# Patient Record
Sex: MALE | Race: WHITE | NOT HISPANIC OR LATINO | Employment: OTHER | URBAN - METROPOLITAN AREA
[De-identification: names, ages, dates, MRNs, and addresses within clinical notes are randomized per-mention and may not be internally consistent; named-entity substitution may affect disease eponyms.]

---

## 2017-02-27 DIAGNOSIS — E11.9 TYPE 2 DIABETES MELLITUS WITHOUT COMPLICATION: ICD-10-CM

## 2017-03-06 ENCOUNTER — HOSPITAL ENCOUNTER (EMERGENCY)
Facility: HOSPITAL | Age: 52
Discharge: HOME | End: 2017-03-06
Attending: FAMILY MEDICINE
Payer: COMMERCIAL

## 2017-03-06 VITALS
WEIGHT: 315 LBS | BODY MASS INDEX: 41.75 KG/M2 | TEMPERATURE: 98 F | RESPIRATION RATE: 18 BRPM | DIASTOLIC BLOOD PRESSURE: 78 MMHG | OXYGEN SATURATION: 97 % | SYSTOLIC BLOOD PRESSURE: 162 MMHG | HEART RATE: 92 BPM | HEIGHT: 73 IN

## 2017-03-06 DIAGNOSIS — E11.621 DIABETIC FOOT ULCER: Primary | ICD-10-CM

## 2017-03-06 DIAGNOSIS — L97.509 DIABETIC FOOT ULCER: Primary | ICD-10-CM

## 2017-03-06 LAB
ALBUMIN SERPL BCP-MCNC: 3.1 G/DL
ALP SERPL-CCNC: 111 U/L
ALT SERPL W/O P-5'-P-CCNC: 17 U/L
ANION GAP SERPL CALC-SCNC: 8 MMOL/L
AST SERPL-CCNC: 11 U/L
BASOPHILS # BLD AUTO: 0.01 K/UL
BASOPHILS NFR BLD: 0.1 %
BILIRUB SERPL-MCNC: 0.9 MG/DL
BUN SERPL-MCNC: 16 MG/DL
CALCIUM SERPL-MCNC: 9 MG/DL
CHLORIDE SERPL-SCNC: 102 MMOL/L
CO2 SERPL-SCNC: 25 MMOL/L
CREAT SERPL-MCNC: 1.1 MG/DL
DIFFERENTIAL METHOD: ABNORMAL
EOSINOPHIL # BLD AUTO: 0.1 K/UL
EOSINOPHIL NFR BLD: 1.7 %
ERYTHROCYTE [DISTWIDTH] IN BLOOD BY AUTOMATED COUNT: 13 %
EST. GFR  (AFRICAN AMERICAN): >60 ML/MIN/1.73 M^2
EST. GFR  (NON AFRICAN AMERICAN): >60 ML/MIN/1.73 M^2
GLUCOSE SERPL-MCNC: 421 MG/DL
HCT VFR BLD AUTO: 40.6 %
HGB BLD-MCNC: 13.4 G/DL
LYMPHOCYTES # BLD AUTO: 2 K/UL
LYMPHOCYTES NFR BLD: 28.2 %
MCH RBC QN AUTO: 29.8 PG
MCHC RBC AUTO-ENTMCNC: 33 %
MCV RBC AUTO: 90 FL
MONOCYTES # BLD AUTO: 0.4 K/UL
MONOCYTES NFR BLD: 5.1 %
NEUTROPHILS # BLD AUTO: 4.5 K/UL
NEUTROPHILS NFR BLD: 64.6 %
PLATELET # BLD AUTO: 280 K/UL
PMV BLD AUTO: 10 FL
POTASSIUM SERPL-SCNC: 4.5 MMOL/L
PROT SERPL-MCNC: 7.4 G/DL
RBC # BLD AUTO: 4.5 M/UL
SODIUM SERPL-SCNC: 135 MMOL/L
WBC # BLD AUTO: 7.01 K/UL

## 2017-03-06 PROCEDURE — 85025 COMPLETE CBC W/AUTO DIFF WBC: CPT

## 2017-03-06 PROCEDURE — 25000003 PHARM REV CODE 250: Performed by: PHYSICIAN ASSISTANT

## 2017-03-06 PROCEDURE — 99284 EMERGENCY DEPT VISIT MOD MDM: CPT

## 2017-03-06 PROCEDURE — 80053 COMPREHEN METABOLIC PANEL: CPT

## 2017-03-06 PROCEDURE — 99285 EMERGENCY DEPT VISIT HI MDM: CPT | Mod: ,,, | Performed by: PHYSICIAN ASSISTANT

## 2017-03-06 RX ORDER — AMOXICILLIN AND CLAVULANATE POTASSIUM 875; 125 MG/1; MG/1
1 TABLET, FILM COATED ORAL 2 TIMES DAILY
Qty: 20 TABLET | Refills: 0 | Status: SHIPPED | OUTPATIENT
Start: 2017-03-06 | End: 2017-03-16

## 2017-03-06 RX ORDER — AMOXICILLIN AND CLAVULANATE POTASSIUM 875; 125 MG/1; MG/1
1 TABLET, FILM COATED ORAL
Status: COMPLETED | OUTPATIENT
Start: 2017-03-06 | End: 2017-03-06

## 2017-03-06 RX ADMIN — AMOXICILLIN AND CLAVULANATE POTASSIUM 1 TABLET: 875; 125 TABLET, FILM COATED ORAL at 03:03

## 2017-03-06 NOTE — ED AVS SNAPSHOT
[facility]  [address]  [facility] [address]               Andrew Del Cid Jr.   3/6/2017 12:58 PM   ED    Description:  Male : 1965   Department:  [facility]           Your Care was Coordinated By:     Provider Role From To    Papito Robins MD Attending Provider 17 1326 17 1411    Beth Lehman MD Attending Provider 17 1412 --    Lilo Sanabria PA-C Physician Assistant 17 1412 --      Reason for Visit     Diabetic Foot Ulcer           Diagnoses this Visit        Comments    Diabetic foot ulcer    -  Primary       ED Disposition     ED Disposition Condition Comment    Discharge             To Do List           Follow-up Information     Schedule an appointment as soon as possible for a visit with Mario Devante - Podiatry.    Specialty:  Podiatry    Contact information:    [address]  [facility] [id number] [phone]    Additional information:    [facility] [address]        Schedule an appointment as soon as possible for a visit with Jeannette Herrera MD.    Specialty:  Family Medicine    Contact information:    [address] [facility]  [address]  [facility] [address]  [phone]         These Medications        Disp Refills Start End    amoxicillin-clavulanate 875-125mg (AUGMENTIN) 875-125 mg per tablet 20 tablet 0 3/6/2017 3/16/2017    Take 1 tablet by mouth 2 (two) times daily. - Oral    Pharmacy: [facility]/pharmacy #[id number] - [address] Ph #: [phone]         [facility]     [facility] -  Assistance  Registered nurses in the [facility] provide clinical advisement, health education, appointment booking, and other advisory services.  Call for this free service at [phone].             Medications           Message regarding Medications     Verify the changes and/or additions to your medication regime  listed below are the same as discussed with your clinician today.  If any of these changes or additions are incorrect, please notify your healthcare provider.        START taking these NEW medications        Refills    amoxicillin-clavulanate 875-125mg (AUGMENTIN) 875-125 mg per tablet 0    Sig: Take 1 tablet by mouth 2 (two) times daily.    Class: Print    Route: Oral      These medications were administered today        Dose Freq    amoxicillin-clavulanate 875-125mg per tablet 1 tablet 1 tablet ED 1 Time    Sig: Take 1 tablet by mouth ED 1 Time.    Class: Normal    Route: Oral    Cosign for Ordering: Required by Beth Lehman MD           Verify that the below list of medications is an accurate representation of the medications you are currently taking.  If none reported, the list may be blank. If incorrect, please contact your healthcare provider. Carry this list with you in case of emergency.           Current Medications     amlodipine (NORVASC) 5 MG tablet Take 1 tablet (5 mg total) by mouth once daily.    atorvastatin (LIPITOR) 20 MG tablet Take 1 tablet (20 mg total) by mouth once daily.    gabapentin (NEURONTIN) 300 MG capsule Take 1 capsule (300 mg total) by mouth 3 (three) times daily.    insulin NPH-insulin regular, 70/30, (NOVOLIN 70/30) 100 unit/mL (70-30) injection 50 units sq in the am and 50 units sq in the pm    lisinopril 10 MG tablet Take 1 tablet (10 mg total) by mouth once daily.    metformin (GLUCOPHAGE) 500 MG tablet Take 2 tablets (1,000 mg total) by mouth 2 (two) times daily with meals. 2 po in the am and 1 po in the pm    methocarbamol (ROBAXIN) 500 MG Tab 1 po qhs prn    ondansetron (ZOFRAN-ODT) 8 MG TbDL Take 1 tablet (8 mg total) by mouth every 8 (eight) hours as needed.    amoxicillin-clavulanate 875-125mg (AUGMENTIN) 875-125 mg per tablet Take 1 tablet by mouth 2 (two) times daily.    aspirin (ECOTRIN) 81 MG EC tablet Take 81 mg by mouth once daily.           Clinical Reference  "Information           Your Vitals Were     BP Pulse Temp Resp Height Weight    162/78 92 98.4 °F (36.9 °C) (Oral) 18 6' 1" (1.854 m) 182.3 kg (402 lb)    SpO2 BMI             97% 53.04 kg/m2         Allergies as of 3/6/2017     No Known Allergies      Immunizations Administered on Date of Encounter - 3/6/2017     None      ED Micro, Lab, POCT     Start Ordered       Status Ordering Provider    03/06/17 1500 03/06/17 1459  Comprehensive metabolic panel  STAT      Final result     03/06/17 1332 03/06/17 1332  CBC auto differential  STAT      Final result     03/06/17 1332 03/06/17 1332    STAT,   Status:  Canceled      Canceled       ED Imaging Orders     Start Ordered       Status Ordering Provider    03/06/17 1333 03/06/17 1332  X-Ray Foot Complete Right  1 time imaging      Final result         Discharge Instructions       Follow up with your PCP and podiatrist. Take the antibiotic (augmentin) twice a day - do not skip any doses and take all the medication. Continue wound care at home. Return to the ED for any new or worsening symptoms, including those listed below.        Discharge Instructions for Diabetic Foot Ulcers  You have been diagnosed with foot ulcers related to diabetes. Diabetes is a disease that makes it very hard to control your blood sugar. One dangerous complication of diabetes is a higher risk of developing serious foot problems. Wounds, even minor ones, can easily become infected when you have diabetes. These infections can become life-threatening if they aren't treated. Infections that settle in the bones can also spread throughout your foot and leg, destroying bone as they travel.    Your healthcare provider wants you to practice good diabetic foot care. This can help make sure that hot spots, small cracks, or sores are treated before they get infected. If infection is already present, medicines may be prescribed. Follow the tips on this sheet to take better care of your feet.  After surgery  If " you have had surgery, change your dressings every 12 hours to prevent infection. Regular wound care helps keep your foot free of infection and aids healing:  · Wash your hands.  · Put on disposable gloves if your foot is infected.  · Gently remove the old dressing and discard it in a plastic bag.  · Take off the gloves.  · Wash your hands again.  · Put on new gloves.  · Clean and dress the wound as directed by your healthcare provider.  · Discard any used materials or trash in a plastic bag before placing in a trash can.  · Dispose of sharp objects in specially designated sharps containers.  Daily foot check   Here is what you can do:  · With a mirror, look at the bottom of your feet every day. This way, you can catch small skin changes before they turn into bigger problems, such as ulcers, or before they become infected.  · Call your healthcare provider right away if you notice any of the following: hot spots, red streaks, swelling, cracks, sores, injuries, or foreign objects embedded in your foot.  · Before putting on shoes, check the soles and insides for ramírez or splinters. Remove these as they could break the skin or put added pressure on your feet.  Foot care  Suggestions include the following:   · Wash your feet every day; use lukewarm (not hot) water and mild soap. Make sure to wash between your toes.  · Use a soft towel to dry your feet well, especially between the toes. Pat gently; don't rub.  · Apply a cream or lanolin lotion, especially on the heels, to keep the skin smooth. If it is cracked, talk to your healthcare provider about how to treat it. Do not apply lotion between the toes as this can lead to fungal infections.   · Dust your feet with nonmedicated powder before putting on shoes, socks, or stockings. This will help keep them dry.  · Before putting on your shoes, check your socks to make sure they are not bunched up. Also make sure they don't have folds or creases in them. Even little bumps  created by bunched socks can cause a serious foot wound.   · Talk to your healthcare provider before treating calluses, corns, or bunions.  · To prevent ingrown toenails, cut toenails straight across. Try soaking your toenails in warm water to soften them before cutting.  · Try to keep your feet from getting too hot or too cold.  · Don't go barefoot.  · Avoid rough surfaces or surfaces with sharp objects.  · Don't wear shoes that are too tight or uncomfortable.  · Don't test the temperature of the bathtub water with your feet if you have diminished sensation.  · Follow your healthcare providers instructions about walking and other activity. There may be some restrictions depending on the condition of your feet. You may need special shoes or inserts to take the pressure off the ulcers.   · Take all medicines exactly as directed.  Follow-Up  Your healthcare provider may refer you to a special wound-care center for treatment.     When to call your healthcare provider  Call your healthcare provider if you have any of the following:  · Fever of 100.4°F (38°C) or higher, or as directed by your healthcare provider   · Redness, swelling, or pain in the foot that doesn't go away  · Numbness or tingling in any part of your foot  · Chills, light-headedness, or fainting  · Odor from wounds or swollen areas   Date Last Reviewed: 6/1/2016  © 8873-3779 [employer]. [address] [place] [address]. All rights reserved. This information is not intended as a substitute for professional medical care. Always follow your healthcare professional's instructions.          Diabetic Foot Care  Diabetes can lead to a number of foot complications. Fortunately, you can prevent most of these with a little extra foot care. If diabetes is not well controlled, it can cause damage to blood vessels and result in poor circulation to the foot. When the skin does not get enough blood flow, it becomes prone to pressure sores and  ulcers, which heal slowly.  Diabetes can also damage nerves, interfering with the ability to feel pain and pressure. When you cant feel your foot normally, it is easy to injure your skin, bones, and joints without knowing it. For these reasons diabetes increases the risk of fungal infections, bunions, and ulcers. An ulcer is a sore or break in the skin. With ulcers, often the skin seems to have worn away. Deep ulcers can lead to bone infection.  Gangrene is the most serious foot complication of diabetes. It usually occurs on the tips of the toes as blackened areas of skin. The black area is dead tissue. In severe cases, gangrene spreads to involve the entire toe, other toes, and the entire foot. Foot or toe amputation may be required. Good foot care and blood sugar control can prevent this.  Home care  · Wear comfortable, well-fitting shoes.  · Wash your feet daily with warm water and mild soap.  · After drying, apply a moisturizing cream or lotion to the top and bottom of your feet. Don't put lotion between toes.  · Check your feet daily for skin breaks, blisters, swelling, or redness. Look between your toes as well. If you cannot see the bottoms of your feet, ask someone to look or use a mirror.  · Wear cotton socks and change them every day.  · Trim toenails carefully, and do not cut your cuticles.  · Strive to keep your blood sugar under control with a combination of medicines, diet, and activity.  · If you smoke and have diabetes, it is very important that you stop. Smoking reduces blood flow to your foot.  · Schedule foot exams at least every year, or more often if you have foot problems.  · Put your feet up when sitting, wiggle toes, and move ankles to help improve blood flow.  Avoid activities that increase your risk of foot injury:  · Do not walk barefoot.  · Do not use heating pads or hot water bottles on your feet.  · Do not put your foot in a hot tub without first checking the temperature with your  hand.  Follow-up care  Follow up with your healthcare provider, or as advised. Be sure to take off your shoes and socks before your appointment starts so your healthcare provider will be sure to check your feet. Report any cut, puncture, scrape, blister, or other injury to your foot. Also report if you have a bunion, hammertoes, ingrown toenail, or ulcer on your foot.  When to seek medical advice  Call your healthcare provider right away if any of these occur:  · Black skin color anywhere on the foot  · Open ulcer with pus draining from the wound  · Increasing foot or leg pain  · New areas of redness or swelling or tender areas of the foot  · Fever of 100.4°F (38°C) or greater  Date Last Reviewed: 5/25/2016  © 2528-3731 [employer]. [address] [place] [address], [place] [address]. All rights reserved. This information is not intended as a substitute for professional medical care. Always follow your healthcare professional's instructions.           [facility] complies with applicable Federal civil rights laws and does not discriminate on the basis of race, color, national origin, age, disability, or sex.        Language Assistance Services     ATTENTION: Language assistance services are available, free of charge. Please call [phone].      ATENCIÓN: Si habla aleena, tiene a antony disposición servicios gratuitos de asistencia lingüística. Llame al [phone].     CHÚ Ý: N?u b?n nói Ti?ng Vi?t, có các d?ch v? h? tr? ngôn ng? mi?n phí dành cho b?n. G?i s? [phone].

## 2017-03-06 NOTE — ED TRIAGE NOTES
Pt states hx of diabetic ulcer on right great toe.  States blister started approx 2 weeks ago.  Usually heals silver nitrate.  Wife order meds yesterday.  Followed by foot md , will not be able see md for about 3 weeks.  Going out of town next week.  And has jury duty the following week.  Pt needs medication to heal until seeing md to avoid further infection.

## 2017-03-06 NOTE — DISCHARGE INSTRUCTIONS
Follow up with your PCP and podiatrist. Take the antibiotic (augmentin) twice a day - do not skip any doses and take all the medication. Continue wound care at home. Return to the ED for any new or worsening symptoms, including those listed below.        Discharge Instructions for Diabetic Foot Ulcers  You have been diagnosed with foot ulcers related to diabetes. Diabetes is a disease that makes it very hard to control your blood sugar. One dangerous complication of diabetes is a higher risk of developing serious foot problems. Wounds, even minor ones, can easily become infected when you have diabetes. These infections can become life-threatening if they aren't treated. Infections that settle in the bones can also spread throughout your foot and leg, destroying bone as they travel.    Your healthcare provider wants you to practice good diabetic foot care. This can help make sure that hot spots, small cracks, or sores are treated before they get infected. If infection is already present, medicines may be prescribed. Follow the tips on this sheet to take better care of your feet.  After surgery  If you have had surgery, change your dressings every 12 hours to prevent infection. Regular wound care helps keep your foot free of infection and aids healing:  · Wash your hands.  · Put on disposable gloves if your foot is infected.  · Gently remove the old dressing and discard it in a plastic bag.  · Take off the gloves.  · Wash your hands again.  · Put on new gloves.  · Clean and dress the wound as directed by your healthcare provider.  · Discard any used materials or trash in a plastic bag before placing in a trash can.  · Dispose of sharp objects in specially designated sharps containers.  Daily foot check   Here is what you can do:  · With a mirror, look at the bottom of your feet every day. This way, you can catch small skin changes before they turn into bigger problems, such as ulcers, or before they become  infected.  · Call your healthcare provider right away if you notice any of the following: hot spots, red streaks, swelling, cracks, sores, injuries, or foreign objects embedded in your foot.  · Before putting on shoes, check the soles and insides for ramírez or splinters. Remove these as they could break the skin or put added pressure on your feet.  Foot care  Suggestions include the following:   · Wash your feet every day; use lukewarm (not hot) water and mild soap. Make sure to wash between your toes.  · Use a soft towel to dry your feet well, especially between the toes. Pat gently; don't rub.  · Apply a cream or lanolin lotion, especially on the heels, to keep the skin smooth. If it is cracked, talk to your healthcare provider about how to treat it. Do not apply lotion between the toes as this can lead to fungal infections.   · Dust your feet with nonmedicated powder before putting on shoes, socks, or stockings. This will help keep them dry.  · Before putting on your shoes, check your socks to make sure they are not bunched up. Also make sure they don't have folds or creases in them. Even little bumps created by bunched socks can cause a serious foot wound.   · Talk to your healthcare provider before treating calluses, corns, or bunions.  · To prevent ingrown toenails, cut toenails straight across. Try soaking your toenails in warm water to soften them before cutting.  · Try to keep your feet from getting too hot or too cold.  · Don't go barefoot.  · Avoid rough surfaces or surfaces with sharp objects.  · Don't wear shoes that are too tight or uncomfortable.  · Don't test the temperature of the bathtub water with your feet if you have diminished sensation.  · Follow your healthcare providers instructions about walking and other activity. There may be some restrictions depending on the condition of your feet. You may need special shoes or inserts to take the pressure off the ulcers.   · Take all medicines  exactly as directed.  Follow-Up  Your healthcare provider may refer you to a special wound-care center for treatment.     When to call your healthcare provider  Call your healthcare provider if you have any of the following:  · Fever of 100.4°F (38°C) or higher, or as directed by your healthcare provider   · Redness, swelling, or pain in the foot that doesn't go away  · Numbness or tingling in any part of your foot  · Chills, light-headedness, or fainting  · Odor from wounds or swollen areas   Date Last Reviewed: 6/1/2016 © 2000-2016 [employer]. [address] [place] [address]. All rights reserved. This information is not intended as a substitute for professional medical care. Always follow your healthcare professional's instructions.          Diabetic Foot Care  Diabetes can lead to a number of foot complications. Fortunately, you can prevent most of these with a little extra foot care. If diabetes is not well controlled, it can cause damage to blood vessels and result in poor circulation to the foot. When the skin does not get enough blood flow, it becomes prone to pressure sores and ulcers, which heal slowly.  Diabetes can also damage nerves, interfering with the ability to feel pain and pressure. When you cant feel your foot normally, it is easy to injure your skin, bones, and joints without knowing it. For these reasons diabetes increases the risk of fungal infections, bunions, and ulcers. An ulcer is a sore or break in the skin. With ulcers, often the skin seems to have worn away. Deep ulcers can lead to bone infection.  Gangrene is the most serious foot complication of diabetes. It usually occurs on the tips of the toes as blackened areas of skin. The black area is dead tissue. In severe cases, gangrene spreads to involve the entire toe, other toes, and the entire foot. Foot or toe amputation may be required. Good foot care and blood sugar control can prevent this.  Home  care  · Wear comfortable, well-fitting shoes.  · Wash your feet daily with warm water and mild soap.  · After drying, apply a moisturizing cream or lotion to the top and bottom of your feet. Don't put lotion between toes.  · Check your feet daily for skin breaks, blisters, swelling, or redness. Look between your toes as well. If you cannot see the bottoms of your feet, ask someone to look or use a mirror.  · Wear cotton socks and change them every day.  · Trim toenails carefully, and do not cut your cuticles.  · Strive to keep your blood sugar under control with a combination of medicines, diet, and activity.  · If you smoke and have diabetes, it is very important that you stop. Smoking reduces blood flow to your foot.  · Schedule foot exams at least every year, or more often if you have foot problems.  · Put your feet up when sitting, wiggle toes, and move ankles to help improve blood flow.  Avoid activities that increase your risk of foot injury:  · Do not walk barefoot.  · Do not use heating pads or hot water bottles on your feet.  · Do not put your foot in a hot tub without first checking the temperature with your hand.  Follow-up care  Follow up with your healthcare provider, or as advised. Be sure to take off your shoes and socks before your appointment starts so your healthcare provider will be sure to check your feet. Report any cut, puncture, scrape, blister, or other injury to your foot. Also report if you have a bunion, hammertoes, ingrown toenail, or ulcer on your foot.  When to seek medical advice  Call your healthcare provider right away if any of these occur:  · Black skin color anywhere on the foot  · Open ulcer with pus draining from the wound  · Increasing foot or leg pain  · New areas of redness or swelling or tender areas of the foot  · Fever of 100.4°F (38°C) or greater  Date Last Reviewed: 5/25/2016  © 3027-9159 [employer]. [address] [place] [address], [place] [address]. All rights  reserved. This information is not intended as a substitute for professional medical care. Always follow your healthcare professional's instructions.

## 2017-03-06 NOTE — PROVIDER PROGRESS NOTES - EMERGENCY DEPT.
Encounter Date: 3/6/2017    ED Physician Progress Notes        Physician Note:   Patient is a 51-year-old man with a history of poorly controlled diabetes and a history of prior diabetic foot ulcers past the same area has become ulcerated infected requiring hospitalization and approximately 18 months to heal over the past week.  The callus has gotten larger and more tender and over the last few days.  The to ruptured and he's been draining purulent fluid.  Since then he doesn't have any major systemic symptoms.  We will refer to the ED side for evaluation and decision on management

## 2017-03-06 NOTE — ED PROVIDER NOTES
Encounter Date: 3/6/2017       History     Chief Complaint   Patient presents with    Diabetic Foot Ulcer     getting bigger, denies chills and no fever     Review of patient's allergies indicates:  No Known Allergies    HPI Comments: 50 y/o WM with PMHx of DM2, HTN, hyperlipidemia, diabetic neuropathy presents to the ED c/o diabetic ulcer to R great toe.  He noticed a blister to the plantar aspect of the R great toe 2 weeks ago that pooped and has been draining serous fluid. He denies any purulent drainage. He had an ulceration to the same area in the past that took months to heal.  He has been applying antibiotic ointment and keeping the area clean. He denies any trauma to the toe. He is followed by podiatry but will be unable to see them for the next 2 weeks because he is going on a cruise then has jury duty. He is a poorly controlled and his glucose this morning was 271. He denies any pain to the foot. He denies f/c, chest pain, SOB, abdominal pain, n/v. He denies tobacco, alcohol, or drug use.     The history is provided by the patient.     Past Medical History:   Diagnosis Date    Diabetes mellitus, type 2     Hyperlipidemia     Hypertension     Peripheral neuropathy      Past Surgical History:   Procedure Laterality Date    APPENDECTOMY      HERNIA REPAIR       History reviewed. No pertinent family history.  Social History   Substance Use Topics    Smoking status: Never Smoker    Smokeless tobacco: None    Alcohol use Yes     Review of Systems   Constitutional: Negative for chills and fever.   HENT: Negative for congestion, rhinorrhea and sore throat.    Eyes: Negative for photophobia and visual disturbance.   Respiratory: Negative for shortness of breath.    Cardiovascular: Negative for chest pain.   Gastrointestinal: Negative for abdominal pain, constipation, diarrhea, nausea and vomiting.   Genitourinary: Negative for dysuria and hematuria.   Musculoskeletal: Negative for neck pain and neck  stiffness.   Skin: Positive for color change and wound.   Neurological: Negative for dizziness, syncope, weakness, light-headedness, numbness and headaches.   Psychiatric/Behavioral: Negative for confusion.       Physical Exam   Initial Vitals   BP Pulse Resp Temp SpO2   03/06/17 1154 03/06/17 1154 03/06/17 1154 03/06/17 1154 03/06/17 1154   162/78 92 18 98.4 °F (36.9 °C) 97 %     Physical Exam    Nursing note and vitals reviewed.  Constitutional: He appears well-developed and well-nourished. He is not diaphoretic. No distress.   HENT:   Head: Normocephalic and atraumatic.   Neck: Normal range of motion. Neck supple.   Cardiovascular: Normal rate, regular rhythm and normal heart sounds. Exam reveals no gallop and no friction rub.    No murmur heard.  Pulmonary/Chest: Breath sounds normal. He has no wheezes. He has no rhonchi. He has no rales.   Abdominal: Soft. Bowel sounds are normal. There is no tenderness. There is no rebound and no guarding.   Musculoskeletal: Normal range of motion.        Right foot: There is no tenderness and no bony tenderness.        Feet:    R pedal pulse 2+. Swelling noted to the R foot.    Neurological: He is alert and oriented to person, place, and time.   Skin: Skin is warm and dry. No rash noted. There is erythema (R great toe).   Psychiatric: He has a normal mood and affect.         ED Course   Procedures  Labs Reviewed   CBC W/ AUTO DIFFERENTIAL - Abnormal; Notable for the following:        Result Value    RBC 4.50 (*)     Hemoglobin 13.4 (*)     All other components within normal limits   COMPREHENSIVE METABOLIC PANEL - Abnormal; Notable for the following:     Sodium 135 (*)     Glucose 421 (*)     Albumin 3.1 (*)     All other components within normal limits        Imaging Results         X-Ray Foot Complete Right (Final result) Result time:  03/06/17 14:44:08    Final result by Rob Iraheta Jr., MD (03/06/17 14:44:08)    Narrative:    The right foot 3 views compared to  February 2015.  There are erosive changes involving the tarsometatarsal joints.  Hallux valgus and bunion formation noted.  Some bony fragmentation of the distal tarsals also evident.  Calcaneal spur is noted.    Impression significant detrimental change with marked erosive changes at the tarsometatarsal joints with some fragmentation and subluxation.  This likely represent Charcot joint however osteomyelitis not excluded.  Soft tissue swelling evident.      Electronically signed by: DOMINIQUE PHELPS MD  Date:     03/06/17  Time:    14:44                  Medical Decision Making:   History:   Old Medical Records: I decided to obtain old medical records.  Clinical Tests:   Lab Tests: Ordered and Reviewed  Radiological Study: Ordered and Reviewed       APC / Resident Notes:   52 y/o WM with PMHx of DM2, HTN, hyperlipidemia, diabetic neuropathy presents to the ED c/o diabetic ulcer to R great toe.  RRR without murmurs.  Lungs CTA bilaterally without wheezes. Abdomen soft and nontender. Ulceration noted to the plantar aspect of the R great toe. No purulent drainage. There is surrounding erythema noted of the toe. There is some swelling of the R foot. R pedal pulse 2+. Normal ROM of the foot. DDx includes but is not limited to diabetic foot ulceration with infection, fracture, retained foreign body. Labs and xray of the R foot ordered by intake physician. Will monitor for results.     CBC without leukocytosis with WBC 7.01. CMP shows hyperglycemia at 421 but no other acute abnormalities.    Xray of the R foot shows erosive changes at the tarsometatarsal joints. The ulceration is located at the plantar aspect of the R great toe distal to this abnormality on xray. Will treat for infected diabetic foot ulcer.     He was discharged with a prescription for augmentin (first dose given in the ED).  He will follow up with his podiatrist.  All of the patient's questions were answered.  I reviewed the patient's chart, labs, and  imaging and discussed the case with my supervising physician.            Attending Attestation:     Physician Attestation Statement for NP/PA:   I discussed this assessment and plan of this patient with the NP/PA, but I did not personally examine the patient. The face to face encounter was performed by the NP/PA.    Other NP/PA Attestation Additions:    History of Present Illness: 50 yo m, diabetic foot ulcer, increasing in size but no signs infection, no systemic sx, pt well-appearing clinically with normal VS.  No obvious signs osteo on xray.  Needs close f/u wound care and PCP for definitive management of ulcer.                   ED Course     Clinical Impression:   The encounter diagnosis was Diabetic foot ulcer.    Disposition:   Disposition: Discharged  Condition: Stable       Lilo Sanabria PA-C  03/06/17 [id number]       Beth Lehman MD  03/13/17 [id number]

## 2017-03-06 NOTE — ED NOTES
Pt identifiers Andrew Del Cid Jr. was checked and is correct  LOC: The patient is awake, alert, aware of environment with an appropriate affect. Oriented x3, speaking appropriately  APPEARANCE: Pt states hx of diabetic ulcer on right great toe.  States blister started approx 2 weeks ago.  Usually heals silver nitrate.  Wife order meds yesterday.  Followed by foot md , will not be able see md for about 3 weeks.  Going out of town next week.  And has jury duty the following week.  Pt needs medication to heal until seeing md to avoid further infection.   SKIN: Skin warm, dry and intact, normal skin turgor, moist mucus membranes. Large blister noted to right great toe.  Open and draining.    RESPIRATORY: Airway is open and patent, respirations are spontaneous, even and unlabored  CARDIAC: Normal rate and rhythm, no peripheral edema noted  ABDOMEN: Soft, nontender, nondistended. Bowel sounds present   NEUROLOGIC:patient moving all extremities spontaneously, normal sensation in all extremities when touched with a finger.  Follows all commands appropriately  MUSCULOSKELETAL: No obvious deformities. Pt has diabetic shoes

## 2017-05-07 RX ORDER — AMLODIPINE BESYLATE 5 MG/1
TABLET ORAL
Qty: 90 TABLET | Refills: 2 | Status: SHIPPED | OUTPATIENT
Start: 2017-05-07 | End: 2018-09-25 | Stop reason: SDUPTHER

## 2017-05-12 ENCOUNTER — PATIENT OUTREACH (OUTPATIENT)
Dept: ADMINISTRATIVE | Facility: HOSPITAL | Age: 52
End: 2017-05-12

## 2017-06-29 ENCOUNTER — HOSPITAL ENCOUNTER (INPATIENT)
Facility: HOSPITAL | Age: 52
LOS: 4 days | Discharge: HOME HEALTH | DRG: 478 | End: 2017-07-03
Attending: EMERGENCY MEDICINE | Admitting: HOSPITALIST
Payer: COMMERCIAL

## 2017-06-29 DIAGNOSIS — I73.9 PVD (PERIPHERAL VASCULAR DISEASE): ICD-10-CM

## 2017-06-29 DIAGNOSIS — E08.621 DIABETIC ULCER OF TOE OF RIGHT FOOT ASSOCIATED WITH DIABETES MELLITUS DUE TO UNDERLYING CONDITION, WITH FAT LAYER EXPOSED: Primary | ICD-10-CM

## 2017-06-29 DIAGNOSIS — L03.031 CELLULITIS OF TOE OF RIGHT FOOT: ICD-10-CM

## 2017-06-29 DIAGNOSIS — Z98.890 POSTOPERATIVE STATE: ICD-10-CM

## 2017-06-29 DIAGNOSIS — L97.512 DIABETIC ULCER OF TOE OF RIGHT FOOT ASSOCIATED WITH DIABETES MELLITUS DUE TO UNDERLYING CONDITION, WITH FAT LAYER EXPOSED: Primary | ICD-10-CM

## 2017-06-29 DIAGNOSIS — M86.171 ACUTE OSTEOMYELITIS OF TOE OF RIGHT FOOT: ICD-10-CM

## 2017-06-29 DIAGNOSIS — L97.519 DIABETIC ULCER OF RIGHT GREAT TOE: ICD-10-CM

## 2017-06-29 DIAGNOSIS — M86.271 SUBACUTE OSTEOMYELITIS OF RIGHT FOOT: ICD-10-CM

## 2017-06-29 DIAGNOSIS — E11.621 DIABETIC ULCER OF RIGHT GREAT TOE: ICD-10-CM

## 2017-06-29 LAB
ALBUMIN SERPL BCP-MCNC: 2.9 G/DL
ALP SERPL-CCNC: 119 U/L
ALT SERPL W/O P-5'-P-CCNC: 12 U/L
ANION GAP SERPL CALC-SCNC: 10 MMOL/L
AST SERPL-CCNC: 7 U/L
BASOPHILS # BLD AUTO: 0.01 K/UL
BASOPHILS NFR BLD: 0.1 %
BILIRUB SERPL-MCNC: 1.1 MG/DL
BUN SERPL-MCNC: 28 MG/DL
CALCIUM SERPL-MCNC: 8.8 MG/DL
CHLORIDE SERPL-SCNC: 97 MMOL/L
CO2 SERPL-SCNC: 24 MMOL/L
CREAT SERPL-MCNC: 1.2 MG/DL
DIFFERENTIAL METHOD: ABNORMAL
EOSINOPHIL # BLD AUTO: 0.1 K/UL
EOSINOPHIL NFR BLD: 1 %
ERYTHROCYTE [DISTWIDTH] IN BLOOD BY AUTOMATED COUNT: 12.4 %
EST. GFR  (AFRICAN AMERICAN): >60 ML/MIN/1.73 M^2
EST. GFR  (NON AFRICAN AMERICAN): >60 ML/MIN/1.73 M^2
GLUCOSE SERPL-MCNC: 310 MG/DL
HCT VFR BLD AUTO: 38.3 %
HGB BLD-MCNC: 12.8 G/DL
LACTATE SERPL-SCNC: 1.5 MMOL/L
LYMPHOCYTES # BLD AUTO: 2 K/UL
LYMPHOCYTES NFR BLD: 19.9 %
MCH RBC QN AUTO: 29 PG
MCHC RBC AUTO-ENTMCNC: 33.4 %
MCV RBC AUTO: 87 FL
MONOCYTES # BLD AUTO: 0.6 K/UL
MONOCYTES NFR BLD: 6.1 %
NEUTROPHILS # BLD AUTO: 7.3 K/UL
NEUTROPHILS NFR BLD: 72.5 %
PLATELET # BLD AUTO: 324 K/UL
PMV BLD AUTO: 9.3 FL
POTASSIUM SERPL-SCNC: 3.9 MMOL/L
PROT SERPL-MCNC: 7.5 G/DL
RBC # BLD AUTO: 4.41 M/UL
SODIUM SERPL-SCNC: 131 MMOL/L
WBC # BLD AUTO: 10.12 K/UL

## 2017-06-29 PROCEDURE — 96367 TX/PROPH/DG ADDL SEQ IV INF: CPT

## 2017-06-29 PROCEDURE — 12000002 HC ACUTE/MED SURGE SEMI-PRIVATE ROOM

## 2017-06-29 PROCEDURE — 96365 THER/PROPH/DIAG IV INF INIT: CPT

## 2017-06-29 PROCEDURE — 99285 EMERGENCY DEPT VISIT HI MDM: CPT | Mod: ,,, | Performed by: PHYSICIAN ASSISTANT

## 2017-06-29 PROCEDURE — 99284 EMERGENCY DEPT VISIT MOD MDM: CPT | Mod: 25

## 2017-06-29 PROCEDURE — 87070 CULTURE OTHR SPECIMN AEROBIC: CPT

## 2017-06-29 PROCEDURE — 85025 COMPLETE CBC W/AUTO DIFF WBC: CPT

## 2017-06-29 PROCEDURE — 87186 SC STD MICRODIL/AGAR DIL: CPT | Mod: 59

## 2017-06-29 PROCEDURE — 87077 CULTURE AEROBIC IDENTIFY: CPT

## 2017-06-29 PROCEDURE — 83605 ASSAY OF LACTIC ACID: CPT

## 2017-06-29 PROCEDURE — 87040 BLOOD CULTURE FOR BACTERIA: CPT | Mod: 59

## 2017-06-29 PROCEDURE — 96366 THER/PROPH/DIAG IV INF ADDON: CPT

## 2017-06-29 PROCEDURE — 80053 COMPREHEN METABOLIC PANEL: CPT

## 2017-06-29 RX ORDER — CEFEPIME HYDROCHLORIDE 2 G/50ML
2 INJECTION, SOLUTION INTRAVENOUS
Status: COMPLETED | OUTPATIENT
Start: 2017-06-30 | End: 2017-06-30

## 2017-06-30 PROBLEM — L03.031 CELLULITIS OF TOE OF RIGHT FOOT: Status: ACTIVE | Noted: 2017-06-30

## 2017-06-30 PROBLEM — I10 ESSENTIAL HYPERTENSION: Status: ACTIVE | Noted: 2017-06-30

## 2017-06-30 PROBLEM — E11.610 DIABETIC CHARCOT FOOT: Status: ACTIVE | Noted: 2017-06-30

## 2017-06-30 PROBLEM — R73.9 HYPERGLYCEMIA: Status: ACTIVE | Noted: 2017-06-30

## 2017-06-30 LAB
BACTERIA #/AREA URNS AUTO: ABNORMAL /HPF
BILIRUB UR QL STRIP: NEGATIVE
CLARITY UR REFRACT.AUTO: ABNORMAL
COLOR UR AUTO: YELLOW
GLUCOSE UR QL STRIP: ABNORMAL
HGB UR QL STRIP: NEGATIVE
HYALINE CASTS UR QL AUTO: 4 /LPF
KETONES UR QL STRIP: NEGATIVE
LEUKOCYTE ESTERASE UR QL STRIP: NEGATIVE
MICROSCOPIC COMMENT: ABNORMAL
NITRITE UR QL STRIP: NEGATIVE
PH UR STRIP: 5 [PH] (ref 5–8)
POCT GLUCOSE: 137 MG/DL (ref 70–110)
POCT GLUCOSE: 156 MG/DL (ref 70–110)
POCT GLUCOSE: 254 MG/DL (ref 70–110)
POCT GLUCOSE: 260 MG/DL (ref 70–110)
PROT UR QL STRIP: NEGATIVE
SP GR UR STRIP: 1.02 (ref 1–1.03)
SQUAMOUS #/AREA URNS AUTO: 0 /HPF
URN SPEC COLLECT METH UR: ABNORMAL
UROBILINOGEN UR STRIP-ACNC: NEGATIVE EU/DL
VANCOMYCIN TROUGH SERPL-MCNC: 18.7 UG/ML
WBC #/AREA URNS AUTO: 0 /HPF (ref 0–5)
YEAST UR QL AUTO: ABNORMAL

## 2017-06-30 PROCEDURE — 63600175 PHARM REV CODE 636 W HCPCS: Performed by: PHYSICIAN ASSISTANT

## 2017-06-30 PROCEDURE — 87070 CULTURE OTHR SPECIMN AEROBIC: CPT

## 2017-06-30 PROCEDURE — 25000003 PHARM REV CODE 250: Performed by: PHYSICIAN ASSISTANT

## 2017-06-30 PROCEDURE — 63600175 PHARM REV CODE 636 W HCPCS: Performed by: HOSPITALIST

## 2017-06-30 PROCEDURE — 25000003 PHARM REV CODE 250: Performed by: HOSPITALIST

## 2017-06-30 PROCEDURE — 80202 ASSAY OF VANCOMYCIN: CPT

## 2017-06-30 PROCEDURE — 99223 1ST HOSP IP/OBS HIGH 75: CPT | Mod: ,,, | Performed by: HOSPITALIST

## 2017-06-30 PROCEDURE — 99223 1ST HOSP IP/OBS HIGH 75: CPT | Mod: ,,, | Performed by: PODIATRIST

## 2017-06-30 PROCEDURE — 81003 URINALYSIS AUTO W/O SCOPE: CPT

## 2017-06-30 PROCEDURE — 87075 CULTR BACTERIA EXCEPT BLOOD: CPT

## 2017-06-30 PROCEDURE — 11000001 HC ACUTE MED/SURG PRIVATE ROOM

## 2017-06-30 PROCEDURE — 87077 CULTURE AEROBIC IDENTIFY: CPT

## 2017-06-30 PROCEDURE — 87186 SC STD MICRODIL/AGAR DIL: CPT

## 2017-06-30 PROCEDURE — 81001 URINALYSIS AUTO W/SCOPE: CPT

## 2017-06-30 PROCEDURE — 36415 COLL VENOUS BLD VENIPUNCTURE: CPT

## 2017-06-30 RX ORDER — IBUPROFEN 200 MG
24 TABLET ORAL
Status: DISCONTINUED | OUTPATIENT
Start: 2017-06-30 | End: 2017-07-03 | Stop reason: HOSPADM

## 2017-06-30 RX ORDER — OXYCODONE HYDROCHLORIDE 5 MG/1
15 TABLET ORAL EVERY 4 HOURS PRN
Status: DISCONTINUED | OUTPATIENT
Start: 2017-06-30 | End: 2017-07-03 | Stop reason: HOSPADM

## 2017-06-30 RX ORDER — GLUCAGON 1 MG
1 KIT INJECTION
Status: DISCONTINUED | OUTPATIENT
Start: 2017-06-30 | End: 2017-07-03 | Stop reason: HOSPADM

## 2017-06-30 RX ORDER — SODIUM CHLORIDE 9 MG/ML
INJECTION, SOLUTION INTRAVENOUS CONTINUOUS
Status: DISCONTINUED | OUTPATIENT
Start: 2017-06-30 | End: 2017-07-02

## 2017-06-30 RX ORDER — HYDROCODONE BITARTRATE AND ACETAMINOPHEN 5; 325 MG/1; MG/1
1 TABLET ORAL EVERY 4 HOURS PRN
Status: DISCONTINUED | OUTPATIENT
Start: 2017-06-30 | End: 2017-07-03 | Stop reason: HOSPADM

## 2017-06-30 RX ORDER — INSULIN ASPART 100 [IU]/ML
1-10 INJECTION, SOLUTION INTRAVENOUS; SUBCUTANEOUS
Status: DISCONTINUED | OUTPATIENT
Start: 2017-06-30 | End: 2017-07-03 | Stop reason: HOSPADM

## 2017-06-30 RX ORDER — OXYCODONE HYDROCHLORIDE 5 MG/1
10 TABLET ORAL EVERY 4 HOURS PRN
Status: DISCONTINUED | OUTPATIENT
Start: 2017-06-30 | End: 2017-07-03 | Stop reason: HOSPADM

## 2017-06-30 RX ORDER — AMLODIPINE BESYLATE 5 MG/1
5 TABLET ORAL DAILY
Status: DISCONTINUED | OUTPATIENT
Start: 2017-06-30 | End: 2017-07-03 | Stop reason: HOSPADM

## 2017-06-30 RX ORDER — INSULIN ASPART 100 [IU]/ML
8 INJECTION, SOLUTION INTRAVENOUS; SUBCUTANEOUS
Status: DISCONTINUED | OUTPATIENT
Start: 2017-06-30 | End: 2017-07-01

## 2017-06-30 RX ORDER — LISINOPRIL 10 MG/1
10 TABLET ORAL DAILY
Status: DISCONTINUED | OUTPATIENT
Start: 2017-06-30 | End: 2017-07-03 | Stop reason: HOSPADM

## 2017-06-30 RX ORDER — ONDANSETRON 2 MG/ML
4 INJECTION INTRAMUSCULAR; INTRAVENOUS EVERY 12 HOURS PRN
Status: DISCONTINUED | OUTPATIENT
Start: 2017-06-30 | End: 2017-07-03 | Stop reason: HOSPADM

## 2017-06-30 RX ORDER — CIPROFLOXACIN 2 MG/ML
400 INJECTION, SOLUTION INTRAVENOUS
Status: DISCONTINUED | OUTPATIENT
Start: 2017-06-30 | End: 2017-07-02

## 2017-06-30 RX ORDER — HEPARIN SODIUM 5000 [USP'U]/ML
7500 INJECTION, SOLUTION INTRAVENOUS; SUBCUTANEOUS EVERY 8 HOURS
Status: DISCONTINUED | OUTPATIENT
Start: 2017-06-30 | End: 2017-07-03 | Stop reason: HOSPADM

## 2017-06-30 RX ORDER — IBUPROFEN 200 MG
16 TABLET ORAL
Status: DISCONTINUED | OUTPATIENT
Start: 2017-06-30 | End: 2017-07-03 | Stop reason: HOSPADM

## 2017-06-30 RX ORDER — ACETAMINOPHEN 325 MG/1
650 TABLET ORAL EVERY 6 HOURS PRN
Status: DISCONTINUED | OUTPATIENT
Start: 2017-06-30 | End: 2017-07-03 | Stop reason: HOSPADM

## 2017-06-30 RX ORDER — METHOCARBAMOL 500 MG/1
500 TABLET, FILM COATED ORAL EVERY 8 HOURS PRN
Status: DISCONTINUED | OUTPATIENT
Start: 2017-06-30 | End: 2017-07-03 | Stop reason: HOSPADM

## 2017-06-30 RX ORDER — ASPIRIN 81 MG/1
81 TABLET ORAL DAILY
Status: DISCONTINUED | OUTPATIENT
Start: 2017-06-30 | End: 2017-07-03 | Stop reason: HOSPADM

## 2017-06-30 RX ORDER — POLYETHYLENE GLYCOL 3350 17 G/17G
17 POWDER, FOR SOLUTION ORAL DAILY
Status: DISCONTINUED | OUTPATIENT
Start: 2017-06-30 | End: 2017-07-03 | Stop reason: HOSPADM

## 2017-06-30 RX ORDER — IPRATROPIUM BROMIDE AND ALBUTEROL SULFATE 2.5; .5 MG/3ML; MG/3ML
3 SOLUTION RESPIRATORY (INHALATION) EVERY 4 HOURS PRN
Status: DISCONTINUED | OUTPATIENT
Start: 2017-06-30 | End: 2017-07-03 | Stop reason: HOSPADM

## 2017-06-30 RX ORDER — GABAPENTIN 300 MG/1
300 CAPSULE ORAL 3 TIMES DAILY
Status: DISCONTINUED | OUTPATIENT
Start: 2017-06-30 | End: 2017-07-03 | Stop reason: HOSPADM

## 2017-06-30 RX ADMIN — INSULIN ASPART 8 UNITS: 100 INJECTION, SOLUTION INTRAVENOUS; SUBCUTANEOUS at 09:06

## 2017-06-30 RX ADMIN — CEFEPIME HYDROCHLORIDE 2 G: 2 INJECTION, SOLUTION INTRAVENOUS at 12:06

## 2017-06-30 RX ADMIN — SODIUM CHLORIDE: 0.9 INJECTION, SOLUTION INTRAVENOUS at 03:06

## 2017-06-30 RX ADMIN — INSULIN DETEMIR 20 UNITS: 100 INJECTION, SOLUTION SUBCUTANEOUS at 08:06

## 2017-06-30 RX ADMIN — GABAPENTIN 300 MG: 300 CAPSULE ORAL at 01:06

## 2017-06-30 RX ADMIN — GABAPENTIN 300 MG: 300 CAPSULE ORAL at 06:06

## 2017-06-30 RX ADMIN — LISINOPRIL 10 MG: 10 TABLET ORAL at 08:06

## 2017-06-30 RX ADMIN — SODIUM CHLORIDE 1000 ML: 0.9 INJECTION, SOLUTION INTRAVENOUS at 12:06

## 2017-06-30 RX ADMIN — HEPARIN SODIUM 7500 UNITS: 5000 INJECTION, SOLUTION INTRAVENOUS; SUBCUTANEOUS at 06:06

## 2017-06-30 RX ADMIN — INSULIN ASPART 8 UNITS: 100 INJECTION, SOLUTION INTRAVENOUS; SUBCUTANEOUS at 01:06

## 2017-06-30 RX ADMIN — INSULIN ASPART 2 UNITS: 100 INJECTION, SOLUTION INTRAVENOUS; SUBCUTANEOUS at 05:06

## 2017-06-30 RX ADMIN — AMLODIPINE BESYLATE 5 MG: 5 TABLET ORAL at 08:06

## 2017-06-30 RX ADMIN — SODIUM CHLORIDE: 0.9 INJECTION, SOLUTION INTRAVENOUS at 08:06

## 2017-06-30 RX ADMIN — GABAPENTIN 300 MG: 300 CAPSULE ORAL at 09:06

## 2017-06-30 RX ADMIN — INSULIN ASPART 6 UNITS: 100 INJECTION, SOLUTION INTRAVENOUS; SUBCUTANEOUS at 09:06

## 2017-06-30 RX ADMIN — SODIUM CHLORIDE, PRESERVATIVE FREE 1000 ML: 5 INJECTION INTRAVENOUS at 08:06

## 2017-06-30 RX ADMIN — VANCOMYCIN HYDROCHLORIDE 1500 MG: 100 INJECTION, POWDER, LYOPHILIZED, FOR SOLUTION INTRAVENOUS at 08:06

## 2017-06-30 RX ADMIN — HEPARIN SODIUM 7500 UNITS: 5000 INJECTION, SOLUTION INTRAVENOUS; SUBCUTANEOUS at 09:06

## 2017-06-30 RX ADMIN — VANCOMYCIN HYDROCHLORIDE 1500 MG: 100 INJECTION, POWDER, LYOPHILIZED, FOR SOLUTION INTRAVENOUS at 01:06

## 2017-06-30 RX ADMIN — HEPARIN SODIUM 7500 UNITS: 5000 INJECTION, SOLUTION INTRAVENOUS; SUBCUTANEOUS at 01:06

## 2017-06-30 RX ADMIN — CIPROFLOXACIN 400 MG: 2 INJECTION, SOLUTION INTRAVENOUS at 11:06

## 2017-06-30 RX ADMIN — INSULIN ASPART 8 UNITS: 100 INJECTION, SOLUTION INTRAVENOUS; SUBCUTANEOUS at 05:06

## 2017-06-30 RX ADMIN — VANCOMYCIN HYDROCHLORIDE 2750 MG: 750 INJECTION, POWDER, LYOPHILIZED, FOR SOLUTION INTRAVENOUS at 12:06

## 2017-06-30 RX ADMIN — ASPIRIN 81 MG: 81 TABLET, COATED ORAL at 08:06

## 2017-06-30 RX ADMIN — INSULIN DETEMIR 20 UNITS: 100 INJECTION, SOLUTION SUBCUTANEOUS at 09:06

## 2017-06-30 RX ADMIN — CIPROFLOXACIN 400 MG: 2 INJECTION, SOLUTION INTRAVENOUS at 06:06

## 2017-06-30 NOTE — HPI
Pt is a 51 y/o male  has a past medical history of Diabetes mellitus, type 2; Hyperlipidemia; Hypertension; and Peripheral neuropathy. Admitted and consulted for diabetic foot wound of the right great toe. Pt is known to podiatry and last seen by Dr. Olguin. Pt relates he has been taking care of his wound at home for 2 months and it has recently worsened. Pt relates to new onset of pain to anterior ankle that is painful to touch. Denies recent N/V/F/C. No other complaints at this time.

## 2017-06-30 NOTE — H&P
"[facility] Medicine  History & Physical    Patient Name: Andrew Del Cid Jr.  MRN: [id number]  Admission Date: 6/29/2017  Attending Physician: Mike Luke DO   Primary Care Provider: Jeannette Herrera MD    [facility] Medicine Team: Networked reference to record PCT  Mike Luke DO     Patient information was obtained from patient and ER records.     Subjective:     Principal Problem:Diabetic ulcer of toe of right foot associated with diabetes mellitus due to underlying condition, with fat layer exposed    Chief Complaint:   Chief Complaint   Patient presents with    Diabetic Foot Ulcer     Pt reports ulcer to right big toe. Pt reports foul odor and "tip of toe falling off"        HPI:   52-year-old male with h/o uncontrolled DM2, diabetic neuropathy, HTN, HLD, morbid obesity  presents to the ER for evaluation of a diabetic wound to his right great toe.  The lesion has been present for a few months worsening for the past 2 weeks.  He has been trying to treat at home but the wound became very malodorous began to drain purulent green material. Also reports redness and swelling of his right great toe. He has no sensation to this area.  The past 2 days he reports decreased appetite with fever, chills and dizziness.     He is found to have temp of 100.3 F in ED. Received dose of vancomycin and cefepime in ER for right great toe wound infection.     NS 1 L bolus given for elevated glucose 310.     Past Medical History:   Diagnosis Date    Diabetes mellitus, type 2     Hyperlipidemia     Hypertension     Peripheral neuropathy        Past Surgical History:   Procedure Laterality Date    APPENDECTOMY      HERNIA REPAIR         Review of patient's allergies indicates:  No Known Allergies    No current facility-administered medications on file prior to encounter.      Current Outpatient Prescriptions on File Prior to Encounter   Medication Sig    amlodipine (NORVASC) 5 MG tablet TAKE 1 " TABLET BY MOUTH DAILY    aspirin (ECOTRIN) 81 MG EC tablet Take 81 mg by mouth once daily.    atorvastatin (LIPITOR) 20 MG tablet Take 1 tablet (20 mg total) by mouth once daily.    gabapentin (NEURONTIN) 300 MG capsule Take 1 capsule (300 mg total) by mouth 3 (three) times daily.    insulin NPH-insulin regular, 70/30, (NOVOLIN 70/30) 100 unit/mL (70-30) injection 50 units sq in the am and 50 units sq in the pm    lisinopril 10 MG tablet Take 1 tablet (10 mg total) by mouth once daily.    metformin (GLUCOPHAGE) 500 MG tablet Take 2 tablets (1,000 mg total) by mouth 2 (two) times daily with meals. 2 po in the am and 1 po in the pm    methocarbamol (ROBAXIN) 500 MG Tab 1 po qhs prn    ondansetron (ZOFRAN-ODT) 8 MG TbDL Take 1 tablet (8 mg total) by mouth every 8 (eight) hours as needed.     Family History     None        Social History Main Topics    Smoking status: Never Smoker    Smokeless tobacco: Never Used    Alcohol use No    Drug use: No    Sexual activity: Not on file     Review of Systems   Constitutional: Positive for chills and fever.   Skin: Positive for wound (Right great toe wound ( plantar aspect ) with purulent drainage with surrounding redness and swelling ).   Neurological: Positive for dizziness.     Objective:     Vital Signs (Most Recent):  Temp: 99.1 °F (37.3 °C) (06/30/17 0322)  Pulse: 84 (06/30/17 0322)  Resp: 17 (06/30/17 0322)  BP: (!) 119/55 (06/30/17 0322)  SpO2: 95 % (06/30/17 0322) Vital Signs (24h Range):  Temp:  [99.1 °F (37.3 °C)-100.3 °F (37.9 °C)] 99.1 °F (37.3 °C)  Pulse:  [] 84  Resp:  [17-18] 17  SpO2:  [95 %-97 %] 95 %  BP: (119-192)/(55-93) 119/55     Weight: (!) 183.8 kg (405 lb 3.3 oz)  Body mass index is 53.46 kg/m².    Physical Exam   Constitutional: He is oriented to person, place, and time. No distress.   Morbidly obese male in no distress    HENT:   Head: Normocephalic and atraumatic.   Right Ear: External ear normal.   Left Ear: External ear normal.    Nose: Nose normal.   Mouth/Throat: Oropharynx is clear and moist. No oropharyngeal exudate.   Eyes: Conjunctivae and EOM are normal. Pupils are equal, round, and reactive to light. Right eye exhibits no discharge. Left eye exhibits no discharge. No scleral icterus.   Neck: Normal range of motion. Neck supple. No JVD present. No tracheal deviation present. No thyromegaly present.   Cardiovascular: Normal rate, regular rhythm, normal heart sounds and intact distal pulses.  Exam reveals no gallop and no friction rub.    No murmur heard.  Pulmonary/Chest: Effort normal and breath sounds normal. No stridor. No respiratory distress. He has no wheezes. He has no rales. He exhibits no tenderness.   Abdominal: Soft. Bowel sounds are normal. He exhibits no distension and no mass. There is no tenderness. No hernia.   Musculoskeletal: Normal range of motion. He exhibits tenderness (Mild tenderss on palpation over right ankle, FROM intact ). He exhibits no edema or deformity.   Lymphadenopathy:     He has no cervical adenopathy.   Neurological: He is alert and oriented to person, place, and time. He has normal reflexes. He displays normal reflexes. No cranial nerve deficit. He exhibits normal muscle tone. Coordination normal.   Skin: Skin is warm and dry. No rash noted. He is not diaphoretic. No erythema. No pallor.   Large, deep wound over plantar aspect to right great toe with draining pus and surrounding erythema and edema. No fluctuance    Psychiatric: He has a normal mood and affect. His behavior is normal. Judgment and thought content normal.       Significant Labs:   Admission on 06/29/2017   Component Date Value Ref Range Status    WBC 06/29/2017 10.12  3.90 - 12.70 K/uL Final    RBC 06/29/2017 4.41* 4.60 - 6.20 M/uL Final    Hemoglobin 06/29/2017 12.8* 14.0 - 18.0 g/dL Final    Hematocrit 06/29/2017 38.3* 40.0 - 54.0 % Final    MCV 06/29/2017 87  82 - 98 fL Final    MCH 06/29/2017 29.0  27.0 - 31.0 pg Final     MCHC 06/29/2017 33.4  32.0 - 36.0 % Final    RDW 06/29/2017 12.4  11.5 - 14.5 % Final    Platelets 06/29/2017 324  150 - 350 K/uL Final    MPV 06/29/2017 9.3  9.2 - 12.9 fL Final    Gran # 06/29/2017 7.3  1.8 - 7.7 K/uL Final    Lymph # 06/29/2017 2.0  1.0 - 4.8 K/uL Final    Mono # 06/29/2017 0.6  0.3 - 1.0 K/uL Final    Eos # 06/29/2017 0.1  0.0 - 0.5 K/uL Final    Baso # 06/29/2017 0.01  0.00 - 0.20 K/uL Final    Gran% 06/29/2017 72.5  38.0 - 73.0 % Final    Lymph% 06/29/2017 19.9  18.0 - 48.0 % Final    Mono% 06/29/2017 6.1  4.0 - 15.0 % Final    Eosinophil% 06/29/2017 1.0  0.0 - 8.0 % Final    Basophil% 06/29/2017 0.1  0.0 - 1.9 % Final    Differential Method 06/29/2017 Automated   Final    Sodium 06/29/2017 131* 136 - 145 mmol/L Final    Potassium 06/29/2017 3.9  3.5 - 5.1 mmol/L Final    Chloride 06/29/2017 97  95 - 110 mmol/L Final    CO2 06/29/2017 24  23 - 29 mmol/L Final    Glucose 06/29/2017 310* 70 - 110 mg/dL Final    BUN, Bld 06/29/2017 28* 6 - 20 mg/dL Final    Creatinine 06/29/2017 1.2  0.5 - 1.4 mg/dL Final    Calcium 06/29/2017 8.8  8.7 - 10.5 mg/dL Final    Total Protein 06/29/2017 7.5  6.0 - 8.4 g/dL Final    Albumin 06/29/2017 2.9* 3.5 - 5.2 g/dL Final    Total Bilirubin 06/29/2017 1.1* 0.1 - 1.0 mg/dL Final    Alkaline Phosphatase 06/29/2017 119  55 - 135 U/L Final    AST 06/29/2017 7* 10 - 40 U/L Final    ALT 06/29/2017 12  10 - 44 U/L Final    Anion Gap 06/29/2017 10  8 - 16 mmol/L Final    eGFR if African American 06/29/2017 >60.0  >60 mL/min/1.73 m^2 Final    eGFR if non African American 06/29/2017 >60.0  >60 mL/min/1.73 m^2 Final    Specimen UA 06/30/2017 Urine, Clean Catch   Final    Color, UA 06/30/2017 Yellow  Yellow, Straw, Velvet Final    Appearance, UA 06/30/2017 Hazy* Clear Final    pH, UA 06/30/2017 5.0  5.0 - 8.0 Final    Specific Gravity, UA 06/30/2017 1.025  1.005 - 1.030 Final    Protein, UA 06/30/2017 Negative  Negative Final    Glucose,  UA 06/30/2017 3+* Negative Final    Ketones, UA 06/30/2017 Negative  Negative Final    Bilirubin (UA) 06/30/2017 Negative  Negative Final    Occult Blood UA 06/30/2017 Negative  Negative Final    Nitrite, UA 06/30/2017 Negative  Negative Final    Urobilinogen, UA 06/30/2017 Negative  <2.0 EU/dL Final    Leukocytes, UA 06/30/2017 Negative  Negative Final    Lactate (Lactic Acid) 06/29/2017 1.5  0.5 - 2.2 mmol/L Final    WBC, UA 06/30/2017 0  0 - 5 /hpf Final    Bacteria, UA 06/30/2017 Rare  None-Occ /hpf Final    Yeast, UA 06/30/2017 None  None Final    Squam Epithel, UA 06/30/2017 0  /hpf Final    Hyaline Casts, UA 06/30/2017 4* 0-1/lpf /lpf Final    Microscopic Comment 06/30/2017 SEE COMMENT   Final         Significant Imaging: I have reviewed and interpreted all pertinent imaging results/findings within the past 24 hours.     X-RAY RIGHT FOOT :  Evolving chronic changes of the foot as described above, without convincing acute displaced fracture, dislocation, or new osseous erosive/destructive process in this patient with Charcot joint.  If there is concern for osteomyelitis, consider three-phase bone scan.        Assessment/Plan:     Active Diagnoses:    Diagnosis Date Noted POA    PRINCIPAL PROBLEM:  Diabetic ulcer of toe of right foot associated with diabetes mellitus due to underlying condition, with fat layer exposed [E08.621, L97.512] 06/29/2017 Yes    Cellulitis of toe of right foot [L03.031] 06/30/2017 Yes    DM type 2, uncontrolled, with neuropathy [E11.40, E11.65] 06/30/2017 Yes    Essential hypertension [I10] 06/30/2017 Yes    Hyperglycemia [R73.9] 06/30/2017 Yes    Diabetic Charcot foot [E11.610] 06/30/2017 Yes    DM (diabetes mellitus), type 2, uncontrolled with complications [E11.8, E11.65] 06/04/2015 Yes    morbid obesity [I10] 06/04/2015 Yes      Problems Resolved During this Admission:    Diagnosis Date Noted Date Resolved POA     # Infected diabetic foot wound ( R great toe  )  - Subjective fever, chills at home   - afebrile here and no leukocytosis   - doesn't appear toxic   - Continue vancomycin and ciprofloxacin for pseudomonas coverage  - follow up with cultures, Check ESR/CRP  - Podiatry consult for further evaluation and possible I&D    # Diabetic charcot joint of right foot and diabetic neuropathy  - Continue neurontin     # Uncontrolled DM2 with hyperglycemia   - Last A1C 9 with glucose 310 today   - Not in DKA   - Patient on insulin 70/30 and metformin at home   - Doesn't check his CBG regularly   - Hold metformin while in hospital   - Will start on levemir 20 U bid AND Novolog 8 U TIDWM   - Adjust insulin dose based on CBG     # HTN   - Continue amlodipine and lisinopril       VTE Risk Mitigation         Ordered     heparin (porcine) injection 7,500 Units  Every 8 hours     Route:  Subcutaneous        06/30/17 0507     Medium Risk of VTE  Once      06/30/17 0507     Place CAT hose  Until discontinued      06/30/17 0305     Place sequential compression device  Until discontinued      06/30/17 0305        Mike Luke DO  Department of Hospital Medicine   [facility]

## 2017-06-30 NOTE — PLAN OF CARE
Problem: Patient Care Overview  Goal: Plan of Care Review  Outcome: Ongoing (interventions implemented as appropriate)  Patient resting in bed comfortably. IV intact and infusing free of infection and irration, fall precautions maintained no falls noted. Call light in reach bed locked and in lowest position. Non skid socks on while out of bed. Patient instructed to call for assistance. Skin integrity maintained as patient is independent with frequent positioning, educated patient about diabetic diet and insulin, No other complaints or concerns. Progressing towards goals. Will continue to monitor and follow careplan of care.

## 2017-06-30 NOTE — ED PROVIDER NOTES
"Encounter Date: 6/29/2017    SCRIBE #1 NOTE: I, Linette Zuleta, am scribing for, and in the presence of,  Dr. Hallman. I have scribed the following portions of the note - the APC attestation.       History     Chief Complaint   Patient presents with    Diabetic Foot Ulcer     Pt reports ulcer to right big toe. Pt reports foul odor and "tip of toe falling off"     52-year-old male presents to the ER for evaluation of a diabetic ulcer to his right great toe.  The lesion has been present for a few months worsening for the past 2 weeks.  He has been trying to treat at home but the wound became very malodorous began to drain purulent green material.  He has no sensation to this area.  The past 2 days he reports decreased appetite with fever and chills.           Review of patient's allergies indicates:  No Known Allergies  Past Medical History:   Diagnosis Date    Diabetes mellitus, type 2     Hyperlipidemia     Hypertension     Peripheral neuropathy      Past Surgical History:   Procedure Laterality Date    APPENDECTOMY      HERNIA REPAIR       History reviewed. No pertinent family history.  Social History   Substance Use Topics    Smoking status: Never Smoker    Smokeless tobacco: Never Used    Alcohol use No     Review of Systems   Constitutional: Positive for appetite change, chills and fever.   HENT: Negative for sore throat.    Respiratory: Negative for shortness of breath.    Cardiovascular: Negative for chest pain.   Gastrointestinal: Negative for nausea.   Genitourinary: Negative for dysuria.   Musculoskeletal: Negative for back pain.   Skin: Positive for color change and wound. Negative for rash.   Neurological: Negative for weakness.   Hematological: Does not bruise/bleed easily.       Physical Exam     Initial Vitals [06/29/17 2210]   BP Pulse Resp Temp SpO2   (!) 192/93 106 18 100.3 °F (37.9 °C) 96 %      MAP       126         Physical Exam    Constitutional: Vital signs are normal. He appears " well-developed and well-nourished.   HENT:   Head: Normocephalic and atraumatic.   Eyes: Conjunctivae are normal.   Cardiovascular: Regular rhythm. Tachycardia present.  Exam reveals no gallop and no friction rub.    No murmur heard.  Pulmonary/Chest: No respiratory distress. He has no wheezes. He has no rhonchi. He has no rales. He exhibits no tenderness.   Abdominal: Soft. Normal appearance, normal aorta and bowel sounds are normal.   Musculoskeletal: Normal range of motion.   Neurological: He is alert and oriented to person, place, and time.   Skin: Skin is warm and intact.   No sensation to feet.   R great toe, large diabetic ulcer, to the pad of the toe, malodor, green DC, ulcer approx 5cm diameter, extends into the base of the metatarsal   Psychiatric: He has a normal mood and affect. His speech is normal and behavior is normal. Cognition and memory are normal.         ED Course   Procedures  Labs Reviewed   CBC W/ AUTO DIFFERENTIAL - Abnormal; Notable for the following:        Result Value    RBC 4.41 (*)     Hemoglobin 12.8 (*)     Hematocrit 38.3 (*)     All other components within normal limits   COMPREHENSIVE METABOLIC PANEL - Abnormal; Notable for the following:     Sodium 131 (*)     Glucose 310 (*)     BUN, Bld 28 (*)     Albumin 2.9 (*)     Total Bilirubin 1.1 (*)     AST 7 (*)     All other components within normal limits   CULTURE, AEROBIC  (SPECIFY SOURCE)   LACTIC ACID, PLASMA         Imaging Results          MRI Lower Extremity Without Contrast Right (Final result)  Result time 07/01/17 05:20:28    Final result by Vazquez Krueger MD (07/01/17 05:20:28)                 Impression:        Osteomyelitis of the distal phalanx and distal aspect of the proximal phalanx of the first digit with probable interphalangeal septic joint and an overlying ulcer within the medial subcutaneous soft tissues.    Tenosynovitis of the flexor hallucis longus which may be sterile or infectious noting close proximity  to the aforementioned osseous changes.    Imaging findings suggesting Charcot joint centered at the midfoot.      Electronically signed by: CRISS ARAMBULA MD  Date:     07/01/17  Time:    05:20              Narrative:    Technique: Routine MRI evaluation of the distal aspect of the right forefoot performed without contrast.    Comparison: Radiograph 06/30/2017    Findings:    There is abnormal marrow edema within the distal phalanx and distal aspect of the proximal phalanx of the first digit with corresponding T1 signal abnormality in a medullary and geographic distribution.    Osseous destruction, disorganization and subluxation noted about the mid foot in keeping with sequela of Charcot joint.  Advanced degenerative changes noted about the first through third tarsometatarsal joints.    There is fluid within the flexor hallucis longus tendon sheath that extends into the area of abnormal marrow signal within the distal aspect of the first digit.  Remaining visualized flexor and extensor tendons demonstrate no significant abnormalities.    There is subcutaneous soft tissue ulceration within the medial aspect of the first digit.  Extensive signal hyperintensities noted within the dorsal subcutaneous soft tissues.  No definite focal drainable collection.                             X-Ray Foot Complete Right (Final result)  Result time 06/30/17 11:22:29    Final result by Dimas Steve MD (06/30/17 11:22:29)                 Impression:     Significant progression of neuropathic changes at the tarsometatarsal joint since 2015.      Soft tissue ulceration RIGHT great toe yet no definitive evidence for osteomyelitis.  MRI could further evaluate with increase sensitivity if indicated.      Electronically signed by: Dr. Dimas Steve MD  Date:     06/30/17  Time:    11:22              Narrative:    Comparison: Multiple, including 2015.    Findings:3 view examination.Significant degenerative changes identified level of  the tarsometatarsal joint, with marked joint space narrowing, exuberant bone production and rocker-bottom foot.  Findings are consistent with neuropathic changes.   No displaced fractures identified.  Soft tissues are unremarkable.MRI could further evaluate for underlying osteomyelitis if indicated.                             X-Ray Ankle Complete Right (Final result)  Result time 06/30/17 12:00:24    Final result by Jeison Marquez MD (06/30/17 12:00:24)                 Impression:     See above      Electronically signed by: Jeison Marquez MD  Date:     06/30/17  Time:    12:00              Narrative:    3 views    DJD.  Significant sclerotic and cystic changes identified involving the tarsal bones.  bony spurs seen arising from the calcaneus.  No acute fractures or dislocation.  Vascular calcifications.                             US Lower Extremity Veins Bilateral (Final result)  Result time 06/30/17 10:11:07    Final result by Papito Esquivel MD (06/30/17 10:11:07)                 Impression:        No evidence of deep venous thrombosis bilaterally.    Enlarged right groin lymph node with normal appearing fatty hilum. Clinical correlation is recommended to determine the need for and timing of followup.  ______________________________________     Electronically signed by resident: PAPITO THOMAS  Date:     06/30/17  Time:    10:08            As the supervising and teaching physician, I personally reviewed the images and resident's interpretation and I agree with the findings.          Electronically signed by: PAPITO ESQUIVEL MD  Date:     06/30/17  Time:    10:11              Narrative:    Time of Procedure: 06/30/17 09:25:00  Accession # [id number]    Technique: Duplex and color flow Doppler evaluation of the bilateral lower extremities.    Clinical indication:  swelling, pain.    Findings:    Right - There is no evidence of acute venous thrombosis in the common femoral, superficial femoral, greater saphenous,  popliteal, peroneal, anterior tibial and posterior tibial veins.  There is no reflux to suggest valvular incompetence.    There is a enlarged lymph node with fatty and greater saphenous vein and common femoral vein junction which measures 4.6 x 1.4 x 2.1 cm.    Left - There is no evidence of acute venous thrombosis in the common femoral, superficial femoral, greater saphenous, popliteal, peroneal, anterior tibial and posterior tibial veins.  There is no reflux to suggest valvular incompetence.                             X-Ray Foot Complete Right (Final result)  Result time 06/29/17 23:56:27    Final result by Chapin Beckman MD (06/29/17 23:56:27)                 Impression:      1.  Evolving chronic changes of the foot as described above, without convincing acute displaced fracture, dislocation, or new osseous erosive/destructive process in this patient with Charcot joint.  If there is concern for osteomyelitis, consider three-phase bone scan.      Electronically signed by: CHAPIN BECKMAN MD  Date:     06/29/17  Time:    23:56              Narrative:    Foot complete review right    Clinical history: Type 2 diabetes    Comparison: 3/6/2017    Findings:  3 views.    There is Charcot joint involving the proximal foot at the tarsometatarsal joints, noting in comparison to the previous examination, there has been some interval osseous hypertrophy and sclerotic change without significant changes in the orientation of the metatarsals in relationship to the tarsals.  No soniya dislocation.  No convincing acute displaced fracture.  No new osseous erosive or destructive process.  There is diffuse edema about the foot although improved somewhat since the previous examination.  No radiopaque foreign body.                                 Medical Decision Making:   History:   Old Medical Records: I decided to obtain old medical records.  Old Records Summarized: records from previous admission(s).  Clinical Tests:   Lab  Tests: Ordered and Reviewed  Radiological Study: Ordered and Reviewed  ED Management:  52-year-old male with a diabetic foot ulcer.   The toe appears to have good circulation.  The ulcer is very malodorous with a pseudomonas-like smell.  He also is large and Compazine the entire pad of the right great toe and going into the joint the base of the metacarpal.  There is some erythema going up the foot.     Wound cultures blood cultures ordered.  Patient started on broad-spectrum IV antibiotics.  Will admit to internal medicine with podiatry consult in the morning  Other:   I have discussed this case with another health care provider.       <> Summary of the Discussion: Hospital medicine            Scribe Attestation:   Scribe #1: I performed the above scribed service and the documentation accurately describes the services I performed. I attest to the accuracy of the note.    Attending Attestation:     Physician Attestation Statement for NP/PA:   I discussed this assessment and plan of this patient with the NP/PA, but I did not personally examine the patient. The face to face encounter was performed by the NP/PA.    Other NP/PA Attestation Additions:    History of Present Illness: 52 y.o. Male with history of DM presenting with foot ulcer with purulent drainage that is foul smelling.    Medical Decision Making: Will admit for IV antibiotics and podiatry consult.       Physician Attestation for Scribe:  Physician Attestation Statement for Scribe #1: I, Dr. Hallman, reviewed documentation, as scribed by Linetet Zuleta in my presence, and it is both accurate and complete.                 ED Course     Clinical Impression:   The primary encounter diagnosis was Diabetic ulcer of toe of right foot associated with diabetes mellitus due to underlying condition, with fat layer exposed. A diagnosis of Diabetic ulcer of right great toe was also pertinent to this visit.    Disposition:   Disposition: Admitted  Condition:  Stable                        Jordan Xiao PA-C  06/30/17 0601       Dianne Hallman MD  07/01/17 0653

## 2017-06-30 NOTE — PLAN OF CARE
Extended Emergency Contact Information  Primary Emergency Contact: Aleena Garcia  Address: [facility] [place] [address] [facility]  Home Phone: [phone]  Mobile Phone: [phone]  Relation: Spouse  Preferred language: English   needed? No    Jeannette Herrera MD  [address] / [facility] [address]    Future Appointments  Date Time Provider Department Center   7/12/2017 8:00 AM MARIA GUADALUPE [place]     Payor: [employer] / Plan: [employer] OPEN ACCESS PLUS / Product Type: Commercial /       [id number] - [place], [place] - [address]  [address] [address]  Phone: [phone] Fax: [phone]       06/30/17 1630   Discharge Assessment   Assessment Type Discharge Planning Assessment   Confirmed/corrected address and phone number on facesheet? Yes   Assessment information obtained from? Patient;Medical Record   Expected Length of Stay (days) 3   Communicated expected length of stay with patient/caregiver yes   Prior to hospitilization cognitive status: Alert/Oriented   Prior to hospitalization functional status: Independent   Current cognitive status: Alert/Oriented   Current Functional Status: Independent   Arrived From home or self-care   Lives With spouse   Able to Return to Prior Arrangements yes   Is patient able to care for self after discharge? Yes   How many people do you have in your home that can help with your care after discharge? 1   Patient's perception of discharge disposition home or selfcare   Readmission Within The Last 30 Days no previous admission in last 30 days   Patient currently being followed by outpatient case management? No   Patient currently receives home health services? No   Does the patient currently use HME? No   Patient currently receives private duty nursing? N/A   Patient currently receives any other outside agency services? No   Equipment Currently Used at Home none   Do you  have any problems affording any of your prescribed medications? No   Is the patient taking medications as prescribed? yes   Do you have any financial concerns preventing you from receiving the healthcare you need? No   Does the patient have transportation to healthcare appointments? Yes   Transportation Available car   On Dialysis? No   Does the patient receive services at the Coumadin Clinic? No   Are there any open cases? No   Discharge Plan A Home   Discharge Plan B Home;Home Health   Patient/Family In Agreement With Plan yes

## 2017-07-01 ENCOUNTER — ANESTHESIA EVENT (OUTPATIENT)
Dept: SURGERY | Facility: HOSPITAL | Age: 52
DRG: 478 | End: 2017-07-01
Payer: COMMERCIAL

## 2017-07-01 PROBLEM — E11.621 DIABETIC ULCER OF RIGHT GREAT TOE: Status: ACTIVE | Noted: 2017-07-01

## 2017-07-01 PROBLEM — M86.271 SUBACUTE OSTEOMYELITIS OF RIGHT FOOT: Status: ACTIVE | Noted: 2017-07-01

## 2017-07-01 PROBLEM — L97.519 DIABETIC ULCER OF RIGHT GREAT TOE: Status: ACTIVE | Noted: 2017-07-01

## 2017-07-01 LAB
ALBUMIN SERPL BCP-MCNC: 2.3 G/DL
ALP SERPL-CCNC: 87 U/L
ALT SERPL W/O P-5'-P-CCNC: 8 U/L
ANION GAP SERPL CALC-SCNC: 5 MMOL/L
AST SERPL-CCNC: 7 U/L
BASOPHILS # BLD AUTO: 0.01 K/UL
BASOPHILS NFR BLD: 0.2 %
BILIRUB SERPL-MCNC: 0.8 MG/DL
BUN SERPL-MCNC: 15 MG/DL
CALCIUM SERPL-MCNC: 8.5 MG/DL
CHLORIDE SERPL-SCNC: 102 MMOL/L
CHOLEST/HDLC SERPL: 4.6 {RATIO}
CO2 SERPL-SCNC: 28 MMOL/L
CREAT SERPL-MCNC: 0.8 MG/DL
CRP SERPL-MCNC: 118.1 MG/L
DIFFERENTIAL METHOD: ABNORMAL
EOSINOPHIL # BLD AUTO: 0.2 K/UL
EOSINOPHIL NFR BLD: 2.8 %
ERYTHROCYTE [DISTWIDTH] IN BLOOD BY AUTOMATED COUNT: 12.5 %
ERYTHROCYTE [SEDIMENTATION RATE] IN BLOOD BY WESTERGREN METHOD: 97 MM/HR
EST. GFR  (AFRICAN AMERICAN): >60 ML/MIN/1.73 M^2
EST. GFR  (NON AFRICAN AMERICAN): >60 ML/MIN/1.73 M^2
ESTIMATED AVG GLUCOSE: 315 MG/DL
GLUCOSE SERPL-MCNC: 227 MG/DL
HBA1C MFR BLD HPLC: 12.6 %
HCT VFR BLD AUTO: 35.1 %
HDL/CHOLESTEROL RATIO: 22 %
HDLC SERPL-MCNC: 132 MG/DL
HDLC SERPL-MCNC: 29 MG/DL
HGB BLD-MCNC: 11.6 G/DL
LDLC SERPL CALC-MCNC: 62.4 MG/DL
LYMPHOCYTES # BLD AUTO: 1.6 K/UL
LYMPHOCYTES NFR BLD: 26.6 %
MAGNESIUM SERPL-MCNC: 1.4 MG/DL
MCH RBC QN AUTO: 29 PG
MCHC RBC AUTO-ENTMCNC: 33 %
MCV RBC AUTO: 88 FL
MONOCYTES # BLD AUTO: 0.5 K/UL
MONOCYTES NFR BLD: 8.2 %
NEUTROPHILS # BLD AUTO: 3.7 K/UL
NEUTROPHILS NFR BLD: 61.7 %
NONHDLC SERPL-MCNC: 103 MG/DL
PHOSPHATE SERPL-MCNC: 2.5 MG/DL
PLATELET # BLD AUTO: 287 K/UL
PMV BLD AUTO: 9.1 FL
POCT GLUCOSE: 186 MG/DL (ref 70–110)
POCT GLUCOSE: 239 MG/DL (ref 70–110)
POCT GLUCOSE: 253 MG/DL (ref 70–110)
POCT GLUCOSE: 254 MG/DL (ref 70–110)
POTASSIUM SERPL-SCNC: 4 MMOL/L
PROT SERPL-MCNC: 6.4 G/DL
RBC # BLD AUTO: 4 M/UL
SODIUM SERPL-SCNC: 135 MMOL/L
TRIGL SERPL-MCNC: 203 MG/DL
VANCOMYCIN TROUGH SERPL-MCNC: 22.8 UG/ML
WBC # BLD AUTO: 5.98 K/UL

## 2017-07-01 PROCEDURE — 85651 RBC SED RATE NONAUTOMATED: CPT

## 2017-07-01 PROCEDURE — 84100 ASSAY OF PHOSPHORUS: CPT

## 2017-07-01 PROCEDURE — 25000003 PHARM REV CODE 250: Performed by: HOSPITALIST

## 2017-07-01 PROCEDURE — 99232 SBSQ HOSP IP/OBS MODERATE 35: CPT | Mod: ,,, | Performed by: HOSPITALIST

## 2017-07-01 PROCEDURE — 11000001 HC ACUTE MED/SURG PRIVATE ROOM

## 2017-07-01 PROCEDURE — 36415 COLL VENOUS BLD VENIPUNCTURE: CPT

## 2017-07-01 PROCEDURE — 83036 HEMOGLOBIN GLYCOSYLATED A1C: CPT

## 2017-07-01 PROCEDURE — 85025 COMPLETE CBC W/AUTO DIFF WBC: CPT

## 2017-07-01 PROCEDURE — 99232 SBSQ HOSP IP/OBS MODERATE 35: CPT | Mod: 57,,, | Performed by: PODIATRIST

## 2017-07-01 PROCEDURE — 80053 COMPREHEN METABOLIC PANEL: CPT

## 2017-07-01 PROCEDURE — 99499 UNLISTED E&M SERVICE: CPT | Mod: ,,, | Performed by: PHYSICIAN ASSISTANT

## 2017-07-01 PROCEDURE — 25000003 PHARM REV CODE 250: Performed by: PHYSICIAN ASSISTANT

## 2017-07-01 PROCEDURE — 63600175 PHARM REV CODE 636 W HCPCS: Performed by: HOSPITALIST

## 2017-07-01 PROCEDURE — 86140 C-REACTIVE PROTEIN: CPT

## 2017-07-01 PROCEDURE — 80061 LIPID PANEL: CPT

## 2017-07-01 PROCEDURE — 83735 ASSAY OF MAGNESIUM: CPT

## 2017-07-01 PROCEDURE — 80202 ASSAY OF VANCOMYCIN: CPT

## 2017-07-01 RX ORDER — INSULIN ASPART 100 [IU]/ML
10 INJECTION, SOLUTION INTRAVENOUS; SUBCUTANEOUS
Status: DISCONTINUED | OUTPATIENT
Start: 2017-07-01 | End: 2017-07-03

## 2017-07-01 RX ADMIN — INSULIN ASPART 8 UNITS: 100 INJECTION, SOLUTION INTRAVENOUS; SUBCUTANEOUS at 08:07

## 2017-07-01 RX ADMIN — SODIUM CHLORIDE: 0.9 INJECTION, SOLUTION INTRAVENOUS at 11:07

## 2017-07-01 RX ADMIN — HEPARIN SODIUM 7500 UNITS: 5000 INJECTION, SOLUTION INTRAVENOUS; SUBCUTANEOUS at 06:07

## 2017-07-01 RX ADMIN — ASPIRIN 81 MG: 81 TABLET, COATED ORAL at 08:07

## 2017-07-01 RX ADMIN — HEPARIN SODIUM 7500 UNITS: 5000 INJECTION, SOLUTION INTRAVENOUS; SUBCUTANEOUS at 09:07

## 2017-07-01 RX ADMIN — GABAPENTIN 300 MG: 300 CAPSULE ORAL at 01:07

## 2017-07-01 RX ADMIN — INSULIN ASPART 10 UNITS: 100 INJECTION, SOLUTION INTRAVENOUS; SUBCUTANEOUS at 11:07

## 2017-07-01 RX ADMIN — VANCOMYCIN HYDROCHLORIDE 1500 MG: 100 INJECTION, POWDER, LYOPHILIZED, FOR SOLUTION INTRAVENOUS at 07:07

## 2017-07-01 RX ADMIN — LISINOPRIL 10 MG: 10 TABLET ORAL at 08:07

## 2017-07-01 RX ADMIN — INSULIN ASPART 6 UNITS: 100 INJECTION, SOLUTION INTRAVENOUS; SUBCUTANEOUS at 05:07

## 2017-07-01 RX ADMIN — AMLODIPINE BESYLATE 5 MG: 5 TABLET ORAL at 08:07

## 2017-07-01 RX ADMIN — INSULIN ASPART 4 UNITS: 100 INJECTION, SOLUTION INTRAVENOUS; SUBCUTANEOUS at 11:07

## 2017-07-01 RX ADMIN — GABAPENTIN 300 MG: 300 CAPSULE ORAL at 09:07

## 2017-07-01 RX ADMIN — CIPROFLOXACIN 400 MG: 2 INJECTION, SOLUTION INTRAVENOUS at 11:07

## 2017-07-01 RX ADMIN — INSULIN ASPART 10 UNITS: 100 INJECTION, SOLUTION INTRAVENOUS; SUBCUTANEOUS at 05:07

## 2017-07-01 RX ADMIN — GABAPENTIN 300 MG: 300 CAPSULE ORAL at 06:07

## 2017-07-01 RX ADMIN — HEPARIN SODIUM 7500 UNITS: 5000 INJECTION, SOLUTION INTRAVENOUS; SUBCUTANEOUS at 01:07

## 2017-07-01 RX ADMIN — INSULIN ASPART 1 UNITS: 100 INJECTION, SOLUTION INTRAVENOUS; SUBCUTANEOUS at 09:07

## 2017-07-01 RX ADMIN — INSULIN DETEMIR 2 UNITS: 100 INJECTION, SOLUTION SUBCUTANEOUS at 11:07

## 2017-07-01 RX ADMIN — INSULIN DETEMIR 20 UNITS: 100 INJECTION, SOLUTION SUBCUTANEOUS at 08:07

## 2017-07-01 RX ADMIN — INSULIN ASPART 6 UNITS: 100 INJECTION, SOLUTION INTRAVENOUS; SUBCUTANEOUS at 08:07

## 2017-07-01 NOTE — ASSESSMENT & PLAN NOTE
-No acute sx intervention indicated at this time, wound with exposed bone and acute osteomyelitis. No drainable abscess noted clinically.   -After obtaining verbal consent, a sterile #15 blade was used to debride fibrotic tissue to level of muscle without incident noted. Pt tolerated procedure well.   -Long discussion with pt over options for treatment of osteomyelitis with 6 weeks of IV ABX vs partial 1st ray amp. Pt refuses amputation at this time  -Xrays of foot and ankle ordered  -US veins ordered  -MICKEY ordered  -Wound dressed with betadine soaked 4x4, kerlix, ACE, podiatry will follow with daily dressing changes    Weightbearing status: NWB to right forefoot  Offloading device: Darco shoe

## 2017-07-01 NOTE — SUBJECTIVE & OBJECTIVE
Interval History: Pt aware of plans for surgery tomorrow and in agreement with plan.    Review of Systems   Constitutional: Negative for appetite change, chills, fatigue and fever.   Respiratory: Negative for cough, chest tightness and shortness of breath.    Cardiovascular: Negative for chest pain and palpitations.   Gastrointestinal: Negative for abdominal pain, blood in stool, constipation and diarrhea.   Genitourinary: Negative for dysuria, frequency and urgency.   Neurological: Negative for dizziness, weakness, light-headedness and headaches.     Objective:     Vital Signs (Most Recent):  Temp: 98.5 °F (36.9 °C) (07/01/17 1200)  Pulse: 83 (07/01/17 1200)  Resp: 18 (07/01/17 1200)  BP: (!) 168/67 (07/01/17 1200)  SpO2: 96 % (07/01/17 1200) Vital Signs (24h Range):  Temp:  [98 °F (36.7 °C)-99 °F (37.2 °C)] 98.5 °F (36.9 °C)  Pulse:  [74-85] 83  Resp:  [18-20] 18  SpO2:  [92 %-97 %] 96 %  BP: (105-168)/(51-72) 168/67     Weight: (!) 183.8 kg (405 lb 3.3 oz)  Body mass index is 54.96 kg/m².    Intake/Output Summary (Last 24 hours) at 07/01/17 1225  Last data filed at 07/01/17 0400   Gross per 24 hour   Intake           391.67 ml   Output              650 ml   Net          -258.33 ml      Physical Exam   Constitutional: He is oriented to person, place, and time. He appears well-developed and well-nourished. No distress.   Cardiovascular: Normal rate and regular rhythm.    No murmur heard.  Pulmonary/Chest: Effort normal and breath sounds normal. He has no wheezes.   Abdominal: Soft. Bowel sounds are normal. He exhibits no distension. There is no tenderness.   Musculoskeletal:   Foot wrapped per podiatry   Neurological: He is alert and oriented to person, place, and time.

## 2017-07-01 NOTE — PROGRESS NOTES
Notified Dr. Schwartz vanc trough is 22.8. Orders to hold 1530 dose and she will adjust dosage. Will continue to monitor pt.

## 2017-07-01 NOTE — PROGRESS NOTES
[facility]  Podiatry  Progress Note    Patient Name: Andrew Del Cid Jr.  MRN: [id number]  Admission Date: 6/29/2017  Hospital Length of Stay: 2 days  Attending Physician: Alisha Schwartz MD  Primary Care Provider: Jeannette Herrera MD     Interval hx: Pt seen with infectious disease today. Family present at bedside. No complaints at this time.  Scheduled Meds:   amlodipine  5 mg Oral Daily    aspirin  81 mg Oral Daily    ciprofloxacin  400 mg Intravenous Q12H    gabapentin  300 mg Oral TID    heparin (porcine)  7,500 Units Subcutaneous Q8H    insulin aspart  10 Units Subcutaneous TIDWM    insulin detemir  22 Units Subcutaneous BID    lisinopril  10 mg Oral Daily    polyethylene glycol  17 g Oral Daily    [START ON 7/2/2017] vancomycin (VANCOCIN) IVPB  1,500 mg Intravenous Q12H     Continuous Infusions:   sodium chloride 0.9% 125 mL/hr at 06/30/17 2052     PRN Meds:acetaminophen, albuterol-ipratropium 2.5mg-0.5mg/3mL, dextrose 50%, dextrose 50%, dextrose 50%, dextrose 50%, glucagon (human recombinant), glucagon (human recombinant), glucose, glucose, glucose, glucose, hydrocodone-acetaminophen 5-325mg, insulin aspart, methocarbamol, ondansetron, oxycodone, oxycodone    Review of patient's allergies indicates:  No Known Allergies     Past Medical History:   Diagnosis Date    Diabetes mellitus, type 2     Hyperlipidemia     Hypertension     Peripheral neuropathy      Past Surgical History:   Procedure Laterality Date    APPENDECTOMY      HERNIA REPAIR         Family History     None        Social History Main Topics    Smoking status: Never Smoker    Smokeless tobacco: Never Used    Alcohol use No    Drug use: No    Sexual activity: Not on file     Review of Systems   Constitutional: Negative.    HENT: Negative.    Eyes: Negative.    Respiratory: Negative.    Cardiovascular: Negative.    Gastrointestinal: Negative.    Endocrine: Negative.    Genitourinary: Negative.     Musculoskeletal: Negative.    Skin: Positive for color change and wound.   Allergic/Immunologic: Negative.    Neurological: Negative.    Hematological: Negative.    Psychiatric/Behavioral: Negative.      Objective:     Vital Signs (Most Recent):  Temp: 98.1 °F (36.7 °C) (07/01/17 1558)  Pulse: 80 (07/01/17 1558)  Resp: 18 (07/01/17 1558)  BP: (!) 127/59 (07/01/17 1558)  SpO2: (!) 94 % (07/01/17 1558) Vital Signs (24h Range):  Temp:  [98 °F (36.7 °C)-99 °F (37.2 °C)] 98.1 °F (36.7 °C)  Pulse:  [74-85] 80  Resp:  [18-20] 18  SpO2:  [92 %-96 %] 94 %  BP: (118-168)/(56-72) 127/59     Weight: (!) 183.8 kg (405 lb 3.3 oz)  Body mass index is 54.96 kg/m².    Foot Exam    General  General Appearance: appears stated age and healthy   Orientation: alert and oriented to person, place, and time   Affect: appropriate       Right Foot/Ankle     Inspection and Palpation  Ecchymosis: first toe and dorsum  Tenderness: (Tenderness noted with POP to anterior ankle joint and subtalar joint)  Swelling: dorsum   Skin Exam: ulcer;     Neurovascular  Dorsalis pedis: absent  Posterior tibial: absent  Saphenous nerve sensation: diminished  Tibial nerve sensation: diminished  Superficial peroneal nerve sensation: diminished  Deep peroneal nerve sensation: diminished  Sural nerve sensation: diminished    Muscle Strength  Ankle dorsiflexion: 5  Ankle plantar flexion: 5  Ankle inversion: 5  Ankle eversion: 5  Great toe extension: 5  Great toe flexion: 5    Range of Motion    Normal right ankle ROM      Left Foot/Ankle      Inspection and Palpation  Ecchymosis: none  Tenderness: none   Swelling: none   Skin Exam: skin intact;     Neurovascular  Dorsalis pedis: absent  Posterior tibial: absent  Saphenous nerve sensation: diminished  Tibial nerve sensation: diminished  Superficial peroneal nerve sensation: diminished  Deep peroneal nerve sensation: diminished  Sural nerve sensation: diminished    Muscle Strength  Ankle dorsiflexion: 5  Ankle  plantar flexion: 5  Ankle inversion: 5  Ankle eversion: 5  Great toe extension: 5  Great toe flexion: 5    Range of Motion    Normal left ankle ROM        Derm: Wound measures 3.1x2.5x.3cm with fibrogranular base and exposed bone. Periwound erythema and edema noted extending dosomedially to level of ankle. No purulence noted. Mild serosanguinous drainage noted. No fluctuance or cerpitus noted. With hyperkeratotic wound edges.         Laboratory:  A1C:     Recent Labs  Lab 07/01/17  0351   HGBA1C 12.6*     Blood Cultures:     Recent Labs  Lab 06/29/17  2313   LABBLOO No Growth to date  No Growth to date  No Growth to date  No Growth to date     BMP:     Recent Labs  Lab 07/01/17  0351   *   *   K 4.0      CO2 28   BUN 15   CREATININE 0.8   CALCIUM 8.5*   MG 1.4*     CBC:     Recent Labs  Lab 07/01/17 0351   WBC 5.98   RBC 4.00*   HGB 11.6*   HCT 35.1*      MCV 88   MCH 29.0   MCHC 33.0     CMP:     Recent Labs  Lab 07/01/17  0351   *   CALCIUM 8.5*   ALBUMIN 2.3*   PROT 6.4   *   K 4.0   CO2 28      BUN 15   CREATININE 0.8   ALKPHOS 87   ALT 8*   AST 7*   BILITOT 0.8     Coagulation: No results for input(s): INR, APTT in the last 168 hours.    Invalid input(s): PT  CRP:     Recent Labs  Lab 07/01/17  1323   .1*     ESR:     Recent Labs  Lab 07/01/17  1323   SEDRATE 97*     LFTs:     Recent Labs  Lab 07/01/17  0351   ALT 8*   AST 7*   ALKPHOS 87   BILITOT 0.8   PROT 6.4   ALBUMIN 2.3*     Prealbumin: No results for input(s): PREALBUMIN in the last 48 hours.  Wound Cultures:     Recent Labs  Lab 06/29/17 2346 06/30/17  0957   LABAERO GRAM NEGATIVE JAKY, NON-LACTOSE FERMENTERManyIdentification and susceptibility pending PRESUMPTIVE PROTEUS SPECIESModerateIdentification and susceptibility pendingSkin cory also present     Microbiology Results (last 7 days)     Procedure Component Value Units Date/Time    Aerobic culture [[id number]] Collected:  06/29/17 2346     Order Status:  Completed Specimen:  Wound from Foot, Right Updated:  07/01/17 1037     Aerobic Bacterial Culture --     GRAM NEGATIVE JAKY, NON-LACTOSE   Many  Identification and susceptibility pending      Narrative:       Diabetic foot wound    Aerobic culture [[id number]] Collected:  06/30/17 0957    Order Status:  Completed Specimen:  Wound from Toe, Right Foot Updated:  07/01/17 0943     Aerobic Bacterial Culture --     PRESUMPTIVE PROTEUS SPECIES  Moderate  Identification and susceptibility pending  Skin cory also present      Blood Culture #1 **CANNOT BE ORDERED STAT** [[id number]] Collected:  06/29/17 2313    Order Status:  Completed Specimen:  Blood from Peripheral, Forearm, Right Updated:  07/01/17 0612     Blood Culture, Routine No Growth to date     Blood Culture, Routine No Growth to date    Blood Culture #2 **CANNOT BE ORDERED STAT** [[id number]] Collected:  06/29/17 2313    Order Status:  Completed Specimen:  Blood from Peripheral, Antecubital, Right Updated:  07/01/17 0612     Blood Culture, Routine No Growth to date     Blood Culture, Routine No Growth to date    Culture, Anaerobe [[id number]] Collected:  06/30/17 0957    Order Status:  Sent Specimen:  Wound from Toe, Right Foot Updated:  06/30/17 1003        Specimen (12h ago through future)    None          Diagnostic Results:  I have reviewed all pertinent imaging results/findings within the past 24 hours.    Clinical Findings:  Wound with exposed bone/osteomyelitis and cellulitis extending from right plantar hallux ulceration superiorly towards ankle.     Assessment/Plan:     Subacute osteomyelitis of right foot    As above        Diabetic Charcot foot    -Pt with charoct neuropathic changes since 2015  -Pt NWB to right foot  -Appears stable, will monitor        Cellulitis of toe of right foot    As above        DM (diabetes mellitus), type 2, uncontrolled with complications    Per medicine team.         * Diabetic ulcer of toe of right  foot associated with diabetes mellitus due to underlying condition, with fat layer exposed    -Surgical intervention indicated at this time. MRI reviewed. Wound with septic IPJ joint and acute osteomyelitis.   -Long discussion with pt over options for treatment of osteomyelitis with 6 weeks of IV ABX vs partial 1st ray amp. Pt agrees to partial 1st ray  -Long discussion with patient regarding the procedure in detail. Patient understands all risks, potential complications, and alternatives, including, but not limited to those listed on the consent form. All questions were answered. No guarantees given or implied as to outcome. Informed verbal and written consent was obtained. Consent forms read, signed, witnessed. Patient ready for surgery  -Pt NPO MN  -MICKEY ordered, pending  -Wound dressed with betadine soaked 4x4, kerlix, ACE, podiatry will follow with daily dressing changes    Weightbearing status: NWB to right forefoot  Offloading device: Darco shoe            Shari Patel MD  Podiatry  [facility]

## 2017-07-01 NOTE — SUBJECTIVE & OBJECTIVE
Scheduled Meds:   amlodipine  5 mg Oral Daily    aspirin  81 mg Oral Daily    ciprofloxacin  400 mg Intravenous Q12H    gabapentin  300 mg Oral TID    heparin (porcine)  7,500 Units Subcutaneous Q8H    insulin aspart  10 Units Subcutaneous TIDWM    insulin detemir  22 Units Subcutaneous BID    lisinopril  10 mg Oral Daily    polyethylene glycol  17 g Oral Daily    [START ON 7/2/2017] vancomycin (VANCOCIN) IVPB  1,500 mg Intravenous Q12H     Continuous Infusions:   sodium chloride 0.9% 125 mL/hr at 06/30/17 2052     PRN Meds:acetaminophen, albuterol-ipratropium 2.5mg-0.5mg/3mL, dextrose 50%, dextrose 50%, dextrose 50%, dextrose 50%, glucagon (human recombinant), glucagon (human recombinant), glucose, glucose, glucose, glucose, hydrocodone-acetaminophen 5-325mg, insulin aspart, methocarbamol, ondansetron, oxycodone, oxycodone    Review of patient's allergies indicates:  No Known Allergies     Past Medical History:   Diagnosis Date    Diabetes mellitus, type 2     Hyperlipidemia     Hypertension     Peripheral neuropathy      Past Surgical History:   Procedure Laterality Date    APPENDECTOMY      HERNIA REPAIR         Family History     None        Social History Main Topics    Smoking status: Never Smoker    Smokeless tobacco: Never Used    Alcohol use No    Drug use: No    Sexual activity: Not on file     Review of Systems   Constitutional: Negative.    HENT: Negative.    Eyes: Negative.    Respiratory: Negative.    Cardiovascular: Negative.    Gastrointestinal: Negative.    Endocrine: Negative.    Genitourinary: Negative.    Musculoskeletal: Negative.    Skin: Positive for color change and wound.   Allergic/Immunologic: Negative.    Neurological: Negative.    Hematological: Negative.    Psychiatric/Behavioral: Negative.      Objective:     Vital Signs (Most Recent):  Temp: 98.1 °F (36.7 °C) (07/01/17 1558)  Pulse: 80 (07/01/17 1558)  Resp: 18 (07/01/17 1558)  BP: (!) 127/59 (07/01/17  1558)  SpO2: (!) 94 % (07/01/17 1558) Vital Signs (24h Range):  Temp:  [98 °F (36.7 °C)-99 °F (37.2 °C)] 98.1 °F (36.7 °C)  Pulse:  [74-85] 80  Resp:  [18-20] 18  SpO2:  [92 %-96 %] 94 %  BP: (118-168)/(56-72) 127/59     Weight: (!) 183.8 kg (405 lb 3.3 oz)  Body mass index is 54.96 kg/m².    Foot Exam    General  General Appearance: appears stated age and healthy   Orientation: alert and oriented to person, place, and time   Affect: appropriate       Right Foot/Ankle     Inspection and Palpation  Ecchymosis: first toe and dorsum  Tenderness: (Tenderness noted with POP to anterior ankle joint and subtalar joint)  Swelling: dorsum   Skin Exam: ulcer;     Neurovascular  Dorsalis pedis: absent  Posterior tibial: absent  Saphenous nerve sensation: diminished  Tibial nerve sensation: diminished  Superficial peroneal nerve sensation: diminished  Deep peroneal nerve sensation: diminished  Sural nerve sensation: diminished    Muscle Strength  Ankle dorsiflexion: 5  Ankle plantar flexion: 5  Ankle inversion: 5  Ankle eversion: 5  Great toe extension: 5  Great toe flexion: 5    Range of Motion    Normal right ankle ROM      Left Foot/Ankle      Inspection and Palpation  Ecchymosis: none  Tenderness: none   Swelling: none   Skin Exam: skin intact;     Neurovascular  Dorsalis pedis: absent  Posterior tibial: absent  Saphenous nerve sensation: diminished  Tibial nerve sensation: diminished  Superficial peroneal nerve sensation: diminished  Deep peroneal nerve sensation: diminished  Sural nerve sensation: diminished    Muscle Strength  Ankle dorsiflexion: 5  Ankle plantar flexion: 5  Ankle inversion: 5  Ankle eversion: 5  Great toe extension: 5  Great toe flexion: 5    Range of Motion    Normal left ankle ROM        Derm: Wound measures 3.1x2.5x.3cm with fibrogranular base and exposed bone. Periwound erythema and edema noted extending dosomedially to level of ankle. No purulence noted. Mild serosanguinous drainage noted. No  fluctuance or cerpitus noted. With hyperkeratotic wound edges.         Laboratory:  A1C:     Recent Labs  Lab 07/01/17  0351   HGBA1C 12.6*     Blood Cultures:     Recent Labs  Lab 06/29/17  2313   LABBLOO No Growth to date  No Growth to date  No Growth to date  No Growth to date     BMP:     Recent Labs  Lab 07/01/17  0351   *   *   K 4.0      CO2 28   BUN 15   CREATININE 0.8   CALCIUM 8.5*   MG 1.4*     CBC:     Recent Labs  Lab 07/01/17 0351   WBC 5.98   RBC 4.00*   HGB 11.6*   HCT 35.1*      MCV 88   MCH 29.0   MCHC 33.0     CMP:     Recent Labs  Lab 07/01/17  0351   *   CALCIUM 8.5*   ALBUMIN 2.3*   PROT 6.4   *   K 4.0   CO2 28      BUN 15   CREATININE 0.8   ALKPHOS 87   ALT 8*   AST 7*   BILITOT 0.8     Coagulation: No results for input(s): INR, APTT in the last 168 hours.    Invalid input(s): PT  CRP:     Recent Labs  Lab 07/01/17  1323   .1*     ESR:     Recent Labs  Lab 07/01/17  1323   SEDRATE 97*     LFTs:     Recent Labs  Lab 07/01/17  0351   ALT 8*   AST 7*   ALKPHOS 87   BILITOT 0.8   PROT 6.4   ALBUMIN 2.3*     Prealbumin: No results for input(s): PREALBUMIN in the last 48 hours.  Wound Cultures:     Recent Labs  Lab 06/29/17 2346 06/30/17  0957   LABAERO GRAM NEGATIVE JAKY, NON-LACTOSE FERMENTERManyIdentification and susceptibility pending PRESUMPTIVE PROTEUS SPECIESModerateIdentification and susceptibility pendingSkin cory also present     Microbiology Results (last 7 days)     Procedure Component Value Units Date/Time    Aerobic culture [[id number]] Collected:  06/29/17 2346    Order Status:  Completed Specimen:  Wound from Foot, Right Updated:  07/01/17 1037     Aerobic Bacterial Culture --     GRAM NEGATIVE JAKY, NON-LACTOSE   Many  Identification and susceptibility pending      Narrative:       Diabetic foot wound    Aerobic culture [[id number]] Collected:  06/30/17 0957    Order Status:  Completed Specimen:  Wound from Toe, Right  Foot Updated:  07/01/17 0943     Aerobic Bacterial Culture --     PRESUMPTIVE PROTEUS SPECIES  Moderate  Identification and susceptibility pending  Skin cory also present      Blood Culture #1 **CANNOT BE ORDERED STAT** [[id number]] Collected:  06/29/17 2313    Order Status:  Completed Specimen:  Blood from Peripheral, Forearm, Right Updated:  07/01/17 0612     Blood Culture, Routine No Growth to date     Blood Culture, Routine No Growth to date    Blood Culture #2 **CANNOT BE ORDERED STAT** [[id number]] Collected:  06/29/17 2313    Order Status:  Completed Specimen:  Blood from Peripheral, Antecubital, Right Updated:  07/01/17 0612     Blood Culture, Routine No Growth to date     Blood Culture, Routine No Growth to date    Culture, Anaerobe [[id number]] Collected:  06/30/17 0957    Order Status:  Sent Specimen:  Wound from Toe, Right Foot Updated:  06/30/17 1003        Specimen (12h ago through future)    None          Diagnostic Results:  I have reviewed all pertinent imaging results/findings within the past 24 hours.    Clinical Findings:  Wound with exposed bone/osteomyelitis and cellulitis extending from right plantar hallux ulceration superiorly towards ankle.

## 2017-07-01 NOTE — CONSULTS
[facility]  Podiatry  Consult Note    Patient Name: Andrew Del Cid Jr.  MRN: [id number]  Admission Date: 6/29/2017  Hospital Length of Stay: 1 days  Attending Physician: Alisha Schwartz MD  Primary Care Provider: Jeannette Herrera MD     Consults  Subjective:     History of Present Illness:  Pt is a 53 y/o male  has a past medical history of Diabetes mellitus, type 2; Hyperlipidemia; Hypertension; and Peripheral neuropathy. Admitted and consulted for diabetic foot wound of the right great toe. Pt is known to podiatry and last seen by Dr. Olguin. Pt relates he has been taking care of his wound at home for 2 months and it has recently worsened. Pt relates to new onset of pain to anterior ankle that is painful to touch. Denies recent N/V/F/C. No other complaints at this time.     Scheduled Meds:   amlodipine  5 mg Oral Daily    aspirin  81 mg Oral Daily    ciprofloxacin  400 mg Intravenous Q12H    gabapentin  300 mg Oral TID    heparin (porcine)  7,500 Units Subcutaneous Q8H    insulin aspart  8 Units Subcutaneous TIDWM    insulin detemir  20 Units Subcutaneous BID    lisinopril  10 mg Oral Daily    polyethylene glycol  17 g Oral Daily    vancomycin (VANCOCIN) IVPB  1,500 mg Intravenous Q8H     Continuous Infusions:   sodium chloride 0.9% 125 mL/hr at 06/30/17 2052     PRN Meds:acetaminophen, albuterol-ipratropium 2.5mg-0.5mg/3mL, dextrose 50%, dextrose 50%, dextrose 50%, dextrose 50%, glucagon (human recombinant), glucagon (human recombinant), glucose, glucose, glucose, glucose, hydrocodone-acetaminophen 5-325mg, insulin aspart, methocarbamol, ondansetron, oxycodone, oxycodone    Review of patient's allergies indicates:  No Known Allergies     Past Medical History:   Diagnosis Date    Diabetes mellitus, type 2     Hyperlipidemia     Hypertension     Peripheral neuropathy      Past Surgical History:   Procedure Laterality Date    APPENDECTOMY      HERNIA REPAIR         Family  History     None        Social History Main Topics    Smoking status: Never Smoker    Smokeless tobacco: Never Used    Alcohol use No    Drug use: No    Sexual activity: Not on file     Review of Systems   Constitutional: Negative.    HENT: Negative.    Eyes: Negative.    Respiratory: Negative.    Cardiovascular: Negative.    Gastrointestinal: Negative.    Endocrine: Negative.    Genitourinary: Negative.    Musculoskeletal: Negative.    Skin: Positive for color change and wound.   Allergic/Immunologic: Negative.    Neurological: Negative.    Hematological: Negative.    Psychiatric/Behavioral: Negative.      Objective:     Vital Signs (Most Recent):  Temp: 98.1 °F (36.7 °C) (06/30/17 2048)  Pulse: 81 (06/30/17 2048)  Resp: 20 (06/30/17 2048)  BP: 131/72 (06/30/17 2048)  SpO2: (!) 93 % (06/30/17 2048) Vital Signs (24h Range):  Temp:  [97.5 °F (36.4 °C)-100.3 °F (37.9 °C)] 98.1 °F (36.7 °C)  Pulse:  [] 81  Resp:  [17-20] 20  SpO2:  [93 %-97 %] 93 %  BP: (100-192)/(51-93) 131/72     Weight: (!) 183.8 kg (405 lb 3.3 oz)  Body mass index is 53.46 kg/m².    Foot Exam    General  General Appearance: appears stated age and healthy   Orientation: alert and oriented to person, place, and time   Affect: appropriate       Right Foot/Ankle     Inspection and Palpation  Ecchymosis: first toe and dorsum  Tenderness: (Tenderness noted with POP to anterior ankle joint and subtalar joint)  Swelling: dorsum   Skin Exam: ulcer;     Neurovascular  Dorsalis pedis: absent  Posterior tibial: absent  Saphenous nerve sensation: diminished  Tibial nerve sensation: diminished  Superficial peroneal nerve sensation: diminished  Deep peroneal nerve sensation: diminished  Sural nerve sensation: diminished    Muscle Strength  Ankle dorsiflexion: 5  Ankle plantar flexion: 5  Ankle inversion: 5  Ankle eversion: 5  Great toe extension: 5  Great toe flexion: 5    Range of Motion    Normal right ankle ROM      Left Foot/Ankle      Inspection  and Palpation  Ecchymosis: none  Tenderness: none   Swelling: none   Skin Exam: skin intact;     Neurovascular  Dorsalis pedis: absent  Posterior tibial: absent  Saphenous nerve sensation: diminished  Tibial nerve sensation: diminished  Superficial peroneal nerve sensation: diminished  Deep peroneal nerve sensation: diminished  Sural nerve sensation: diminished    Muscle Strength  Ankle dorsiflexion: 5  Ankle plantar flexion: 5  Ankle inversion: 5  Ankle eversion: 5  Great toe extension: 5  Great toe flexion: 5    Range of Motion    Normal left ankle ROM        Derm: Wound measures 3.1x2.5x.3cm with fibrogranular base and exposed bone. Periwound erythema and edema noted extending dosomedially to level of ankle. No purulence noted. Mild serosanguinous drainage noted. No fluctuance or cerpitus noted. With hyperkeratotic wound edges.         Laboratory:  A1C: No results for input(s): HGBA1C in the last 4320 hours.  Blood Cultures:   Recent Labs  Lab 06/29/17 2313   LABBLOO No Growth to date  No Growth to date     BMP:   Recent Labs  Lab 06/29/17 2313   *   *   K 3.9   CL 97   CO2 24   BUN 28*   CREATININE 1.2   CALCIUM 8.8     CBC:   Recent Labs  Lab 06/29/17 2313   WBC 10.12   RBC 4.41*   HGB 12.8*   HCT 38.3*      MCV 87   MCH 29.0   MCHC 33.4     CMP:   Recent Labs  Lab 06/29/17 2313   *   CALCIUM 8.8   ALBUMIN 2.9*   PROT 7.5   *   K 3.9   CO2 24   CL 97   BUN 28*   CREATININE 1.2   ALKPHOS 119   ALT 12   AST 7*   BILITOT 1.1*     Coagulation: No results for input(s): INR, APTT in the last 168 hours.    Invalid input(s): PT  CRP: No results for input(s): CRP in the last 168 hours.  ESR: No results for input(s): SEDRATE in the last 168 hours.  LFTs:   Recent Labs  Lab 06/29/17 2313   ALT 12   AST 7*   ALKPHOS 119   BILITOT 1.1*   PROT 7.5   ALBUMIN 2.9*     Prealbumin: No results for input(s): PREALBUMIN in the last 48 hours.  Wound Cultures: No results for input(s): LABAERO in  the last 4320 hours.  Microbiology Results (last 7 days)     Procedure Component Value Units Date/Time    Aerobic culture [[id number]] Collected:  06/30/17 0957    Order Status:  Sent Specimen:  Wound from Toe, Right Foot Updated:  06/30/17 1004    Culture, Anaerobe [[id number]] Collected:  06/30/17 0957    Order Status:  Sent Specimen:  Wound from Toe, Right Foot Updated:  06/30/17 1003    Blood Culture #1 **CANNOT BE ORDERED STAT** [[id number]] Collected:  06/29/17 2313    Order Status:  Completed Specimen:  Blood from Peripheral, Forearm, Right Updated:  06/30/17 0745     Blood Culture, Routine No Growth to date    Blood Culture #2 **CANNOT BE ORDERED STAT** [[id number]] Collected:  06/29/17 2313    Order Status:  Completed Specimen:  Blood from Peripheral, Antecubital, Right Updated:  06/30/17 0745     Blood Culture, Routine No Growth to date    Aerobic culture [[id number]] Collected:  06/29/17 2346    Order Status:  Sent Specimen:  Wound from Foot, Right Updated:  06/29/17 2359        Specimen (12h ago through future)    None          Diagnostic Results:  I have reviewed all pertinent imaging results/findings within the past 24 hours.    Clinical Findings:  Wound with exposed bone/osteomyelitis and cellulitis extending from right plantar hallux ulceration superiorly towards ankle.     Assessment/Plan:     * Diabetic ulcer of toe of right foot associated with diabetes mellitus due to underlying condition, with fat layer exposed    --Long discussion with pt over options for treatment of osteomyelitis with 6 weeks of IV ABX vs partial 1st ray amp. Pt refuses amputation at this time  -Xrays of foot and ankle ordered  -US veins ordered  -MICKEY ordered  -Wound dressed with betadine soaked 4x4, kerlix, ACE, podiatry will follow with daily dressing changes    Weightbearing status: NWB to right forefoot  Offloading device: Darco shoe            Thank you for your consult. I will follow-up with patient. Please contact us if you  have any additional questions.    Shari Patel MD  Podiatry  [facility]        Resident Supervision:  I have personally taken the history and examined this patient and agree with the resident's note as stated above.   Yulia Fernandes DPM

## 2017-07-01 NOTE — HPI
52-year-old male with h/o uncontrolled DM2, diabetic neuropathy, HTN, HLD, morbid obesity  presents to the ER for evaluation of a diabetic wound to his right great toe.  The lesion has been present for a few months worsening for the past 2 weeks.  He has been trying to treat at home but the wound became very malodorous began to drain purulent green material. Also reports redness and swelling of his right great toe. He has no sensation to this area.  The past 2 days he reports decreased appetite with fever, chills and dizziness.

## 2017-07-01 NOTE — PROGRESS NOTES
[facility] Hospital Medicine  Progress Note    Patient Name: Andrew Del Cid Jr.  MRN: [id number]  Patient Class: IP- Inpatient   Admission Date: 6/29/2017  Length of Stay: 2 days  Attending Physician: Alisha Schwartz MD  Primary Care Provider: Jeannette Herrera MD    [facility] Medicine Team: [facility] Alisha Schwartz MD    Subjective:     Principal Problem:Diabetic ulcer of toe of right foot associated with diabetes mellitus due to underlying condition, with fat layer exposed    HPI:  52-year-old male with h/o uncontrolled DM2, diabetic neuropathy, HTN, HLD, morbid obesity  presents to the ER for evaluation of a diabetic wound to his right great toe.  The lesion has been present for a few months worsening for the past 2 weeks.  He has been trying to treat at home but the wound became very malodorous began to drain purulent green material. Also reports redness and swelling of his right great toe. He has no sensation to this area.  The past 2 days he reports decreased appetite with fever, chills and dizziness.     Hospital Course:  Pt was admitted to [facility] and podiatry was consulted.  He has been started on Vanc and Cipro for empiric antibiotics.  Wound culture currently growing proteus. MRI of the foot showed Osteomyelitis of the distal phalanx and distal aspect of the proximal phalanx of the first digit with probable interphalangeal septic joint and an overlying ulcer within the medial subcutaneous soft tissues. Podiatry now planning for partial 1st ray amputation tomorrow.  ID consulted to help tailor antibiotics.     Interval History: Pt aware of plans for surgery tomorrow and in agreement with plan.    Review of Systems   Constitutional: Negative for appetite change, chills, fatigue and fever.   Respiratory: Negative for cough, chest tightness and shortness of breath.    Cardiovascular: Negative for chest pain and palpitations.   Gastrointestinal: Negative for abdominal pain, blood in  stool, constipation and diarrhea.   Genitourinary: Negative for dysuria, frequency and urgency.   Neurological: Negative for dizziness, weakness, light-headedness and headaches.     Objective:     Vital Signs (Most Recent):  Temp: 98.5 °F (36.9 °C) (07/01/17 1200)  Pulse: 83 (07/01/17 1200)  Resp: 18 (07/01/17 1200)  BP: (!) 168/67 (07/01/17 1200)  SpO2: 96 % (07/01/17 1200) Vital Signs (24h Range):  Temp:  [98 °F (36.7 °C)-99 °F (37.2 °C)] 98.5 °F (36.9 °C)  Pulse:  [74-85] 83  Resp:  [18-20] 18  SpO2:  [92 %-97 %] 96 %  BP: (105-168)/(51-72) 168/67     Weight: (!) 183.8 kg (405 lb 3.3 oz)  Body mass index is 54.96 kg/m².    Intake/Output Summary (Last 24 hours) at 07/01/17 1225  Last data filed at 07/01/17 0400   Gross per 24 hour   Intake           391.67 ml   Output              650 ml   Net          -258.33 ml      Physical Exam   Constitutional: He is oriented to person, place, and time. He appears well-developed and well-nourished. No distress.   Cardiovascular: Normal rate and regular rhythm.    No murmur heard.  Pulmonary/Chest: Effort normal and breath sounds normal. He has no wheezes.   Abdominal: Soft. Bowel sounds are normal. He exhibits no distension. There is no tenderness.   Musculoskeletal:   Foot wrapped per podiatry   Neurological: He is alert and oriented to person, place, and time.       Assessment/Plan:      * Diabetic ulcer of toe of right foot associated with diabetes mellitus due to underlying condition, with fat layer exposed    Subacute Osteomyelitis of the right foot  - Continue vancomycin and ciprofloxacin for pseudomonas coverage  - ID consulted to tailor antibiotcs  - podiatry planning for partial ray amputation tomorrow.           DM (diabetes mellitus), type 2, uncontrolled with complications    - HA1C 12.6  - On 70/30 50 units BID at home  - Now on detimir 22 units BID and Aspart 10 TID with meals  - will continue to trend glucose and administer SSI and adjust scheduled insulin as  needed.           Essential hypertension    - Continue amlodipine and lisinopril           morbid obesity    Pt counseled on weight loss.             VTE Risk Mitigation         Ordered     heparin (porcine) injection 7,500 Units  Every 8 hours     Route:  Subcutaneous        06/30/17 0507     Medium Risk of VTE  Once      06/30/17 0507     Place CAT hose  Until discontinued      06/30/17 0305     Place sequential compression device  Until discontinued      06/30/17 0305          Alisha Schwartz MD  Department of Hospital Medicine   [facility]

## 2017-07-01 NOTE — SUBJECTIVE & OBJECTIVE
Past Medical History:   Diagnosis Date    Diabetes mellitus, type 2     Hyperlipidemia     Hypertension     Peripheral neuropathy        Past Surgical History:   Procedure Laterality Date    APPENDECTOMY      HERNIA REPAIR         Review of patient's allergies indicates:  No Known Allergies    Medications:  Prescriptions Prior to Admission   Medication Sig    amlodipine (NORVASC) 5 MG tablet TAKE 1 TABLET BY MOUTH DAILY    aspirin (ECOTRIN) 81 MG EC tablet Take 81 mg by mouth once daily.    atorvastatin (LIPITOR) 20 MG tablet Take 1 tablet (20 mg total) by mouth once daily.    gabapentin (NEURONTIN) 300 MG capsule Take 1 capsule (300 mg total) by mouth 3 (three) times daily.    insulin NPH-insulin regular, 70/30, (NOVOLIN 70/30) 100 unit/mL (70-30) injection 50 units sq in the am and 50 units sq in the pm    lisinopril 10 MG tablet Take 1 tablet (10 mg total) by mouth once daily.    metformin (GLUCOPHAGE) 500 MG tablet Take 2 tablets (1,000 mg total) by mouth 2 (two) times daily with meals. 2 po in the am and 1 po in the pm    methocarbamol (ROBAXIN) 500 MG Tab 1 po qhs prn    ondansetron (ZOFRAN-ODT) 8 MG TbDL Take 1 tablet (8 mg total) by mouth every 8 (eight) hours as needed.     Antibiotics     Start     Stop Route Frequency Ordered    06/30/17 0615  ciprofloxacin (CIPRO)400mg/200ml D5W IVPB 400 mg      -- IV Every 12 hours (non-standard times) 06/30/17 0512        Antifungals     None        Antivirals     None           Immunization History   Administered Date(s) Administered    Tdap 04/27/2013       Family History     None        Social History     Social History    Marital status:      Spouse name: N/A    Number of children: N/A    Years of education: N/A     Social History Main Topics    Smoking status: Never Smoker    Smokeless tobacco: Never Used    Alcohol use No    Drug use: No    Sexual activity: Not Asked     Other Topics Concern    None     Social History Narrative     None     Review of Systems   Constitutional: Negative for chills and fever.   Respiratory: Negative for cough, shortness of breath and wheezing.    Cardiovascular: Positive for leg swelling. Negative for chest pain.   Gastrointestinal: Negative for abdominal pain, diarrhea and vomiting.   Skin: Positive for wound (RLE). Negative for rash.   Neurological: Negative for weakness, numbness and headaches.   Psychiatric/Behavioral: Negative for confusion. The patient is not nervous/anxious.      Objective:     Vital Signs (Most Recent):  Temp: 98.1 °F (36.7 °C) (07/01/17 1558)  Pulse: 80 (07/01/17 1558)  Resp: 18 (07/01/17 1558)  BP: (!) 127/59 (07/01/17 1558)  SpO2: (!) 94 % (07/01/17 1558) Vital Signs (24h Range):  Temp:  [98 °F (36.7 °C)-99 °F (37.2 °C)] 98.1 °F (36.7 °C)  Pulse:  [74-85] 80  Resp:  [18-20] 18  SpO2:  [92 %-96 %] 94 %  BP: (118-168)/(56-72) 127/59     Weight: (!) 183.8 kg (405 lb 3.3 oz)  Body mass index is 54.96 kg/m².    Estimated Creatinine Clearance: 187.7 mL/min (based on Cr of 0.8).    Physical Exam   Constitutional: He is oriented to person, place, and time. He appears well-developed and well-nourished. No distress.       HENT:   Head: Normocephalic and atraumatic.   Cardiovascular: Normal rate, regular rhythm and normal heart sounds.  Exam reveals no gallop and no friction rub.    No murmur heard.  Pulmonary/Chest: Effort normal and breath sounds normal. No respiratory distress. He has no wheezes. He has no rales.   Abdominal: Soft. Bowel sounds are normal. He exhibits no distension and no mass. There is no tenderness. There is no rebound and no guarding.   Musculoskeletal: He exhibits no edema.   Right foot with dressing in place   Neurological: He is alert and oriented to person, place, and time.   Skin: Skin is warm and dry. No rash noted. He is not diaphoretic. No erythema. No pallor.   Psychiatric: He has a normal mood and affect. His behavior is normal.       Significant Labs:   Blood  Culture:   Recent Labs  Lab 06/29/17 2313   LABBLOO No Growth to date  No Growth to date  No Growth to date  No Growth to date     CBC:   Recent Labs  Lab 06/29/17  2313 07/01/17  0351   WBC 10.12 5.98   HGB 12.8* 11.6*   HCT 38.3* 35.1*    287     CMP:   Recent Labs  Lab 06/29/17 2313 07/01/17  0351   * 135*   K 3.9 4.0   CL 97 102   CO2 24 28   * 227*   BUN 28* 15   CREATININE 1.2 0.8   CALCIUM 8.8 8.5*   PROT 7.5 6.4   ALBUMIN 2.9* 2.3*   BILITOT 1.1* 0.8   ALKPHOS 119 87   AST 7* 7*   ALT 12 8*   ANIONGAP 10 5*   EGFRNONAA >60.0 >60.0     Wound Culture:   Recent Labs  Lab 06/29/17  2346 06/30/17  0957   LABAERO GRAM NEGATIVE JAKY, NON-LACTOSE FERMENTERManyIdentification and susceptibility pending PRESUMPTIVE PROTEUS SPECIESModerateIdentification and susceptibility pendingSkin cory also present     All pertinent labs within the past 24 hours have been reviewed.    Significant Imaging: I have reviewed all pertinent imaging results/findings within the past 24 hours.   MRI Lower Extremity Without Contrast Right [[id number]] Resulted: 07/01/17 0520   Order Status: Completed Updated: 07/01/17 0520   Narrative:     Technique: Routine MRI evaluation of the distal aspect of the right forefoot performed without contrast.    Comparison: Radiograph 06/30/2017    Findings:    There is abnormal marrow edema within the distal phalanx and distal aspect of the proximal phalanx of the first digit with corresponding T1 signal abnormality in a medullary and geographic distribution.    Osseous destruction, disorganization and subluxation noted about the mid foot in keeping with sequela of Charcot joint.  Advanced degenerative changes noted about the first through third tarsometatarsal joints.    There is fluid within the flexor hallucis longus tendon sheath that extends into the area of abnormal marrow signal within the distal aspect of the first digit.  Remaining visualized flexor and extensor tendons  demonstrate no significant abnormalities.    There is subcutaneous soft tissue ulceration within the medial aspect of the first digit.  Extensive signal hyperintensities noted within the dorsal subcutaneous soft tissues.  No definite focal drainable collection.   Impression:         Osteomyelitis of the distal phalanx and distal aspect of the proximal phalanx of the first digit with probable interphalangeal septic joint and an overlying ulcer within the medial subcutaneous soft tissues.    Tenosynovitis of the flexor hallucis longus which may be sterile or infectious noting close proximity to the aforementioned osseous changes.    Imaging findings suggesting Charcot joint centered at the midfoot.      Electronically signed by: CRISS ARAMBULA MD  Date: 07/01/17  Time: 05:20    X-Ray Ankle Complete Right [[id number]] Resulted: 06/30/17 1200   Order Status: Completed Updated: 06/30/17 1200   Narrative:     3 views    DJD.  Significant sclerotic and cystic changes identified involving the tarsal bones.  bony spurs seen arising from the calcaneus.  No acute fractures or dislocation.  Vascular calcifications.   Impression:      See above      Electronically signed by: Jeison Marquez MD  Date: 06/30/17  Time: 12:00    X-Ray Foot Complete Right [[id number]] Resulted: 06/30/17 1122   Order Status: Completed Updated: 06/30/17 1122   Narrative:     Comparison: Multiple, including 2015.    Findings:3 view examination.Significant degenerative changes identified level of the tarsometatarsal joint, with marked joint space narrowing, exuberant bone production and rocker-bottom foot.  Findings are consistent with neuropathic changes.   No displaced fractures identified.  Soft tissues are unremarkable.MRI could further evaluate for underlying osteomyelitis if indicated.   Impression:      Significant progression of neuropathic changes at the tarsometatarsal joint since 2015.      Soft tissue ulceration RIGHT great toe yet no definitive  evidence for osteomyelitis.  MRI could further evaluate with increase sensitivity if indicated.      Electronically signed by: Dr. Dimas Steve MD  Date: 06/30/17  Time: 11:22    US Lower Extremity Veins Bilateral [[id number]] Resulted: 06/30/17 1011   Order Status: Completed Updated: 06/30/17 1011   Narrative:     Time of Procedure: 06/30/17 09:25:00  Accession # [id number]    Technique: Duplex and color flow Doppler evaluation of the bilateral lower extremities.    Clinical indication:  swelling, pain.    Findings:    Right - There is no evidence of acute venous thrombosis in the common femoral, superficial femoral, greater saphenous, popliteal, peroneal, anterior tibial and posterior tibial veins.  There is no reflux to suggest valvular incompetence.    There is a enlarged lymph node with fatty and greater saphenous vein and common femoral vein junction which measures 4.6 x 1.4 x 2.1 cm.    Left - There is no evidence of acute venous thrombosis in the common femoral, superficial femoral, greater saphenous, popliteal, peroneal, anterior tibial and posterior tibial veins.  There is no reflux to suggest valvular incompetence.   Impression:         No evidence of deep venous thrombosis bilaterally.    Enlarged right groin lymph node with normal appearing fatty hilum. Clinical correlation is recommended to determine the need for and timing of followup.  ______________________________________     Electronically signed by resident: LULA THOMAS  Date: 06/30/17  Time: 10:08            As the supervising and teaching physician, I personally reviewed the images and resident's interpretation and I agree with the findings.          Electronically signed by: LULA SPANGLER MD  Date: 06/30/17  Time: 10:11    X-Ray Foot Complete Right [[id number]] Resulted: 06/29/17 2356   Order Status: Completed Updated: 06/29/17 2356   Narrative:     Foot complete review right    Clinical history: Type 2 diabetes    Comparison:  3/6/2017    Findings:  3 views.    There is Charcot joint involving the proximal foot at the tarsometatarsal joints, noting in comparison to the previous examination, there has been some interval osseous hypertrophy and sclerotic change without significant changes in the orientation of the metatarsals in relationship to the tarsals.  No soniya dislocation.  No convincing acute displaced fracture.  No new osseous erosive or destructive process.  There is diffuse edema about the foot although improved somewhat since the previous examination.  No radiopaque foreign body.   Impression:       1.  Evolving chronic changes of the foot as described above, without convincing acute displaced fracture, dislocation, or new osseous erosive/destructive process in this patient with Charcot joint.  If there is concern for osteomyelitis, consider three-phase bone scan.      Electronically signed by: CHAPIN TRUJILLO MD  Date: 06/29/17  Time: 23:56

## 2017-07-01 NOTE — ASSESSMENT & PLAN NOTE
Subacute Osteomyelitis of the right foot  - Continue vancomycin and ciprofloxacin for pseudomonas coverage  - ID consulted to tailor antibiotcs  - podiatry planning for partial ray amputation tomorrow.

## 2017-07-01 NOTE — ASSESSMENT & PLAN NOTE
-Surgical intervention indicated at this time. MRI reviewed. Wound with septic IPJ joint and acute osteomyelitis.   -Long discussion with pt over options for treatment of osteomyelitis with 6 weeks of IV ABX vs partial 1st ray amp. Pt agrees to partial 1st ray  -Long discussion with patient regarding the procedure in detail. Patient understands all risks, potential complications, and alternatives, including, but not limited to those listed on the consent form. All questions were answered. No guarantees given or implied as to outcome. Informed verbal and written consent was obtained. Consent forms read, signed, witnessed. Patient ready for surgery  -Pt NPO MN  -MICKEY ordered, pending  -Wound dressed with betadine soaked 4x4, kerlix, ACE, podiatry will follow with daily dressing changes    Weightbearing status: NWB to right forefoot  Offloading device: Darco shoe

## 2017-07-01 NOTE — ASSESSMENT & PLAN NOTE
-Pt with charoct neuropathic changes since 2015  -Pt NWB to right foot  -Appears stable, will monitor

## 2017-07-01 NOTE — ASSESSMENT & PLAN NOTE
- HA1C 12.6  - On 70/30 50 units BID at home  - Now on detimir 22 units BID and Aspart 10 TID with meals  - will continue to trend glucose and administer SSI and adjust scheduled insulin as needed.

## 2017-07-01 NOTE — SUBJECTIVE & OBJECTIVE
Scheduled Meds:   amlodipine  5 mg Oral Daily    aspirin  81 mg Oral Daily    ciprofloxacin  400 mg Intravenous Q12H    gabapentin  300 mg Oral TID    heparin (porcine)  7,500 Units Subcutaneous Q8H    insulin aspart  8 Units Subcutaneous TIDWM    insulin detemir  20 Units Subcutaneous BID    lisinopril  10 mg Oral Daily    polyethylene glycol  17 g Oral Daily    vancomycin (VANCOCIN) IVPB  1,500 mg Intravenous Q8H     Continuous Infusions:   sodium chloride 0.9% 125 mL/hr at 06/30/17 2052     PRN Meds:acetaminophen, albuterol-ipratropium 2.5mg-0.5mg/3mL, dextrose 50%, dextrose 50%, dextrose 50%, dextrose 50%, glucagon (human recombinant), glucagon (human recombinant), glucose, glucose, glucose, glucose, hydrocodone-acetaminophen 5-325mg, insulin aspart, methocarbamol, ondansetron, oxycodone, oxycodone    Review of patient's allergies indicates:  No Known Allergies     Past Medical History:   Diagnosis Date    Diabetes mellitus, type 2     Hyperlipidemia     Hypertension     Peripheral neuropathy      Past Surgical History:   Procedure Laterality Date    APPENDECTOMY      HERNIA REPAIR         Family History     None        Social History Main Topics    Smoking status: Never Smoker    Smokeless tobacco: Never Used    Alcohol use No    Drug use: No    Sexual activity: Not on file     Review of Systems   Constitutional: Negative.    HENT: Negative.    Eyes: Negative.    Respiratory: Negative.    Cardiovascular: Negative.    Gastrointestinal: Negative.    Endocrine: Negative.    Genitourinary: Negative.    Musculoskeletal: Negative.    Skin: Positive for color change and wound.   Allergic/Immunologic: Negative.    Neurological: Negative.    Hematological: Negative.    Psychiatric/Behavioral: Negative.      Objective:     Vital Signs (Most Recent):  Temp: 98.1 °F (36.7 °C) (06/30/17 2048)  Pulse: 81 (06/30/17 2048)  Resp: 20 (06/30/17 2048)  BP: 131/72 (06/30/17 2048)  SpO2: (!) 93 % (06/30/17  2048) Vital Signs (24h Range):  Temp:  [97.5 °F (36.4 °C)-100.3 °F (37.9 °C)] 98.1 °F (36.7 °C)  Pulse:  [] 81  Resp:  [17-20] 20  SpO2:  [93 %-97 %] 93 %  BP: (100-192)/(51-93) 131/72     Weight: (!) 183.8 kg (405 lb 3.3 oz)  Body mass index is 53.46 kg/m².    Foot Exam    General  General Appearance: appears stated age and healthy   Orientation: alert and oriented to person, place, and time   Affect: appropriate       Right Foot/Ankle     Inspection and Palpation  Ecchymosis: first toe and dorsum  Tenderness: (Tenderness noted with POP to anterior ankle joint and subtalar joint)  Swelling: dorsum   Skin Exam: ulcer;     Neurovascular  Dorsalis pedis: absent  Posterior tibial: absent  Saphenous nerve sensation: diminished  Tibial nerve sensation: diminished  Superficial peroneal nerve sensation: diminished  Deep peroneal nerve sensation: diminished  Sural nerve sensation: diminished    Muscle Strength  Ankle dorsiflexion: 5  Ankle plantar flexion: 5  Ankle inversion: 5  Ankle eversion: 5  Great toe extension: 5  Great toe flexion: 5    Range of Motion    Normal right ankle ROM      Left Foot/Ankle      Inspection and Palpation  Ecchymosis: none  Tenderness: none   Swelling: none   Skin Exam: skin intact;     Neurovascular  Dorsalis pedis: absent  Posterior tibial: absent  Saphenous nerve sensation: diminished  Tibial nerve sensation: diminished  Superficial peroneal nerve sensation: diminished  Deep peroneal nerve sensation: diminished  Sural nerve sensation: diminished    Muscle Strength  Ankle dorsiflexion: 5  Ankle plantar flexion: 5  Ankle inversion: 5  Ankle eversion: 5  Great toe extension: 5  Great toe flexion: 5    Range of Motion    Normal left ankle ROM        Derm: Wound measures 3.1x2.5x.3cm with fibrogranular base and exposed bone. Periwound erythema and edema noted extending dosomedially to level of ankle. No purulence noted. Mild serosanguinous drainage noted. No fluctuance or cerpitus noted.  With hyperkeratotic wound edges.         Laboratory:  A1C: No results for input(s): HGBA1C in the last 4320 hours.  Blood Cultures:   Recent Labs  Lab 06/29/17 2313   LABBLOO No Growth to date  No Growth to date     BMP:   Recent Labs  Lab 06/29/17 2313   *   *   K 3.9   CL 97   CO2 24   BUN 28*   CREATININE 1.2   CALCIUM 8.8     CBC:   Recent Labs  Lab 06/29/17 2313   WBC 10.12   RBC 4.41*   HGB 12.8*   HCT 38.3*      MCV 87   MCH 29.0   MCHC 33.4     CMP:   Recent Labs  Lab 06/29/17 2313   *   CALCIUM 8.8   ALBUMIN 2.9*   PROT 7.5   *   K 3.9   CO2 24   CL 97   BUN 28*   CREATININE 1.2   ALKPHOS 119   ALT 12   AST 7*   BILITOT 1.1*     Coagulation: No results for input(s): INR, APTT in the last 168 hours.    Invalid input(s): PT  CRP: No results for input(s): CRP in the last 168 hours.  ESR: No results for input(s): SEDRATE in the last 168 hours.  LFTs:   Recent Labs  Lab 06/29/17 2313   ALT 12   AST 7*   ALKPHOS 119   BILITOT 1.1*   PROT 7.5   ALBUMIN 2.9*     Prealbumin: No results for input(s): PREALBUMIN in the last 48 hours.  Wound Cultures: No results for input(s): LABAERO in the last 4320 hours.  Microbiology Results (last 7 days)     Procedure Component Value Units Date/Time    Aerobic culture [[id number]] Collected:  06/30/17 0957    Order Status:  Sent Specimen:  Wound from Toe, Right Foot Updated:  06/30/17 1004    Culture, Anaerobe [[id number]] Collected:  06/30/17 0957    Order Status:  Sent Specimen:  Wound from Toe, Right Foot Updated:  06/30/17 1003    Blood Culture #1 **CANNOT BE ORDERED STAT** [[id number]] Collected:  06/29/17 2313    Order Status:  Completed Specimen:  Blood from Peripheral, Forearm, Right Updated:  06/30/17 0745     Blood Culture, Routine No Growth to date    Blood Culture #2 **CANNOT BE ORDERED STAT** [[id number]] Collected:  06/29/17 2313    Order Status:  Completed Specimen:  Blood from Peripheral, Antecubital, Right Updated:  06/30/17 0745      Blood Culture, Routine No Growth to date    Aerobic culture [[id number]] Collected:  06/29/17 0986    Order Status:  Sent Specimen:  Wound from Foot, Right Updated:  06/29/17 2014        Specimen (12h ago through future)    None          Diagnostic Results:  I have reviewed all pertinent imaging results/findings within the past 24 hours.    Clinical Findings:  Wound with exposed bone/osteomyelitis and cellulitis extending from right plantar hallux ulceration superiorly towards ankle.

## 2017-07-01 NOTE — CONSULTS
[facility]  Infectious Disease  Consult Note    Patient Name: Andrew Del Cid Jr.  MRN: [id number]  Admission Date: 6/29/2017  Hospital Length of Stay: 2 days  Attending Physician: Mekhi Schwartz MD  Primary Care Provider: Jeannette Herrera MD       Inpatient consult to Infectious Diseases  Consult performed by: EVA ÁLVAREZ JR  Consult ordered by: MEKHI SCHWARTZ      Consult received.  Full consult to follow.      NIR Ritchie  Infectious Disease  [facility]

## 2017-07-01 NOTE — HPI
52-year-old male with h/o uncontrolled DM2, diabetic neuropathy, HTN, HLD, morbid obesity presented 6.29 to the ER for evaluation of a diabetic wound to his right great toe.  The lesion has been present for a 2 month with worsening for the past 2 weeks.  He has been trying to treat at home with localized care but the wound became very malodorous began to drain purulent green material. Also reports redness and swelling of his right great toe. He has no sensation to this area.  The past 2 days he reports decreased appetite with fever, chills and dizziness.    He was found to have temp of 100.3 F in ED. Received dose of vancomycin and cefepime in ER for right great toe wound infection. MRI shows osteomyelitis of the great toe with septic arthritis.  Podiatry planning amputation 7.2.     Cultures show presumptive proteus sp and a non lactose fermenting gram neg lokesh.  Patient is improved since admit.  The patient denies any recent fever, chills, or sweats.

## 2017-07-01 NOTE — ANESTHESIA PREPROCEDURE EVALUATION
07/01/2017  Andrew Del Cid Jr. is a 52 y.o., male with h/o uncontrolled DM2, diabetic neuropathy, HTN, HLD, morbid obesity, and diabetic wound to his right great toe who presents for:    Pre-operative evaluation for Procedure(s) (LRB):  AMPUTATION-TOE (Right)    Diagnostic Studies:  7/1/17 MRI LE  Osteomyelitis of the distal phalanx and distal aspect of the proximal phalanx of the first digit with probable interphalangeal septic joint and an overlying ulcer within the medial subcutaneous soft tissues.    Tenosynovitis of the flexor hallucis longus which may be sterile or infectious noting close proximity to the aforementioned osseous changes.    Imaging findings suggesting Charcot joint centered at the midfoot.    6/1/16 EKG:  Sinus tachycardia  Left anterior fascicular block    Drips:   sodium chloride 0.9% 125 mL/hr at 06/30/17 2052       Patient Active Problem List   Diagnosis    DM (diabetes mellitus), type 2, uncontrolled with complications    morbid obesity    Diabetic ulcer of toe of right foot associated with diabetes mellitus due to underlying condition, with fat layer exposed    Cellulitis of toe of right foot    DM type 2, uncontrolled, with neuropathy    Essential hypertension    Diabetic Charcot foot    Diabetic ulcer of right great toe    Subacute osteomyelitis of right foot       Review of patient's allergies indicates:  No Known Allergies     No current facility-administered medications on file prior to encounter.      Current Outpatient Prescriptions on File Prior to Encounter   Medication Sig Dispense Refill    amlodipine (NORVASC) 5 MG tablet TAKE 1 TABLET BY MOUTH DAILY 90 tablet 2    aspirin (ECOTRIN) 81 MG EC tablet Take 81 mg by mouth once daily.      atorvastatin (LIPITOR) 20 MG tablet Take 1 tablet (20 mg total) by mouth once daily. 90 tablet 3    gabapentin  (NEURONTIN) 300 MG capsule Take 1 capsule (300 mg total) by mouth 3 (three) times daily. 270 capsule 2    insulin NPH-insulin regular, 70/30, (NOVOLIN 70/30) 100 unit/mL (70-30) injection 50 units sq in the am and 50 units sq in the pm 30 mL 6    lisinopril 10 MG tablet Take 1 tablet (10 mg total) by mouth once daily. 90 tablet 3    metformin (GLUCOPHAGE) 500 MG tablet Take 2 tablets (1,000 mg total) by mouth 2 (two) times daily with meals. 2 po in the am and 1 po in the pm 270 tablet 3    methocarbamol (ROBAXIN) 500 MG Tab 1 po qhs prn 20 tablet 0    ondansetron (ZOFRAN-ODT) 8 MG TbDL Take 1 tablet (8 mg total) by mouth every 8 (eight) hours as needed. 15 tablet 0     Past Surgical History:   Procedure Laterality Date    APPENDECTOMY      HERNIA REPAIR         Social History     Social History    Marital status:      Spouse name: N/A    Number of children: N/A    Years of education: N/A     Occupational History    Not on file.     Social History Main Topics    Smoking status: Never Smoker    Smokeless tobacco: Never Used    Alcohol use No    Drug use: No    Sexual activity: Not on file     Other Topics Concern    Not on file     Social History Narrative    No narrative on file         Vital Signs Range (Last 24H):  Temp:  [36.7 °C (98 °F)-37.2 °C (99 °F)]   Pulse:  [74-85]   Resp:  [18-20]   BP: (118-168)/(56-72)   SpO2:  [92 %-96 %]       CBC:   Recent Labs      06/29/17 2313 07/01/17   0351   WBC  10.12  5.98   RBC  4.41*  4.00*   HGB  12.8*  11.6*   HCT  38.3*  35.1*   PLT  324  287   MCV  87  88   MCH  29.0  29.0   MCHC  33.4  33.0       CMP:   Recent Labs      06/29/17 2313 07/01/17   0351   NA  131*  135*   K  3.9  4.0   CL  97  102   CO2  24  28   BUN  28*  15   CREATININE  1.2  0.8   GLU  310*  227*   MG   --   1.4*   PHOS   --   2.5*   CALCIUM  8.8  8.5*   ALBUMIN  2.9*  2.3*   PROT  7.5  6.4   ALKPHOS  119  87   ALT  12  8*   AST  7*  7*   BILITOT  1.1*  0.8       Anesthesia  Evaluation    I have reviewed the Patient Summary Reports.     I have reviewed the Medications.     Review of Systems  Anesthesia Hx:  No problems with previous Anesthesia Denies Hx of Anesthetic complications  History of prior surgery of interest to airway management or planning:  Denies Personal Hx of Anesthesia complications.   Social:  Non-Smoker    Hematology/Oncology:  Hematology Normal   Oncology Normal     EENT/Dental:EENT/Dental Normal   Cardiovascular:   Exercise tolerance: good Hypertension hyperlipidemia ECG has been reviewed.    Pulmonary:  Pulmonary Normal    Renal/:  Renal/ Normal     Hepatic/GI:  Hepatic/GI Normal    Musculoskeletal:  Musculoskeletal Normal    Neurological:   Neuromuscular Disease,    Endocrine:   Diabetes    Psych:  Psychiatric Normal           Physical Exam  General:  Morbid Obesity, Large Beard    Airway/Jaw/Neck:  Airway Findings: Mouth Opening: Normal Tongue: Normal  General Airway Assessment: Adult  Oropharynx Findings: Normal Mallampati: I  TM Distance: Normal, at least 6 cm  Jaw/Neck Findings:  Neck ROM: Normal ROM  Neck Findings: Normal    Eyes/Ears/Nose:  EYES/EARS/NOSE FINDINGS: Normal   Dental:  Dental Findings: Periodontal disease, Severe    Chest/Lungs:  Chest/Lungs Findings: Normal Respiratory Rate     Heart/Vascular:  Heart Findings: Rate: Normal        Mental Status:  Mental Status Findings:  Cooperative, Alert and Oriented         Anesthesia Plan  Type of Anesthesia, risks & benefits discussed:  Anesthesia Type:  MAC, general, regional  Patient's Preference:   Intra-op Monitoring Plan:   Intra-op Monitoring Plan Comments:   Post Op Pain Control Plan:   Post Op Pain Control Plan Comments:   Induction:   IV  Beta Blocker:  Patient is not currently on a Beta-Blocker (No further documentation required).       Informed Consent: Patient understands risks and agrees with Anesthesia plan.  Questions answered. Anesthesia consent signed with patient.  ASA Score: 3     Day  of Surgery Review of History & Physical:    H&P update referred to the provider.         Ready For Surgery From Anesthesia Perspective.

## 2017-07-01 NOTE — HOSPITAL COURSE
Pt was admitted to [facility] and podiatry was consulted.  He has been started on Vanc and Cipro for empiric antibiotics.  Wound culture currently growing proteus. MRI of the foot showed Osteomyelitis of the distal phalanx and distal aspect of the proximal phalanx of the first digit with probable interphalangeal septic joint and an overlying ulcer within the medial subcutaneous soft tissues. On 7/1, pt underwent AMPUTATION right hallux and resection first metatarsal right foot by Dr Nino Olguin with Podiatry.  ID consulted to help tailor antibiotics and pt currently on Cipro monotherpy. Pt will be discharged home today on 2 weeks of oral Cipro.  He will have close follow up with podiatry and ID.  Pt will also [facility] with [facility] for [facility] / diabetes follow up and referral to endocrinology also placed for long term management. Will arrange home health for wound care.

## 2017-07-02 ENCOUNTER — ANESTHESIA (OUTPATIENT)
Dept: SURGERY | Facility: HOSPITAL | Age: 52
DRG: 478 | End: 2017-07-02
Payer: COMMERCIAL

## 2017-07-02 ENCOUNTER — SURGERY (OUTPATIENT)
Age: 52
End: 2017-07-02

## 2017-07-02 LAB
ABO + RH BLD: NORMAL
BLD GP AB SCN CELLS X3 SERPL QL: NORMAL
GRAM STN SPEC: NORMAL
POCT GLUCOSE: 173 MG/DL (ref 70–110)
POCT GLUCOSE: 216 MG/DL (ref 70–110)
POCT GLUCOSE: 217 MG/DL (ref 70–110)
POCT GLUCOSE: 249 MG/DL (ref 70–110)
POCT GLUCOSE: 252 MG/DL (ref 70–110)
VANCOMYCIN SERPL-MCNC: 4.9 UG/ML

## 2017-07-02 PROCEDURE — 0Y6P0Z0 DETACHMENT AT RIGHT 1ST TOE, COMPLETE, OPEN APPROACH: ICD-10-PCS | Performed by: PODIATRIST

## 2017-07-02 PROCEDURE — 27200750 HC INSULATED NEEDLE/ STIMUPLEX: Performed by: ANESTHESIOLOGY

## 2017-07-02 PROCEDURE — 25000003 PHARM REV CODE 250: Performed by: HOSPITALIST

## 2017-07-02 PROCEDURE — 63600175 PHARM REV CODE 636 W HCPCS: Performed by: HOSPITALIST

## 2017-07-02 PROCEDURE — 0QBN0ZX EXCISION OF RIGHT METATARSAL, OPEN APPROACH, DIAGNOSTIC: ICD-10-PCS | Performed by: PODIATRIST

## 2017-07-02 PROCEDURE — 88311 DECALCIFY TISSUE: CPT | Mod: 26,,, | Performed by: PATHOLOGY

## 2017-07-02 PROCEDURE — 25000003 PHARM REV CODE 250: Performed by: PHYSICIAN ASSISTANT

## 2017-07-02 PROCEDURE — 27000221 HC OXYGEN, UP TO 24 HOURS

## 2017-07-02 PROCEDURE — 87102 FUNGUS ISOLATION CULTURE: CPT

## 2017-07-02 PROCEDURE — 87015 SPECIMEN INFECT AGNT CONCNTJ: CPT

## 2017-07-02 PROCEDURE — 99499 UNLISTED E&M SERVICE: CPT | Mod: ,,, | Performed by: PHYSICIAN ASSISTANT

## 2017-07-02 PROCEDURE — 37000009 HC ANESTHESIA EA ADD 15 MINS: Performed by: PODIATRIST

## 2017-07-02 PROCEDURE — 88305 TISSUE EXAM BY PATHOLOGIST: CPT | Performed by: PATHOLOGY

## 2017-07-02 PROCEDURE — 99232 SBSQ HOSP IP/OBS MODERATE 35: CPT | Mod: ,,, | Performed by: HOSPITALIST

## 2017-07-02 PROCEDURE — 64450 NJX AA&/STRD OTHER PN/BRANCH: CPT | Mod: 59,RT,, | Performed by: ANESTHESIOLOGY

## 2017-07-02 PROCEDURE — D9220A PRA ANESTHESIA: Mod: CRNA,,, | Performed by: REGISTERED NURSE

## 2017-07-02 PROCEDURE — 86901 BLOOD TYPING SEROLOGIC RH(D): CPT

## 2017-07-02 PROCEDURE — 87147 CULTURE TYPE IMMUNOLOGIC: CPT

## 2017-07-02 PROCEDURE — 63600175 PHARM REV CODE 636 W HCPCS: Performed by: REGISTERED NURSE

## 2017-07-02 PROCEDURE — 80202 ASSAY OF VANCOMYCIN: CPT

## 2017-07-02 PROCEDURE — 64447 NJX AA&/STRD FEMORAL NRV IMG: CPT | Mod: 59,RT,, | Performed by: ANESTHESIOLOGY

## 2017-07-02 PROCEDURE — 25000003 PHARM REV CODE 250: Performed by: REGISTERED NURSE

## 2017-07-02 PROCEDURE — 27201423 OPTIME MED/SURG SUP & DEVICES STERILE SUPPLY: Performed by: PODIATRIST

## 2017-07-02 PROCEDURE — 36415 COLL VENOUS BLD VENIPUNCTURE: CPT

## 2017-07-02 PROCEDURE — 82962 GLUCOSE BLOOD TEST: CPT | Performed by: PODIATRIST

## 2017-07-02 PROCEDURE — 11000001 HC ACUTE MED/SURG PRIVATE ROOM

## 2017-07-02 PROCEDURE — 36000706: Performed by: PODIATRIST

## 2017-07-02 PROCEDURE — 87075 CULTR BACTERIA EXCEPT BLOOD: CPT

## 2017-07-02 PROCEDURE — 86900 BLOOD TYPING SEROLOGIC ABO: CPT

## 2017-07-02 PROCEDURE — 71000033 HC RECOVERY, INTIAL HOUR: Performed by: PODIATRIST

## 2017-07-02 PROCEDURE — 87205 SMEAR GRAM STAIN: CPT

## 2017-07-02 PROCEDURE — 76942 ECHO GUIDE FOR BIOPSY: CPT | Mod: 26,,, | Performed by: ANESTHESIOLOGY

## 2017-07-02 PROCEDURE — 28810 AMPUTATION TOE & METATARSAL: CPT | Mod: T5,RT,, | Performed by: PODIATRIST

## 2017-07-02 PROCEDURE — D9220A PRA ANESTHESIA: Mod: ANES,,, | Performed by: ANESTHESIOLOGY

## 2017-07-02 PROCEDURE — 36000707: Performed by: PODIATRIST

## 2017-07-02 PROCEDURE — 88305 TISSUE EXAM BY PATHOLOGIST: CPT | Mod: 26,,, | Performed by: PATHOLOGY

## 2017-07-02 PROCEDURE — 71000039 HC RECOVERY, EACH ADD'L HOUR: Performed by: PODIATRIST

## 2017-07-02 PROCEDURE — 37000008 HC ANESTHESIA 1ST 15 MINUTES: Performed by: PODIATRIST

## 2017-07-02 PROCEDURE — 87116 MYCOBACTERIA CULTURE: CPT | Mod: 59

## 2017-07-02 PROCEDURE — 87070 CULTURE OTHR SPECIMN AEROBIC: CPT

## 2017-07-02 RX ORDER — MIDAZOLAM HYDROCHLORIDE 1 MG/ML
INJECTION, SOLUTION INTRAMUSCULAR; INTRAVENOUS
Status: DISCONTINUED | OUTPATIENT
Start: 2017-07-02 | End: 2017-07-02

## 2017-07-02 RX ORDER — EPINEPHRINE 1 MG/ML
INJECTION, SOLUTION INTRACARDIAC; INTRAMUSCULAR; INTRAVENOUS; SUBCUTANEOUS
Status: DISPENSED
Start: 2017-07-02 | End: 2017-07-02

## 2017-07-02 RX ORDER — GLYCOPYRROLATE 0.2 MG/ML
INJECTION INTRAMUSCULAR; INTRAVENOUS
Status: DISCONTINUED | OUTPATIENT
Start: 2017-07-02 | End: 2017-07-02

## 2017-07-02 RX ORDER — LIDOCAINE HCL/PF 100 MG/5ML
SYRINGE (ML) INTRAVENOUS
Status: DISCONTINUED | OUTPATIENT
Start: 2017-07-02 | End: 2017-07-02

## 2017-07-02 RX ORDER — PROPOFOL 10 MG/ML
VIAL (ML) INTRAVENOUS
Status: DISCONTINUED | OUTPATIENT
Start: 2017-07-02 | End: 2017-07-02

## 2017-07-02 RX ORDER — FENTANYL CITRATE 50 UG/ML
INJECTION, SOLUTION INTRAMUSCULAR; INTRAVENOUS
Status: DISCONTINUED | OUTPATIENT
Start: 2017-07-02 | End: 2017-07-02

## 2017-07-02 RX ORDER — CIPROFLOXACIN 500 MG/1
500 TABLET ORAL EVERY 12 HOURS
Status: DISCONTINUED | OUTPATIENT
Start: 2017-07-02 | End: 2017-07-03 | Stop reason: HOSPADM

## 2017-07-02 RX ORDER — PROPOFOL 10 MG/ML
VIAL (ML) INTRAVENOUS CONTINUOUS PRN
Status: DISCONTINUED | OUTPATIENT
Start: 2017-07-02 | End: 2017-07-02

## 2017-07-02 RX ORDER — KETAMINE HYDROCHLORIDE 100 MG/ML
INJECTION, SOLUTION INTRAMUSCULAR; INTRAVENOUS
Status: DISCONTINUED | OUTPATIENT
Start: 2017-07-02 | End: 2017-07-02

## 2017-07-02 RX ORDER — ROPIVACAINE HYDROCHLORIDE 7.5 MG/ML
INJECTION, SOLUTION EPIDURAL; PERINEURAL
Status: DISPENSED
Start: 2017-07-02 | End: 2017-07-02

## 2017-07-02 RX ADMIN — HYDROCODONE BITARTRATE AND ACETAMINOPHEN 1 TABLET: 5; 325 TABLET ORAL at 09:07

## 2017-07-02 RX ADMIN — CIPROFLOXACIN 400 MG: 2 INJECTION, SOLUTION INTRAVENOUS at 08:07

## 2017-07-02 RX ADMIN — KETAMINE HYDROCHLORIDE 10 MG: 100 INJECTION, SOLUTION, CONCENTRATE INTRAMUSCULAR; INTRAVENOUS at 09:07

## 2017-07-02 RX ADMIN — GABAPENTIN 300 MG: 300 CAPSULE ORAL at 01:07

## 2017-07-02 RX ADMIN — CIPROFLOXACIN HYDROCHLORIDE 500 MG: 500 TABLET, FILM COATED ORAL at 01:07

## 2017-07-02 RX ADMIN — GABAPENTIN 300 MG: 300 CAPSULE ORAL at 09:07

## 2017-07-02 RX ADMIN — INSULIN ASPART 6 UNITS: 100 INJECTION, SOLUTION INTRAVENOUS; SUBCUTANEOUS at 10:07

## 2017-07-02 RX ADMIN — GLYCOPYRROLATE 0.1 MG: 0.2 INJECTION, SOLUTION INTRAMUSCULAR; INTRAVENOUS at 08:07

## 2017-07-02 RX ADMIN — KETAMINE HYDROCHLORIDE 10 MG: 100 INJECTION, SOLUTION, CONCENTRATE INTRAMUSCULAR; INTRAVENOUS at 08:07

## 2017-07-02 RX ADMIN — GLYCOPYRROLATE 0.2 MG: 0.2 INJECTION, SOLUTION INTRAMUSCULAR; INTRAVENOUS at 07:07

## 2017-07-02 RX ADMIN — FENTANYL CITRATE 25 MCG: 50 INJECTION, SOLUTION INTRAMUSCULAR; INTRAVENOUS at 07:07

## 2017-07-02 RX ADMIN — INSULIN ASPART 4 UNITS: 100 INJECTION, SOLUTION INTRAVENOUS; SUBCUTANEOUS at 01:07

## 2017-07-02 RX ADMIN — MIDAZOLAM HYDROCHLORIDE 1 MG: 1 INJECTION, SOLUTION INTRAMUSCULAR; INTRAVENOUS at 07:07

## 2017-07-02 RX ADMIN — INSULIN ASPART 10 UNITS: 100 INJECTION, SOLUTION INTRAVENOUS; SUBCUTANEOUS at 05:07

## 2017-07-02 RX ADMIN — SODIUM CHLORIDE, SODIUM GLUCONATE, SODIUM ACETATE, POTASSIUM CHLORIDE, MAGNESIUM CHLORIDE, SODIUM PHOSPHATE, DIBASIC, AND POTASSIUM PHOSPHATE: .53; .5; .37; .037; .03; .012; .00082 INJECTION, SOLUTION INTRAVENOUS at 07:07

## 2017-07-02 RX ADMIN — INSULIN ASPART 10 UNITS: 100 INJECTION, SOLUTION INTRAVENOUS; SUBCUTANEOUS at 12:07

## 2017-07-02 RX ADMIN — HEPARIN SODIUM 7500 UNITS: 5000 INJECTION, SOLUTION INTRAVENOUS; SUBCUTANEOUS at 01:07

## 2017-07-02 RX ADMIN — HEPARIN SODIUM 7500 UNITS: 5000 INJECTION, SOLUTION INTRAVENOUS; SUBCUTANEOUS at 09:07

## 2017-07-02 RX ADMIN — LIDOCAINE HYDROCHLORIDE 20 MG: 20 INJECTION, SOLUTION INTRAVENOUS at 08:07

## 2017-07-02 RX ADMIN — PROPOFOL 20 MG: 10 INJECTION, EMULSION INTRAVENOUS at 08:07

## 2017-07-02 RX ADMIN — AMLODIPINE BESYLATE 5 MG: 5 TABLET ORAL at 10:07

## 2017-07-02 RX ADMIN — POLYETHYLENE GLYCOL 3350 17 G: 17 POWDER, FOR SOLUTION ORAL at 09:07

## 2017-07-02 RX ADMIN — INSULIN ASPART 2 UNITS: 100 INJECTION, SOLUTION INTRAVENOUS; SUBCUTANEOUS at 09:07

## 2017-07-02 RX ADMIN — ASPIRIN 81 MG: 81 TABLET, COATED ORAL at 09:07

## 2017-07-02 RX ADMIN — CIPROFLOXACIN HYDROCHLORIDE 500 MG: 500 TABLET, FILM COATED ORAL at 09:07

## 2017-07-02 RX ADMIN — LISINOPRIL 10 MG: 10 TABLET ORAL at 10:07

## 2017-07-02 RX ADMIN — VANCOMYCIN HYDROCHLORIDE 1.5 G: 1 INJECTION, POWDER, LYOPHILIZED, FOR SOLUTION INTRAVENOUS at 08:07

## 2017-07-02 RX ADMIN — PROPOFOL 75 MCG/KG/MIN: 10 INJECTION, EMULSION INTRAVENOUS at 08:07

## 2017-07-02 NOTE — ASSESSMENT & PLAN NOTE
Subacute Osteomyelitis of the right foot  Pt was admitted to [facility] and podiatry was consulted.  He has been started on Vanc and Cipro for empiric antibiotics.  Wound culture currently growing proteus. MRI of the foot showed Osteomyelitis of the distal phalanx and distal aspect of the proximal phalanx of the first digit with probable interphalangeal septic joint and an overlying ulcer within the medial subcutaneous soft tissues. On 7/1, pt underwent AMPUTATION right hallux and resection first metatarsal right foot by Dr Nino Olguin with Podiatry.  ID consulted to help tailor antibiotics and pt currently on Cipro monotherpy.

## 2017-07-02 NOTE — PROGRESS NOTES
Attempted to reach wife via waiting room but no one in the waiting room.  Tried cellphone but no answer.  Pt is aware.

## 2017-07-02 NOTE — BRIEF OP NOTE
[facility]  Brief Operative Note    SUMMARY     Surgery Date: 7/2/2017     Surgeon(s) and Role:     * Nino Olguin DPM - Primary     * Shari Patel MD - Resident - Assisting        Pre-op Diagnosis:  Diabetic ulcer of right great toe [E11.621, L97.519]    Post-op Diagnosis:  Post-Op Diagnosis Codes:     * Diabetic ulcer of right great toe [E11.621, L97.519]    Procedure(s) (LRB):  AMPUTATION right hallux and resection first metatarsal right foot    Anesthesia: Local MAC    Description of Procedure: disarticulation of the hallux at the metatarsal phalangeal joint with resection first metatarsal head right foot    Description of the findings of the procedure: erosion and soft portion of the first metatarsal head and brown appearance of the bone. Toe was devitalized with soft necrotic bone that was biopsied with a lyndsey and sent for culture.    Estimated Blood Loss: 20 mL         Specimens:   Specimen (12h ago through future)    None

## 2017-07-02 NOTE — PLAN OF CARE
Pt vital sings wnl.  Rt foot dressing intact.  Pt with no complaints of any pain.  No nausea.  Pt cleared to return to room 911.

## 2017-07-02 NOTE — ANESTHESIA POSTPROCEDURE EVALUATION
Anesthesia Post Evaluation    Patient: Andrew Del Cid JrSharron    Procedure(s) Performed: Procedure(s) (LRB):  AMPUTATION-TOE (Right)    Final Anesthesia Type: general  Patient location during evaluation: PACU  Patient participation: Yes- Able to Participate  Level of consciousness: awake and alert and oriented  Post-procedure vital signs: reviewed and stable  Pain management: adequate  Airway patency: patent  PONV status at discharge: No PONV  Anesthetic complications: no      Cardiovascular status: blood pressure returned to baseline and hemodynamically stable  Respiratory status: unassisted, spontaneous ventilation and room air  Hydration status: euvolemic  Follow-up not needed.        Visit Vitals  /63   Pulse 73   Temp 36.6 °C (97.9 °F) (Skin)   Resp 14   Ht 6' (1.829 m)   Wt (!) 183.8 kg (405 lb 3.3 oz)   SpO2 98%   BMI 54.96 kg/m²       Pain/Judy Score: Pain Assessment Performed: Yes (7/2/2017  9:29 AM)  Presence of Pain: denies (7/2/2017  9:29 AM)  Pain Rating Prior to Med Admin: 0 (7/2/2017  9:54 AM)  Judy Score: 8 (7/2/2017  9:29 AM)

## 2017-07-02 NOTE — PROGRESS NOTES
[facility] Hospital Medicine  Progress Note    Patient Name: Andrew Del Cid Jr.  MRN: [id number]  Patient Class: IP- Inpatient   Admission Date: 6/29/2017  Length of Stay: 3 days  Attending Physician: Nino Olguin DPM  Primary Care Provider: Jeannette Herrera MD    [facility] Medicine Team: [facility] HOSP MED K Alisha Schwartz MD    Subjective:     Principal Problem:Diabetic ulcer of toe of right foot associated with diabetes mellitus due to underlying condition, with fat layer exposed    HPI:  52-year-old male with h/o uncontrolled DM2, diabetic neuropathy, HTN, HLD, morbid obesity  presents to the ER for evaluation of a diabetic wound to his right great toe.  The lesion has been present for a few months worsening for the past 2 weeks.  He has been trying to treat at home but the wound became very malodorous began to drain purulent green material. Also reports redness and swelling of his right great toe. He has no sensation to this area.  The past 2 days he reports decreased appetite with fever, chills and dizziness.     Hospital Course:  Pt was admitted to [facility] and podiatry was consulted.  He has been started on Vanc and Cipro for empiric antibiotics.  Wound culture currently growing proteus. MRI of the foot showed Osteomyelitis of the distal phalanx and distal aspect of the proximal phalanx of the first digit with probable interphalangeal septic joint and an overlying ulcer within the medial subcutaneous soft tissues. On 7/1, pt underwent AMPUTATION right hallux and resection first metatarsal right foot by Dr Nino Olguin with Podiatry.  ID consulted to help tailor antibiotics and pt currently on Cipro monotherpy.     Interval History: Pt s/p surgery yesterday with no complications.  Doing very well.     Review of Systems   Constitutional: Negative for appetite change, chills, fatigue and fever.   Respiratory: Negative for cough, chest tightness and shortness of breath.    Cardiovascular:  Negative for chest pain and palpitations.   Gastrointestinal: Negative for abdominal pain, blood in stool, constipation and diarrhea.   Genitourinary: Negative for dysuria, frequency and urgency.   Neurological: Negative for dizziness, weakness, light-headedness and headaches.     Objective:     Vital Signs (Most Recent):  Temp: 97.5 °F (36.4 °C) (07/02/17 1159)  Pulse: 70 (07/02/17 1159)  Resp: 18 (07/02/17 1159)  BP: 118/71 (07/02/17 1159)  SpO2: 95 % (07/02/17 1159) Vital Signs (24h Range):  Temp:  [97.5 °F (36.4 °C)-98.6 °F (37 °C)] 97.5 °F (36.4 °C)  Pulse:  [68-84] 70  Resp:  [13-22] 18  SpO2:  [92 %-98 %] 95 %  BP: ()/(58-88) 118/71     Weight: (!) 183.8 kg (405 lb 3.3 oz)  Body mass index is 54.96 kg/m².    Intake/Output Summary (Last 24 hours) at 07/02/17 1242  Last data filed at 07/02/17 1032   Gross per 24 hour   Intake             2495 ml   Output             1552 ml   Net              943 ml      Physical Exam   Constitutional: He is oriented to person, place, and time. He appears well-developed and well-nourished. No distress.   Cardiovascular: Normal rate and regular rhythm.    No murmur heard.  Pulmonary/Chest: Effort normal and breath sounds normal. He has no wheezes.   Abdominal: Soft. Bowel sounds are normal. He exhibits no distension. There is no tenderness.   Musculoskeletal:   Foot wrapped per podiatry   Neurological: He is alert and oriented to person, place, and time.       Assessment/Plan:      * Diabetic ulcer of toe of right foot associated with diabetes mellitus due to underlying condition, with fat layer exposed    Subacute Osteomyelitis of the right foot  Pt was admitted to [facility] and podiatry was consulted.  He has been started on Vanc and Cipro for empiric antibiotics.  Wound culture currently growing proteus. MRI of the foot showed Osteomyelitis of the distal phalanx and distal aspect of the proximal phalanx of the first digit with probable interphalangeal septic joint and an  overlying ulcer within the medial subcutaneous soft tissues. On 7/1, pt underwent AMPUTATION right hallux and resection first metatarsal right foot by Dr Nino Olguin with Podiatry.  ID consulted to help tailor antibiotics and pt currently on Cipro monotherpy.         DM (diabetes mellitus), type 2, uncontrolled with complications    - HA1C 12.6  - On 70/30 50 units BID at home  - Now on detimir 22 units BID and Aspart 10 TID with meals, increase levemir to 24 BID  - will continue to trend glucose and administer SSI and adjust scheduled insulin as needed.           Essential hypertension    - Continue amlodipine and lisinopril           morbid obesity    Pt counseled on weight loss.             VTE Risk Mitigation         Ordered     heparin (porcine) injection 7,500 Units  Every 8 hours     Route:  Subcutaneous        06/30/17 0507     Medium Risk of VTE  Once      06/30/17 0507     Place CAT hose  Until discontinued      06/30/17 0305     Place sequential compression device  Until discontinued      06/30/17 0305          Alisha Schwartz MD  Department of Hospital Medicine   [facility]

## 2017-07-02 NOTE — PROGRESS NOTES
Afebrile. In surgery this AM so not seen.  Continue Cipro for now as both Providencia, Morganella, and Proteus are all sensitive.  Wlll follow cultures.    Jin Pfeiffer PA-C  Pager [phone]

## 2017-07-02 NOTE — NURSING TRANSFER
Nursing Transfer Note      7/2/2017     Transfer To: 911    Transfer via stretcher    Transfer with none    Transported by transporter     Medicines sent: insulin pen    Chart send with patient: Yes    Notified: spouse going to the room    Patient reassessed at: 7/2/17

## 2017-07-02 NOTE — ANESTHESIA PROCEDURE NOTES
R Saphenous SS    Patient location during procedure: OR   Block not for primary anesthetic.  Reason for block: at surgeon's request and post-op pain management   Post-op Pain Location: right medial foot  Start time: 7/2/2017 8:02 AM  Timeout: 7/2/2017 8:01 AM   End time: 7/2/2017 8:11 AM  Staffing  Anesthesiologist: JAMES BAEZA  Performed: anesthesiologist   Preanesthetic Checklist  Completed: patient identified, site marked, surgical consent, pre-op evaluation, timeout performed, IV checked, risks and benefits discussed and monitors and equipment checked  Peripheral Block  Patient position: supine  Prep: ChloraPrep  Patient monitoring: heart rate, cardiac monitor, continuous pulse ox, continuous capnometry and frequent blood pressure checks  Block type: adductor canal  Laterality: right  Injection technique: single shot  Needle  Needle type: Stimuplex   Needle gauge: 21 G  Needle length: 4 in  Needle localization: anatomical landmarks and ultrasound guidance   -ultrasound image captured on disc.  Assessment  Injection assessment: negative aspiration, negative parasthesia and local visualized surrounding nerve  Paresthesia pain: none  Heart rate change: no  Slow fractionated injection: yes  Medications:  Bolus administered: 10 mL of 0.75 ropivacaine  Epinephrine added: 3.75 mcg/mL (1/300,000)  Additional Notes  VSS.  [facility] RN monitoring vitals throughout procedure.  Patient tolerated procedure well.

## 2017-07-02 NOTE — SUBJECTIVE & OBJECTIVE
Interval History: Pt s/p surgery yesterday with no complications.  Doing very well.     Review of Systems   Constitutional: Negative for appetite change, chills, fatigue and fever.   Respiratory: Negative for cough, chest tightness and shortness of breath.    Cardiovascular: Negative for chest pain and palpitations.   Gastrointestinal: Negative for abdominal pain, blood in stool, constipation and diarrhea.   Genitourinary: Negative for dysuria, frequency and urgency.   Neurological: Negative for dizziness, weakness, light-headedness and headaches.     Objective:     Vital Signs (Most Recent):  Temp: 97.5 °F (36.4 °C) (07/02/17 1159)  Pulse: 70 (07/02/17 1159)  Resp: 18 (07/02/17 1159)  BP: 118/71 (07/02/17 1159)  SpO2: 95 % (07/02/17 1159) Vital Signs (24h Range):  Temp:  [97.5 °F (36.4 °C)-98.6 °F (37 °C)] 97.5 °F (36.4 °C)  Pulse:  [68-84] 70  Resp:  [13-22] 18  SpO2:  [92 %-98 %] 95 %  BP: ()/(58-88) 118/71     Weight: (!) 183.8 kg (405 lb 3.3 oz)  Body mass index is 54.96 kg/m².    Intake/Output Summary (Last 24 hours) at 07/02/17 1242  Last data filed at 07/02/17 1032   Gross per 24 hour   Intake             2495 ml   Output             1552 ml   Net              943 ml      Physical Exam   Constitutional: He is oriented to person, place, and time. He appears well-developed and well-nourished. No distress.   Cardiovascular: Normal rate and regular rhythm.    No murmur heard.  Pulmonary/Chest: Effort normal and breath sounds normal. He has no wheezes.   Abdominal: Soft. Bowel sounds are normal. He exhibits no distension. There is no tenderness.   Musculoskeletal:   Foot wrapped per podiatry   Neurological: He is alert and oriented to person, place, and time.

## 2017-07-02 NOTE — ANESTHESIA PROCEDURE NOTES
Right Popliteal SS    Patient location during procedure: OR   Block not for primary anesthetic.  Reason for block: at surgeon's request and post-op pain management   Post-op Pain Location: right foot  Start time: 7/2/2017 8:02 AM  Timeout: 7/2/2017 8:01 AM   End time: 7/2/2017 8:11 AM  Staffing  Anesthesiologist: JAMES BAEZA  Performed: anesthesiologist   Preanesthetic Checklist  Completed: patient identified, site marked, surgical consent, pre-op evaluation, timeout performed, IV checked, risks and benefits discussed and monitors and equipment checked  Peripheral Block  Patient position: supine  Prep: ChloraPrep  Patient monitoring: heart rate, cardiac monitor, continuous pulse ox, continuous capnometry and frequent blood pressure checks  Block type: popliteal  Laterality: right  Injection technique: single shot  Needle  Needle type: Stimuplex   Needle gauge: 21 G  Needle length: 4 in  Needle localization: anatomical landmarks and ultrasound guidance   -ultrasound image captured on disc.  Assessment  Injection assessment: negative aspiration, negative parasthesia and local visualized surrounding nerve  Paresthesia pain: none  Heart rate change: no  Slow fractionated injection: yes  Medications:  Bolus administered: 30 mL of 0.75 ropivacaine  Epinephrine added: 3.75 mcg/mL (1/300,000)  Additional Notes  VSS.  [facility] RN monitoring vitals throughout procedure.  Patient tolerated procedure well.

## 2017-07-02 NOTE — TRANSFER OF CARE
Anesthesia Transfer of Care Note    Patient: Andrew Del Cid Jr.    Procedure(s) Performed: Procedure(s) (LRB):  AMPUTATION-TOE (Right)    Patient location: PACU    Anesthesia Type: general    Transport from OR: Transported from OR on 6-10 L/min O2 by face mask with adequate spontaneous ventilation    Post pain: adequate analgesia    Post assessment: no apparent anesthetic complications and tolerated procedure well    Post vital signs: stable    Level of consciousness: awake and alert    Nausea/Vomiting: no nausea/vomiting    Complications: none    Transfer of care protocol was followed      Last vitals:   Visit Vitals  BP 99/72 (BP Location: Left arm, Patient Position: Lying, BP Method: Automatic)   Pulse 74   Temp 36.6 °C (97.9 °F) (Skin)   Resp (!) 22   Ht 6' (1.829 m)   Wt (!) 183.8 kg (405 lb 3.3 oz)   SpO2 (!) 94%   BMI 54.96 kg/m²

## 2017-07-02 NOTE — PLAN OF CARE
Problem: Diabetes, Type 2 (Adult)  Goal: Signs and Symptoms of Listed Potential Problems Will be Absent, Minimized or Managed (Diabetes, Type 2)  Signs and symptoms of listed potential problems will be absent, minimized or managed by discharge/transition of care (reference Diabetes, Type 2 (Adult) CPG).   Outcome: Ongoing (interventions implemented as appropriate)  KV=249 overnight. 1 unit novolog administered.     Problem: Fall Risk (Adult)  Goal: Identify Related Risk Factors and Signs and Symptoms  Related risk factors and signs and symptoms are identified upon initiation of Human Response Clinical Practice Guideline (CPG)   Outcome: Ongoing (interventions implemented as appropriate)  Pt verbalized understanding of fall prevention plan and personal risk factors. Pt remained free of falls/trauma/injury during shift.     Problem: Patient Care Overview  Goal: Plan of Care Review  Outcome: Ongoing (interventions implemented as appropriate)  Pt AAOx4, VSS. Pt remained NPO after midnight, last reported solid before PM shift. Pt denied pain, nausea, and SOB throughout shift. Normal saline continuous infusion @ 125 ml/hr. Pt verbalized understanding of scheduled surgery this morning. Pre-op checklist completed in epic flowsheets. Pt with clear yellow output of 1000ml+ during shift. Overall, no acute events overnight.     Problem: Pressure Ulcer Risk (Erwin Scale) (Adult,Obstetrics,Pediatric)  Goal: Identify Related Risk Factors and Signs and Symptoms  Related risk factors and signs and symptoms are identified upon initiation of Human Response Clinical Practice Guideline (CPG)   Outcome: Ongoing (interventions implemented as appropriate)  Pt verbalized understanding of ways to reduce pressure. Wound consult placed to inquire about bariatric bed. Pt repositions independently during shift.

## 2017-07-02 NOTE — ASSESSMENT & PLAN NOTE
- HA1C 12.6  - On 70/30 50 units BID at home  - Now on detimir 22 units BID and Aspart 10 TID with meals, increase levemir to 24 BID  - will continue to trend glucose and administer SSI and adjust scheduled insulin as needed.

## 2017-07-03 VITALS
OXYGEN SATURATION: 95 % | BODY MASS INDEX: 42.66 KG/M2 | SYSTOLIC BLOOD PRESSURE: 141 MMHG | HEIGHT: 72 IN | HEART RATE: 83 BPM | DIASTOLIC BLOOD PRESSURE: 85 MMHG | RESPIRATION RATE: 18 BRPM | WEIGHT: 315 LBS | TEMPERATURE: 99 F

## 2017-07-03 PROBLEM — I73.9 PVD (PERIPHERAL VASCULAR DISEASE): Status: ACTIVE | Noted: 2017-07-03

## 2017-07-03 PROBLEM — M86.171 ACUTE OSTEOMYELITIS OF TOE OF RIGHT FOOT: Status: ACTIVE | Noted: 2017-07-03

## 2017-07-03 PROBLEM — M86.271 SUBACUTE OSTEOMYELITIS OF RIGHT FOOT: Status: RESOLVED | Noted: 2017-07-01 | Resolved: 2017-07-03

## 2017-07-03 LAB
BACTERIA SPEC AEROBE CULT: NORMAL
POCT GLUCOSE: 158 MG/DL (ref 70–110)
POCT GLUCOSE: 193 MG/DL (ref 70–110)

## 2017-07-03 PROCEDURE — 99223 1ST HOSP IP/OBS HIGH 75: CPT | Mod: ,,, | Performed by: PHYSICIAN ASSISTANT

## 2017-07-03 PROCEDURE — 93924 LWR XTR VASC STDY BILAT: CPT | Performed by: SURGERY

## 2017-07-03 PROCEDURE — 25000003 PHARM REV CODE 250: Performed by: HOSPITALIST

## 2017-07-03 PROCEDURE — 99239 HOSP IP/OBS DSCHRG MGMT >30: CPT | Mod: ,,, | Performed by: HOSPITALIST

## 2017-07-03 RX ORDER — CIPROFLOXACIN 500 MG/1
500 TABLET ORAL EVERY 12 HOURS
Qty: 24 TABLET | Refills: 0 | Status: SHIPPED | OUTPATIENT
Start: 2017-07-03 | End: 2017-07-17 | Stop reason: SDUPTHER

## 2017-07-03 RX ORDER — INSULIN GLARGINE 300 [IU]/ML
54 INJECTION, SOLUTION SUBCUTANEOUS NIGHTLY
Qty: 6 SYRINGE | Refills: 11 | Status: SHIPPED | OUTPATIENT
Start: 2017-07-03 | End: 2017-07-03 | Stop reason: HOSPADM

## 2017-07-03 RX ORDER — METFORMIN HYDROCHLORIDE 500 MG/1
1000 TABLET ORAL 2 TIMES DAILY WITH MEALS
Qty: 270 TABLET | Refills: 3
Start: 2017-07-03 | End: 2017-09-06 | Stop reason: SDUPTHER

## 2017-07-03 RX ORDER — INSULIN ASPART 100 [IU]/ML
12 INJECTION, SOLUTION INTRAVENOUS; SUBCUTANEOUS
Qty: 32.4 ML | Refills: 3 | Status: SHIPPED | OUTPATIENT
Start: 2017-07-03 | End: 2017-09-06 | Stop reason: SDUPTHER

## 2017-07-03 RX ORDER — ATORVASTATIN CALCIUM 20 MG/1
20 TABLET, FILM COATED ORAL DAILY
Qty: 90 TABLET | Refills: 3 | Status: ON HOLD | OUTPATIENT
Start: 2017-07-03 | End: 2018-12-30

## 2017-07-03 RX ORDER — LISINOPRIL 10 MG/1
10 TABLET ORAL DAILY
Qty: 90 TABLET | Refills: 3 | Status: ON HOLD | OUTPATIENT
Start: 2017-07-03 | End: 2018-12-30 | Stop reason: HOSPADM

## 2017-07-03 RX ORDER — INSULIN ASPART 100 [IU]/ML
12 INJECTION, SOLUTION INTRAVENOUS; SUBCUTANEOUS
Status: DISCONTINUED | OUTPATIENT
Start: 2017-07-03 | End: 2017-07-03 | Stop reason: HOSPADM

## 2017-07-03 RX ADMIN — INSULIN ASPART 10 UNITS: 100 INJECTION, SOLUTION INTRAVENOUS; SUBCUTANEOUS at 08:07

## 2017-07-03 RX ADMIN — GABAPENTIN 300 MG: 300 CAPSULE ORAL at 02:07

## 2017-07-03 RX ADMIN — CIPROFLOXACIN HYDROCHLORIDE 500 MG: 500 TABLET, FILM COATED ORAL at 09:07

## 2017-07-03 RX ADMIN — LISINOPRIL 10 MG: 10 TABLET ORAL at 08:07

## 2017-07-03 RX ADMIN — AMLODIPINE BESYLATE 5 MG: 5 TABLET ORAL at 09:07

## 2017-07-03 RX ADMIN — GABAPENTIN 300 MG: 300 CAPSULE ORAL at 05:07

## 2017-07-03 RX ADMIN — HEPARIN SODIUM 7500 UNITS: 5000 INJECTION, SOLUTION INTRAVENOUS; SUBCUTANEOUS at 05:07

## 2017-07-03 RX ADMIN — HEPARIN SODIUM 7500 UNITS: 5000 INJECTION, SOLUTION INTRAVENOUS; SUBCUTANEOUS at 02:07

## 2017-07-03 RX ADMIN — ASPIRIN 81 MG: 81 TABLET, COATED ORAL at 09:07

## 2017-07-03 RX ADMIN — INSULIN ASPART 12 UNITS: 100 INJECTION, SOLUTION INTRAVENOUS; SUBCUTANEOUS at 12:07

## 2017-07-03 NOTE — SUBJECTIVE & OBJECTIVE
Interval History: Pt doing very well today.  No complaints and pain well controlled. Requesting to go home if possible.     Review of Systems   Constitutional: Negative for appetite change, chills, fatigue and fever.   Respiratory: Negative for cough, chest tightness and shortness of breath.    Cardiovascular: Negative for chest pain and palpitations.   Gastrointestinal: Negative for abdominal pain, blood in stool, constipation and diarrhea.   Genitourinary: Negative for dysuria, frequency and urgency.   Neurological: Negative for dizziness, weakness, light-headedness and headaches.     Objective:     Vital Signs (Most Recent):  Temp: 99.1 °F (37.3 °C) (07/03/17 1248)  Pulse: 83 (07/03/17 1248)  Resp: 18 (07/03/17 1248)  BP: (!) 141/85 (07/03/17 1248)  SpO2: 95 % (07/03/17 1248) Vital Signs (24h Range):  Temp:  [98.9 °F (37.2 °C)-99.1 °F (37.3 °C)] 99.1 °F (37.3 °C)  Pulse:  [83-88] 83  Resp:  [17-18] 18  SpO2:  [92 %-95 %] 95 %  BP: (130-168)/(62-85) 141/85     Weight: (!) 183.8 kg (405 lb 3.3 oz)  Body mass index is 54.96 kg/m².    Intake/Output Summary (Last 24 hours) at 07/03/17 1600  Last data filed at 07/03/17 0556   Gross per 24 hour   Intake              300 ml   Output             1225 ml   Net             -925 ml      Physical Exam   Constitutional: He is oriented to person, place, and time. He appears well-developed and well-nourished. No distress.   Cardiovascular: Normal rate and regular rhythm.    No murmur heard.  Pulmonary/Chest: Effort normal and breath sounds normal. He has no wheezes.   Abdominal: Soft. Bowel sounds are normal. He exhibits no distension. There is no tenderness.   Musculoskeletal:   Foot wrapped per podiatry   Neurological: He is alert and oriented to person, place, and time.

## 2017-07-03 NOTE — CONSULTS
[facility]  Infectious Disease  Consult Note    Patient Name: Andrew Del Cid Jr.  MRN: [id number]  Admission Date: 6/29/2017  Hospital Length of Stay: 4 days  Attending Physician: Alisha Schwartz MD  Primary Care Provider: Jeannette Herrera MD     Isolation Status: No active isolations    Patient information was obtained from patient and past medical records.      Consults  Assessment/Plan:     Acute osteomyelitis of toe of right foot    - now s/p amputation of right great toe 7/2 with podiatry- per podiatry, excellent margins obtained in the OR  - ABIs show no PAD  - prior wound cultures with providencia, proteus, and morganella (all sensitive to cipro)  - OR cultures NGTD; pathology of clean margin pending  - anticipating discharge home today  - would recommend d/c home on 2 weeks of oral cipro 500 mg BID  - f/u with podiatry and ID upon discharge  - will f/u cultures and pathology as outpatient  - discussed with Dr. Schwartz  - ID signing off          Thank you for your consult. I will sign off. Please contact us if you have any additional questions.  NIR Orellana, pager: [phone]  Infectious Disease  [facility]    Subjective:     Principal Problem: Diabetic ulcer of toe of right foot associated with diabetes mellitus due to underlying condition, with fat layer exposed    HPI: 52-year-old male with h/o uncontrolled DM2, diabetic neuropathy, HTN, HLD, morbid obesity presented 6.29 to the ER for evaluation of a diabetic wound to his right great toe.  The lesion has been present for a 2 month with worsening for the past 2 weeks.  He has been trying to treat at home with localized care but the wound became very malodorous began to drain purulent green material. Also reports redness and swelling of his right great toe. He has no sensation to this area.  The past 2 days he reports decreased appetite with fever, chills and dizziness.    He was found to have temp of 100.3 F  in ED. Received dose of vancomycin and cefepime in ER for right great toe wound infection. MRI shows osteomyelitis of the great toe with septic arthritis.  Podiatry planning amputation 7.2.     Cultures show presumptive proteus sp and a non lactose fermenting gram neg lokesh.  Patient is improved since admit.  The patient denies any recent fever, chills, or sweats.      Past Medical History:   Diagnosis Date    Diabetes mellitus, type 2     Hyperlipidemia     Hypertension     Peripheral neuropathy        Past Surgical History:   Procedure Laterality Date    APPENDECTOMY      HERNIA REPAIR         Review of patient's allergies indicates:  No Known Allergies    Medications:  Prescriptions Prior to Admission   Medication Sig    amlodipine (NORVASC) 5 MG tablet TAKE 1 TABLET BY MOUTH DAILY    aspirin (ECOTRIN) 81 MG EC tablet Take 81 mg by mouth once daily.    atorvastatin (LIPITOR) 20 MG tablet Take 1 tablet (20 mg total) by mouth once daily.    gabapentin (NEURONTIN) 300 MG capsule Take 1 capsule (300 mg total) by mouth 3 (three) times daily.    insulin NPH-insulin regular, 70/30, (NOVOLIN 70/30) 100 unit/mL (70-30) injection 50 units sq in the am and 50 units sq in the pm    lisinopril 10 MG tablet Take 1 tablet (10 mg total) by mouth once daily.    metformin (GLUCOPHAGE) 500 MG tablet Take 2 tablets (1,000 mg total) by mouth 2 (two) times daily with meals. 2 po in the am and 1 po in the pm    methocarbamol (ROBAXIN) 500 MG Tab 1 po qhs prn    ondansetron (ZOFRAN-ODT) 8 MG TbDL Take 1 tablet (8 mg total) by mouth every 8 (eight) hours as needed.     Antibiotics     Start     Stop Route Frequency Ordered    06/30/17 0615  ciprofloxacin (CIPRO)400mg/200ml D5W IVPB 400 mg      -- IV Every 12 hours (non-standard times) 06/30/17 0512        Antifungals     None        Antivirals     None           Immunization History   Administered Date(s) Administered    Tdap 04/27/2013       Family History     None         Social History     Social History    Marital status:      Spouse name: N/A    Number of children: N/A    Years of education: N/A     Social History Main Topics    Smoking status: Never Smoker    Smokeless tobacco: Never Used    Alcohol use No    Drug use: No    Sexual activity: Not Asked     Other Topics Concern    None     Social History Narrative    None     Review of Systems   Constitutional: Negative for chills and fever.   Respiratory: Negative for cough, shortness of breath and wheezing.    Cardiovascular: Positive for leg swelling. Negative for chest pain.   Gastrointestinal: Negative for abdominal pain, diarrhea and vomiting.   Skin: Positive for wound (RLE). Negative for rash.   Neurological: Negative for weakness, numbness and headaches.   Psychiatric/Behavioral: Negative for confusion. The patient is not nervous/anxious.      Objective:     Vital Signs (Most Recent):  Temp: 98.1 °F (36.7 °C) (07/01/17 1558)  Pulse: 80 (07/01/17 1558)  Resp: 18 (07/01/17 1558)  BP: (!) 127/59 (07/01/17 1558)  SpO2: (!) 94 % (07/01/17 1558) Vital Signs (24h Range):  Temp:  [98 °F (36.7 °C)-99 °F (37.2 °C)] 98.1 °F (36.7 °C)  Pulse:  [74-85] 80  Resp:  [18-20] 18  SpO2:  [92 %-96 %] 94 %  BP: (118-168)/(56-72) 127/59     Weight: (!) 183.8 kg (405 lb 3.3 oz)  Body mass index is 54.96 kg/m².    Estimated Creatinine Clearance: 187.7 mL/min (based on Cr of 0.8).    Physical Exam   Constitutional: He is oriented to person, place, and time. He appears well-developed and well-nourished. No distress.       HENT:   Head: Normocephalic and atraumatic.   Cardiovascular: Normal rate, regular rhythm and normal heart sounds.  Exam reveals no gallop and no friction rub.    No murmur heard.  Pulmonary/Chest: Effort normal and breath sounds normal. No respiratory distress. He has no wheezes. He has no rales.   Abdominal: Soft. Bowel sounds are normal. He exhibits no distension and no mass. There is no tenderness. There  is no rebound and no guarding.   Musculoskeletal: He exhibits no edema.   Right foot with dressing in place   Neurological: He is alert and oriented to person, place, and time.   Skin: Skin is warm and dry. No rash noted. He is not diaphoretic. No erythema. No pallor.   Psychiatric: He has a normal mood and affect. His behavior is normal.       Significant Labs:   Blood Culture:   Recent Labs  Lab 06/29/17 2313   LABBLOO No Growth to date  No Growth to date  No Growth to date  No Growth to date     CBC:   Recent Labs  Lab 06/29/17 2313 07/01/17  0351   WBC 10.12 5.98   HGB 12.8* 11.6*   HCT 38.3* 35.1*    287     CMP:   Recent Labs  Lab 06/29/17 2313 07/01/17 0351   * 135*   K 3.9 4.0   CL 97 102   CO2 24 28   * 227*   BUN 28* 15   CREATININE 1.2 0.8   CALCIUM 8.8 8.5*   PROT 7.5 6.4   ALBUMIN 2.9* 2.3*   BILITOT 1.1* 0.8   ALKPHOS 119 87   AST 7* 7*   ALT 12 8*   ANIONGAP 10 5*   EGFRNONAA >60.0 >60.0     Wound Culture:   Recent Labs  Lab 06/29/17 2346 06/30/17  0957   LABAERO GRAM NEGATIVE JAKY, NON-LACTOSE FERMENTERManyIdentification and susceptibility pending PRESUMPTIVE PROTEUS SPECIESModerateIdentification and susceptibility pendingSkin cory also present     All pertinent labs within the past 24 hours have been reviewed.    Significant Imaging: I have reviewed all pertinent imaging results/findings within the past 24 hours.   MRI Lower Extremity Without Contrast Right [[id number]] Resulted: 07/01/17 0520   Order Status: Completed Updated: 07/01/17 0520   Narrative:     Technique: Routine MRI evaluation of the distal aspect of the right forefoot performed without contrast.    Comparison: Radiograph 06/30/2017    Findings:    There is abnormal marrow edema within the distal phalanx and distal aspect of the proximal phalanx of the first digit with corresponding T1 signal abnormality in a medullary and geographic distribution.    Osseous destruction, disorganization and subluxation noted  about the mid foot in keeping with sequela of Charcot joint.  Advanced degenerative changes noted about the first through third tarsometatarsal joints.    There is fluid within the flexor hallucis longus tendon sheath that extends into the area of abnormal marrow signal within the distal aspect of the first digit.  Remaining visualized flexor and extensor tendons demonstrate no significant abnormalities.    There is subcutaneous soft tissue ulceration within the medial aspect of the first digit.  Extensive signal hyperintensities noted within the dorsal subcutaneous soft tissues.  No definite focal drainable collection.   Impression:         Osteomyelitis of the distal phalanx and distal aspect of the proximal phalanx of the first digit with probable interphalangeal septic joint and an overlying ulcer within the medial subcutaneous soft tissues.    Tenosynovitis of the flexor hallucis longus which may be sterile or infectious noting close proximity to the aforementioned osseous changes.    Imaging findings suggesting Charcot joint centered at the midfoot.      Electronically signed by: CRISS ARAMBULA MD  Date: 07/01/17  Time: 05:20    X-Ray Ankle Complete Right [[id number]] Resulted: 06/30/17 1200   Order Status: Completed Updated: 06/30/17 1200   Narrative:     3 views    DJD.  Significant sclerotic and cystic changes identified involving the tarsal bones.  bony spurs seen arising from the calcaneus.  No acute fractures or dislocation.  Vascular calcifications.   Impression:      See above      Electronically signed by: Jeison Marquez MD  Date: 06/30/17  Time: 12:00    X-Ray Foot Complete Right [[id number]] Resulted: 06/30/17 1122   Order Status: Completed Updated: 06/30/17 1122   Narrative:     Comparison: Multiple, including 2015.    Findings:3 view examination.Significant degenerative changes identified level of the tarsometatarsal joint, with marked joint space narrowing, exuberant bone production and rocker-bottom  foot.  Findings are consistent with neuropathic changes.   No displaced fractures identified.  Soft tissues are unremarkable.MRI could further evaluate for underlying osteomyelitis if indicated.   Impression:      Significant progression of neuropathic changes at the tarsometatarsal joint since 2015.      Soft tissue ulceration RIGHT great toe yet no definitive evidence for osteomyelitis.  MRI could further evaluate with increase sensitivity if indicated.      Electronically signed by: Dr. Dimas Steve MD  Date: 06/30/17  Time: 11:22     Lower Extremity Veins Bilateral [[id number]] Resulted: 06/30/17 1011   Order Status: Completed Updated: 06/30/17 1011   Narrative:     Time of Procedure: 06/30/17 09:25:00  Accession # [id number]    Technique: Duplex and color flow Doppler evaluation of the bilateral lower extremities.    Clinical indication:  swelling, pain.    Findings:    Right - There is no evidence of acute venous thrombosis in the common femoral, superficial femoral, greater saphenous, popliteal, peroneal, anterior tibial and posterior tibial veins.  There is no reflux to suggest valvular incompetence.    There is a enlarged lymph node with fatty and greater saphenous vein and common femoral vein junction which measures 4.6 x 1.4 x 2.1 cm.    Left - There is no evidence of acute venous thrombosis in the common femoral, superficial femoral, greater saphenous, popliteal, peroneal, anterior tibial and posterior tibial veins.  There is no reflux to suggest valvular incompetence.   Impression:         No evidence of deep venous thrombosis bilaterally.    Enlarged right groin lymph node with normal appearing fatty hilum. Clinical correlation is recommended to determine the need for and timing of followup.  ______________________________________     Electronically signed by resident: LULA THOMAS  Date: 06/30/17  Time: 10:08            As the supervising and teaching physician, I personally reviewed the images and  resident's interpretation and I agree with the findings.          Electronically signed by: LULA SPANGLER MD  Date: 06/30/17  Time: 10:11    X-Ray Foot Complete Right [[id number]] Resulted: 06/29/17 2356   Order Status: Completed Updated: 06/29/17 2356   Narrative:     Foot complete review right    Clinical history: Type 2 diabetes    Comparison: 3/6/2017    Findings:  3 views.    There is Charcot joint involving the proximal foot at the tarsometatarsal joints, noting in comparison to the previous examination, there has been some interval osseous hypertrophy and sclerotic change without significant changes in the orientation of the metatarsals in relationship to the tarsals.  No soniya dislocation.  No convincing acute displaced fracture.  No new osseous erosive or destructive process.  There is diffuse edema about the foot although improved somewhat since the previous examination.  No radiopaque foreign body.   Impression:       1.  Evolving chronic changes of the foot as described above, without convincing acute displaced fracture, dislocation, or new osseous erosive/destructive process in this patient with Charcot joint.  If there is concern for osteomyelitis, consider three-phase bone scan.      Electronically signed by: CHAPIN TRUJILLO MD  Date: 06/29/17  Time: 23:56

## 2017-07-03 NOTE — DISCHARGE SUMMARY
[facility] Hospital Medicine  Discharge Summary      Patient Name: Andrew Del Cid Jr.  MRN: [id number]  Admission Date: 6/29/2017  Hospital Length of Stay: 4 days  Discharge Date and Time:  07/03/2017 4:05 PM  Attending Physician: Alisha Schwartz MD   Discharging Provider: Alisha Schwartz MD  Primary Care Provider: Jeannette Herrera MD  [facility] Medicine Team: [facility]OSP MED K Alisha Schwartz MD    HPI:   52-year-old male with h/o uncontrolled DM2, diabetic neuropathy, HTN, HLD, morbid obesity  presents to the ER for evaluation of a diabetic wound to his right great toe.  The lesion has been present for a few months worsening for the past 2 weeks.  He has been trying to treat at home but the wound became very malodorous began to drain purulent green material. Also reports redness and swelling of his right great toe. He has no sensation to this area.  The past 2 days he reports decreased appetite with fever, chills and dizziness.     Hospital Course:   Pt was admitted to [facility] and podiatry was consulted.  He has been started on Vanc and Cipro for empiric antibiotics.  Wound culture currently growing proteus. MRI of the foot showed Osteomyelitis of the distal phalanx and distal aspect of the proximal phalanx of the first digit with probable interphalangeal septic joint and an overlying ulcer within the medial subcutaneous soft tissues. On 7/1, pt underwent AMPUTATION right hallux and resection first metatarsal right foot by Dr Nino Olguin with Podiatry.  ID consulted to help tailor antibiotics and pt currently on Cipro monotherpy. Pt will be discharged home today on 2 weeks of oral Cipro.  He will have close follow up with podiatry and ID.  Pt will also follow with [facility] for insulin / diabetes follow up and referral to endocrinology also placed for long term management. Will arrange home health for wound care.       * Diabetic ulcer of toe of right foot associated with diabetes  mellitus due to underlying condition, with fat layer exposed    Subacute Osteomyelitis of the right foot  Pt was admitted to [facility] and podiatry was consulted.  He has been started on Vanc and Cipro for empiric antibiotics.  Wound culture currently growing proteus. MRI of the foot showed Osteomyelitis of the distal phalanx and distal aspect of the proximal phalanx of the first digit with probable interphalangeal septic joint and an overlying ulcer within the medial subcutaneous soft tissues. On 7/1, pt underwent AMPUTATION right hallux and resection first metatarsal right foot by Dr Nino Olguin with Podiatry.  ID consulted to help tailor antibiotics and pt currently on Cipro monotherpy. Pt will be discharged home today on 2 weeks of oral Cipro.  He will have close follow up with podiatry and ID.  Pt will also follow with [facility] for insulin / diabetes follow up and referral to endocrinology also placed for long term management. Will arrange home health for wound care.         DM (diabetes mellitus), type 2, uncontrolled with complications    - HA1C 12.6  - On 70/30 50 units BID at home  - Now on detimir 27 units BID and Aspart 12 TID with meals  - follow up with [facility] in 1-2 weeks and referral also placed for endocrinology for long term management.           Essential hypertension    - Continue amlodipine and lisinopril           morbid obesity    Pt counseled on weight loss.               Discharged Condition: good    Disposition: Home or Self Care    Follow Up:  Follow-up Information     Jeannette Herrera MD In 1 week.    Specialty:  Family Medicine  Contact information:  [address]  [address]  [facility] [address]  [phone]             Khris Camacho DPM On 7/12/2017.    Specialty:  Podiatry  Why:  Wednesday 7/12 @ 8am  Contact information:  [address]  [place] [address]  [phone]             Marcelino Gandhi MD On 7/17/2017.    Specialty:  Infectious Diseases  Why:   2:00pm  Contact information:  [address]  [facility] [address]  [phone]                 Patient Instructions:     Ambulatory Referral to Endocrinology   Referral Priority: Routine Referral Type: Consultation   Requested Specialty: Endocrinology    Number of Visits Requested: 1      Ambulatory Referral to [facility]   Referral Priority: Routine Referral Type: Consultation   Referral Reason: Specialty Services Required    Number of Visits Requested: 1      Diet Diabetic 2000 Calories       Medications:  Reconciled Home Medications:   Current Discharge Medication List      START taking these medications    Details   ciprofloxacin HCl (CIPRO) 500 MG tablet Take 1 tablet (500 mg total) by mouth every 12 (twelve) hours.  Qty: 24 tablet, Refills: 0      insulin aspart (NOVOLOG) 100 unit/mL InPn pen Inject 12 Units into the skin 3 (three) times daily with meals.  Qty: 32.4 mL, Refills: 3      insulin detemir (LEVEMIR FLEXTOUCH) 100 unit/mL (3 mL) SubQ InPn pen Inject 27 Units into the skin 2 (two) times daily.  Qty: 48.6 mL, Refills: 3         CONTINUE these medications which have CHANGED    Details   atorvastatin (LIPITOR) 20 MG tablet Take 1 tablet (20 mg total) by mouth once daily.  Qty: 90 tablet, Refills: 3      lisinopril 10 MG tablet Take 1 tablet (10 mg total) by mouth once daily.  Qty: 90 tablet, Refills: 3      metformin (GLUCOPHAGE) 500 MG tablet Take 2 tablets (1,000 mg total) by mouth 2 (two) times daily with meals. 2 po in the am and 1 po in the pm  Qty: 270 tablet, Refills: 3         CONTINUE these medications which have NOT CHANGED    Details   amlodipine (NORVASC) 5 MG tablet TAKE 1 TABLET BY MOUTH DAILY  Qty: 90 tablet, Refills: 2      aspirin (ECOTRIN) 81 MG EC tablet Take 81 mg by mouth once daily.      gabapentin (NEURONTIN) 300 MG capsule Take 1 capsule (300 mg total) by mouth 3 (three) times daily.  Qty: 270 capsule, Refills: 2      methocarbamol (ROBAXIN) 500 MG Tab 1 po qhs  prn  Qty: 20 tablet, Refills: 0      ondansetron (ZOFRAN-ODT) 8 MG TbDL Take 1 tablet (8 mg total) by mouth every 8 (eight) hours as needed.  Qty: 15 tablet, Refills: 0         STOP taking these medications       insulin NPH-insulin regular, 70/30, (NOVOLIN 70/30) 100 unit/mL (70-30) injection Comments:   Reason for Stopping:             Time spent on the discharge of patient: 40 minutes    Alisha Schwartz MD  Department of Hospital Medicine  [facility]

## 2017-07-03 NOTE — PROGRESS NOTES
[facility] Hospital Medicine  Progress Note    Patient Name: Andrew Del Cid Jr.  MRN: [id number]  Patient Class: IP- Inpatient   Admission Date: 6/29/2017  Length of Stay: 4 days  Attending Physician: Alisha Schwartz MD  Primary Care Provider: Jeannette Herrera MD    [facility] Medicine Team: [facility] HOSP MED K Alisha Schwartz MD    Subjective:     Principal Problem:Diabetic ulcer of toe of right foot associated with diabetes mellitus due to underlying condition, with fat layer exposed    HPI:  52-year-old male with h/o uncontrolled DM2, diabetic neuropathy, HTN, HLD, morbid obesity  presents to the ER for evaluation of a diabetic wound to his right great toe.  The lesion has been present for a few months worsening for the past 2 weeks.  He has been trying to treat at home but the wound became very malodorous began to drain purulent green material. Also reports redness and swelling of his right great toe. He has no sensation to this area.  The past 2 days he reports decreased appetite with fever, chills and dizziness.     Hospital Course:  Pt was admitted to [facility] and podiatry was consulted.  He has been started on Vanc and Cipro for empiric antibiotics.  Wound culture currently growing proteus. MRI of the foot showed Osteomyelitis of the distal phalanx and distal aspect of the proximal phalanx of the first digit with probable interphalangeal septic joint and an overlying ulcer within the medial subcutaneous soft tissues. On 7/1, pt underwent AMPUTATION right hallux and resection first metatarsal right foot by Dr Nino Olguin with Podiatry.  ID consulted to help tailor antibiotics and pt currently on Cipro monotherpy. Pt will be discharged home today on 2 weeks of oral Cipro.  He will have close follow up with podiatry and ID.  Pt will also follow with [facility] for insulin / diabetes follow up and referral to endocrinology also placed for long term management. Will arrange home health for  wound care.     Interval History: Pt doing very well today.  No complaints and pain well controlled. Requesting to go home if possible.     Review of Systems   Constitutional: Negative for appetite change, chills, fatigue and fever.   Respiratory: Negative for cough, chest tightness and shortness of breath.    Cardiovascular: Negative for chest pain and palpitations.   Gastrointestinal: Negative for abdominal pain, blood in stool, constipation and diarrhea.   Genitourinary: Negative for dysuria, frequency and urgency.   Neurological: Negative for dizziness, weakness, light-headedness and headaches.     Objective:     Vital Signs (Most Recent):  Temp: 99.1 °F (37.3 °C) (07/03/17 1248)  Pulse: 83 (07/03/17 1248)  Resp: 18 (07/03/17 1248)  BP: (!) 141/85 (07/03/17 1248)  SpO2: 95 % (07/03/17 1248) Vital Signs (24h Range):  Temp:  [98.9 °F (37.2 °C)-99.1 °F (37.3 °C)] 99.1 °F (37.3 °C)  Pulse:  [83-88] 83  Resp:  [17-18] 18  SpO2:  [92 %-95 %] 95 %  BP: (130-168)/(62-85) 141/85     Weight: (!) 183.8 kg (405 lb 3.3 oz)  Body mass index is 54.96 kg/m².    Intake/Output Summary (Last 24 hours) at 07/03/17 1600  Last data filed at 07/03/17 0556   Gross per 24 hour   Intake              300 ml   Output             1225 ml   Net             -925 ml      Physical Exam   Constitutional: He is oriented to person, place, and time. He appears well-developed and well-nourished. No distress.   Cardiovascular: Normal rate and regular rhythm.    No murmur heard.  Pulmonary/Chest: Effort normal and breath sounds normal. He has no wheezes.   Abdominal: Soft. Bowel sounds are normal. He exhibits no distension. There is no tenderness.   Musculoskeletal:   Foot wrapped per podiatry   Neurological: He is alert and oriented to person, place, and time.       Assessment/Plan:      * Diabetic ulcer of toe of right foot associated with diabetes mellitus due to underlying condition, with fat layer exposed    Subacute Osteomyelitis of the right  foot  Pt was admitted to [facility] and podiatry was consulted.  He has been started on Vanc and Cipro for empiric antibiotics.  Wound culture currently growing proteus. MRI of the foot showed Osteomyelitis of the distal phalanx and distal aspect of the proximal phalanx of the first digit with probable interphalangeal septic joint and an overlying ulcer within the medial subcutaneous soft tissues. On 7/1, pt underwent AMPUTATION right hallux and resection first metatarsal right foot by Dr Nino Olguin with Podiatry.  ID consulted to help tailor antibiotics and pt currently on Cipro monotherpy. Pt will be discharged home today on 2 weeks of oral Cipro.  He will have close follow up with podiatry and ID.  Pt will also [facility] for [facility] / diabetes follow up and referral to endocrinology also placed for long term management. Will arrange home health for wound care.         DM (diabetes mellitus), type 2, uncontrolled with complications    - HA1C 12.6  - On 70/30 50 units BID at home  - Now on detimir 27 units BID and Aspart 12 TID with meals  - follow up with [facility] in 1-2 weeks and referral also placed for endocrinology for long term management.           Essential hypertension    - Continue amlodipine and lisinopril           morbid obesity    Pt counseled on weight loss.             VTE Risk Mitigation         Ordered     heparin (porcine) injection 7,500 Units  Every 8 hours     Route:  Subcutaneous        06/30/17 0507     Medium Risk of VTE  Once      06/30/17 0507     Place CAT hose  Until discontinued      06/30/17 0305     Place sequential compression device  Until discontinued      06/30/17 0305          Alisha Schwartz MD  Department of Hospital Medicine   [facility]

## 2017-07-03 NOTE — PLAN OF CARE
NITHYA has faxed h/h orders along w/clinical info to isael Vanegas for [facility].  Ph# [phone].    Yamile Mcfarlane, NITHYA  [id number]

## 2017-07-03 NOTE — NURSING
Pt being discharge home with orders to follow up with primary care physician; pt also instruction on prescriptions located at [facility] as ordered to be picked up. Pt waiting for transportation home; IV site discontinued as ordered.

## 2017-07-03 NOTE — ASSESSMENT & PLAN NOTE
- HA1C 12.6  - On 70/30 50 units BID at home  - Now on detimir 27 units BID and Aspart 12 TID with meals  - follow up with [facility] in 1-2 weeks and referral also placed for endocrinology for long term management.

## 2017-07-03 NOTE — PLAN OF CARE
AUSTIN contacted [facility] p [phone] to verify pt's copays on 2 Rx's of insulin.    Rx insulin NovoLog (84 day supply/2 boxes) $0.00/copay    Rx insulin Toujeo needs a Prior Auth thru insurance - patient must use insulins Basajlar, Levemir or Tresiba or MD needs to call [phone] to state why patient needs Toujeo to receive auth. Alternate insulin can be priced or after prior auth obtained, copay will be determined.    AUSTIN will relay info to MD.    New Rx insulin Levemir $0.00/copay; AUSTIN will relay info to MD.

## 2017-07-03 NOTE — PLAN OF CARE
AUSTIN notified that h/h orders in by MD - AUSTIN informed MD that pt's insurance requires orders to go thru a 3rd party provider to assign a h/h agency therefore pt may not be seen until Wed 7/5. MD stated that is acceptable as pt's wound care is every other day. AUSTIN relayed info to covering SW.

## 2017-07-03 NOTE — PLAN OF CARE
[facility]    HOME HEALTH ORDERS  FACE TO FACE ENCOUNTER    Patient Name: Andrew Del Cid Jr.  YOB: 1965    PCP: Jeannette Herrera MD   PCP Address: [address] / [facility] [address]  PCP Phone Number: [phone]  PCP Fax: [phone]    Encounter Date: 07/03/2017    Admit to Home Health    Diagnoses:  Active Hospital Problems    Diagnosis  POA    *Diabetic ulcer of toe of right foot associated with diabetes mellitus due to underlying condition, with fat layer exposed [E08.621, L97.512]  Yes     Priority: 1 - High    DM (diabetes mellitus), type 2, uncontrolled with complications [E11.8, E11.65]  Yes     Priority: 2      Chronic    Essential hypertension [I10]  Yes     Priority: 3     morbid obesity [I10]  Yes     Priority: 4     PVD (peripheral vascular disease) [I73.9]  Yes    Acute osteomyelitis of toe of right foot [M86.171]  Yes    Diabetic ulcer of right great toe [E11.621, L97.519]  Yes    Cellulitis of toe of right foot [L03.031]  Yes    DM type 2, uncontrolled, with neuropathy [E11.40, E11.65]  Yes    Diabetic Charcot foot [E11.610]  Yes      Resolved Hospital Problems    Diagnosis Date Resolved POA    Subacute osteomyelitis of right foot [M86.271] 07/03/2017 Yes     Priority: 5        Future Appointments  Date Time Provider Department Center   7/12/2017 8:00 AM Khris Camacho DPM [facility]   7/17/2017 2:00 PM Marcelino Gandhi MD [facility] ID [address]     Follow-up Information     Jeannette Herrera MD In 1 week.    Specialty:  Family Medicine  Contact information:  [address]  [address]  [facility] [address]  [phone]             Nino Olguin DPM In 1 week.    Specialty:  Podiatry  Contact information:  [address] [facility] [id number] [phone]                     I have seen and examined this patient face to face today. My clinical findings that support the need for the home health skilled services and  home bound status are the following:  Weakness/numbness causing balance and gait disturbance due to Infection making it taxing to leave home.    Allergies:Review of patient's allergies indicates:  No Known Allergies    Diet: diabetic diet: 2000 calorie    Activities: ambulate in house with assistance    Nursing:   SN to complete comprehensive assessment including routine vital signs. Instruct on disease process and s/s of complications to report to MD. Review/verify medication list sent home with the patient at time of discharge  and instruct patient/caregiver as needed. Frequency may be adjusted depending on start of care date.    Notify MD if SBP > 160 or < 90; DBP > 90 or < 50; HR > 120 or < 50; Temp > 101      MISCELLANEOUS CARE:  Diabetic Care:   SN to perform and educate Diabetic management with blood glucose monitoring:, Fingerstick blood sugar AC and HS and Report CBG < 60 or > 350 to physician.    WOUND CARE ORDERS  yes:  Foot Ulcer:  Location: right great toe. Wound care to be done 3 x per week.     Remove packing, apply betadine soaked xeroform to incision, wrap in 2 rolls of kerlix, and secure with ACE.     Medications: Review discharge medications with patient and family and provide education.    Current Discharge Medication List      START taking these medications    Details   ciprofloxacin HCl (CIPRO) 500 MG tablet Take 1 tablet (500 mg total) by mouth every 12 (twelve) hours.  Qty: 24 tablet, Refills: 0      insulin aspart (NOVOLOG) 100 unit/mL InPn pen Inject 12 Units into the skin 3 (three) times daily with meals.  Qty: 32.4 mL, Refills: 3      insulin detemir (LEVEMIR FLEXTOUCH) 100 unit/mL (3 mL) SubQ InPn pen Inject 27 Units into the skin 2 (two) times daily.  Qty: 48.6 mL, Refills: 3         CONTINUE these medications which have CHANGED    Details   atorvastatin (LIPITOR) 20 MG tablet Take 1 tablet (20 mg total) by mouth once daily.  Qty: 90 tablet, Refills: 3      lisinopril 10 MG tablet Take  1 tablet (10 mg total) by mouth once daily.  Qty: 90 tablet, Refills: 3      metformin (GLUCOPHAGE) 500 MG tablet Take 2 tablets (1,000 mg total) by mouth 2 (two) times daily with meals. 2 po in the am and 1 po in the pm  Qty: 270 tablet, Refills: 3         CONTINUE these medications which have NOT CHANGED    Details   amlodipine (NORVASC) 5 MG tablet TAKE 1 TABLET BY MOUTH DAILY  Qty: 90 tablet, Refills: 2      aspirin (ECOTRIN) 81 MG EC tablet Take 81 mg by mouth once daily.      gabapentin (NEURONTIN) 300 MG capsule Take 1 capsule (300 mg total) by mouth 3 (three) times daily.  Qty: 270 capsule, Refills: 2      methocarbamol (ROBAXIN) 500 MG Tab 1 po qhs prn  Qty: 20 tablet, Refills: 0      ondansetron (ZOFRAN-ODT) 8 MG TbDL Take 1 tablet (8 mg total) by mouth every 8 (eight) hours as needed.  Qty: 15 tablet, Refills: 0         STOP taking these medications       insulin NPH-insulin regular, 70/30, (NOVOLIN 70/30) 100 unit/mL (70-30) injection Comments:   Reason for Stopping:               I certify that this patient is confined to his home and needs intermittent skilled nursing care.

## 2017-07-03 NOTE — ASSESSMENT & PLAN NOTE
- now s/p amputation of right great toe 7/2 with podiatry- per podiatry, excellent margins obtained in the OR  - prior wound cultures with providencia, proteus, and morganella (all sensitive to cipro)  - OR cultures NGTD; pathology of clean margin pending  - anticipating discharge home today  - would recommend d/c home on 2 weeks of oral cipro 500 mg BID  - f/u with podiatry and ID upon discharge  - will f/u cultures and pathology as outpatient  - discussed with Dr. Schwartz  - ID signing off

## 2017-07-03 NOTE — ASSESSMENT & PLAN NOTE
Subacute Osteomyelitis of the right foot  Pt was admitted to [facility] and podiatry was consulted.  He has been started on Vanc and Cipro for empiric antibiotics.  Wound culture currently growing proteus. MRI of the foot showed Osteomyelitis of the distal phalanx and distal aspect of the proximal phalanx of the first digit with probable interphalangeal septic joint and an overlying ulcer within the medial subcutaneous soft tissues. On 7/1, pt underwent AMPUTATION right hallux and resection first metatarsal right foot by Dr Nino Olguin with Podiatry.  ID consulted to help tailor antibiotics and pt currently on Cipro monotherpy. Pt will be discharged home today on 2 weeks of oral Cipro.  He will have close follow up with podiatry and ID.  Pt will also [facility] with [facility] for insulin / diabetes follow up and referral to endocrinology also placed for long term management. Will arrange home health for wound care.

## 2017-07-03 NOTE — PLAN OF CARE
07/03/17 1514   Final Note   Assessment Type Final Discharge Note   Discharge Disposition Home   Discharge planning education complete? Yes   Hospital Follow Up  Appt(s) scheduled? Yes   Discharge plans and expectations educations in teach back method with documentation complete? Yes   Offered [facility] Bedside Delivery? n/a   Discharge/Hospital Encounter Summary to (non[facility]) PCP n/a   Referral to Outpatient Case Management complete? n/a   Referral to / orders for Home Health Complete? n/a   30 day supply of medicines given at discharge, if documented non-compliance / non-adherence? n/a   Any social issues identified prior to discharge? No   Did you assess the readiness or willingness of the family or caregiver to support self management of care? Yes   Right Care Referral Info   Post Acute Recommendation No Care

## 2017-07-03 NOTE — CONSULTS
Consult received on patient. Podiatry seeing and following patient. Discussed with RN in regards to bed/support surface. RN denies that patient needs new bed/support surface. Re-consult wound care if needed.

## 2017-07-03 NOTE — ASSESSMENT & PLAN NOTE
Subacute Osteomyelitis of the right foot  Pt was admitted to [facility] and podiatry was consulted.  He has been started on Vanc and Cipro for empiric antibiotics.  Wound culture currently growing proteus. MRI of the foot showed Osteomyelitis of the distal phalanx and distal aspect of the proximal phalanx of the first digit with probable interphalangeal septic joint and an overlying ulcer within the medial subcutaneous soft tissues. On 7/1, pt underwent AMPUTATION right hallux and resection first metatarsal right foot by Dr Nino Olguin with Podiatry.  ID consulted to help tailor antibiotics and pt currently on Cipro monotherpy. Pt will be discharged home today on 2 weeks of oral Cipro.  He will have close follow up with podiatry and ID.  Pt will also [facility] with [facility] for [facility] / diabetes follow up and referral to endocrinology also placed for long term management. Will arrange home health for wound care.

## 2017-07-03 NOTE — PLAN OF CARE
[facility] has referred pt to [facility] for Skilled Nursing Care ph# [phone].    Yamile Mcfarlane, NITHYA  [id number]

## 2017-07-04 LAB — BACTERIA SPEC AEROBE CULT: NORMAL

## 2017-07-04 NOTE — PROGRESS NOTES
[facility]  Podiatry  Progress Note    Patient Name: Andrew Del Cid Jr.  MRN: [id number]  Admission Date: 6/29/2017  Hospital Length of Stay: 4 days  Attending Physician: No att. providers found  Primary Care Provider: Jeannette Herrera MD       Interval Hx: pt doing well today, relates would like to go home.     Scheduled Meds:    Continuous Infusions:    PRN Meds:    Review of patient's allergies indicates:  No Known Allergies     Past Medical History:   Diagnosis Date    Diabetes mellitus, type 2     Hyperlipidemia     Hypertension     Peripheral neuropathy      Past Surgical History:   Procedure Laterality Date    APPENDECTOMY      HERNIA REPAIR         Family History     None        Social History Main Topics    Smoking status: Never Smoker    Smokeless tobacco: Never Used    Alcohol use No    Drug use: No    Sexual activity: Not on file     Review of Systems   Constitutional: Negative.    HENT: Negative.    Eyes: Negative.    Respiratory: Negative.    Cardiovascular: Negative.    Gastrointestinal: Negative.    Endocrine: Negative.    Genitourinary: Negative.    Musculoskeletal: Negative.    Skin: Positive for color change and wound.   Allergic/Immunologic: Negative.    Neurological: Negative.    Hematological: Negative.    Psychiatric/Behavioral: Negative.      Objective:     Vital Signs (Most Recent):  Temp: 99.1 °F (37.3 °C) (07/03/17 1248)  Pulse: 83 (07/03/17 1248)  Resp: 18 (07/03/17 1248)  BP: (!) 141/85 (07/03/17 1248)  SpO2: 95 % (07/03/17 1248) Vital Signs (24h Range):  Temp:  [99.1 °F (37.3 °C)] 99.1 °F (37.3 °C)  Pulse:  [83] 83  Resp:  [18] 18  SpO2:  [95 %] 95 %  BP: (141)/(85) 141/85     Weight: (!) 183.8 kg (405 lb 3.3 oz)  Body mass index is 54.96 kg/m².    Foot Exam    General  General Appearance: appears stated age and healthy   Orientation: alert and oriented to person, place, and time   Affect: appropriate       Right Foot/Ankle     Inspection and  Palpation  Ecchymosis: first toe and dorsum  Tenderness: (Tenderness noted with POP to anterior ankle joint and subtalar joint)  Swelling: dorsum   Skin Exam: ulcer;     Neurovascular  Dorsalis pedis: absent  Posterior tibial: absent  Saphenous nerve sensation: diminished  Tibial nerve sensation: diminished  Superficial peroneal nerve sensation: diminished  Deep peroneal nerve sensation: diminished  Sural nerve sensation: diminished    Muscle Strength  Ankle dorsiflexion: 5  Ankle plantar flexion: 5  Ankle inversion: 5  Ankle eversion: 5  Great toe extension: 5  Great toe flexion: 5    Range of Motion    Normal right ankle ROM      Left Foot/Ankle      Inspection and Palpation  Ecchymosis: none  Tenderness: none   Swelling: none   Skin Exam: skin intact;     Neurovascular  Dorsalis pedis: absent  Posterior tibial: absent  Saphenous nerve sensation: diminished  Tibial nerve sensation: diminished  Superficial peroneal nerve sensation: diminished  Deep peroneal nerve sensation: diminished  Sural nerve sensation: diminished    Muscle Strength  Ankle dorsiflexion: 5  Ankle plantar flexion: 5  Ankle inversion: 5  Ankle eversion: 5  Great toe extension: 5  Great toe flexion: 5    Range of Motion    Normal left ankle ROM        Derm: Wound edges well coapted without SOI noted. Small collection of venous blood noted within the wound which was drained.               Laboratory:  A1C:     Recent Labs  Lab 07/01/17 0351   HGBA1C 12.6*     Blood Cultures:   No results for input(s): LABBLOO in the last 48 hours.  BMP:     Recent Labs  Lab 07/01/17 0351   *   *   K 4.0      CO2 28   BUN 15   CREATININE 0.8   CALCIUM 8.5*   MG 1.4*     CBC:     Recent Labs  Lab 07/01/17 0351   WBC 5.98   RBC 4.00*   HGB 11.6*   HCT 35.1*      MCV 88   MCH 29.0   MCHC 33.0     CMP:     Recent Labs  Lab 07/01/17 0351   *   CALCIUM 8.5*   ALBUMIN 2.3*   PROT 6.4   *   K 4.0   CO2 28      BUN 15    CREATININE 0.8   ALKPHOS 87   ALT 8*   AST 7*   BILITOT 0.8     Coagulation: No results for input(s): INR, APTT in the last 168 hours.    Invalid input(s): PT  CRP:     Recent Labs  Lab 07/01/17  1323   .1*     ESR:     Recent Labs  Lab 07/01/17  1323   SEDRATE 97*     LFTs:     Recent Labs  Lab 07/01/17  0351   ALT 8*   AST 7*   ALKPHOS 87   BILITOT 0.8   PROT 6.4   ALBUMIN 2.3*     Prealbumin: No results for input(s): PREALBUMIN in the last 48 hours.  Wound Cultures:     Recent Labs  Lab 06/29/17  2346 06/30/17  0957 07/02/17  0911 07/02/17  0937   LABAERO PROVIDENCIA STUARTIIMany  MORGANELLA MORGANIIMany PROTEUS MIRABILISModerateSkin cory also present STREPTOCOCCUS AGALACTIAE (GROUP B)FewSusceptibility testing not routinely performed No growth     Microbiology Results (last 7 days)     Procedure Component Value Units Date/Time    Culture, Anaerobe [[id number]] Collected:  06/30/17 0957    Order Status:  Completed Specimen:  Wound from Toe, Right Foot Updated:  07/03/17 1516     Anaerobic Culture Culture in progress    Culture, Anaerobe [[id number]] Collected:  07/02/17 0911    Order Status:  Completed Specimen:  Bone from Toe, Right Foot Updated:  07/03/17 1310     Anaerobic Culture Culture in progress    Culture, Anaerobe [[id number]] Collected:  07/02/17 0937    Order Status:  Completed Specimen:  Tissue from Toe, Right Foot Updated:  07/03/17 1310     Anaerobic Culture Culture in progress    Narrative:       1st metatarsal head    Aerobic culture [[id number]]  (Susceptibility) Collected:  06/29/17 2346    Order Status:  Completed Specimen:  Wound from Foot, Right Updated:  07/03/17 1121     Aerobic Bacterial Culture --     PROVIDENCIA STUARTII  Many       Aerobic Bacterial Culture --     MORGANELLA MORGANII  Many      Narrative:       Diabetic foot wound    Aerobic culture [[id number]]  (Susceptibility) Collected:  06/30/17 0957    Order Status:  Completed Specimen:  Wound from Toe, Right Foot Updated:   07/03/17 1056     Aerobic Bacterial Culture --     PROTEUS MIRABILIS  Moderate  Skin cory also present      Aerobic culture [[id number]] Collected:  07/02/17 0937    Order Status:  Completed Specimen:  Tissue from Toe, Right Foot Updated:  07/03/17 0804     Aerobic Bacterial Culture No growth    Narrative:       1st metatarsal head    Gram stain [[id number]] Collected:  07/02/17 0911    Order Status:  Completed Specimen:  Bone from Toe, Right Foot Updated:  07/02/17 1513     Gram Stain Result Rare WBC's      Rare Gram positive cocci    Gram stain [[id number]] Collected:  07/02/17 0937    Order Status:  Completed Specimen:  Tissue from Toe, Right Foot Updated:  07/02/17 1512     Gram Stain Result No WBC's      No organisms seen    Narrative:       1st metatarsal head    Fungus culture [[id number]] Collected:  07/02/17 0911    Order Status:  Sent Specimen:  Bone from Toe, Right Foot Updated:  07/02/17 1038    Fungus culture [[id number]] Collected:  07/02/17 0937    Order Status:  Sent Specimen:  Tissue from Toe, Right Foot Updated:  07/02/17 1035    Aerobic culture [[id number]] Collected:  07/02/17 1006    Order Status:  Sent Specimen:  Bone from Toe, Right Foot Updated:  07/02/17 1006        Specimen (12h ago through future)    None          Diagnostic Results:  I have reviewed all pertinent imaging results/findings within the past 24 hours.    Clinical Findings:  Wound with exposed bone/osteomyelitis and cellulitis extending from right plantar hallux ulceration superiorly towards ankle.     Assessment/Plan:     DM (diabetes mellitus), type 2, uncontrolled with complications    Per medicine team.         * Diabetic ulcer of toe of right foot associated with diabetes mellitus due to underlying condition, with fat layer exposed    -Pt s/p day 1 partial 1st ray amp, doing well  -Wound with venous drainage and repacked loosely with betadine soaked gauze, adaptic, 4x4, 2 rolls or kerlix, ACE  -Home Health to change dressings  every other day as follows:   1. Remove packing if there is packing in the wound  2. Do not pack wound  3. Dress wound with betadine soaked adaptic, 4x4, 2 rolls kerlix, ACE.   -Continue abx, appreciate recs    Weightbearing status: NWB to right forefoot  Offloading device: Darco shoe            Shari Patel MD  Podiatry  [facility]

## 2017-07-04 NOTE — ASSESSMENT & PLAN NOTE
-Pt s/p day 1 partial 1st ray amp, doing well  -Wound with venous drainage and repacked loosely with betadine soaked gauze, adaptic, 4x4, 2 rolls or kerlix, ACE  -Home Health to change dressings every other day as follows:   1. Remove packing if there is packing in the wound  2. Do not pack wound  3. Dress wound with betadine soaked adaptic, 4x4, 2 rolls kerlix, ACE.   -Continue abx, appreciate recs    Weightbearing status: NWB to right forefoot  Offloading device: Darco shoe

## 2017-07-04 NOTE — SUBJECTIVE & OBJECTIVE
Scheduled Meds:    Continuous Infusions:    PRN Meds:    Review of patient's allergies indicates:  No Known Allergies     Past Medical History:   Diagnosis Date    Diabetes mellitus, type 2     Hyperlipidemia     Hypertension     Peripheral neuropathy      Past Surgical History:   Procedure Laterality Date    APPENDECTOMY      HERNIA REPAIR         Family History     None        Social History Main Topics    Smoking status: Never Smoker    Smokeless tobacco: Never Used    Alcohol use No    Drug use: No    Sexual activity: Not on file     Review of Systems   Constitutional: Negative.    HENT: Negative.    Eyes: Negative.    Respiratory: Negative.    Cardiovascular: Negative.    Gastrointestinal: Negative.    Endocrine: Negative.    Genitourinary: Negative.    Musculoskeletal: Negative.    Skin: Positive for color change and wound.   Allergic/Immunologic: Negative.    Neurological: Negative.    Hematological: Negative.    Psychiatric/Behavioral: Negative.      Objective:     Vital Signs (Most Recent):  Temp: 99.1 °F (37.3 °C) (07/03/17 1248)  Pulse: 83 (07/03/17 1248)  Resp: 18 (07/03/17 1248)  BP: (!) 141/85 (07/03/17 1248)  SpO2: 95 % (07/03/17 1248) Vital Signs (24h Range):  Temp:  [99.1 °F (37.3 °C)] 99.1 °F (37.3 °C)  Pulse:  [83] 83  Resp:  [18] 18  SpO2:  [95 %] 95 %  BP: (141)/(85) 141/85     Weight: (!) 183.8 kg (405 lb 3.3 oz)  Body mass index is 54.96 kg/m².    Foot Exam    General  General Appearance: appears stated age and healthy   Orientation: alert and oriented to person, place, and time   Affect: appropriate       Right Foot/Ankle     Inspection and Palpation  Ecchymosis: first toe and dorsum  Tenderness: (Tenderness noted with POP to anterior ankle joint and subtalar joint)  Swelling: dorsum   Skin Exam: ulcer;     Neurovascular  Dorsalis pedis: absent  Posterior tibial: absent  Saphenous nerve sensation: diminished  Tibial nerve sensation: diminished  Superficial peroneal nerve sensation:  diminished  Deep peroneal nerve sensation: diminished  Sural nerve sensation: diminished    Muscle Strength  Ankle dorsiflexion: 5  Ankle plantar flexion: 5  Ankle inversion: 5  Ankle eversion: 5  Great toe extension: 5  Great toe flexion: 5    Range of Motion    Normal right ankle ROM      Left Foot/Ankle      Inspection and Palpation  Ecchymosis: none  Tenderness: none   Swelling: none   Skin Exam: skin intact;     Neurovascular  Dorsalis pedis: absent  Posterior tibial: absent  Saphenous nerve sensation: diminished  Tibial nerve sensation: diminished  Superficial peroneal nerve sensation: diminished  Deep peroneal nerve sensation: diminished  Sural nerve sensation: diminished    Muscle Strength  Ankle dorsiflexion: 5  Ankle plantar flexion: 5  Ankle inversion: 5  Ankle eversion: 5  Great toe extension: 5  Great toe flexion: 5    Range of Motion    Normal left ankle ROM        Derm: Wound edges well coapted without SOI noted. Small collection of venous blood noted within the wound which was drained.               Laboratory:  A1C:     Recent Labs  Lab 07/01/17  0351   HGBA1C 12.6*     Blood Cultures:   No results for input(s): LABBLOO in the last 48 hours.  BMP:     Recent Labs  Lab 07/01/17  0351   *   *   K 4.0      CO2 28   BUN 15   CREATININE 0.8   CALCIUM 8.5*   MG 1.4*     CBC:     Recent Labs  Lab 07/01/17  0351   WBC 5.98   RBC 4.00*   HGB 11.6*   HCT 35.1*      MCV 88   MCH 29.0   MCHC 33.0     CMP:     Recent Labs  Lab 07/01/17  0351   *   CALCIUM 8.5*   ALBUMIN 2.3*   PROT 6.4   *   K 4.0   CO2 28      BUN 15   CREATININE 0.8   ALKPHOS 87   ALT 8*   AST 7*   BILITOT 0.8     Coagulation: No results for input(s): INR, APTT in the last 168 hours.    Invalid input(s): PT  CRP:     Recent Labs  Lab 07/01/17  1323   .1*     ESR:     Recent Labs  Lab 07/01/17  1323   SEDRATE 97*     LFTs:     Recent Labs  Lab 07/01/17  0351   ALT 8*   AST 7*   ALKPHOS 87    BILITOT 0.8   PROT 6.4   ALBUMIN 2.3*     Prealbumin: No results for input(s): PREALBUMIN in the last 48 hours.  Wound Cultures:     Recent Labs  Lab 06/29/17  2346 06/30/17 0957 07/02/17 0911 07/02/17 0937   LABAERO PROVIDENCIA STUARTIIMany  MORGANELLA MORGANIIMany PROTEUS MIRABILISModerateSkin cory also present STREPTOCOCCUS AGALACTIAE (GROUP B)FewSusceptibility testing not routinely performed No growth     Microbiology Results (last 7 days)     Procedure Component Value Units Date/Time    Culture, Anaerobe [[id number]] Collected:  06/30/17 0957    Order Status:  Completed Specimen:  Wound from Toe, Right Foot Updated:  07/03/17 1516     Anaerobic Culture Culture in progress    Culture, Anaerobe [[id number]] Collected:  07/02/17 0911    Order Status:  Completed Specimen:  Bone from Toe, Right Foot Updated:  07/03/17 1310     Anaerobic Culture Culture in progress    Culture, Anaerobe [[id number]] Collected:  07/02/17 0937    Order Status:  Completed Specimen:  Tissue from Toe, Right Foot Updated:  07/03/17 1310     Anaerobic Culture Culture in progress    Narrative:       1st metatarsal head    Aerobic culture [[id number]]  (Susceptibility) Collected:  06/29/17 2346    Order Status:  Completed Specimen:  Wound from Foot, Right Updated:  07/03/17 1121     Aerobic Bacterial Culture --     PROVIDENCIA STUARTII  Many       Aerobic Bacterial Culture --     MORGANELLA MORGANII  Many      Narrative:       Diabetic foot wound    Aerobic culture [[id number]]  (Susceptibility) Collected:  06/30/17 0957    Order Status:  Completed Specimen:  Wound from Toe, Right Foot Updated:  07/03/17 1056     Aerobic Bacterial Culture --     PROTEUS MIRABILIS  Moderate  Skin cory also present      Aerobic culture [[id number]] Collected:  07/02/17 0937    Order Status:  Completed Specimen:  Tissue from Toe, Right Foot Updated:  07/03/17 0804     Aerobic Bacterial Culture No growth    Narrative:       1st metatarsal head    Gram stain  [[id number]] Collected:  07/02/17 0911    Order Status:  Completed Specimen:  Bone from Toe, Right Foot Updated:  07/02/17 1513     Gram Stain Result Rare WBC's      Rare Gram positive cocci    Gram stain [[id number]] Collected:  07/02/17 0937    Order Status:  Completed Specimen:  Tissue from Toe, Right Foot Updated:  07/02/17 1512     Gram Stain Result No WBC's      No organisms seen    Narrative:       1st metatarsal head    Fungus culture [[id number]] Collected:  07/02/17 0911    Order Status:  Sent Specimen:  Bone from Toe, Right Foot Updated:  07/02/17 1038    Fungus culture [[id number]] Collected:  07/02/17 0937    Order Status:  Sent Specimen:  Tissue from Toe, Right Foot Updated:  07/02/17 1035    Aerobic culture [[id number]] Collected:  07/02/17 1006    Order Status:  Sent Specimen:  Bone from Toe, Right Foot Updated:  07/02/17 1006        Specimen (12h ago through future)    None          Diagnostic Results:  I have reviewed all pertinent imaging results/findings within the past 24 hours.    Clinical Findings:  Wound with exposed bone/osteomyelitis and cellulitis extending from right plantar hallux ulceration superiorly towards ankle.

## 2017-07-05 ENCOUNTER — TELEPHONE (OUTPATIENT)
Dept: PODIATRY | Facility: CLINIC | Age: 52
End: 2017-07-05

## 2017-07-05 LAB
BACTERIA BLD CULT: NORMAL
BACTERIA BLD CULT: NORMAL
BACTERIA SPEC AEROBE CULT: NO GROWTH
BACTERIA SPEC ANAEROBE CULT: NORMAL

## 2017-07-05 NOTE — TELEPHONE ENCOUNTER
----- Message from Debra Martinez sent at 7/5/2017 11:41 AM CDT -----  Contact: [employer], [phone]  Requests to verify patient's information, states he is faxing a 2 page form to your office today. Please advise.

## 2017-07-05 NOTE — OP NOTE
Operative Note       Surgery Date: 7/2/2017     Surgeon(s) and Role:     * Nino Olguin DPM - Primary     * Shari Patel MD - Resident - Assisting    Pre-op Diagnosis:  Diabetic ulcer of right great toe [E11.621, L97.519]    Post-op Diagnosis: Post-Op Diagnosis Codes:     * Diabetic ulcer of right great toe [E11.621, L97.519]    Procedure(s) (LRB):  AMPUTATION HALLUX (Right)  RESECTION FIRST METATARSAL HEAD RIGHT FOOT    Anesthesia: Local MAC    Procedure in Detail/Findings:  The patient was brought to the operating room on a stretcher and placed on the operating table in a supine position. Following the successful induction with MAC, a tourniquet was applied to the patients right ankle. Then, the right foot was scrubbed, prepped and draped in the usual aseptic manner. A marking pen was utilized to create a incision guide in a racquet type fashion. A time out was performed.    Attention was drawn the the hallux of the right foot. A sterile 15 blade was used to to follow the marked incision to the level of bone. The 1st MTPJ was carefully disarticulated and the hallux was sent to path. Then the subcutaneous and deep tissues were carefully reflected off of the distal 1st metatarsal to expose the metatarsal head and distal diaphysis. A sagittal saw was then used and the distal aspect of the 1st metatarsal was removed. The bone was noted to be soft and necrotic. A clean margin was then excised, with care taken to bevel the edges. The sesamoids were then carefully removed and all exposed tendons structures, including the FHL and the EHL were excised. A rongeur was then used to debride all remaining necrotic and devitalized structures. All bleeders were cauterized as necessary. When the skin was noted to be properly approximated for closure, the wound was copiously flushed with sterile saline using a pulse lavage. The wound was then closed with 3-0 nylon with alternating simple sutures and staples. It  was packed with betadine soaked plain packing strips and dressed with 4x4, kerlix, and ACE.       The patient tolerated the procedure and anesthesia well. He was transferred to the recovery room with vital signs stable, vascular status intact, and capillary refill time < 3 seconds to the distal right foot.        Estimated Blood Loss: 20 ml           Specimens     Start     Ordered    07/02/17 0938  Specimen to Pathology - Surgery  Once      07/02/17 0939        Implants: * No implants in log *           Disposition: PACU - hemodynamically stable.           Condition: Good    Attestation:  I was present and scrubbed for the entire procedure.

## 2017-07-07 LAB — BACTERIA SPEC ANAEROBE CULT: NORMAL

## 2017-07-07 NOTE — PLAN OF CARE
Pt d/c to home w/ h/h - [facility].     07/07/17 1615   Final Note   Assessment Type Final Discharge Note   Discharge Disposition Home-Health   What phone number can be called within the next 1-3 days to see how you are doing after discharge? [phone]   Hospital Follow Up  Appt(s) scheduled? Yes   Discharge/Hospital Encounter Summary to (non-[facility]) PCP n/a   Referral to / orders for Home Health Complete? Yes   Right Care Referral Info   Post Acute Recommendation Home-care   Referral Type h/h   Facility Name [facility]

## 2017-07-10 DIAGNOSIS — Z12.11 COLON CANCER SCREENING: ICD-10-CM

## 2017-07-10 LAB — BACTERIA SPEC ANAEROBE CULT: NORMAL

## 2017-07-12 ENCOUNTER — OFFICE VISIT (OUTPATIENT)
Dept: PODIATRY | Facility: CLINIC | Age: 52
End: 2017-07-12
Payer: COMMERCIAL

## 2017-07-12 DIAGNOSIS — E11.610 DIABETIC CHARCOT FOOT: ICD-10-CM

## 2017-07-12 DIAGNOSIS — Z89.419 HISTORY OF AMPUTATION OF GREAT TOE: ICD-10-CM

## 2017-07-12 DIAGNOSIS — E11.69 ONYCHOMYCOSIS OF MULTIPLE TOENAILS WITH TYPE 2 DIABETES MELLITUS AND PERIPHERAL NEUROPATHY: ICD-10-CM

## 2017-07-12 DIAGNOSIS — I73.9 PVD (PERIPHERAL VASCULAR DISEASE): ICD-10-CM

## 2017-07-12 DIAGNOSIS — E11.42 ONYCHOMYCOSIS OF MULTIPLE TOENAILS WITH TYPE 2 DIABETES MELLITUS AND PERIPHERAL NEUROPATHY: ICD-10-CM

## 2017-07-12 DIAGNOSIS — E11.42 DIABETIC POLYNEUROPATHY ASSOCIATED WITH TYPE 2 DIABETES MELLITUS: Primary | ICD-10-CM

## 2017-07-12 DIAGNOSIS — B35.1 ONYCHOMYCOSIS OF MULTIPLE TOENAILS WITH TYPE 2 DIABETES MELLITUS AND PERIPHERAL NEUROPATHY: ICD-10-CM

## 2017-07-12 DIAGNOSIS — Z98.890 POST-OPERATIVE STATE: ICD-10-CM

## 2017-07-12 PROCEDURE — 11721 DEBRIDE NAIL 6 OR MORE: CPT | Mod: S$GLB,,, | Performed by: PODIATRIST

## 2017-07-12 PROCEDURE — 99499 UNLISTED E&M SERVICE: CPT | Mod: S$GLB,,, | Performed by: PODIATRIST

## 2017-07-12 NOTE — PROGRESS NOTES
Subjective:      Patient ID: Andrew Del Cid Jr. is a 52 y.o. male.    Chief Complaint:  Post op toe amputation     Andrew is a 52 y.o. male who presents to the clinic upon referral from Dr. Mariana cortez. provider found  for evaluation and treatment of diabetic feet. Andrew has a past medical history of Diabetes mellitus, type 2; Hyperlipidemia; Hypertension; and Peripheral neuropathy. He is 1 week s/p right partial first ray amputation for OM by Dr. Olguin. Doing well with home health dressing changes and antibiotics. Partial heel WB. Complains of thick, fungal nails cutting into skin. Denies calf pain.     PCP: Jeannette Herrera MD    Date Last Seen by internal medicine, Dr. Schwartz on 7/3/17    Current shoe gear: Football and Darco shoe on the affected foot    Hemoglobin A1C   Date Value Ref Range Status   07/01/2017 12.6 (H) 4.0 - 5.6 % Final     Comment:     According to ADA guidelines, hemoglobin A1c <7.0% represents  optimal control in non-pregnant diabetic patients. Different  metrics may apply to specific patient populations.   Standards of Medical Care in Diabetes-2016.  For the purpose of screening for the presence of diabetes:  <5.7%     Consistent with the absence of diabetes  5.7-6.4%  Consistent with increasing risk for diabetes   (prediabetes)  >or=6.5%  Consistent with diabetes  Currently, no consensus exists for use of hemoglobin A1c  for diagnosis of diabetes for children.  This Hemoglobin A1c assay has significant interference with fetal   hemoglobin   (HbF). The results are invalid for patients with abnormal amounts of   HbF,   including those with known Hereditary Persistence   of Fetal Hemoglobin. Heterozygous hemoglobin variants (HbAS, HbAC,   HbAD, HbAE, HbA2) do not significantly interfere with this assay;   however, presence of multiple variants in a sample may impact the %   interference.     06/10/2016 9.3 (H) 4.5 - 6.2 % Final   01/21/2016 8.3 (H) 4.5 - 6.2 % Final           Review of  Systems   Constitution: Negative for chills, fever and malaise/fatigue.   Cardiovascular: Negative for chest pain, leg swelling, orthopnea and palpitations.   Respiratory: Negative for cough, shortness of breath and wheezing.    Skin: Positive for color change, dry skin and nail changes. Negative for itching, poor wound healing and rash.   Musculoskeletal: Negative for arthritis, gout, joint pain, joint swelling, muscle weakness and myalgias.   Neurological: Positive for numbness, paresthesias and sensory change. Negative for disturbances in coordination, dizziness, focal weakness and tremors.           Objective:      Physical Exam   Cardiovascular:   Dorsalis pedis and posterior tibial pulses are diminished Bilaterally. Toes are cool to touch. Feet are warm proximally.There is decreased digital hair. Skin is atrophic, slightly hyperpigmented, and mildly edematous       Musculoskeletal:        Right foot: There is swelling and deformity.   Muscle strength is 5/5 in all groups bilaterally.  Metatarsophalangeal and subtalar range of motion are within normal limits without crepitus bilaterally. There is limitation of ankle dorsiflexion with knees extended and flexed Bilaterally.    Partial first ray amputation, right.         Neurological:   [place]-Luís 5.07 monofilamant testing is diminished both feet. Sharp/dull sensation diminished Bilaterally. Light touch absent Bilaterally.       Skin:   Amputation site at right first ray well opposed, sutures intact. 0.5 cm opening at dorsal incision site that was packed open. No fluctuance, drainage, erythema, malodor.  Mild forefoot edema.     Toenails 1-5 bilaterally are elongated by 2-3 mm, thickened by 2-3 mm, discolored/yellowed, dystrophic, brittle with subungual debris.              Assessment:       Encounter Diagnoses   Name Primary?    Diabetic polyneuropathy associated with type 2 diabetes mellitus Yes    PVD (peripheral vascular disease)     Diabetic Charcot  foot     History of amputation of great toe - Right Foot     Post-operative state     Onychomycosis of multiple toenails with type 2 diabetes mellitus and peripheral neuropathy          Plan:       Diagnoses and all orders for this visit:    Diabetic polyneuropathy associated with type 2 diabetes mellitus    PVD (peripheral vascular disease)    Diabetic Charcot foot    History of amputation of great toe - Right Foot    Post-operative state    Onychomycosis of multiple toenails with type 2 diabetes mellitus and peripheral neuropathy      I counseled the patient on his conditions, their implications and medical management.    - Right foot cleansed, small wound opening at dorsal incision site irrigated and packed with aquacel. Foot dressed with foam, cast padding, coban.    - Continue home alicia from dressing changes per previous orders.    - continue abx and minimal heel WBAT with surgical shoe    - Rest and elevation emphasized.    - f/u 2 weeks or sooner, prn.     - Shoe inspection. Diabetic Foot Education. Patient reminded of the importance of good nutrition and blood sugar control to help prevent podiatric complications of diabetes. Patient instructed on proper foot hygeine. We discussed wearing proper shoe gear, daily foot inspections, never walking without protective shoe gear    - With patient's permission, nails were aggressively reduced and debrided x 9 to their soft tissue attachment mechanically and with electric , removing all offending nail and debris. Patient relates relief following the procedure.

## 2017-07-17 ENCOUNTER — OFFICE VISIT (OUTPATIENT)
Dept: INFECTIOUS DISEASES | Facility: CLINIC | Age: 52
End: 2017-07-17
Payer: COMMERCIAL

## 2017-07-17 VITALS
WEIGHT: 315 LBS | BODY MASS INDEX: 41.75 KG/M2 | HEIGHT: 73 IN | HEART RATE: 91 BPM | TEMPERATURE: 98 F | DIASTOLIC BLOOD PRESSURE: 96 MMHG | SYSTOLIC BLOOD PRESSURE: 157 MMHG

## 2017-07-17 DIAGNOSIS — L08.9 TYPE 2 DIABETES MELLITUS WITH RIGHT DIABETIC FOOT INFECTION: Primary | ICD-10-CM

## 2017-07-17 DIAGNOSIS — E11.628 TYPE 2 DIABETES MELLITUS WITH RIGHT DIABETIC FOOT INFECTION: Primary | ICD-10-CM

## 2017-07-17 PROCEDURE — 3046F HEMOGLOBIN A1C LEVEL >9.0%: CPT | Mod: S$GLB,,, | Performed by: INTERNAL MEDICINE

## 2017-07-17 PROCEDURE — 99215 OFFICE O/P EST HI 40 MIN: CPT | Mod: S$GLB,,, | Performed by: INTERNAL MEDICINE

## 2017-07-17 PROCEDURE — 4010F ACE/ARB THERAPY RXD/TAKEN: CPT | Mod: S$GLB,,, | Performed by: INTERNAL MEDICINE

## 2017-07-17 PROCEDURE — 99999 PR PBB SHADOW E&M-EST. PATIENT-LVL III: CPT | Mod: PBBFAC,,, | Performed by: INTERNAL MEDICINE

## 2017-07-17 RX ORDER — CIPROFLOXACIN 500 MG/1
500 TABLET ORAL EVERY 12 HOURS
Qty: 24 TABLET | Refills: 1 | Status: SHIPPED | OUTPATIENT
Start: 2017-07-17 | End: 2017-08-16

## 2017-07-17 NOTE — PROGRESS NOTES
Subjective:      Patient ID: Andrew Del Cid Jr. is a 52 y.o. male.    Chief Complaint:   F/U diabetic foot infection with amputation right great toe      History of Present Illness    In [facility] 6/29-7/3 for infected right great toe which was amputated on 7/2.. The cultures grew Providencia, Morganella and Proteus, all cipro sensitive. He also grew Prevotella in anaerobic culture and group B strep.    His wound is healing nicely. Staples still in place and almost no drainage on bandage. No fever. No ADRs from the cipro. CRP was 118 on 7/1.    Review of Systems   Constitution: Positive for malaise/fatigue. Negative for chills, decreased appetite, fever, weakness, night sweats, weight gain and weight loss.   HENT: Negative for congestion, ear pain, headaches, hearing loss, hoarse voice, sore throat and tinnitus.    Eyes: Negative for blurred vision, redness and visual disturbance.   Cardiovascular: Negative for chest pain, leg swelling and palpitations.   Respiratory: Negative for cough, hemoptysis, shortness of breath and sputum production.    Hematologic/Lymphatic: Negative for adenopathy. Does not bruise/bleed easily.   Skin: Negative for dry skin, itching, rash and suspicious lesions.   Musculoskeletal: Negative for back pain, joint pain, myalgias and neck pain.   Gastrointestinal: Negative for abdominal pain, constipation, diarrhea, heartburn, nausea and vomiting.   Genitourinary: Negative for dysuria, flank pain, frequency, hematuria, hesitancy and urgency.   Neurological: Negative for dizziness, numbness and paresthesias.   Psychiatric/Behavioral: Negative for depression and memory loss. The patient does not have insomnia and is not nervous/anxious.      Objective:   Physical Exam   Constitutional: He appears well-developed and well-nourished.   obese   Musculoskeletal:   Right foot in football which was removed. Wound healing nicely. See photos   Vitals reviewed.            Assessment:       1. Type 2 diabetes  mellitus with right polymicrobial diabetic foot infection          Plan:        1. Renew cipro   2. CBC, CMP, ESR, CRP today   3. F/U 2 weeks with me

## 2017-07-24 ENCOUNTER — TELEPHONE (OUTPATIENT)
Dept: PODIATRY | Facility: CLINIC | Age: 52
End: 2017-07-24

## 2017-07-24 NOTE — TELEPHONE ENCOUNTER
----- Message from Debra Martinez sent at 7/24/2017  2:59 PM CDT -----  Contact[facility], [phone]  Requests to speak with you, states she got to patient's home and he has 3 lacerations on his left great toe. Please advise.

## 2017-07-24 NOTE — TELEPHONE ENCOUNTER
Per Fawn the site has been cleaned, there is no swelling on the toe but their is redness on the three lacerations.

## 2017-07-26 ENCOUNTER — OFFICE VISIT (OUTPATIENT)
Dept: PODIATRY | Facility: CLINIC | Age: 52
End: 2017-07-26
Payer: COMMERCIAL

## 2017-07-26 VITALS
WEIGHT: 315 LBS | DIASTOLIC BLOOD PRESSURE: 82 MMHG | HEIGHT: 73 IN | HEART RATE: 81 BPM | SYSTOLIC BLOOD PRESSURE: 123 MMHG | BODY MASS INDEX: 41.75 KG/M2

## 2017-07-26 DIAGNOSIS — S91.112A LACERATION OF LEFT GREAT TOE WITHOUT FOREIGN BODY PRESENT OR DAMAGE TO NAIL, INITIAL ENCOUNTER: Primary | ICD-10-CM

## 2017-07-26 DIAGNOSIS — Z89.419 HISTORY OF AMPUTATION OF GREAT TOE: ICD-10-CM

## 2017-07-26 DIAGNOSIS — Z98.890 POST-OPERATIVE STATE: ICD-10-CM

## 2017-07-26 DIAGNOSIS — I73.9 PVD (PERIPHERAL VASCULAR DISEASE): ICD-10-CM

## 2017-07-26 DIAGNOSIS — E11.42 DIABETIC POLYNEUROPATHY ASSOCIATED WITH TYPE 2 DIABETES MELLITUS: ICD-10-CM

## 2017-07-26 PROCEDURE — 99999 PR PBB SHADOW E&M-EST. PATIENT-LVL III: CPT | Mod: PBBFAC,,, | Performed by: PODIATRIST

## 2017-07-26 PROCEDURE — 4010F ACE/ARB THERAPY RXD/TAKEN: CPT | Mod: S$GLB,,, | Performed by: PODIATRIST

## 2017-07-26 PROCEDURE — 99212 OFFICE O/P EST SF 10 MIN: CPT | Mod: S$GLB,,, | Performed by: PODIATRIST

## 2017-07-26 PROCEDURE — 3046F HEMOGLOBIN A1C LEVEL >9.0%: CPT | Mod: S$GLB,,, | Performed by: PODIATRIST

## 2017-07-26 NOTE — PROGRESS NOTES
Subjective:      Patient ID: Andrew Del Cid Jr. is a 52 y.o. male.    Chief Complaint:  Post op toe amputation     Andrew is a 52 y.o. male who presents to the clinic upon referral from Dr. Mariana cortez. provider found  for evaluation and treatment of diabetic feet. Andrew has a past medical history of Diabetes mellitus, type 2; Hyperlipidemia; Hypertension; and Peripheral neuropathy. He is 3 week s/p right partial first ray amputation for OM by Dr. Olguin. Doing well with home health dressing changes and antibiotics. Partial heel WB. Complains of 3 cuts to left big toe that was noted two days ago. Denies pain, redness, bleeding, swelling.     PCP: Jeannette Herrera MD    Date Last Seen by internal medicine, Dr. Schwartz on 7/3/17    Current shoe gear: Football and Darco shoe on the affected foot    Hemoglobin A1C   Date Value Ref Range Status   07/17/2017 11.0 (H) 4.0 - 5.6 % Final     Comment:     According to ADA guidelines, hemoglobin A1c <7.0% represents  optimal control in non-pregnant diabetic patients. Different  metrics may apply to specific patient populations.   Standards of Medical Care in Diabetes-2016.  For the purpose of screening for the presence of diabetes:  <5.7%     Consistent with the absence of diabetes  5.7-6.4%  Consistent with increasing risk for diabetes   (prediabetes)  >or=6.5%  Consistent with diabetes  Currently, no consensus exists for use of hemoglobin A1c  for diagnosis of diabetes for children.  This Hemoglobin A1c assay has significant interference with fetal   hemoglobin   (HbF). The results are invalid for patients with abnormal amounts of   HbF,   including those with known Hereditary Persistence   of Fetal Hemoglobin. Heterozygous hemoglobin variants (HbAS, HbAC,   HbAD, HbAE, HbA2) do not significantly interfere with this assay;   however, presence of multiple variants in a sample may impact the %   interference.     07/01/2017 12.6 (H) 4.0 - 5.6 % Final     Comment:      According to ADA guidelines, hemoglobin A1c <7.0% represents  optimal control in non-pregnant diabetic patients. Different  metrics may apply to specific patient populations.   Standards of Medical Care in Diabetes-2016.  For the purpose of screening for the presence of diabetes:  <5.7%     Consistent with the absence of diabetes  5.7-6.4%  Consistent with increasing risk for diabetes   (prediabetes)  >or=6.5%  Consistent with diabetes  Currently, no consensus exists for use of hemoglobin A1c  for diagnosis of diabetes for children.  This Hemoglobin A1c assay has significant interference with fetal   hemoglobin   (HbF). The results are invalid for patients with abnormal amounts of   HbF,   including those with known Hereditary Persistence   of Fetal Hemoglobin. Heterozygous hemoglobin variants (HbAS, HbAC,   HbAD, HbAE, HbA2) do not significantly interfere with this assay;   however, presence of multiple variants in a sample may impact the %   interference.     06/10/2016 9.3 (H) 4.5 - 6.2 % Final           Review of Systems   Constitution: Negative for chills, fever and malaise/fatigue.   Cardiovascular: Negative for chest pain, leg swelling, orthopnea and palpitations.   Respiratory: Negative for cough, shortness of breath and wheezing.    Skin: Positive for color change, dry skin and nail changes. Negative for itching, poor wound healing and rash.   Musculoskeletal: Negative for arthritis, gout, joint pain, joint swelling, muscle weakness and myalgias.   Neurological: Positive for numbness, paresthesias and sensory change. Negative for disturbances in coordination, dizziness, focal weakness and tremors.           Objective:      Physical Exam   Cardiovascular:   Dorsalis pedis and posterior tibial pulses are diminished Bilaterally. Toes are cool to touch. Feet are warm proximally.There is decreased digital hair. Skin is atrophic, slightly hyperpigmented, and mildly edematous       Musculoskeletal:        Right  foot: There is swelling and deformity.   Muscle strength is 5/5 in all groups bilaterally.  Metatarsophalangeal and subtalar range of motion are within normal limits without crepitus bilaterally. There is limitation of ankle dorsiflexion with knees extended and flexed Bilaterally.    Partial first ray amputation, right.         Neurological:   [place]-Luís 5.07 monofilamant testing is diminished both feet. Sharp/dull sensation diminished Bilaterally. Light touch absent Bilaterally.       Skin:   Amputation site at right first ray well opposed, sutures intact. No fluctuance, drainage, erythema, malodor.  Mild forefoot edema.     3 superficial lacerations at plantar-medial left great toe each about 2 cm long. No signs of infection.              Assessment:       Encounter Diagnoses   Name Primary?    Laceration of left great toe without foreign body present or damage to nail, initial encounter Yes    Post-operative state     History of amputation of great toe - Right Foot     Diabetic polyneuropathy associated with type 2 diabetes mellitus     PVD (peripheral vascular disease)          Plan:       Andrew was seen today for post-op evaluation.    Diagnoses and all orders for this visit:    Laceration of left great toe without foreign body present or damage to nail, initial encounter    Post-operative state    History of amputation of great toe - Right Foot    Diabetic polyneuropathy associated with type 2 diabetes mellitus    PVD (peripheral vascular disease)      I counseled the patient on his conditions, their implications and medical management.    - Half of suture and staples removed. Plantar skin still healing.     - Right foot cleansed.  Foot dressed with foam, cast padding, coban.    - Triple antibiotic, Band-Aid to left great toe for 1 week or until cut healed.     - Continue home alicia from dressing changes per previous orders.    - continue abx and minimal heel WBAT with surgical shoe    - Rest and  elevation emphasized.    - f/u 2 weeks or sooner, prn.     - Shoe inspection. Diabetic Foot Education. Patient reminded of the importance of good nutrition and blood sugar control to help prevent podiatric complications of diabetes. Patient instructed on proper foot hygeine. We discussed wearing proper shoe gear, daily foot inspections, never walking without protective shoe gear

## 2017-08-01 LAB
FUNGUS SPEC CULT: NORMAL
FUNGUS SPEC CULT: NORMAL

## 2017-08-04 DIAGNOSIS — Z12.11 COLON CANCER SCREENING: ICD-10-CM

## 2017-08-09 ENCOUNTER — OFFICE VISIT (OUTPATIENT)
Dept: INFECTIOUS DISEASES | Facility: CLINIC | Age: 52
End: 2017-08-09
Payer: COMMERCIAL

## 2017-08-09 VITALS
WEIGHT: 315 LBS | TEMPERATURE: 98 F | HEIGHT: 73 IN | HEART RATE: 100 BPM | DIASTOLIC BLOOD PRESSURE: 112 MMHG | SYSTOLIC BLOOD PRESSURE: 167 MMHG | BODY MASS INDEX: 41.75 KG/M2

## 2017-08-09 DIAGNOSIS — I10 ESSENTIAL HYPERTENSION: ICD-10-CM

## 2017-08-09 DIAGNOSIS — E66.9 DIABETES MELLITUS TYPE 2 IN OBESE: Primary | ICD-10-CM

## 2017-08-09 DIAGNOSIS — L08.9 DIABETIC FOOT INFECTION: ICD-10-CM

## 2017-08-09 DIAGNOSIS — E11.69 DIABETES MELLITUS TYPE 2 IN OBESE: Primary | ICD-10-CM

## 2017-08-09 DIAGNOSIS — E11.628 DIABETIC FOOT INFECTION: ICD-10-CM

## 2017-08-09 PROCEDURE — 3046F HEMOGLOBIN A1C LEVEL >9.0%: CPT | Mod: S$GLB,,, | Performed by: INTERNAL MEDICINE

## 2017-08-09 PROCEDURE — 3077F SYST BP >= 140 MM HG: CPT | Mod: S$GLB,,, | Performed by: INTERNAL MEDICINE

## 2017-08-09 PROCEDURE — 4010F ACE/ARB THERAPY RXD/TAKEN: CPT | Mod: S$GLB,,, | Performed by: INTERNAL MEDICINE

## 2017-08-09 PROCEDURE — 99215 OFFICE O/P EST HI 40 MIN: CPT | Mod: S$GLB,,, | Performed by: INTERNAL MEDICINE

## 2017-08-09 PROCEDURE — 3080F DIAST BP >= 90 MM HG: CPT | Mod: S$GLB,,, | Performed by: INTERNAL MEDICINE

## 2017-08-09 PROCEDURE — 99999 PR PBB SHADOW E&M-EST. PATIENT-LVL III: CPT | Mod: PBBFAC,,, | Performed by: INTERNAL MEDICINE

## 2017-08-09 NOTE — PROGRESS NOTES
Subjective:      Patient ID: Andrew Del Cid Jr. is a 52 y.o. male.    Chief Complaint:Follow-up      History of Present Illness  In [facility] 6/29-7/3 for infected right great toe which was amputated on 7/2.. The cultures grew Providencia, Morganella and Proteus, all cipro sensitive. He also grew Prevotella in anaerobic culture and group B strep.     His wound is healing nicely. Staples still in place and almost no drainage on bandage. No fever. No ADRs from the cipro. CRP was 118 on 7/1 and 10 on 7/17.    Diabetes and BP are VERY POORLY controlled. I urged him to see Dr. Guerrero soon.  A1c=11.0 on 7/17. He says he is eating better now. Will recheck today.    Review of Systems   Constitution: Negative for chills, decreased appetite, fever, weakness, malaise/fatigue, night sweats, weight gain and weight loss.   HENT: Negative for congestion, ear pain, headaches, hearing loss, hoarse voice, sore throat and tinnitus.    Eyes: Negative for blurred vision, redness and visual disturbance.   Cardiovascular: Negative for chest pain, leg swelling and palpitations.   Respiratory: Negative for cough, hemoptysis, shortness of breath and sputum production.    Hematologic/Lymphatic: Negative for adenopathy. Does not bruise/bleed easily.   Skin: Negative for dry skin, itching, rash and suspicious lesions.   Musculoskeletal: Negative for back pain, joint pain, myalgias and neck pain.   Gastrointestinal: Negative for abdominal pain, constipation, diarrhea, heartburn, nausea and vomiting.   Genitourinary: Negative for dysuria, flank pain, frequency, hematuria, hesitancy and urgency.   Neurological: Negative for dizziness, numbness and paresthesias.   Psychiatric/Behavioral: Negative for depression and memory loss. The patient does not have insomnia and is not nervous/anxious.      Objective:   Physical Exam   Constitutional: He is oriented to person, place, and time. He appears well-developed and well-nourished. No distress.    Musculoskeletal:   Amputation wound clean and healing. NO redness, warmth or drainage. (see photo)   Neurological: He is alert and oriented to person, place, and time.   Vitals reviewed.        Assessment:       1. Diabetes mellitus type 2 in obese, poor control    2. Diabetic foot infection / osteomyelitis, S/P amputation of toe   3. Essential hypertension          Plan:        1. F/u with Dr. Javier ley and with podiatry   2. RTC to ID prn

## 2017-08-10 ENCOUNTER — TELEPHONE (OUTPATIENT)
Dept: FAMILY MEDICINE | Facility: CLINIC | Age: 52
End: 2017-08-10

## 2017-08-10 ENCOUNTER — OFFICE VISIT (OUTPATIENT)
Dept: PODIATRY | Facility: CLINIC | Age: 52
End: 2017-08-10
Payer: COMMERCIAL

## 2017-08-10 VITALS
HEART RATE: 88 BPM | WEIGHT: 315 LBS | BODY MASS INDEX: 41.75 KG/M2 | SYSTOLIC BLOOD PRESSURE: 134 MMHG | HEIGHT: 73 IN | DIASTOLIC BLOOD PRESSURE: 87 MMHG

## 2017-08-10 DIAGNOSIS — M20.42 HAMMER TOES OF BOTH FEET: ICD-10-CM

## 2017-08-10 DIAGNOSIS — M20.41 HAMMER TOES OF BOTH FEET: ICD-10-CM

## 2017-08-10 DIAGNOSIS — Z89.411 STATUS POST AMPUTATION OF GREAT TOE, RIGHT: ICD-10-CM

## 2017-08-10 DIAGNOSIS — I73.9 PVD (PERIPHERAL VASCULAR DISEASE): ICD-10-CM

## 2017-08-10 DIAGNOSIS — E11.42 DIABETIC POLYNEUROPATHY ASSOCIATED WITH TYPE 2 DIABETES MELLITUS: ICD-10-CM

## 2017-08-10 DIAGNOSIS — Z98.890 POSTOPERATIVE STATE: Primary | ICD-10-CM

## 2017-08-10 PROCEDURE — 99999 PR PBB SHADOW E&M-EST. PATIENT-LVL IV: CPT | Mod: PBBFAC,,, | Performed by: PODIATRIST

## 2017-08-10 PROCEDURE — 99024 POSTOP FOLLOW-UP VISIT: CPT | Mod: S$GLB,,, | Performed by: PODIATRIST

## 2017-08-10 NOTE — TELEPHONE ENCOUNTER
----- Message from Bev Ray sent at 8/10/2017  3:58 PM CDT -----  Contact: Pt  X  1st Request  _  2nd Request  _  3rd Request        Who: ABNER CAMARILLO JR. [[id number]]    Why: Pt is returning the missed call.  Pt says that he won't be able to make an appointment on tomorrow but can come in next week if possible.  Please contact pt to further discuss and advise.    What Number to Call Back: [phone]    When to Expect a call back: (With in 24 hours)

## 2017-08-10 NOTE — TELEPHONE ENCOUNTER
Patient was scheduled an appointment on 8/11/17 for elevated blood pressure.Please inform him.Thanks.

## 2017-08-11 ENCOUNTER — TELEPHONE (OUTPATIENT)
Dept: FAMILY MEDICINE | Facility: CLINIC | Age: 52
End: 2017-08-11

## 2017-08-11 NOTE — PROGRESS NOTES
Subjective:      Patient ID: Andrew Del Cid Jr. is a 52 y.o. male.    Chief Complaint: Post-op Evaluation (suture removal)    Andrew is a 52 y.o. male who presents to the clinic for evaluation and treatment of diabetic feet. Andrew has a past medical history of Diabetes mellitus, type 2; Hyperlipidemia; Hypertension; and Peripheral neuropathy. Post right hallux amputation and first metatarsal head resection 7/2/17. Dr. Camacho has been treating the patient. No new complaints.    PCP: Jeannette Herrera MD    Date Last Seen by PCP: 6/27/16    Current shoe gear: Football and Darco shoe on the affected foot    Hemoglobin A1C   Date Value Ref Range Status   08/09/2017 9.7 (H) 4.0 - 5.6 % Final     Comment:     According to ADA guidelines, hemoglobin A1c <7.0% represents  optimal control in non-pregnant diabetic patients. Different  metrics may apply to specific patient populations.   Standards of Medical Care in Diabetes-2016.  For the purpose of screening for the presence of diabetes:  <5.7%     Consistent with the absence of diabetes  5.7-6.4%  Consistent with increasing risk for diabetes   (prediabetes)  >or=6.5%  Consistent with diabetes  Currently, no consensus exists for use of hemoglobin A1c  for diagnosis of diabetes for children.  This Hemoglobin A1c assay has significant interference with fetal   hemoglobin   (HbF). The results are invalid for patients with abnormal amounts of   HbF,   including those with known Hereditary Persistence   of Fetal Hemoglobin. Heterozygous hemoglobin variants (HbAS, HbAC,   HbAD, HbAE, HbA2) do not significantly interfere with this assay;   however, presence of multiple variants in a sample may impact the %   interference.     07/17/2017 11.0 (H) 4.0 - 5.6 % Final     Comment:     According to ADA guidelines, hemoglobin A1c <7.0% represents  optimal control in non-pregnant diabetic patients. Different  metrics may apply to specific patient populations.   Standards of  Medical Care in Diabetes-2016.  For the purpose of screening for the presence of diabetes:  <5.7%     Consistent with the absence of diabetes  5.7-6.4%  Consistent with increasing risk for diabetes   (prediabetes)  >or=6.5%  Consistent with diabetes  Currently, no consensus exists for use of hemoglobin A1c  for diagnosis of diabetes for children.  This Hemoglobin A1c assay has significant interference with fetal   hemoglobin   (HbF). The results are invalid for patients with abnormal amounts of   HbF,   including those with known Hereditary Persistence   of Fetal Hemoglobin. Heterozygous hemoglobin variants (HbAS, HbAC,   HbAD, HbAE, HbA2) do not significantly interfere with this assay;   however, presence of multiple variants in a sample may impact the %   interference.     07/01/2017 12.6 (H) 4.0 - 5.6 % Final     Comment:     According to ADA guidelines, hemoglobin A1c <7.0% represents  optimal control in non-pregnant diabetic patients. Different  metrics may apply to specific patient populations.   Standards of Medical Care in Diabetes-2016.  For the purpose of screening for the presence of diabetes:  <5.7%     Consistent with the absence of diabetes  5.7-6.4%  Consistent with increasing risk for diabetes   (prediabetes)  >or=6.5%  Consistent with diabetes  Currently, no consensus exists for use of hemoglobin A1c  for diagnosis of diabetes for children.  This Hemoglobin A1c assay has significant interference with fetal   hemoglobin   (HbF). The results are invalid for patients with abnormal amounts of   HbF,   including those with known Hereditary Persistence   of Fetal Hemoglobin. Heterozygous hemoglobin variants (HbAS, HbAC,   HbAD, HbAE, HbA2) do not significantly interfere with this assay;   however, presence of multiple variants in a sample may impact the %   interference.             Review of Systems   Constitution: Negative for chills, fever, weakness and malaise/fatigue.   Cardiovascular: Positive for  leg swelling. Negative for chest pain and claudication.   Respiratory: Negative for cough and shortness of breath.    Skin: Positive for nail changes and poor wound healing. Negative for color change and rash.   Musculoskeletal: Negative for back pain, joint pain, muscle cramps and muscle weakness.   Gastrointestinal: Negative for nausea and vomiting.   Neurological: Positive for numbness. Negative for paresthesias.   Psychiatric/Behavioral: Negative for altered mental status.           Objective:      Physical Exam   Constitutional: He is oriented to person, place, and time. No distress.   Cardiovascular:   Pulses:       Dorsalis pedis pulses are 2+ on the right side, and 2+ on the left side.        Posterior tibial pulses are 1+ on the right side, and 1+ on the left side.   CFT< 3 secs all toes bilateral foot, skin temp warm bilateral foot, no hair growth bilateral foot, mild lower extremity edema bilateral.     Musculoskeletal:        Right foot: There is decreased range of motion and deformity.        Left foot: There is decreased range of motion and deformity.   Non-reducible pes planus right foot.     Amputated right hallux.    Non-reducible claw toe deformity right second toe.    Semi-reducible flexion contractures toes 3-5 right and 2-5 left.    Track bound hallux valgus left foot.    + equinus that reduces with knee bent bilateral.    No pain with ROM or MMT bilateral foot.   Feet:   Right Foot:   Skin Integrity: Positive for callus and dry skin.   Left Foot:   Skin Integrity: Positive for callus and dry skin.   Neurological: He is alert and oriented to person, place, and time. He has normal strength. A sensory deficit is present.   Skin: Skin is dry. Capillary refill takes less than 2 seconds. No ecchymosis and no rash noted. He is not diaphoretic. No cyanosis or erythema. Nails show no clubbing.   Incision right hallux amputation site healed.    Dry and hyperkeratotic skin with underlying ecchymosis  distal tip of right second toe.    Raised and hyperkeratotic skin plantar right midfoot.    Nails 2-5 right and 1-5 left are thickened 2-3 mm, trimmed, loosened, discolored yellow.               Assessment:       Encounter Diagnoses   Name Primary?    Postoperative state Yes    Status post amputation of great toe, right     Diabetic polyneuropathy associated with type 2 diabetes mellitus     PVD (peripheral vascular disease)     Hammer toes of both feet          Plan:       Andrew was seen today for post-op evaluation.    Diagnoses and all orders for this visit:    Postoperative state  -     DIABETIC SHOES FOR HOME USE    Status post amputation of great toe, right  -     DIABETIC SHOES FOR HOME USE    Diabetic polyneuropathy associated with type 2 diabetes mellitus  -     DIABETIC SHOES FOR HOME USE    PVD (peripheral vascular disease)  -     DIABETIC SHOES FOR HOME USE    Hammer toes of both feet  -     DIABETIC SHOES FOR HOME USE      I counseled the patient on his conditions, their implications and medical management.    Shoe inspection. Diabetic Foot Education. Patient reminded of the importance of good nutrition and blood sugar control to help prevent podiatric complications of diabetes. Patient instructed on proper foot hygeine. We discussed wearing proper shoe gear, daily foot inspections, never walking without protective shoe gear, never putting sharp instruments to feet, routine podiatric nail visits every 2-3 months.      Note wound healed right hallux amputation site. Discussed taking precautions to prevent it from breaking down. Aquaphor twice daily    Discussed percutaneous flexor tenotomy right second toe to reduce contracture at tip of the toe.    RTC 1 week.

## 2017-08-11 NOTE — TELEPHONE ENCOUNTER
----- Message from Jeannette Herrera MD sent at 8/11/2017 11:24 AM CDT -----  Please call pt to come in for appt fasting also have him increase his levimir to fbs in the low to mid 100's and bring a bid bs log   ----- Message -----  From: Marcelino Gandhi MD  Sent: 8/9/2017   2:34 PM  To: Jeannette Herrera MD    Jeannette   he was in today. Foot doing better but has very high BP and A1c=11. I urged him to see you soon, but he seems reluctant perhaps related to $. I could not get an appt with you in computer till Sept. Can you contact him and urge him to come in?  Thanks  Yvon

## 2017-08-13 ENCOUNTER — PATIENT MESSAGE (OUTPATIENT)
Dept: PODIATRY | Facility: CLINIC | Age: 52
End: 2017-08-13

## 2017-08-16 ENCOUNTER — OFFICE VISIT (OUTPATIENT)
Dept: FAMILY MEDICINE | Facility: CLINIC | Age: 52
End: 2017-08-16
Attending: FAMILY MEDICINE
Payer: COMMERCIAL

## 2017-08-16 ENCOUNTER — LAB VISIT (OUTPATIENT)
Dept: LAB | Facility: HOSPITAL | Age: 52
End: 2017-08-16
Attending: FAMILY MEDICINE
Payer: COMMERCIAL

## 2017-08-16 VITALS
BODY MASS INDEX: 41.75 KG/M2 | SYSTOLIC BLOOD PRESSURE: 136 MMHG | DIASTOLIC BLOOD PRESSURE: 84 MMHG | OXYGEN SATURATION: 96 % | WEIGHT: 315 LBS | HEART RATE: 102 BPM | HEIGHT: 73 IN

## 2017-08-16 DIAGNOSIS — I10 HTN (HYPERTENSION), BENIGN: ICD-10-CM

## 2017-08-16 DIAGNOSIS — E78.00 HYPERCHOLESTEROLEMIA: ICD-10-CM

## 2017-08-16 DIAGNOSIS — E11.9 TYPE 2 DIABETES MELLITUS WITHOUT COMPLICATION: ICD-10-CM

## 2017-08-16 DIAGNOSIS — E11.59 TYPE 2 DIABETES MELLITUS WITH OTHER CIRCULATORY COMPLICATION, WITHOUT LONG-TERM CURRENT USE OF INSULIN: Primary | ICD-10-CM

## 2017-08-16 LAB
CHOLEST/HDLC SERPL: 4.3 {RATIO}
HDL/CHOLESTEROL RATIO: 23.2 %
HDLC SERPL-MCNC: 185 MG/DL
HDLC SERPL-MCNC: 43 MG/DL
LDLC SERPL CALC-MCNC: 105.2 MG/DL
NONHDLC SERPL-MCNC: 142 MG/DL
TRIGL SERPL-MCNC: 184 MG/DL

## 2017-08-16 PROCEDURE — 3046F HEMOGLOBIN A1C LEVEL >9.0%: CPT | Mod: S$GLB,,, | Performed by: FAMILY MEDICINE

## 2017-08-16 PROCEDURE — 3075F SYST BP GE 130 - 139MM HG: CPT | Mod: S$GLB,,, | Performed by: FAMILY MEDICINE

## 2017-08-16 PROCEDURE — 99214 OFFICE O/P EST MOD 30 MIN: CPT | Mod: S$GLB,,, | Performed by: FAMILY MEDICINE

## 2017-08-16 PROCEDURE — 99999 PR PBB SHADOW E&M-EST. PATIENT-LVL III: CPT | Mod: PBBFAC,,, | Performed by: FAMILY MEDICINE

## 2017-08-16 PROCEDURE — 36415 COLL VENOUS BLD VENIPUNCTURE: CPT | Mod: PO

## 2017-08-16 PROCEDURE — 3079F DIAST BP 80-89 MM HG: CPT | Mod: S$GLB,,, | Performed by: FAMILY MEDICINE

## 2017-08-16 PROCEDURE — 3008F BODY MASS INDEX DOCD: CPT | Mod: S$GLB,,, | Performed by: FAMILY MEDICINE

## 2017-08-16 PROCEDURE — 80061 LIPID PANEL: CPT

## 2017-08-16 PROCEDURE — 4010F ACE/ARB THERAPY RXD/TAKEN: CPT | Mod: S$GLB,,, | Performed by: FAMILY MEDICINE

## 2017-08-16 PROCEDURE — 83036 HEMOGLOBIN GLYCOSYLATED A1C: CPT

## 2017-08-16 NOTE — PATIENT INSTRUCTIONS
Your test results will be communicated to you via : My [facility], Telephone or Letter.   If you have not received your test results in one week, please contact the clinic at [phone].

## 2017-08-17 LAB
ESTIMATED AVG GLUCOSE: 220 MG/DL
HBA1C MFR BLD HPLC: 9.3 %

## 2017-08-18 ENCOUNTER — OFFICE VISIT (OUTPATIENT)
Dept: PODIATRY | Facility: CLINIC | Age: 52
End: 2017-08-18
Payer: COMMERCIAL

## 2017-08-18 VITALS
DIASTOLIC BLOOD PRESSURE: 78 MMHG | HEART RATE: 88 BPM | BODY MASS INDEX: 41.75 KG/M2 | WEIGHT: 315 LBS | HEIGHT: 73 IN | SYSTOLIC BLOOD PRESSURE: 117 MMHG

## 2017-08-18 DIAGNOSIS — E11.42 DIABETIC POLYNEUROPATHY ASSOCIATED WITH TYPE 2 DIABETES MELLITUS: ICD-10-CM

## 2017-08-18 DIAGNOSIS — M20.41 HAMMERTOE OF RIGHT FOOT: Primary | ICD-10-CM

## 2017-08-18 PROCEDURE — 28010 INCISION OF TOE TENDON: CPT | Mod: 78,T6,S$GLB, | Performed by: PODIATRIST

## 2017-08-18 PROCEDURE — 99499 UNLISTED E&M SERVICE: CPT | Mod: S$GLB,,, | Performed by: PODIATRIST

## 2017-08-18 PROCEDURE — 99999 PR PBB SHADOW E&M-EST. PATIENT-LVL III: CPT | Mod: PBBFAC,,, | Performed by: PODIATRIST

## 2017-08-18 RX ORDER — SULFAMETHOXAZOLE AND TRIMETHOPRIM 800; 160 MG/1; MG/1
1 TABLET ORAL 2 TIMES DAILY
Qty: 14 TABLET | Refills: 0 | Status: SHIPPED | OUTPATIENT
Start: 2017-08-18 | End: 2018-02-10

## 2017-08-20 NOTE — PROGRESS NOTES
Surgeon: Nino Olguin DPM  Assistant: none  Procedure: percutaneous flexor tenotomy right second toe  Pre-op Dx: hammertoe deformity right second toe  Post-op Dx: same  Pathology: none  Anesthesia: 3 mL 1% plain lidocaine  Hemostasis: none  EBL: < 3 mL  Materials: none  Injectables: none  Complications: none    Procedure in Detail    A time out was called verifying the patient, site, foot and procedure. Alcohol was used to prep the skin followed by skin anesthesia of injection site with ethyl chloride and injected 3 mL 1% plain lidocaine into affected toe. The surgical site was prepped with betadine and sterile draping applied. A sterile 18 gauze needle was entered into the plantar aspect of the toe adjacent to the DIPJ region and used to incise the long flexor tendon while dorsiflexing the toe. The toe was noted to reduce and become more flexible. A sterile dressing consisting of xeroform, gauze and coban applied.    The patient tolerated the procedure well without complication and related no pain during the procedure.     Instructed to keep dressing clean, dry and intact x 3 days then may apply neosporin and bandaid daily.    RTC 1 week for wound check.

## 2017-08-22 NOTE — PROGRESS NOTES
"Subjective:       Patient ID: Andrew Del Cid Jr. is a 52 y.o. male.    Chief Complaint: Diabetes and Hypertension    HPI   Pt is here for follow up of dm stable on insulin no hypoglycemia and metformin no adverse gi side effects  Pt has htn stable on ace no cough and norvasc pos ankle swelling pt has venous insfcy no sob/cp acceptable bp today   Pt has hypercholesterolemia stable on a statin no muscle aches   Pt is followed in podiatry for diabetic foot complications   Review of Systems   Constitutional: Negative for chills, fatigue and fever.   Respiratory: Negative for cough, chest tightness and shortness of breath.    Cardiovascular: Negative for chest pain.   Gastrointestinal: Negative for abdominal distention and abdominal pain.   Endocrine: Negative for polydipsia, polyphagia and polyuria.       Objective:      Physical Exam   Constitutional: He appears well-developed and well-nourished. No distress.   Cardiovascular: Normal rate and regular rhythm.  Exam reveals no gallop.    Pulmonary/Chest: Effort normal and breath sounds normal. No respiratory distress. He has no rales.   Abdominal: Soft. Bowel sounds are normal. He exhibits no distension and no mass. There is no tenderness.     labs discussed with pt   Assessment:       1. Type 2 diabetes mellitus with other circulatory complication, without long-term current use of insulin    2. HTN (hypertension), benign    3. Hypercholesterolemia        Plan:     orders cmp lipid hgb a1c  Cont meds  Ada diet  Graded exercise  rtc quarterly        "This note will not be shared with the patient."   "

## 2017-08-25 ENCOUNTER — OFFICE VISIT (OUTPATIENT)
Dept: PODIATRY | Facility: CLINIC | Age: 52
End: 2017-08-25
Payer: COMMERCIAL

## 2017-08-25 VITALS
HEART RATE: 89 BPM | SYSTOLIC BLOOD PRESSURE: 138 MMHG | BODY MASS INDEX: 41.75 KG/M2 | HEIGHT: 73 IN | DIASTOLIC BLOOD PRESSURE: 89 MMHG | WEIGHT: 315 LBS

## 2017-08-25 DIAGNOSIS — Z89.411 STATUS POST AMPUTATION OF GREAT TOE, RIGHT: ICD-10-CM

## 2017-08-25 DIAGNOSIS — I73.9 PVD (PERIPHERAL VASCULAR DISEASE): ICD-10-CM

## 2017-08-25 DIAGNOSIS — E11.610 DIABETIC CHARCOT FOOT: ICD-10-CM

## 2017-08-25 DIAGNOSIS — E11.42 DIABETIC POLYNEUROPATHY ASSOCIATED WITH TYPE 2 DIABETES MELLITUS: ICD-10-CM

## 2017-08-25 DIAGNOSIS — Z98.890 POSTOPERATIVE STATE: Primary | ICD-10-CM

## 2017-08-25 PROCEDURE — 99999 PR PBB SHADOW E&M-EST. PATIENT-LVL III: CPT | Mod: PBBFAC,,, | Performed by: PODIATRIST

## 2017-08-25 PROCEDURE — 99024 POSTOP FOLLOW-UP VISIT: CPT | Mod: S$GLB,,, | Performed by: PODIATRIST

## 2017-08-28 NOTE — PROGRESS NOTES
Subjective:      Patient ID: Andrew Del Cid Jr. is a 52 y.o. male.    Chief Complaint: Toe Pain (Tenotomy R2)    Andrew is a 52 y.o. male who presents to the clinic for evaluation and treatment of diabetic feet. Andrew has a past medical history of Diabetes mellitus, type 2; Hyperlipidemia; Hypertension; and Peripheral neuropathy. Post right hallux amputation and first metatarsal head resection 7/2/17. Dr. Camacho has been treating the patient. No new complaints.    8/25/17: Post percutaneous flexor tenotomy right second toe x 1 week. Relates no pain. Requesting to be released back to work.    PCP: Jeannette Herrera MD    Date Last Seen by PCP: 6/27/16    Current shoe gear: Football and Darco shoe on the affected foot    Hemoglobin A1C   Date Value Ref Range Status   08/16/2017 9.3 (H) 4.0 - 5.6 % Final     Comment:     According to ADA guidelines, hemoglobin A1c <7.0% represents  optimal control in non-pregnant diabetic patients. Different  metrics may apply to specific patient populations.   Standards of Medical Care in Diabetes-2016.  For the purpose of screening for the presence of diabetes:  <5.7%     Consistent with the absence of diabetes  5.7-6.4%  Consistent with increasing risk for diabetes   (prediabetes)  >or=6.5%  Consistent with diabetes  Currently, no consensus exists for use of hemoglobin A1c  for diagnosis of diabetes for children.  This Hemoglobin A1c assay has significant interference with fetal   hemoglobin   (HbF). The results are invalid for patients with abnormal amounts of   HbF,   including those with known Hereditary Persistence   of Fetal Hemoglobin. Heterozygous hemoglobin variants (HbAS, HbAC,   HbAD, HbAE, HbA2) do not significantly interfere with this assay;   however, presence of multiple variants in a sample may impact the %   interference.     08/09/2017 9.7 (H) 4.0 - 5.6 % Final     Comment:     According to ADA guidelines, hemoglobin A1c <7.0% represents  optimal control in  non-pregnant diabetic patients. Different  metrics may apply to specific patient populations.   Standards of Medical Care in Diabetes-2016.  For the purpose of screening for the presence of diabetes:  <5.7%     Consistent with the absence of diabetes  5.7-6.4%  Consistent with increasing risk for diabetes   (prediabetes)  >or=6.5%  Consistent with diabetes  Currently, no consensus exists for use of hemoglobin A1c  for diagnosis of diabetes for children.  This Hemoglobin A1c assay has significant interference with fetal   hemoglobin   (HbF). The results are invalid for patients with abnormal amounts of   HbF,   including those with known Hereditary Persistence   of Fetal Hemoglobin. Heterozygous hemoglobin variants (HbAS, HbAC,   HbAD, HbAE, HbA2) do not significantly interfere with this assay;   however, presence of multiple variants in a sample may impact the %   interference.     07/17/2017 11.0 (H) 4.0 - 5.6 % Final     Comment:     According to ADA guidelines, hemoglobin A1c <7.0% represents  optimal control in non-pregnant diabetic patients. Different  metrics may apply to specific patient populations.   Standards of Medical Care in Diabetes-2016.  For the purpose of screening for the presence of diabetes:  <5.7%     Consistent with the absence of diabetes  5.7-6.4%  Consistent with increasing risk for diabetes   (prediabetes)  >or=6.5%  Consistent with diabetes  Currently, no consensus exists for use of hemoglobin A1c  for diagnosis of diabetes for children.  This Hemoglobin A1c assay has significant interference with fetal   hemoglobin   (HbF). The results are invalid for patients with abnormal amounts of   HbF,   including those with known Hereditary Persistence   of Fetal Hemoglobin. Heterozygous hemoglobin variants (HbAS, HbAC,   HbAD, HbAE, HbA2) do not significantly interfere with this assay;   however, presence of multiple variants in a sample may impact the %   interference.             Review of  Systems   Constitution: Negative for chills, fever, weakness and malaise/fatigue.   Cardiovascular: Positive for leg swelling. Negative for chest pain and claudication.   Respiratory: Negative for cough and shortness of breath.    Skin: Positive for nail changes and poor wound healing. Negative for color change and rash.   Musculoskeletal: Negative for back pain, joint pain, muscle cramps and muscle weakness.   Gastrointestinal: Negative for nausea and vomiting.   Neurological: Positive for numbness. Negative for paresthesias.   Psychiatric/Behavioral: Negative for altered mental status.           Objective:      Physical Exam   Constitutional: He is oriented to person, place, and time. No distress.   Cardiovascular:   Pulses:       Dorsalis pedis pulses are 2+ on the right side, and 2+ on the left side.        Posterior tibial pulses are 1+ on the right side, and 1+ on the left side.   CFT< 3 secs all toes bilateral foot, skin temp warm bilateral foot, no hair growth bilateral foot, mild lower extremity edema bilateral.     Musculoskeletal:        Right foot: There is decreased range of motion and deformity.        Left foot: There is decreased range of motion and deformity.   Non-reducible pes planus right foot.     Amputated right hallux.    Right second toe without pain on palpation. Toe is more flexible at the tip and sits in more superior alignment when loaded.    Semi-reducible flexion contractures toes 3-5 right and 2-5 left.    Track bound hallux valgus left foot.    + equinus that reduces with knee bent bilateral.    No pain with ROM or MMT bilateral foot.   Feet:   Right Foot:   Skin Integrity: Positive for callus and dry skin.   Left Foot:   Skin Integrity: Positive for callus and dry skin.   Neurological: He is alert and oriented to person, place, and time. He has normal strength. A sensory deficit is present.   Skin: Skin is dry and intact. Capillary refill takes less than 2 seconds. No ecchymosis and  no rash noted. He is not diaphoretic. No cyanosis or erythema. Nails show no clubbing.   Incision right hallux amputation site healed.    Dry and hyperkeratotic skin with underlying ecchymosis distal tip of right second toe.    Nails 2-5 right and 1-5 left are thickened 2-3 mm, trimmed, loosened, discolored yellow.           Assessment:       Encounter Diagnoses   Name Primary?    Postoperative state Yes    Diabetic polyneuropathy associated with type 2 diabetes mellitus     Status post amputation of great toe, right     PVD (peripheral vascular disease)     Diabetic Charcot foot          Plan:       Andrew was seen today for toe pain.    Diagnoses and all orders for this visit:    Postoperative state  -     DIABETIC SHOES FOR HOME USE  -     ORTHOTIC DEVICE (DME)    Diabetic polyneuropathy associated with type 2 diabetes mellitus  -     DIABETIC SHOES FOR HOME USE  -     ORTHOTIC DEVICE (DME)    Status post amputation of great toe, right  -     DIABETIC SHOES FOR HOME USE  -     ORTHOTIC DEVICE (DME)    PVD (peripheral vascular disease)  -     DIABETIC SHOES FOR HOME USE  -     ORTHOTIC DEVICE (DME)    Diabetic Charcot foot  -     DIABETIC SHOES FOR HOME USE  -     ORTHOTIC DEVICE (DME)      I counseled the patient on his conditions, their implications and medical management.    Shoe inspection. Diabetic Foot Education. Patient reminded of the importance of good nutrition and blood sugar control to help prevent podiatric complications of diabetes. Patient instructed on proper foot hygeine. We discussed wearing proper shoe gear, daily foot inspections, never walking without protective shoe gear, never putting sharp instruments to feet, routine podiatric nail visits every 2-3 months.      May transition back into Diabetic shoes. Referred to [facility] for new custom insoles.    RTC 2-3 months or prn.

## 2017-09-03 LAB
ACID FAST MOD KINY STN SPEC: NORMAL
ACID FAST MOD KINY STN SPEC: NORMAL
MYCOBACTERIUM SPEC QL CULT: NORMAL
MYCOBACTERIUM SPEC QL CULT: NORMAL

## 2017-09-06 ENCOUNTER — OFFICE VISIT (OUTPATIENT)
Dept: FAMILY MEDICINE | Facility: CLINIC | Age: 52
End: 2017-09-06
Attending: FAMILY MEDICINE
Payer: COMMERCIAL

## 2017-09-06 VITALS
HEART RATE: 95 BPM | WEIGHT: 315 LBS | HEIGHT: 73 IN | RESPIRATION RATE: 18 BRPM | DIASTOLIC BLOOD PRESSURE: 72 MMHG | BODY MASS INDEX: 41.75 KG/M2 | SYSTOLIC BLOOD PRESSURE: 112 MMHG | OXYGEN SATURATION: 96 %

## 2017-09-06 DIAGNOSIS — E78.00 HYPERCHOLESTEROLEMIA: ICD-10-CM

## 2017-09-06 DIAGNOSIS — I10 HTN (HYPERTENSION), BENIGN: ICD-10-CM

## 2017-09-06 PROCEDURE — 3074F SYST BP LT 130 MM HG: CPT | Mod: S$GLB,,, | Performed by: FAMILY MEDICINE

## 2017-09-06 PROCEDURE — 99999 PR PBB SHADOW E&M-EST. PATIENT-LVL III: CPT | Mod: PBBFAC,,, | Performed by: FAMILY MEDICINE

## 2017-09-06 PROCEDURE — 4010F ACE/ARB THERAPY RXD/TAKEN: CPT | Mod: S$GLB,,, | Performed by: FAMILY MEDICINE

## 2017-09-06 PROCEDURE — 3008F BODY MASS INDEX DOCD: CPT | Mod: S$GLB,,, | Performed by: FAMILY MEDICINE

## 2017-09-06 PROCEDURE — 3078F DIAST BP <80 MM HG: CPT | Mod: S$GLB,,, | Performed by: FAMILY MEDICINE

## 2017-09-06 PROCEDURE — 99214 OFFICE O/P EST MOD 30 MIN: CPT | Mod: S$GLB,,, | Performed by: FAMILY MEDICINE

## 2017-09-06 PROCEDURE — 3046F HEMOGLOBIN A1C LEVEL >9.0%: CPT | Mod: S$GLB,,, | Performed by: FAMILY MEDICINE

## 2017-09-06 RX ORDER — INSULIN ASPART 100 [IU]/ML
12 INJECTION, SOLUTION INTRAVENOUS; SUBCUTANEOUS 2 TIMES DAILY WITH MEALS
Qty: 21.6 ML | Refills: 3 | Status: SHIPPED | OUTPATIENT
Start: 2017-09-06 | End: 2017-12-27 | Stop reason: SDUPTHER

## 2017-09-06 RX ORDER — METFORMIN HYDROCHLORIDE 1000 MG/1
1000 TABLET ORAL 2 TIMES DAILY WITH MEALS
Qty: 180 TABLET | Refills: 3 | Status: SHIPPED | OUTPATIENT
Start: 2017-09-06 | End: 2018-09-25 | Stop reason: SDUPTHER

## 2017-09-11 NOTE — PROGRESS NOTES
"Subjective:       Patient ID: Andrew Del Cid Jr. is a 52 y.o. male.    Chief Complaint: Follow-up    HPI   Pt is here for follow up of dm pt is on insulin fbs in the upper 100's improved from previous pt is not consistently on an ada diet but is improving his diet he is not exercising   Pt has htn stable on norvasc with ankle swelling venous infcy and ace no cough bp is acceptable today  Pt has hypercholesterolemia stable on statin no muscle aches   Review of Systems   Constitutional: Negative for chills, fatigue and fever.   Respiratory: Negative for cough, chest tightness and shortness of breath.    Cardiovascular: Negative for palpitations.   Gastrointestinal: Negative for abdominal distention and abdominal pain.   Endocrine: Negative for polydipsia, polyphagia and polyuria.       Objective:      Physical Exam   Constitutional: He appears well-developed. No distress.   Obese    Cardiovascular: Normal rate and regular rhythm.  Exam reveals no gallop.    Pulmonary/Chest: Effort normal and breath sounds normal. No respiratory distress. He has no rales.   Abdominal: Soft. Bowel sounds are normal. He exhibits no distension. There is no tenderness.     labs discussed with pt   Assessment:       1. Uncontrolled type 2 diabetes mellitus without complication, with long-term current use of insulin    2. HTN (hypertension), benign    3. Hypercholesterolemia        Plan:     orders labs recently  Cont meds    ada diet  Increase insulin as directed  Increase water intake  rtc 3 months     "This note will not be shared with the patient."   "

## 2017-11-06 RX ORDER — GABAPENTIN 300 MG/1
CAPSULE ORAL
Qty: 270 CAPSULE | Refills: 2 | Status: SHIPPED | OUTPATIENT
Start: 2017-11-06 | End: 2018-06-28 | Stop reason: SDUPTHER

## 2017-11-17 DIAGNOSIS — Z13.5 DIABETIC RETINOPATHY SCREENING: ICD-10-CM

## 2017-12-19 ENCOUNTER — TELEPHONE (OUTPATIENT)
Dept: FAMILY MEDICINE | Facility: CLINIC | Age: 52
End: 2017-12-19

## 2017-12-19 NOTE — TELEPHONE ENCOUNTER
----- Message from Tracy Wallace sent at 12/19/2017 11:25 AM CST -----  Contact: PHARMACY AUTHORIZATION  _  1st Request  X  2nd Request  _  3rd Request                                                  PRIOR AUTHORIZATION    Please refill the medication(s) listed below. Please call the patient when the prescription(s) is ready for  at the phone number [phone] .    Medication #1  NOVOLOG  100 units insulin pen    Preferred Pharmacy:  [facility]# [id number] - [place] - [address]# [phone]  /  Fax# [phone]

## 2017-12-26 ENCOUNTER — TELEPHONE (OUTPATIENT)
Dept: FAMILY MEDICINE | Facility: CLINIC | Age: 52
End: 2017-12-26

## 2017-12-26 NOTE — TELEPHONE ENCOUNTER
Patient's phone goes straight to [facility].Would you like to send something else over for the patient in the place of his novolog?

## 2017-12-26 NOTE — TELEPHONE ENCOUNTER
----- Message from Meaghan Orlando sent at 12/26/2017  3:53 PM CST -----  Contact: [facility]  x_  1st Request  _  2nd Request  _  3rd Request      [facility]    Why: would like to know if PA for insulin aspart (NOVOLOG) 100 unit/mL InPn pen has been received     What Number to Call Back: [phone]    When to Expect a call back: (Before the end of the day)   -- if call after 3:00 call back will be tomorrow.

## 2017-12-27 ENCOUNTER — TELEPHONE (OUTPATIENT)
Dept: FAMILY MEDICINE | Facility: CLINIC | Age: 52
End: 2017-12-27

## 2017-12-27 RX ORDER — INSULIN ASPART 100 [IU]/ML
12 INJECTION, SOLUTION INTRAVENOUS; SUBCUTANEOUS 2 TIMES DAILY WITH MEALS
Qty: 21.6 ML | Refills: 3 | Status: ON HOLD | OUTPATIENT
Start: 2017-12-27 | End: 2018-12-30

## 2017-12-27 NOTE — TELEPHONE ENCOUNTER
[facility] pharmacy called and would like to know would you like the patient to take levemir along with the regular insulin.Please advise.Thanks.

## 2017-12-29 NOTE — TELEPHONE ENCOUNTER
I spoke with the pharmacist and I was informed that both Levemir and regular insulin need prior auth.  Please advise. thanks

## 2018-01-04 NOTE — TELEPHONE ENCOUNTER
Please have pt find out what insulin is covered by his plan and have him see me fasting for follow up

## 2018-01-04 NOTE — TELEPHONE ENCOUNTER
A message was left for the patient to contact his insurance company to see which insulin they are willing to cover.

## 2018-02-10 ENCOUNTER — OFFICE VISIT (OUTPATIENT)
Dept: URGENT CARE | Facility: CLINIC | Age: 53
End: 2018-02-10
Payer: MEDICAID

## 2018-02-10 VITALS
SYSTOLIC BLOOD PRESSURE: 131 MMHG | HEART RATE: 87 BPM | BODY MASS INDEX: 41.75 KG/M2 | HEIGHT: 73 IN | DIASTOLIC BLOOD PRESSURE: 81 MMHG | RESPIRATION RATE: 20 BRPM | TEMPERATURE: 98 F | WEIGHT: 315 LBS | OXYGEN SATURATION: 97 %

## 2018-02-10 DIAGNOSIS — J40 BRONCHITIS: Primary | ICD-10-CM

## 2018-02-10 PROCEDURE — 3008F BODY MASS INDEX DOCD: CPT | Mod: S$GLB,,, | Performed by: FAMILY MEDICINE

## 2018-02-10 PROCEDURE — 99214 OFFICE O/P EST MOD 30 MIN: CPT | Mod: S$GLB,,, | Performed by: FAMILY MEDICINE

## 2018-02-10 RX ORDER — IBUPROFEN 800 MG/1
800 TABLET ORAL 2 TIMES DAILY
Qty: 14 TABLET | Refills: 0 | Status: SHIPPED | OUTPATIENT
Start: 2018-02-10 | End: 2018-02-17

## 2018-02-10 RX ORDER — DEXAMETHASONE SODIUM PHOSPHATE 100 MG/10ML
10 INJECTION INTRAMUSCULAR; INTRAVENOUS
Status: COMPLETED | OUTPATIENT
Start: 2018-02-10 | End: 2018-02-10

## 2018-02-10 RX ORDER — BENZONATATE 100 MG/1
100 CAPSULE ORAL EVERY 6 HOURS PRN
Qty: 30 CAPSULE | Refills: 1 | Status: SHIPPED | OUTPATIENT
Start: 2018-02-10 | End: 2018-12-25

## 2018-02-10 RX ORDER — ALBUTEROL SULFATE 90 UG/1
2 AEROSOL, METERED RESPIRATORY (INHALATION) EVERY 6 HOURS PRN
Qty: 1 INHALER | Refills: 0 | Status: SHIPPED | OUTPATIENT
Start: 2018-02-10 | End: 2021-09-03

## 2018-02-10 RX ADMIN — DEXAMETHASONE SODIUM PHOSPHATE 10 MG: 100 INJECTION INTRAMUSCULAR; INTRAVENOUS at 03:02

## 2018-02-10 NOTE — PATIENT INSTRUCTIONS
Bronchitis with Wheezing (Viral or Bacterial: Adult)    Bronchitis is an infection of the air passages. It often occurs during a cold and is usually caused by a virus. Symptoms include cough with mucus (phlegm) and low-grade fever. This illness is contagious during the first few days and is spread through the air by coughing and sneezing, or by direct contact (touching the sick person and then touching your own eyes, nose, or mouth).  If there is a lot of inflammation, air flow is restricted. The air passages may also go into spasm, especially if you have asthma. This causes wheezing and difficulty breathing even in people who do not have asthma.  Bronchitis usually lasts 7 to 14 days. The wheezing should improve with treatment during the first week. An inhaler is often prescribed to relax the air passages and stop wheezing. Antibiotics will be prescribed if your doctor thinks there is also a secondary bacterial infection.  Home care  · If symptoms are severe, rest at home for the first 2 to 3 days. When you go back to your usual activities, don't let yourself get too tired.  · Do not smoke. Also avoid being exposed to secondhand smoke.  · You may use over-the-counter medicine to control fever or pain, unless another medicine was prescribed. Note: If you have chronic liver or kidney disease or have ever had a stomach ulcer or gastrointestinal bleeding, talk with your healthcare provider before using these medicines. Also talk to your provider if you are taking medicine to prevent blood clots.) Aspirin should never be given to anyone younger than 18 years of age who is ill with a viral infection or fever. It may cause severe liver or brain damage.  · Your appetite may be poor, so a light diet is fine. Avoid dehydration by drinking 6 to 8 glasses of fluids per day (such as water, soft drinks, sports drinks, juices, tea, or soup). Extra fluids will help loosen secretions in the nose and lungs.  · Over-the-counter  cough, cold, and sore-throat medicines will not shorten the length of the illness, but they may be helpful to reduce symptoms. (Note: Do not use decongestants if you have high blood pressure.)  · If you were given an inhaler, use it exactly as directed. If you need to use it more often than prescribed, your condition may be worsening. If this happens, contact your healthcare provider.  · If prescribed, finish all antibiotic medicine, even if you are feeling better after only a few days.  Follow-up care  Follow up with your healthcare provider, or as advised. If you had an X-ray or ECG (electrocardiogram), a specialist will review it. You will be notified of any new findings that may affect your care.  Note: If you are age 65 or older, or if you have a chronic lung disease or condition that affects your immune system, or you smoke, talk to your healthcare provider about having a pneumococcal vaccinations and a yearly influenza vaccination (flu shot).  When to seek medical advice  Call your healthcare provider right away if any of these occur:  · Fever of 100.4°F (38°C) or higher  · Coughing up increasing amounts of colored sputum  · Weakness, drowsiness, headache, facial pain, ear pain, or a stiff neck  Call 911, or get immediate medical care  Contact emergency services right away if any of these occur.  · Coughing up blood  · Worsening weakness, drowsiness, headache, or stiff neck  · Increased wheezing not helped with medication, shortness of breath, or pain with breathing  Date Last Reviewed: 9/13/2015  © 5497-5478 [employer]. [address] [place] [address], [place] [address]. All rights reserved. This information is not intended as a substitute for professional medical care. Always follow your healthcare professional's instructions.

## 2018-02-10 NOTE — LETTER
February 10, 2018      [facility] [address], [address]  [place] [address]  Phone: [phone]  Fax: [phone]       Patient: Andrew Del Cid   YOB: 1965  Date of Visit: 02/10/2018    To Whom It May Concern:    Michael Del Cid  was at [facility] on 02/10/2018. He may return to work/school on 02/10/2018 with restrictions. No cold areas for the next 7 days.  If you have any questions or concerns, or if I can be of further assistance, please do not hesitate to contact me.    Sincerely,    Olivia Lacey MA

## 2018-02-10 NOTE — PROGRESS NOTES
"Subjective:       Patient ID: Andrew Del Cid Jr. is a 52 y.o. male.    Vitals:  height is 6' 1" (1.854 m) and weight is 192.8 kg (425 lb) (abnormal). His oral temperature is 98.3 °F (36.8 °C). His blood pressure is 131/81 and his pulse is 87. His respiration is 20 and oxygen saturation is 97%.     Chief Complaint: Nasal Congestion    Other   This is a new problem. The current episode started 1 to 4 weeks ago. The problem occurs constantly. The problem has been gradually worsening. Associated symptoms include congestion and coughing. Pertinent negatives include no abdominal pain, chest pain, chills, fever, headaches, myalgias, nausea or sore throat. The symptoms are aggravated by coughing. Treatments tried: otc chest congestion med. The treatment provided no relief.     Review of Systems   Constitution: Negative for chills, fever and malaise/fatigue.   HENT: Positive for congestion. Negative for ear pain, hoarse voice and sore throat.    Eyes: Negative for discharge and redness.   Cardiovascular: Negative for chest pain, dyspnea on exertion and leg swelling.   Respiratory: Positive for cough. Negative for shortness of breath, sputum production and wheezing.    Musculoskeletal: Negative for myalgias.   Gastrointestinal: Negative for abdominal pain and nausea.   Neurological: Negative for headaches.       Objective:      Physical Exam   Constitutional: He is oriented to person, place, and time. He appears well-developed and well-nourished. He is cooperative.  Non-toxic appearance. He does not appear ill. No distress.   HENT:   Head: Normocephalic and atraumatic.   Right Ear: Hearing, tympanic membrane, external ear and ear canal normal.   Left Ear: Hearing, tympanic membrane, external ear and ear canal normal.   Nose: Nose normal. No mucosal edema, rhinorrhea or nasal deformity. No epistaxis. Right sinus exhibits no maxillary sinus tenderness and no frontal sinus tenderness. Left sinus exhibits no maxillary sinus " tenderness and no frontal sinus tenderness.   Mouth/Throat: Uvula is midline and mucous membranes are normal. No trismus in the jaw. Normal dentition. No uvula swelling. Posterior oropharyngeal erythema present.   Eyes: Conjunctivae and lids are normal. Right eye exhibits no discharge. Left eye exhibits no discharge. No scleral icterus.   Sclera clear bilat   Neck: Trachea normal, normal range of motion, full passive range of motion without pain and phonation normal. Neck supple.   Cardiovascular: Normal rate, regular rhythm, normal heart sounds, intact distal pulses and normal pulses.    Pulmonary/Chest: Effort normal and breath sounds normal. No respiratory distress.           Abdominal: Soft. Normal appearance and bowel sounds are normal. He exhibits no distension, no pulsatile midline mass and no mass. There is no tenderness.   Musculoskeletal: Normal range of motion. He exhibits no edema or deformity.   Neurological: He is alert and oriented to person, place, and time. He exhibits normal muscle tone. Coordination normal.   Skin: Skin is warm, dry and intact. He is not diaphoretic. No pallor.   Psychiatric: He has a normal mood and affect. His speech is normal and behavior is normal. Judgment and thought content normal. Cognition and memory are normal.   Nursing note and vitals reviewed.      Assessment:       1. Bronchitis        Plan:         Bronchitis    Other orders  -     ibuprofen (ADVIL,MOTRIN) 800 MG tablet; Take 1 tablet (800 mg total) by mouth 2 (two) times daily.  Dispense: 14 tablet; Refill: 0  -     dexamethasone injection 10 mg; Inject 1 mL (10 mg total) into the muscle one time.  -     benzonatate (TESSALON PERLES) 100 MG capsule; Take 1 capsule (100 mg total) by mouth every 6 (six) hours as needed for Cough.  Dispense: 30 capsule; Refill: 1  -     albuterol 90 mcg/actuation inhaler; Inhale 2 puffs into the lungs every 6 (six) hours as needed for Wheezing. Rescue  Dispense: 1 Inhaler; Refill:  0    Work note restricting work in freezer room

## 2018-04-06 DIAGNOSIS — E11.9 TYPE 2 DIABETES MELLITUS WITHOUT COMPLICATION: ICD-10-CM

## 2018-06-13 ENCOUNTER — PATIENT MESSAGE (OUTPATIENT)
Dept: PODIATRY | Facility: CLINIC | Age: 53
End: 2018-06-13

## 2018-06-23 ENCOUNTER — PATIENT MESSAGE (OUTPATIENT)
Dept: PODIATRY | Facility: CLINIC | Age: 53
End: 2018-06-23

## 2018-06-28 RX ORDER — GABAPENTIN 300 MG/1
300 CAPSULE ORAL 3 TIMES DAILY
Qty: 90 CAPSULE | Refills: 0 | Status: SHIPPED | OUTPATIENT
Start: 2018-06-28 | End: 2018-09-25 | Stop reason: SDUPTHER

## 2018-07-13 DIAGNOSIS — E11.9 TYPE 2 DIABETES MELLITUS WITHOUT COMPLICATION: ICD-10-CM

## 2018-08-10 DIAGNOSIS — Z12.11 COLON CANCER SCREENING: ICD-10-CM

## 2018-09-25 RX ORDER — METFORMIN HYDROCHLORIDE 1000 MG/1
1000 TABLET ORAL 2 TIMES DAILY WITH MEALS
Qty: 180 TABLET | Refills: 3 | Status: ON HOLD | OUTPATIENT
Start: 2018-09-25 | End: 2018-12-30 | Stop reason: HOSPADM

## 2018-09-25 RX ORDER — GABAPENTIN 300 MG/1
300 CAPSULE ORAL 3 TIMES DAILY
Qty: 90 CAPSULE | Refills: 0 | Status: SHIPPED | OUTPATIENT
Start: 2018-09-25 | End: 2022-10-04

## 2018-09-25 RX ORDER — AMLODIPINE BESYLATE 5 MG/1
5 TABLET ORAL DAILY
Qty: 90 TABLET | Refills: 2 | Status: ON HOLD | OUTPATIENT
Start: 2018-09-25 | End: 2018-12-30 | Stop reason: HOSPADM

## 2018-11-13 ENCOUNTER — HOSPITAL ENCOUNTER (EMERGENCY)
Facility: HOSPITAL | Age: 53
Discharge: HOME | End: 2018-11-13
Attending: EMERGENCY MEDICINE
Payer: COMMERCIAL

## 2018-11-13 VITALS
HEIGHT: 73 IN | BODY MASS INDEX: 41.75 KG/M2 | DIASTOLIC BLOOD PRESSURE: 74 MMHG | TEMPERATURE: 98 F | WEIGHT: 315 LBS | HEART RATE: 92 BPM | SYSTOLIC BLOOD PRESSURE: 158 MMHG | RESPIRATION RATE: 20 BRPM | OXYGEN SATURATION: 96 %

## 2018-11-13 DIAGNOSIS — S91.109S OPEN TOE WOUND, SEQUELA: Primary | ICD-10-CM

## 2018-11-13 DIAGNOSIS — M79.673 FOOT PAIN: ICD-10-CM

## 2018-11-13 LAB
GLUCOSE SERPL-MCNC: 337 MG/DL (ref 70–110)
POCT GLUCOSE: 337 MG/DL (ref 70–110)

## 2018-11-13 PROCEDURE — 82962 GLUCOSE BLOOD TEST: CPT

## 2018-11-13 PROCEDURE — 99283 EMERGENCY DEPT VISIT LOW MDM: CPT | Mod: 25

## 2018-11-13 NOTE — CONSULTS
NITHYA contacted [facility] @ [phone] to inquire if patient established in [facility] system, NITHYA spoke to Queta. Queta reported the last time patient been to [facility] was in 2016 therefore patient will have to re-establish care. Patient will see Dr. Servin on 12/27/18 @ 2:00pm. Queta informed NITHYA patient will need to apply for financial assistance before appointment because clinic will not see patient if application not done before then. NITHYA requested for patient to get a podiatry appointment, Queta informed NITHYA a referral will need to be sent in order to get a podiatry appointment.

## 2018-11-13 NOTE — ED NOTES
Called social work (Wilson) for wound care consult at [facility]. Advised pt must be established at [facility] for social work to set up clinic appointment. Wilson to call [facility] and update nurse on what steps to take. Pt will need close follow up with podiatry/wound care.

## 2018-11-13 NOTE — ED PROVIDER NOTES
Encounter Date: 11/13/2018  SORT:  This is a 53 year old Diabetic male who presents to the ED complaining of an injury to the right 4th toe.  Orders placed.  SCRIBE #1 NOTE: I, Gregory Galvan, am scribing for, and in the presence of,  Maureen Alcantar MD. I have scribed the following portions of the note - Other sections scribed: HPI, ROS, PE.       History   No chief complaint on file.    CC: Wound Check    52 y/o male with DM type II, HLD, HTN, peripheral neuropathy of b/l feet, and R great toe amputation presents to the ED for emergent evaluation of acute onset wound to 4th digit of R foot that he noticed yesterday night. The patient is unsure if he injured his toe at work due to neuropathy of b/l feet. The patient reports cleaning his toe and wrapping it up yesterday night. The patient is not following up with podiatry currently because he doesn't have insurance. The patient denies any associated pain, fever, or chills. No other symptoms reported.      The history is provided by the patient. No  was used.     Review of patient's allergies indicates:  No Known Allergies  Past Medical History:   Diagnosis Date    Diabetes mellitus, type 2     Hyperlipidemia     Hypertension     Peripheral neuropathy      Past Surgical History:   Procedure Laterality Date    AMPUTATION-TOE Right 7/2/2017    Performed by Nino Olguin DPM at [facility] OR [address] [place] [address]    APPENDECTOMY      HERNIA REPAIR       No family history on file.  Social History     Tobacco Use    Smoking status: Never Smoker    Smokeless tobacco: Never Used   Substance Use Topics    Alcohol use: No    Drug use: No     Review of Systems   Constitutional: Negative for chills and fever.   HENT: Negative for congestion, ear pain, rhinorrhea and sore throat.    Eyes: Negative for redness.   Respiratory: Negative for cough and shortness of breath.    Cardiovascular: Negative for chest pain.   Gastrointestinal: Negative for abdominal  pain, diarrhea, nausea and vomiting.   Genitourinary: Negative for decreased urine volume, difficulty urinating, dysuria, frequency, hematuria and urgency.   Musculoskeletal: Negative for back pain and neck pain.   Skin: Positive for wound (to 4th digit of R foot). Negative for rash.   Neurological: Negative for headaches.   Psychiatric/Behavioral: Negative for confusion.   All other systems reviewed and are negative.      Physical Exam     Initial Vitals [11/13/18 1241]   BP Pulse Resp Temp SpO2   (!) 154/101 104 18 98.7 °F (37.1 °C) 96 %      MAP       --         Physical Exam    Nursing note and vitals reviewed.  Constitutional: He appears well-developed and well-nourished. He is not diaphoretic. No distress.   HENT:   Head: Normocephalic and atraumatic.   Mouth/Throat: Oropharynx is clear and moist.   Eyes: Conjunctivae and EOM are normal. Pupils are equal, round, and reactive to light. No scleral icterus.   Neck: Normal range of motion. Neck supple. No JVD present.   Cardiovascular: Normal rate, regular rhythm, normal heart sounds and intact distal pulses.   Pulmonary/Chest: Breath sounds normal. No stridor. No respiratory distress.   Abdominal: Soft. Bowel sounds are normal. He exhibits no distension. There is no tenderness.   Musculoskeletal: Normal range of motion. He exhibits no edema or tenderness.   Neurological: He is alert and oriented to person, place, and time. He has normal strength. No cranial nerve deficit.   Skin: Skin is warm and dry. Capillary refill takes 2 to 3 seconds. No rash noted. There is erythema.   Wound to 4th digit of R foot. Chronic swelling to R dorsal foot after R great toe amputation on 7/2/17.   Psychiatric: He has a normal mood and affect.           ED Course   Procedures  Labs Reviewed - No data to display       Imaging Results          X-Ray Foot Complete Right (Final result)  Result time 11/13/18 14:40:13    Final result by Edin Quinones MD (11/13/18 14:40:13)                  Impression:      Postsurgical changes from a 1st metatarsal amputation; soft tissue swelling around the foot      Electronically signed by: Edin Quinones MD  Date:    11/13/2018  Time:    14:40             Narrative:    EXAMINATION:  XR FOOT COMPLETE 3 VIEW RIGHT    CLINICAL HISTORY:  . Pain in unspecified foot    TECHNIQUE:  AP, lateral, and oblique views of the right foot were performed.    COMPARISON:  Right foot radiograph 06/30/2017 and MRI dated 07/01/2017    FINDINGS:  Since the prior radiograph there is an amputation of the distal right 1st digit.  Significant degenerative changes are noted at the tarsometatarsal joint which were seen on the prior examination.  There is no evidence of acute fracture.  There is no radiographic evidence of osseous erosion or new bone formation.  Soft tissues demonstrate diffuse soft tissue swelling.  Plantar calcaneal spur and enthesophyte at the insertion of the Achilles tendon is again noted.                                 Medical Decision Making:   Initial Assessment:   Working diagnosis of wound evaluation.  ED Management:  53-year-old male who comes to the emergency department for wound evaluation of his right foot 4th toe.  Patient states that he is not sure if he accidentally incurred while working review develop a blister that spontaneously drained.  He currently has no medical insurance and is concerned with the wound and wanted a medical evaluation.  Upon physical examination the wound does not look infected.  X-ray did not show any evidence bone trauma. We were able to get social work to help him coordinate financial assistance and follow up with the wound clinic and/or Podiatry.  Patient states that he has some products for wound cleaning at home from a previous toe amputation that he had.  I discussed in detail with the patient that this evaluation in the ED does not suggest any emergent or life-threatening medical condition requiring admission or  immediate intervention beyond what was provided in the ED.  Regardless, an unremarkable evaluation in the ED does not preclude the development or presence of a serious of life threatening condition. As such, patient was instructed to return immediately for any worsening or change in current symptoms. Patient agrees and understands the outpatient treatment and follow-up plan.            Scribe Attestation:   Scribe #1: I performed the above scribed service and the documentation accurately describes the services I performed. I attest to the accuracy of the note.    Attending Attestation:           Physician Attestation for Scribe:  Physician Attestation Statement for Scribe #1: I, Maureen Alcantar MD, reviewed documentation, as scribed by Gregory Galvan in my presence, and it is both accurate and complete.                    Clinical Impression:   The primary encounter diagnosis was Open toe wound, sequela. A diagnosis of Foot pain was also pertinent to this visit.                             Maureen Zarco MD  11/13/18 2500

## 2018-11-13 NOTE — PROGRESS NOTES
Follow-up Information     [facility]. Go on 12/27/2018.    Specialties:  Neurosurgery, Plastic Surgery, Podiatry, Surgical Oncology, Allergy, Cardiothoracic Surgery, Otolaryngology, Gastroenterology, Breast Surgery, Oral Surgery, Oral and Maxillofacial Surgery, Cardiology, Bariatrics, Internal Medicine, Family Medicine, Colon and Rectal Surgery, Dental General Practice, Gynecology, Orthopedic Surgery, Genetics, Endocrinology, Vascular Surgery, Physical Medicine and Rehabilitation, Urology, Neurology, Dermatology, Rheumatology, Occupational Therapy, Ophthalmology, Optometry  Why:  Outpatient Services, PCP Follow-up Appointment, Please arrive to clinic for 2:00PM @ [facility], You will see Dr. Servin  Contact information:  [address] [facility] [address]  [phone]                   [facility]    WRITTEN HEALTHCARE AND DISCHARGE INFORMATION                        Help at Home           [phone]  After discharge for assistance [facility] On Call Nurse Care Line 24/7  Assistance      YOU WILL NEED TO APPLY FOR FINANCIAL ASSISTANCE BEFORE SCHEDULED APPOINTMENT, PLEASE GO TO [facility] 1ST FLOOR. THE HOURS TO APPLY IS Monday-Friday 7:00AM-4:00PM      Things You are responsible For To Manage Your Care At Home:  1.    Getting your prescriptions filled   2.    Taking your medications as directed, DO NOT MISS ANY DOSES!  3.    Going to your follow-up doctor appointment. This is important because it  allow the doctor to monitor your progress and determine if  any changes need to made to your treatment plan.     Thank you for choosing [facility] for your care.  Please answer any calls you may receive from [facility] we want to continue to support you as you manage your healthcare needs. [facility] is happy to have the opportunity to serve you.     Sincerely,  Your [facility] Team,  Kim Borja LMSW   II  [phone]

## 2018-11-13 NOTE — ED TRIAGE NOTES
Pt hit right fourth toe at work, noticed wound last night came to ED for evaluation. Pt has hx of diabetes, amputation of right great toe previously.

## 2018-11-13 NOTE — PLAN OF CARE
NITHYA met with patient to provide discharge follow-up instructions. NITHYA informed nurse Mónica  completed all discharge planning for patient.         11/13/18 1433   Final Note   Assessment Type Final Discharge Note   Anticipated Discharge Disposition Home   Hospital Follow Up  Appt(s) scheduled? Yes   Right Care Referral Info   Post Acute Recommendation No Care

## 2018-12-25 ENCOUNTER — HOSPITAL ENCOUNTER (INPATIENT)
Facility: OTHER | Age: 53
LOS: 5 days | Discharge: HOME | DRG: 617 | End: 2018-12-30
Attending: EMERGENCY MEDICINE | Admitting: INTERNAL MEDICINE
Payer: MEDICAID

## 2018-12-25 DIAGNOSIS — L97.929 ULCERS OF BOTH LOWER EXTREMITIES, UNSPECIFIED ULCER STAGE: ICD-10-CM

## 2018-12-25 DIAGNOSIS — L97.919 ULCERS OF BOTH LOWER EXTREMITIES, UNSPECIFIED ULCER STAGE: ICD-10-CM

## 2018-12-25 DIAGNOSIS — L97.929 ULCERS OF BOTH LOWER EXTREMITIES: ICD-10-CM

## 2018-12-25 DIAGNOSIS — Z79.4 TYPE 2 DIABETES MELLITUS WITH HYPERGLYCEMIA, WITH LONG-TERM CURRENT USE OF INSULIN: ICD-10-CM

## 2018-12-25 DIAGNOSIS — E11.621 TYPE 2 DIABETES MELLITUS WITH FOOT ULCER, WITH LONG-TERM CURRENT USE OF INSULIN: Chronic | ICD-10-CM

## 2018-12-25 DIAGNOSIS — L97.919 ULCERS OF BOTH LOWER EXTREMITIES: ICD-10-CM

## 2018-12-25 DIAGNOSIS — L97.509 TYPE 2 DIABETES MELLITUS WITH FOOT ULCER, UNSPECIFIED WHETHER LONG TERM INSULIN USE: ICD-10-CM

## 2018-12-25 DIAGNOSIS — R52 PAIN: ICD-10-CM

## 2018-12-25 DIAGNOSIS — L03.115 CELLULITIS OF RIGHT LOWER EXTREMITY: ICD-10-CM

## 2018-12-25 DIAGNOSIS — M86.9 OSTEOMYELITIS OF LEFT FOOT: ICD-10-CM

## 2018-12-25 DIAGNOSIS — L97.912 ULCERS OF BOTH LOWER EXTREMITIES WITH FAT LAYER EXPOSED: ICD-10-CM

## 2018-12-25 DIAGNOSIS — M86.172 OTHER ACUTE OSTEOMYELITIS OF LEFT FOOT: Primary | ICD-10-CM

## 2018-12-25 DIAGNOSIS — E11.621 TYPE 2 DIABETES MELLITUS WITH FOOT ULCER, UNSPECIFIED WHETHER LONG TERM INSULIN USE: ICD-10-CM

## 2018-12-25 DIAGNOSIS — E11.610 DIABETIC CHARCOT FOOT: Chronic | ICD-10-CM

## 2018-12-25 DIAGNOSIS — L97.509 TYPE 2 DIABETES MELLITUS WITH FOOT ULCER, WITH LONG-TERM CURRENT USE OF INSULIN: Chronic | ICD-10-CM

## 2018-12-25 DIAGNOSIS — E11.51 DIABETIC PERIPHERAL VASCULAR DISEASE: Chronic | ICD-10-CM

## 2018-12-25 DIAGNOSIS — L97.922 ULCERS OF BOTH LOWER EXTREMITIES WITH FAT LAYER EXPOSED: ICD-10-CM

## 2018-12-25 DIAGNOSIS — Z79.4 TYPE 2 DIABETES MELLITUS WITH FOOT ULCER, WITH LONG-TERM CURRENT USE OF INSULIN: Chronic | ICD-10-CM

## 2018-12-25 DIAGNOSIS — E11.65 TYPE 2 DIABETES MELLITUS WITH HYPERGLYCEMIA, WITH LONG-TERM CURRENT USE OF INSULIN: ICD-10-CM

## 2018-12-25 PROBLEM — E11.42 TYPE 2 DIABETES MELLITUS WITH DIABETIC POLYNEUROPATHY, WITH LONG-TERM CURRENT USE OF INSULIN: Chronic | Status: ACTIVE | Noted: 2017-06-30

## 2018-12-25 PROBLEM — M86.171 ACUTE OSTEOMYELITIS OF TOE OF RIGHT FOOT: Status: RESOLVED | Noted: 2017-07-03 | Resolved: 2018-12-25

## 2018-12-25 PROBLEM — E08.621 DIABETIC ULCER OF TOE OF RIGHT FOOT ASSOCIATED WITH DIABETES MELLITUS DUE TO UNDERLYING CONDITION, WITH FAT LAYER EXPOSED: Status: RESOLVED | Noted: 2017-06-29 | Resolved: 2018-12-25

## 2018-12-25 PROBLEM — L03.031 CELLULITIS OF TOE OF RIGHT FOOT: Status: RESOLVED | Noted: 2017-06-30 | Resolved: 2018-12-25

## 2018-12-25 PROBLEM — L97.512 DIABETIC ULCER OF TOE OF RIGHT FOOT ASSOCIATED WITH DIABETES MELLITUS DUE TO UNDERLYING CONDITION, WITH FAT LAYER EXPOSED: Status: RESOLVED | Noted: 2017-06-29 | Resolved: 2018-12-25

## 2018-12-25 PROBLEM — I73.9 PVD (PERIPHERAL VASCULAR DISEASE): Chronic | Status: ACTIVE | Noted: 2017-07-03

## 2018-12-25 PROBLEM — Z89.411 HISTORY OF AMPUTATION OF RIGHT GREAT TOE: Chronic | Status: ACTIVE | Noted: 2017-07-12

## 2018-12-25 PROBLEM — M79.673 FOOT PAIN: Status: RESOLVED | Noted: 2018-11-13 | Resolved: 2018-12-25

## 2018-12-25 PROBLEM — E78.5 HYPERLIPIDEMIA: Chronic | Status: ACTIVE | Noted: 2018-12-25

## 2018-12-25 PROBLEM — S91.109S: Status: RESOLVED | Noted: 2018-11-13 | Resolved: 2018-12-25

## 2018-12-25 PROBLEM — L97.519 DIABETIC ULCER OF RIGHT GREAT TOE: Status: RESOLVED | Noted: 2017-07-01 | Resolved: 2018-12-25

## 2018-12-25 PROBLEM — I10 ESSENTIAL HYPERTENSION: Chronic | Status: ACTIVE | Noted: 2017-06-30

## 2018-12-25 LAB
ALBUMIN SERPL BCP-MCNC: 2.9 G/DL
ALP SERPL-CCNC: 87 U/L
ALT SERPL W/O P-5'-P-CCNC: 12 U/L
ANION GAP SERPL CALC-SCNC: 10 MMOL/L
AST SERPL-CCNC: 7 U/L
B-OH-BUTYR BLD STRIP-SCNC: 0.2 MMOL/L
BACTERIA #/AREA URNS HPF: NORMAL /HPF
BASOPHILS # BLD AUTO: 0.01 K/UL
BASOPHILS NFR BLD: 0.1 %
BILIRUB SERPL-MCNC: 1.5 MG/DL
BILIRUB UR QL STRIP: NEGATIVE
BUN SERPL-MCNC: 15 MG/DL
CALCIUM SERPL-MCNC: 9.1 MG/DL
CHLORIDE SERPL-SCNC: 100 MMOL/L
CLARITY UR: CLEAR
CO2 SERPL-SCNC: 25 MMOL/L
COLOR UR: YELLOW
CREAT SERPL-MCNC: 0.9 MG/DL
CRP SERPL-MCNC: 179.1 MG/L
DIFFERENTIAL METHOD: ABNORMAL
EOSINOPHIL # BLD AUTO: 0.1 K/UL
EOSINOPHIL NFR BLD: 1.3 %
ERYTHROCYTE [DISTWIDTH] IN BLOOD BY AUTOMATED COUNT: 12.3 %
ERYTHROCYTE [SEDIMENTATION RATE] IN BLOOD: 68 MM/HR
EST. GFR  (AFRICAN AMERICAN): >60 ML/MIN/1.73 M^2
EST. GFR  (NON AFRICAN AMERICAN): >60 ML/MIN/1.73 M^2
ESTIMATED AVG GLUCOSE: 278 MG/DL
GLUCOSE SERPL-MCNC: 284 MG/DL
GLUCOSE UR QL STRIP: ABNORMAL
HBA1C MFR BLD HPLC: 11.3 %
HCT VFR BLD AUTO: 38.7 %
HGB BLD-MCNC: 12.8 G/DL
HGB UR QL STRIP: NEGATIVE
KETONES UR QL STRIP: ABNORMAL
LACTATE SERPL-SCNC: 1.3 MMOL/L
LEUKOCYTE ESTERASE UR QL STRIP: NEGATIVE
LYMPHOCYTES # BLD AUTO: 1.8 K/UL
LYMPHOCYTES NFR BLD: 16.7 %
MCH RBC QN AUTO: 28.9 PG
MCHC RBC AUTO-ENTMCNC: 33.1 G/DL
MCV RBC AUTO: 87 FL
MICROSCOPIC COMMENT: NORMAL
MONOCYTES # BLD AUTO: 0.5 K/UL
MONOCYTES NFR BLD: 5.1 %
NEUTROPHILS # BLD AUTO: 8.1 K/UL
NEUTROPHILS NFR BLD: 76.6 %
NITRITE UR QL STRIP: NEGATIVE
PH UR STRIP: 6 [PH] (ref 5–8)
PLATELET # BLD AUTO: 332 K/UL
PMV BLD AUTO: 9.1 FL
POCT GLUCOSE: 165 MG/DL (ref 70–110)
POCT GLUCOSE: 236 MG/DL (ref 70–110)
POCT GLUCOSE: 249 MG/DL (ref 70–110)
POCT GLUCOSE: 283 MG/DL (ref 70–110)
POTASSIUM SERPL-SCNC: 4.1 MMOL/L
PROT SERPL-MCNC: 7.5 G/DL
PROT UR QL STRIP: ABNORMAL
RBC # BLD AUTO: 4.43 M/UL
RBC #/AREA URNS HPF: 1 /HPF (ref 0–4)
SODIUM SERPL-SCNC: 135 MMOL/L
SP GR UR STRIP: 1.02 (ref 1–1.03)
SQUAMOUS #/AREA URNS HPF: 1 /HPF
URN SPEC COLLECT METH UR: ABNORMAL
UROBILINOGEN UR STRIP-ACNC: NEGATIVE EU/DL
WBC # BLD AUTO: 10.57 K/UL
WBC #/AREA URNS HPF: 3 /HPF (ref 0–5)
YEAST URNS QL MICRO: NORMAL

## 2018-12-25 PROCEDURE — 80053 COMPREHEN METABOLIC PANEL: CPT

## 2018-12-25 PROCEDURE — 25000003 PHARM REV CODE 250: Performed by: INTERNAL MEDICINE

## 2018-12-25 PROCEDURE — 86140 C-REACTIVE PROTEIN: CPT

## 2018-12-25 PROCEDURE — 96374 THER/PROPH/DIAG INJ IV PUSH: CPT

## 2018-12-25 PROCEDURE — 82962 GLUCOSE BLOOD TEST: CPT

## 2018-12-25 PROCEDURE — 99233 PR SUBSEQUENT HOSPITAL CARE,LEVL III: ICD-10-PCS | Mod: ,,, | Performed by: PODIATRIST

## 2018-12-25 PROCEDURE — 87147 CULTURE TYPE IMMUNOLOGIC: CPT | Mod: 59

## 2018-12-25 PROCEDURE — 96375 TX/PRO/DX INJ NEW DRUG ADDON: CPT

## 2018-12-25 PROCEDURE — A9585 GADOBUTROL INJECTION: HCPCS | Performed by: INTERNAL MEDICINE

## 2018-12-25 PROCEDURE — 85651 RBC SED RATE NONAUTOMATED: CPT

## 2018-12-25 PROCEDURE — 36415 COLL VENOUS BLD VENIPUNCTURE: CPT

## 2018-12-25 PROCEDURE — 99285 EMERGENCY DEPT VISIT HI MDM: CPT | Mod: 25

## 2018-12-25 PROCEDURE — 63600175 PHARM REV CODE 636 W HCPCS: Performed by: EMERGENCY MEDICINE

## 2018-12-25 PROCEDURE — 87070 CULTURE OTHR SPECIMN AEROBIC: CPT

## 2018-12-25 PROCEDURE — 87040 BLOOD CULTURE FOR BACTERIA: CPT

## 2018-12-25 PROCEDURE — 99223 1ST HOSP IP/OBS HIGH 75: CPT | Mod: ,,, | Performed by: INTERNAL MEDICINE

## 2018-12-25 PROCEDURE — 99233 SBSQ HOSP IP/OBS HIGH 50: CPT | Mod: ,,, | Performed by: PODIATRIST

## 2018-12-25 PROCEDURE — S5571 INSULIN DISPOS PEN 3 ML: HCPCS | Performed by: INTERNAL MEDICINE

## 2018-12-25 PROCEDURE — 25000003 PHARM REV CODE 250: Performed by: EMERGENCY MEDICINE

## 2018-12-25 PROCEDURE — 99223 PR INITIAL HOSPITAL CARE,LEVL III: ICD-10-PCS | Mod: ,,, | Performed by: INTERNAL MEDICINE

## 2018-12-25 PROCEDURE — 87186 SC STD MICRODIL/AGAR DIL: CPT

## 2018-12-25 PROCEDURE — 87075 CULTR BACTERIA EXCEPT BLOOD: CPT

## 2018-12-25 PROCEDURE — 83036 HEMOGLOBIN GLYCOSYLATED A1C: CPT

## 2018-12-25 PROCEDURE — 81000 URINALYSIS NONAUTO W/SCOPE: CPT

## 2018-12-25 PROCEDURE — 11000001 HC ACUTE MED/SURG PRIVATE ROOM

## 2018-12-25 PROCEDURE — 83605 ASSAY OF LACTIC ACID: CPT

## 2018-12-25 PROCEDURE — 82010 KETONE BODYS QUAN: CPT

## 2018-12-25 PROCEDURE — 96361 HYDRATE IV INFUSION ADD-ON: CPT

## 2018-12-25 PROCEDURE — 85025 COMPLETE CBC W/AUTO DIFF WBC: CPT

## 2018-12-25 PROCEDURE — 63600175 PHARM REV CODE 636 W HCPCS: Performed by: INTERNAL MEDICINE

## 2018-12-25 PROCEDURE — 25500020 PHARM REV CODE 255: Performed by: INTERNAL MEDICINE

## 2018-12-25 RX ORDER — SODIUM CHLORIDE 0.9 % (FLUSH) 0.9 %
5 SYRINGE (ML) INJECTION
Status: DISCONTINUED | OUTPATIENT
Start: 2018-12-25 | End: 2018-12-30 | Stop reason: HOSPADM

## 2018-12-25 RX ORDER — INSULIN ASPART 100 [IU]/ML
1-10 INJECTION, SOLUTION INTRAVENOUS; SUBCUTANEOUS
Status: DISCONTINUED | OUTPATIENT
Start: 2018-12-25 | End: 2018-12-30 | Stop reason: HOSPADM

## 2018-12-25 RX ORDER — ACETAMINOPHEN 325 MG/1
650 TABLET ORAL EVERY 8 HOURS PRN
Status: CANCELLED | OUTPATIENT
Start: 2018-12-25

## 2018-12-25 RX ORDER — CEFEPIME HYDROCHLORIDE 2 G/50ML
2 INJECTION, SOLUTION INTRAVENOUS
Status: DISCONTINUED | OUTPATIENT
Start: 2018-12-25 | End: 2018-12-25

## 2018-12-25 RX ORDER — IBUPROFEN 200 MG
24 TABLET ORAL
Status: DISCONTINUED | OUTPATIENT
Start: 2018-12-25 | End: 2018-12-30 | Stop reason: HOSPADM

## 2018-12-25 RX ORDER — ENOXAPARIN SODIUM 100 MG/ML
40 INJECTION SUBCUTANEOUS EVERY 12 HOURS
Status: DISCONTINUED | OUTPATIENT
Start: 2018-12-26 | End: 2018-12-30 | Stop reason: HOSPADM

## 2018-12-25 RX ORDER — CEFEPIME HYDROCHLORIDE 2 G/50ML
2 INJECTION, SOLUTION INTRAVENOUS
Status: DISCONTINUED | OUTPATIENT
Start: 2018-12-25 | End: 2018-12-27

## 2018-12-25 RX ORDER — GABAPENTIN 300 MG/1
300 CAPSULE ORAL 3 TIMES DAILY
Status: DISCONTINUED | OUTPATIENT
Start: 2018-12-25 | End: 2018-12-30 | Stop reason: HOSPADM

## 2018-12-25 RX ORDER — LOSARTAN POTASSIUM 25 MG/1
25 TABLET ORAL DAILY
Status: DISCONTINUED | OUTPATIENT
Start: 2018-12-26 | End: 2018-12-30 | Stop reason: HOSPADM

## 2018-12-25 RX ORDER — IBUPROFEN 200 MG
16 TABLET ORAL
Status: DISCONTINUED | OUTPATIENT
Start: 2018-12-25 | End: 2018-12-30 | Stop reason: HOSPADM

## 2018-12-25 RX ORDER — CEFAZOLIN SODIUM 2 G/50ML
2 SOLUTION INTRAVENOUS
Status: DISCONTINUED | OUTPATIENT
Start: 2018-12-25 | End: 2018-12-25

## 2018-12-25 RX ORDER — GADOBUTROL 604.72 MG/ML
10 INJECTION INTRAVENOUS
Status: COMPLETED | OUTPATIENT
Start: 2018-12-25 | End: 2018-12-25

## 2018-12-25 RX ORDER — INSULIN ASPART 100 [IU]/ML
10 INJECTION, SOLUTION INTRAVENOUS; SUBCUTANEOUS
Status: DISCONTINUED | OUTPATIENT
Start: 2018-12-25 | End: 2018-12-30 | Stop reason: HOSPADM

## 2018-12-25 RX ORDER — ATORVASTATIN CALCIUM 20 MG/1
20 TABLET, FILM COATED ORAL DAILY
Status: DISCONTINUED | OUTPATIENT
Start: 2018-12-26 | End: 2018-12-30 | Stop reason: HOSPADM

## 2018-12-25 RX ORDER — GLUCAGON 1 MG
1 KIT INJECTION
Status: DISCONTINUED | OUTPATIENT
Start: 2018-12-25 | End: 2018-12-30 | Stop reason: HOSPADM

## 2018-12-25 RX ADMIN — INSULIN DETEMIR 45 UNITS: 100 INJECTION, SOLUTION SUBCUTANEOUS at 11:12

## 2018-12-25 RX ADMIN — GABAPENTIN 300 MG: 300 CAPSULE ORAL at 11:12

## 2018-12-25 RX ADMIN — SODIUM CHLORIDE 2000 ML: 0.9 INJECTION, SOLUTION INTRAVENOUS at 01:12

## 2018-12-25 RX ADMIN — GADOBUTROL 10 ML: 604.72 INJECTION INTRAVENOUS at 10:12

## 2018-12-25 RX ADMIN — VANCOMYCIN HYDROCHLORIDE 2000 MG: 100 INJECTION, POWDER, LYOPHILIZED, FOR SOLUTION INTRAVENOUS at 01:12

## 2018-12-25 RX ADMIN — INSULIN ASPART 1 UNITS: 100 INJECTION, SOLUTION INTRAVENOUS; SUBCUTANEOUS at 11:12

## 2018-12-25 RX ADMIN — INSULIN HUMAN 6 UNITS: 100 INJECTION, SOLUTION PARENTERAL at 02:12

## 2018-12-25 RX ADMIN — CEFEPIME HYDROCHLORIDE 2 G: 2 INJECTION, SOLUTION INTRAVENOUS at 06:12

## 2018-12-25 NOTE — HPI
Mr. Del Cid is a 53/M with PMH HTN, HLD, DMII with associated neuropathy and peripheral vascular disease, h/o diabetic osteomyelitis with ultimate amputation of R 1st toe who presented to ED with a ~1.5 week history of worsening wounds to his L foot. He was previously followed by Dr. Camacho (podiatry) and Dr. Herrera (PCP), but lost insurance coverage and has not been seen by a physician in office since 02/2018. He has continued insulin therapy but does not closely monitor his glucose. He reports that his prior diabetic shoes have been wearing down and he switched to tennis shoes about 2 weeks prior to presentation; subsequently he began to develop blisters to his L 3rd toe as well as the plantar side of his left foot. A smaller area of mild skin breakdown began to develop on his R foot as well. He attempted to cover his toe wounds with neosporin ointment and wrapping with minimal effect. Denies significant pain but does note drainage. He has noted worsening erythema and drainage especially from the L 3rd toe. He also notes generalized fatigue which he reports feels similar to when he had his prior amputation, which prompted him to present to the ED.

## 2018-12-25 NOTE — ASSESSMENT & PLAN NOTE
- At home on amlodipine; previously had been on lisinopril but does not recall taking currently.  - Will start losartan 25mg PO daily in the setting of DMII; hold amlodipine for time being.

## 2018-12-25 NOTE — SUBJECTIVE & OBJECTIVE
Past Medical History:   Diagnosis Date    Diabetes mellitus, type 2     Hyperlipidemia     Hypertension     Peripheral neuropathy      Past Surgical History:   Procedure Laterality Date    AMPUTATION-TOE Right 7/2/2017    Performed by Nino Olguin DPM at [facility] OR [address] [place] [address]    APPENDECTOMY      HERNIA REPAIR      TOE AMPUTATION      right great toe     Review of patient's allergies indicates:  No Known Allergies    No current facility-administered medications on file prior to encounter.      Current Outpatient Medications on File Prior to Encounter   Medication Sig    amLODIPine (NORVASC) 5 MG tablet Take 1 tablet (5 mg total) by mouth once daily.    aspirin (ECOTRIN) 81 MG EC tablet Take 81 mg by mouth once daily.    gabapentin (NEURONTIN) 300 MG capsule Take 1 capsule (300 mg total) by mouth 3 (three) times daily.    insulin aspart (NOVOLOG) 100 unit/mL InPn pen Inject 12 Units into the skin 2 (two) times daily with meals.    insulin detemir (LEVEMIR FLEXTOUCH) 100 unit/mL (3 mL) SubQ InPn pen 60 units in the am and 40 units in the pm    metFORMIN (GLUCOPHAGE) 1000 MG tablet Take 1 tablet (1,000 mg total) by mouth 2 (two) times daily with meals.    methocarbamol (ROBAXIN) 500 MG Tab 1 po qhs prn    [DISCONTINUED] insulin regular 100 unit/mL Inj injection Inject 12 Units into the skin 2 (two) times daily before meals.    albuterol 90 mcg/actuation inhaler Inhale 2 puffs into the lungs every 6 (six) hours as needed for Wheezing. Rescue    atorvastatin (LIPITOR) 20 MG tablet Take 1 tablet (20 mg total) by mouth once daily.    lisinopril 10 MG tablet Take 1 tablet (10 mg total) by mouth once daily.    [DISCONTINUED] benzonatate (TESSALON PERLES) 100 MG capsule Take 1 capsule (100 mg total) by mouth every 6 (six) hours as needed for Cough.     Family History     None        Tobacco Use    Smoking status: Never Smoker    Smokeless tobacco: Never Used   Substance and Sexual Activity    Alcohol  use: No    Drug use: No    Sexual activity: Yes     Partners: Female     Comment:      Review of Systems   Constitutional: Positive for fatigue. Negative for chills and fever.   HENT: Negative for sore throat and trouble swallowing.    Eyes: Negative for pain and visual disturbance.   Respiratory: Negative for cough and shortness of breath.    Cardiovascular: Negative for chest pain and palpitations.   Gastrointestinal: Negative for abdominal pain, nausea and vomiting.   Endocrine: Positive for polydipsia and polyuria.   Genitourinary: Negative for difficulty urinating and dysuria.   Musculoskeletal: Negative for arthralgias and joint swelling.   Skin: Positive for rash and wound.   Neurological: Negative for weakness and numbness.     Objective:     Vital Signs (Most Recent):  Temp: 98.3 °F (36.8 °C) (12/25/18 1211)  Pulse: 78 (12/25/18 1442)  Resp: 18 (12/25/18 1211)  BP: 138/78 (12/25/18 1442)  SpO2: 97 % (12/25/18 1442) Vital Signs (24h Range):  Temp:  [98.3 °F (36.8 °C)] 98.3 °F (36.8 °C)  Pulse:  [78-91] 78  Resp:  [18] 18  SpO2:  [95 %-97 %] 97 %  BP: (136-155)/(76-87) 138/78     Weight: (!) 181.4 kg (400 lb)  Body mass index is 52.77 kg/m².    Physical Exam   Constitutional: He is oriented to person, place, and time. He appears well-developed. No distress.   Morbidly obese.   HENT:   Head: Normocephalic and atraumatic.   Eyes: Conjunctivae and EOM are normal.   Cardiovascular: Normal rate, regular rhythm, S1 normal and S2 normal.   ?systolic murmur. Poorly palpated dorsalis pedis / posterior tibialis pulses; 1+ at best.   Pulmonary/Chest: Effort normal and breath sounds normal.   Abdominal: Soft. He exhibits no distension. There is no tenderness.   Musculoskeletal:   Bilateral thickening of nails with ?onychomycosis. R foot s/p 1st toe amputation; erythema and mild irritation of R superior-medial 1st MTP; prior incision site intact. L foot with most notably 3rd toe erythema with ruptured bulla and  ulcer of distal end of toe draining serosanguinous fluid. ~4cm ruptured bulla / ulcer on distal plantar foot without significant drainage.   Neurological: He is alert and oriented to person, place, and time.   Skin: Skin is warm and dry.   Nursing note and vitals reviewed.    Significant Labs:   CBC:  Recent Labs   Lab 12/25/18  1239   WBC 10.57   HGB 12.8*   HCT 38.7*      GRAN 76.6*  8.1*   LYMPH 16.7*  1.8   MONO 5.1  0.5   EOS 0.1   BASO 0.01     CMP:  Recent Labs   Lab 12/25/18  1239   *   K 4.1      CO2 25   BUN 15   CREATININE 0.9   *   CALCIUM 9.1   ALKPHOS 87   AST 7*   ALT 12   BILITOT 1.5*   PROT 7.5   ALBUMIN 2.9*   ANIONGAP 10        Significant Imaging:   CXR 12/25/18:  Allowing for shallow inspiratory effort and habitus, no convincing acute cardiopulmonary process.    X-Ray Foot Complete Bilateral 12/25/18:  Sequela of neuropathic arthropathy bilaterally.  Superimposed osteomyelitis difficult to exclude.

## 2018-12-25 NOTE — H&P
[facility] Medicine  History & Physical    Patient Name: Andrew Del Cid Jr.  MRN: [id number]  Admission Date: 12/25/2018  Attending Physician: HARSHA Hendricks MD  Primary Care Provider: Jeannette Herrera MD    Patient information was obtained from patient, past medical records and ER records.     Subjective:     Principal Problem:Osteomyelitis of left foot    Chief Complaint:   Chief Complaint   Patient presents with    Wound Infection     PT with mutliple wounds to L foot and toes x 1.5 weeks with worsening today. Pt reporting PMH of DM, has not checked CBG in 1 month. Complaint with insulin.         HPI: Mr. Del Cid is a 53/M with PMH HTN, HLD, DMII with associated neuropathy and peripheral vascular disease, h/o diabetic osteomyelitis with ultimate amputation of R 1st toe who presented to ED with a ~1.5 week history of worsening wounds to his L foot. He was previously followed by Dr. Camacho (podiatry) and Dr. Herrera (PCP), but lost insurance coverage and has not been seen by a physician in office since 02/2018. He has continued insulin therapy but does not closely monitor his glucose. He reports that his prior diabetic shoes have been wearing down and he switched to tennis shoes about 2 weeks prior to presentation; subsequently he began to develop blisters to his L 3rd toe as well as the plantar side of his left foot. A smaller area of mild skin breakdown began to develop on his R foot as well. He attempted to cover his toe wounds with neosporin ointment and wrapping with minimal effect. Denies significant pain but does note drainage. He has noted worsening erythema and drainage especially from the L 3rd toe. He also notes generalized fatigue which he reports feels similar to when he had his prior amputation, which prompted him to present to the ED.    Past Medical History:   Diagnosis Date    Diabetes mellitus, type 2     Hyperlipidemia     Hypertension     Peripheral  neuropathy      Past Surgical History:   Procedure Laterality Date    AMPUTATION-TOE Right 7/2/2017    Performed by Nino Olguin DPM at [facility] OR [address] [place] [address]    APPENDECTOMY      HERNIA REPAIR      TOE AMPUTATION      right great toe     Review of patient's allergies indicates:  No Known Allergies    No current facility-administered medications on file prior to encounter.      Current Outpatient Medications on File Prior to Encounter   Medication Sig    amLODIPine (NORVASC) 5 MG tablet Take 1 tablet (5 mg total) by mouth once daily.    aspirin (ECOTRIN) 81 MG EC tablet Take 81 mg by mouth once daily.    gabapentin (NEURONTIN) 300 MG capsule Take 1 capsule (300 mg total) by mouth 3 (three) times daily.    insulin aspart (NOVOLOG) 100 unit/mL InPn pen Inject 12 Units into the skin 2 (two) times daily with meals.    insulin detemir (LEVEMIR FLEXTOUCH) 100 unit/mL (3 mL) SubQ InPn pen 60 units in the am and 40 units in the pm    metFORMIN (GLUCOPHAGE) 1000 MG tablet Take 1 tablet (1,000 mg total) by mouth 2 (two) times daily with meals.    methocarbamol (ROBAXIN) 500 MG Tab 1 po qhs prn    [DISCONTINUED] insulin regular 100 unit/mL Inj injection Inject 12 Units into the skin 2 (two) times daily before meals.    albuterol 90 mcg/actuation inhaler Inhale 2 puffs into the lungs every 6 (six) hours as needed for Wheezing. Rescue    atorvastatin (LIPITOR) 20 MG tablet Take 1 tablet (20 mg total) by mouth once daily.    lisinopril 10 MG tablet Take 1 tablet (10 mg total) by mouth once daily.    [DISCONTINUED] benzonatate (TESSALON PERLES) 100 MG capsule Take 1 capsule (100 mg total) by mouth every 6 (six) hours as needed for Cough.     Family History     None        Tobacco Use    Smoking status: Never Smoker    Smokeless tobacco: Never Used   Substance and Sexual Activity    Alcohol use: No    Drug use: No    Sexual activity: Yes     Partners: Female     Comment:      Review of Systems    Constitutional: Positive for fatigue. Negative for chills and fever.   HENT: Negative for sore throat and trouble swallowing.    Eyes: Negative for pain and visual disturbance.   Respiratory: Negative for cough and shortness of breath.    Cardiovascular: Negative for chest pain and palpitations.   Gastrointestinal: Negative for abdominal pain, nausea and vomiting.   Endocrine: Positive for polydipsia and polyuria.   Genitourinary: Negative for difficulty urinating and dysuria.   Musculoskeletal: Negative for arthralgias and joint swelling.   Skin: Positive for rash and wound.   Neurological: Negative for weakness and numbness.     Objective:     Vital Signs (Most Recent):  Temp: 98.3 °F (36.8 °C) (12/25/18 1211)  Pulse: 78 (12/25/18 1442)  Resp: 18 (12/25/18 1211)  BP: 138/78 (12/25/18 1442)  SpO2: 97 % (12/25/18 1442) Vital Signs (24h Range):  Temp:  [98.3 °F (36.8 °C)] 98.3 °F (36.8 °C)  Pulse:  [78-91] 78  Resp:  [18] 18  SpO2:  [95 %-97 %] 97 %  BP: (136-155)/(76-87) 138/78     Weight: (!) 181.4 kg (400 lb)  Body mass index is 52.77 kg/m².    Physical Exam   Constitutional: He is oriented to person, place, and time. He appears well-developed. No distress.   Morbidly obese.   HENT:   Head: Normocephalic and atraumatic.   Eyes: Conjunctivae and EOM are normal.   Cardiovascular: Normal rate, regular rhythm, S1 normal and S2 normal.   ?systolic murmur. Poorly palpated dorsalis pedis / posterior tibialis pulses; 1+ at best.   Pulmonary/Chest: Effort normal and breath sounds normal.   Abdominal: Soft. He exhibits no distension. There is no tenderness.   Musculoskeletal:   Bilateral thickening of nails with ?onychomycosis. R foot s/p 1st toe amputation; erythema and mild irritation of R superior-medial 1st MTP; prior incision site intact. L foot with most notably 3rd toe erythema with ruptured bulla and ulcer of distal end of toe draining serosanguinous fluid. ~4cm ruptured bulla / ulcer on distal plantar foot  without significant drainage.   Neurological: He is alert and oriented to person, place, and time.   Skin: Skin is warm and dry.   Nursing note and vitals reviewed.    Significant Labs:   CBC:  Recent Labs   Lab 12/25/18  1239   WBC 10.57   HGB 12.8*   HCT 38.7*      GRAN 76.6*  8.1*   LYMPH 16.7*  1.8   MONO 5.1  0.5   EOS 0.1   BASO 0.01     CMP:  Recent Labs   Lab 12/25/18  1239   *   K 4.1      CO2 25   BUN 15   CREATININE 0.9   *   CALCIUM 9.1   ALKPHOS 87   AST 7*   ALT 12   BILITOT 1.5*   PROT 7.5   ALBUMIN 2.9*   ANIONGAP 10        Significant Imaging:   CXR 12/25/18:  Allowing for shallow inspiratory effort and habitus, no convincing acute cardiopulmonary process.    X-Ray Foot Complete Bilateral 12/25/18:  Sequela of neuropathic arthropathy bilaterally.  Superimposed osteomyelitis difficult to exclude.    Assessment/Plan:     * Osteomyelitis of left foot    - ?cellulitis versus osteomyelitis of L 3rd toe in the setting of diabetic peripheral vascular disease and poorly-controlled diabetes.  - Received vancomycin 2g IV in ED; continue with vancomycin 2g IV q12hr. Will start cefepime 2g IV q12hr in addition for broad coverage.  - Podiatry consult to further evaluate; patient states that if amputation is necessary he is willing to proceed.     Hyperlipidemia    - Continuing atorvastatin 20mg PO daily.     Diabetic peripheral vascular disease    - Hold aspirin in setting of potential podiatric intervention.     Essential hypertension    - At home on amlodipine; previously had been on lisinopril but does not recall taking currently.  - Will start losartan 25mg PO daily in the setting of DMII; hold amlodipine for time being.     Type 2 diabetes mellitus with diabetic polyneuropathy, with long-term current use of insulin    - Patient reports home regimen of insulin detemir 50U subQ BID, insulin aspart 12U subQ TIDWM as well as PO metformin.  - Hold home regimen and start insulin  detemir 45U subQ BID, insulin aspart 10U subQ TIDWM with additional moderate-dose sliding scale insulin aspart 1-10U subQ TIDWM PRN.  - Continue gabapentin 300mg PO TID for neuropathy.     Morbid obesity with BMI of 50.0-59.9, adult    - Risk factor for DMII, hypertension, hyperlipidemia.       VTE Risk Mitigation (From admission, onward)        Ordered     enoxaparin injection 40 mg  Every 12 hours      12/25/18 1535     IP VTE HIGH RISK PATIENT  Once      12/25/18 1535     Place sequential compression device  Until discontinued      12/25/18 1535        SHAYY Meraz MD  Department of Hospital Medicine   [facility]  [phone]

## 2018-12-25 NOTE — ED NOTES
Pt presents with c/o foot wound to the left foot and toes. Pt states he noticed a blister on the sole of the foot and another on the third toe about ten days ago and states both have ruptured. Pt reports he has been cleaning them regularly and keeping it covered. He reports pain to the left foot that extends to the calf. The pain worsens when the patient walks on it. Skin is warm, drainage noted on foot. No foul odor. Pt reports he has not taken his medications today, does not check his glucose regularly, but is compliant with insulin. Pt is AAOx4, RR even and unlabored, NAD noted. Pt placed on cardiac, bp, and pulse ox monitors. Bed locked and in lowest position, side rails up x2, and call light within reach.

## 2018-12-25 NOTE — ASSESSMENT & PLAN NOTE
- Patient reports home regimen of insulin detemir 50U subQ BID, insulin aspart 12U subQ TIDWM as well as PO metformin.  - Hold home regimen and start insulin detemir 45U subQ BID, insulin aspart 10U subQ TIDWM with additional moderate-dose sliding scale insulin aspart 1-10U subQ TIDWM PRN.  - Continue gabapentin 300mg PO TID for neuropathy.

## 2018-12-25 NOTE — ED NOTES
Pt AAOx4 and appropriate at this time. Respirations even and unlabored. No acute distress noted. Skin warm and dry.

## 2018-12-25 NOTE — NURSING
[facility] called and notified of patient requiring MRI at [facility].

## 2018-12-25 NOTE — ASSESSMENT & PLAN NOTE
- ?cellulitis versus osteomyelitis of L 3rd toe in the setting of diabetic peripheral vascular disease and poorly-controlled diabetes.  - Received vancomycin 2g IV in ED; continue with vancomycin 2g IV q12hr. Will start cefepime 2g IV q12hr in addition for broad coverage.  - Podiatry consult to further evaluate; patient states that if amputation is necessary he is willing to proceed.

## 2018-12-25 NOTE — ED PROVIDER NOTES
Encounter Date: 12/25/2018    SCRIBE #1 NOTE: I, Enedelia Fitch, am scribing for, and in the presence of, Dr. Nuno.       History     Chief Complaint   Patient presents with    Wound Infection     PT with mutliple wounds to L foot and toes x 1.5 weeks with worsening today. Pt reporting PMH of DM, has not checked CBG in 1 month. Complaint with insulin.      Time seen by provider: 12:34 PM    This is a 53 y.o. Male, with history of DM, HTN, and HLD, who presents with multiple wounds to the left foot. He noticed one blister on the sole of the foot and another on the third toe approximately ten days ago and states both have ruptured. He reports pain to the left foot that extends to the calf. The pain worsens when the patient bears weight. He usually wears orthopedic shoes, but reports wearing tennis shoes last week, which caused symptoms to worsen. These symptoms are consistent with an episode that led his right toe being amputated two years ago. Pt reports fatigue that began yesterday and congestion, but denies fever, chills, or myalgias. He has not followed up with podiatry because he does not have insurance. Pt is compliant with insulin, but denies taking any other medication yesterday. Pt denies use of alcohol, tobacco, or illicit drugs.       The history is provided by the patient.     Review of patient's allergies indicates:  No Known Allergies  Past Medical History:   Diagnosis Date    Diabetes mellitus, type 2     Hyperlipidemia     Hypertension     Peripheral neuropathy      Past Surgical History:   Procedure Laterality Date    AMPUTATION-TOE Right 7/2/2017    Performed by Nino Olguin DPM at [facility] OR [address] [place] [address]    APPENDECTOMY      HERNIA REPAIR      TOE AMPUTATION      right great toe     History reviewed. No pertinent family history.  Social History     Tobacco Use    Smoking status: Never Smoker    Smokeless tobacco: Never Used   Substance Use Topics    Alcohol use: No    Drug use: No      Review of Systems   Constitutional: Positive for fatigue. Negative for activity change, appetite change, chills, diaphoresis and fever.   HENT: Positive for congestion. Negative for sore throat and trouble swallowing.    Eyes: Negative for photophobia and visual disturbance.   Respiratory: Negative for cough, chest tightness and shortness of breath.    Cardiovascular: Negative for chest pain.   Gastrointestinal: Negative for abdominal pain, nausea and vomiting.   Endocrine: Negative for polydipsia, polyphagia and polyuria.   Genitourinary: Negative for difficulty urinating and flank pain.   Musculoskeletal: Negative for back pain, myalgias and neck pain.   Skin: Positive for wound (left foot). Negative for pallor.   Neurological: Negative for weakness and headaches.   Psychiatric/Behavioral: Negative for confusion.       Physical Exam     Initial Vitals [12/25/18 1211]   BP Pulse Resp Temp SpO2   (!) 155/76 91 18 98.3 °F (36.8 °C) 95 %      MAP       --         Physical Exam    Nursing note and vitals reviewed.  Constitutional: He appears well-developed and well-nourished. He is not diaphoretic. No distress.   Morbidly obese. Pt appears fatigued.    HENT:   Head: Normocephalic and atraumatic.   Right Ear: External ear normal.   Left Ear: External ear normal.   Eyes: EOM are normal. Pupils are equal, round, and reactive to light.   Neck: Normal range of motion.   Cardiovascular: Normal rate, regular rhythm and normal heart sounds. Exam reveals no gallop and no friction rub.    No murmur heard.  Pulmonary/Chest: Breath sounds normal. No respiratory distress. He has no wheezes. He has no rhonchi. He has no rales.   Abdominal: Soft. There is no tenderness. There is no rebound and no guarding.   Obese.    Musculoskeletal: He exhibits tenderness. He exhibits no edema.   Absence of the right great toe.    Neurological: He is alert and oriented to person, place, and time.   Skin: Skin is warm and dry. Rash noted.   R  foot: Hypertrophic skin changes to all nails. 6 cm x 6 cm region of induration with a 1 cm discolored superficial ulcer overlying remote surgical incision. L foot Swelling and 2 cm ulcer present to the third digit with surrouding hypertrophy, fluctuance and ascending erythema.    Psychiatric: He has a normal mood and affect. His behavior is normal. Judgment and thought content normal.                     ED Course   Procedures  Labs Reviewed   CBC W/ AUTO DIFFERENTIAL - Abnormal; Notable for the following components:       Result Value    RBC 4.43 (*)     Hemoglobin 12.8 (*)     Hematocrit 38.7 (*)     MPV 9.1 (*)     Gran # (ANC) 8.1 (*)     Gran% 76.6 (*)     Lymph% 16.7 (*)     All other components within normal limits   COMPREHENSIVE METABOLIC PANEL - Abnormal; Notable for the following components:    Sodium 135 (*)     Glucose 284 (*)     Albumin 2.9 (*)     Total Bilirubin 1.5 (*)     AST 7 (*)     All other components within normal limits   URINALYSIS, REFLEX TO URINE CULTURE - Abnormal; Notable for the following components:    Protein, UA Trace (*)     Glucose, UA 3+ (*)     Ketones, UA Trace (*)     All other components within normal limits    Narrative:     Preferred Collection Type->Urine, Clean Catch   SEDIMENTATION RATE - Abnormal; Notable for the following components:    Sed Rate 68 (*)     All other components within normal limits   C-REACTIVE PROTEIN - Abnormal; Notable for the following components:    .1 (*)     All other components within normal limits   POCT GLUCOSE - Abnormal; Notable for the following components:    POCT Glucose 283 (*)     All other components within normal limits   POCT GLUCOSE - Abnormal; Notable for the following components:    POCT Glucose 249 (*)     All other components within normal limits   CULTURE, BLOOD   CULTURE, BLOOD   LACTIC ACID, PLASMA   BETA - HYDROXYBUTYRATE, SERUM   URINALYSIS MICROSCOPIC    Narrative:     Preferred Collection Type->Urine, Clean Catch    POCT GLUCOSE MONITORING CONTINUOUS     Imaging Results          X-Ray Foot Complete Left (Final result)  Result time 12/25/18 13:22:10    Final result by Cedric Hinojosa MD (12/25/18 13:22:10)                 Impression:      Sequela of neuropathic arthropathy bilaterally.  Superimposed osteomyelitis difficult to exclude.      Electronically signed by: Cedric Hinojosa MD  Date:    12/25/2018  Time:    13:22             Narrative:    EXAMINATION:  XR FOOT COMPLETE 3 VIEW LEFT; XR FOOT COMPLETE 3 VIEW RIGHT    CLINICAL HISTORY:  .  Non-pressure chronic ulcer of unspecified part of right lower leg with unspecified severity; Pain, unspecified    TECHNIQUE:  AP, lateral and oblique views of the bilateral feet were performed.    COMPARISON:  11/13/2018    FINDINGS:  Left: No acute fracture or dislocation.  Lisfranc articulation is congruent.  Advanced arthritic changes are seen in the midfoot as well as the 1st and 2nd MTP joints.  There is fusion of the great toe IP joint and pencil in cup deformity of the great toe MTP joint.  Scattered erosions and sclerosis noted.  There is soft tissue swelling.  Achilles enthesopathy and plantar calcaneal spur noted.  Vascular calcifications are present.    Right: Status post great toe amputation.  Advanced arthritic changes are seen throughout the midfoot with erosions and sclerosis.  Achilles enthesopathy and plantar calcaneal spur noted.  There is soft tissue swelling.  Vascular calcifications are present.                               X-Ray Foot Complete Right (Final result)  Result time 12/25/18 13:22:10    Final result by Cedric Hinojosa MD (12/25/18 13:22:10)                 Impression:      Sequela of neuropathic arthropathy bilaterally.  Superimposed osteomyelitis difficult to exclude.      Electronically signed by: eCdric Hinojosa MD  Date:    12/25/2018  Time:    13:22             Narrative:    EXAMINATION:  XR FOOT COMPLETE 3 VIEW LEFT; XR FOOT COMPLETE 3 VIEW  RIGHT    CLINICAL HISTORY:  .  Non-pressure chronic ulcer of unspecified part of right lower leg with unspecified severity; Pain, unspecified    TECHNIQUE:  AP, lateral and oblique views of the bilateral feet were performed.    COMPARISON:  11/13/2018    FINDINGS:  Left: No acute fracture or dislocation.  Lisfranc articulation is congruent.  Advanced arthritic changes are seen in the midfoot as well as the 1st and 2nd MTP joints.  There is fusion of the great toe IP joint and pencil in cup deformity of the great toe MTP joint.  Scattered erosions and sclerosis noted.  There is soft tissue swelling.  Achilles enthesopathy and plantar calcaneal spur noted.  Vascular calcifications are present.    Right: Status post great toe amputation.  Advanced arthritic changes are seen throughout the midfoot with erosions and sclerosis.  Achilles enthesopathy and plantar calcaneal spur noted.  There is soft tissue swelling.  Vascular calcifications are present.                               X-Ray Chest AP Portable (Final result)  Result time 12/25/18 13:16:47    Final result by Jordan Beckman MD (12/25/18 13:16:47)                 Impression:      1. Allowing for shallow inspiratory effort and habitus, no convincing acute cardiopulmonary process.      Electronically signed by: Jordan Beckman MD  Date:    12/25/2018  Time:    13:16             Narrative:    EXAMINATION:  XR CHEST AP PORTABLE    CLINICAL HISTORY:  Sepsis;    TECHNIQUE:  Single frontal view of the chest was performed.    COMPARISON:  CT 06/01/2016, radiograph 06/01/2016    FINDINGS:  The cardiomediastinal silhouette is prominent, magnified by technique..  There is no pleural effusion.  The trachea is midline.  The lungs are symmetrically expanded bilaterally with coarse interstitial attenuation, likely accentuated by habitus and shallow inspiratory effort.  No large focal consolidation seen.  There is no pneumothorax.  The osseous structures are remarkable for  degenerative change..                                     X-Rays:   Independently Interpreted Readings:   Chest X-Ray: Poor lung volume. No focal infiltrate.    Other Readings:  Left foot: No fracture.   Right foot: Post operative changes with absence of right great toe. No obvious subcutaneous air.     Medical Decision Making:   Initial Assessment:   Urgent evaluation a 53-year-old obese gentleman with insulin-dependent diabetes, hyperlipidemia, hypertension, peripheral neuropathy of bilateral lower extremities, and remote right great toe amputation here given worsening of bilateral lower extremity foot ulcers.  Patient was seen 11/18/18 for same complaint and reportedly not able to follow up with Podiatry as anticipated. Now pt has generalized fatigue and discomfort to L foot. On exam pt has induration overlying prior amputation site on R and wet gangrene to L 3rd digit w concern for possible osteomyelitis. Will obtain labs , imaging and admin abx.  Clinical Tests:   Lab Tests: Ordered and Reviewed  Radiological Study: Ordered and Reviewed  ED Management:  Labs notable for hyperglycemia, with normal anion gap, bicarb 25,  Lactate 1.3, ESR and CRP elevated, imaging unable to exclude acute osteo    Will plan for admission given concern for possible osteomyelitis v wet gangrene in setting of high risk pt w chronic dm ulcers.  1:51 PM - Discussed and consulted case with the hospitalist, who will admit the patient to Dr. Hendricks.             Scribe Attestation:   Scribe #1: I performed the above scribed service and the documentation accurately describes the services I performed. I attest to the accuracy of the note.    Attending Attestation:           Physician Attestation for Scribe:  Physician Attestation Statement for Scribe #1: I, Dr. Nuno , reviewed documentation, as scribed by Enedelia Fitch in my presence, and it is both accurate and complete.                    Clinical Impression:     1. Cellulitis of right  lower extremity    2. Pain    3. Ulcers of both lower extremities    4. Type 2 diabetes mellitus with hyperglycemia, with long-term current use of insulin            Disposition:   Disposition: Admitted  Condition: Patricia Nuno MD  12/25/18 1602

## 2018-12-26 ENCOUNTER — ANESTHESIA (OUTPATIENT)
Dept: SURGERY | Facility: OTHER | Age: 53
DRG: 617 | End: 2018-12-26
Payer: MEDICAID

## 2018-12-26 ENCOUNTER — ANESTHESIA EVENT (OUTPATIENT)
Dept: SURGERY | Facility: OTHER | Age: 53
DRG: 617 | End: 2018-12-26
Payer: MEDICAID

## 2018-12-26 PROBLEM — R78.81 GRAM-POSITIVE COCCI BACTEREMIA: Status: ACTIVE | Noted: 2018-12-26

## 2018-12-26 PROBLEM — L97.919 ULCERS OF BOTH LOWER EXTREMITIES: Status: ACTIVE | Noted: 2018-12-26

## 2018-12-26 PROBLEM — L97.929 ULCERS OF BOTH LOWER EXTREMITIES: Status: ACTIVE | Noted: 2018-12-26

## 2018-12-26 LAB
ANION GAP SERPL CALC-SCNC: 7 MMOL/L
BASOPHILS # BLD AUTO: 0.01 K/UL
BASOPHILS NFR BLD: 0.2 %
BUN SERPL-MCNC: 9 MG/DL
CALCIUM SERPL-MCNC: 8.5 MG/DL
CHLORIDE SERPL-SCNC: 102 MMOL/L
CO2 SERPL-SCNC: 26 MMOL/L
CREAT SERPL-MCNC: 0.9 MG/DL
DIFFERENTIAL METHOD: ABNORMAL
EOSINOPHIL # BLD AUTO: 0.1 K/UL
EOSINOPHIL NFR BLD: 1.9 %
ERYTHROCYTE [DISTWIDTH] IN BLOOD BY AUTOMATED COUNT: 12.3 %
EST. GFR  (AFRICAN AMERICAN): >60 ML/MIN/1.73 M^2
EST. GFR  (NON AFRICAN AMERICAN): >60 ML/MIN/1.73 M^2
GLUCOSE SERPL-MCNC: 285 MG/DL
GRAM STN SPEC: NORMAL
GRAM STN SPEC: NORMAL
HCT VFR BLD AUTO: 37.3 %
HGB BLD-MCNC: 12.2 G/DL
LYMPHOCYTES # BLD AUTO: 1.3 K/UL
LYMPHOCYTES NFR BLD: 20.1 %
MAGNESIUM SERPL-MCNC: 1.5 MG/DL
MCH RBC QN AUTO: 29 PG
MCHC RBC AUTO-ENTMCNC: 32.7 G/DL
MCV RBC AUTO: 89 FL
MONOCYTES # BLD AUTO: 0.3 K/UL
MONOCYTES NFR BLD: 4.8 %
NEUTROPHILS # BLD AUTO: 4.7 K/UL
NEUTROPHILS NFR BLD: 72.8 %
PHOSPHATE SERPL-MCNC: 2.5 MG/DL
PLATELET # BLD AUTO: 274 K/UL
PMV BLD AUTO: 9 FL
POCT GLUCOSE: 126 MG/DL (ref 70–110)
POCT GLUCOSE: 144 MG/DL (ref 70–110)
POCT GLUCOSE: 261 MG/DL (ref 70–110)
POCT GLUCOSE: 283 MG/DL (ref 70–110)
POTASSIUM SERPL-SCNC: 4.2 MMOL/L
RBC # BLD AUTO: 4.21 M/UL
SODIUM SERPL-SCNC: 135 MMOL/L
WBC # BLD AUTO: 6.41 K/UL

## 2018-12-26 PROCEDURE — S0020 INJECTION, BUPIVICAINE HYDRO: HCPCS

## 2018-12-26 PROCEDURE — 99233 SBSQ HOSP IP/OBS HIGH 50: CPT | Mod: 57,25,,

## 2018-12-26 PROCEDURE — 87040 BLOOD CULTURE FOR BACTERIA: CPT | Mod: 59

## 2018-12-26 PROCEDURE — 94761 N-INVAS EAR/PLS OXIMETRY MLT: CPT

## 2018-12-26 PROCEDURE — 11042 PR DEBRIDEMENT, SKIN, SUB-Q TISSUE,=<20 SQ CM: ICD-10-PCS | Mod: 59,,,

## 2018-12-26 PROCEDURE — 36415 COLL VENOUS BLD VENIPUNCTURE: CPT

## 2018-12-26 PROCEDURE — 99233 SBSQ HOSP IP/OBS HIGH 50: CPT | Mod: ,,, | Performed by: INTERNAL MEDICINE

## 2018-12-26 PROCEDURE — 88311 TISSUE SPECIMEN TO PATHOLOGY - SURGERY: ICD-10-PCS | Mod: 26,,, | Performed by: PATHOLOGY

## 2018-12-26 PROCEDURE — 88305 TISSUE EXAM BY PATHOLOGIST: CPT | Performed by: PATHOLOGY

## 2018-12-26 PROCEDURE — 25000003 PHARM REV CODE 250: Performed by: SPECIALIST

## 2018-12-26 PROCEDURE — 87075 CULTR BACTERIA EXCEPT BLOOD: CPT | Mod: 59

## 2018-12-26 PROCEDURE — 88305 TISSUE SPECIMEN TO PATHOLOGY - SURGERY: ICD-10-PCS | Mod: 26,,, | Performed by: PATHOLOGY

## 2018-12-26 PROCEDURE — 87206 SMEAR FLUORESCENT/ACID STAI: CPT

## 2018-12-26 PROCEDURE — 11043 DBRDMT MUSC&/FSCA 1ST 20/<: CPT | Mod: 59,,,

## 2018-12-26 PROCEDURE — 28820 AMPUTATION OF TOE: CPT | Mod: T2,,,

## 2018-12-26 PROCEDURE — 63600175 PHARM REV CODE 636 W HCPCS: Performed by: INTERNAL MEDICINE

## 2018-12-26 PROCEDURE — 25000003 PHARM REV CODE 250: Performed by: NURSE ANESTHETIST, CERTIFIED REGISTERED

## 2018-12-26 PROCEDURE — 87070 CULTURE OTHR SPECIMN AEROBIC: CPT

## 2018-12-26 PROCEDURE — 36000707

## 2018-12-26 PROCEDURE — 87070 CULTURE OTHR SPECIMN AEROBIC: CPT | Mod: 59

## 2018-12-26 PROCEDURE — 87075 CULTR BACTERIA EXCEPT BLOOD: CPT

## 2018-12-26 PROCEDURE — 71000039 HC RECOVERY, EACH ADD'L HOUR

## 2018-12-26 PROCEDURE — 88311 DECALCIFY TISSUE: CPT | Mod: 26,,, | Performed by: PATHOLOGY

## 2018-12-26 PROCEDURE — 37000009 HC ANESTHESIA EA ADD 15 MINS

## 2018-12-26 PROCEDURE — 25000003 PHARM REV CODE 250: Performed by: INTERNAL MEDICINE

## 2018-12-26 PROCEDURE — 87116 MYCOBACTERIA CULTURE: CPT

## 2018-12-26 PROCEDURE — 84100 ASSAY OF PHOSPHORUS: CPT

## 2018-12-26 PROCEDURE — 83735 ASSAY OF MAGNESIUM: CPT

## 2018-12-26 PROCEDURE — 87147 CULTURE TYPE IMMUNOLOGIC: CPT

## 2018-12-26 PROCEDURE — 71000033 HC RECOVERY, INTIAL HOUR

## 2018-12-26 PROCEDURE — S5571 INSULIN DISPOS PEN 3 ML: HCPCS | Performed by: INTERNAL MEDICINE

## 2018-12-26 PROCEDURE — 63600175 PHARM REV CODE 636 W HCPCS: Performed by: NURSE ANESTHETIST, CERTIFIED REGISTERED

## 2018-12-26 PROCEDURE — 28002 PR DEEP DISSEC FOOT INFEC,1 BURSA: ICD-10-PCS | Mod: 59,,,

## 2018-12-26 PROCEDURE — 37000008 HC ANESTHESIA 1ST 15 MINUTES

## 2018-12-26 PROCEDURE — 28002 TREATMENT OF FOOT INFECTION: CPT | Mod: 59,,,

## 2018-12-26 PROCEDURE — 11000001 HC ACUTE MED/SURG PRIVATE ROOM

## 2018-12-26 PROCEDURE — 36000706

## 2018-12-26 PROCEDURE — 85025 COMPLETE CBC W/AUTO DIFF WBC: CPT

## 2018-12-26 PROCEDURE — 99233 PR SUBSEQUENT HOSPITAL CARE,LEVL III: ICD-10-PCS | Mod: 57,25,,

## 2018-12-26 PROCEDURE — 88305 TISSUE EXAM BY PATHOLOGIST: CPT | Mod: 26,,, | Performed by: PATHOLOGY

## 2018-12-26 PROCEDURE — 11043 PR DEBRIDEMENT, SKIN, SUB-Q TISSUE,MUSCLE,=<20 SQ CM: ICD-10-PCS | Mod: 59,,,

## 2018-12-26 PROCEDURE — 99233 PR SUBSEQUENT HOSPITAL CARE,LEVL III: ICD-10-PCS | Mod: ,,, | Performed by: INTERNAL MEDICINE

## 2018-12-26 PROCEDURE — 87102 FUNGUS ISOLATION CULTURE: CPT

## 2018-12-26 PROCEDURE — 11042 DBRDMT SUBQ TIS 1ST 20SQCM/<: CPT | Mod: 59,,,

## 2018-12-26 PROCEDURE — 28820 PR AMPUTATION TOE,MT-P JT: ICD-10-PCS | Mod: T2,,,

## 2018-12-26 PROCEDURE — 80048 BASIC METABOLIC PNL TOTAL CA: CPT

## 2018-12-26 PROCEDURE — 25000003 PHARM REV CODE 250

## 2018-12-26 PROCEDURE — 87205 SMEAR GRAM STAIN: CPT

## 2018-12-26 RX ORDER — ONDANSETRON 2 MG/ML
4 INJECTION INTRAMUSCULAR; INTRAVENOUS DAILY PRN
Status: DISCONTINUED | OUTPATIENT
Start: 2018-12-26 | End: 2018-12-26 | Stop reason: HOSPADM

## 2018-12-26 RX ORDER — SODIUM CHLORIDE 0.9 % (FLUSH) 0.9 %
3 SYRINGE (ML) INJECTION
Status: DISCONTINUED | OUTPATIENT
Start: 2018-12-26 | End: 2018-12-30 | Stop reason: HOSPADM

## 2018-12-26 RX ORDER — FENTANYL CITRATE 50 UG/ML
25 INJECTION, SOLUTION INTRAMUSCULAR; INTRAVENOUS EVERY 5 MIN PRN
Status: DISCONTINUED | OUTPATIENT
Start: 2018-12-26 | End: 2018-12-26 | Stop reason: HOSPADM

## 2018-12-26 RX ORDER — MIDAZOLAM HYDROCHLORIDE 1 MG/ML
INJECTION INTRAMUSCULAR; INTRAVENOUS
Status: DISCONTINUED | OUTPATIENT
Start: 2018-12-26 | End: 2018-12-26

## 2018-12-26 RX ORDER — HYDROMORPHONE HYDROCHLORIDE 2 MG/ML
0.4 INJECTION, SOLUTION INTRAMUSCULAR; INTRAVENOUS; SUBCUTANEOUS EVERY 5 MIN PRN
Status: DISCONTINUED | OUTPATIENT
Start: 2018-12-26 | End: 2018-12-26 | Stop reason: HOSPADM

## 2018-12-26 RX ORDER — FENTANYL CITRATE 50 UG/ML
INJECTION, SOLUTION INTRAMUSCULAR; INTRAVENOUS
Status: DISCONTINUED | OUTPATIENT
Start: 2018-12-26 | End: 2018-12-26

## 2018-12-26 RX ORDER — PROPOFOL 10 MG/ML
VIAL (ML) INTRAVENOUS
Status: DISCONTINUED | OUTPATIENT
Start: 2018-12-26 | End: 2018-12-26

## 2018-12-26 RX ORDER — MAGNESIUM SULFATE HEPTAHYDRATE 40 MG/ML
2 INJECTION, SOLUTION INTRAVENOUS ONCE
Status: COMPLETED | OUTPATIENT
Start: 2018-12-26 | End: 2018-12-26

## 2018-12-26 RX ORDER — OXYCODONE HYDROCHLORIDE 5 MG/1
5 TABLET ORAL
Status: DISCONTINUED | OUTPATIENT
Start: 2018-12-26 | End: 2018-12-26 | Stop reason: HOSPADM

## 2018-12-26 RX ORDER — EPHEDRINE SULFATE 50 MG/ML
INJECTION, SOLUTION INTRAVENOUS
Status: DISCONTINUED | OUTPATIENT
Start: 2018-12-26 | End: 2018-12-26

## 2018-12-26 RX ORDER — ROCURONIUM BROMIDE 10 MG/ML
INJECTION, SOLUTION INTRAVENOUS
Status: DISCONTINUED | OUTPATIENT
Start: 2018-12-26 | End: 2018-12-26

## 2018-12-26 RX ORDER — LIDOCAINE HCL/PF 100 MG/5ML
SYRINGE (ML) INTRAVENOUS
Status: DISCONTINUED | OUTPATIENT
Start: 2018-12-26 | End: 2018-12-26

## 2018-12-26 RX ORDER — BACITRACIN 50000 [IU]/1
INJECTION, POWDER, FOR SOLUTION INTRAMUSCULAR
Status: DISCONTINUED | OUTPATIENT
Start: 2018-12-26 | End: 2018-12-26 | Stop reason: HOSPADM

## 2018-12-26 RX ORDER — BUPIVACAINE HYDROCHLORIDE 5 MG/ML
INJECTION, SOLUTION EPIDURAL; INTRACAUDAL
Status: DISCONTINUED | OUTPATIENT
Start: 2018-12-26 | End: 2018-12-26 | Stop reason: HOSPADM

## 2018-12-26 RX ORDER — MEPERIDINE HYDROCHLORIDE 50 MG/ML
12.5 INJECTION INTRAMUSCULAR; INTRAVENOUS; SUBCUTANEOUS ONCE AS NEEDED
Status: DISCONTINUED | OUTPATIENT
Start: 2018-12-26 | End: 2018-12-26 | Stop reason: HOSPADM

## 2018-12-26 RX ORDER — SODIUM,POTASSIUM PHOSPHATES 280-250MG
2 POWDER IN PACKET (EA) ORAL EVERY 4 HOURS
Status: DISPENSED | OUTPATIENT
Start: 2018-12-26 | End: 2018-12-26

## 2018-12-26 RX ORDER — SUCCINYLCHOLINE CHLORIDE 20 MG/ML
INJECTION INTRAMUSCULAR; INTRAVENOUS
Status: DISCONTINUED | OUTPATIENT
Start: 2018-12-26 | End: 2018-12-26

## 2018-12-26 RX ORDER — SODIUM CHLORIDE, SODIUM LACTATE, POTASSIUM CHLORIDE, CALCIUM CHLORIDE 600; 310; 30; 20 MG/100ML; MG/100ML; MG/100ML; MG/100ML
INJECTION, SOLUTION INTRAVENOUS CONTINUOUS PRN
Status: DISCONTINUED | OUTPATIENT
Start: 2018-12-26 | End: 2018-12-26

## 2018-12-26 RX ADMIN — INSULIN ASPART 10 UNITS: 100 INJECTION, SOLUTION INTRAVENOUS; SUBCUTANEOUS at 07:12

## 2018-12-26 RX ADMIN — SODIUM CHLORIDE, SODIUM LACTATE, POTASSIUM CHLORIDE, AND CALCIUM CHLORIDE: 600; 310; 30; 20 INJECTION, SOLUTION INTRAVENOUS at 10:12

## 2018-12-26 RX ADMIN — LIDOCAINE HYDROCHLORIDE 75 MG: 20 INJECTION, SOLUTION INTRAVENOUS at 11:12

## 2018-12-26 RX ADMIN — ROCURONIUM BROMIDE 5 MG: 10 INJECTION, SOLUTION INTRAVENOUS at 11:12

## 2018-12-26 RX ADMIN — GABAPENTIN 300 MG: 300 CAPSULE ORAL at 08:12

## 2018-12-26 RX ADMIN — LOSARTAN POTASSIUM 25 MG: 25 TABLET, FILM COATED ORAL at 08:12

## 2018-12-26 RX ADMIN — CEFEPIME HYDROCHLORIDE 2 G: 2 INJECTION, SOLUTION INTRAVENOUS at 05:12

## 2018-12-26 RX ADMIN — GABAPENTIN 300 MG: 300 CAPSULE ORAL at 04:12

## 2018-12-26 RX ADMIN — EPHEDRINE SULFATE 10 MG: 50 INJECTION INTRAMUSCULAR; INTRAVENOUS; SUBCUTANEOUS at 11:12

## 2018-12-26 RX ADMIN — INSULIN ASPART 6 UNITS: 100 INJECTION, SOLUTION INTRAVENOUS; SUBCUTANEOUS at 08:12

## 2018-12-26 RX ADMIN — SUCCINYLCHOLINE CHLORIDE 160 MG: 20 INJECTION, SOLUTION INTRAMUSCULAR; INTRAVENOUS at 11:12

## 2018-12-26 RX ADMIN — MIDAZOLAM HYDROCHLORIDE 2 MG: 1 INJECTION, SOLUTION INTRAMUSCULAR; INTRAVENOUS at 11:12

## 2018-12-26 RX ADMIN — INSULIN ASPART 6 UNITS: 100 INJECTION, SOLUTION INTRAVENOUS; SUBCUTANEOUS at 07:12

## 2018-12-26 RX ADMIN — VANCOMYCIN HYDROCHLORIDE 2000 MG: 10 INJECTION, POWDER, LYOPHILIZED, FOR SOLUTION INTRAVENOUS at 01:12

## 2018-12-26 RX ADMIN — INSULIN DETEMIR 50 UNITS: 100 INJECTION, SOLUTION SUBCUTANEOUS at 08:12

## 2018-12-26 RX ADMIN — PROPOFOL 300 MG: 10 INJECTION, EMULSION INTRAVENOUS at 11:12

## 2018-12-26 RX ADMIN — EPHEDRINE SULFATE 10 MG: 50 INJECTION INTRAMUSCULAR; INTRAVENOUS; SUBCUTANEOUS at 12:12

## 2018-12-26 RX ADMIN — ATORVASTATIN CALCIUM 20 MG: 20 TABLET, FILM COATED ORAL at 08:12

## 2018-12-26 RX ADMIN — ENOXAPARIN SODIUM 40 MG: 100 INJECTION SUBCUTANEOUS at 08:12

## 2018-12-26 RX ADMIN — VANCOMYCIN HYDROCHLORIDE 2000 MG: 10 INJECTION, POWDER, LYOPHILIZED, FOR SOLUTION INTRAVENOUS at 04:12

## 2018-12-26 RX ADMIN — MAGNESIUM SULFATE IN WATER 2 G: 40 INJECTION, SOLUTION INTRAVENOUS at 08:12

## 2018-12-26 RX ADMIN — FENTANYL CITRATE 100 MCG: 50 INJECTION, SOLUTION INTRAMUSCULAR; INTRAVENOUS at 11:12

## 2018-12-26 RX ADMIN — POTASSIUM & SODIUM PHOSPHATES POWDER PACK 280-160-250 MG 2 PACKET: 280-160-250 PACK at 04:12

## 2018-12-26 NOTE — ANESTHESIA POSTPROCEDURE EVALUATION
"Anesthesia Post Evaluation    Patient: Andrew Del Cid JrSharron    Procedure(s) Performed: Procedure(s) (LRB):  INCISION AND DRAINAGE, FOOT (Bilateral)  AMPUTATION, TOE; left 3rd toe (Left)    Final Anesthesia Type: general  Patient location during evaluation: PACU  Patient participation: Yes- Able to Participate  Level of consciousness: awake and alert  Post-procedure vital signs: reviewed and stable  Pain management: adequate  Airway patency: patent  PONV status at discharge: No PONV  Anesthetic complications: no      Cardiovascular status: blood pressure returned to baseline  Respiratory status: unassisted and room air  Hydration status: euvolemic  Follow-up not needed.        Visit Vitals  BP (!) 146/66   Pulse 83   Temp 37 °C (98.6 °F) (Oral)   Resp 18   Ht 6' 1" (1.854 m)   Wt (!) 184.2 kg (406 lb)   SpO2 95%   BMI 53.57 kg/m²       Pain/Judy Score: Pain Rating Prior to Med Admin: 0 (12/26/2018 12:33 PM)  Pain Rating Post Med Admin: 0 (12/26/2018 12:33 PM)  Judy Score: 9 (12/26/2018 12:33 PM)        "

## 2018-12-26 NOTE — NURSING TRANSFER
"/63 (BP Location: Right arm, Patient Position: Lying)   Pulse 75   Temp 98.4 °F (36.9 °C) (Oral)   Resp 18   Ht 6' 1" (1.854 m)   Wt (!) 184.2 kg (406 lb)   SpO2 (!) 94%   BMI 53.57 kg/m²     "

## 2018-12-26 NOTE — BRIEF OP NOTE
[facility]  Brief Operative Note    SUMMARY     Surgery Date: 12/26/2018     Surgeon(s) and Role:     * Rob Alas DPM - Primary    Assisting Surgeon: None    Pre-op Diagnosis:  Ulcers of both lower extremities [L97.919, L97.929]  Diabetic Charcot foot [E11.610]    Post-op Diagnosis:  Post-Op Diagnosis Codes:     * Ulcers of both lower extremities [L97.919, L97.929]     * Diabetic Charcot foot [E11.610]        Abscess b/l foot        Osteomyelitis left third toe    Procedure(s) (LRB):  INCISION AND DRAINAGE, FOOT (Bilateral) left third MPJ, right dorsal 1st MPJ  AMPUTATION, TOE; left 3rd toe (Left)   Debridement to muscle left first MPJ ulcer  Debridement to muscle right fourth toe distal phalanx     Anesthesia: General    Description of Procedure: Abscess noted left third toe to base, bone eroded distal phalanx left third toe, left 1st MPJ ulcer debrided without abscess, right dorsal 1st MPH abscess drained, debrided right 4th toe ulcer fourth    Description of the findings of the procedure: Appears no infected bone remains    Estimated Blood Loss:3 cc       Specimens:   Specimen (12h ago, onward)    Start     Ordered    12/26/18 1217  Specimen to Pathology - Surgery  Once     Comments:  1. Clean margin base of proximal phalanx, left third toe     Start Status   12/26/18 1217 Collected (12/26/18 1218)       12/26/18 1218    12/26/18 1206  Specimen to Pathology - Surgery  Once     Comments:  1. Left 3rd toe2. Clean margin, base of proximal phalanx (L) 3rd toe     Start Status   12/26/18 1206 Needs to be Collected       12/26/18 1206

## 2018-12-26 NOTE — ASSESSMENT & PLAN NOTE
- Continuing insulin detemir at 50U subQ BID, insulin aspart 10U subQ TIDWM with additional moderate-dose sliding scale insulin aspart 1-10U subQ TIDWM PRN.  - Continuing gabapentin 300mg PO TID for neuropathy.

## 2018-12-26 NOTE — TRANSFER OF CARE
"Anesthesia Transfer of Care Note    Patient: Andrew Del Cid Jr.    Procedure(s) Performed: Procedure(s) (LRB):  INCISION AND DRAINAGE, FOOT (Bilateral)  AMPUTATION, TOE; left 3rd toe (Left)    Patient location: PACU    Anesthesia Type: general    Transport from OR: Transported from OR on 2-3 L/min O2 by NC with adequate spontaneous ventilation    Post pain: adequate analgesia    Post assessment: no apparent anesthetic complications    Post vital signs: stable    Level of consciousness: awake and alert    Nausea/Vomiting: no nausea/vomiting    Complications: none    Transfer of care protocol was followed      Last vitals:   Visit Vitals  /63 (BP Location: Right arm, Patient Position: Lying)   Pulse 87   Temp 37.8 °C (100 °F) (Oral)   Resp 18   Ht 6' 1" (1.854 m)   Wt (!) 184.2 kg (406 lb)   SpO2 (!) 94%   BMI 53.57 kg/m²     "

## 2018-12-26 NOTE — ASSESSMENT & PLAN NOTE
- Osteomyelitis of L 3rd toe, cellulitis of R foot and concurrent bacteremia in the setting of diabetic peripheral vascular disease and poorly-controlled diabetes.  - Continuing vancomycin 2g IV q12hr, cefepime 2g IV q12hr.  - Appreciate podiatry assistance; anticipate procedure today.

## 2018-12-26 NOTE — HPI
Andrew Del Cid . is a 53 y.o. male who  has a past medical history of Diabetes mellitus, type 2, Hyperlipidemia, Hypertension, and Peripheral neuropathy.    Patient admitted for b/l infected diabetic foot ulcers.  Consulted to Podiatry for the same.  Patient complains of ulcerations to his left foot  that started a week and a half ago. Pt has been working more at [employer] since the holidays resulting in these ulcerations. Pt reports not having insurance thus not able to follow blood sugar controlling measures. Pt reports that the feet began looking infected yesterday and he has been feeling fatigue starting today. Pt denies F/N/V/C.

## 2018-12-26 NOTE — PROGRESS NOTES
[facility]  Podiatry  [place]    Patient Name: Andrew Del Cid Jr.  MRN: [id number]  Admission Date: 12/25/2018  Hospital Length of Stay: 1 days  Attending Physician: SHAYY Hendricks MD  Primary Care Provider: Jeannette Herrera MD     Consults  Subjective:     History of Present Illness:  Andrew Del Cid Jr. is a 53 y.o. male who  has a past medical history of Diabetes mellitus, type 2, Hyperlipidemia, Hypertension, and Peripheral neuropathy.    Patient admitted for b/l infected diabetic foot ulcers.  Consulted to Podiatry for the same.  Patient complains of ulcerations to his left foot  that started a week and a half ago. Pt has been working more at [employer] since the holidays resulting in these ulcerations. Pt reports not having insurance thus not able to follow blood sugar controlling measures. Pt reports that the feet began looking infected yesterday and he has been feeling fatigue starting today. Pt denies F/N/V/C.      Interim History: Underwent MRI b/l feet last night.  Verbal read reveals osteomyelitis left third distal toe, charcot changes, no major abscess.  Plan for I&D today with left third toe amputation.  Santos Narvaez MD   Resident   Podiatry   Consults   Attested               Consult Orders   Inpatient consult to Podiatry [[id number]] ordered by Cha Nuno MD at 12/25/18 1349          Attestation signed by Yen Baires DPM at 12/26/2018 9:51 AM   I have seen the patient, reviewed the Resident's history and physical, assessment and plan. I have personally interviewed and examined the patient at bedside and: agree with the findings. Handoff with Dr. Alas who will follow the patient and likely do surgery.                             []Hide copied text    []Paulver for details      [facility]  Podiatry  Consult Note     Patient Name: Andrew Del Cid Jr.  MRN: [id number]  Admission Date: 12/25/2018  Hospital Length of Stay: 0 days  Attending  Physician: SHAYY Hendricks MD  Primary Care Provider: Jeannette Herrera MD      Inpatient consult to Podiatry  Consult performed by: Santos Narvaez MD  Consult ordered by: Cha Nuno MD  Reason for consult: Foot wounds          Subjective:      History of Present Illness:  Andrew Del Cid Jr. is a 53 y.o. male who  has a past medical history of Diabetes mellitus, type 2, Hyperlipidemia, Hypertension, and Peripheral neuropathy.     Patient admitted for b/l infected diabetic foot ulcers.  Consulted to Podiatry for the same.  Patient complains of ulcerations to his left foot  that started a week and a half ago. Pt has been working more at [employer] since the holidays resulting in these ulcerations. Pt reports not having insurance thus not able to follow blood sugar controlling measures. Pt reports that the feet began looking infected yesterday and he has been feeling fatigue starting today. Pt denies F/N/V/C.          Scheduled Meds:   [START ON 12/26/2018] atorvastatin  20 mg Oral Daily    ceFEPime (MAXIPIME) IVPB  2 g Intravenous Q12H    [START ON 12/26/2018] enoxparin  40 mg Subcutaneous Q12H    gabapentin  300 mg Oral TID    insulin aspart U-100  10 Units Subcutaneous TIDWM    insulin detemir U-100  45 Units Subcutaneous BID    [START ON 12/26/2018] losartan  25 mg Oral Daily    [START ON 12/26/2018] vancomycin (VANCOCIN) IVPB  2,000 mg Intravenous Q12H      Continuous Infusions:  PRN Meds:dextrose 50%, dextrose 50%, glucagon (human recombinant), glucose, glucose, insulin aspart U-100, sodium chloride 0.9%     Review of patient's allergies indicates:  No Known Allergies           Past Medical History:   Diagnosis Date    Diabetes mellitus, type 2      Hyperlipidemia      Hypertension      Peripheral neuropathy              Past Surgical History:   Procedure Laterality Date    AMPUTATION-TOE Right 7/2/2017     Performed by Nino Olguin DPM at [facility] OR [address] [place] [address]    APPENDECTOMY         HERNIA REPAIR        TOE AMPUTATION         right great toe             Family History      None                Tobacco Use    Smoking status: Never Smoker    Smokeless tobacco: Never Used   Substance and Sexual Activity    Alcohol use: No    Drug use: No    Sexual activity: Yes       Partners: Female       Comment:       Review of Systems   Constitutional: Positive for fatigue. Negative for chills and fever.   Gastrointestinal: Negative for nausea and vomiting.   Skin: Positive for wound.      Objective:      Vital Signs (Most Recent):  Temp: 98.6 °F (37 °C) (12/25/18 1554)  Pulse: 79 (12/25/18 1554)  Resp: 18 (12/25/18 1211)  BP: (!) 144/70 (12/25/18 1554)  SpO2: 97 % (12/25/18 1554) Vital Signs (24h Range):  Temp:  [98.3 °F (36.8 °C)-98.8 °F (37.1 °C)] 98.6 °F (37 °C)  Pulse:  [78-91] 79  Resp:  [18] 18  SpO2:  [95 %-97 %] 97 %  BP: (136-155)/(66-87) 144/70      Weight: (!) 181.4 kg (400 lb)  Body mass index is 52.77 kg/m².     Foot Exam     General  Orientation: alert and oriented to person, place, and time   Affect: appropriate         Right Foot/Ankle      Inspection and Palpation  Swelling: (Diffuse edema to the LE)     Neurovascular  Dorsalis pedis: 2+  Posterior tibial: 2+  Saphenous nerve sensation: absent  Tibial nerve sensation: absent  Superficial peroneal nerve sensation: absent  Deep peroneal nerve sensation: absent  Sural nerve sensation: absent     Comments  Pt with R partial 1st ray amputation. Now with an ulceration to the old incision site. Wound measures roughly 0.5x0.5x0.5cm to the underlying bone. The wound undermines circumferentially about 1 cm around the wound. The periwound skin is erythematous. There is fluctuance. Nav purulence expressed from the wound.     There is also a superficial ulceration to the dorsal aspect of the 3rd toe with overlying callus tissue. Purulent drainage expressed upon debridement of the toe.     Left Foot/Ankle       Inspection and  Palpation  Tenderness: (Tenderness to fluctuant area dorsal lateral foot)  Swelling: (Diffuse swelling to LLE)     Neurovascular  Dorsalis pedis: 2+  Posterior tibial: 2+  Saphenous nerve sensation: absent  Tibial nerve sensation: absent  Superficial peroneal nerve sensation: absent  Deep peroneal nerve sensation: absent  Sural nerve sensation: absent     Comments  Wound to the distal tip of the 3rd toe. The wound is roughly 1.5x1.5x1cm to bone. No drainage expressed from this wound. Maceration to the periwound skin. The toe is generally edematous with erythema tracking proximally. The tip of the patient's 3rd toe is cold to touch.     Also a large superficial appearing wound to the plantar medial aspect.      Also an area of palpable fluctuance to the dorsal lateral aspect of the foot.        Laboratory:  CBC:       Recent Labs   Lab 12/25/18  1239   WBC 10.57   RBC 4.43*   HGB 12.8*   HCT 38.7*      MCV 87   MCH 28.9   MCHC 33.1      CMP:       Recent Labs   Lab 12/25/18  1239   *   CALCIUM 9.1   ALBUMIN 2.9*   PROT 7.5   *   K 4.1   CO2 25      BUN 15   CREATININE 0.9   ALKPHOS 87   ALT 12   AST 7*   BILITOT 1.5*      Wound Cultures: No results for input(s): LABAERO in the last 4320 hours.     Diagnostic Results:  MRI: Dr. THOMPSON was given a verbal read, official results pending  X-Ray: I have reviewed all pertinent results/findings within the past 24 hours.  No acute findings of gas or fracture. No obvious signs of erosion to the 3rd toe.     Clinical Findings:                            Assessment/Plan:          Type 2 diabetes mellitus with diabetic polyneuropathy, with long-term current use of insulin     Per primary.      Diabetic foot ulcers, osteomyelitis     Plan:  Discussed MRI with the patient and the treatment plan.  Plan to perform I&D b/lateral foot ulcers and a left third toe amputation with wound and bone cultures.  No guarantees implied or given.  The risks, benefits,  complications, treatment options, and expected outcomes were discussed with the patient who verbalized understanding. He understands he may need additional surgery and that both his feet are at risk for limb loss.                  Thank you for your consult. I will follow-up with patient. Please contact us if you have any additional questions.     Santos Narvaez MD  Podiatry  [facility]          Cosigned by: Yen Baires DPM at 12/26/2018  9:51 AM   Revision History     Date/Time User Provider Type Action   12/26/2018  9:51 AM Yen Baires DPM Physician Cosign   12/25/2018  7:12 PM Santos Narvaez MD Resident Sign   12/25/2018  7:11 PM Santos Narvaez MD Resident Sign   View Details Report            Assessment/Plan:     Type 2 diabetes mellitus with diabetic polyneuropathy, with long-term current use of insulin    Per primary.     Diabetic foot ulcers    Plan:  - Wounds evaluated at bedside. Purulent drainage expressed from wounds on the R foot. L foot with necrotic, infected appearing 3rd toe. There is an area of pain and fluctuance on the lateral dorsal foot.  - Cultures taken.   - b/l foot MRI's to evaluate for surgical planning. D/t body habitus, pt must get MRI at [facility].  - Art US ordered for surgical planning.  - Pt amenable to surgical intervention and will likely need amputation and drainage of any abscesses.   - Podiatry will follow.          Thank you for your consult. I will follow-up with patient. Please contact us if you have any additional questions.    Rob Crockett DPM  Podiatry  [facility]

## 2018-12-26 NOTE — ASSESSMENT & PLAN NOTE
Plan:  - Wounds evaluated at bedside. Purulent drainage expressed from wounds on the R foot. L foot with necrotic, infected appearing 3rd toe. There is an area of pain and fluctuance on the lateral dorsal foot.   - b/l foot MRI's to evaluate for surgical planning. D/t body habitus, pt must get MRI at [facility].  - Art US ordered for surgical planning.  - Pt amenable to surgical intervention and will likely need amputation and drainage of any abscesses.   - Podiatry will follow.

## 2018-12-26 NOTE — PLAN OF CARE
SW met with pt to complete discharge assessment, verified PCP and uses [facility] on [address].  Pt has crutches.  Pt stated noncompliant with meds, sometimes forget to take.  Pt is uninsured and may need assistance with purchasing meds.       12/26/18 1124   Discharge Assessment   Assessment Type Discharge Planning Assessment   Confirmed/corrected address and phone number on facesheet? Yes   Assessment information obtained from? Patient   Communicated expected length of stay with patient/caregiver no   Prior to hospitilization cognitive status: Alert/Oriented   Prior to hospitalization functional status: Independent   Current cognitive status: Alert/Oriented   Current Functional Status: Needs Assistance;Assistive Equipment   Lives With spouse;child(milana), dependent   Able to Return to Prior Arrangements yes   Is patient able to care for self after discharge? Unable to determine at this time (comments)   Readmission Within the Last 30 Days no previous admission in last 30 days   Patient currently being followed by outpatient case management? No   Patient currently receives any other outside agency services? No   Equipment Currently Used at Home crutches   Do you have any problems affording any of your prescribed medications? TBD   Is the patient taking medications as prescribed? no   Does the patient have transportation home? Yes   Transportation Anticipated family or friend will provide   Does the patient receive services at the [facility]? No   Discharge Plan A Home   Patient/Family in Agreement with Plan yes

## 2018-12-26 NOTE — CONSULTS
Consulted due to patient's weight.  Patient declines bariatric bed; reports he is comfortable in current bed.  Nurse will apply bed extender once patient returns to floor.

## 2018-12-26 NOTE — CONSULTS
[facility]  Podiatry  Consult Note    Patient Name: Andrew Del Cid Jr.  MRN: [id number]  Admission Date: 12/25/2018  Hospital Length of Stay: 0 days  Attending Physician: SHAYY Hendricks MD  Primary Care Provider: Jeannette Herrera MD     Inpatient consult to Podiatry  Consult performed by: Santos Narvaez MD  Consult ordered by: Cha Nuno MD  Reason for consult: Foot wounds        Subjective:     History of Present Illness:  Andrew Del Cid Jr. is a 53 y.o. male who  has a past medical history of Diabetes mellitus, type 2, Hyperlipidemia, Hypertension, and Peripheral neuropathy.    Patient admitted for b/l infected diabetic foot ulcers.  Consulted to Podiatry for the same.  Patient complains of ulcerations to his left foot  that started a week and a half ago. Pt has been working more at [employer] since the holidays resulting in these ulcerations. Pt reports not having insurance thus not able to follow blood sugar controlling measures. Pt reports that the feet began looking infected yesterday and he has been feeling fatigue starting today. Pt denies F/N/V/C.        Scheduled Meds:   [START ON 12/26/2018] atorvastatin  20 mg Oral Daily    ceFEPime (MAXIPIME) IVPB  2 g Intravenous Q12H    [START ON 12/26/2018] enoxparin  40 mg Subcutaneous Q12H    gabapentin  300 mg Oral TID    insulin aspart U-100  10 Units Subcutaneous TIDWM    insulin detemir U-100  45 Units Subcutaneous BID    [START ON 12/26/2018] losartan  25 mg Oral Daily    [START ON 12/26/2018] vancomycin (VANCOCIN) IVPB  2,000 mg Intravenous Q12H     Continuous Infusions:  PRN Meds:dextrose 50%, dextrose 50%, glucagon (human recombinant), glucose, glucose, insulin aspart U-100, sodium chloride 0.9%    Review of patient's allergies indicates:  No Known Allergies     Past Medical History:   Diagnosis Date    Diabetes mellitus, type 2     Hyperlipidemia     Hypertension     Peripheral neuropathy      Past Surgical  History:   Procedure Laterality Date    AMPUTATION-TOE Right 7/2/2017    Performed [facility]    APPENDECTOMY      HERNIA REPAIR      TOE AMPUTATION      right great toe       Family History     None        Tobacco Use    Smoking status: Never Smoker    Smokeless tobacco: Never Used   Substance and Sexual Activity    Alcohol use: No    Drug use: No    Sexual activity: Yes     Partners: Female     Comment:      Review of Systems   Constitutional: Positive for fatigue. Negative for chills and fever.   Gastrointestinal: Negative for nausea and vomiting.   Skin: Positive for wound.     Objective:     Vital Signs (Most Recent):  Temp: 98.6 °F (37 °C) (12/25/18 1554)  Pulse: 79 (12/25/18 1554)  Resp: 18 (12/25/18 1211)  BP: (!) 144/70 (12/25/18 1554)  SpO2: 97 % (12/25/18 1554) Vital Signs (24h Range):  Temp:  [98.3 °F (36.8 °C)-98.8 °F (37.1 °C)] 98.6 °F (37 °C)  Pulse:  [78-91] 79  Resp:  [18] 18  SpO2:  [95 %-97 %] 97 %  BP: (136-155)/(66-87) 144/70     Weight: (!) 181.4 kg (400 lb)  Body mass index is 52.77 kg/m².    Foot Exam    General  Orientation: alert and oriented to person, place, and time   Affect: appropriate       Right Foot/Ankle     Inspection and Palpation  Swelling: (Diffuse edema to the LE)    Neurovascular  Dorsalis pedis: 2+  Posterior tibial: 2+  Saphenous nerve sensation: absent  Tibial nerve sensation: absent  Superficial peroneal nerve sensation: absent  Deep peroneal nerve sensation: absent  Sural nerve sensation: absent    Comments  Pt with R partial 1st ray amputation. Now with an ulceration to the old incision site. Wound measures roughly 0.5x0.5x0.5cm to the underlying bone. The wound undermines circumferentially about 1 cm around the wound. The periwound skin is erythematous. There is fluctuance. Nav purulence expressed from the wound.    There is also a superficial ulceration to the dorsal aspect of the 3rd toe with overlying callus  tissue. Purulent drainage expressed upon debridement of the toe.    Left Foot/Ankle      Inspection and Palpation  Tenderness: (Tenderness to fluctuant area dorsal lateral foot)  Swelling: (Diffuse swelling to LLE)    Neurovascular  Dorsalis pedis: 2+  Posterior tibial: 2+  Saphenous nerve sensation: absent  Tibial nerve sensation: absent  Superficial peroneal nerve sensation: absent  Deep peroneal nerve sensation: absent  Sural nerve sensation: absent    Comments  Wound to the distal tip of the 3rd toe. The wound is roughly 1.5x1.5x1cm to bone. No drainage expressed from this wound. Maceration to the periwound skin. The toe is generally edematous with erythema tracking proximally. The tip of the patient's 3rd toe is cold to touch.    Also a large superficial appearing wound to the plantar medial aspect.     Also an area of palpable fluctuance to the dorsal lateral aspect of the foot.      Laboratory:  CBC:   Recent Labs   Lab 12/25/18  1239   WBC 10.57   RBC 4.43*   HGB 12.8*   HCT 38.7*      MCV 87   MCH 28.9   MCHC 33.1     CMP:   Recent Labs   Lab 12/25/18  1239   *   CALCIUM 9.1   ALBUMIN 2.9*   PROT 7.5   *   K 4.1   CO2 25      BUN 15   CREATININE 0.9   ALKPHOS 87   ALT 12   AST 7*   BILITOT 1.5*     Wound Cultures: No results for input(s): LABAERO in the last 4320 hours.    Diagnostic Results:  MRI: I have reviewed all pertinent results/findings within the past 24 hours.  Pending  X-Ray: I have reviewed all pertinent results/findings within the past 24 hours.  No acute findings of gas or fracture. No obvious signs of erosion to the 3rd toe.    Clinical Findings:                      Assessment/Plan:     Type 2 diabetes mellitus with diabetic polyneuropathy, with long-term current use of insulin    Per primary.     Diabetic foot ulcers    Plan:  - Wounds evaluated at bedside. Purulent drainage expressed from wounds on the R foot. L foot with necrotic, infected appearing 3rd toe.  There is an area of pain and fluctuance on the lateral dorsal foot.  - Cultures taken.   - b/l foot MRI's to evaluate for surgical planning. D/t body habitus, [place] must get [place] [facility]  - Art US ordered for surgical planning.  - Pt amenable to surgical intervention and will likely need amputation and drainage of any abscesses.   - Podiatry will follow.          Thank you for your consult. I will follow-up with patient. Please contact us if you have any additional questions.    Santos Narvaez MD  Podiatry  [facility]

## 2018-12-26 NOTE — OP NOTE
[facility]  Brief Operative Note    SUMMARY     Surgery Date: 12/26/2018     Surgeon(s) and Role:     * Rob Alas DPM - Primary    Assisting Surgeon: None    Pre-op Diagnosis:  Ulcers of both lower extremities [L97.919, L97.929]  Diabetic Charcot foot [E11.610]    Post-op Diagnosis:  Post-Op Diagnosis Codes:     * Ulcers of both lower extremities [L97.919, L97.929]     * Diabetic Charcot foot [E11.610]    Procedure(s) (LRB):  INCISION AND DRAINAGE, FOOT (Bilateral)  AMPUTATION, TOE; left 3rd toe (Left)    Anesthesia: General    Description of Procedure: The patient was brought to the operating room on a stretcher and placed on the operating table in a supine position. Following the successful induction of MAC anesthesia, esmarch was placed around the left ankle, no esmarch used on the right.. No local anesthesia was used as the patient is neuropathic.    Attention was directed to the left thirid toe where a fishmouth-type incision was made with a #15 blade and deepened down to bone. Soft tissue was carefully lifted from the base of the proximal phalanx until the metatarsophalangeal joint was visualized. At this point upon inspection it was noted that the distal phalanx was soft and brittle bone with a dark discoloration, consistent with osteomyelitis.     The metatarsophalangeal joint was disarticulated at this time and the third toe left was removed.  There was a small amount of pus noted in the joint but the base of the proximal phalanx and head of the third metatarsal did not appear to be infected.  The proximal base of the proximal phalanx was removed with a sagittal saw and portions of this were sent for permanent and cultures. The remaining flexor and extensor tendons were cut as proximally as possible. The area was copiously flushed with sterile saline.and the skin was partially closed proximally closed with 4-0 nylon and packed open with 1/4 inch packing soaked with  betadine.    Attention was directed at the plantar aspect of the left first MPJ where a 2 x 2 x 0.1 cm wound was noted to the sub q..  A sterile #15 blade and curette were used to remove all the nonviable wound elements from the wound periphery and base.  I redefined  the wound borders in the process.  Debridement was carried into and did include the muscle layer. The area was explored but no deep abscess was noted and the wound was partially closed.  Final wound measurements to muscle with partial closure were 2 x 2 x 0.6 cm.    Attention was directed at the dorsal aspect of the right dorsal 1st MPJ where a 1 x 1 x 0.5 cm wound was noted to the muscle.  A sterile #15 blade and curette were used to remove all the nonviable wound elements from the wound periphery and base.  I redefined  the wound borders in the process.  Debridement was carried into and did include the muscle layer. The area was explored an abscess was noted and drained.  Final wound measurements were 2x 2 x 1 cm.  It was packed open with 1/4 inch packing soaked in betadine.    Attention was directed at the plantar aspect of the right 4th toe where a red lesion was noted medial to the dipj was noted..  A sterile #15 blade and curette were used to make an incision and then remove all the nonviable wound elements from the wound periphery and base.  I redefined  the wound borders in the process.  Debridement was carried into and did include the subq layer. The area was explored but no deep abscess was noted and the wound was left open, final wound measurement were 1 x 0.2 x 0.5 cm    The patient tolerated the procedure and anesthesia well. He was transferred to the recovery room with vital signs stable, vascular status intact, and capillary refill time < 3 seconds to the distal bilateral foot. Following a period of post op monitoring, the patient will be transferred to the floor  1. Keep dressing dry and intact until podiatrist sees tomorrow.  2.Heel WB  only b/l feet with Darco shoes.  3. Continue antibiotics per HM  4. F/U cultures.  5.  Don't anticipate need for a long term IV antibiotics but will await bone cx results.  6.  He will need HHN for wound care upon D/C.          Estimated Blood Loss: 3 cc    Specimens:   Specimen (12h ago, onward)    Start     Ordered    12/26/18 1217  Specimen to Pathology - Surgery  Once     Comments:  1. Clean margin base of proximal phalanx, left third toe     Start Status   12/26/18 1217 Collected (12/26/18 1218)       12/26/18 1218    12/26/18 1206  Specimen to Pathology - Surgery  Once     Comments:  1. Left 3rd toe2. Clean margin, base of proximal phalanx (L) 3rd toe     Start Status   12/26/18 1206 Needs to be Collected       12/26/18 1206

## 2018-12-26 NOTE — SUBJECTIVE & OBJECTIVE
Interval History: No acute events overnight. Feeling well this morning. No other concerns at this time.     Review of Systems   Constitutional: Negative for chills and fever.   Respiratory: Negative for cough and shortness of breath.    Cardiovascular: Negative for chest pain and palpitations.   Gastrointestinal: Negative for abdominal pain, nausea and vomiting.     Objective:     Vital Signs (Most Recent):  Temp: 98.4 °F (36.9 °C) (12/26/18 1542)  Pulse: 77 (12/26/18 1542)  Resp: 18 (12/26/18 1542)  BP: 117/63 (12/26/18 1542)  SpO2: (!) 94 % (12/26/18 1542) Vital Signs (24h Range):  Temp:  [97.9 °F (36.6 °C)-100 °F (37.8 °C)] 98.4 °F (36.9 °C)  Pulse:  [77-90] 77  Resp:  [18] 18  SpO2:  [94 %-99 %] 94 %  BP: (117-152)/(56-76) 117/63     Weight: (!) 184.2 kg (406 lb)  Body mass index is 53.57 kg/m².    Intake/Output Summary (Last 24 hours) at 12/26/2018 1552  Last data filed at 12/26/2018 1300  Gross per 24 hour   Intake 1340 ml   Output 1050 ml   Net 290 ml      Physical Exam   Constitutional: He is oriented to person, place, and time. He appears well-developed. No distress.   Morbidly obese.   HENT:   Head: Normocephalic and atraumatic.   Eyes: Conjunctivae and EOM are normal.   Cardiovascular: Normal rate, regular rhythm, S1 normal and S2 normal.   Poorly palpated dorsalis pedis / posterior tibialis pulses; 1+ at best.   Pulmonary/Chest: Effort normal and breath sounds normal.   Abdominal: Soft. He exhibits no distension. There is no tenderness.   Musculoskeletal:   Bilateral thickening of nails with ?onychomycosis. R foot s/p 1st toe amputation; erythema and mild irritation of R superior-medial 1st MTP; prior incision site intact. L foot with most notably 3rd toe erythema with ruptured bulla and ulcer of distal end of toe draining serosanguinous fluid. ~4cm ruptured bulla / ulcer on distal plantar foot without significant drainage.   Neurological: He is alert and oriented to person, place, and time.   Skin: Skin  is warm and dry.   Nursing note and vitals reviewed.    Significant Labs:   CBC:  Recent Labs   Lab 12/25/18  1239 12/26/18  0425   WBC 10.57 6.41   HGB 12.8* 12.2*   HCT 38.7* 37.3*    274   GRAN 76.6*  8.1* 72.8  4.7   LYMPH 16.7*  1.8 20.1  1.3   MONO 5.1  0.5 4.8  0.3   EOS 0.1 0.1   BASO 0.01 0.01      BMP:  Recent Labs   Lab 12/25/18  1239 12/26/18  0425   * 135*   K 4.1 4.2    102   CO2 25 26   BUN 15 9   CREATININE 0.9 0.9   * 285*   CALCIUM 9.1 8.5*   MG  --  1.5*   PHOS  --  2.5*      Recent Labs   Lab 12/25/18  1230 12/25/18  1231   LABBLOO Gram stain lizzy bottle: Gram positive cocci in chains resembling Strep   Results called to and read back by: Willian Davidson RN 12/26/2018  04:07  Gram stain aer bottle: Gram positive cocci in chains resembling Strep  Positive results previously called on  12/26/2018 Gram stain lizzy bottle: Gram positive cocci in chains resembling Strep   Results called to and read back by: Willian Davidson RN 12/26/2018  04:07  Gram stain aer bottle: Gram positive cocci in chains resembling Strep  Positive results previously called on  12/26/2018      Significant Imaging:   MRI Foot Left W WO Contrast 12/25/18:  Osteomyelitis of 3rd distal phalanx. Ulcer with sinus tract to the 1st MTP joint. Neuropathic arthropathy of midfoot and forefoot.    MRI Foot Right W WO Contrast 12/25/18:  Status post 1st toe amputation.  Ulcer with extensive soft tissue edema about the stump.  Bone marrow edema at the stump without soniya replacement in keeping with reactive osteitis. Neuropathic arthropathy.

## 2018-12-26 NOTE — SUBJECTIVE & OBJECTIVE
Scheduled Meds:   [START ON 12/26/2018] atorvastatin  20 mg Oral Daily    ceFEPime (MAXIPIME) IVPB  2 g Intravenous Q12H    [START ON 12/26/2018] enoxparin  40 mg Subcutaneous Q12H    gabapentin  300 mg Oral TID    insulin aspart U-100  10 Units Subcutaneous TIDWM    insulin detemir U-100  45 Units Subcutaneous BID    [START ON 12/26/2018] losartan  25 mg Oral Daily    [START ON 12/26/2018] vancomycin (VANCOCIN) IVPB  2,000 mg Intravenous Q12H     Continuous Infusions:  PRN Meds:dextrose 50%, dextrose 50%, glucagon (human recombinant), glucose, glucose, insulin aspart U-100, sodium chloride 0.9%    Review of patient's allergies indicates:  No Known Allergies     Past Medical History:   Diagnosis Date    Diabetes mellitus, type 2     Hyperlipidemia     Hypertension     Peripheral neuropathy      Past Surgical History:   Procedure Laterality Date    AMPUTATION-TOE Right 7/2/2017    Performed [facility] Nino [facility]guin DPM at [facility] OR [address] [place] [address]    APPENDECTOMY      HERNIA REPAIR      TOE AMPUTATION      right great toe       Family History     None        Tobacco Use    Smoking status: Never Smoker    Smokeless tobacco: Never Used   Substance and Sexual Activity    Alcohol use: No    Drug use: No    Sexual activity: Yes     Partners: Female     Comment:      Review of Systems   Constitutional: Positive for fatigue. Negative for chills and fever.   Gastrointestinal: Negative for nausea and vomiting.   Skin: Positive for wound.     Objective:     Vital Signs (Most Recent):  Temp: 98.6 °F (37 °C) (12/25/18 1554)  Pulse: 79 (12/25/18 1554)  Resp: 18 (12/25/18 1211)  BP: (!) 144/70 (12/25/18 1554)  SpO2: 97 % (12/25/18 1554) Vital Signs (24h Range):  Temp:  [98.3 °F (36.8 °C)-98.8 °F (37.1 °C)] 98.6 °F (37 °C)  Pulse:  [78-91] 79  Resp:  [18] 18  SpO2:  [95 %-97 %] 97 %  BP: (136-155)/(66-87) 144/70     Weight: (!) 181.4 kg (400 lb)  Body mass index is 52.77 kg/m².    Foot Exam    General  Orientation:  alert and oriented to person, place, and time   Affect: appropriate       Right Foot/Ankle     Inspection and Palpation  Swelling: (Diffuse edema to the LE)    Neurovascular  Dorsalis pedis: 2+  Posterior tibial: 2+  Saphenous nerve sensation: absent  Tibial nerve sensation: absent  Superficial peroneal nerve sensation: absent  Deep peroneal nerve sensation: absent  Sural nerve sensation: absent    Comments  Pt with R partial 1st ray amputation. Now with an ulceration to the old incision site. Wound measures roughly 0.5x0.5x0.5cm to the underlying bone. The wound undermines circumferentially about 1 cm around the wound. The periwound skin is erythematous. There is fluctuance. Nav purulence expressed from the wound.    There is also a superficial ulceration to the dorsal aspect of the 3rd toe with overlying callus tissue. Purulent drainage expressed upon debridement of the toe.    Left Foot/Ankle      Inspection and Palpation  Tenderness: (Tenderness to fluctuant area dorsal lateral foot)  Swelling: (Diffuse swelling to LLE)    Neurovascular  Dorsalis pedis: 2+  Posterior tibial: 2+  Saphenous nerve sensation: absent  Tibial nerve sensation: absent  Superficial peroneal nerve sensation: absent  Deep peroneal nerve sensation: absent  Sural nerve sensation: absent    Comments  Wound to the distal tip of the 3rd toe. The wound is roughly 1.5x1.5x1cm to bone. No drainage expressed from this wound. Maceration to the periwound skin. The toe is generally edematous with erythema tracking proximally. The tip of the patient's 3rd toe is cold to touch.    Also a large superficial appearing wound to the plantar medial aspect.     Also an area of palpable fluctuance to the dorsal lateral aspect of the foot.      Laboratory:  CBC:   Recent Labs   Lab 12/25/18  1239   WBC 10.57   RBC 4.43*   HGB 12.8*   HCT 38.7*      MCV 87   MCH 28.9   MCHC 33.1     CMP:   Recent Labs   Lab 12/25/18  1239   *   CALCIUM 9.1    ALBUMIN 2.9*   PROT 7.5   *   K 4.1   CO2 25      BUN 15   CREATININE 0.9   ALKPHOS 87   ALT 12   AST 7*   BILITOT 1.5*     Wound Cultures: No results for input(s): LABAERO in the last 4320 hours.    Diagnostic Results:  MRI: I have reviewed all pertinent results/findings within the past 24 hours.  Pending  X-Ray: I have reviewed all pertinent results/findings within the past 24 hours.  No acute findings of gas or fracture. No obvious signs of erosion to the 3rd toe.    Clinical Findings:

## 2018-12-26 NOTE — ANESTHESIA PREPROCEDURE EVALUATION
12/26/2018  Andrew Del Cid Jr. is a 53 y.o., male with h/o uncontrolled DM2, diabetic neuropathy, HTN, HLD, morbid obesity, and diabetic wound to his right great toe who presents for:    Pre-operative evaluation for Procedure(s) (LRB):  AMPUTATION-TOE (Right)    Diagnostic Studies:  7/1/17 MRI LE  Osteomyelitis of the distal phalanx and distal aspect of the proximal phalanx of the first digit with probable interphalangeal septic joint and an overlying ulcer within the medial subcutaneous soft tissues.    Tenosynovitis of the flexor hallucis longus which may be sterile or infectious noting close proximity to the aforementioned osseous changes.    Imaging findings suggesting Charcot joint centered at the midfoot.    6/1/16 EKG:  Sinus tachycardia  Left anterior fascicular block    Drips:      Patient Active Problem List   Diagnosis    Morbid obesity with BMI of 50.0-59.9, adult    Diabetic foot ulcers    Type 2 diabetes mellitus with diabetic polyneuropathy, with long-term current use of insulin    Essential hypertension    Diabetic Charcot foot    Diabetic peripheral vascular disease    History of amputation of right great toe    Osteomyelitis of left foot    Hyperlipidemia    Gram-positive cocci bacteremia    Cellulitis of right lower extremity       Review of patient's allergies indicates:  No Known Allergies     Current Facility-Administered Medications on File Prior to Visit   Medication Dose Route Frequency Provider Last Rate Last Dose    atorvastatin tablet 20 mg  20 mg Oral Daily SHAYY Hendricks MD   20 mg at 12/26/18 0849    ceFEPIme in dextrose 5% 2 gram/50 mL IVPB 2 g  2 g Intravenous Q12H SHAYY Hendricks  mL/hr at 12/26/18 0534 2 g at 12/26/18 0534    dextrose 50% injection 12.5 g  12.5 g Intravenous PRN SHAYY Hendricks MD        dextrose 50% injection 25 g  25 g  Intravenous PRN DSharron Hendricks MD        enoxaparin injection 40 mg  40 mg Subcutaneous Q12H DSharron Hendricks MD        gabapentin capsule 300 mg  300 mg Oral TID SHAYY Hendricks MD   300 mg at 12/26/18 0849    glucagon (human recombinant) injection 1 mg  1 mg Intramuscular PRN SHAYY Hendricks MD        glucose chewable tablet 16 g  16 g Oral PRN SHAYY Hendricks MD        glucose chewable tablet 24 g  24 g Oral PRN SHAYY Hendricks MD        insulin aspart U-100 pen 1-10 Units  1-10 Units Subcutaneous QID (AC + HS) PRN SHAYY Hendricks MD   6 Units at 12/26/18 0850    insulin aspart U-100 pen 10 Units  10 Units Subcutaneous TIDWM SHAYY Hendricks MD        insulin detemir U-100 pen 50 Units  50 Units Subcutaneous BID SHAYY Hendricks MD   50 Units at 12/26/18 0850    losartan tablet 25 mg  25 mg Oral Daily SHAYY Hendricks MD   25 mg at 12/26/18 0849    magnesium sulfate 2g in water 50mL IVPB (premix)  2 g Intravenous Once DSharron Hendricks MD   2 g at 12/26/18 0849    potassium, sodium phosphates 280-160-250 mg packet 2 packet  2 packet Oral Q4H SHAYY Hendricks MD        sodium chloride 0.9% flush 5 mL  5 mL Intravenous PRN SHAYY Hendricks MD        vancomycin 2 g in dextrose 5 % 500 mL IVPB  2,000 mg Intravenous Q12H DSharron Hendricks MD   2,000 mg at 12/26/18 0110     Current Outpatient Medications on File Prior to Visit   Medication Sig Dispense Refill    albuterol 90 mcg/actuation inhaler Inhale 2 puffs into the lungs every 6 (six) hours as needed for Wheezing. Rescue 1 Inhaler 0    amLODIPine (NORVASC) 5 MG tablet Take 1 tablet (5 mg total) by mouth once daily. 90 tablet 2    aspirin (ECOTRIN) 81 MG EC tablet Take 81 mg by mouth once daily.      atorvastatin (LIPITOR) 20 MG tablet Take 1 tablet (20 mg total) by mouth once daily. 90 tablet 3    gabapentin (NEURONTIN) 300 MG capsule Take 1 capsule (300 mg total) by mouth 3 (three) times daily. 90 capsule 0    insulin aspart (NOVOLOG) 100  unit/mL InPn pen Inject 12 Units into the skin 2 (two) times daily with meals. 21.6 mL 3    insulin detemir (LEVEMIR FLEXTOUCH) 100 unit/mL (3 mL) SubQ InPn pen 60 units in the am and 40 units in the pm 5 Box 3    lisinopril 10 MG tablet Take 1 tablet (10 mg total) by mouth once daily. 90 tablet 3    metFORMIN (GLUCOPHAGE) 1000 MG tablet Take 1 tablet (1,000 mg total) by mouth 2 (two) times daily with meals. 180 tablet 3    methocarbamol (ROBAXIN) 500 MG Tab 1 po qhs prn 20 tablet 0     Past Surgical History:   Procedure Laterality Date    AMPUTATION-TOE Right 7/2/2017    Performed by Nino Olguin DPM at [facility] OR [address] [place] [address]    APPENDECTOMY      HERNIA REPAIR      TOE AMPUTATION      right great toe       Social History     Socioeconomic History    Marital status:      Spouse name: Not on file    Number of children: Not on file    Years of education: Not on file    Highest education level: Not on file   Social Needs    Financial resource strain: Not on file    Food insecurity - worry: Not on file    Food insecurity - inability: Not on file    Transportation needs - medical: Not on file    Transportation needs - non-medical: Not on file   Occupational History    Not on file   Tobacco Use    Smoking status: Never Smoker    Smokeless tobacco: Never Used   Substance and Sexual Activity    Alcohol use: No    Drug use: No    Sexual activity: Yes     Partners: Female     Comment:    Other Topics Concern    Not on file   Social History Narrative    Not on file         Vital Signs Range (Last 24H):  Temp:  [36.8 °C (98.3 °F)-37.8 °C (100 °F)]   Pulse:  [78-91]   Resp:  [18]   BP: (136-155)/(63-87)   SpO2:  [94 %-99 %]       CBC:   Recent Labs     12/25/18  1239 12/26/18  0425   WBC 10.57 6.41   RBC 4.43* 4.21*   HGB 12.8* 12.2*   HCT 38.7* 37.3*    274   MCV 87 89   MCH 28.9 29.0   MCHC 33.1 32.7       CMP:   Recent Labs     12/25/18  1239 12/26/18  0425   * 135*   K 4.1  4.2    102   CO2 25 26   BUN 15 9   CREATININE 0.9 0.9   * 285*   MG  --  1.5*   PHOS  --  2.5*   CALCIUM 9.1 8.5*   ALBUMIN 2.9*  --    PROT 7.5  --    ALKPHOS 87  --    ALT 12  --    AST 7*  --    BILITOT 1.5*  --        Pre-op Assessment    I have reviewed the Patient Summary Reports.     I have reviewed the Nursing Notes.   I have reviewed the Medications.     Review of Systems  Anesthesia Hx:  No problems with previous Anesthesia Denies Hx of Anesthetic complications  History of prior surgery of interest to airway management or planning:  Denies Personal Hx of Anesthesia complications.   Social:  Non-Smoker    Hematology/Oncology:  Hematology Normal   Oncology Normal     EENT/Dental:EENT/Dental Normal   Cardiovascular:   Exercise tolerance: good Hypertension, poorly controlled hyperlipidemia ECG has been reviewed.    Pulmonary:  Pulmonary Normal    Renal/:  Renal/ Normal     Hepatic/GI:  Hepatic/GI Normal    Musculoskeletal:  Musculoskeletal Normal    Neurological:   Neuromuscular Disease,    Endocrine:   Diabetes, poorly controlled    Psych:  Psychiatric Normal           Physical Exam  General:  Morbid Obesity, Large Beard    Airway/Jaw/Neck:  Airway Findings: Mouth Opening: Normal Tongue: Normal  General Airway Assessment: Adult  Oropharynx Findings: Normal Mallampati: I  TM Distance: Normal, at least 6 cm  Jaw/Neck Findings:  Neck ROM: Normal ROM  Neck Findings: Normal    Eyes/Ears/Nose:  EYES/EARS/NOSE FINDINGS: Normal   Dental:  Dental Findings: Periodontal disease, Severe    Chest/Lungs:  Chest/Lungs Findings: Normal Respiratory Rate     Heart/Vascular:  Heart Findings: Rate: Normal        Mental Status:  Mental Status Findings:  Cooperative, Alert and Oriented         Anesthesia Plan  Type of Anesthesia, risks & benefits discussed:  Anesthesia Type:  MAC, general, regional  Patient's Preference:   Intra-op Monitoring Plan: standard ASA monitors  Intra-op Monitoring Plan Comments:   Post  Op Pain Control Plan: per primary service following discharge from PACU  Post Op Pain Control Plan Comments:   Induction:   IV  Beta Blocker:  Patient is not currently on a Beta-Blocker (No further documentation required).       Informed Consent: Patient understands risks and agrees with Anesthesia plan.  Questions answered. Anesthesia consent signed with patient.  ASA Score: 3  emergent   Day of Surgery Review of History & Physical:    H&P update referred to the provider.         Ready For Surgery From Anesthesia Perspective.

## 2018-12-26 NOTE — PROGRESS NOTES
[facility] Hospital Medicine  Progress Note    Patient Name: Andrew Del Cid Jr.  MRN: [id number]  Patient Class: IP- Inpatient   Admission Date: 12/25/2018  Length of Stay: 1 days  Attending Physician: SHAYY Hendricks MD  Primary Care Provider: Jeannette Herrera MD        Subjective:     Principal Problem:Osteomyelitis of left foot    HPI:  Mr. Del Cid is a 53/M with PMH HTN, HLD, DMII with associated neuropathy and peripheral vascular disease, h/o diabetic osteomyelitis with ultimate amputation of R 1st toe who presented to ED with a ~1.5 week history of worsening wounds to his L foot. He was previously followed by Dr. Camacho (podiatry) and Dr. Herrera (PCP), but lost insurance coverage and has not been seen by a physician in office since 02/2018. He has continued insulin therapy but does not closely monitor his glucose. He reports that his prior diabetic shoes have been wearing down and he switched to tennis shoes about 2 weeks prior to presentation; subsequently he began to develop blisters to his L 3rd toe as well as the plantar side of his left foot. A smaller area of mild skin breakdown began to develop on his R foot as well. He attempted to cover his toe wounds with neosporin ointment and wrapping with minimal effect. Denies significant pain but does note drainage. He has noted worsening erythema and drainage especially from the L 3rd toe. He also notes generalized fatigue which he reports feels similar to when he had his prior amputation, which prompted him to present to the ED.    Hospital Course:  After admission, Podiatry was consulted and MRI obtained for surgical planning. Empiric antibiotics were continued. Initial blood cultures demonstrated GPCs.    Interval History: No acute events overnight. Feeling well this morning. No other concerns at this time.     Review of Systems   Constitutional: Negative for chills and fever.   Respiratory: Negative for cough and shortness of  breath.    Cardiovascular: Negative for chest pain and palpitations.   Gastrointestinal: Negative for abdominal pain, nausea and vomiting.     Objective:     Vital Signs (Most Recent):  Temp: 98.4 °F (36.9 °C) (12/26/18 1542)  Pulse: 77 (12/26/18 1542)  Resp: 18 (12/26/18 1542)  BP: 117/63 (12/26/18 1542)  SpO2: (!) 94 % (12/26/18 1542) Vital Signs (24h Range):  Temp:  [97.9 °F (36.6 °C)-100 °F (37.8 °C)] 98.4 °F (36.9 °C)  Pulse:  [77-90] 77  Resp:  [18] 18  SpO2:  [94 %-99 %] 94 %  BP: (117-152)/(56-76) 117/63     Weight: (!) 184.2 kg (406 lb)  Body mass index is 53.57 kg/m².    Intake/Output Summary (Last 24 hours) at 12/26/2018 1552  Last data filed at 12/26/2018 1300  Gross per 24 hour   Intake 1340 ml   Output 1050 ml   Net 290 ml      Physical Exam   Constitutional: He is oriented to person, place, and time. He appears well-developed. No distress.   Morbidly obese.   HENT:   Head: Normocephalic and atraumatic.   Eyes: Conjunctivae and EOM are normal.   Cardiovascular: Normal rate, regular rhythm, S1 normal and S2 normal.   Poorly palpated dorsalis pedis / posterior tibialis pulses; 1+ at best.   Pulmonary/Chest: Effort normal and breath sounds normal.   Abdominal: Soft. He exhibits no distension. There is no tenderness.   Musculoskeletal:   Bilateral thickening of nails with ?onychomycosis. R foot s/p 1st toe amputation; erythema and mild irritation of R superior-medial 1st MTP; prior incision site intact. L foot with most notably 3rd toe erythema with ruptured bulla and ulcer of distal end of toe draining serosanguinous fluid. ~4cm ruptured bulla / ulcer on distal plantar foot without significant drainage.   Neurological: He is alert and oriented to person, place, and time.   Skin: Skin is warm and dry.   Nursing note and vitals reviewed.    Significant Labs:   CBC:  Recent Labs   Lab 12/25/18  1239 12/26/18  0425   WBC 10.57 6.41   HGB 12.8* 12.2*   HCT 38.7* 37.3*    274   GRAN 76.6*  8.1* 72.8   4.7   LYMPH 16.7*  1.8 20.1  1.3   MONO 5.1  0.5 4.8  0.3   EOS 0.1 0.1   BASO 0.01 0.01      BMP:  Recent Labs   Lab 12/25/18  1239 12/26/18  0425   * 135*   K 4.1 4.2    102   CO2 25 26   BUN 15 9   CREATININE 0.9 0.9   * 285*   CALCIUM 9.1 8.5*   MG  --  1.5*   PHOS  --  2.5*      Recent Labs   Lab 12/25/18  1230 12/25/18  1231   LABBLOO Gram stain lizzy bottle: Gram positive cocci in chains resembling Strep   Results called to and read back by: Willian Davidson RN 12/26/2018  04:07  Gram stain aer bottle: Gram positive cocci in chains resembling Strep  Positive results previously called on  12/26/2018 Gram stain lizzy bottle: Gram positive cocci in chains resembling Strep   Results called to and read back by: Willian Davidson RN 12/26/2018  04:07  Gram stain aer bottle: Gram positive cocci in chains resembling Strep  Positive results previously called on  12/26/2018      Significant Imaging:   MRI Foot Left W WO Contrast 12/25/18:  Osteomyelitis of 3rd distal phalanx. Ulcer with sinus tract to the 1st MTP joint. Neuropathic arthropathy of midfoot and forefoot.    MRI Foot Right W WO Contrast 12/25/18:  Status post 1st toe amputation.  Ulcer with extensive soft tissue edema about the stump.  Bone marrow edema at the stump without soniya replacement in keeping with reactive osteitis. Neuropathic arthropathy.    Assessment/Plan:      * Osteomyelitis of left foot    - Osteomyelitis of L 3rd toe, cellulitis of R foot and concurrent bacteremia in the setting of diabetic peripheral vascular disease and poorly-controlled diabetes.  - Continuing vancomycin 2g IV q12hr, cefepime 2g IV q12hr.  - Appreciate podiatry assistance; anticipate procedure today.     Ulcers of both lower extremities    - As under osteomyelitis.     Cellulitis of right lower extremity    - As under osteomyelitis.     Gram-positive cocci bacteremia    - As under osteomyelitis.     Hyperlipidemia    - Continuing atorvastatin 20mg  PO daily.     Diabetic peripheral vascular disease    - Hold aspirin in setting of podiatric intervention.     Essential hypertension    -Continuing losartan 25mg PO daily.     Type 2 diabetes mellitus with diabetic polyneuropathy, with long-term current use of insulin    - Continuing insulin detemir at 50U subQ BID, insulin aspart 10U subQ TIDWM with additional moderate-dose sliding scale insulin aspart 1-10U subQ TIDWM PRN.  - Continuing gabapentin 300mg PO TID for neuropathy.     Diabetic foot ulcers    - As under osteomyelitis.     Morbid obesity with BMI of 50.0-59.9, adult    - Risk factor for DMII, hypertension, hyperlipidemia.       VTE Risk Mitigation (From admission, onward)        Ordered     enoxaparin injection 40 mg  Every 12 hours      12/25/18 1535     IP VTE HIGH RISK PATIENT  Once      12/25/18 1535     Place sequential compression device  Until discontinued      12/25/18 1535        SHAYY Meraz MD  Department of Hospital Medicine   [facility]  [phone]

## 2018-12-26 NOTE — NURSING
MICROBIOLOGY DEPARTMENT NOTIFIED THIS NURSE (RM) PT'S BLOOD CULTURES RESULTS GRAM STRAIN JACKY BOTTLE GRAM POSITIVE COCCI IN CHAINS RESEMBLING STREP.  ON CALL PA (CC) NOTIFIED BY THIS NURSE (RM).  PER ON CALL PA (CC) CURRENT ANTIBIOTIC THERAPY IS APPROPRIATE.  WILL CONTINUE TO MONITOR.

## 2018-12-26 NOTE — HOSPITAL COURSE
After admission, Podiatry was consulted and MRI obtained for surgical planning. Empiric antibiotics were continued. Initial blood cultures demonstrated GPCs which later speciated as group B streptococcus. He was taken to surgery with podiatry on 12/26 with I&D of R foot abscess and amputation of L 3rd toe.

## 2018-12-26 NOTE — PLAN OF CARE
Problem: Adult Inpatient Plan of Care  Goal: Plan of Care Review  Outcome: Ongoing (interventions implemented as appropriate)  PLAN OF CARE REVIEWED WITH PT.  PT AA+OX4.  ABLE TO MAKE NEEDS KNOWN.  DOES NOT APPEAR TO BE IN ANY DISTRESS.  NO C/O PAIN.  WILL CONTINUE TO REASSESS PAIN.  INDEPENDENT WITH ADLS.  CONT. OF B/B.  ABT THERAPY GIVEN AS ORDERED.  PT REMAINS FREE FROM FALLS, INJURY, AND TRAUMA.  FALL PRECAUTIONS IN PLACE.  BED IN LOWEST POSITION WITH WHEELS LOCKED.  CALL LIGHT WITHIN REACH.  WILL CONTINUE TO MONITOR.

## 2018-12-27 PROBLEM — L97.509 TYPE 2 DIABETES MELLITUS WITH FOOT ULCER: Chronic | Status: ACTIVE | Noted: 2017-06-30

## 2018-12-27 PROBLEM — B95.1 BACTEREMIA DUE TO GROUP B STREPTOCOCCUS: Status: ACTIVE | Noted: 2018-12-26

## 2018-12-27 PROBLEM — E11.621 TYPE 2 DIABETES MELLITUS WITH FOOT ULCER: Chronic | Status: ACTIVE | Noted: 2017-06-30

## 2018-12-27 LAB
ANION GAP SERPL CALC-SCNC: 8 MMOL/L
BACTERIA SPEC AEROBE CULT: NORMAL
BACTERIA SPEC AEROBE CULT: NORMAL
BASOPHILS # BLD AUTO: 0.01 K/UL
BASOPHILS NFR BLD: 0.2 %
BUN SERPL-MCNC: 10 MG/DL
CALCIUM SERPL-MCNC: 8.6 MG/DL
CHLORIDE SERPL-SCNC: 101 MMOL/L
CO2 SERPL-SCNC: 28 MMOL/L
CREAT SERPL-MCNC: 0.8 MG/DL
DIFFERENTIAL METHOD: ABNORMAL
EOSINOPHIL # BLD AUTO: 0.2 K/UL
EOSINOPHIL NFR BLD: 3.3 %
ERYTHROCYTE [DISTWIDTH] IN BLOOD BY AUTOMATED COUNT: 12.2 %
EST. GFR  (AFRICAN AMERICAN): >60 ML/MIN/1.73 M^2
EST. GFR  (NON AFRICAN AMERICAN): >60 ML/MIN/1.73 M^2
GLUCOSE SERPL-MCNC: 165 MG/DL
HCT VFR BLD AUTO: 38.9 %
HGB BLD-MCNC: 12.5 G/DL
LYMPHOCYTES # BLD AUTO: 1.4 K/UL
LYMPHOCYTES NFR BLD: 27.1 %
MAGNESIUM SERPL-MCNC: 1.9 MG/DL
MCH RBC QN AUTO: 28.8 PG
MCHC RBC AUTO-ENTMCNC: 32.1 G/DL
MCV RBC AUTO: 90 FL
MONOCYTES # BLD AUTO: 0.4 K/UL
MONOCYTES NFR BLD: 8.5 %
NEUTROPHILS # BLD AUTO: 3.1 K/UL
NEUTROPHILS NFR BLD: 60.7 %
PHOSPHATE SERPL-MCNC: 3.3 MG/DL
PLATELET # BLD AUTO: 294 K/UL
PMV BLD AUTO: 9.2 FL
POCT GLUCOSE: 180 MG/DL (ref 70–110)
POCT GLUCOSE: 204 MG/DL (ref 70–110)
POCT GLUCOSE: 206 MG/DL (ref 70–110)
POTASSIUM SERPL-SCNC: 4 MMOL/L
RBC # BLD AUTO: 4.34 M/UL
SODIUM SERPL-SCNC: 137 MMOL/L
VANCOMYCIN TROUGH SERPL-MCNC: 8.7 UG/ML
WBC # BLD AUTO: 5.16 K/UL

## 2018-12-27 PROCEDURE — 84100 ASSAY OF PHOSPHORUS: CPT

## 2018-12-27 PROCEDURE — 36415 COLL VENOUS BLD VENIPUNCTURE: CPT

## 2018-12-27 PROCEDURE — 94761 N-INVAS EAR/PLS OXIMETRY MLT: CPT

## 2018-12-27 PROCEDURE — 25000003 PHARM REV CODE 250: Performed by: INTERNAL MEDICINE

## 2018-12-27 PROCEDURE — 99024 PR POST-OP FOLLOW-UP VISIT: ICD-10-PCS | Mod: ,,, | Performed by: PODIATRIST

## 2018-12-27 PROCEDURE — 80202 ASSAY OF VANCOMYCIN: CPT

## 2018-12-27 PROCEDURE — 63600175 PHARM REV CODE 636 W HCPCS: Performed by: INTERNAL MEDICINE

## 2018-12-27 PROCEDURE — 80048 BASIC METABOLIC PNL TOTAL CA: CPT

## 2018-12-27 PROCEDURE — 11000001 HC ACUTE MED/SURG PRIVATE ROOM

## 2018-12-27 PROCEDURE — 97161 PT EVAL LOW COMPLEX 20 MIN: CPT

## 2018-12-27 PROCEDURE — 99233 SBSQ HOSP IP/OBS HIGH 50: CPT | Mod: ,,, | Performed by: INTERNAL MEDICINE

## 2018-12-27 PROCEDURE — 99233 PR SUBSEQUENT HOSPITAL CARE,LEVL III: ICD-10-PCS | Mod: ,,, | Performed by: INTERNAL MEDICINE

## 2018-12-27 PROCEDURE — 99024 POSTOP FOLLOW-UP VISIT: CPT | Mod: ,,, | Performed by: PODIATRIST

## 2018-12-27 PROCEDURE — 83735 ASSAY OF MAGNESIUM: CPT

## 2018-12-27 PROCEDURE — 85025 COMPLETE CBC W/AUTO DIFF WBC: CPT

## 2018-12-27 RX ORDER — BENZONATATE 100 MG/1
100 CAPSULE ORAL 3 TIMES DAILY PRN
Status: DISCONTINUED | OUTPATIENT
Start: 2018-12-27 | End: 2018-12-30 | Stop reason: HOSPADM

## 2018-12-27 RX ADMIN — CEFEPIME HYDROCHLORIDE 2 G: 2 INJECTION, SOLUTION INTRAVENOUS at 06:12

## 2018-12-27 RX ADMIN — INSULIN ASPART 10 UNITS: 100 INJECTION, SOLUTION INTRAVENOUS; SUBCUTANEOUS at 07:12

## 2018-12-27 RX ADMIN — ATORVASTATIN CALCIUM 20 MG: 20 TABLET, FILM COATED ORAL at 08:12

## 2018-12-27 RX ADMIN — GABAPENTIN 300 MG: 300 CAPSULE ORAL at 10:12

## 2018-12-27 RX ADMIN — GABAPENTIN 300 MG: 300 CAPSULE ORAL at 08:12

## 2018-12-27 RX ADMIN — GABAPENTIN 300 MG: 300 CAPSULE ORAL at 02:12

## 2018-12-27 RX ADMIN — INSULIN ASPART 10 UNITS: 100 INJECTION, SOLUTION INTRAVENOUS; SUBCUTANEOUS at 02:12

## 2018-12-27 RX ADMIN — INSULIN DETEMIR 50 UNITS: 100 INJECTION, SOLUTION SUBCUTANEOUS at 08:12

## 2018-12-27 RX ADMIN — ENOXAPARIN SODIUM 40 MG: 100 INJECTION SUBCUTANEOUS at 10:12

## 2018-12-27 RX ADMIN — VANCOMYCIN HYDROCHLORIDE 2000 MG: 10 INJECTION, POWDER, LYOPHILIZED, FOR SOLUTION INTRAVENOUS at 07:12

## 2018-12-27 RX ADMIN — INSULIN ASPART 10 UNITS: 100 INJECTION, SOLUTION INTRAVENOUS; SUBCUTANEOUS at 08:12

## 2018-12-27 RX ADMIN — LOSARTAN POTASSIUM 25 MG: 25 TABLET, FILM COATED ORAL at 08:12

## 2018-12-27 RX ADMIN — INSULIN DETEMIR 50 UNITS: 100 INJECTION, SOLUTION SUBCUTANEOUS at 10:12

## 2018-12-27 RX ADMIN — ENOXAPARIN SODIUM 40 MG: 100 INJECTION SUBCUTANEOUS at 08:12

## 2018-12-27 RX ADMIN — CEFTRIAXONE 2 G: 2 INJECTION, SOLUTION INTRAVENOUS at 12:12

## 2018-12-27 RX ADMIN — INSULIN ASPART 2 UNITS: 100 INJECTION, SOLUTION INTRAVENOUS; SUBCUTANEOUS at 08:12

## 2018-12-27 RX ADMIN — INSULIN ASPART 4 UNITS: 100 INJECTION, SOLUTION INTRAVENOUS; SUBCUTANEOUS at 07:12

## 2018-12-27 NOTE — PROGRESS NOTES
[facility]  Podiatry  Progress Note    Patient Name: Andrew Del Cid Jr.  MRN: [id number]  Admission Date: 12/25/2018  Hospital Length of Stay: 2 days  Attending Physician: SHAYY Hendricks MD  Primary Care Provider: Jeannette Herrera MD     Subjective:     Interval History:  Dressings intact from surgery; no fevers; no calf pain;  No foot pain.  Patient resting comfortably in bed.     Follow-up For: Procedure(s) (LRB):  INCISION AND DRAINAGE, FOOT (Bilateral)  AMPUTATION, TOE; left 3rd toe (Left)    Post-Operative Day: 1 Day Post-Op    Scheduled Meds:   atorvastatin  20 mg Oral Daily    cefTRIAXone (ROCEPHIN) IVPB  2 g Intravenous Q24H    enoxparin  40 mg Subcutaneous Q12H    gabapentin  300 mg Oral TID    insulin aspart U-100  10 Units Subcutaneous TIDWM    insulin detemir U-100  50 Units Subcutaneous BID    losartan  25 mg Oral Daily     Continuous Infusions:  PRN Meds:benzonatate, dextrose 50%, dextrose 50%, glucagon (human recombinant), glucose, glucose, insulin aspart U-100, sodium chloride 0.9%, sodium chloride 0.9%    Review of Systems   Constitutional: Negative for chills and fever.   Respiratory: Negative for shortness of breath and wheezing.    Cardiovascular: Negative for chest pain and palpitations.   Gastrointestinal: Negative for nausea and vomiting.   Skin: Positive for wound.     Objective:     Vital Signs (Most Recent):  Temp: 98.4 °F (36.9 °C) (12/27/18 1105)  Pulse: 85 (12/27/18 1400)  Resp: 17 (12/27/18 1105)  BP: 127/71 (12/27/18 1105)  SpO2: (!) 94 % (12/27/18 1105) Vital Signs (24h Range):  Temp:  [97.8 °F (36.6 °C)-98.8 °F (37.1 °C)] 98.4 °F (36.9 °C)  Pulse:  [72-92] 85  Resp:  [16-18] 17  SpO2:  [92 %-98 %] 94 %  BP: (117-152)/(60-78) 127/71     Weight: (!) 184.2 kg (406 lb)  Body mass index is 53.57 kg/m².    Foot Exam    Laboratory:  CMP:   Recent Labs   Lab 12/25/18  1239  12/27/18  0626   *   < > 165*   CALCIUM 9.1   < > 8.6*   ALBUMIN 2.9*  --   --     PROT 7.5  --   --    *   < > 137   K 4.1   < > 4.0   CO2 25   < > 28      < > 101   BUN 15   < > 10   CREATININE 0.9   < > 0.8   ALKPHOS 87  --   --    ALT 12  --   --    AST 7*  --   --    BILITOT 1.5*  --   --     < > = values in this interval not displayed.     CRP:   Recent Labs   Lab 12/25/18  1239   .1*     ESR:   Recent Labs   Lab 12/25/18  1239   SEDRATE 68*       Diagnostic Results:  Right  Foot MRI:    Impression       Status post 1st toe amputation.  Ulcer with extensive soft tissue edema about the stump.  Bone marrow edema at the stump without soniya replacement in keeping with reactive osteitis.    Neuropathic arthropathy.       Left Foot MRI:  Impression       Osteomyelitis of 3rd distal phalanx.    Ulcer with sinus tract to the 1st MTP joint.    Neuropathic arthropathy of midfoot and forefoot.       Clinical Findings:  S/p left 3rd toe amputation with sutures intact and small 1.5cm opening at the plantar aspect with packing material.   Wound edges appear healthy, no dehiscence.     S/p LEFT sub 1st metatarsal head wound debridement with sutures intact.  Slightly macerated site but no wound dehiscence.     S/p I&D abscess at the site of the 1st metatarsal RIGHT foot, packing material in place,  With no purulence drainage evident, wound edges appear viable.       Wound noted left 4th toe at the proximal interphalangeal joint with scant serous drainage, feels like it probes to bone.  No tenderness to palpation. No cellulitis evident.                    Assessment/Plan:     Active Diagnoses:    Diagnosis Date Noted POA    PRINCIPAL PROBLEM:  Osteomyelitis of left foot [M86.9] 12/25/2018 Yes    Bacteremia due to group B Streptococcus [R78.81] 12/26/2018 Yes    Ulcers of both lower extremities [L97.919, L97.929] 12/26/2018 Yes    Cellulitis of right lower extremity [L03.115]  Yes    Hyperlipidemia [E78.5] 12/25/2018 Yes     Chronic    Diabetic peripheral vascular disease [E11.51]  07/03/2017 Yes     Chronic    Essential hypertension [I10] 06/30/2017 Yes     Chronic    Type 2 diabetes mellitus with diabetic polyneuropathy, with long-term current use of insulin [E11.42, Z79.4] 06/30/2017 Not Applicable     Chronic    Diabetic Charcot foot [E11.610] 06/30/2017 Yes     Chronic    Diabetic foot ulcers [E11.621, L97.509] 06/29/2017 Yes    Morbid obesity with BMI of 50.0-59.9, adult [E66.01, Z68.43] 06/04/2015 Not Applicable     Chronic      Problems Resolved During this Admission:       One day S/p  INCISION AND DRAINAGE, FOOT (Bilateral);  AMPUTATION, TOE; left 3rd toe (Left);  Doing well.  Continue daily wound care.  Will follow up tomorrow, may  partially close incision on the right foot at bedside.         Yulia Fernandes DPM  Podiatry  [facility]

## 2018-12-27 NOTE — PLAN OF CARE
Problem: Adult Inpatient Plan of Care  Goal: Plan of Care Review  Outcome: Ongoing (interventions implemented as appropriate)  Patient remained free from falls/injury.  No acute changes in status.  No complaints of pain.  Bed locked in lowest position.  Side rails x2.  Call bell within reach.  Purposeful rounding maintain throughout shift.

## 2018-12-27 NOTE — PLAN OF CARE
Voicemail left for [employer] to review patient & assist with Medicaid application if applicable

## 2018-12-27 NOTE — PROGRESS NOTES
[facility] Hospital Medicine  Progress Note    Patient Name: Andrew Del Cid Jr.  MRN: [id number]  Patient Class: IP- Inpatient   Admission Date: 12/25/2018  Length of Stay: 2 days  Attending Physician: SHAYY Hendricks MD  Primary Care Provider: Jeannette Herrera MD    Subjective:     Principal Problem:Osteomyelitis of left foot    HPI:  Mr. Del Cid is a 53/M with PMH HTN, HLD, DMII with associated neuropathy and peripheral vascular disease, h/o diabetic osteomyelitis with ultimate amputation of R 1st toe who presented to ED with a ~1.5 week history of worsening wounds to his L foot. He was previously followed by Dr. Camacho (podiatry) and Dr. Herrera (PCP), but lost insurance coverage and has not been seen by a physician in office since 02/2018. He has continued insulin therapy but does not closely monitor his glucose. He reports that his prior diabetic shoes have been wearing down and he switched to tennis shoes about 2 weeks prior to presentation; subsequently he began to develop blisters to his L 3rd toe as well as the plantar side of his left foot. A smaller area of mild skin breakdown began to develop on his R foot as well. He attempted to cover his toe wounds with neosporin ointment and wrapping with minimal effect. Denies significant pain but does note drainage. He has noted worsening erythema and drainage especially from the L 3rd toe. He also notes generalized fatigue which he reports feels similar to when he had his prior amputation, which prompted him to present to the ED.    Hospital Course:  After admission, Podiatry was consulted and MRI obtained for surgical planning. Empiric antibiotics were continued. Initial blood cultures demonstrated GPCs which later speciated as group B streptococcus. He was taken to surgery with podiatry on 12/26 with I&D of R foot abscess and amputation of L 3rd toe.    Interval History: No acute events overnight. Feeling well this morning. No other  concerns at this time.     Review of Systems   Constitutional: Negative for chills and fever.   Respiratory: Negative for cough and shortness of breath.    Cardiovascular: Negative for chest pain and palpitations.   Gastrointestinal: Negative for abdominal pain, nausea and vomiting.     Objective:     Vital Signs (Most Recent):  Temp: 98.4 °F (36.9 °C) (12/27/18 1105)  Pulse: 77 (12/27/18 1105)  Resp: 17 (12/27/18 1105)  BP: 127/71 (12/27/18 1105)  SpO2: (!) 94 % (12/27/18 1105) Vital Signs (24h Range):  Temp:  [97.8 °F (36.6 °C)-98.8 °F (37.1 °C)] 98.4 °F (36.9 °C)  Pulse:  [72-92] 77  Resp:  [16-18] 17  SpO2:  [92 %-98 %] 94 %  BP: (117-152)/(56-78) 127/71     Weight: (!) 184.2 kg (406 lb)  Body mass index is 53.57 kg/m².    Intake/Output Summary (Last 24 hours) at 12/27/2018 1131  Last data filed at 12/27/2018 1102  Gross per 24 hour   Intake 1850 ml   Output 1450 ml   Net 400 ml      Physical Exam   Constitutional: He is oriented to person, place, and time. He appears well-developed. No distress.   Morbidly obese.   HENT:   Head: Normocephalic and atraumatic.   Eyes: Conjunctivae and EOM are normal.   Cardiovascular: Normal rate, regular rhythm, S1 normal and S2 normal.   Pulmonary/Chest: Effort normal and breath sounds normal.   Abdominal: Soft. He exhibits no distension. There is no tenderness.   Musculoskeletal:   Bilateral feet in dressings as per podiatry.   Neurological: He is alert and oriented to person, place, and time.   Skin: Skin is warm and dry.   Nursing note and vitals reviewed.    Significant Labs:   CBC:  Recent Labs   Lab 12/25/18  1239 12/26/18  0425 12/27/18  0626   WBC 10.57 6.41 5.16   HGB 12.8* 12.2* 12.5*   HCT 38.7* 37.3* 38.9*    274 294   GRAN 76.6*  8.1* 72.8  4.7 60.7  3.1   LYMPH 16.7*  1.8 20.1  1.3 27.1  1.4   MONO 5.1  0.5 4.8  0.3 8.5  0.4   EOS 0.1 0.1 0.2   BASO 0.01 0.01 0.01      BMP:  Recent Labs   Lab 12/25/18  1239 12/26/18  0425 12/27/18  0626   *  135* 137   K 4.1 4.2 4.0    102 101   CO2 25 26 28   BUN 15 9 10   CREATININE 0.9 0.9 0.8   * 285* 165*   CALCIUM 9.1 8.5* 8.6*   MG  --  1.5* 1.9   PHOS  --  2.5* 3.3      Recent Labs   Lab 12/26/18  0837 12/26/18  0845   LABBLOO No Growth to date No Growth to date      Significant Imaging:   No new imaging this morning.    Assessment/Plan:      * Osteomyelitis of left foot    - Osteomyelitis of L 3rd toe, cellulitis of R foot and concurrent bacteremia in the setting of diabetic peripheral vascular disease and poorly-controlled diabetes.  - Blood cultures 12/25 demonstrate group B streptococcus.  - De-escalate from cefepime, vancomycin to ceftriaxone 2g IV q24hr.  - Appreciate podiatry assistance.     Ulcers of both lower extremities    - As under osteomyelitis.     Cellulitis of right lower extremity    - As under osteomyelitis.     Bacteremia due to group B Streptococcus    - As under osteomyelitis.     Hyperlipidemia    - Continuing atorvastatin 20mg PO daily.     Diabetic peripheral vascular disease    - Hold aspirin in setting of podiatric intervention.     Essential hypertension    -Continuing losartan 25mg PO daily.     Type 2 diabetes mellitus with diabetic polyneuropathy, with long-term current use of insulin    - Continuing insulin detemir at 50U subQ BID, insulin aspart 10U subQ TIDWM with additional moderate-dose sliding scale insulin aspart 1-10U subQ TIDWM PRN.  - Continuing gabapentin 300mg PO TID for neuropathy.     Diabetic foot ulcers    - As under osteomyelitis.     Morbid obesity with BMI of 50.0-59.9, adult    - Risk factor for DMII, hypertension, hyperlipidemia.       VTE Risk Mitigation (From admission, onward)        Ordered     enoxaparin injection 40 mg  Every 12 hours      12/25/18 1535     IP VTE HIGH RISK PATIENT  Once      12/25/18 1535     Place sequential compression device  Until discontinued      12/25/18 1535        SHAYY Meraz MD  Department of Hospital Medicine    [facility]  [phone]

## 2018-12-27 NOTE — PLAN OF CARE
12/27/2018      Andrew Del Cid Jr.  [address] [address]          [facility] Medicine Dept.  [facility] [address] [facility] [address] [phone] after hours  [phone] fax Andrew ADAMS Maria Eugenia Zaragoza has been hospitalized at the [facility] since 12/25/2018.  Please excuse the patient from duties.    Please contact me if you have any questions.                  __________________________  D Ismael Hendricks MD  12/27/2018

## 2018-12-27 NOTE — SUBJECTIVE & OBJECTIVE
Interval History: No acute events overnight. Feeling well this morning. No other concerns at this time.     Review of Systems   Constitutional: Negative for chills and fever.   Respiratory: Negative for cough and shortness of breath.    Cardiovascular: Negative for chest pain and palpitations.   Gastrointestinal: Negative for abdominal pain, nausea and vomiting.     Objective:     Vital Signs (Most Recent):  Temp: 98.4 °F (36.9 °C) (12/27/18 1105)  Pulse: 77 (12/27/18 1105)  Resp: 17 (12/27/18 1105)  BP: 127/71 (12/27/18 1105)  SpO2: (!) 94 % (12/27/18 1105) Vital Signs (24h Range):  Temp:  [97.8 °F (36.6 °C)-98.8 °F (37.1 °C)] 98.4 °F (36.9 °C)  Pulse:  [72-92] 77  Resp:  [16-18] 17  SpO2:  [92 %-98 %] 94 %  BP: (117-152)/(56-78) 127/71     Weight: (!) 184.2 kg (406 lb)  Body mass index is 53.57 kg/m².    Intake/Output Summary (Last 24 hours) at 12/27/2018 1131  Last data filed at 12/27/2018 1102  Gross per 24 hour   Intake 1850 ml   Output 1450 ml   Net 400 ml      Physical Exam   Constitutional: He is oriented to person, place, and time. He appears well-developed. No distress.   Morbidly obese.   HENT:   Head: Normocephalic and atraumatic.   Eyes: Conjunctivae and EOM are normal.   Cardiovascular: Normal rate, regular rhythm, S1 normal and S2 normal.   Pulmonary/Chest: Effort normal and breath sounds normal.   Abdominal: Soft. He exhibits no distension. There is no tenderness.   Musculoskeletal:   Bilateral feet in dressings as per podiatry.   Neurological: He is alert and oriented to person, place, and time.   Skin: Skin is warm and dry.   Nursing note and vitals reviewed.    Significant Labs:   CBC:  Recent Labs   Lab 12/25/18  1239 12/26/18  0425 12/27/18  0626   WBC 10.57 6.41 5.16   HGB 12.8* 12.2* 12.5*   HCT 38.7* 37.3* 38.9*    274 294   GRAN 76.6*  8.1* 72.8  4.7 60.7  3.1   LYMPH 16.7*  1.8 20.1  1.3 27.1  1.4   MONO 5.1  0.5 4.8  0.3 8.5  0.4   EOS 0.1 0.1 0.2   BASO 0.01 0.01 0.01       BMP:  Recent Labs   Lab 12/25/18  1239 12/26/18  0425 12/27/18  0626   * 135* 137   K 4.1 4.2 4.0    102 101   CO2 25 26 28   BUN 15 9 10   CREATININE 0.9 0.9 0.8   * 285* 165*   CALCIUM 9.1 8.5* 8.6*   MG  --  1.5* 1.9   PHOS  --  2.5* 3.3      Recent Labs   Lab 12/26/18  0837 12/26/18  0845   LABBLOO No Growth to date No Growth to date      Significant Imaging:   No new imaging this morning.

## 2018-12-27 NOTE — PROGRESS NOTES
Patient seen with Dr. Fernandes for wound care to both feet.  Wounds cleansed with normal saline, silver contact layer placed.  Gauze, kerlex, cast padding and coban dressing applied.  Right fourth toe:    Wound right fourth toe:    Wound right foot:    Wound plantar surface left foot:    Surgical site left foot:

## 2018-12-27 NOTE — ASSESSMENT & PLAN NOTE
- Osteomyelitis of L 3rd toe, cellulitis of R foot and concurrent bacteremia in the setting of diabetic peripheral vascular disease and poorly-controlled diabetes.  - Blood cultures 12/25 demonstrate group B streptococcus.  - De-escalate from cefepime, vancomycin to ceftriaxone 2g IV q24hr.  - Appreciate podiatry assistance.

## 2018-12-28 LAB
ANION GAP SERPL CALC-SCNC: 9 MMOL/L
BACTERIA BLD CULT: NORMAL
BACTERIA SPEC AEROBE CULT: NORMAL
BACTERIA SPEC AEROBE CULT: NORMAL
BASOPHILS # BLD AUTO: 0.01 K/UL
BASOPHILS NFR BLD: 0.2 %
BUN SERPL-MCNC: 9 MG/DL
CALCIUM SERPL-MCNC: 9.1 MG/DL
CHLORIDE SERPL-SCNC: 101 MMOL/L
CO2 SERPL-SCNC: 29 MMOL/L
CREAT SERPL-MCNC: 0.8 MG/DL
DIFFERENTIAL METHOD: ABNORMAL
EOSINOPHIL # BLD AUTO: 0.2 K/UL
EOSINOPHIL NFR BLD: 4 %
ERYTHROCYTE [DISTWIDTH] IN BLOOD BY AUTOMATED COUNT: 12.1 %
EST. GFR  (AFRICAN AMERICAN): >60 ML/MIN/1.73 M^2
EST. GFR  (NON AFRICAN AMERICAN): >60 ML/MIN/1.73 M^2
GLUCOSE SERPL-MCNC: 182 MG/DL
HCT VFR BLD AUTO: 39.1 %
HGB BLD-MCNC: 12.5 G/DL
LYMPHOCYTES # BLD AUTO: 1.6 K/UL
LYMPHOCYTES NFR BLD: 35.4 %
MAGNESIUM SERPL-MCNC: 1.9 MG/DL
MCH RBC QN AUTO: 28.5 PG
MCHC RBC AUTO-ENTMCNC: 32 G/DL
MCV RBC AUTO: 89 FL
MONOCYTES # BLD AUTO: 0.4 K/UL
MONOCYTES NFR BLD: 8.1 %
NEUTROPHILS # BLD AUTO: 2.4 K/UL
NEUTROPHILS NFR BLD: 51.9 %
PHOSPHATE SERPL-MCNC: 3.3 MG/DL
PLATELET # BLD AUTO: 316 K/UL
PMV BLD AUTO: 9 FL
POCT GLUCOSE: 122 MG/DL (ref 70–110)
POCT GLUCOSE: 145 MG/DL (ref 70–110)
POCT GLUCOSE: 181 MG/DL (ref 70–110)
POCT GLUCOSE: 275 MG/DL (ref 70–110)
POTASSIUM SERPL-SCNC: 4.3 MMOL/L
RBC # BLD AUTO: 4.39 M/UL
SODIUM SERPL-SCNC: 139 MMOL/L
WBC # BLD AUTO: 4.55 K/UL

## 2018-12-28 PROCEDURE — 97530 THERAPEUTIC ACTIVITIES: CPT

## 2018-12-28 PROCEDURE — 63600175 PHARM REV CODE 636 W HCPCS: Performed by: INTERNAL MEDICINE

## 2018-12-28 PROCEDURE — 99232 SBSQ HOSP IP/OBS MODERATE 35: CPT | Mod: ,,, | Performed by: INTERNAL MEDICINE

## 2018-12-28 PROCEDURE — 80048 BASIC METABOLIC PNL TOTAL CA: CPT

## 2018-12-28 PROCEDURE — 85025 COMPLETE CBC W/AUTO DIFF WBC: CPT

## 2018-12-28 PROCEDURE — 84100 ASSAY OF PHOSPHORUS: CPT

## 2018-12-28 PROCEDURE — S5571 INSULIN DISPOS PEN 3 ML: HCPCS | Performed by: INTERNAL MEDICINE

## 2018-12-28 PROCEDURE — 83735 ASSAY OF MAGNESIUM: CPT

## 2018-12-28 PROCEDURE — 36415 COLL VENOUS BLD VENIPUNCTURE: CPT

## 2018-12-28 PROCEDURE — 25000003 PHARM REV CODE 250: Performed by: INTERNAL MEDICINE

## 2018-12-28 PROCEDURE — 99232 PR SUBSEQUENT HOSPITAL CARE,LEVL II: ICD-10-PCS | Mod: ,,, | Performed by: INTERNAL MEDICINE

## 2018-12-28 PROCEDURE — 11000001 HC ACUTE MED/SURG PRIVATE ROOM

## 2018-12-28 RX ORDER — AMOXICILLIN AND CLAVULANATE POTASSIUM 875; 125 MG/1; MG/1
1 TABLET, FILM COATED ORAL EVERY 12 HOURS
Status: DISCONTINUED | OUTPATIENT
Start: 2018-12-29 | End: 2018-12-30 | Stop reason: HOSPADM

## 2018-12-28 RX ADMIN — GABAPENTIN 300 MG: 300 CAPSULE ORAL at 09:12

## 2018-12-28 RX ADMIN — GABAPENTIN 300 MG: 300 CAPSULE ORAL at 03:12

## 2018-12-28 RX ADMIN — INSULIN DETEMIR 50 UNITS: 100 INJECTION, SOLUTION SUBCUTANEOUS at 09:12

## 2018-12-28 RX ADMIN — ENOXAPARIN SODIUM 40 MG: 100 INJECTION SUBCUTANEOUS at 10:12

## 2018-12-28 RX ADMIN — ATORVASTATIN CALCIUM 20 MG: 20 TABLET, FILM COATED ORAL at 08:12

## 2018-12-28 RX ADMIN — INSULIN ASPART 10 UNITS: 100 INJECTION, SOLUTION INTRAVENOUS; SUBCUTANEOUS at 09:12

## 2018-12-28 RX ADMIN — LOSARTAN POTASSIUM 25 MG: 25 TABLET, FILM COATED ORAL at 08:12

## 2018-12-28 RX ADMIN — INSULIN ASPART 10 UNITS: 100 INJECTION, SOLUTION INTRAVENOUS; SUBCUTANEOUS at 01:12

## 2018-12-28 RX ADMIN — INSULIN ASPART 3 UNITS: 100 INJECTION, SOLUTION INTRAVENOUS; SUBCUTANEOUS at 09:12

## 2018-12-28 RX ADMIN — CEFTRIAXONE 2 G: 2 INJECTION, SOLUTION INTRAVENOUS at 12:12

## 2018-12-28 RX ADMIN — ENOXAPARIN SODIUM 40 MG: 100 INJECTION SUBCUTANEOUS at 09:12

## 2018-12-28 NOTE — PROGRESS NOTES
[facility] Hospital Medicine  Progress Note    Patient Name: Andrew Del Cid Jr.  MRN: [id number]  Patient Class: IP- Inpatient   Admission Date: 12/25/2018  Length of Stay: 3 days  Attending Physician: SHAYY Hendricks MD  Primary Care Provider: Jeannette Herrera MD        Subjective:     Principal Problem:Osteomyelitis of left foot    HPI:  Mr. Del Cid is a 53/M with PMH HTN, HLD, DMII with associated neuropathy and peripheral vascular disease, h/o diabetic osteomyelitis with ultimate amputation of R 1st toe who presented to ED with a ~1.5 week history of worsening wounds to his L foot. He was previously followed by Dr. Camacho (podiatry) and Dr. Herrera (PCP), but lost insurance coverage and has not been seen by a physician in office since 02/2018. He has continued insulin therapy but does not closely monitor his glucose. He reports that his prior diabetic shoes have been wearing down and he switched to tennis shoes about 2 weeks prior to presentation; subsequently he began to develop blisters to his L 3rd toe as well as the plantar side of his left foot. A smaller area of mild skin breakdown began to develop on his R foot as well. He attempted to cover his toe wounds with neosporin ointment and wrapping with minimal effect. Denies significant pain but does note drainage. He has noted worsening erythema and drainage especially from the L 3rd toe. He also notes generalized fatigue which he reports feels similar to when he had his prior amputation, which prompted him to present to the ED.    Hospital Course:  After admission, Podiatry was consulted and MRI obtained for surgical planning. Empiric antibiotics were continued. Initial blood cultures demonstrated GPCs which later speciated as group B streptococcus. He was taken to surgery with podiatry on 12/26 with I&D of R foot abscess and amputation of L 3rd toe.    Interval History: No acute events overnight. Feeling well this morning. No other  concerns at this time.     Review of Systems   Constitutional: Negative for chills and fever.   Respiratory: Negative for cough and shortness of breath.    Cardiovascular: Negative for chest pain and palpitations.   Gastrointestinal: Negative for abdominal pain, nausea and vomiting.     Objective:     Vital Signs (Most Recent):  Temp: 97.8 °F (36.6 °C) (12/28/18 1239)  Pulse: 74 (12/28/18 1400)  Resp: 18 (12/28/18 1239)  BP: (!) 141/68 (12/28/18 1239)  SpO2: (!) 94 % (12/28/18 1239) Vital Signs (24h Range):  Temp:  [97.7 °F (36.5 °C)-99.1 °F (37.3 °C)] 97.8 °F (36.6 °C)  Pulse:  [72-84] 74  Resp:  [14-18] 18  SpO2:  [94 %-98 %] 94 %  BP: (119-154)/(60-79) 141/68     Weight: (!) 184.2 kg (406 lb)  Body mass index is 53.57 kg/m².    Intake/Output Summary (Last 24 hours) at 12/28/2018 1458  Last data filed at 12/28/2018 0900  Gross per 24 hour   Intake 1850.55 ml   Output 2440 ml   Net -589.45 ml      Physical Exam   Constitutional: He is oriented to person, place, and time. He appears well-developed. No distress.   Morbidly obese.   HENT:   Head: Normocephalic and atraumatic.   Eyes: Conjunctivae and EOM are normal.   Cardiovascular: Normal rate, regular rhythm, S1 normal and S2 normal.   Pulmonary/Chest: Effort normal and breath sounds normal.   Abdominal: Soft. He exhibits no distension. There is no tenderness.   Musculoskeletal:   Bilateral feet in dressings as per podiatry.   Neurological: He is alert and oriented to person, place, and time.   Skin: Skin is warm and dry.   Nursing note and vitals reviewed.    Significant Labs:   CBC:  Recent Labs   Lab 12/27/18  0626 12/28/18  0427   WBC 5.16 4.55   HGB 12.5* 12.5*   HCT 38.9* 39.1*    316   GRAN 60.7  3.1 51.9  2.4   LYMPH 27.1  1.4 35.4  1.6   MONO 8.5  0.4 8.1  0.4   EOS 0.2 0.2   BASO 0.01 0.01      BMP:  Recent Labs   Lab 12/27/18  0626 12/28/18  0427    139   K 4.0 4.3    101   CO2 28 29   BUN 10 9   CREATININE 0.8 0.8   *  182*   CALCIUM 8.6* 9.1   MG 1.9 1.9   PHOS 3.3 3.3      Recent Labs   Lab 12/26/18  0837 12/26/18  0845   LABBLOO No Growth to date  No Growth to date  No Growth to date No Growth to date  No Growth to date  No Growth to date      Significant Imaging:   No new imaging this morning.    Assessment/Plan:      * Osteomyelitis of left foot    - Osteomyelitis of L 3rd toe, cellulitis of R foot and concurrent bacteremia in the setting of diabetic peripheral vascular disease and poorly-controlled diabetes.  - Blood cultures 12/25 demonstrate group B streptococcus.  - Remaining afebrile; cultures show group B strep sensitive to ampicillin, ceftriaxone; resistant to clindamycin, erythromycin.  - Convert to amoxicillin-clavulanate 875-125mg PO q12hr to complete a 14-day total course, given source control of osteomyelitis with amputation.  - Appreciate podiatry assistance.     Ulcers of both lower extremities    - As under osteomyelitis.     Cellulitis of right lower extremity    - As under osteomyelitis.     Bacteremia due to group B Streptococcus    - As under osteomyelitis.     Hyperlipidemia    - Continuing atorvastatin 20mg PO daily.     Diabetic peripheral vascular disease    - Hold aspirin in setting of podiatric intervention.     Essential hypertension    -Continuing losartan 25mg PO daily.     Type 2 diabetes mellitus with foot ulcer    - Continuing insulin detemir at 50U subQ BID, insulin aspart 10U subQ TIDWM with additional moderate-dose sliding scale insulin aspart 1-10U subQ TIDWM PRN.  - Continuing gabapentin 300mg PO TID for neuropathy.     Diabetic foot ulcers    - As under osteomyelitis.     Morbid obesity with BMI of 50.0-59.9, adult    - Risk factor for DMII, hypertension, hyperlipidemia.       VTE Risk Mitigation (From admission, onward)        Ordered     enoxaparin injection 40 mg  Every 12 hours      12/25/18 1535     IP VTE HIGH RISK PATIENT  Once      12/25/18 1535     Place sequential compression  device  Until discontinued      12/25/18 0608        SHAYY Meraz MD  Department of Hospital Medicine   [facility]  [phone]

## 2018-12-28 NOTE — PLAN OF CARE
Problem: Physical Therapy Goal  Goal: Physical Therapy Goal  Goals to be met by: 1/3/19    Patient will increase functional independence with mobility by performin. Supine to sit with Mod I  2. Sit to supine with Mod I   3. Sit<>stand transfer with supervision using rolling walker, maintaining PWB (heel WB) through BLE.   4. Gait > 50 feet with SBA using rolling walker while maintaining PWB (heel WB) through BLE.   5. Ascend/descend 15 stairs with bilateral handrails with CGA with or without AD maintaining PWB (heel-WB) through BLE.     Outcome: Ongoing (interventions implemented as appropriate)  Patient demonstrating significant progress with therapy as evidenced by his tolerance to OOB mobility tasks. Patient requiring SBA for supine <> sit, and CGA for sit <> stand. Patient tolerated 15' of gait training with RW wearing [employer] boots while maintaining WBAT on LLE and PWB (heel WB) on RLE for short distances per Dr. Fernandes. Will communicate with MD regarding stair negotiation given that the patient is limited in distance for WB through BLE.

## 2018-12-28 NOTE — PT/OT/SLP PROGRESS
Physical Therapy      Patient Name:  Andrew Del Cid Jr.   MRN:  [id number]    Patient not seen today secondary to Wound care. PT spoke with patient for approx 5 minutes regarding today's POC and WB status. PT exited room to joshua Caruso but wound care and podiatry working with patient upon re-entry to patient's room. Plan to f/u as time permits.     Mine Acosta, PT

## 2018-12-28 NOTE — PLAN OF CARE
Met with patient at bedside to discuss discharge disposition including PT's recommendation for outpatient PT & rolling walker - patient agrees with both recommendations - Ambulatory referral placed for outpatient PT - Heather from [facility] gave approval to pull heavy duty rolling walker from our supply & they will bill patient's Medicaid once it's active - Art [facility] to deliver to bedside

## 2018-12-28 NOTE — NURSING
No significant events this shift. Remains free from fall, injury, and skin breakdown.  VSS stable on RA and afebrile. Positions self independently. No c/o pain this shift. Neuro checks WDL. Tolerating ordered diet. IV site WNL. Plan of care reviewed with patient and all questions answered. Bed low, locked w/ bed alarm on. Call light within reach. Purposeful rounding performed. No other complaints at this time.

## 2018-12-28 NOTE — PT/OT/SLP EVAL
"Physical Therapy Evaluation    Patient Name:  Andrew Del Cid Jr.   MRN:  [id number]    Recommendations:     Discharge Recommendations:  (OP PT)   Discharge Equipment Recommendations: walker, rolling   Barriers to discharge: Pending patient's ability to negotiate stairs    Assessment:     Andrew Del Cid Jr. is a 53 y.o. male admitted with a medical diagnosis of Osteomyelitis of left foot.  He presents with the following impairments/functional limitations:  impaired functional mobilty, gait instability, impaired balance, orthopedic precautions.    Eval complete; Goals established. Patient presented in pleasant demeanor, agreeable to PT evaluation and treatment. Session limited to bed mobility 2/2 need for redistributing boot rather than DARCO boot. Patient unsafe to ambulate on heels with RW while wearing DARCO boot; therefore, PT reached out to MD and is awaiting orders for new orthotic devices.     Patient requires SBA for bed mobility and demonstrates good strength in BLE. Plan to ambulate with patient once provided with orthotics. Patient would benefit from OP PT upon discharge in order to optimize functional mobility outcomes and maximize patient safety.     Rehab Prognosis: Good; patient would benefit from acute skilled PT services to address these deficits and reach maximum level of function.    Recent Surgery: Procedure(s) (LRB):  INCISION AND DRAINAGE, FOOT (Bilateral)  AMPUTATION, TOE; left 3rd toe (Left) 1 Day Post-Op    Plan:     During this hospitalization, patient to be seen 5 x/week to address the identified rehab impairments via gait training, therapeutic activities, therapeutic exercises and progress toward the following goals:    · Plan of Care Expires:  01/26/19    Subjective     Chief Complaint: Did not feel safe ambulating in DARCO shoe  Patient/Family Comments/goals: "I need something to distribute my weight into my heel. I've used one of those boots before. That's what I " "need."  Pain/Comfort:  · Pain Rating 1: 0/10(no pain 2/2 neuropathy)  · Pain Rating Post-Intervention 1: 0/10    Patients cultural, spiritual, Gnosticist conflicts given the current situation: no    Living Environment:  · 2 story home, 5 steps to enter, lives on second floor which is 15 steps; both stairs have BHR  · Lives with wife who is pregnant and toddler; Patient's relatives also live downstairs  · Tub/shower combo with shower chair  · Used crutches in past for surgery  · Was completely independent prior; works at [employer] part-time  · Was ambulating with no AD  · Equipment used at home: crutches, axillary, shower chair.     Objective:     Communicated with RN prior to session.  Patient found all lines intact and call button in reach peripheral IV, telemetry  upon PT entry to room.    General Precautions: Standard, diabetic   Orthopedic Precautions:(BLE PWB (WB through heels only))   Braces: N/A(Order for DARCO boot but patient prefers other orthotic device)     · Cognition:   · Patient is oriented to name, , location and situation   · Pt follows approximately 100% of single-step commands.    · Mood: Pleasant and cooperative.  · Musculoskeletal:  · Posture:    · -       Rounded shoulders  · -       Forward head  · LE ROM/Strength:  · 5/5 BLE hip flexion, knee flex, knee ext   · Did not assess ankle ROM or MMT  · Neuromuscular:  · Sensation: Paresthesias noted in BLE  · Coordination/Tone/Reflexes: No impairments identified with functional mobility. No formal testing performed.   · Balance: sitting balance: MOD I  · Visual-vestibular: No impairments identified with functional mobility. No formal testing performed.  · Integument: Wounds covered with no drainage noted  · Cardiopulmonary:  · Edema: minimal BLE    Functional Mobility:  · Bed Mobility:     · Supine to Sit: stand by assistance  · Sit to Supine: stand by assistance  · Transfers:    · Unable to assess until new orthotic devices received "   · Gait:  · Unable to assess until new orthotic devices received   · Balance: Sitting balance: Mod I      Therapeutic Activities and Exercises:  · Extensively attempted to communicate with MD regarding new orthotic but unable to reach him  · Provided his staff with PT's phone number but did not receive a call back    AM-PAC 6 CLICK MOBILITY  Total Score:17     Patient left supine with all lines intact and call button in reach.    GOALS:   Multidisciplinary Problems     Physical Therapy Goals        Problem: Physical Therapy Goal    Goal Priority Disciplines Outcome Goal Variances Interventions   Physical Therapy Goal     PT, PT/OT Ongoing (interventions implemented as appropriate)     Description:  Goals to be met by: 1/3/19    Patient will increase functional independence with mobility by performin. Supine to sit with Mod I  2. Sit to supine with Mod I   3. Sit<>stand transfer with supervision using rolling walker, maintaining PWB (heel WB) through BLE.   4. Gait > 50 feet with SBA using rolling walker while maintaining PWB (heel WB) through BLE.   5. Ascend/descend 15 stairs with bilateral handrails with CGA with or without AD maintaining PWB (heel-WB) through BLE.                      History:     Past Medical History:   Diagnosis Date    Diabetes mellitus, type 2     Hyperlipidemia     Hypertension     Peripheral neuropathy        Past Surgical History:   Procedure Laterality Date    AMPUTATION, TOE; left 3rd toe Left 2018    Performed [facility] at [facility]    AMPUTATION-TOE Right 2017    Performed by Nino Olguin [facility] at [facility] OR 2ND [facility]    APPENDECTOMY      HERNIA REPAIR      INCISION AND DRAINAGE, FOOT Bilateral 2018    Performed by Rob [facility]as [facility] at [facility] OR    TOE AMPUTATION      right great toe       Clinical Decision Making:     History  Co-morbidities and personal factors that may impact the plan of care Examination  Body Structures and  Functions, activity limitations and participation restrictions that may impact the plan of care Clinical Presentation   Decision Making/ Complexity Score   Co-morbidities:   [] Time since onset of injury / illness / exacerbation  [] Status of current condition  []Patient's cognitive status and safety concerns    [] Multiple Medical Problems (see med hx)  Personal Factors:   [] Patient's age  [] Prior Level of function   [] Patient's home situation (environment and family support)  [] Patient's level of motivation  [] Expected progression of patient      HISTORY:(criteria)    [] 29891 - no personal factors/history    [] 36886 - has 1-2 personal factor/comorbidity     [] 76780 - has >3 personal factor/comorbidity     Body Regions:  [] Objective examination findings  [] Head     []  Neck  [] Trunk   [] Upper Extremity  [] Lower Extremity    Body Systems:  [] For communication ability, affect, cognition, language, and learning style: the assessment of the ability to make needs known, consciousness, orientation (person, place, and time), expected emotional /behavioral responses, and learning preferences (eg, learning barriers, education  needs)  [] For the neuromuscular system: a general assessment of gross coordinated movement (eg, balance, gait, locomotion, transfers, and transitions) and motor function  (motor control and motor learning)  [] For the musculoskeletal system: the assessment of gross symmetry, gross range of motion, gross strength, height, and weight  [] For the integumentary system: the assessment of pliability(texture), presence of scar formation, skin color, and skin integrity  [] For cardiovascular/pulmonary system: the assessment of heart rate, respiratory rate, blood pressure, and edema     Activity limitations:    [] Patient's cognitive status and saf ety concerns          [] Status of current condition      [] Weight bearing restriction  [] Cardiopulmunary Restriction    Participation  Restrictions:   [] Goals and goal agreement with the patient     [] Rehab potential (prognosis) and probable outcome      Examination of Body System: (criteria)    [] 53557 - addressing 1-2 elements    [] 40139 - addressing a total of 3 or more elements     [] 57162 -  Addressing a total of 4 or more elements         Clinical Presentation: (criteria)  Choose one     On examination of body system using standardized tests and measures patient presents with (CHOOSE ONE) elements from any of the following: body structures and functions, activity limitations, and/or participation restrictions.  Leading to a clinical presentation that is considered (CHOOSE ONE)                              Clinical Decision Making  (Eval Complexity):  Choose One     Time Tracking:     PT Received On: 12/27/18  PT Start Time: 1116     PT Stop Time: 1142  PT Total Time (min): 26 min     Billable Minutes: Evaluation 26      Mine Acosta, PT  12/27/2018

## 2018-12-28 NOTE — PT/OT/SLP PROGRESS
"Physical Therapy Treatment    Patient Name:  Andrew Del Cid Jr.   MRN:  [id number]    Recommendations:     Discharge Recommendations:  (OP PT)   Discharge Equipment Recommendations: walker, rolling   Barriers to discharge: Inaccessible home. Must negotiate flight of stairs.     Assessment:     Andrew Del Cid Jr. is a 53 y.o. male admitted with a medical diagnosis of Osteomyelitis of left foot.  He presents with the following impairments/functional limitations:  impaired functional mobilty, gait instability, decreased safety awareness, orthopedic precautions, decreased lower extremity function, decreased ROM.    Patient demonstrating significant progress with therapy as evidenced by his tolerance to OOB mobility tasks. Patient requiring SBA for supine <> sit, and CGA for sit <> stand. Patient tolerated 15' of gait training with RW wearing DARCO boots while maintaining WBAT on LLE and PWB (heel WB) on RLE for short distances per Dr. Fernandes. Will communicate with MD regarding stair negotiation given that the patient is limited in distance for WB through BLE.      Rehab Prognosis: Good; patient would benefit from acute skilled PT services to address these deficits and reach maximum level of function.    Recent Surgery: Procedure(s) (LRB):  INCISION AND DRAINAGE, FOOT (Bilateral)  AMPUTATION, TOE; left 3rd toe (Left) 2 Days Post-Op    Plan:     During this hospitalization, patient to be seen 5 x/week to address the identified rehab impairments via gait training, therapeutic activities, therapeutic exercises and progress toward the following goals:    · Plan of Care Expires:  01/26/19    Subjective     Chief Complaint: No pain   Patient/Family Comments/goals: "That's the good thing with neuropathy... I can't feel any pain!" "Look, honey, I'm used to all this. I've had DARCO boots in the past. I can take care of myself!"  Pain/Comfort:  · Pain Rating 1: 0/10  · Pain Rating Post-Intervention 1: 0/10      Objective: "     Communicated with RN prior to session.  Patient found all lines intact and call button in reach telemetry, peripheral IV  upon PT entry to room.     General Precautions: Standard, diabetic   Orthopedic Precautions:(Short distances: LLE WBAT, RLE PWB (heel WB). Darco boots donned for all OOB mobility per Dr. Fernandes)   Braces: N/A(XL open toed DARCO boots)     Functional Mobility:  · Bed Mobility:     · Supine to Sit: stand by assistance  · Sit to Supine: stand by assistance  · Transfers:     · Sit to Stand:  stand by assistance and contact guard assistance with rolling walker  · CGA for safety   · SBA for one trial as patient asked if patient could try by himself; although patient was able to perform t/f, patient would benefit from continued CGA 2/2 poor safety  · Gait:   · 15' with RW, CGA for safety  · Impulsivity requiring Moderate verbal cues to slow down while ambulating  · Able to maintain WB precautions for short distances  · Balance:   · Standing balance: CGA with RW  · Able to maintain WB precautions    AM-PAC 6 CLICK MOBILITY  Turning over in bed (including adjusting bedclothes, sheets and blankets)?: 4  Sitting down on and standing up from a chair with arms (e.g., wheelchair, bedside commode, etc.): 3  Moving from lying on back to sitting on the side of the bed?: 4  Moving to and from a bed to a chair (including a wheelchair)?: 3  Need to walk in hospital room?: 3  Climbing 3-5 steps with a railing?: 2  Basic Mobility Total Score: 19       Therapeutic Activities and Exercises:  - reminded patient of WB precautions multiple times throughout session   - proper fitting of RW  - extensive discussion regarding proper fit of RW  - donned DARCO; patient preferred to DOFF DARCO    Patient left supine with all lines intact, call button in reach and RN notified..    GOALS:   Multidisciplinary Problems     Physical Therapy Goals        Problem: Physical Therapy Goal    Goal Priority Disciplines Outcome Goal  Variances Interventions   Physical Therapy Goal     PT, PT/OT Ongoing (interventions implemented as appropriate)     Description:  Goals to be met by: 1/3/19    Patient will increase functional independence with mobility by performin. Supine to sit with Mod I  2. Sit to supine with Mod I   3. Sit<>stand transfer with supervision using rolling walker, maintaining PWB (heel WB) through BLE.   4. Gait > 50 feet with SBA using rolling walker while maintaining PWB (heel WB) through BLE.   5. Ascend/descend 15 stairs with bilateral handrails with CGA with or without AD maintaining PWB (heel-WB) through BLE.                      Time Tracking:     PT Received On: 18  PT Start Time: 1553     PT Stop Time: 1608  PT Total Time (min): 15 min     Billable Minutes: Therapeutic Activity 15    Treatment Type: Treatment  PT/PTA: PT     PTA Visit Number: 0     Mine Acosta, PT  2018

## 2018-12-28 NOTE — PLAN OF CARE
Problem: Adult Inpatient Plan of Care  Goal: Plan of Care Review  Outcome: Ongoing (interventions implemented as appropriate)  Patient on RA with Sats 95%. No distress noted, will continue to monitor.

## 2018-12-28 NOTE — ASSESSMENT & PLAN NOTE
- Osteomyelitis of L 3rd toe, cellulitis of R foot and concurrent bacteremia in the setting of diabetic peripheral vascular disease and poorly-controlled diabetes.  - Blood cultures 12/25 demonstrate group B streptococcus.  - Remaining afebrile; cultures show group B strep sensitive to ampicillin, ceftriaxone; resistant to clindamycin, erythromycin.  - Convert to amoxicillin-clavulanate 875-125mg PO q12hr to complete a 14-day total course, given source control of osteomyelitis with amputation.  - Appreciate podiatry assistance.

## 2018-12-28 NOTE — PROGRESS NOTES
Patient seen with Dr. Fernandes.  Left toe amputation site sutured by physician.  Silver contact layer placed to protect prior to wrap being applied.  Left foot plantar wound sutures removed by physician.  Partial thickness wound. Silver contact layer placed to protect prior to wrap being applied.   Right foot medial wound sutured partially by MD then wound gel placed in base with gauze strip to fill.  Silver contact layer to cover.  Silver contact layer to protect right fourth toe.  kerlex wrap followed by cast padding wrap to both feet with coban to secure.    Left foot plantar wound prior to suture removal     Right fourth toe:    Right foot medial wound:

## 2018-12-28 NOTE — SUBJECTIVE & OBJECTIVE
Interval History: No acute events overnight. Feeling well this morning. No other concerns at this time.     Review of Systems   Constitutional: Negative for chills and fever.   Respiratory: Negative for cough and shortness of breath.    Cardiovascular: Negative for chest pain and palpitations.   Gastrointestinal: Negative for abdominal pain, nausea and vomiting.     Objective:     Vital Signs (Most Recent):  Temp: 97.8 °F (36.6 °C) (12/28/18 1239)  Pulse: 74 (12/28/18 1400)  Resp: 18 (12/28/18 1239)  BP: (!) 141/68 (12/28/18 1239)  SpO2: (!) 94 % (12/28/18 1239) Vital Signs (24h Range):  Temp:  [97.7 °F (36.5 °C)-99.1 °F (37.3 °C)] 97.8 °F (36.6 °C)  Pulse:  [72-84] 74  Resp:  [14-18] 18  SpO2:  [94 %-98 %] 94 %  BP: (119-154)/(60-79) 141/68     Weight: (!) 184.2 kg (406 lb)  Body mass index is 53.57 kg/m².    Intake/Output Summary (Last 24 hours) at 12/28/2018 1458  Last data filed at 12/28/2018 0900  Gross per 24 hour   Intake 1850.55 ml   Output 2440 ml   Net -589.45 ml      Physical Exam   Constitutional: He is oriented to person, place, and time. He appears well-developed. No distress.   Morbidly obese.   HENT:   Head: Normocephalic and atraumatic.   Eyes: Conjunctivae and EOM are normal.   Cardiovascular: Normal rate, regular rhythm, S1 normal and S2 normal.   Pulmonary/Chest: Effort normal and breath sounds normal.   Abdominal: Soft. He exhibits no distension. There is no tenderness.   Musculoskeletal:   Bilateral feet in dressings as per podiatry.   Neurological: He is alert and oriented to person, place, and time.   Skin: Skin is warm and dry.   Nursing note and vitals reviewed.    Significant Labs:   CBC:  Recent Labs   Lab 12/27/18  0626 12/28/18  0427   WBC 5.16 4.55   HGB 12.5* 12.5*   HCT 38.9* 39.1*    316   GRAN 60.7  3.1 51.9  2.4   LYMPH 27.1  1.4 35.4  1.6   MONO 8.5  0.4 8.1  0.4   EOS 0.2 0.2   BASO 0.01 0.01      BMP:  Recent Labs   Lab 12/27/18  0626 12/28/18  0427    139    K 4.0 4.3    101   CO2 28 29   BUN 10 9   CREATININE 0.8 0.8   * 182*   CALCIUM 8.6* 9.1   MG 1.9 1.9   PHOS 3.3 3.3      Recent Labs   Lab 12/26/18  0837 12/26/18  0845   LABBLOO No Growth to date  No Growth to date  No Growth to date No Growth to date  No Growth to date  No Growth to date      Significant Imaging:   No new imaging this morning.

## 2018-12-28 NOTE — PLAN OF CARE
Problem: Physical Therapy Goal  Goal: Physical Therapy Goal  Goals to be met by: 1/3/19    Patient will increase functional independence with mobility by performin. Supine to sit with Mod I  2. Sit to supine with Mod I   3. Sit<>stand transfer with supervision using rolling walker, maintaining PWB (heel WB) through BLE.   4. Gait > 50 feet with SBA using rolling walker while maintaining PWB (heel WB) through BLE.   5. Ascend/descend 15 stairs with bilateral handrails with CGA with or without AD maintaining PWB (heel-WB) through BLE.    Outcome: Ongoing (interventions implemented as appropriate)  Eval complete; Goals established. Patient presented in pleasant demeanor, agreeable to PT evaluation and treatment. Session limited to bed mobility 2/2 need for redistributing boot rather than DARCO boot. Patient unsafe to ambulate on heels with RW while wearing DARCO boot; therefore, PT reached out to MD and is awaiting orders for new orthotic devices.     Patient requires SBA for bed mobility and demonstrates good strength in BLE. Plan to ambulate with patient once provided with orthotics. Patient would benefit from OP PT upon discharge in order to optimize functional mobility outcomes and maximize patient safety.

## 2018-12-29 LAB
ANION GAP SERPL CALC-SCNC: 10 MMOL/L
BASOPHILS # BLD AUTO: 0.02 K/UL
BASOPHILS NFR BLD: 0.4 %
BUN SERPL-MCNC: 9 MG/DL
CALCIUM SERPL-MCNC: 9.4 MG/DL
CHLORIDE SERPL-SCNC: 101 MMOL/L
CO2 SERPL-SCNC: 29 MMOL/L
CREAT SERPL-MCNC: 0.8 MG/DL
DIFFERENTIAL METHOD: ABNORMAL
EOSINOPHIL # BLD AUTO: 0.2 K/UL
EOSINOPHIL NFR BLD: 4.3 %
ERYTHROCYTE [DISTWIDTH] IN BLOOD BY AUTOMATED COUNT: 12 %
EST. GFR  (AFRICAN AMERICAN): >60 ML/MIN/1.73 M^2
EST. GFR  (NON AFRICAN AMERICAN): >60 ML/MIN/1.73 M^2
GLUCOSE SERPL-MCNC: 130 MG/DL
HCT VFR BLD AUTO: 39.2 %
HGB BLD-MCNC: 12.8 G/DL
LYMPHOCYTES # BLD AUTO: 1.5 K/UL
LYMPHOCYTES NFR BLD: 34.6 %
MAGNESIUM SERPL-MCNC: 1.7 MG/DL
MCH RBC QN AUTO: 28.8 PG
MCHC RBC AUTO-ENTMCNC: 32.7 G/DL
MCV RBC AUTO: 88 FL
MONOCYTES # BLD AUTO: 0.4 K/UL
MONOCYTES NFR BLD: 9.2 %
NEUTROPHILS # BLD AUTO: 2.3 K/UL
NEUTROPHILS NFR BLD: 51.1 %
PHOSPHATE SERPL-MCNC: 3.7 MG/DL
PLATELET # BLD AUTO: 320 K/UL
PMV BLD AUTO: 8.9 FL
POCT GLUCOSE: 134 MG/DL (ref 70–110)
POCT GLUCOSE: 174 MG/DL (ref 70–110)
POCT GLUCOSE: 247 MG/DL (ref 70–110)
POCT GLUCOSE: 262 MG/DL (ref 70–110)
POTASSIUM SERPL-SCNC: 4.1 MMOL/L
RBC # BLD AUTO: 4.45 M/UL
SODIUM SERPL-SCNC: 140 MMOL/L
WBC # BLD AUTO: 4.45 K/UL

## 2018-12-29 PROCEDURE — 85025 COMPLETE CBC W/AUTO DIFF WBC: CPT

## 2018-12-29 PROCEDURE — 36415 COLL VENOUS BLD VENIPUNCTURE: CPT

## 2018-12-29 PROCEDURE — 99232 PR SUBSEQUENT HOSPITAL CARE,LEVL II: ICD-10-PCS | Mod: ,,, | Performed by: INTERNAL MEDICINE

## 2018-12-29 PROCEDURE — 94761 N-INVAS EAR/PLS OXIMETRY MLT: CPT

## 2018-12-29 PROCEDURE — 99232 SBSQ HOSP IP/OBS MODERATE 35: CPT | Mod: ,,, | Performed by: INTERNAL MEDICINE

## 2018-12-29 PROCEDURE — 99024 PR POST-OP FOLLOW-UP VISIT: ICD-10-PCS | Mod: ,,, | Performed by: PODIATRIST

## 2018-12-29 PROCEDURE — 99024 POSTOP FOLLOW-UP VISIT: CPT | Mod: ,,, | Performed by: PODIATRIST

## 2018-12-29 PROCEDURE — 11000001 HC ACUTE MED/SURG PRIVATE ROOM

## 2018-12-29 PROCEDURE — 80048 BASIC METABOLIC PNL TOTAL CA: CPT

## 2018-12-29 PROCEDURE — 25000003 PHARM REV CODE 250: Performed by: INTERNAL MEDICINE

## 2018-12-29 PROCEDURE — 63600175 PHARM REV CODE 636 W HCPCS: Performed by: INTERNAL MEDICINE

## 2018-12-29 PROCEDURE — 83735 ASSAY OF MAGNESIUM: CPT

## 2018-12-29 PROCEDURE — 84100 ASSAY OF PHOSPHORUS: CPT

## 2018-12-29 RX ADMIN — INSULIN DETEMIR 50 UNITS: 100 INJECTION, SOLUTION SUBCUTANEOUS at 08:12

## 2018-12-29 RX ADMIN — AMOXICILLIN AND CLAVULANATE POTASSIUM 1 TABLET: 875; 125 TABLET, FILM COATED ORAL at 08:12

## 2018-12-29 RX ADMIN — INSULIN ASPART 4 UNITS: 100 INJECTION, SOLUTION INTRAVENOUS; SUBCUTANEOUS at 04:12

## 2018-12-29 RX ADMIN — INSULIN ASPART 10 UNITS: 100 INJECTION, SOLUTION INTRAVENOUS; SUBCUTANEOUS at 06:12

## 2018-12-29 RX ADMIN — LOSARTAN POTASSIUM 25 MG: 25 TABLET, FILM COATED ORAL at 08:12

## 2018-12-29 RX ADMIN — INSULIN ASPART 10 UNITS: 100 INJECTION, SOLUTION INTRAVENOUS; SUBCUTANEOUS at 12:12

## 2018-12-29 RX ADMIN — ATORVASTATIN CALCIUM 20 MG: 20 TABLET, FILM COATED ORAL at 08:12

## 2018-12-29 RX ADMIN — ENOXAPARIN SODIUM 40 MG: 100 INJECTION SUBCUTANEOUS at 08:12

## 2018-12-29 RX ADMIN — GABAPENTIN 300 MG: 300 CAPSULE ORAL at 02:12

## 2018-12-29 RX ADMIN — GABAPENTIN 300 MG: 300 CAPSULE ORAL at 08:12

## 2018-12-29 RX ADMIN — INSULIN ASPART 10 UNITS: 100 INJECTION, SOLUTION INTRAVENOUS; SUBCUTANEOUS at 08:12

## 2018-12-29 RX ADMIN — INSULIN ASPART 3 UNITS: 100 INJECTION, SOLUTION INTRAVENOUS; SUBCUTANEOUS at 09:12

## 2018-12-29 NOTE — PROGRESS NOTES
[facility] Hospital Medicine  Progress Note    Patient Name: Andrew Del Cid Jr.  MRN: [id number]  Patient Class: IP- Inpatient   Admission Date: 12/25/2018  Length of Stay: 4 days  Attending Physician: SHAYY Hendricks MD  Primary Care Provider: Jeannette Herrera MD        Subjective:     Principal Problem:Osteomyelitis of left foot    HPI:  Mr. Del Cid is a 53/M with PMH HTN, HLD, DMII with associated neuropathy and peripheral vascular disease, h/o diabetic osteomyelitis with ultimate amputation of R 1st toe who presented to ED with a ~1.5 week history of worsening wounds to his L foot. He was previously followed by Dr. Camacho (podiatry) and Dr. Herrera (PCP), but lost insurance coverage and has not been seen by a physician in office since 02/2018. He has continued insulin therapy but does not closely monitor his glucose. He reports that his prior diabetic shoes have been wearing down and he switched to tennis shoes about 2 weeks prior to presentation; subsequently he began to develop blisters to his L 3rd toe as well as the plantar side of his left foot. A smaller area of mild skin breakdown began to develop on his R foot as well. He attempted to cover his toe wounds with neosporin ointment and wrapping with minimal effect. Denies significant pain but does note drainage. He has noted worsening erythema and drainage especially from the L 3rd toe. He also notes generalized fatigue which he reports feels similar to when he had his prior amputation, which prompted him to present to the ED.    Hospital Course:  After admission, Podiatry was consulted and MRI obtained for surgical planning. Empiric antibiotics were continued. Initial blood cultures demonstrated GPCs which later speciated as group B streptococcus. He was taken to surgery with podiatry on 12/26 with I&D of R foot abscess and amputation of L 3rd toe.    Interval History: No acute events overnight. Feeling well this morning. No other  concerns at this time.     Review of Systems   Constitutional: Negative for chills and fever.   Respiratory: Negative for cough and shortness of breath.    Cardiovascular: Negative for chest pain and palpitations.   Gastrointestinal: Negative for abdominal pain, nausea and vomiting.     Objective:     Vital Signs (Most Recent):  Temp: 98.1 °F (36.7 °C) (12/29/18 1221)  Pulse: 78 (12/29/18 1400)  Resp: 18 (12/29/18 1221)  BP: (!) 141/74 (12/29/18 1221)  SpO2: 95 % (12/29/18 1221) Vital Signs (24h Range):  Temp:  [96.6 °F (35.9 °C)-99 °F (37.2 °C)] 98.1 °F (36.7 °C)  Pulse:  [69-84] 78  Resp:  [18-20] 18  SpO2:  [94 %-99 %] 95 %  BP: (132-163)/(70-82) 141/74     Weight: (!) 184.2 kg (406 lb)  Body mass index is 53.57 kg/m².    Intake/Output Summary (Last 24 hours) at 12/29/2018 1630  Last data filed at 12/29/2018 1418  Gross per 24 hour   Intake --   Output 2500 ml   Net -2500 ml      Physical Exam   Constitutional: He is oriented to person, place, and time. He appears well-developed. No distress.   Morbidly obese.   HENT:   Head: Normocephalic and atraumatic.   Eyes: Conjunctivae and EOM are normal.   Cardiovascular: Normal rate, regular rhythm, S1 normal and S2 normal.   Pulmonary/Chest: Effort normal and breath sounds normal.   Abdominal: Soft. He exhibits no distension. There is no tenderness.   Musculoskeletal:   Bilateral feet in dressings as per podiatry.   Neurological: He is alert and oriented to person, place, and time.   Skin: Skin is warm and dry.   Nursing note and vitals reviewed.    Significant Labs:   CBC:  Recent Labs   Lab 12/28/18  0427 12/29/18  0806   WBC 4.55 4.45   HGB 12.5* 12.8*   HCT 39.1* 39.2*    320   GRAN 51.9  2.4 51.1  2.3   LYMPH 35.4  1.6 34.6  1.5   MONO 8.1  0.4 9.2  0.4   EOS 0.2 0.2   BASO 0.01 0.02      BMP:  Recent Labs   Lab 12/28/18  0427 12/29/18  0806    140   K 4.3 4.1    101   CO2 29 29   BUN 9 9   CREATININE 0.8 0.8   * 130*   CALCIUM 9.1  9.4   MG 1.9 1.7   PHOS 3.3 3.7      Recent Labs   Lab 12/26/18  0837 12/26/18  0845   LABBLOO No Growth to date  No Growth to date  No Growth to date  No Growth to date No Growth to date  No Growth to date  No Growth to date  No Growth to date      Significant Imaging:   No new imaging this morning.    Assessment/Plan:      * Osteomyelitis of left foot    - Osteomyelitis of L 3rd toe, cellulitis of R foot and concurrent bacteremia in the setting of diabetic peripheral vascular disease and poorly-controlled diabetes.  - Blood cultures 12/25 demonstrate group B streptococcus.  - Continuing amoxicillin-clavulanate 875-125mg PO q12hr to complete a 14-day total course, given source control of osteomyelitis with amputation.  - Appreciate podiatry assistance.     Ulcers of both lower extremities    - As under osteomyelitis.     Cellulitis of right lower extremity    - As under osteomyelitis.     Bacteremia due to group B Streptococcus    - As under osteomyelitis.     Hyperlipidemia    - Continuing atorvastatin 20mg PO daily.     Diabetic peripheral vascular disease    - Hold aspirin in setting of podiatric intervention.     Essential hypertension    -Continuing losartan 25mg PO daily.     Type 2 diabetes mellitus with foot ulcer    - Continuing insulin detemir at 50U subQ BID, insulin aspart 10U subQ TIDWM with additional moderate-dose sliding scale insulin aspart 1-10U subQ TIDWM PRN.  - Continuing gabapentin 300mg PO TID for neuropathy.     Diabetic foot ulcers    - As under osteomyelitis.     Morbid obesity with BMI of 50.0-59.9, adult    - Risk factor for DMII, hypertension, hyperlipidemia.       VTE Risk Mitigation (From admission, onward)        Ordered     enoxaparin injection 40 mg  Every 12 hours      12/25/18 1535     IP VTE HIGH RISK PATIENT  Once      12/25/18 1535     Place sequential compression device  Until discontinued      12/25/18 1535        SHAYY Meraz MD  Department of Hospital Medicine    [facility]  [phone]

## 2018-12-29 NOTE — PROGRESS NOTES
[facility]  Podiatry  Progress Note    Patient Name: Andrew Del Cid Jr.  MRN: [id number]  Admission Date: 12/25/2018  Hospital Length of Stay: 4 days  Attending Physician: SHAYY Hendricks MD  Primary Care Provider: Jeannette Herrera MD     Subjective:     Interval History:  Dressings intact no fevers; no calf pain;  No foot pain.  Patient resting comfortably in bed.     Follow-up For: Procedure(s) (LRB):  INCISION AND DRAINAGE, FOOT (Bilateral)  AMPUTATION, TOE; left 3rd toe (Left)    Post-Operative Day: 2 Days Post-Op    Scheduled Meds:   amoxicillin-clavulanate 875-125mg  1 tablet Oral Q12H    atorvastatin  20 mg Oral Daily    enoxparin  40 mg Subcutaneous Q12H    gabapentin  300 mg Oral TID    insulin aspart U-100  10 Units Subcutaneous TIDWM    insulin detemir U-100  50 Units Subcutaneous BID    losartan  25 mg Oral Daily     Continuous Infusions:  PRN Meds:benzonatate, dextrose 50%, dextrose 50%, glucagon (human recombinant), glucose, glucose, insulin aspart U-100, sodium chloride 0.9%, sodium chloride 0.9%    Review of Systems   Constitutional: Negative for chills and fever.   Respiratory: Negative for shortness of breath and wheezing.    Cardiovascular: Negative for chest pain and palpitations.   Gastrointestinal: Negative for nausea and vomiting.   Skin: Positive for wound.     Objective:     Vital Signs (Most Recent):  Temp: 98.6 °F (37 °C) (12/28/18 2351)  Pulse: 70 (12/29/18 0000)  Resp: 18 (12/28/18 2351)  BP: (!) 146/81 (12/28/18 2351)  SpO2: 96 % (12/28/18 2351) Vital Signs (24h Range):  Temp:  [97.7 °F (36.5 °C)-98.6 °F (37 °C)] 98.6 °F (37 °C)  Pulse:  [70-84] 70  Resp:  [14-20] 18  SpO2:  [94 %-96 %] 96 %  BP: (137-163)/(68-81) 146/81     Weight: (!) 184.2 kg (406 lb)  Body mass index is 53.57 kg/m².    Foot Exam    Laboratory:  CMP:   Recent Labs   Lab 12/25/18  1239  12/28/18  0427   *   < > 182*   CALCIUM 9.1   < > 9.1   ALBUMIN 2.9*  --   --    PROT 7.5  --    --    *   < > 139   K 4.1   < > 4.3   CO2 25   < > 29      < > 101   BUN 15   < > 9   CREATININE 0.9   < > 0.8   ALKPHOS 87  --   --    ALT 12  --   --    AST 7*  --   --    BILITOT 1.5*  --   --     < > = values in this interval not displayed.     CRP:   Recent Labs   Lab 12/25/18  1239   .1*     ESR:   Recent Labs   Lab 12/25/18  1239   SEDRATE 68*       Diagnostic Results:  Right  Foot MRI:    Impression       Status post 1st toe amputation.  Ulcer with extensive soft tissue edema about the stump.  Bone marrow edema at the stump without soniya replacement in keeping with reactive osteitis.    Neuropathic arthropathy.       Left Foot MRI:  Impression       Osteomyelitis of 3rd distal phalanx.    Ulcer with sinus tract to the 1st MTP joint.    Neuropathic arthropathy of midfoot and forefoot.       Clinical Findings:  S/p left 3rd toe amputation with sutures intact and small 1.5cm opening at the plantar aspect with packing material.   Wound edges appear healthy, no dehiscence.     S/p LEFT sub 1st metatarsal head wound debridement with sutures intact.  Slightly macerated site but no wound dehiscence.     S/p I&D abscess at the site of the 1st metatarsal RIGHT foot, packing material in place,  With no purulence drainage evident, wound edges appear viable.       Wound noted left 4th toe at the proximal interphalangeal joint with no drainage noted.  Dry scab noted over the proximal interphalangeal joint     No tenderness to palpation. No cellulitis evident.           Assessment/Plan:     Active Diagnoses:    Diagnosis Date Noted POA    PRINCIPAL PROBLEM:  Osteomyelitis of left foot [M86.9] 12/25/2018 Yes    Bacteremia due to group B Streptococcus [R78.81] 12/26/2018 Yes    Ulcers of both lower extremities [L97.919, L97.929] 12/26/2018 Yes    Cellulitis of right lower extremity [L03.115]  Yes    Hyperlipidemia [E78.5] 12/25/2018 Yes     Chronic    Diabetic peripheral vascular disease [E11.51]  07/03/2017 Yes     Chronic    Essential hypertension [I10] 06/30/2017 Yes     Chronic    Type 2 diabetes mellitus with foot ulcer [E11.621, L97.509] 06/30/2017 Yes     Chronic    Diabetic Charcot foot [E11.610] 06/30/2017 Yes     Chronic    Diabetic foot ulcers [E11.621, L97.509] 06/29/2017 Yes    Morbid obesity with BMI of 50.0-59.9, adult [E66.01, Z68.43] 06/04/2015 Not Applicable     Chronic      Problems Resolved During this Admission:       2 days S/p  INCISION AND DRAINAGE, FOOT (Bilateral);  AMPUTATION, TOE; left 3rd toe (Left);  Doing well.      Delayed secondary closure performed at the open left 3rd toe amputation site and right 1st metatarsal head head I&D site.  bilateral football dressings applied.  Continue PT with heel weight bearing for short distances.   Once stable, Okay to discharge home with follow up in outpatient Podiatry clinic within a week of discharge.        Yulia Fernandes DPM  Podiatry  [facility]

## 2018-12-29 NOTE — SUBJECTIVE & OBJECTIVE
Interval History: No acute events overnight. Feeling well this morning. No other concerns at this time.     Review of Systems   Constitutional: Negative for chills and fever.   Respiratory: Negative for cough and shortness of breath.    Cardiovascular: Negative for chest pain and palpitations.   Gastrointestinal: Negative for abdominal pain, nausea and vomiting.     Objective:     Vital Signs (Most Recent):  Temp: 98.1 °F (36.7 °C) (12/29/18 1221)  Pulse: 78 (12/29/18 1400)  Resp: 18 (12/29/18 1221)  BP: (!) 141/74 (12/29/18 1221)  SpO2: 95 % (12/29/18 1221) Vital Signs (24h Range):  Temp:  [96.6 °F (35.9 °C)-99 °F (37.2 °C)] 98.1 °F (36.7 °C)  Pulse:  [69-84] 78  Resp:  [18-20] 18  SpO2:  [94 %-99 %] 95 %  BP: (132-163)/(70-82) 141/74     Weight: (!) 184.2 kg (406 lb)  Body mass index is 53.57 kg/m².    Intake/Output Summary (Last 24 hours) at 12/29/2018 1630  Last data filed at 12/29/2018 1418  Gross per 24 hour   Intake --   Output 2500 ml   Net -2500 ml      Physical Exam   Constitutional: He is oriented to person, place, and time. He appears well-developed. No distress.   Morbidly obese.   HENT:   Head: Normocephalic and atraumatic.   Eyes: Conjunctivae and EOM are normal.   Cardiovascular: Normal rate, regular rhythm, S1 normal and S2 normal.   Pulmonary/Chest: Effort normal and breath sounds normal.   Abdominal: Soft. He exhibits no distension. There is no tenderness.   Musculoskeletal:   Bilateral feet in dressings as per podiatry.   Neurological: He is alert and oriented to person, place, and time.   Skin: Skin is warm and dry.   Nursing note and vitals reviewed.    Significant Labs:   CBC:  Recent Labs   Lab 12/28/18  0427 12/29/18  0806   WBC 4.55 4.45   HGB 12.5* 12.8*   HCT 39.1* 39.2*    320   GRAN 51.9  2.4 51.1  2.3   LYMPH 35.4  1.6 34.6  1.5   MONO 8.1  0.4 9.2  0.4   EOS 0.2 0.2   BASO 0.01 0.02      BMP:  Recent Labs   Lab 12/28/18  0427 12/29/18  0806    140   K 4.3 4.1   CL  101 101   CO2 29 29   BUN 9 9   CREATININE 0.8 0.8   * 130*   CALCIUM 9.1 9.4   MG 1.9 1.7   PHOS 3.3 3.7      Recent Labs   Lab 12/26/18  0837 12/26/18  0845   LABBLOO No Growth to date  No Growth to date  No Growth to date  No Growth to date No Growth to date  No Growth to date  No Growth to date  No Growth to date      Significant Imaging:   No new imaging this morning.

## 2018-12-29 NOTE — NURSING
No significant events this shift. Remains free from fall, injury, and skin breakdown. Urinal at bedside. VSS stable on RA and afebrile. Positions self independently.  Pain controlled with PO PRN meds. Neuro checks WDL. Tolerating ordered diet. IV site x2 WNL. Plan of care reviewed with patient and all questions answered. Bed low, locked w/ bed alarm on. Call light within reach. Purposeful rounding performed. No other complaints at this time.

## 2018-12-29 NOTE — ASSESSMENT & PLAN NOTE
- Osteomyelitis of L 3rd toe, cellulitis of R foot and concurrent bacteremia in the setting of diabetic peripheral vascular disease and poorly-controlled diabetes.  - Blood cultures 12/25 demonstrate group B streptococcus.  - Continuing amoxicillin-clavulanate 875-125mg PO q12hr to complete a 14-day total course, given source control of osteomyelitis with amputation.  - Appreciate podiatry assistance.

## 2018-12-29 NOTE — PLAN OF CARE
Problem: Adult Inpatient Plan of Care  Goal: Plan of Care Review  Outcome: Ongoing (interventions implemented as appropriate)  Plan of Care reviewed with patient. Pt free from falls or injury. Pt voiding adequately without difficulty. Dsg to bilateral feet changed per PodiatristJEAN. Family @ bedside. Blood sugars monitored and covered with sliding scale and routine insulin as ordered. Denies any c/o discomfort thus far this shift. Purposeful rounding done. Safety maintained. Bed in lowest position and locked. Call light in reach.

## 2018-12-29 NOTE — PLAN OF CARE
Problem: Adult Inpatient Plan of Care  Goal: Plan of Care Review  Outcome: Ongoing (interventions implemented as appropriate)  No significant events this shift. Remains free from fall, injury, and skin breakdown. Urinal at bedside. VSS stable on RA and afebrile. Positions self independently.  Pain controlled with PO PRN meds. Neuro checks WDL. Tolerating ordered diet. IV site x2 WNL. Plan of care reviewed with patient and all questions answered. Bed low, locked w/ bed alarm on. Call light within reach. Purposeful rounding performed. No other complaints at this time.

## 2018-12-30 VITALS
DIASTOLIC BLOOD PRESSURE: 85 MMHG | TEMPERATURE: 98 F | HEIGHT: 73 IN | BODY MASS INDEX: 41.75 KG/M2 | OXYGEN SATURATION: 93 % | RESPIRATION RATE: 18 BRPM | SYSTOLIC BLOOD PRESSURE: 142 MMHG | WEIGHT: 315 LBS | HEART RATE: 86 BPM

## 2018-12-30 LAB
ANION GAP SERPL CALC-SCNC: 9 MMOL/L
BASOPHILS # BLD AUTO: 0.01 K/UL
BASOPHILS NFR BLD: 0.2 %
BUN SERPL-MCNC: 8 MG/DL
CALCIUM SERPL-MCNC: 9.4 MG/DL
CHLORIDE SERPL-SCNC: 102 MMOL/L
CO2 SERPL-SCNC: 28 MMOL/L
CREAT SERPL-MCNC: 0.8 MG/DL
DIFFERENTIAL METHOD: ABNORMAL
EOSINOPHIL # BLD AUTO: 0.2 K/UL
EOSINOPHIL NFR BLD: 3.5 %
ERYTHROCYTE [DISTWIDTH] IN BLOOD BY AUTOMATED COUNT: 12.1 %
EST. GFR  (AFRICAN AMERICAN): >60 ML/MIN/1.73 M^2
EST. GFR  (NON AFRICAN AMERICAN): >60 ML/MIN/1.73 M^2
GLUCOSE SERPL-MCNC: 178 MG/DL
HCT VFR BLD AUTO: 40.6 %
HGB BLD-MCNC: 13 G/DL
LYMPHOCYTES # BLD AUTO: 1.9 K/UL
LYMPHOCYTES NFR BLD: 39.5 %
MAGNESIUM SERPL-MCNC: 1.7 MG/DL
MCH RBC QN AUTO: 28.2 PG
MCHC RBC AUTO-ENTMCNC: 32 G/DL
MCV RBC AUTO: 88 FL
MONOCYTES # BLD AUTO: 0.4 K/UL
MONOCYTES NFR BLD: 9 %
NEUTROPHILS # BLD AUTO: 2.3 K/UL
NEUTROPHILS NFR BLD: 47 %
PHOSPHATE SERPL-MCNC: 3.8 MG/DL
PLATELET # BLD AUTO: 324 K/UL
PMV BLD AUTO: 8.8 FL
POCT GLUCOSE: 145 MG/DL (ref 70–110)
POCT GLUCOSE: 263 MG/DL (ref 70–110)
POTASSIUM SERPL-SCNC: 4.1 MMOL/L
RBC # BLD AUTO: 4.61 M/UL
SODIUM SERPL-SCNC: 139 MMOL/L
WBC # BLD AUTO: 4.89 K/UL

## 2018-12-30 PROCEDURE — 84100 ASSAY OF PHOSPHORUS: CPT

## 2018-12-30 PROCEDURE — 83735 ASSAY OF MAGNESIUM: CPT

## 2018-12-30 PROCEDURE — 94761 N-INVAS EAR/PLS OXIMETRY MLT: CPT

## 2018-12-30 PROCEDURE — 36415 COLL VENOUS BLD VENIPUNCTURE: CPT

## 2018-12-30 PROCEDURE — 80048 BASIC METABOLIC PNL TOTAL CA: CPT

## 2018-12-30 PROCEDURE — 63600175 PHARM REV CODE 636 W HCPCS: Performed by: INTERNAL MEDICINE

## 2018-12-30 PROCEDURE — 25000003 PHARM REV CODE 250: Performed by: INTERNAL MEDICINE

## 2018-12-30 PROCEDURE — 97116 GAIT TRAINING THERAPY: CPT

## 2018-12-30 PROCEDURE — 99239 PR HOSPITAL DISCHARGE DAY,>30 MIN: ICD-10-PCS | Mod: ,,, | Performed by: INTERNAL MEDICINE

## 2018-12-30 PROCEDURE — 99239 HOSP IP/OBS DSCHRG MGMT >30: CPT | Mod: ,,, | Performed by: INTERNAL MEDICINE

## 2018-12-30 PROCEDURE — S5571 INSULIN DISPOS PEN 3 ML: HCPCS | Performed by: INTERNAL MEDICINE

## 2018-12-30 PROCEDURE — 85025 COMPLETE CBC W/AUTO DIFF WBC: CPT

## 2018-12-30 RX ORDER — LOSARTAN POTASSIUM 25 MG/1
25 TABLET ORAL DAILY
Qty: 30 TABLET | Refills: 2 | Status: SHIPPED | OUTPATIENT
Start: 2018-12-31 | End: 2022-10-04

## 2018-12-30 RX ORDER — INSULIN ASPART 100 [IU]/ML
12 INJECTION, SOLUTION INTRAVENOUS; SUBCUTANEOUS
Qty: 32.4 ML | Refills: 0 | Status: SHIPPED | OUTPATIENT
Start: 2018-12-30 | End: 2022-10-04

## 2018-12-30 RX ORDER — ATORVASTATIN CALCIUM 20 MG/1
20 TABLET, FILM COATED ORAL DAILY
Qty: 30 TABLET | Refills: 2 | Status: SHIPPED | OUTPATIENT
Start: 2018-12-30 | End: 2019-12-30

## 2018-12-30 RX ORDER — AMOXICILLIN AND CLAVULANATE POTASSIUM 875; 125 MG/1; MG/1
1 TABLET, FILM COATED ORAL EVERY 12 HOURS
Qty: 17 TABLET | Refills: 0 | Status: SHIPPED | OUTPATIENT
Start: 2018-12-30 | End: 2019-01-08

## 2018-12-30 RX ADMIN — GABAPENTIN 300 MG: 300 CAPSULE ORAL at 08:12

## 2018-12-30 RX ADMIN — LOSARTAN POTASSIUM 25 MG: 25 TABLET, FILM COATED ORAL at 08:12

## 2018-12-30 RX ADMIN — ENOXAPARIN SODIUM 40 MG: 100 INJECTION SUBCUTANEOUS at 08:12

## 2018-12-30 RX ADMIN — AMOXICILLIN AND CLAVULANATE POTASSIUM 1 TABLET: 875; 125 TABLET, FILM COATED ORAL at 08:12

## 2018-12-30 RX ADMIN — ATORVASTATIN CALCIUM 20 MG: 20 TABLET, FILM COATED ORAL at 08:12

## 2018-12-30 RX ADMIN — INSULIN DETEMIR 52 UNITS: 100 INJECTION, SOLUTION SUBCUTANEOUS at 08:12

## 2018-12-30 RX ADMIN — INSULIN ASPART 10 UNITS: 100 INJECTION, SOLUTION INTRAVENOUS; SUBCUTANEOUS at 08:12

## 2018-12-30 NOTE — DISCHARGE SUMMARY
[facility] Hospital Medicine  Discharge Summary      Patient Name: Andrew Del Cid Jr.  MRN: [id number]  Admission Date: 12/25/2018  Hospital Length of Stay: 5 days  Discharge Date and Time: 12/30/2018 12:32 PM  Attending Physician: SHAYY Hendricks MD   Discharging Provider: HARSHA Hendricks MD  Primary Care Provider: Jeannette Herrera MD      HPI:   Mr. Del Cid is a 53/M with PMH HTN, HLD, DMII with associated neuropathy and peripheral vascular disease, h/o diabetic osteomyelitis with ultimate amputation of R 1st toe who presented to ED with a ~1.5 week history of worsening wounds to his L foot. He was previously followed by Dr. Camacho (podiatry) and Dr. Herrera (PCP), but lost insurance coverage and has not been seen by a physician in office since 02/2018. He has continued insulin therapy but does not closely monitor his glucose. He reports that his prior diabetic shoes have been wearing down and he switched to tennis shoes about 2 weeks prior to presentation; subsequently he began to develop blisters to his L 3rd toe as well as the plantar side of his left foot. A smaller area of mild skin breakdown began to develop on his R foot as well. He attempted to cover his toe wounds with neosporin ointment and wrapping with minimal effect. Denies significant pain but does note drainage. He has noted worsening erythema and drainage especially from the L 3rd toe. He also notes generalized fatigue which he reports feels similar to when he had his prior amputation, which prompted him to present to the ED.    Procedure(s) (LRB):  INCISION AND DRAINAGE, FOOT (Bilateral)  AMPUTATION, TOE; left 3rd toe (Left)      Hospital Course:   After admission, Podiatry was consulted and MRI obtained for surgical planning. Empiric antibiotics were continued. Initial blood cultures demonstrated GPCs which later speciated as group B streptococcus. He was taken to surgery with podiatry on 12/26 with I&D of R foot abscess  and amputation of L 3rd toe. Gait training was performed with PT and orthotics provided with podiatry. He was converted to amoxicillin-clavulanate and will complete a 14-day total course of antibiotics given source control operatively. He will follow-up with podiatry this week and establish a PCP near his podiatrist's office for continued management of his chronic conditions.    Consults:   Consults (From admission, onward)        Status Ordering Provider     Inpatient consult to Podiatry  Once     Provider:  Santos Narvaez MD    Completed ROMEL GRIGGS     Inpatient consult to Podiatry  Once     Provider:  (Not yet assigned)    Completed SHAYY ROYAL          No new Assessment & Plan notes have been filed under this hospital service since the last note was generated.  Service: Hospital Medicine    Final Active Diagnoses:    Diagnosis Date Noted POA    PRINCIPAL PROBLEM:  Osteomyelitis of left foot [M86.9] 12/25/2018 Yes    Bacteremia due to group B Streptococcus [R78.81] 12/26/2018 Yes    Ulcers of both lower extremities [L97.919, L97.929] 12/26/2018 Yes    Cellulitis of right lower extremity [L03.115]  Yes    Hyperlipidemia [E78.5] 12/25/2018 Yes     Chronic    Diabetic peripheral vascular disease [E11.51] 07/03/2017 Yes     Chronic    Essential hypertension [I10] 06/30/2017 Yes     Chronic    Type 2 diabetes mellitus with foot ulcer [E11.621, L97.509] 06/30/2017 Yes     Chronic    Diabetic Charcot foot [E11.610] 06/30/2017 Yes     Chronic    Diabetic foot ulcers [E11.621, L97.509] 06/29/2017 Yes    Morbid obesity with BMI of 50.0-59.9, adult [E66.01, Z68.43] 06/04/2015 Not Applicable     Chronic      Problems Resolved During this Admission:       Discharged Condition: good    Disposition:     Follow Up:  Follow-up Information     [facility]    Specialty:  DME Provider  Why:  heavy duty rolling walker   Contact information:  [address]  [facility]  "[id number]  [phone]             Yulia Fernandes On 1/3/2019.    Why:  at 4pm for podiatry follow up appt   Contact information:  Michael Burch [address] [address] [phone]               Patient Instructions:      WALKER FOR HOME USE     Order Specific Question Answer Comments   Type of Walker: Heavy duty    With wheels? Yes    Height: 6' 1" (1.854 m)    Weight: 184.2 kg (406 lb)    Length of need (1-99 months): 99    Does patient have medical equipment at home? crutches, axillary    Does patient have medical equipment at home? shower chair    Please check all that apply: Patient's condition impairs ambulation.    Please check all that apply: Patient is unable to safely ambulate without equipment.      Ambulatory consult to Physical Therapy   Referral Priority: Routine Referral Type: Physical Medicine   Referral Reason: Specialty Services Required   Requested Specialty: Physical Therapy   Number of Visits Requested: 1       Significant Diagnostic Studies:   Recent Results (from the past 24 hour(s))   POCT glucose    Collection Time: 12/29/18  6:14 PM   Result Value Ref Range    POCT Glucose 263 (H) 70 - 110 mg/dL   POCT glucose    Collection Time: 12/29/18  9:15 PM   Result Value Ref Range    POCT Glucose 262 (H) 70 - 110 mg/dL   Magnesium    Collection Time: 12/30/18  5:03 AM   Result Value Ref Range    Magnesium 1.7 1.6 - 2.6 mg/dL   Phosphorus    Collection Time: 12/30/18  5:03 AM   Result Value Ref Range    Phosphorus 3.8 2.7 - 4.5 mg/dL   CBC with Automated Differential    Collection Time: 12/30/18  5:03 AM   Result Value Ref Range    WBC 4.89 3.90 - 12.70 K/uL    RBC 4.61 4.60 - 6.20 M/uL    Hemoglobin 13.0 (L) 14.0 - 18.0 g/dL    Hematocrit 40.6 40.0 - 54.0 %    MCV 88 82 - 98 fL    MCH 28.2 27.0 - 31.0 pg    MCHC 32.0 32.0 - 36.0 g/dL    RDW 12.1 11.5 - 14.5 %    Platelets 324 150 - 350 K/uL    MPV 8.8 (L) 9.2 - 12.9 fL    Gran # (ANC) 2.3 1.8 - 7.7 K/uL    Lymph # 1.9 1.0 - 4.8 K/uL    " Mono # 0.4 0.3 - 1.0 K/uL    Eos # 0.2 0.0 - 0.5 K/uL    Baso # 0.01 0.00 - 0.20 K/uL    Gran% 47.0 38.0 - 73.0 %    Lymph% 39.5 18.0 - 48.0 %    Mono% 9.0 4.0 - 15.0 %    Eosinophil% 3.5 0.0 - 8.0 %    Basophil% 0.2 0.0 - 1.9 %    Differential Method Automated    Basic Metabolic Panel (BMP)    Collection Time: 12/30/18  5:03 AM   Result Value Ref Range    Sodium 139 136 - 145 mmol/L    Potassium 4.1 3.5 - 5.1 mmol/L    Chloride 102 95 - 110 mmol/L    CO2 28 23 - 29 mmol/L    Glucose 178 (H) 70 - 110 mg/dL    BUN, Bld 8 6 - 20 mg/dL    Creatinine 0.8 0.5 - 1.4 mg/dL    Calcium 9.4 8.7 - 10.5 mg/dL    Anion Gap 9 8 - 16 mmol/L    eGFR if African American >60 >60 mL/min/1.73 m^2    eGFR if non African American >60 >60 mL/min/1.73 m^2   POCT glucose    Collection Time: 12/30/18  7:59 AM   Result Value Ref Range    POCT Glucose 145 (H) 70 - 110 mg/dL     Recent Labs   Lab 12/25/18  1230 12/25/18  1231 12/26/18  0837 12/26/18  0845   LABBLOO Gram stain lizzy bottle: Gram positive cocci in chains resembling Strep   Results called to and read back by: Willian Davidson RN 12/26/2018  04:07  Gram stain aer bottle: Gram positive cocci in chains resembling Strep  Positive results previously called on  12/26/2018  STREPTOCOCCUS AGALACTIAE (GROUP B)  Beta-hemolytic streptococci are routinely susceptible to   penicillins,cephalosporins and carbapenems.   Gram stain lizzy bottle: Gram positive cocci in chains resembling Strep   Results called to and read back by: Willian Davidson RN 12/26/2018  04:07  Gram stain aer bottle: Gram positive cocci in chains resembling Strep  Positive results previously called on  12/26/2018  STREPTOCOCCUS AGALACTIAE (GROUP B)  Beta-hemolytic streptococci are routinely susceptible to   penicillins,cephalosporins and carbapenems.  For susceptibility see order #[id number]   No Growth to date  No Growth to date  No Growth to date  No Growth to date  No Growth to date No Growth to date  No Growth to  date  No Growth to date  No Growth to date  No Growth to date      Pending Diagnostic Studies:     Procedure Component Value Units Date/Time    Basic Metabolic Panel (BMP) [[id number]] Collected:  12/30/18 0503    Order Status:  Sent Lab Status:  In process Updated:  12/30/18 0527    Specimen:  Blood     Magnesium [[id number]] Collected:  12/30/18 0503    Order Status:  Sent Lab Status:  In process Updated:  12/30/18 0527    Specimen:  Blood     Phosphorus [[id number]] Collected:  12/30/18 0503    Order Status:  Sent Lab Status:  In process Updated:  12/30/18 0527    Specimen:  Blood          Medications:  Reconciled Home Medications:      Medication List      START taking these medications    amoxicillin-clavulanate 875-125mg 875-125 mg per tablet  Commonly known as:  AUGMENTIN  Take 1 tablet by mouth every 12 (twelve) hours. for 9 days     losartan 25 MG tablet  Commonly known as:  COZAAR  Take 1 tablet (25 mg total) by mouth once daily.  Start taking on:  12/31/2018        CHANGE how you take these medications    insulin aspart U-100 100 unit/mL Inpn pen  Commonly known as:  NovoLOG  Inject 12 Units into the skin 3 (three) times daily with meals.  What changed:  when to take this     insulin detemir U-100 100 unit/mL (3 mL) Inpn pen  Commonly known as:  LEVEMIR FLEXTOUCH  Inject 52 Units into the skin 2 (two) times daily.  What changed:    · how much to take  · how to take this  · when to take this  · additional instructions        CONTINUE taking these medications    albuterol 90 mcg/actuation inhaler  Commonly known as:  PROVENTIL/VENTOLIN HFA  Inhale 2 puffs into the lungs every 6 (six) hours as needed for Wheezing. Rescue     aspirin 81 MG EC tablet  Commonly known as:  ECOTRIN  Take 81 mg by mouth once daily.     atorvastatin 20 MG tablet  Commonly known as:  LIPITOR  Take 1 tablet (20 mg total) by mouth once daily.     gabapentin 300 MG capsule  Commonly known as:  NEURONTIN  Take 1 capsule (300 mg total)  by mouth 3 (three) times daily.        STOP taking these medications    amLODIPine 5 MG tablet  Commonly known as:  NORVASC     benzonatate 100 MG capsule  Commonly known as:  TESSALON PERLES     insulin regular 100 unit/mL injection  Commonly known as:  Humulin R     lisinopril 10 MG tablet     metFORMIN 1000 MG tablet  Commonly known as:  GLUCOPHAGE     methocarbamol 500 MG Tab  Commonly known as:  ROBAXIN          Indwelling Lines/Drains at time of discharge:   Lines/Drains/Airways          None        Time spent on the discharge of patient: 35 minutes  Patient was seen and examined on the date of discharge and determined to be suitable for discharge.    HARSHA Hendricks MD  Department of Hospital Medicine  [facility]

## 2018-12-30 NOTE — PLAN OF CARE
Problem: Adult Inpatient Plan of Care  Goal: Plan of Care Review  Outcome: Ongoing (interventions implemented as appropriate)  Plan of Care reviewed with patient. Pt free from falls or injury. Pt voiding adequately without difficulty.Blood sugars covered this shift with  sliding scale and routine insulin as ordered. Hourly neuro checks performed. Pt rested well this shift. Denies pain this shift. Purposeful rounding done. Safety maintained. Bed in lowest position and locked. Call light in reach.

## 2018-12-30 NOTE — PT/OT/SLP PROGRESS
Physical Therapy Treatment    Patient Name:  Andrew Del Cid Jr.   MRN:  [id number]    Recommendations:     Discharge Recommendations:  (OPPT)   Discharge Equipment Recommendations: walker, rolling   Barriers to discharge: None    Assessment:     Andrew Del Cid Jr. is a 53 y.o. male admitted with a medical diagnosis of Osteomyelitis of left foot.  He presents with the following impairments/functional limitations:  impaired functional mobilty, gait instability, decreased lower extremity function, decreased safety awareness, impaired skin ;pt with good mobility today, only needing cueing for safety (slowing down, reaching back for chair, keeping B heel partial wt bearing only).    Rehab Prognosis: Good; patient would benefit from acute skilled PT services to address these deficits and reach maximum level of function.    Recent Surgery: Procedure(s) (LRB):  INCISION AND DRAINAGE, FOOT (Bilateral)  AMPUTATION, TOE; left 3rd toe (Left) 4 Days Post-Op    Plan:     During this hospitalization, patient to be seen 5 x/week to address the identified rehab impairments via gait training, therapeutic activities, therapeutic exercises and progress toward the following goals:    · Plan of Care Expires:  01/26/19    Subjective     Chief Complaint: none stated  Patient/Family Comments/goals: pt agreeable to session, wanting to walk to gym to practice steps, explained to pt that it's too far and that he should only be ambulating short distances at this time. Pt brought to dept via w/c.    Pain/Comfort:  · Pain Rating 1: 0/10  · Pain Rating Post-Intervention 1: 0/10      Objective:     Communicated with nurse prior to session.  Patient found sitting up on sofa in room with  peripheral IV  upon PT entry to room.     General Precautions: Standard, diabetic   Orthopedic Precautions:LLE weight bearing as tolerated, RLE partial weight bearing( heel wt bearing only in Darco boots)   Braces: N/A(open toed Darco boots)     Functional  Mobility:  · Transfers:     · Sit to Stand:  supervision and stand by assistance with rolling walker  · Gait: amb'd ~50' with RW and SBA/Supervision,  PWB on heels (Darco boots donned)  · Stairs:  Pt ascended/descended 8 stair(s) with No Assistive Device with bilateral handrails with Stand-by Assistance.       AM-PAC 6 CLICK MOBILITY  Turning over in bed (including adjusting bedclothes, sheets and blankets)?: 4  Sitting down on and standing up from a chair with arms (e.g., wheelchair, bedside commode, etc.): 3  Moving from lying on back to sitting on the side of the bed?: 4  Moving to and from a bed to a chair (including a wheelchair)?: 3  Need to walk in hospital room?: 3  Climbing 3-5 steps with a railing?: 3  Basic Mobility Total Score: 20       Therapeutic Activities and Exercises:       Patient left sitting up on sofa with all lines intact, call button in reach and nurse notified..    GOALS:   Multidisciplinary Problems     Physical Therapy Goals        Problem: Physical Therapy Goal    Goal Priority Disciplines Outcome Goal Variances Interventions   Physical Therapy Goal     PT, PT/OT Ongoing (interventions implemented as appropriate)     Description:  Goals to be met by: 1/3/19    Patient will increase functional independence with mobility by performin. Supine to sit with Mod I  2. Sit to supine with Mod I   3. Sit<>stand transfer with supervision using rolling walker, maintaining PWB (heel WB) through BLE.   4. Gait > 50 feet with SBA using rolling walker while maintaining PWB (heel WB) through BLE.   5. Ascend/descend 15 stairs with bilateral handrails with CGA with or without AD maintaining PWB (heel-WB) through BLE.                      Time Tracking:     PT Received On: 18  PT Start Time: 1035     PT Stop Time: 1048  PT Total Time (min): 13 min     Billable Minutes: Gait Training 13    Treatment Type: Treatment  PT/PTA: PTA     PTA Visit Number: 1     Shilpi Valente PTA  2018

## 2018-12-30 NOTE — SUBJECTIVE & OBJECTIVE
Interval Hx:  Patient Seen at bedside for dressing change.  Patient denies F/C/N/V.  Dressings clean, dry, and intact.    Scheduled Meds:   amoxicillin-clavulanate 875-125mg  1 tablet Oral Q12H    atorvastatin  20 mg Oral Daily    enoxparin  40 mg Subcutaneous Q12H    gabapentin  300 mg Oral TID    insulin aspart U-100  10 Units Subcutaneous TIDWM    insulin detemir U-100  50 Units Subcutaneous BID    losartan  25 mg Oral Daily     Continuous Infusions:  PRN Meds:benzonatate, dextrose 50%, dextrose 50%, glucagon (human recombinant), glucose, glucose, insulin aspart U-100, sodium chloride 0.9%, sodium chloride 0.9%    Review of patient's allergies indicates:  No Known Allergies     Past Medical History:   Diagnosis Date    Diabetes mellitus, type 2     Hyperlipidemia     Hypertension     Peripheral neuropathy      Past Surgical History:   Procedure Laterality Date    AMPUTATION, TOE; left 3rd toe Left 12/26/2018    [facility]    AMPUTATION-TOE Right 7/2/2017    Performed by Nino [facility]guin DPM at [facility] OR 2ND FLR    APPENDECTOMY      HERNIA REPAIR      INCISION AND DRAINAGE, FOOT Bilateral 12/26/2018    Performed by [facility]as D[facility] at [facility] OR    TOE AMPUTATION      right great toe       Family History     None        Tobacco Use    Smoking status: Never Smoker    Smokeless tobacco: Never Used   Substance and Sexual Activity    Alcohol use: No    Drug use: No    Sexual activity: Yes     Partners: Female     Comment:      Review of Systems   Constitutional: Positive for fatigue. Negative for chills and fever.   Gastrointestinal: Negative for nausea and vomiting.   Skin: Positive for wound.     Objective:     Vital Signs (Most Recent):  Temp: 98.3 °F (36.8 °C) (12/29/18 1903)  Pulse: 70 (12/29/18 1903)  Resp: 20 (12/29/18 1903)  BP: (!) 144/72 (12/29/18 1903)  SpO2: 95 % (12/29/18 1903) Vital Signs (24h Range):  Temp:  [96.6 °F (35.9 °C)-99 °F  (37.2 °C)] 98.3 °F (36.8 °C)  Pulse:  [69-84] 70  Resp:  [18-20] 20  SpO2:  [94 %-99 %] 95 %  BP: (132-146)/(71-82) 144/72     Weight: (!) 184.2 kg (406 lb)  Body mass index is 53.57 kg/m².    Foot Exam    General  Orientation: alert and oriented to person, place, and time   Affect: appropriate       Right Foot/Ankle     Inspection and Palpation  Swelling: (Diffuse edema to the LE)    Neurovascular  Dorsalis pedis: 2+  Posterior tibial: 2+  Saphenous nerve sensation: absent  Tibial nerve sensation: absent  Superficial peroneal nerve sensation: absent  Deep peroneal nerve sensation: absent  Sural nerve sensation: absent    Comments  Pt with R partial 1st ray amputation. Now with an ulceration to the old incision site. Wound measures roughly 0.5x0.5x0.5cm to the underlying bone. The wound undermines circumferentially about 1 cm around the wound. The periwound skin is erythematous. There is fluctuance. Nav purulence expressed from the wound.    There is also a superficial ulceration to the dorsal aspect of the 3rd toe with overlying callus tissue. Purulent drainage expressed upon debridement of the toe.    Left Foot/Ankle      Inspection and Palpation  Tenderness: (Tenderness to fluctuant area dorsal lateral foot)  Swelling: (Diffuse swelling to LLE)    Neurovascular  Dorsalis pedis: 2+  Posterior tibial: 2+  Saphenous nerve sensation: absent  Tibial nerve sensation: absent  Superficial peroneal nerve sensation: absent  Deep peroneal nerve sensation: absent  Sural nerve sensation: absent    Comments  Wound to the distal tip of the 3rd toe. The wound is roughly 1.5x1.5x1cm to bone. No drainage expressed from this wound. Maceration to the periwound skin. The toe is generally edematous with erythema tracking proximally. The tip of the patient's 3rd toe is cold to touch.    Also a large superficial appearing wound to the plantar medial aspect.     Also an area of palpable fluctuance to the dorsal lateral aspect of the  foot.      Laboratory:  CBC:   Recent Labs   Lab 12/29/18  0806   WBC 4.45   RBC 4.45*   HGB 12.8*   HCT 39.2*      MCV 88   MCH 28.8   MCHC 32.7     CMP:   Recent Labs   Lab 12/25/18  1239  12/29/18  0806   *   < > 130*   CALCIUM 9.1   < > 9.4   ALBUMIN 2.9*  --   --    PROT 7.5  --   --    *   < > 140   K 4.1   < > 4.1   CO2 25   < > 29      < > 101   BUN 15   < > 9   CREATININE 0.9   < > 0.8   ALKPHOS 87  --   --    ALT 12  --   --    AST 7*  --   --    BILITOT 1.5*  --   --     < > = values in this interval not displayed.     Wound Cultures:   Recent Labs   Lab 12/25/18  1600 12/26/18  1222 12/26/18  1223   LABAERO STREPTOCOCCUS AGALACTIAE (GROUP B)  Many  Beta-hemolytic streptococci are routinely susceptible to   penicillins,cephalosporins and carbapenems.  Susceptibility testing not routinely performed  Skin cory also present    STREPTOCOCCUS AGALACTIAE (GROUP B)  Many  Beta-hemolytic streptococci are routinely susceptible to   penicillins,cephalosporins and carbapenems.  Susceptibility testing not routinely performed  Skin cory also present   STREPTOCOCCUS AGALACTIAE (GROUP B)  Few  Beta-hemolytic streptococci are routinely susceptible to   penicillins,cephalosporins and carbapenems.  Susceptibility testing not routinely performed  Skin cory also present   STREPTOCOCCUS AGALACTIAE (GROUP B)  Few  Beta-hemolytic streptococci are routinely susceptible to   penicillins,cephalosporins and carbapenems.  Susceptibility testing not routinely performed         Diagnostic Results:  reviewed    Clinical Findings:  S/p left 3rd toe amputation with sutures intact and small 1.5cm opening at the plantar aspect with packing material.   Wound edges appear healthy, no dehiscence.      S/p LEFT sub 1st metatarsal head wound debridement with sutures intact.  Slightly macerated site but no wound dehiscence.      S/p I&D abscess at the site of the 1st metatarsal RIGHT foot,  With no purulence  drainage evident, wound edges appear viable.        Wound noted left 4th toe at the proximal interphalangeal joint with no drainage noted.  Dry scab noted over the proximal interphalangeal joint      No tenderness to palpation. No cellulitis evident.

## 2018-12-30 NOTE — PROGRESS NOTES
[facility]  Podiatry  Progress Note    Patient Name: Andrew Del Cid Jr.  MRN: [id number]  Admission Date: 12/25/2018  Hospital Length of Stay: 4 days  Attending Physician: SHAYY Hendricks MD  Primary Care Provider: Jeannette Herrera MD   Interval Hx:  Patient Seen at bedside for dressing change.  Patient denies F/C/N/V.  Dressings clean, dry, and intact.    Scheduled Meds:   amoxicillin-clavulanate 875-125mg  1 tablet Oral Q12H    atorvastatin  20 mg Oral Daily    enoxparin  40 mg Subcutaneous Q12H    gabapentin  300 mg Oral TID    insulin aspart U-100  10 Units Subcutaneous TIDWM    insulin detemir U-100  50 Units Subcutaneous BID    losartan  25 mg Oral Daily     Continuous Infusions:  PRN Meds:benzonatate, dextrose 50%, dextrose 50%, glucagon (human recombinant), glucose, glucose, insulin aspart U-100, sodium chloride 0.9%, sodium chloride 0.9%    Review of patient's allergies indicates:  No Known Allergies     Past Medical History:   Diagnosis Date    Diabetes mellitus, type 2     Hyperlipidemia     Hypertension     Peripheral neuropathy      Past Surgical History:   Procedure Laterality Date    AMPUTATION, TOE; left 3rd toe Left 12/26/2018    [facility]    AMPUTATION-TOE Right 7/2/2017    Per[facility]ino [facility]ugin [facility] at [facility] OR 2ND FLR    APPENDECTOMY      HERNIA REPAIR      INCISION AND DRAINAGE, FOOT Bilateral 12/26/2018    [facility] at [facility] OR    TOE AMPUTATION      right great toe       Family History     None        Tobacco Use    Smoking status: Never Smoker    Smokeless tobacco: Never Used   Substance and Sexual Activity    Alcohol use: No    Drug use: No    Sexual activity: Yes     Partners: Female     Comment:      Review of Systems   Constitutional: Positive for fatigue. Negative for chills and fever.   Gastrointestinal: Negative for nausea and vomiting.   Skin: Positive for  wound.     Objective:     Vital Signs (Most Recent):  Temp: 98.3 °F (36.8 °C) (12/29/18 1903)  Pulse: 70 (12/29/18 1903)  Resp: 20 (12/29/18 1903)  BP: (!) 144/72 (12/29/18 1903)  SpO2: 95 % (12/29/18 1903) Vital Signs (24h Range):  Temp:  [96.6 °F (35.9 °C)-99 °F (37.2 °C)] 98.3 °F (36.8 °C)  Pulse:  [69-84] 70  Resp:  [18-20] 20  SpO2:  [94 %-99 %] 95 %  BP: (132-146)/(71-82) 144/72     Weight: (!) 184.2 kg (406 lb)  Body mass index is 53.57 kg/m².    Foot Exam    General  Orientation: alert and oriented to person, place, and time   Affect: appropriate       Right Foot/Ankle     Inspection and Palpation  Swelling: (Diffuse edema to the LE)    Neurovascular  Dorsalis pedis: 2+  Posterior tibial: 2+  Saphenous nerve sensation: absent  Tibial nerve sensation: absent  Superficial peroneal nerve sensation: absent  Deep peroneal nerve sensation: absent  Sural nerve sensation: absent    Comments  Pt with R partial 1st ray amputation. Now with an ulceration to the old incision site. Wound measures roughly 0.5x0.5x0.5cm to the underlying bone. The wound undermines circumferentially about 1 cm around the wound. The periwound skin is erythematous. There is fluctuance. Nav purulence expressed from the wound.    There is also a superficial ulceration to the dorsal aspect of the 3rd toe with overlying callus tissue. Purulent drainage expressed upon debridement of the toe.    Left Foot/Ankle      Inspection and Palpation  Tenderness: (Tenderness to fluctuant area dorsal lateral foot)  Swelling: (Diffuse swelling to LLE)    Neurovascular  Dorsalis pedis: 2+  Posterior tibial: 2+  Saphenous nerve sensation: absent  Tibial nerve sensation: absent  Superficial peroneal nerve sensation: absent  Deep peroneal nerve sensation: absent  Sural nerve sensation: absent    Comments  Wound to the distal tip of the 3rd toe. The wound is roughly 1.5x1.5x1cm to bone. No drainage expressed from this wound. Maceration to the periwound skin. The  toe is generally edematous with erythema tracking proximally. The tip of the patient's 3rd toe is cold to touch.    Also a large superficial appearing wound to the plantar medial aspect.     Also an area of palpable fluctuance to the dorsal lateral aspect of the foot.      Laboratory:  CBC:   Recent Labs   Lab 12/29/18  0806   WBC 4.45   RBC 4.45*   HGB 12.8*   HCT 39.2*      MCV 88   MCH 28.8   MCHC 32.7     CMP:   Recent Labs   Lab 12/25/18  1239  12/29/18  0806   *   < > 130*   CALCIUM 9.1   < > 9.4   ALBUMIN 2.9*  --   --    PROT 7.5  --   --    *   < > 140   K 4.1   < > 4.1   CO2 25   < > 29      < > 101   BUN 15   < > 9   CREATININE 0.9   < > 0.8   ALKPHOS 87  --   --    ALT 12  --   --    AST 7*  --   --    BILITOT 1.5*  --   --     < > = values in this interval not displayed.     Wound Cultures:   Recent Labs   Lab 12/25/18  1600 12/26/18  1222 12/26/18  1223   LABAERO STREPTOCOCCUS AGALACTIAE (GROUP B)  Many  Beta-hemolytic streptococci are routinely susceptible to   penicillins,cephalosporins and carbapenems.  Susceptibility testing not routinely performed  Skin cory also present    STREPTOCOCCUS AGALACTIAE (GROUP B)  Many  Beta-hemolytic streptococci are routinely susceptible to   penicillins,cephalosporins and carbapenems.  Susceptibility testing not routinely performed  Skin coyr also present   STREPTOCOCCUS AGALACTIAE (GROUP B)  Few  Beta-hemolytic streptococci are routinely susceptible to   penicillins,cephalosporins and carbapenems.  Susceptibility testing not routinely performed  Skin cory also present   STREPTOCOCCUS AGALACTIAE (GROUP B)  Few  Beta-hemolytic streptococci are routinely susceptible to   penicillins,cephalosporins and carbapenems.  Susceptibility testing not routinely performed         Diagnostic Results:  reviewed    Clinical Findings:  S/p left 3rd toe amputation with sutures intact and small 1.5cm opening at the plantar aspect with packing material.    Wound edges appear healthy, no dehiscence.      S/p LEFT sub 1st metatarsal head wound debridement with sutures intact.  Slightly macerated site but no wound dehiscence.      S/p I&D abscess at the site of the 1st metatarsal RIGHT foot,  With no purulence drainage evident, wound edges appear viable.        Wound noted left 4th toe at the proximal interphalangeal joint with no drainage noted.  Dry scab noted over the proximal interphalangeal joint      No tenderness to palpation. No cellulitis evident.                                Assessment/Plan:     Diabetic peripheral vascular disease    Per primary       Type 2 diabetes mellitus with foot ulcer    Per primary.     Diabetic foot ulcers    Plan  - Wounds stable.  Dressed with wound gel, xeroform and foot ball dressings  -Ok in podiatry standpoint for discharge.  Patient to follow up with Dr. Fernandes within one week of discharge.  -abx per primary, appreciate help  -Patient ok to ambulate to tolerance in Darco shoes.  Educated patient to keep walking distances as short as possible, and focus on placing weight on heels.  -PT for gait training.         Hermes Camp MD  Podiatry  [facility]

## 2018-12-30 NOTE — PLAN OF CARE
Problem: Adult Inpatient Plan of Care  Goal: Plan of Care Review  Outcome: Ongoing (interventions implemented as appropriate)  Patient on RA. Sats 95%. Pt. in no distress, will continue to monitor.

## 2018-12-30 NOTE — ASSESSMENT & PLAN NOTE
Plan  - Wounds stable.  Dressed with wound gel, xeroform and foot ball dressings  -Ok in podiatry standpoint for discharge.  Patient to follow up with Dr. Fernandes within one week of discharge.  -abx per primary, appreciate help  -Patient ok to ambulate to tolerance in Darco shoes.  Educated patient to keep walking distances as short as possible, and focus on placing weight on heels.  -PT for gait training.

## 2018-12-30 NOTE — PLAN OF CARE
Problem: Physical Therapy Goal  Goal: Physical Therapy Goal  Goals to be met by: 1/3/19    Patient will increase functional independence with mobility by performin. Supine to sit with Mod I  2. Sit to supine with Mod I   3. Sit<>stand transfer with supervision using rolling walker, maintaining PWB (heel WB) through BLE.   4. Gait > 50 feet with SBA using rolling walker while maintaining PWB (heel WB) through BLE.   5. Ascend/descend 15 stairs with bilateral handrails with CGA with or without AD maintaining PWB (heel-WB) through BLE.     Pt progressing towards goals, amb'd ~50' with RW and PWB on B heels in Darco boots with SBA/Supervision. Asc/desc 8 steps with 2 rails and SBA and PWB on B heels. Recommend OPPT

## 2018-12-30 NOTE — PROGRESS NOTES
Physical Therapy Discharge Summary    Name: Andrew Del Cid Jr.  MRN: [id number]   Principal Problem: Osteomyelitis of left foot     Patient Discharged from acute Physical Therapy on 18.  Please refer to prior PT noted date on 18 for functional status.     Assessment:     Patient appropriate for care in another setting.    Objective:     GOALS:   Multidisciplinary Problems     Physical Therapy Goals        Problem: Physical Therapy Goal    Goal Priority Disciplines Outcome Goal Variances Interventions   Physical Therapy Goal     PT, PT/OT Ongoing (interventions implemented as appropriate)     Description:  Goals to be met by: 1/3/19    Patient will increase functional independence with mobility by performin. Supine to sit with Mod I  2. Sit to supine with Mod I   3. Sit<>stand transfer with supervision using rolling walker, maintaining PWB (heel WB) through BLE. -MET 18  4. Gait > 50 feet with SBA using rolling walker while maintaining PWB (heel WB) through BLE. -MET 18  5. Ascend/descend 15 stairs with bilateral handrails with CGA with or without AD maintaining PWB (heel-WB) through BLE.-MET 18                       Reasons for Discontinuation of Therapy Services  Transfer to alternate level of care.      Plan:     Patient Discharged to: Outpatient Therapy Services.    Jeison Wesley, PT  2018

## 2018-12-31 LAB
BACTERIA BLD CULT: NORMAL
BACTERIA BLD CULT: NORMAL
BACTERIA SPEC ANAEROBE CULT: NORMAL

## 2019-01-03 ENCOUNTER — PATIENT OUTREACH (OUTPATIENT)
Dept: ADMINISTRATIVE | Facility: HOSPITAL | Age: 54
End: 2019-01-03

## 2019-01-03 ENCOUNTER — OFFICE VISIT (OUTPATIENT)
Dept: PODIATRY | Facility: CLINIC | Age: 54
End: 2019-01-03
Payer: MEDICAID

## 2019-01-03 VITALS — HEIGHT: 73 IN | WEIGHT: 315 LBS | BODY MASS INDEX: 41.75 KG/M2

## 2019-01-03 DIAGNOSIS — E11.42 DIABETIC POLYNEUROPATHY ASSOCIATED WITH TYPE 2 DIABETES MELLITUS: ICD-10-CM

## 2019-01-03 DIAGNOSIS — Z98.890 POSTOPERATIVE STATE: Primary | ICD-10-CM

## 2019-01-03 PROBLEM — R78.81 BACTEREMIA DUE TO GROUP B STREPTOCOCCUS: Status: RESOLVED | Noted: 2018-12-26 | Resolved: 2019-01-03

## 2019-01-03 PROBLEM — M86.9 OSTEOMYELITIS OF LEFT FOOT: Status: RESOLVED | Noted: 2018-12-25 | Resolved: 2019-01-03

## 2019-01-03 PROBLEM — B95.1 BACTEREMIA DUE TO GROUP B STREPTOCOCCUS: Status: RESOLVED | Noted: 2018-12-26 | Resolved: 2019-01-03

## 2019-01-03 PROCEDURE — 99999 PR PBB SHADOW E&M-EST. PATIENT-LVL III: ICD-10-PCS | Mod: PBBFAC,,, | Performed by: PODIATRIST

## 2019-01-03 PROCEDURE — 99024 POSTOP FOLLOW-UP VISIT: CPT | Mod: S$PBB,,, | Performed by: PODIATRIST

## 2019-01-03 PROCEDURE — 99999 PR PBB SHADOW E&M-EST. PATIENT-LVL III: CPT | Mod: PBBFAC,,, | Performed by: PODIATRIST

## 2019-01-03 PROCEDURE — 99024 PR POST-OP FOLLOW-UP VISIT: ICD-10-PCS | Mod: S$PBB,,, | Performed by: PODIATRIST

## 2019-01-03 PROCEDURE — 99213 OFFICE O/P EST LOW 20 MIN: CPT | Mod: PBBFAC,PN | Performed by: PODIATRIST

## 2019-01-03 NOTE — PROGRESS NOTES
Dear Andrew Del Cid Jr.        [facility] is committed to your overall health and would like to ensure that you are up to date on all of your health maintenance testing. Our records indicate that you are overdue for your colorectal cancer screening. After reviewing your chart, it has been documented that a FIT KIT (colorectal cancer screening kit) was given at a clinic visit or mailed to you, but the lab has yet to receive the kit so that we may update your chart. This is a friendly reminder to complete this test (the instructions will tell you how) and then return your sample in the postage-paid return envelope within 24 hours of collection or return to ANY [facility] . Please remember to put the collection date on your sample!    If your test results are negative, you won't need testing again for another year. If results show you need more testing, we will call you with next steps.  Also, if you have misplaced the FITKIT let us know and we will mail you another one.  Sincerely,        Jeannette Herrera MD and your [facility]

## 2019-01-03 NOTE — PROGRESS NOTES
Podiatry  Progress Note    HPI:  Andrew Del Cid Jr. is a 53 y.o. male presents to clinic for follow up s/p :  INCISION AND DRAINAGE, FOOT (Bilateral) and AMPUTATION, TOE; left 3rd toe (Left)   Dr. Alas on 12/26/2018.     Past Medical History:   Diagnosis Date    Diabetes mellitus, type 2     Hyperlipidemia     Hypertension     Peripheral neuropathy          Current Outpatient Medications on File Prior to Visit   Medication Sig Dispense Refill    amoxicillin-clavulanate 875-125mg (AUGMENTIN) 875-125 mg per tablet Take 1 tablet by mouth every 12 (twelve) hours. for 9 days 17 tablet 0    aspirin (ECOTRIN) 81 MG EC tablet Take 81 mg by mouth once daily.      atorvastatin (LIPITOR) 20 MG tablet Take 1 tablet (20 mg total) by mouth once daily. 30 tablet 2    gabapentin (NEURONTIN) 300 MG capsule Take 1 capsule (300 mg total) by mouth 3 (three) times daily. 90 capsule 0    insulin aspart U-100 (NOVOLOG) 100 unit/mL InPn pen Inject 12 Units into the skin 3 (three) times daily with meals. 32.4 mL 0    insulin detemir U-100 (LEVEMIR FLEXTOUCH) 100 unit/mL (3 mL) SubQ InPn pen Inject 52 Units into the skin 2 (two) times daily. 93.6 mL 0    losartan (COZAAR) 25 MG tablet Take 1 tablet (25 mg total) by mouth once daily. 30 tablet 2    albuterol 90 mcg/actuation inhaler Inhale 2 puffs into the lungs every 6 (six) hours as needed for Wheezing. Rescue 1 Inhaler 0     No current facility-administered medications on file prior to visit.          Review of patient's allergies indicates:  No Known Allergies      Social History     Socioeconomic History    Marital status:      Spouse name: Not on file    Number of children: Not on file    Years of education: Not on file    Highest education level: Not on file   Social Needs    Financial resource strain: Not on file    Food insecurity - worry: Not on file    Food insecurity - inability: Not on file    Transportation needs - medical: Not on file     "Transportation needs - non-medical: Not on file   Occupational History    Not on file   Tobacco Use    Smoking status: Never Smoker    Smokeless tobacco: Never Used   Substance and Sexual Activity    Alcohol use: No    Drug use: No    Sexual activity: Yes     Partners: Female     Comment:    Other Topics Concern    Not on file   Social History Narrative    Not on file           Review of Systems   Constitutional: Negative for chills and fever.   Respiratory: Negative for shortness of breath and wheezing.    Cardiovascular: Negative for chest pain and palpitations.   Gastrointestinal: Negative for nausea and vomiting.   Skin: Positive for wound.     Objective:     Vitals:    01/03/19 1602   Weight: (!) 181 kg (399 lb)   Height: 6' 1" (1.854 m)         Foot Exam  Diagnostic Results:  Right  Foot MRI:    Impression       Status post 1st toe amputation.  Ulcer with extensive soft tissue edema about the stump.  Bone marrow edema at the stump without soniya replacement in keeping with reactive osteitis.    Neuropathic arthropathy.       Left Foot MRI:  Impression       Osteomyelitis of 3rd distal phalanx.    Ulcer with sinus tract to the 1st MTP joint.    Neuropathic arthropathy of midfoot and forefoot.       Clinical Findings:  S/p left 3rd toe amputation with sutures intact   Wound edges appear healthy, no dehiscence.     S/p LEFT sub 1st metatarsal head wound debridement, granular base, no drainage.     S/p I&D abscess at the site of the 1st metatarsal RIGHT foot, packing material in place,  With no purulence drainage evident, wound edges appear viable.       Wound noted left 4th toe at the proximal interphalangeal joint with no drainage noted.  Dry scab noted over the proximal interphalangeal joint     No tenderness to palpation. No cellulitis evident.           Assessment/Plan:     Problem List Items Addressed This Visit     None      Visit Diagnoses     Postoperative state    -  Primary    Diabetic " polyneuropathy associated with type 2 diabetes mellitus              S/p  INCISION AND DRAINAGE, FOOT (Bilateral);  AMPUTATION, TOE; left 3rd toe (Left);  Doing well.     DOS:  12/26/18  Heel weight bearing only.     Leave dressings clean, dry, and intact.    follow up in a week or sooner if concerned.      Yulia Fernandes DPM  Podiatry  [facility]

## 2019-01-04 ENCOUNTER — TELEPHONE (OUTPATIENT)
Dept: PODIATRY | Facility: CLINIC | Age: 54
End: 2019-01-04

## 2019-01-04 NOTE — TELEPHONE ENCOUNTER
Called and spoke with patient wife gave wife results.She communicated understanding.          ----- Message from Rob Alas DPM sent at 1/4/2019  9:18 AM CST -----  The clean margin of bone culture showed no bone infection.

## 2019-01-09 NOTE — PHYSICIAN QUERY
PT Name: Andrew Del Cid Jr.  MR #: [id number]     Physician Query Form - Diagnosis Clarification      CDS/: Vane ADAIR, RN, CCDS              Contact information: [email or web address]     This form is a permanent document in the medical record.     Query Date: January 9, 2019    By submitting this query, we are merely seeking further clarification of documentation.  Please utilize your independent clinical judgment when addressing the question(s) below.     The medical record contains the following:      Findings Supporting Clinical Information Location in Medical Record     Osteomyelitis of left foot     FINAL PATHOLOGIC DIAGNOSIS  BASE OF PROXIMAL PHALANX CLEAN MARGIN:  -Bone with degenerative changes.  -Negative for osteomyelitis.    The metatarsophalangeal joint was disarticulated at this time and the third toe left was removed.  There was a small amount of pus noted in the joint but the base of the proximal phalanx and head of the third metatarsal did not appear to be infected.  The proximal base of the proximal phalanx was removed with a sagittal saw and portions of this were sent for permanent and cultures.      Final Active Diagnoses:    PRINCIPAL PROBLEM: Osteomyelitis of left foot   Bacteremia due to group B Streptococcus    Ulcers of both lower extremities   Cellulitis of right lower extremity      Pathology Report resulting 1/3/19          Podiatry Op note 12/26/18                      Discharge Summary 12/30/18                   Please clarify if the ___Osteomyelitis of left foot_____ diagnosis has been:    [  ] Ruled Out   [  ] Other/Clarification of findings (please specify):   [  ] Clinically insignificant     [ x ] Clinically undetermined - defer to podiatry     Please document in your progress notes daily for the duration of treatment, until resolved, and include in your discharge summary.

## 2019-01-10 ENCOUNTER — OFFICE VISIT (OUTPATIENT)
Dept: PODIATRY | Facility: CLINIC | Age: 54
End: 2019-01-10
Payer: MEDICAID

## 2019-01-10 VITALS — BODY MASS INDEX: 41.75 KG/M2 | HEIGHT: 73 IN | WEIGHT: 315 LBS

## 2019-01-10 DIAGNOSIS — E11.42 DIABETIC POLYNEUROPATHY ASSOCIATED WITH TYPE 2 DIABETES MELLITUS: ICD-10-CM

## 2019-01-10 DIAGNOSIS — Z89.411 STATUS POST AMPUTATION OF GREAT TOE, RIGHT: ICD-10-CM

## 2019-01-10 DIAGNOSIS — Z98.890 POSTOPERATIVE STATE: Primary | ICD-10-CM

## 2019-01-10 DIAGNOSIS — Z89.422 HISTORY OF AMPUTATION OF LESSER TOE OF LEFT FOOT: ICD-10-CM

## 2019-01-10 PROCEDURE — 99999 PR PBB SHADOW E&M-EST. PATIENT-LVL III: ICD-10-PCS | Mod: PBBFAC,,, | Performed by: PODIATRIST

## 2019-01-10 PROCEDURE — 99024 PR POST-OP FOLLOW-UP VISIT: ICD-10-PCS | Mod: ,,, | Performed by: PODIATRIST

## 2019-01-10 PROCEDURE — 99213 OFFICE O/P EST LOW 20 MIN: CPT | Mod: PBBFAC,PN | Performed by: PODIATRIST

## 2019-01-10 PROCEDURE — 99024 POSTOP FOLLOW-UP VISIT: CPT | Mod: ,,, | Performed by: PODIATRIST

## 2019-01-10 PROCEDURE — 99999 PR PBB SHADOW E&M-EST. PATIENT-LVL III: CPT | Mod: PBBFAC,,, | Performed by: PODIATRIST

## 2019-01-10 NOTE — PROGRESS NOTES
Podiatry  Progress Note    HPI:  Andrew Del Cid Jr. is a 53 y.o. male presents to clinic for follow up s/p :  INCISION AND DRAINAGE, FOOT (Bilateral) and AMPUTATION, TOE; left 3rd toe (Left)   Dr. Alas on 12/26/2018.   He has been in bilateral football dressing.  No fevers, no chills.  No foot pain      Past Medical History:   Diagnosis Date    Diabetes mellitus, type 2     Hyperlipidemia     Hypertension     Peripheral neuropathy          Current Outpatient Medications on File Prior to Visit   Medication Sig Dispense Refill    aspirin (ECOTRIN) 81 MG EC tablet Take 81 mg by mouth once daily.      atorvastatin (LIPITOR) 20 MG tablet Take 1 tablet (20 mg total) by mouth once daily. 30 tablet 2    gabapentin (NEURONTIN) 300 MG capsule Take 1 capsule (300 mg total) by mouth 3 (three) times daily. 90 capsule 0    insulin aspart U-100 (NOVOLOG) 100 unit/mL InPn pen Inject 12 Units into the skin 3 (three) times daily with meals. 32.4 mL 0    insulin detemir U-100 (LEVEMIR FLEXTOUCH) 100 unit/mL (3 mL) SubQ InPn pen Inject 52 Units into the skin 2 (two) times daily. 93.6 mL 0    losartan (COZAAR) 25 MG tablet Take 1 tablet (25 mg total) by mouth once daily. 30 tablet 2    albuterol 90 mcg/actuation inhaler Inhale 2 puffs into the lungs every 6 (six) hours as needed for Wheezing. Rescue 1 Inhaler 0     No current facility-administered medications on file prior to visit.          Review of patient's allergies indicates:  No Known Allergies      Social History     Socioeconomic History    Marital status:      Spouse name: Not on file    Number of children: Not on file    Years of education: Not on file    Highest education level: Not on file   Social Needs    Financial resource strain: Not on file    Food insecurity - worry: Not on file    Food insecurity - inability: Not on file    Transportation needs - medical: Not on file    Transportation needs - non-medical: Not on file  "  Occupational History    Not on file   Tobacco Use    Smoking status: Never Smoker    Smokeless tobacco: Never Used   Substance and Sexual Activity    Alcohol use: No    Drug use: No    Sexual activity: Yes     Partners: Female     Comment:    Other Topics Concern    Not on file   Social History Narrative    Not on file           Review of Systems   Constitutional: Negative for chills and fever.   Respiratory: Negative for shortness of breath and wheezing.    Cardiovascular: Negative for chest pain and palpitations.   Gastrointestinal: Negative for nausea and vomiting.   Skin: Positive for wound.     Objective:     Vitals:    01/10/19 1444   Weight: (!) 181 kg (399 lb)   Height: 6' 1" (1.854 m)         Foot Exam  Diagnostic Results:  Right  Foot MRI:    Impression       Status post 1st toe amputation.  Ulcer with extensive soft tissue edema about the stump.  Bone marrow edema at the stump without soniya replacement in keeping with reactive osteitis.    Neuropathic arthropathy.       Left Foot MRI:  Impression       Osteomyelitis of 3rd distal phalanx.    Ulcer with sinus tract to the 1st MTP joint.    Neuropathic arthropathy of midfoot and forefoot.       Clinical Findings:  S/p left 3rd toe amputation with sutures intact   Wound edges appear healthy, no dehiscence.     S/p LEFT sub 1st metatarsal head wound debridement, granular base, no drainage.     S/p I&D abscess at the site of the 1st metatarsal RIGHT foot,  With no purulence drainage evident, wound edges appear viable.       Wound noted left 4th toe is healed.   Dry scab noted over the proximal interphalangeal joint     No tenderness to palpation. No cellulitis evident.           Assessment/Plan:     Problem List Items Addressed This Visit     None      Visit Diagnoses     Postoperative state    -  Primary    Diabetic polyneuropathy associated with type 2 diabetes mellitus        Status post amputation of great toe, right        History of " amputation of lesser toe of left foot        3rd toe (DOS 12/26/18)          S/p  INCISION AND DRAINAGE, FOOT (Bilateral);  AMPUTATION, TOE; left 3rd toe (Left);  Doing well.     DOS:  12/26/18  Heel weight bearing only.     Dilcia to wounds.  bilateral football dressings applied Leave dressings clean, dry, and intact.    follow up in a week or sooner if concerned.      Yulia Fernandes DPM  Podiatry

## 2019-01-17 ENCOUNTER — OFFICE VISIT (OUTPATIENT)
Dept: PODIATRY | Facility: CLINIC | Age: 54
End: 2019-01-17
Payer: MEDICAID

## 2019-01-17 VITALS — WEIGHT: 315 LBS | HEIGHT: 73 IN | BODY MASS INDEX: 41.75 KG/M2

## 2019-01-17 DIAGNOSIS — Z98.890 POSTOPERATIVE STATE: ICD-10-CM

## 2019-01-17 DIAGNOSIS — E11.621 DIABETIC ULCER OF LEFT FOOT ASSOCIATED WITH TYPE 2 DIABETES MELLITUS, WITH FAT LAYER EXPOSED, UNSPECIFIED PART OF FOOT: Primary | ICD-10-CM

## 2019-01-17 DIAGNOSIS — Z89.422 HISTORY OF AMPUTATION OF LESSER TOE OF LEFT FOOT: ICD-10-CM

## 2019-01-17 DIAGNOSIS — E11.42 DIABETIC POLYNEUROPATHY ASSOCIATED WITH TYPE 2 DIABETES MELLITUS: ICD-10-CM

## 2019-01-17 DIAGNOSIS — L97.522 DIABETIC ULCER OF LEFT FOOT ASSOCIATED WITH TYPE 2 DIABETES MELLITUS, WITH FAT LAYER EXPOSED, UNSPECIFIED PART OF FOOT: Primary | ICD-10-CM

## 2019-01-17 PROCEDURE — 99213 OFFICE O/P EST LOW 20 MIN: CPT | Mod: 24,S$PBB,, | Performed by: PODIATRIST

## 2019-01-17 PROCEDURE — 99999 PR PBB SHADOW E&M-EST. PATIENT-LVL II: ICD-10-PCS | Mod: PBBFAC,,, | Performed by: PODIATRIST

## 2019-01-17 PROCEDURE — 99999 PR PBB SHADOW E&M-EST. PATIENT-LVL II: CPT | Mod: PBBFAC,,, | Performed by: PODIATRIST

## 2019-01-17 PROCEDURE — 99213 PR OFFICE/OUTPT VISIT, EST, LEVL III, 20-29 MIN: ICD-10-PCS | Mod: 24,S$PBB,, | Performed by: PODIATRIST

## 2019-01-17 PROCEDURE — 99212 OFFICE O/P EST SF 10 MIN: CPT | Mod: PBBFAC,PN | Performed by: PODIATRIST

## 2019-01-17 NOTE — PROGRESS NOTES
Podiatry  Progress Note    HPI:  Andrew Del Cid Jr. is a 53 y.o. male presents to clinic for follow up s/p :  INCISION AND DRAINAGE, FOOT (Bilateral) and AMPUTATION, TOE; left 3rd toe (Left)   Dr. Alas on 12/26/2018.     He has been in bilateral football dressing.  No fevers, no chills.  No foot pain      Past Medical History:   Diagnosis Date    Diabetes mellitus, type 2     Hyperlipidemia     Hypertension     Peripheral neuropathy          Current Outpatient Medications on File Prior to Visit   Medication Sig Dispense Refill    aspirin (ECOTRIN) 81 MG EC tablet Take 81 mg by mouth once daily.      atorvastatin (LIPITOR) 20 MG tablet Take 1 tablet (20 mg total) by mouth once daily. 30 tablet 2    gabapentin (NEURONTIN) 300 MG capsule Take 1 capsule (300 mg total) by mouth 3 (three) times daily. 90 capsule 0    insulin aspart U-100 (NOVOLOG) 100 unit/mL InPn pen Inject 12 Units into the skin 3 (three) times daily with meals. 32.4 mL 0    insulin detemir U-100 (LEVEMIR FLEXTOUCH) 100 unit/mL (3 mL) SubQ InPn pen Inject 52 Units into the skin 2 (two) times daily. 93.6 mL 0    losartan (COZAAR) 25 MG tablet Take 1 tablet (25 mg total) by mouth once daily. 30 tablet 2    albuterol 90 mcg/actuation inhaler Inhale 2 puffs into the lungs every 6 (six) hours as needed for Wheezing. Rescue 1 Inhaler 0     No current facility-administered medications on file prior to visit.          Review of patient's allergies indicates:  No Known Allergies      Social History     Socioeconomic History    Marital status:      Spouse name: Not on file    Number of children: Not on file    Years of education: Not on file    Highest education level: Not on file   Social Needs    Financial resource strain: Not on file    Food insecurity - worry: Not on file    Food insecurity - inability: Not on file    Transportation needs - medical: Not on file    Transportation needs - non-medical: Not on file  "  Occupational History    Not on file   Tobacco Use    Smoking status: Never Smoker    Smokeless tobacco: Never Used   Substance and Sexual Activity    Alcohol use: No    Drug use: No    Sexual activity: Yes     Partners: Female     Comment:    Other Topics Concern    Not on file   Social History Narrative    Not on file           Review of Systems   Constitutional: Negative for chills and fever.   Respiratory: Negative for shortness of breath and wheezing.    Cardiovascular: Negative for chest pain and palpitations.   Gastrointestinal: Negative for nausea and vomiting.   Skin: Positive for wound.     Objective:     Vitals:    01/17/19 0959   Weight: (!) 181 kg (399 lb)   Height: 6' 1" (1.854 m)         Foot Exam  Diagnostic Results:  Right  Foot MRI:    Impression       Status post 1st toe amputation.  Ulcer with extensive soft tissue edema about the stump.  Bone marrow edema at the stump without soniya replacement in keeping with reactive osteitis.    Neuropathic arthropathy.       Left Foot MRI:  Impression       Osteomyelitis of 3rd distal phalanx.    Ulcer with sinus tract to the 1st MTP joint.    Neuropathic arthropathy of midfoot and forefoot.         Clinical Findings:  S/p left 3rd toe amputation.  Surgical site is healed.   No redness or localized swelling.     S/p LEFT sub 1st metatarsal head wound debridement, 100% granular base, no drainage.     S/p I&D abscess at the site of the 1st metatarsal RIGHT foot,  with no purulence drainage evident, wound edges appear viable.   100% granular base    No tenderness to palpation. No cellulitis evident.           Assessment/Plan:     Problem List Items Addressed This Visit     Diabetic foot ulcers - Primary      Other Visit Diagnoses     Postoperative state        Diabetic polyneuropathy associated with type 2 diabetes mellitus        History of amputation of lesser toe of left foot              S/p  INCISION AND DRAINAGE, FOOT (Bilateral);  " AMPUTATION, TOE; left 3rd toe (Left);  Doing well.     DOS:  12/26/18  Heel weight bearing only.     Dilcia to wounds sub 1st metatarsal head left foot and right 1st metatarsal head  bilateral football dressings applied Leave dressings clean, dry, and intact.    follow up in a week or sooner if concerned.      Yulia Fernandes DPM  Podiatry

## 2019-01-25 ENCOUNTER — PATIENT MESSAGE (OUTPATIENT)
Dept: PODIATRY | Facility: CLINIC | Age: 54
End: 2019-01-25

## 2019-01-29 LAB — FUNGUS SPEC CULT: NORMAL

## 2019-02-04 ENCOUNTER — OFFICE VISIT (OUTPATIENT)
Dept: PODIATRY | Facility: CLINIC | Age: 54
End: 2019-02-04
Payer: MEDICAID

## 2019-02-04 VITALS — BODY MASS INDEX: 41.75 KG/M2 | HEIGHT: 73 IN | WEIGHT: 315 LBS

## 2019-02-04 DIAGNOSIS — Z89.422 HISTORY OF AMPUTATION OF LESSER TOE OF LEFT FOOT: ICD-10-CM

## 2019-02-04 DIAGNOSIS — L97.512 DIABETIC ULCER OF OTHER PART OF RIGHT FOOT ASSOCIATED WITH TYPE 2 DIABETES MELLITUS, WITH FAT LAYER EXPOSED: ICD-10-CM

## 2019-02-04 DIAGNOSIS — E11.621 DIABETIC ULCER OF LEFT FOOT ASSOCIATED WITH TYPE 2 DIABETES MELLITUS, WITH FAT LAYER EXPOSED, UNSPECIFIED PART OF FOOT: ICD-10-CM

## 2019-02-04 DIAGNOSIS — E11.42 DIABETIC POLYNEUROPATHY ASSOCIATED WITH TYPE 2 DIABETES MELLITUS: Primary | ICD-10-CM

## 2019-02-04 DIAGNOSIS — E11.621 DIABETIC ULCER OF OTHER PART OF RIGHT FOOT ASSOCIATED WITH TYPE 2 DIABETES MELLITUS, WITH FAT LAYER EXPOSED: ICD-10-CM

## 2019-02-04 DIAGNOSIS — L97.522 DIABETIC ULCER OF LEFT FOOT ASSOCIATED WITH TYPE 2 DIABETES MELLITUS, WITH FAT LAYER EXPOSED, UNSPECIFIED PART OF FOOT: ICD-10-CM

## 2019-02-04 PROCEDURE — 99213 OFFICE O/P EST LOW 20 MIN: CPT | Mod: S$PBB,24,, | Performed by: PODIATRIST

## 2019-02-04 PROCEDURE — 99999 PR PBB SHADOW E&M-EST. PATIENT-LVL III: ICD-10-PCS | Mod: PBBFAC,,, | Performed by: PODIATRIST

## 2019-02-04 PROCEDURE — 99213 PR OFFICE/OUTPT VISIT, EST, LEVL III, 20-29 MIN: ICD-10-PCS | Mod: S$PBB,24,, | Performed by: PODIATRIST

## 2019-02-04 PROCEDURE — 99213 OFFICE O/P EST LOW 20 MIN: CPT | Mod: PBBFAC,PN | Performed by: PODIATRIST

## 2019-02-04 PROCEDURE — 99999 PR PBB SHADOW E&M-EST. PATIENT-LVL III: CPT | Mod: PBBFAC,,, | Performed by: PODIATRIST

## 2019-02-06 NOTE — PROGRESS NOTES
Chief Complaint   Patient presents with    Post-op Evaluation     Bilateral           HPI:  Andrew Del Cid Jr. is a 53 y.o. male presents to clinic for follow up s/p :  INCISION AND DRAINAGE, FOOT (Bilateral) and AMPUTATION, TOE; left 3rd toe (Left)   Dr. Alas on 12/26/2018.     He has been in bilateral football dressing x 2 weeks.  Missed last appointment.    No fevers, no chills.  No foot pain.      PCP:  Jeannette Herrera MD  Last PCP visit:   9/6/17      Past Medical History:   Diagnosis Date    Diabetes mellitus, type 2     Hyperlipidemia     Hypertension     Peripheral neuropathy          Current Outpatient Medications on File Prior to Visit   Medication Sig Dispense Refill    aspirin (ECOTRIN) 81 MG EC tablet Take 81 mg by mouth once daily.      atorvastatin (LIPITOR) 20 MG tablet Take 1 tablet (20 mg total) by mouth once daily. 30 tablet 2    gabapentin (NEURONTIN) 300 MG capsule Take 1 capsule (300 mg total) by mouth 3 (three) times daily. 90 capsule 0    insulin aspart U-100 (NOVOLOG) 100 unit/mL InPn pen Inject 12 Units into the skin 3 (three) times daily with meals. 32.4 mL 0    insulin detemir U-100 (LEVEMIR FLEXTOUCH) 100 unit/mL (3 mL) SubQ InPn pen Inject 52 Units into the skin 2 (two) times daily. 93.6 mL 0    losartan (COZAAR) 25 MG tablet Take 1 tablet (25 mg total) by mouth once daily. 30 tablet 2    albuterol 90 mcg/actuation inhaler Inhale 2 puffs into the lungs every 6 (six) hours as needed for Wheezing. Rescue 1 Inhaler 0     No current facility-administered medications on file prior to visit.          Review of patient's allergies indicates:  No Known Allergies      Social History     Socioeconomic History    Marital status:      Spouse name: Not on file    Number of children: Not on file    Years of education: Not on file    Highest education level: Not on file   Social Needs    Financial resource strain: Not on file    Food insecurity - worry: Not on file  "   Food insecurity - inability: Not on file    Transportation needs - medical: Not on file    Transportation needs - non-medical: Not on file   Occupational History    Not on file   Tobacco Use    Smoking status: Never Smoker    Smokeless tobacco: Never Used   Substance and Sexual Activity    Alcohol use: No    Drug use: No    Sexual activity: Yes     Partners: Female     Comment:    Other Topics Concern    Not on file   Social History Narrative    Not on file           Review of Systems   Constitutional: Negative for chills and fever.   Respiratory: Negative for shortness of breath and wheezing.    Cardiovascular: Negative for chest pain and palpitations.   Gastrointestinal: Negative for nausea and vomiting.   Skin: Positive for wound.     Objective:     Vitals:    02/04/19 1215   Weight: (!) 181 kg (399 lb)   Height: 6' 1" (1.854 m)         Foot Exam  Diagnostic Results:  Right  Foot MRI:    Impression       Status post 1st toe amputation.  Ulcer with extensive soft tissue edema about the stump.  Bone marrow edema at the stump without soniya replacement in keeping with reactive osteitis.    Neuropathic arthropathy.       Left Foot MRI:  Impression       Osteomyelitis of 3rd distal phalanx.    Ulcer with sinus tract to the 1st MTP joint.    Neuropathic arthropathy of midfoot and forefoot.         Clinical Findings:    Msk:  S/p left 3rd toe amputation.  Surgical site is healed.   No redness or localized swelling.     Derm:   S/p LEFT sub 1st metatarsal head wound debridement, 100% granular base, no drainage.   1cm x 0.1cm x 0.1cm.     S/p I&D abscess at the site of the 1st metatarsal RIGHT foot,  with no purulence drainage evident, wound edges appear viable.   100% granular base    1.5cm x 1cm x 0.1cm   No tenderness to palpation. No cellulitis evident.     Neuro:  +LOPS  Negative Tinels    Vasc:  DP, PT Pulses palpable.           Assessment/Plan:     Problem List Items Addressed This Visit     " Diabetic foot ulcers      Other Visit Diagnoses     Diabetic polyneuropathy associated with type 2 diabetes mellitus    -  Primary    History of amputation of lesser toe of left foot              S/p  INCISION AND DRAINAGE, FOOT (Bilateral);  AMPUTATION, TOE; left 3rd toe (Left);  Doing well.     DOS:  12/26/18  Heel weight bearing only.     Dilcia to wounds sub 1st metatarsal head left foot and right 1st metatarsal head  bilateral football dressings applied Leave dressings clean, dry, and intact.    follow up in a week or sooner if concerned.

## 2019-02-18 ENCOUNTER — TELEPHONE (OUTPATIENT)
Dept: PODIATRY | Facility: CLINIC | Age: 54
End: 2019-02-18

## 2019-02-18 NOTE — TELEPHONE ENCOUNTER
----- Message from Yulia Fernandes DPM sent at 2/18/2019  8:14 AM CST -----  Regarding: RE: FMLA and disability amendment    Please amend the return to work date as March 30, 2019.  If patient wants another date, please inform.         ----- Message -----  From: Adalgisa Roberts MA  Sent: 2/18/2019   8:01 AM  To: Yulia Fernandes DPM, Cristiana Ramirez MA, #  Subject: FMLA and disability amendment                    Good morning,     I received forms for extension for this patient. His original return to work date was 1/31/2019, however patient states he needs an extension of leave due to the additional treatment.  With Dr. Fernandes's permission I can amend the forms to reflect the new return to work date.  Please advise the new expected return to work date. Also please note patient needs these forms completed TODAY as they are due tomorrow.  Patient has advised if they are not submitted he will be terminated.    Thank you    Adalgisa    Left a message to patient regarding his return to work date.

## 2019-02-19 ENCOUNTER — PATIENT OUTREACH (OUTPATIENT)
Dept: ADMINISTRATIVE | Facility: HOSPITAL | Age: 54
End: 2019-02-19

## 2019-02-20 ENCOUNTER — OFFICE VISIT (OUTPATIENT)
Dept: PODIATRY | Facility: CLINIC | Age: 54
End: 2019-02-20
Payer: MEDICAID

## 2019-02-20 VITALS — HEIGHT: 73 IN | WEIGHT: 315 LBS | BODY MASS INDEX: 41.75 KG/M2

## 2019-02-20 DIAGNOSIS — E11.42 DIABETIC POLYNEUROPATHY ASSOCIATED WITH TYPE 2 DIABETES MELLITUS: Primary | ICD-10-CM

## 2019-02-20 DIAGNOSIS — L97.511 DIABETIC ULCER OF OTHER PART OF RIGHT FOOT ASSOCIATED WITH TYPE 2 DIABETES MELLITUS, LIMITED TO BREAKDOWN OF SKIN: ICD-10-CM

## 2019-02-20 DIAGNOSIS — E11.621 DIABETIC ULCER OF OTHER PART OF RIGHT FOOT ASSOCIATED WITH TYPE 2 DIABETES MELLITUS, LIMITED TO BREAKDOWN OF SKIN: ICD-10-CM

## 2019-02-20 DIAGNOSIS — Z89.422 HISTORY OF AMPUTATION OF LESSER TOE OF LEFT FOOT: ICD-10-CM

## 2019-02-20 PROCEDURE — 99213 OFFICE O/P EST LOW 20 MIN: CPT | Mod: PBBFAC,PN | Performed by: PODIATRIST

## 2019-02-20 PROCEDURE — 99999 PR PBB SHADOW E&M-EST. PATIENT-LVL III: CPT | Mod: PBBFAC,,, | Performed by: PODIATRIST

## 2019-02-20 PROCEDURE — 99214 OFFICE O/P EST MOD 30 MIN: CPT | Mod: S$PBB,,, | Performed by: PODIATRIST

## 2019-02-20 PROCEDURE — 99999 PR PBB SHADOW E&M-EST. PATIENT-LVL III: ICD-10-PCS | Mod: PBBFAC,,, | Performed by: PODIATRIST

## 2019-02-20 PROCEDURE — 99214 PR OFFICE/OUTPT VISIT, EST, LEVL IV, 30-39 MIN: ICD-10-PCS | Mod: S$PBB,,, | Performed by: PODIATRIST

## 2019-02-20 NOTE — PROGRESS NOTES
Chief Complaint   Patient presents with    Follow-up     ulcer Bilateral    Diabetes Mellitus     A1C 12/25/18 11.3 PCP last seen on 9/06/17           HPI:  Andrew Del Cid Jr. is a 53 y.o. male presents to clinic for follow up s/p :  INCISION AND DRAINAGE, FOOT (Bilateral) and AMPUTATION, TOE; left 3rd toe (Left)   Dr. Alas on 12/26/2018.     He has been in bilateral football dressing x 2 weeks.  He wife changes bandages once without complications.    No fevers, no chills.  No foot pain.        PCP:  Jeannette Herrera MD  Last PCP visit:   9/6/17        Past Medical History:   Diagnosis Date    Diabetes mellitus, type 2     Hyperlipidemia     Hypertension     Peripheral neuropathy          Current Outpatient Medications on File Prior to Visit   Medication Sig Dispense Refill    aspirin (ECOTRIN) 81 MG EC tablet Take 81 mg by mouth once daily.      atorvastatin (LIPITOR) 20 MG tablet Take 1 tablet (20 mg total) by mouth once daily. 30 tablet 2    gabapentin (NEURONTIN) 300 MG capsule Take 1 capsule (300 mg total) by mouth 3 (three) times daily. 90 capsule 0    insulin aspart U-100 (NOVOLOG) 100 unit/mL InPn pen Inject 12 Units into the skin 3 (three) times daily with meals. 32.4 mL 0    insulin detemir U-100 (LEVEMIR FLEXTOUCH) 100 unit/mL (3 mL) SubQ InPn pen Inject 52 Units into the skin 2 (two) times daily. 93.6 mL 0    losartan (COZAAR) 25 MG tablet Take 1 tablet (25 mg total) by mouth once daily. 30 tablet 2    albuterol 90 mcg/actuation inhaler Inhale 2 puffs into the lungs every 6 (six) hours as needed for Wheezing. Rescue 1 Inhaler 0     No current facility-administered medications on file prior to visit.          Review of patient's allergies indicates:  No Known Allergies        Social History     Socioeconomic History    Marital status:      Spouse name: Not on file    Number of children: Not on file    Years of education: Not on file    Highest education level: Not on file    Social Needs    Financial resource strain: Not on file    Food insecurity - worry: Not on file    Food insecurity - inability: Not on file    Transportation needs - medical: Not on file    Transportation needs - non-medical: Not on file   Occupational History    Not on file   Tobacco Use    Smoking status: Never Smoker    Smokeless tobacco: Never Used   Substance and Sexual Activity    Alcohol use: No    Drug use: No    Sexual activity: Yes     Partners: Female     Comment:    Other Topics Concern    Not on file   Social History Narrative    Not on file             Review of Systems   Constitutional: Negative for chills and fever.   Respiratory: Negative for shortness of breath and wheezing.    Cardiovascular: Negative for chest pain and palpitations.   Gastrointestinal: Negative for nausea and vomiting.   Skin: Positive for wound.       Hemoglobin A1C   Date Value Ref Range Status   12/25/2018 11.3 (H) 4.0 - 5.6 % Final     Comment:     ADA Screening Guidelines:  5.7-6.4%  Consistent with prediabetes  >or=6.5%  Consistent with diabetes  High levels of fetal hemoglobin interfere with the HbA1C  assay. Heterozygous hemoglobin variants (HbS, HgC, etc)do  not significantly interfere with this assay.   However, presence of multiple variants may affect accuracy.     08/16/2017 9.3 (H) 4.0 - 5.6 % Final     Comment:     According to ADA guidelines, hemoglobin A1c <7.0% represents  optimal control in non-pregnant diabetic patients. Different  metrics may apply to specific patient populations.   Standards of Medical Care in Diabetes-2016.  For the purpose of screening for the presence of diabetes:  <5.7%     Consistent with the absence of diabetes  5.7-6.4%  Consistent with increasing risk for diabetes   (prediabetes)  >or=6.5%  Consistent with diabetes  Currently, no consensus exists for use of hemoglobin A1c  for diagnosis of diabetes for children.  This Hemoglobin A1c assay has significant  "interference with fetal   hemoglobin   (HbF). The results are invalid for patients with abnormal amounts of   HbF,   including those with known Hereditary Persistence   of Fetal Hemoglobin. Heterozygous hemoglobin variants (HbAS, HbAC,   HbAD, HbAE, HbA2) do not significantly interfere with this assay;   however, presence of multiple variants in a sample may impact the %   interference.     08/09/2017 9.7 (H) 4.0 - 5.6 % Final     Comment:     According to ADA guidelines, hemoglobin A1c <7.0% represents  optimal control in non-pregnant diabetic patients. Different  metrics may apply to specific patient populations.   Standards of Medical Care in Diabetes-2016.  For the purpose of screening for the presence of diabetes:  <5.7%     Consistent with the absence of diabetes  5.7-6.4%  Consistent with increasing risk for diabetes   (prediabetes)  >or=6.5%  Consistent with diabetes  Currently, no consensus exists for use of hemoglobin A1c  for diagnosis of diabetes for children.  This Hemoglobin A1c assay has significant interference with fetal   hemoglobin   (HbF). The results are invalid for patients with abnormal amounts of   HbF,   including those with known Hereditary Persistence   of Fetal Hemoglobin. Heterozygous hemoglobin variants (HbAS, HbAC,   HbAD, HbAE, HbA2) do not significantly interfere with this assay;   however, presence of multiple variants in a sample may impact the %   interference.           Objective:     Vitals:    02/20/19 1128   Weight: (!) 181 kg (399 lb)   Height: 6' 1" (1.854 m)         Foot Exam  Diagnostic Results:  Right  Foot MRI:    Impression       Status post 1st toe amputation.  Ulcer with extensive soft tissue edema about the stump.  Bone marrow edema at the stump without soniya replacement in keeping with reactive osteitis.    Neuropathic arthropathy.       Left Foot MRI:  Impression       Osteomyelitis of 3rd distal phalanx.    Ulcer with sinus tract to the 1st MTP " joint.    Neuropathic arthropathy of midfoot and forefoot.         Clinical Findings:    Msk:  right hallux amputation status.   S/p left 3rd toe amputation.  Surgical site is healed.   No redness or localized swelling.   Remaining hammertoes    Derm:   S/p LEFT sub 1st metatarsal head wound debridement, wound has epithelialized with fragile skin.    S/p I&D abscess at the site of the 1st metatarsal RIGHT foot,  with no purulence drainage evident, wound edges appear viable.   100% granular base   1cm x 0.1cm.    No tenderness to palpation. No cellulitis evident.   right 4th toe dorsal aspect, skin breakdown.  No acute infection evident.     Neuro:  +LOPS  Negative Tinels  Negative Mulders    Vasc:  DP, PT Pulses palpable.   1+ chronic edema       2/20/19                       Assessment/Plan:     Problem List Items Addressed This Visit     Diabetic foot ulcers      Other Visit Diagnoses     Diabetic polyneuropathy associated with type 2 diabetes mellitus    -  Primary    History of amputation of lesser toe of left foot              S/p  INCISION AND DRAINAGE, FOOT (Bilateral);  AMPUTATION, TOE; left 3rd toe (Left);  Doing well.     DOS:  12/26/18  Dilcia to wounds right 1st metatarsal head and right 4th toe.    Bilateral football dressings applied Leave dressings clean, dry, and intact.    Minimal weight bearing      Follow up in a week or sooner if concerned.     Patient to remain out of work until March 30, 2019 due to feet condition.             Yulia Fernandes DPM  NPI: [id number]         [facility] - PODIATRY  [address] [address]  Dept: [phone]  Dept Fax: [phone]

## 2019-02-27 DIAGNOSIS — E11.59 TYPE 2 DIABETES MELLITUS WITH OTHER CIRCULATORY COMPLICATION, WITH LONG-TERM CURRENT USE OF INSULIN: Primary | ICD-10-CM

## 2019-02-27 DIAGNOSIS — Z79.4 TYPE 2 DIABETES MELLITUS WITH OTHER CIRCULATORY COMPLICATION, WITH LONG-TERM CURRENT USE OF INSULIN: Primary | ICD-10-CM

## 2019-02-27 LAB
ACID FAST MOD KINY STN SPEC: NORMAL
MYCOBACTERIUM SPEC QL CULT: NORMAL

## 2019-02-28 ENCOUNTER — OFFICE VISIT (OUTPATIENT)
Dept: PODIATRY | Facility: CLINIC | Age: 54
End: 2019-02-28
Payer: MEDICAID

## 2019-02-28 VITALS — BODY MASS INDEX: 41.75 KG/M2 | HEIGHT: 73 IN | WEIGHT: 315 LBS

## 2019-02-28 DIAGNOSIS — Z89.411 HISTORY OF AMPUTATION OF RIGHT GREAT TOE: Chronic | ICD-10-CM

## 2019-02-28 DIAGNOSIS — Z89.422 HISTORY OF AMPUTATION OF LESSER TOE, LEFT: ICD-10-CM

## 2019-02-28 DIAGNOSIS — E11.42 DIABETIC POLYNEUROPATHY ASSOCIATED WITH TYPE 2 DIABETES MELLITUS: ICD-10-CM

## 2019-02-28 DIAGNOSIS — E11.610 DIABETIC CHARCOT FOOT: Primary | Chronic | ICD-10-CM

## 2019-02-28 PROBLEM — L97.919 ULCERS OF BOTH LOWER EXTREMITIES: Status: RESOLVED | Noted: 2018-12-26 | Resolved: 2019-02-28

## 2019-02-28 PROBLEM — E11.621 DIABETIC FOOT ULCERS: Status: RESOLVED | Noted: 2017-06-29 | Resolved: 2019-02-28

## 2019-02-28 PROBLEM — L97.929 ULCERS OF BOTH LOWER EXTREMITIES: Status: RESOLVED | Noted: 2018-12-26 | Resolved: 2019-02-28

## 2019-02-28 PROBLEM — L97.509 TYPE 2 DIABETES MELLITUS WITH FOOT ULCER: Chronic | Status: RESOLVED | Noted: 2017-06-30 | Resolved: 2019-02-28

## 2019-02-28 PROBLEM — L97.509 DIABETIC FOOT ULCERS: Status: RESOLVED | Noted: 2017-06-29 | Resolved: 2019-02-28

## 2019-02-28 PROBLEM — E11.621 TYPE 2 DIABETES MELLITUS WITH FOOT ULCER: Chronic | Status: RESOLVED | Noted: 2017-06-30 | Resolved: 2019-02-28

## 2019-02-28 PROCEDURE — 99213 OFFICE O/P EST LOW 20 MIN: CPT | Mod: PBBFAC,PN | Performed by: PODIATRIST

## 2019-02-28 PROCEDURE — 99024 POSTOP FOLLOW-UP VISIT: CPT | Mod: S$PBB,,, | Performed by: PODIATRIST

## 2019-02-28 PROCEDURE — 99999 PR PBB SHADOW E&M-EST. PATIENT-LVL III: ICD-10-PCS | Mod: PBBFAC,,, | Performed by: PODIATRIST

## 2019-02-28 PROCEDURE — 99024 PR POST-OP FOLLOW-UP VISIT: ICD-10-PCS | Mod: S$PBB,,, | Performed by: PODIATRIST

## 2019-02-28 PROCEDURE — 99999 PR PBB SHADOW E&M-EST. PATIENT-LVL III: CPT | Mod: PBBFAC,,, | Performed by: PODIATRIST

## 2019-02-28 NOTE — PROGRESS NOTES
Chief Complaint   Patient presents with    Follow-up     Bilateral           HPI:  Andrew Del Cid Jr. is a 53 y.o. male presents to clinic for follow up s/p :  INCISION AND DRAINAGE, FOOT (Bilateral) and AMPUTATION, TOE; left 3rd toe (Left)   Dr. Alas on 12/26/2018.     He has been in bilateral security football dressing.  No fevers, no chills.  No foot pain.    He needs diabetes shoes prescription.       PCP:  Jeannette Herrera MD  Last PCP visit:   9/6/17        Past Medical History:   Diagnosis Date    Diabetes mellitus, type 2     Hyperlipidemia     Hypertension     Peripheral neuropathy          Current Outpatient Medications on File Prior to Visit   Medication Sig Dispense Refill    aspirin (ECOTRIN) 81 MG EC tablet Take 81 mg by mouth once daily.      atorvastatin (LIPITOR) 20 MG tablet Take 1 tablet (20 mg total) by mouth once daily. 30 tablet 2    gabapentin (NEURONTIN) 300 MG capsule Take 1 capsule (300 mg total) by mouth 3 (three) times daily. 90 capsule 0    insulin aspart U-100 (NOVOLOG) 100 unit/mL InPn pen Inject 12 Units into the skin 3 (three) times daily with meals. 32.4 mL 0    insulin detemir U-100 (LEVEMIR FLEXTOUCH) 100 unit/mL (3 mL) SubQ InPn pen Inject 52 Units into the skin 2 (two) times daily. 93.6 mL 0    losartan (COZAAR) 25 MG tablet Take 1 tablet (25 mg total) by mouth once daily. 30 tablet 2    albuterol 90 mcg/actuation inhaler Inhale 2 puffs into the lungs every 6 (six) hours as needed for Wheezing. Rescue 1 Inhaler 0     No current facility-administered medications on file prior to visit.          Review of patient's allergies indicates:  No Known Allergies        Social History     Socioeconomic History    Marital status:      Spouse name: Not on file    Number of children: Not on file    Years of education: Not on file    Highest education level: Not on file   Social Needs    Financial resource strain: Not on file    Food insecurity - worry: Not  on file    Food insecurity - inability: Not on file    Transportation needs - medical: Not on file    Transportation needs - non-medical: Not on file   Occupational History    Not on file   Tobacco Use    Smoking status: Never Smoker    Smokeless tobacco: Never Used   Substance and Sexual Activity    Alcohol use: No    Drug use: No    Sexual activity: Yes     Partners: Female     Comment:    Other Topics Concern    Not on file   Social History Narrative    Not on file             Review of Systems   Constitutional: Negative for chills and fever.   Respiratory: Negative for shortness of breath and wheezing.    Cardiovascular: Negative for chest pain and palpitations.   Gastrointestinal: Negative for nausea and vomiting.   Skin: Positive for wound.       Hemoglobin A1C   Date Value Ref Range Status   12/25/2018 11.3 (H) 4.0 - 5.6 % Final     Comment:     ADA Screening Guidelines:  5.7-6.4%  Consistent with prediabetes  >or=6.5%  Consistent with diabetes  High levels of fetal hemoglobin interfere with the HbA1C  assay. Heterozygous hemoglobin variants (HbS, HgC, etc)do  not significantly interfere with this assay.   However, presence of multiple variants may affect accuracy.     08/16/2017 9.3 (H) 4.0 - 5.6 % Final     Comment:     According to ADA guidelines, hemoglobin A1c <7.0% represents  optimal control in non-pregnant diabetic patients. Different  metrics may apply to specific patient populations.   Standards of Medical Care in Diabetes-2016.  For the purpose of screening for the presence of diabetes:  <5.7%     Consistent with the absence of diabetes  5.7-6.4%  Consistent with increasing risk for diabetes   (prediabetes)  >or=6.5%  Consistent with diabetes  Currently, no consensus exists for use of hemoglobin A1c  for diagnosis of diabetes for children.  This Hemoglobin A1c assay has significant interference with fetal   hemoglobin   (HbF). The results are invalid for patients with abnormal  "amounts of   HbF,   including those with known Hereditary Persistence   of Fetal Hemoglobin. Heterozygous hemoglobin variants (HbAS, HbAC,   HbAD, HbAE, HbA2) do not significantly interfere with this assay;   however, presence of multiple variants in a sample may impact the %   interference.     08/09/2017 9.7 (H) 4.0 - 5.6 % Final     Comment:     According to ADA guidelines, hemoglobin A1c <7.0% represents  optimal control in non-pregnant diabetic patients. Different  metrics may apply to specific patient populations.   Standards of Medical Care in Diabetes-2016.  For the purpose of screening for the presence of diabetes:  <5.7%     Consistent with the absence of diabetes  5.7-6.4%  Consistent with increasing risk for diabetes   (prediabetes)  >or=6.5%  Consistent with diabetes  Currently, no consensus exists for use of hemoglobin A1c  for diagnosis of diabetes for children.  This Hemoglobin A1c assay has significant interference with fetal   hemoglobin   (HbF). The results are invalid for patients with abnormal amounts of   HbF,   including those with known Hereditary Persistence   of Fetal Hemoglobin. Heterozygous hemoglobin variants (HbAS, HbAC,   HbAD, HbAE, HbA2) do not significantly interfere with this assay;   however, presence of multiple variants in a sample may impact the %   interference.           Objective:     Vitals:    02/28/19 0932   Weight: (!) 181 kg (399 lb)   Height: 6' 1" (1.854 m)         Foot Exam  Diagnostic Results:  Right  Foot MRI:    Impression       Status post 1st toe amputation.  Ulcer with extensive soft tissue edema about the stump.  Bone marrow edema at the stump without soniya replacement in keeping with reactive osteitis.    Neuropathic arthropathy.       Left Foot MRI:  Impression       Osteomyelitis of 3rd distal phalanx.    Ulcer with sinus tract to the 1st MTP joint.    Neuropathic arthropathy of midfoot and forefoot.         Clinical Findings:    Msk:  right hallux " amputation status.   S/p left 3rd toe amputation.  Surgical site is healed.   No redness or localized swelling.   Remaining hammertoes    Derm:   S/p LEFT sub 1st metatarsal head wound debridement, wound has epithelialized with fragile skin.    S/p I&D abscess at the site of the 1st metatarsal RIGHT foot,  with no purulence drainage evident, epithelialized  No tenderness to palpation. No cellulitis evident.   right 4th toe dorsal aspect, skin breakdown.  No acute infection evident.     Neuro:  +LOPS  Negative Tinels  Negative Mulders    Vasc:  DP, PT Pulses palpable.   1+ chronic edema       2/20/19                       Assessment/Plan:     Problem List Items Addressed This Visit     Diabetic Charcot foot - Primary (Chronic)    Relevant Orders    DIABETIC SHOES FOR HOME USE    History of amputation of right great toe (Chronic)    Relevant Orders    DIABETIC SHOES FOR HOME USE      Other Visit Diagnoses     History of amputation of lesser toe, left        Relevant Orders    DIABETIC SHOES FOR HOME USE    Diabetic polyneuropathy associated with type 2 diabetes mellitus        Relevant Orders    DIABETIC SHOES FOR HOME USE          S/p  INCISION AND DRAINAGE, FOOT (Bilateral);  AMPUTATION, TOE; left 3rd toe (Left);  Doing well.     DOS:  12/26/18  Security Bilateral football dressings applied with extra foam.   Leave dressings clean, dry, and intact.    Minimal weight bearing.   Follow up in a week or sooner if concerned.   He will schedule appointment to be fitted for custom shoes.  He will need bilateral football dressings until he is able to obtain custom diabetes shoes.               Yulia Fernandes DPM  NPI: [id number]         [facility] - PODIATRY  [address] [place] [id number]  Dept: [phone]  Dept Fax: [phone]

## 2019-03-11 ENCOUNTER — OFFICE VISIT (OUTPATIENT)
Dept: PODIATRY | Facility: CLINIC | Age: 54
End: 2019-03-11
Payer: MEDICAID

## 2019-03-11 VITALS — HEIGHT: 73 IN | WEIGHT: 315 LBS | BODY MASS INDEX: 41.75 KG/M2

## 2019-03-11 DIAGNOSIS — E11.42 DIABETIC POLYNEUROPATHY ASSOCIATED WITH TYPE 2 DIABETES MELLITUS: Primary | ICD-10-CM

## 2019-03-11 DIAGNOSIS — Z89.422 HISTORY OF AMPUTATION OF LESSER TOE, LEFT: ICD-10-CM

## 2019-03-11 DIAGNOSIS — Z89.411 HISTORY OF AMPUTATION OF RIGHT GREAT TOE: ICD-10-CM

## 2019-03-11 PROCEDURE — 99024 PR POST-OP FOLLOW-UP VISIT: ICD-10-PCS | Mod: S$PBB,,, | Performed by: PODIATRIST

## 2019-03-11 PROCEDURE — 99999 PR PBB SHADOW E&M-EST. PATIENT-LVL III: CPT | Mod: PBBFAC,,, | Performed by: PODIATRIST

## 2019-03-11 PROCEDURE — 99999 PR PBB SHADOW E&M-EST. PATIENT-LVL III: ICD-10-PCS | Mod: PBBFAC,,, | Performed by: PODIATRIST

## 2019-03-11 PROCEDURE — 99213 OFFICE O/P EST LOW 20 MIN: CPT | Mod: PBBFAC,PN | Performed by: PODIATRIST

## 2019-03-11 PROCEDURE — 99024 POSTOP FOLLOW-UP VISIT: CPT | Mod: S$PBB,,, | Performed by: PODIATRIST

## 2019-03-11 NOTE — PROGRESS NOTES
Chief Complaint   Patient presents with    Follow-up     ulcer Bilateral    Diabetes Mellitus     A1C 12/25/18 11.3 PCP last seen on 9/26/17           HPI:  Andrew Del Cid Jr. is a 53 y.o. male presents to clinic for follow up s/p :  INCISION AND DRAINAGE, FOOT (Bilateral) and AMPUTATION, TOE; left 3rd toe (Left)   Dr. Alas on 12/26/2018.     He has been in bilateral security football dressing.    No fevers, no chills.  No foot pain.    Removed dressing on  Friday to get feet measured for custom diabetes shoes.  He has been wearing [employer] shoes over the weekend and does not note new wounds to his feet.    He states he will not be insured after this month.         PCP:  Jeannette Herrera MD  Last PCP visit:   9/6/17        Past Medical History:   Diagnosis Date    Diabetes mellitus, type 2     Hyperlipidemia     Hypertension     Peripheral neuropathy          Current Outpatient Medications on File Prior to Visit   Medication Sig Dispense Refill    aspirin (ECOTRIN) 81 MG EC tablet Take 81 mg by mouth once daily.      atorvastatin (LIPITOR) 20 MG tablet Take 1 tablet (20 mg total) by mouth once daily. 30 tablet 2    gabapentin (NEURONTIN) 300 MG capsule Take 1 capsule (300 mg total) by mouth 3 (three) times daily. 90 capsule 0    insulin aspart U-100 (NOVOLOG) 100 unit/mL InPn pen Inject 12 Units into the skin 3 (three) times daily with meals. 32.4 mL 0    insulin detemir U-100 (LEVEMIR FLEXTOUCH) 100 unit/mL (3 mL) SubQ InPn pen Inject 52 Units into the skin 2 (two) times daily. 93.6 mL 0    losartan (COZAAR) 25 MG tablet Take 1 tablet (25 mg total) by mouth once daily. 30 tablet 2    albuterol 90 mcg/actuation inhaler Inhale 2 puffs into the lungs every 6 (six) hours as needed for Wheezing. Rescue 1 Inhaler 0     No current facility-administered medications on file prior to visit.          Review of patient's allergies indicates:  No Known Allergies        Social History     Socioeconomic History     Marital status:      Spouse name: Not on file    Number of children: Not on file    Years of education: Not on file    Highest education level: Not on file   Social Needs    Financial resource strain: Not on file    Food insecurity - worry: Not on file    Food insecurity - inability: Not on file    Transportation needs - medical: Not on file    Transportation needs - non-medical: Not on file   Occupational History    Not on file   Tobacco Use    Smoking status: Never Smoker    Smokeless tobacco: Never Used   Substance and Sexual Activity    Alcohol use: No    Drug use: No    Sexual activity: Yes     Partners: Female     Comment:    Other Topics Concern    Not on file   Social History Narrative    Not on file             Review of Systems   Constitutional: Negative for chills and fever.   Respiratory: Negative for shortness of breath and wheezing.    Cardiovascular: Negative for chest pain and palpitations.   Gastrointestinal: Negative for nausea and vomiting.   Skin: Positive for wound.       Hemoglobin A1C   Date Value Ref Range Status   12/25/2018 11.3 (H) 4.0 - 5.6 % Final     Comment:     ADA Screening Guidelines:  5.7-6.4%  Consistent with prediabetes  >or=6.5%  Consistent with diabetes  High levels of fetal hemoglobin interfere with the HbA1C  assay. Heterozygous hemoglobin variants (HbS, HgC, etc)do  not significantly interfere with this assay.   However, presence of multiple variants may affect accuracy.     08/16/2017 9.3 (H) 4.0 - 5.6 % Final     Comment:     According to ADA guidelines, hemoglobin A1c <7.0% represents  optimal control in non-pregnant diabetic patients. Different  metrics may apply to specific patient populations.   Standards of Medical Care in Diabetes-2016.  For the purpose of screening for the presence of diabetes:  <5.7%     Consistent with the absence of diabetes  5.7-6.4%  Consistent with increasing risk for diabetes   (prediabetes)  >or=6.5%   "Consistent with diabetes  Currently, no consensus exists for use of hemoglobin A1c  for diagnosis of diabetes for children.  This Hemoglobin A1c assay has significant interference with fetal   hemoglobin   (HbF). The results are invalid for patients with abnormal amounts of   HbF,   including those with known Hereditary Persistence   of Fetal Hemoglobin. Heterozygous hemoglobin variants (HbAS, HbAC,   HbAD, HbAE, HbA2) do not significantly interfere with this assay;   however, presence of multiple variants in a sample may impact the %   interference.     08/09/2017 9.7 (H) 4.0 - 5.6 % Final     Comment:     According to ADA guidelines, hemoglobin A1c <7.0% represents  optimal control in non-pregnant diabetic patients. Different  metrics may apply to specific patient populations.   Standards of Medical Care in Diabetes-2016.  For the purpose of screening for the presence of diabetes:  <5.7%     Consistent with the absence of diabetes  5.7-6.4%  Consistent with increasing risk for diabetes   (prediabetes)  >or=6.5%  Consistent with diabetes  Currently, no consensus exists for use of hemoglobin A1c  for diagnosis of diabetes for children.  This Hemoglobin A1c assay has significant interference with fetal   hemoglobin   (HbF). The results are invalid for patients with abnormal amounts of   HbF,   including those with known Hereditary Persistence   of Fetal Hemoglobin. Heterozygous hemoglobin variants (HbAS, HbAC,   HbAD, HbAE, HbA2) do not significantly interfere with this assay;   however, presence of multiple variants in a sample may impact the %   interference.           Objective:     Vitals:    03/11/19 1442   Weight: (!) 181 kg (399 lb)   Height: 6' 1" (1.854 m)         Foot Exam  Diagnostic Results:  Right  Foot MRI:    Impression       Status post 1st toe amputation.  Ulcer with extensive soft tissue edema about the stump.  Bone marrow edema at the stump without soniya replacement in keeping with reactive " osteitis.    Neuropathic arthropathy.       Left Foot MRI:  Impression       Osteomyelitis of 3rd distal phalanx.    Ulcer with sinus tract to the 1st MTP joint.    Neuropathic arthropathy of midfoot and forefoot.         Clinical Findings:    Msk:  right hallux amputation status.   S/p left 3rd toe amputation.  Surgical site is healed.   No redness or localized swelling.   Remaining hammertoes    Derm:   S/p LEFT sub 1st metatarsal head wound debridement, wound has epithelialized with fragile skin.    S/p I&D abscess at the site of the 1st metatarsal RIGHT foot,  with no purulence drainage evident, epithelialized  No tenderness to palpation. No cellulitis evident.   right 4th toe dorsal aspect, no open wounds.   Splinter noted to the sulcus left 4th toe, plantarly.  Superficial skin breakdown.  No pain due to neuropathy.  The splinter was removed from the superficial skin without complication.       Neuro:  +LOPS  Negative Tinels  Negative Mulders  Reflexes 2+      Vasc:  DP, PT Pulses palpable.   1+ chronic edema   Feet warm to touch.   Cap refill <3secs to toes.           Assessment/Plan:     Problem List Items Addressed This Visit     History of amputation of right great toe (Chronic)      Other Visit Diagnoses     Diabetic polyneuropathy associated with type 2 diabetes mellitus    -  Primary    History of amputation of lesser toe, left              · S/p  INCISION AND DRAINAGE, FOOT (Bilateral);  AMPUTATION, TOE; left 3rd toe (Left);  Doing well.     DOS:  12/26/18  ·   · Security Bilateral football dressings applied with extra foam.   Leave dressings clean, dry, and intact.    ·   · Minimal weight bearing.   Follow up in a week or sooner if concerned for dressing change and check to make sure no new wounds noted.                 Yulia Fernandes DPM  NPI: [id number]         [facility] - PODIATRY  [address] [place] [id number]  Dept: [phone]  Dept Fax:  [phone]

## 2019-04-03 ENCOUNTER — PATIENT MESSAGE (OUTPATIENT)
Dept: PODIATRY | Facility: CLINIC | Age: 54
End: 2019-04-03

## 2019-08-16 DIAGNOSIS — Z12.11 COLON CANCER SCREENING: ICD-10-CM

## 2021-09-03 ENCOUNTER — HOSPITAL ENCOUNTER (EMERGENCY)
Facility: HOSPITAL | Age: 56
Discharge: HOME | End: 2021-09-04
Attending: EMERGENCY MEDICINE
Payer: MEDICAID

## 2021-09-03 DIAGNOSIS — R73.9 HYPERGLYCEMIA: ICD-10-CM

## 2021-09-03 DIAGNOSIS — S99.929A FOOT INJURY: ICD-10-CM

## 2021-09-03 DIAGNOSIS — T14.8XXA PUNCTURE WOUND: Primary | ICD-10-CM

## 2021-09-03 LAB
ALBUMIN SERPL BCP-MCNC: 3 G/DL (ref 3.5–5.2)
ALP SERPL-CCNC: 107 U/L (ref 55–135)
ALT SERPL W/O P-5'-P-CCNC: 20 U/L (ref 10–44)
ANION GAP SERPL CALC-SCNC: 7 MMOL/L (ref 8–16)
AST SERPL-CCNC: 6 U/L (ref 10–40)
BASOPHILS # BLD AUTO: 0.04 K/UL (ref 0–0.2)
BASOPHILS NFR BLD: 0.4 % (ref 0–1.9)
BILIRUB SERPL-MCNC: 1.3 MG/DL (ref 0.1–1)
BUN SERPL-MCNC: 19 MG/DL (ref 6–20)
CALCIUM SERPL-MCNC: 8.4 MG/DL (ref 8.7–10.5)
CHLORIDE SERPL-SCNC: 95 MMOL/L (ref 95–110)
CO2 SERPL-SCNC: 26 MMOL/L (ref 23–29)
CREAT SERPL-MCNC: 1.3 MG/DL (ref 0.5–1.4)
DIFFERENTIAL METHOD: ABNORMAL
EOSINOPHIL # BLD AUTO: 0 K/UL (ref 0–0.5)
EOSINOPHIL NFR BLD: 0.3 % (ref 0–8)
ERYTHROCYTE [DISTWIDTH] IN BLOOD BY AUTOMATED COUNT: 11.9 % (ref 11.5–14.5)
EST. GFR  (AFRICAN AMERICAN): >60 ML/MIN/1.73 M^2
EST. GFR  (NON AFRICAN AMERICAN): >60 ML/MIN/1.73 M^2
GLUCOSE SERPL-MCNC: 543 MG/DL (ref 70–110)
HCT VFR BLD AUTO: 37.9 % (ref 40–54)
HGB BLD-MCNC: 12.9 G/DL (ref 14–18)
IMM GRANULOCYTES # BLD AUTO: 0.04 K/UL (ref 0–0.04)
IMM GRANULOCYTES NFR BLD AUTO: 0.4 % (ref 0–0.5)
LACTATE SERPL-SCNC: 1.7 MMOL/L (ref 0.5–2.2)
LYMPHOCYTES # BLD AUTO: 1.7 K/UL (ref 1–4.8)
LYMPHOCYTES NFR BLD: 16.5 % (ref 18–48)
MCH RBC QN AUTO: 29.6 PG (ref 27–31)
MCHC RBC AUTO-ENTMCNC: 34 G/DL (ref 32–36)
MCV RBC AUTO: 87 FL (ref 82–98)
MONOCYTES # BLD AUTO: 0.7 K/UL (ref 0.3–1)
MONOCYTES NFR BLD: 6.6 % (ref 4–15)
NEUTROPHILS # BLD AUTO: 7.6 K/UL (ref 1.8–7.7)
NEUTROPHILS NFR BLD: 75.8 % (ref 38–73)
NRBC BLD-RTO: 0 /100 WBC
PLATELET # BLD AUTO: 333 K/UL (ref 150–450)
PMV BLD AUTO: 9.3 FL (ref 9.2–12.9)
POCT GLUCOSE: 468 MG/DL (ref 70–110)
POTASSIUM SERPL-SCNC: 4.3 MMOL/L (ref 3.5–5.1)
PROT SERPL-MCNC: 7.9 G/DL (ref 6–8.4)
RBC # BLD AUTO: 4.36 M/UL (ref 4.6–6.2)
SODIUM SERPL-SCNC: 128 MMOL/L (ref 136–145)
WBC # BLD AUTO: 10 K/UL (ref 3.9–12.7)

## 2021-09-03 PROCEDURE — 96361 HYDRATE IV INFUSION ADD-ON: CPT

## 2021-09-03 PROCEDURE — 63600175 PHARM REV CODE 636 W HCPCS: Performed by: CLINICAL NURSE SPECIALIST

## 2021-09-03 PROCEDURE — 82962 GLUCOSE BLOOD TEST: CPT

## 2021-09-03 PROCEDURE — 63600175 PHARM REV CODE 636 W HCPCS: Performed by: EMERGENCY MEDICINE

## 2021-09-03 PROCEDURE — 96375 TX/PRO/DX INJ NEW DRUG ADDON: CPT

## 2021-09-03 PROCEDURE — 25000003 PHARM REV CODE 250: Performed by: CLINICAL NURSE SPECIALIST

## 2021-09-03 PROCEDURE — 83605 ASSAY OF LACTIC ACID: CPT | Performed by: CLINICAL NURSE SPECIALIST

## 2021-09-03 PROCEDURE — 96365 THER/PROPH/DIAG IV INF INIT: CPT

## 2021-09-03 PROCEDURE — 80053 COMPREHEN METABOLIC PANEL: CPT | Performed by: CLINICAL NURSE SPECIALIST

## 2021-09-03 PROCEDURE — 85025 COMPLETE CBC W/AUTO DIFF WBC: CPT | Performed by: CLINICAL NURSE SPECIALIST

## 2021-09-03 PROCEDURE — 25000003 PHARM REV CODE 250: Performed by: EMERGENCY MEDICINE

## 2021-09-03 PROCEDURE — 36415 COLL VENOUS BLD VENIPUNCTURE: CPT | Performed by: CLINICAL NURSE SPECIALIST

## 2021-09-03 PROCEDURE — 99284 EMERGENCY DEPT VISIT MOD MDM: CPT | Mod: 25

## 2021-09-03 PROCEDURE — 96372 THER/PROPH/DIAG INJ SC/IM: CPT | Mod: 59

## 2021-09-03 PROCEDURE — 96376 TX/PRO/DX INJ SAME DRUG ADON: CPT

## 2021-09-03 RX ORDER — GABAPENTIN 300 MG/1
1 CAPSULE ORAL 3 TIMES DAILY
COMMUNITY
Start: 2021-02-18 | End: 2022-10-04

## 2021-09-03 RX ORDER — LISINOPRIL 10 MG/1
1 TABLET ORAL DAILY
COMMUNITY
Start: 2021-04-19 | End: 2022-10-04

## 2021-09-03 RX ORDER — AMOXICILLIN AND CLAVULANATE POTASSIUM 875; 125 MG/1; MG/1
1 TABLET, FILM COATED ORAL 2 TIMES DAILY
Qty: 14 TABLET | Refills: 0 | Status: SHIPPED | OUTPATIENT
Start: 2021-09-03 | End: 2022-10-04

## 2021-09-03 RX ORDER — ATORVASTATIN CALCIUM 40 MG/1
40 TABLET, FILM COATED ORAL
COMMUNITY
Start: 2021-07-19 | End: 2023-01-17 | Stop reason: SDUPTHER

## 2021-09-03 RX ORDER — AMLODIPINE BESYLATE 5 MG/1
5 TABLET ORAL
Status: ON HOLD | COMMUNITY
Start: 2021-07-19 | End: 2022-11-09 | Stop reason: HOSPADM

## 2021-09-03 RX ORDER — CLINDAMYCIN PHOSPHATE 900 MG/50ML
900 INJECTION, SOLUTION INTRAVENOUS
Status: COMPLETED | OUTPATIENT
Start: 2021-09-03 | End: 2021-09-04

## 2021-09-03 RX ADMIN — INSULIN HUMAN 10 UNITS: 100 INJECTION, SOLUTION PARENTERAL at 08:09

## 2021-09-03 RX ADMIN — INSULIN HUMAN 10 UNITS: 100 INJECTION, SOLUTION PARENTERAL at 11:09

## 2021-09-03 RX ADMIN — SODIUM CHLORIDE 1000 ML: 0.9 INJECTION, SOLUTION INTRAVENOUS at 08:09

## 2021-09-03 RX ADMIN — CLINDAMYCIN IN 5 PERCENT DEXTROSE 900 MG: 18 INJECTION, SOLUTION INTRAVENOUS at 11:09

## 2021-09-03 RX ADMIN — SODIUM CHLORIDE 1000 ML: 0.9 INJECTION, SOLUTION INTRAVENOUS at 11:09

## 2021-09-04 VITALS
RESPIRATION RATE: 16 BRPM | BODY MASS INDEX: 41.75 KG/M2 | HEART RATE: 84 BPM | DIASTOLIC BLOOD PRESSURE: 94 MMHG | SYSTOLIC BLOOD PRESSURE: 164 MMHG | HEIGHT: 73 IN | WEIGHT: 315 LBS | OXYGEN SATURATION: 100 % | TEMPERATURE: 99 F

## 2021-09-04 LAB
POCT GLUCOSE: 268 MG/DL (ref 70–110)
POCT GLUCOSE: 418 MG/DL (ref 70–110)

## 2021-09-04 PROCEDURE — 82962 GLUCOSE BLOOD TEST: CPT

## 2022-03-07 DIAGNOSIS — E11.65 TYPE 2 DIABETES MELLITUS WITH HYPERGLYCEMIA, UNSPECIFIED WHETHER LONG TERM INSULIN USE: Primary | ICD-10-CM

## 2022-07-21 ENCOUNTER — HOSPITAL ENCOUNTER (EMERGENCY)
Facility: HOSPITAL | Age: 57
Discharge: HOME | End: 2022-07-21
Attending: EMERGENCY MEDICINE
Payer: MEDICAID

## 2022-07-21 VITALS
SYSTOLIC BLOOD PRESSURE: 168 MMHG | RESPIRATION RATE: 14 BRPM | OXYGEN SATURATION: 97 % | DIASTOLIC BLOOD PRESSURE: 89 MMHG | WEIGHT: 315 LBS | TEMPERATURE: 98 F | HEART RATE: 81 BPM | BODY MASS INDEX: 41.75 KG/M2 | HEIGHT: 73 IN

## 2022-07-21 DIAGNOSIS — R07.89 CHEST WALL PAIN: Primary | ICD-10-CM

## 2022-07-21 LAB
ALBUMIN SERPL BCP-MCNC: 2.8 G/DL (ref 3.5–5.2)
ALP SERPL-CCNC: 95 U/L (ref 55–135)
ALT SERPL W/O P-5'-P-CCNC: 21 U/L (ref 10–44)
ANION GAP SERPL CALC-SCNC: 4 MMOL/L (ref 8–16)
AST SERPL-CCNC: 10 U/L (ref 10–40)
BASOPHILS # BLD AUTO: 0.03 K/UL (ref 0–0.2)
BASOPHILS NFR BLD: 0.4 % (ref 0–1.9)
BILIRUB SERPL-MCNC: 1 MG/DL (ref 0.1–1)
BUN SERPL-MCNC: 11 MG/DL (ref 6–20)
CALCIUM SERPL-MCNC: 8.7 MG/DL (ref 8.7–10.5)
CHLORIDE SERPL-SCNC: 102 MMOL/L (ref 95–110)
CO2 SERPL-SCNC: 30 MMOL/L (ref 23–29)
CREAT SERPL-MCNC: 0.8 MG/DL (ref 0.5–1.4)
DIFFERENTIAL METHOD: ABNORMAL
EOSINOPHIL # BLD AUTO: 0.1 K/UL (ref 0–0.5)
EOSINOPHIL NFR BLD: 2.1 % (ref 0–8)
ERYTHROCYTE [DISTWIDTH] IN BLOOD BY AUTOMATED COUNT: 13 % (ref 11.5–14.5)
EST. GFR  (AFRICAN AMERICAN): >60 ML/MIN/1.73 M^2
EST. GFR  (NON AFRICAN AMERICAN): >60 ML/MIN/1.73 M^2
GLUCOSE SERPL-MCNC: 281 MG/DL (ref 70–110)
HCT VFR BLD AUTO: 39 % (ref 40–54)
HGB BLD-MCNC: 12.9 G/DL (ref 14–18)
IMM GRANULOCYTES # BLD AUTO: 0.02 K/UL (ref 0–0.04)
IMM GRANULOCYTES NFR BLD AUTO: 0.3 % (ref 0–0.5)
LYMPHOCYTES # BLD AUTO: 2.3 K/UL (ref 1–4.8)
LYMPHOCYTES NFR BLD: 33.2 % (ref 18–48)
MCH RBC QN AUTO: 28.7 PG (ref 27–31)
MCHC RBC AUTO-ENTMCNC: 33.1 G/DL (ref 32–36)
MCV RBC AUTO: 87 FL (ref 82–98)
MONOCYTES # BLD AUTO: 0.4 K/UL (ref 0.3–1)
MONOCYTES NFR BLD: 5.8 % (ref 4–15)
NEUTROPHILS # BLD AUTO: 4 K/UL (ref 1.8–7.7)
NEUTROPHILS NFR BLD: 58.2 % (ref 38–73)
NRBC BLD-RTO: 0 /100 WBC
PLATELET # BLD AUTO: 296 K/UL (ref 150–450)
PMV BLD AUTO: 8.9 FL (ref 9.2–12.9)
POTASSIUM SERPL-SCNC: 4.7 MMOL/L (ref 3.5–5.1)
PROT SERPL-MCNC: 6.9 G/DL (ref 6–8.4)
RBC # BLD AUTO: 4.5 M/UL (ref 4.6–6.2)
SODIUM SERPL-SCNC: 136 MMOL/L (ref 136–145)
TROPONIN I SERPL DL<=0.01 NG/ML-MCNC: 12.1 PG/ML (ref 0–60)
WBC # BLD AUTO: 6.78 K/UL (ref 3.9–12.7)

## 2022-07-21 PROCEDURE — 96372 THER/PROPH/DIAG INJ SC/IM: CPT | Performed by: EMERGENCY MEDICINE

## 2022-07-21 PROCEDURE — 80053 COMPREHEN METABOLIC PANEL: CPT | Performed by: EMERGENCY MEDICINE

## 2022-07-21 PROCEDURE — 36415 COLL VENOUS BLD VENIPUNCTURE: CPT | Performed by: EMERGENCY MEDICINE

## 2022-07-21 PROCEDURE — 93010 EKG 12-LEAD: ICD-10-PCS | Mod: ,,, | Performed by: INTERNAL MEDICINE

## 2022-07-21 PROCEDURE — 63600175 PHARM REV CODE 636 W HCPCS: Performed by: EMERGENCY MEDICINE

## 2022-07-21 PROCEDURE — 93010 ELECTROCARDIOGRAM REPORT: CPT | Mod: ,,, | Performed by: INTERNAL MEDICINE

## 2022-07-21 PROCEDURE — 93005 ELECTROCARDIOGRAM TRACING: CPT

## 2022-07-21 PROCEDURE — 84484 ASSAY OF TROPONIN QUANT: CPT | Performed by: EMERGENCY MEDICINE

## 2022-07-21 PROCEDURE — 85025 COMPLETE CBC W/AUTO DIFF WBC: CPT | Performed by: EMERGENCY MEDICINE

## 2022-07-21 PROCEDURE — 99285 EMERGENCY DEPT VISIT HI MDM: CPT | Mod: 25

## 2022-07-21 RX ORDER — METHOCARBAMOL 500 MG/1
1000 TABLET, FILM COATED ORAL 3 TIMES DAILY
Qty: 30 TABLET | Refills: 0 | Status: SHIPPED | OUTPATIENT
Start: 2022-07-21 | End: 2022-07-21 | Stop reason: SDUPTHER

## 2022-07-21 RX ORDER — KETOROLAC TROMETHAMINE 30 MG/ML
60 INJECTION, SOLUTION INTRAMUSCULAR; INTRAVENOUS
Status: COMPLETED | OUTPATIENT
Start: 2022-07-21 | End: 2022-07-21

## 2022-07-21 RX ORDER — METHOCARBAMOL 500 MG/1
1000 TABLET, FILM COATED ORAL 3 TIMES DAILY
Qty: 30 TABLET | Refills: 0 | Status: SHIPPED | OUTPATIENT
Start: 2022-07-21 | End: 2022-07-26

## 2022-07-21 RX ADMIN — KETOROLAC TROMETHAMINE 60 MG: 30 INJECTION, SOLUTION INTRAMUSCULAR; INTRAVENOUS at 06:07

## 2022-07-21 NOTE — ED PROVIDER NOTES
Encounter Date: 7/21/2022       History     Chief Complaint   Patient presents with    Chest Pain     Pt reports chest pain that hurts when he moves.      This is a 57-year-old male with a history of diabetes mellitus, hypertension, hyperlipidemia, peripheral neuropathy presents the emergency department with reproducible left-sided chest wall pain, worse with movement and palpation, also back of shoulder as well, denies any trauma.  History of this in the past where he was diagnosed with chest wall pain.  Denies any other radiation.  No nausea vomiting.  No diaphoresis.  He is not ill appearing, alert oriented x4, GCS is 15. He works as a  for [employer].          Review of patient's allergies indicates:  No Known Allergies  Past Medical History:   Diagnosis Date    Diabetes mellitus, type 2     Hyperlipidemia     Hypertension     Peripheral neuropathy      Past Surgical History:   Procedure Laterality Date    APPENDECTOMY      HERNIA REPAIR      INCISION AND DRAINAGE FOOT Bilateral 12/26/2018    Procedure: INCISION AND DRAINAGE, FOOT;  Surgeon: Rob Alas DPM;  Location: [facility] OR;  Service: Podiatry;  Laterality: Bilateral;    TOE AMPUTATION      right great toe    TOE AMPUTATION Left 12/26/2018    Procedure: AMPUTATION, TOE; left 3rd toe;  Surgeon: Rob Alas DPM;  Location: [facility] OR;  Service: Podiatry;  Laterality: Left;     No family history on file.  Social History     Tobacco Use    Smoking status: Never Smoker    Smokeless tobacco: Never Used   Substance Use Topics    Alcohol use: No    Drug use: No     Review of Systems   Constitutional: Negative for fever.   HENT: Negative for sore throat.    Respiratory: Negative for shortness of breath.    Cardiovascular: Positive for chest pain.   Gastrointestinal: Negative for nausea.   Genitourinary: Negative for dysuria.   Musculoskeletal: Negative for back pain.   Skin: Negative for rash.   Neurological: Negative for weakness.    Hematological: Does not bruise/bleed easily.   All other systems reviewed and are negative.      Physical Exam     Initial Vitals [07/21/22 0653]   BP Pulse Resp Temp SpO2   (!) 204/112 88 14 98.1 °F (36.7 °C) 97 %      MAP       --         Physical Exam    Nursing note and vitals reviewed.  Constitutional: He appears well-developed and well-nourished. He is not diaphoretic. No distress.   HENT:   Head: Normocephalic and atraumatic.   Eyes: Conjunctivae and EOM are normal. Pupils are equal, round, and reactive to light. Right eye exhibits no discharge. Left eye exhibits no discharge. No scleral icterus.   Neck: Neck supple. No JVD present.   Normal range of motion.  Cardiovascular: Normal rate, regular rhythm, normal heart sounds and intact distal pulses.   No murmur heard.  Pulmonary/Chest: Breath sounds normal. No stridor. No respiratory distress. He has no wheezes. He has no rhonchi. He has no rales. He exhibits tenderness.   Completely reproducible left-sided chest wall tenderness anteriorly with palpation, no crepitance, no rash.   Abdominal: Abdomen is soft. Bowel sounds are normal. He exhibits no distension and no mass. There is no abdominal tenderness. There is no rebound and no guarding.   Musculoskeletal:         General: No tenderness or edema. Normal range of motion.      Cervical back: Normal range of motion and neck supple.     Neurological: He is alert and oriented to person, place, and time. He has normal strength. GCS score is 15. GCS eye subscore is 4. GCS verbal subscore is 5. GCS motor subscore is 6.   Skin: Skin is warm and dry. Capillary refill takes less than 2 seconds.         ED Course   Procedures  Labs Reviewed   CBC W/ AUTO DIFFERENTIAL - Abnormal; Notable for the following components:       Result Value    RBC 4.50 (*)     Hemoglobin 12.9 (*)     Hematocrit 39.0 (*)     MPV 8.9 (*)     All other components within normal limits   COMPREHENSIVE METABOLIC PANEL - Abnormal; Notable for the  following components:    CO2 30 (*)     Glucose 281 (*)     Albumin 2.8 (*)     Anion Gap 4 (*)     All other components within normal limits   TROPONIN I HIGH SENSITIVITY     EKG Readings: (Independently Interpreted)   Initial Reading: No STEMI. Rhythm: Normal Sinus Rhythm. Heart Rate: 84. Ectopy: No Ectopy. Conduction: LAFB. ST Segments: Normal ST Segments. T Waves: Normal. Axis: Normal. Clinical Impression: Normal Sinus Rhythm     ECG Results          EKG 12-lead (In process)  Result time 07/21/22 07:43:25    In process by Interface, Lab In [facility] (07/21/22 07:43:25)                 Narrative:    Test Reason : R07.89,    Vent. Rate : 084 BPM     Atrial Rate : 084 BPM     P-R Int : 178 ms          QRS Dur : 100 ms      QT Int : 350 ms       P-R-T Axes : 053 -54 058 degrees     QTc Int : 413 ms    Program found technically poor ECG  Normal sinus rhythm  Left anterior fascicular block  Cannot rule out Anterior infarct ,age undetermined  Abnormal ECG  When compared with ECG of 01-JUN-2016 09:30,  No significant change was found    Referred By: [email or web address]   SELF           Confirmed By:                             Imaging Results          X-Ray Chest 1 View (In process)                  Medications   ketorolac injection 60 mg (60 mg Intramuscular Given 7/21/22 0657)     Medical Decision Making:   Differential Diagnosis:   Chest wall pain, costochondritis, musculoskeletal pain             ED Course as of 07/21/22 0759   Thu Jul 21, 2022   0706 Chest x-ray is negative for acute changes [SD]   0758 Labs are all normal, clinically this is chest wall pain/costochondritis, stable for discharge home [SD]      ED Course User Index  [SD] Ismael Amaro MD             Clinical Impression:   Final diagnoses:  [R07.89] Chest wall pain (Primary)          ED Disposition Condition    Discharge Stable        ED Prescriptions     Medication Sig Dispense Start Date End Date Auth. Provider    methocarbamoL (ROBAXIN) 500 MG Tab Take 2  tablets (1,000 mg total) by mouth 3 (three) times daily. for 5 days 30 tablet 7/21/2022 7/26/2022 Ismael Amaro MD        Follow-up Information     Follow up With Specialties Details Why Contact Info Additional Information    Primary care physician  In 2 days       [facility] Emergency Department Emergency Medicine  If symptoms worsen [address] [place] [id number]  [phone] Floor 1           Ismael Amaro MD  07/21/22 0759

## 2022-07-21 NOTE — Clinical Note
"Andrew Robledo" Maria Eugenia was seen and treated in our emergency department on 7/21/2022.  He may return to school on 07/22/2022.      If you have any questions or concerns, please don't hesitate to call.      Shelli DANIEL"

## 2022-08-10 ENCOUNTER — TELEPHONE (OUTPATIENT)
Dept: PRIMARY CARE CLINIC | Facility: CLINIC | Age: 57
End: 2022-08-10
Payer: MEDICAID

## 2022-08-10 NOTE — TELEPHONE ENCOUNTER
[facility] sent us a referral for patient to see us for higher level of care.   I tried calling there was not answer.

## 2022-08-30 ENCOUNTER — TELEPHONE (OUTPATIENT)
Dept: PRIMARY CARE CLINIC | Facility: CLINIC | Age: 57
End: 2022-08-30
Payer: MEDICAID

## 2022-08-30 NOTE — TELEPHONE ENCOUNTER
Tried calling patient to schedule him an appt.  Yina Fontenot from [facility]  referred him here for higher level of care.  No answer to schedule him.  I also called him on 8/10/2022 at 1:23pm no answer then either.

## 2022-10-04 ENCOUNTER — OFFICE VISIT (OUTPATIENT)
Dept: PRIMARY CARE CLINIC | Facility: CLINIC | Age: 57
End: 2022-10-04
Payer: MEDICAID

## 2022-10-04 VITALS
DIASTOLIC BLOOD PRESSURE: 77 MMHG | SYSTOLIC BLOOD PRESSURE: 132 MMHG | HEART RATE: 92 BPM | WEIGHT: 315 LBS | OXYGEN SATURATION: 96 % | TEMPERATURE: 99 F | BODY MASS INDEX: 41.75 KG/M2 | RESPIRATION RATE: 16 BRPM | HEIGHT: 73 IN

## 2022-10-04 DIAGNOSIS — E11.621 TYPE 2 DIABETES MELLITUS WITH FOOT ULCER, WITH LONG-TERM CURRENT USE OF INSULIN: ICD-10-CM

## 2022-10-04 DIAGNOSIS — I10 ESSENTIAL HYPERTENSION: Primary | Chronic | ICD-10-CM

## 2022-10-04 DIAGNOSIS — Z79.4 TYPE 2 DIABETES MELLITUS WITH DIABETIC POLYNEUROPATHY, WITH LONG-TERM CURRENT USE OF INSULIN: ICD-10-CM

## 2022-10-04 DIAGNOSIS — E78.5 HYPERLIPIDEMIA, UNSPECIFIED HYPERLIPIDEMIA TYPE: Chronic | ICD-10-CM

## 2022-10-04 DIAGNOSIS — Z79.4 TYPE 2 DIABETES MELLITUS WITH FOOT ULCER, WITH LONG-TERM CURRENT USE OF INSULIN: ICD-10-CM

## 2022-10-04 DIAGNOSIS — E11.42 TYPE 2 DIABETES MELLITUS WITH DIABETIC POLYNEUROPATHY, WITH LONG-TERM CURRENT USE OF INSULIN: ICD-10-CM

## 2022-10-04 DIAGNOSIS — Z23 INFLUENZA VACCINE ADMINISTERED: ICD-10-CM

## 2022-10-04 DIAGNOSIS — E66.01 MORBID OBESITY WITH BMI OF 50.0-59.9, ADULT: Chronic | ICD-10-CM

## 2022-10-04 DIAGNOSIS — L97.509 TYPE 2 DIABETES MELLITUS WITH FOOT ULCER, WITH LONG-TERM CURRENT USE OF INSULIN: ICD-10-CM

## 2022-10-04 DIAGNOSIS — Z89.411 HISTORY OF AMPUTATION OF RIGHT GREAT TOE: Chronic | ICD-10-CM

## 2022-10-04 PROCEDURE — 4010F ACE/ARB THERAPY RXD/TAKEN: CPT | Mod: CPTII,,, | Performed by: STUDENT IN AN ORGANIZED HEALTH CARE EDUCATION/TRAINING PROGRAM

## 2022-10-04 PROCEDURE — 99214 OFFICE O/P EST MOD 30 MIN: CPT | Mod: PBBFAC,PN | Performed by: STUDENT IN AN ORGANIZED HEALTH CARE EDUCATION/TRAINING PROGRAM

## 2022-10-04 PROCEDURE — 1159F PR MEDICATION LIST DOCUMENTED IN MEDICAL RECORD: ICD-10-PCS | Mod: CPTII,,, | Performed by: STUDENT IN AN ORGANIZED HEALTH CARE EDUCATION/TRAINING PROGRAM

## 2022-10-04 PROCEDURE — 1159F MED LIST DOCD IN RCRD: CPT | Mod: CPTII,,, | Performed by: STUDENT IN AN ORGANIZED HEALTH CARE EDUCATION/TRAINING PROGRAM

## 2022-10-04 PROCEDURE — 4010F PR ACE/ARB THEARPY RXD/TAKEN: ICD-10-PCS | Mod: CPTII,,, | Performed by: STUDENT IN AN ORGANIZED HEALTH CARE EDUCATION/TRAINING PROGRAM

## 2022-10-04 PROCEDURE — 3075F SYST BP GE 130 - 139MM HG: CPT | Mod: CPTII,,, | Performed by: STUDENT IN AN ORGANIZED HEALTH CARE EDUCATION/TRAINING PROGRAM

## 2022-10-04 PROCEDURE — 90686 IIV4 VACC NO PRSV 0.5 ML IM: CPT | Mod: PBBFAC,PN | Performed by: STUDENT IN AN ORGANIZED HEALTH CARE EDUCATION/TRAINING PROGRAM

## 2022-10-04 PROCEDURE — 99999 PR PBB SHADOW E&M-EST. PATIENT-LVL IV: CPT | Mod: PBBFAC,,, | Performed by: STUDENT IN AN ORGANIZED HEALTH CARE EDUCATION/TRAINING PROGRAM

## 2022-10-04 PROCEDURE — 99204 OFFICE O/P NEW MOD 45 MIN: CPT | Mod: S$PBB,,, | Performed by: STUDENT IN AN ORGANIZED HEALTH CARE EDUCATION/TRAINING PROGRAM

## 2022-10-04 PROCEDURE — 3078F PR MOST RECENT DIASTOLIC BLOOD PRESSURE < 80 MM HG: ICD-10-PCS | Mod: CPTII,,, | Performed by: STUDENT IN AN ORGANIZED HEALTH CARE EDUCATION/TRAINING PROGRAM

## 2022-10-04 PROCEDURE — 3075F PR MOST RECENT SYSTOLIC BLOOD PRESS GE 130-139MM HG: ICD-10-PCS | Mod: CPTII,,, | Performed by: STUDENT IN AN ORGANIZED HEALTH CARE EDUCATION/TRAINING PROGRAM

## 2022-10-04 PROCEDURE — 3078F DIAST BP <80 MM HG: CPT | Mod: CPTII,,, | Performed by: STUDENT IN AN ORGANIZED HEALTH CARE EDUCATION/TRAINING PROGRAM

## 2022-10-04 PROCEDURE — 99204 PR OFFICE/OUTPT VISIT, NEW, LEVL IV, 45-59 MIN: ICD-10-PCS | Mod: S$PBB,,, | Performed by: STUDENT IN AN ORGANIZED HEALTH CARE EDUCATION/TRAINING PROGRAM

## 2022-10-04 PROCEDURE — 99999 PR PBB SHADOW E&M-EST. PATIENT-LVL IV: ICD-10-PCS | Mod: PBBFAC,,, | Performed by: STUDENT IN AN ORGANIZED HEALTH CARE EDUCATION/TRAINING PROGRAM

## 2022-10-04 PROCEDURE — 3008F BODY MASS INDEX DOCD: CPT | Mod: CPTII,,, | Performed by: STUDENT IN AN ORGANIZED HEALTH CARE EDUCATION/TRAINING PROGRAM

## 2022-10-04 PROCEDURE — 3008F PR BODY MASS INDEX (BMI) DOCUMENTED: ICD-10-PCS | Mod: CPTII,,, | Performed by: STUDENT IN AN ORGANIZED HEALTH CARE EDUCATION/TRAINING PROGRAM

## 2022-10-04 RX ORDER — DULOXETIN HYDROCHLORIDE 30 MG/1
30 CAPSULE, DELAYED RELEASE ORAL DAILY
COMMUNITY
Start: 2022-09-08 | End: 2022-12-17 | Stop reason: SDUPTHER

## 2022-10-04 RX ORDER — EMPAGLIFLOZIN 25 MG/1
25 TABLET, FILM COATED ORAL DAILY
COMMUNITY
Start: 2022-08-28 | End: 2023-01-19 | Stop reason: SDUPTHER

## 2022-10-04 RX ORDER — DULAGLUTIDE 4.5 MG/.5ML
INJECTION, SOLUTION SUBCUTANEOUS
COMMUNITY
Start: 2022-08-28 | End: 2022-11-15 | Stop reason: SDUPTHER

## 2022-10-04 RX ORDER — LOSARTAN POTASSIUM 25 MG/1
TABLET ORAL
Status: ON HOLD | COMMUNITY
End: 2022-10-25 | Stop reason: SDUPTHER

## 2022-10-04 RX ORDER — METFORMIN HYDROCHLORIDE 1000 MG/1
1000 TABLET ORAL 2 TIMES DAILY
COMMUNITY
Start: 2022-09-05 | End: 2023-01-19

## 2022-10-04 RX ORDER — VIT C/E/ZN/COPPR/LUTEIN/ZEAXAN 250MG-90MG
CAPSULE ORAL
COMMUNITY
End: 2024-01-18

## 2022-10-04 RX ORDER — INSULIN ASPART 100 [IU]/ML
30 INJECTION, SOLUTION INTRAVENOUS; SUBCUTANEOUS 2 TIMES DAILY
Status: ON HOLD | COMMUNITY
End: 2022-10-25 | Stop reason: HOSPADM

## 2022-10-04 RX ORDER — INSULIN GLARGINE 100 [IU]/ML
INJECTION, SOLUTION SUBCUTANEOUS
COMMUNITY
Start: 2022-09-23 | End: 2022-11-15 | Stop reason: SDUPTHER

## 2022-10-04 RX ORDER — GABAPENTIN 100 MG/1
1 CAPSULE ORAL 3 TIMES DAILY
COMMUNITY
End: 2022-10-04

## 2022-10-04 NOTE — ASSESSMENT & PLAN NOTE
Normotensive. BP controlled with   - amlodipine 5 mg daily  - losartan 25 mg daily  - continue to follow low sodium diet <2g or 2 teaspoons  - DASH diet  - f/u 3 months

## 2022-10-04 NOTE — PROGRESS NOTES
[facility] Note    Chief Complaint      Chief Complaint   Patient presents with    Establish Care     Referred by [facility] for higher level of care       History of Present Illness      Andrew Del Cid Jr. is a 57 y.o. male who presents today for Establish Care (Referred by [facility] for higher level of care)  Patient states he is currently under the care of podiatry for poor healing diabetic wound over his right foot.   He has had his toes in the left foot amputated in the past.  His A1c in August to TAC was over 12.5%.  Along with his basal insulin is now put on Jardiance, along good Januvia.  He has been educated on diabetes management and diet modifications including increasing physical activity.  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   Patient declines to meet with any specialists at this time as it will further cause financial strain in addition to the food insecurity he mentioned having difficulty to support his family with three children, the youngest 5 months old.     Past Medical History:  Past Medical History:   Diagnosis Date    Diabetes mellitus, type 2     Hyperlipidemia     Hypertension     Obese     Peripheral neuropathy      Past Surgical History:   has a past surgical history that includes Hernia repair; Appendectomy; Toe amputation; Incision and drainage foot (Bilateral, 12/26/2018); and Toe amputation (Left, 12/26/2018).    Family History:  family history is not on file.     Social History:  Social History     Tobacco Use    Smoking status: Never    Smokeless tobacco: Never   Substance Use Topics    Alcohol use: No    Drug use: No     I personally reviewed all past medical, surgical, social and family history.    Review of Systems   Constitutional:  Negative for chills, fever, malaise/fatigue and weight loss.   HENT:  Negative for congestion, ear discharge, ear pain, sinus pain and sore throat.    Eyes:  Negative for  blurred vision, pain, discharge and redness.   Respiratory:  Negative for cough, hemoptysis, sputum production, shortness of breath, wheezing and stridor.    Cardiovascular:  Negative for chest pain, palpitations and leg swelling.   Gastrointestinal:  Negative for abdominal pain, blood in stool, constipation, diarrhea, nausea and vomiting.   Genitourinary:  Negative for dysuria, frequency and hematuria.   Musculoskeletal:  Negative for back pain, falls, myalgias and neck pain.   Skin: Negative.  Negative for rash.   Neurological:  Negative for dizziness, tingling, focal weakness, seizures, weakness and headaches.   Endo/Heme/Allergies:  Negative for environmental allergies and polydipsia. Does not bruise/bleed easily.   Psychiatric/Behavioral:  Negative for depression and suicidal ideas. The patient is not nervous/anxious.       Medications:  Outpatient Encounter Medications as of 10/4/2022   Medication Sig Note Dispense Refill    amLODIPine (NORVASC) 5 MG tablet Take 5 mg by mouth.       aspirin (ECOTRIN) 81 MG EC tablet Take 81 mg by mouth once daily.       atorvastatin (LIPITOR) 40 MG tablet Take 40 mg by mouth.       cholecalciferol, vitamin D3, (VITAMIN D3) 25 mcg (1,000 unit) capsule 1 capsule       DULoxetine (CYMBALTA) 30 MG capsule Take 30 mg by mouth once daily.       insulin aspart U-100 (NOVOLOG) 100 unit/mL (3 mL) InPn pen 30 Units 2 (two) times daily.       JARDIANCE 25 mg tablet Take 25 mg by mouth once daily.       LANTUS SOLOSTAR U-100 INSULIN glargine 100 units/mL SubQ pen SMARTSI Unit(s) SUB-Q Twice Daily       losartan (COZAAR) 25 MG tablet 1 tablet       metFORMIN (GLUCOPHAGE) 1000 MG tablet Take 1,000 mg by mouth 2 (two) times daily.       TRULICITY 4.5 mg/0.5 mL pen injector Inject into the skin.       [DISCONTINUED] amoxicillin-clavulanate 875-125mg (AUGMENTIN) 875-125 mg per tablet Take 1 tablet by mouth 2 (two) times daily. (Patient not taking: Reported on 10/4/2022)  14 tablet 0     [DISCONTINUED] gabapentin (NEURONTIN) 100 MG capsule Take 1 capsule by mouth 3 (three) times daily. 10/5/2022: No therapeutic effects      [DISCONTINUED] gabapentin (NEURONTIN) 300 MG capsule Take 1 capsule (300 mg total) by mouth 3 (three) times daily.  90 capsule 0    [DISCONTINUED] gabapentin (NEURONTIN) 300 MG capsule Take 1 capsule by mouth 3 (three) times daily.       [DISCONTINUED] insulin aspart U-100 (NOVOLOG) 100 unit/mL InPn pen Inject 12 Units into the skin 3 (three) times daily with meals. (Patient taking differently: Inject 30 Units into the skin 2 (two) times daily.)  32.4 mL 0    [DISCONTINUED] insulin detemir U-100 (LEVEMIR FLEXTOUCH) 100 unit/mL (3 mL) SubQ InPn pen Inject 52 Units into the skin 2 (two) times daily.  93.6 mL 0    [DISCONTINUED] lisinopriL 10 MG tablet Take 1 tablet by mouth once daily.       [DISCONTINUED] losartan (COZAAR) 25 MG tablet Take 1 tablet (25 mg total) by mouth once daily.  30 tablet 2    [DISCONTINUED] SITagliptin (JANUVIA) 100 MG Tab Take 100 mg by mouth.        No facility-administered encounter medications on file as of 10/4/2022.     Allergies:  Review of patient's allergies indicates:  No Known Allergies    Health Maintenance:  Immunization History   Administered Date(s) Administered    COVID-19 MRNA, LN-S PF (MODERNA HALF 0.25 ML DOSE) 03/12/2021, 03/14/2022    COVID-19, MRNA, LN-S, PF (MODERNA FULL 0.5 ML DOSE) 03/12/2021, 04/09/2021    Influenza 11/15/2018    Influenza - Quadrivalent - PF *Preferred* (6 months and older) 09/12/2019, 10/19/2021, 10/04/2022    Influenza - Trivalent - PF (ADULT) 10/19/2021    Pneumococcal Conjugate - 13 Valent 10/19/2021    Tdap 04/27/2013, 04/27/2013      Health Maintenance   Topic Date Due    Hepatitis C Screening  Never done    Hemoglobin A1c  11/02/2022    TETANUS VACCINE  04/27/2023    Lipid Panel  11/13/2025     Health Maintenance Topics with due status: Not Due       Topic Last Completion Date    TETANUS VACCINE 04/27/2013  "   Lipid Panel 11/13/2020    Hemoglobin A1c 08/02/2022     Health Maintenance Due   Topic Date Due    Hepatitis C Screening  Never done    HIV Screening  Never done    Colorectal Cancer Screening  Never done    Shingles Vaccine (1 of 2) Never done    COVID-19 Vaccine (2 - Moderna series) 04/11/2022     Physical Exam      Vital Signs  Temp: 98.5 °F (36.9 °C)  Temp src: Oral  Pulse: 92  Resp: 16  SpO2: 96 %  BP: 132/77  BP Location: Left arm  Patient Position: Sitting  Pain Score: 0-No pain  Height and Weight  Height: 6' 1" (185.4 cm)  Weight: (!) 168.7 kg (372 lb)  BSA (Calculated - sq m): 2.95 sq meters  BMI (Calculated): 49.1  Weight in (lb) to have BMI = 25: 189.1]    Physical Exam  Vitals and nursing note reviewed.   Constitutional:       General: He is not in acute distress.     Appearance: Normal appearance. He is normal weight. He is not ill-appearing or toxic-appearing.   HENT:      Head: Normocephalic and atraumatic.      Right Ear: External ear normal.      Left Ear: External ear normal.      Nose: Nose normal.      Mouth/Throat:      Mouth: Mucous membranes are moist.      Pharynx: Oropharynx is clear.   Eyes:      Extraocular Movements: Extraocular movements intact.      Conjunctiva/sclera: Conjunctivae normal.   Cardiovascular:      Rate and Rhythm: Normal rate and regular rhythm.      Pulses: Normal pulses.      Heart sounds: Normal heart sounds. No murmur heard.  Pulmonary:      Effort: Pulmonary effort is normal. No respiratory distress.      Breath sounds: Normal breath sounds. No wheezing or rales.   Abdominal:      Palpations: Abdomen is soft. There is no mass.      Tenderness: There is no abdominal tenderness. There is no right CVA tenderness, left CVA tenderness or guarding.   Musculoskeletal:         General: Normal range of motion.      Cervical back: Normal range of motion and neck supple. No rigidity.      Right lower leg: No edema.      Left lower leg: No edema.   Skin:     General: Skin is " warm and dry.   Neurological:      General: No focal deficit present.      Mental Status: He is alert and oriented to person, place, and time.      Motor: No weakness.      Gait: Gait normal.   Psychiatric:         Mood and Affect: Mood normal.         Behavior: Behavior normal.         Thought Content: Thought content normal.         Judgment: Judgment normal.   Protective Sensation (w/ 10 gram monofilament):  Right: Decreased  Left: Absent    Visual Inspection:  Right foot wrapped in dressing in boot, Left foot third toe amputation, dry skin, no skin break down in left.  Pedal Pulses:   Right: Present  Left: Present  Posterior tibialis:   Right:Present  Left: Present   Assessment/Plan     Andrew Del Cid Jr. is a 57 y.o.male with:    Problem List Items Addressed This Visit          Cardiac/Vascular    Essential hypertension - Primary (Chronic)    Current Assessment & Plan     Normotensive. BP controlled with   - amlodipine 5 mg daily  - losartan 25 mg daily  - continue to follow low sodium diet <2g or 2 teaspoons  - DASH diet  - f/u 3 months         Hyperlipidemia (Chronic)    Current Assessment & Plan     Review external lab 8/28. Patient currently taking atorvastatin 40 mg daily.             ID    Influenza vaccine administered    Relevant Orders    Influenza - Quadrivalent *Preferred* (6 months+) (PF) (Completed)       Endocrine    Morbid obesity with BMI of 50.0-59.9, adult (Chronic)    Current Assessment & Plan     Wt Readings from Last 3 Encounters:   10/04/22 1430 (!) 168.7 kg (372 lb)   07/21/22 0652 (!) 179.6 kg (396 lb)   09/03/21 1811 (!) 175.1 kg (386 lb)   BMI today is 49. Patient endorses two pounds of weight loss attributed to reduction in sugary carbonated drink intake.   Patient will not eat vegetables. His only intake for veggies include, cucumber, tomato, lettuce, peas and corn.    Recommendations:   Stay physically active. As tolerated alternate resistance training with stretching and  cardio. Goal of 150 minutes per week of moderate intensity activity or 7,500 - 10,000 steps per day. Follow the Mediterranean Diet. Include whole fresh fruits, vegetables, olive oil, seeds, nuts, whole grains, cold water fish, salmon, mackerel and lean cuts of meat.  Do not drink sugary/diet carbonated beverages. Decrease portion sizes slightly which will result in an approximately 500-calorie deficit. Avoid fast or fried and processed food, especially canned foods. Avoid refined carbohydrates, white starchy foods, flour, white potato, bread, muffins, and cakes. Consider substituting one meal a day with a meal replacement such as Slim fast, lean cuisine, or weight watcher's. Follow a healthy diet that includes enough calcium, vitamin D and proteins for bone health.           Type 2 diabetes mellitus with diabetic polyneuropathy, with long-term current use of insulin    Current Assessment & Plan     Diagnosis over 20 years. Last HgA1C >12.5% end of 8/2022. Started on Jardiance. Denies symptoms. Continue with current regimen and follow up 11/2022 with labs. Diabetes complicated with current poor healing right foot wound, under the care of podiatry.  For cardiovascular prevention, on statin, ASA and ARB.  - patient declined referral to endocrinologist  - metformin 1000 mg BID  - Jardiance 25 mg daily  - Trulicity 4.5 mg weekly  - insulin aspart 30 U BID           Relevant Medications    insulin aspart U-100 (NOVOLOG) 100 unit/mL (3 mL) InPn pen    LANTUS SOLOSTAR U-100 INSULIN glargine 100 units/mL SubQ pen    TRULICITY 4.5 mg/0.5 mL pen injector    metFORMIN (GLUCOPHAGE) 1000 MG tablet    Type 2 diabetes mellitus without complication, with long-term current use of insulin    Relevant Medications    insulin aspart U-100 (NOVOLOG) 100 unit/mL (3 mL) InPn pen    LANTUS SOLOSTAR U-100 INSULIN glargine 100 units/mL SubQ pen    TRULICITY 4.5 mg/0.5 mL pen injector    metFORMIN (GLUCOPHAGE) 1000 MG tablet       Orthopedic     History of amputation of right great toe (Chronic)       Other than changes above, cont current medications and maintain follow up with specialists.  No follow-ups on file.    Future Appointments   Date Time Provider Department Center   12/6/2022  2:00 PM [facility]       Kazumi G Yoshinaga, DO [facility]

## 2022-10-05 ENCOUNTER — PATIENT MESSAGE (OUTPATIENT)
Dept: ADMINISTRATIVE | Facility: HOSPITAL | Age: 57
End: 2022-10-05
Payer: MEDICAID

## 2022-10-05 PROBLEM — Z23 INFLUENZA VACCINE ADMINISTERED: Status: ACTIVE | Noted: 2022-10-05

## 2022-10-05 PROBLEM — Z79.4 TYPE 2 DIABETES MELLITUS WITHOUT COMPLICATION, WITH LONG-TERM CURRENT USE OF INSULIN: Status: ACTIVE | Noted: 2022-10-05

## 2022-10-05 PROBLEM — E11.628 DIABETIC INFECTION OF RIGHT FOOT: Chronic | Status: ACTIVE | Noted: 2022-10-04

## 2022-10-05 PROBLEM — E11.9 TYPE 2 DIABETES MELLITUS WITHOUT COMPLICATION, WITH LONG-TERM CURRENT USE OF INSULIN: Status: ACTIVE | Noted: 2022-10-05

## 2022-10-05 PROBLEM — L08.9 DIABETIC INFECTION OF RIGHT FOOT: Chronic | Status: ACTIVE | Noted: 2022-10-04

## 2022-10-05 NOTE — ASSESSMENT & PLAN NOTE
Currently under the care of podiatry, Dr. Esquivel.   - importance of blood glucose control discussed  - daily foot exam bilaterally  - f/u podiatry

## 2022-10-05 NOTE — ASSESSMENT & PLAN NOTE
Diagnosis over 20 years. Last HgA1C >12.5% end of 8/2022. Started on Jardiance. Denies symptoms. Continue with current regimen and follow up 11/2022 with labs. Diabetes complicated with current poor healing right foot wound, under the care of podiatry.  For cardiovascular prevention, on statin, ASA and ARB.  - patient declined referral to endocrinologist  - metformin 1000 mg BID  - Jardiance 25 mg daily  - Trulicity 4.5 mg weekly  - insulin aspart 30 U BID

## 2022-10-21 ENCOUNTER — HOSPITAL ENCOUNTER (INPATIENT)
Facility: HOSPITAL | Age: 57
LOS: 4 days | Discharge: HOME | DRG: 638 | End: 2022-10-25
Attending: EMERGENCY MEDICINE | Admitting: INTERNAL MEDICINE
Payer: MEDICAID

## 2022-10-21 DIAGNOSIS — E86.0 DEHYDRATION: ICD-10-CM

## 2022-10-21 DIAGNOSIS — I48.91 NEW ONSET A-FIB: ICD-10-CM

## 2022-10-21 DIAGNOSIS — R78.81 BACTEREMIA: ICD-10-CM

## 2022-10-21 DIAGNOSIS — R53.1 GENERALIZED WEAKNESS: ICD-10-CM

## 2022-10-21 DIAGNOSIS — E87.1 HYPONATREMIA: ICD-10-CM

## 2022-10-21 DIAGNOSIS — R09.02 HYPOXIA: Primary | ICD-10-CM

## 2022-10-21 DIAGNOSIS — E11.628 DIABETIC INFECTION OF RIGHT FOOT: Chronic | ICD-10-CM

## 2022-10-21 DIAGNOSIS — R42 DIZZINESS: ICD-10-CM

## 2022-10-21 DIAGNOSIS — R50.9 FEVER, UNSPECIFIED FEVER CAUSE: ICD-10-CM

## 2022-10-21 DIAGNOSIS — R53.83 FATIGUE, UNSPECIFIED TYPE: ICD-10-CM

## 2022-10-21 DIAGNOSIS — L08.9 DIABETIC INFECTION OF RIGHT FOOT: Chronic | ICD-10-CM

## 2022-10-21 DIAGNOSIS — N17.9 AKI (ACUTE KIDNEY INJURY): ICD-10-CM

## 2022-10-21 DIAGNOSIS — R65.10 SIRS (SYSTEMIC INFLAMMATORY RESPONSE SYNDROME): ICD-10-CM

## 2022-10-21 DIAGNOSIS — R73.9 HYPERGLYCEMIA: ICD-10-CM

## 2022-10-21 LAB
CORRECTED TEMPERATURE (PCO2): 39.9 MMHG
CORRECTED TEMPERATURE (PH): 7.4
CORRECTED TEMPERATURE (PO2): 53.3 MMHG
CTP QC/QA: YES
CTP QC/QA: YES
FIO2: 21 %
Lab: ABNORMAL
MODIFIED ALLEN'S TEST: ABNORMAL
NOTIFIED BY: ABNORMAL
O2DEVICE: ABNORMAL
PCO2 BLDA: 39.9 MMHG (ref 35–45)
PH SMN: 7.4 [PH] (ref 7.34–7.45)
PO2 BLDA: 53.3 MMHG (ref 80–100)
POC BASE DEFICIT: -0.1 MMOL/L
POC HCO3: 24.1 MMOL/L
POC MOLECULAR INFLUENZA A AGN: NEGATIVE
POC MOLECULAR INFLUENZA B AGN: NEGATIVE
POC PERFORMED BY: ABNORMAL
POC SATURATED O2: 89.5 %
POC TCO2: 50 ML/DL
POC TEMPERATURE: 37 C
POCT GLUCOSE: 349 MG/DL (ref 70–110)
PROVIDER NOTIFIED: ABNORMAL
SARS-COV-2 RDRP RESP QL NAA+PROBE: NEGATIVE
SPECIMEN SOURCE: ABNORMAL

## 2022-10-21 PROCEDURE — 96375 TX/PRO/DX INJ NEW DRUG ADDON: CPT

## 2022-10-21 PROCEDURE — 94761 N-INVAS EAR/PLS OXIMETRY MLT: CPT

## 2022-10-21 PROCEDURE — 25000242 PHARM REV CODE 250 ALT 637 W/ HCPCS: Performed by: EMERGENCY MEDICINE

## 2022-10-21 PROCEDURE — 25000003 PHARM REV CODE 250: Performed by: EMERGENCY MEDICINE

## 2022-10-21 PROCEDURE — 93010 EKG 12-LEAD: ICD-10-PCS | Mod: ,,, | Performed by: INTERNAL MEDICINE

## 2022-10-21 PROCEDURE — 84484 ASSAY OF TROPONIN QUANT: CPT | Performed by: EMERGENCY MEDICINE

## 2022-10-21 PROCEDURE — 87186 SC STD MICRODIL/AGAR DIL: CPT | Performed by: EMERGENCY MEDICINE

## 2022-10-21 PROCEDURE — 36600 WITHDRAWAL OF ARTERIAL BLOOD: CPT

## 2022-10-21 PROCEDURE — 83605 ASSAY OF LACTIC ACID: CPT | Performed by: EMERGENCY MEDICINE

## 2022-10-21 PROCEDURE — 63600175 PHARM REV CODE 636 W HCPCS: Performed by: EMERGENCY MEDICINE

## 2022-10-21 PROCEDURE — 82009 KETONE BODYS QUAL: CPT | Performed by: EMERGENCY MEDICINE

## 2022-10-21 PROCEDURE — 36415 COLL VENOUS BLD VENIPUNCTURE: CPT | Performed by: EMERGENCY MEDICINE

## 2022-10-21 PROCEDURE — 87502 INFLUENZA DNA AMP PROBE: CPT

## 2022-10-21 PROCEDURE — 96361 HYDRATE IV INFUSION ADD-ON: CPT

## 2022-10-21 PROCEDURE — 82803 BLOOD GASES ANY COMBINATION: CPT

## 2022-10-21 PROCEDURE — 87635 SARS-COV-2 COVID-19 AMP PRB: CPT | Performed by: EMERGENCY MEDICINE

## 2022-10-21 PROCEDURE — 87040 BLOOD CULTURE FOR BACTERIA: CPT | Performed by: EMERGENCY MEDICINE

## 2022-10-21 PROCEDURE — 87077 CULTURE AEROBIC IDENTIFY: CPT | Performed by: EMERGENCY MEDICINE

## 2022-10-21 PROCEDURE — 94640 AIRWAY INHALATION TREATMENT: CPT

## 2022-10-21 PROCEDURE — 99285 EMERGENCY DEPT VISIT HI MDM: CPT | Mod: 25,27

## 2022-10-21 PROCEDURE — 93010 ELECTROCARDIOGRAM REPORT: CPT | Mod: ,,, | Performed by: INTERNAL MEDICINE

## 2022-10-21 PROCEDURE — 11000001 HC ACUTE MED/SURG PRIVATE ROOM

## 2022-10-21 PROCEDURE — 93005 ELECTROCARDIOGRAM TRACING: CPT

## 2022-10-21 PROCEDURE — 83880 ASSAY OF NATRIURETIC PEPTIDE: CPT | Performed by: EMERGENCY MEDICINE

## 2022-10-21 PROCEDURE — 83735 ASSAY OF MAGNESIUM: CPT | Performed by: EMERGENCY MEDICINE

## 2022-10-21 PROCEDURE — 80053 COMPREHEN METABOLIC PANEL: CPT | Mod: 91 | Performed by: EMERGENCY MEDICINE

## 2022-10-21 PROCEDURE — 85025 COMPLETE CBC W/AUTO DIFF WBC: CPT | Mod: 91 | Performed by: EMERGENCY MEDICINE

## 2022-10-21 PROCEDURE — 82962 GLUCOSE BLOOD TEST: CPT

## 2022-10-21 PROCEDURE — 99900035 HC TECH TIME PER 15 MIN (STAT)

## 2022-10-21 PROCEDURE — 99900031 HC PATIENT EDUCATION (STAT)

## 2022-10-21 RX ORDER — IBUPROFEN 800 MG/1
800 TABLET ORAL
Status: COMPLETED | OUTPATIENT
Start: 2022-10-21 | End: 2022-10-21

## 2022-10-21 RX ORDER — PROCHLORPERAZINE EDISYLATE 5 MG/ML
10 INJECTION INTRAMUSCULAR; INTRAVENOUS
Status: COMPLETED | OUTPATIENT
Start: 2022-10-21 | End: 2022-10-21

## 2022-10-21 RX ORDER — VANCOMYCIN 2 GRAM/500 ML IN 0.9 % SODIUM CHLORIDE INTRAVENOUS
2000
Status: COMPLETED | OUTPATIENT
Start: 2022-10-22 | End: 2022-10-22

## 2022-10-21 RX ORDER — SODIUM CHLORIDE 9 MG/ML
1000 INJECTION, SOLUTION INTRAVENOUS
Status: DISCONTINUED | OUTPATIENT
Start: 2022-10-21 | End: 2022-10-21

## 2022-10-21 RX ORDER — IPRATROPIUM BROMIDE AND ALBUTEROL SULFATE 2.5; .5 MG/3ML; MG/3ML
3 SOLUTION RESPIRATORY (INHALATION)
Status: COMPLETED | OUTPATIENT
Start: 2022-10-21 | End: 2022-10-21

## 2022-10-21 RX ORDER — DIPHENHYDRAMINE HYDROCHLORIDE 50 MG/ML
25 INJECTION INTRAMUSCULAR; INTRAVENOUS
Status: COMPLETED | OUTPATIENT
Start: 2022-10-21 | End: 2022-10-21

## 2022-10-21 RX ADMIN — IBUPROFEN 800 MG: 800 TABLET, FILM COATED ORAL at 11:10

## 2022-10-21 RX ADMIN — PROCHLORPERAZINE EDISYLATE 10 MG: 5 INJECTION INTRAMUSCULAR; INTRAVENOUS at 11:10

## 2022-10-21 RX ADMIN — DIPHENHYDRAMINE HYDROCHLORIDE 25 MG: 50 INJECTION, SOLUTION INTRAMUSCULAR; INTRAVENOUS at 11:10

## 2022-10-21 RX ADMIN — SODIUM CHLORIDE 1000 ML: 0.9 INJECTION, SOLUTION INTRAVENOUS at 11:10

## 2022-10-21 RX ADMIN — IPRATROPIUM BROMIDE AND ALBUTEROL SULFATE 3 ML: 2.5; .5 SOLUTION RESPIRATORY (INHALATION) at 11:10

## 2022-10-21 NOTE — Clinical Note
Diagnosis: Hypoxia [412825]   Future Attending Provider: TR SANDOVAL JR [[id number]]   Admitting Provider:: RT SANDOVAL JR [[id number]]

## 2022-10-22 PROBLEM — R50.9 FEVER: Status: ACTIVE | Noted: 2022-10-22

## 2022-10-22 PROBLEM — E87.1 HYPONATREMIA: Status: ACTIVE | Noted: 2022-10-22

## 2022-10-22 PROBLEM — N17.0 ACUTE RENAL FAILURE WITH TUBULAR NECROSIS: Status: ACTIVE | Noted: 2022-10-22

## 2022-10-22 LAB
ACETONE BLD-MCNC: NEGATIVE MG/DL
ALBUMIN SERPL BCP-MCNC: 2.6 G/DL (ref 3.5–5.2)
ALP SERPL-CCNC: 102 U/L (ref 55–135)
ALT SERPL W/O P-5'-P-CCNC: 36 U/L (ref 10–44)
ANION GAP SERPL CALC-SCNC: 8 MMOL/L (ref 8–16)
AST SERPL-CCNC: 60 U/L (ref 10–40)
BACTERIA #/AREA URNS HPF: NEGATIVE /HPF
BASOPHILS # BLD AUTO: 0.02 K/UL (ref 0–0.2)
BASOPHILS NFR BLD: 0.3 % (ref 0–1.9)
BILIRUB SERPL-MCNC: 1.4 MG/DL (ref 0.1–1)
BILIRUB UR QL STRIP: NEGATIVE
BUN SERPL-MCNC: 32 MG/DL (ref 6–20)
CALCIUM SERPL-MCNC: 8.2 MG/DL (ref 8.7–10.5)
CHLORIDE SERPL-SCNC: 97 MMOL/L (ref 95–110)
CLARITY UR: ABNORMAL
CO2 SERPL-SCNC: 24 MMOL/L (ref 23–29)
COLOR UR: YELLOW
CREAT SERPL-MCNC: 1.5 MG/DL (ref 0.5–1.4)
DIFFERENTIAL METHOD: ABNORMAL
EOSINOPHIL # BLD AUTO: 0 K/UL (ref 0–0.5)
EOSINOPHIL NFR BLD: 0 % (ref 0–8)
ERYTHROCYTE [DISTWIDTH] IN BLOOD BY AUTOMATED COUNT: 12.2 % (ref 11.5–14.5)
EST. GFR  (NO RACE VARIABLE): 54.6 ML/MIN/1.73 M^2
ESTIMATED AVG GLUCOSE: 217 MG/DL (ref 68–131)
GLUCOSE SERPL-MCNC: 243 MG/DL (ref 70–110)
GLUCOSE UR QL STRIP: ABNORMAL
HBA1C MFR BLD: 9.2 % (ref 4–5.6)
HCT VFR BLD AUTO: 39.1 % (ref 40–54)
HGB BLD-MCNC: 13.3 G/DL (ref 14–18)
HGB UR QL STRIP: ABNORMAL
HYALINE CASTS #/AREA URNS LPF: 9 /LPF
IMM GRANULOCYTES # BLD AUTO: 0.06 K/UL (ref 0–0.04)
IMM GRANULOCYTES NFR BLD AUTO: 1 % (ref 0–0.5)
KETONES UR QL STRIP: NEGATIVE
LACTATE SERPL-SCNC: 1.2 MMOL/L (ref 0.5–2.2)
LACTATE SERPL-SCNC: 1.6 MMOL/L (ref 0.5–2.2)
LEUKOCYTE ESTERASE UR QL STRIP: NEGATIVE
LYMPHOCYTES # BLD AUTO: 0.7 K/UL (ref 1–4.8)
LYMPHOCYTES NFR BLD: 11.7 % (ref 18–48)
MAGNESIUM SERPL-MCNC: 2 MG/DL (ref 1.6–2.6)
MCH RBC QN AUTO: 28.3 PG (ref 27–31)
MCHC RBC AUTO-ENTMCNC: 34 G/DL (ref 32–36)
MCV RBC AUTO: 83 FL (ref 82–98)
MICROSCOPIC COMMENT: ABNORMAL
MONOCYTES # BLD AUTO: 0.3 K/UL (ref 0.3–1)
MONOCYTES NFR BLD: 4.5 % (ref 4–15)
MRSA ID BY PCR: NEGATIVE
NEUTROPHILS # BLD AUTO: 4.7 K/UL (ref 1.8–7.7)
NEUTROPHILS NFR BLD: 82.5 % (ref 38–73)
NITRITE UR QL STRIP: NEGATIVE
NRBC BLD-RTO: 0 /100 WBC
NT-PROBNP SERPL-MCNC: 381 PG/ML (ref 5–900)
PH UR STRIP: 5 [PH] (ref 5–8)
PLATELET # BLD AUTO: 184 K/UL (ref 150–450)
PMV BLD AUTO: 10.1 FL (ref 9.2–12.9)
POCT GLUCOSE: 210 MG/DL (ref 70–110)
POCT GLUCOSE: 299 MG/DL (ref 70–110)
POTASSIUM SERPL-SCNC: 4.4 MMOL/L (ref 3.5–5.1)
PROT SERPL-MCNC: 7.5 G/DL (ref 6–8.4)
PROT UR QL STRIP: ABNORMAL
RBC # BLD AUTO: 4.7 M/UL (ref 4.6–6.2)
RBC #/AREA URNS HPF: 1 /HPF (ref 0–4)
SODIUM SERPL-SCNC: 129 MMOL/L (ref 136–145)
SP GR UR STRIP: 1.02 (ref 1–1.03)
SQUAMOUS #/AREA URNS HPF: 2 /HPF
STAPH AUREUS ID BY PCR: POSITIVE
TROPONIN I SERPL DL<=0.01 NG/ML-MCNC: 45.7 PG/ML (ref 0–60)
URN SPEC COLLECT METH UR: ABNORMAL
UROBILINOGEN UR STRIP-ACNC: 1 EU/DL
VANCOMYCIN SERPL-MCNC: 8.6 UG/ML
WBC # BLD AUTO: 5.75 K/UL (ref 3.9–12.7)
WBC #/AREA URNS HPF: 5 /HPF (ref 0–5)
YEAST URNS QL MICRO: ABNORMAL

## 2022-10-22 PROCEDURE — 99900031 HC PATIENT EDUCATION (STAT)

## 2022-10-22 PROCEDURE — 83605 ASSAY OF LACTIC ACID: CPT | Performed by: EMERGENCY MEDICINE

## 2022-10-22 PROCEDURE — 63600175 PHARM REV CODE 636 W HCPCS: Performed by: EMERGENCY MEDICINE

## 2022-10-22 PROCEDURE — 81000 URINALYSIS NONAUTO W/SCOPE: CPT | Performed by: EMERGENCY MEDICINE

## 2022-10-22 PROCEDURE — 83036 HEMOGLOBIN GLYCOSYLATED A1C: CPT | Performed by: EMERGENCY MEDICINE

## 2022-10-22 PROCEDURE — 87150 DNA/RNA AMPLIFIED PROBE: CPT | Performed by: EMERGENCY MEDICINE

## 2022-10-22 PROCEDURE — 27000221 HC OXYGEN, UP TO 24 HOURS

## 2022-10-22 PROCEDURE — 63600175 PHARM REV CODE 636 W HCPCS: Performed by: INTERNAL MEDICINE

## 2022-10-22 PROCEDURE — 93010 EKG 12-LEAD: ICD-10-PCS | Mod: ,,, | Performed by: INTERNAL MEDICINE

## 2022-10-22 PROCEDURE — 96365 THER/PROPH/DIAG IV INF INIT: CPT

## 2022-10-22 PROCEDURE — 99900035 HC TECH TIME PER 15 MIN (STAT)

## 2022-10-22 PROCEDURE — 25500020 PHARM REV CODE 255: Performed by: EMERGENCY MEDICINE

## 2022-10-22 PROCEDURE — 25000003 PHARM REV CODE 250: Performed by: EMERGENCY MEDICINE

## 2022-10-22 PROCEDURE — 94761 N-INVAS EAR/PLS OXIMETRY MLT: CPT

## 2022-10-22 PROCEDURE — 11000001 HC ACUTE MED/SURG PRIVATE ROOM

## 2022-10-22 PROCEDURE — 36415 COLL VENOUS BLD VENIPUNCTURE: CPT | Performed by: INTERNAL MEDICINE

## 2022-10-22 PROCEDURE — 93005 ELECTROCARDIOGRAM TRACING: CPT

## 2022-10-22 PROCEDURE — 87632 RESP VIRUS 6-11 TARGETS: CPT | Performed by: EMERGENCY MEDICINE

## 2022-10-22 PROCEDURE — 25000003 PHARM REV CODE 250: Performed by: STUDENT IN AN ORGANIZED HEALTH CARE EDUCATION/TRAINING PROGRAM

## 2022-10-22 PROCEDURE — 93010 ELECTROCARDIOGRAM REPORT: CPT | Mod: ,,, | Performed by: INTERNAL MEDICINE

## 2022-10-22 PROCEDURE — 36415 COLL VENOUS BLD VENIPUNCTURE: CPT | Performed by: EMERGENCY MEDICINE

## 2022-10-22 PROCEDURE — 80202 ASSAY OF VANCOMYCIN: CPT | Performed by: INTERNAL MEDICINE

## 2022-10-22 PROCEDURE — 96375 TX/PRO/DX INJ NEW DRUG ADDON: CPT

## 2022-10-22 PROCEDURE — 25000003 PHARM REV CODE 250: Performed by: INTERNAL MEDICINE

## 2022-10-22 RX ORDER — GLUCAGON 1 MG
1 KIT INJECTION
Status: DISCONTINUED | OUTPATIENT
Start: 2022-10-22 | End: 2022-10-23

## 2022-10-22 RX ORDER — ATORVASTATIN CALCIUM 40 MG/1
40 TABLET, FILM COATED ORAL DAILY
Status: DISCONTINUED | OUTPATIENT
Start: 2022-10-22 | End: 2022-10-25 | Stop reason: HOSPADM

## 2022-10-22 RX ORDER — ASPIRIN 81 MG/1
81 TABLET ORAL DAILY
Status: DISCONTINUED | OUTPATIENT
Start: 2022-10-22 | End: 2022-10-25 | Stop reason: HOSPADM

## 2022-10-22 RX ORDER — TALC
6 POWDER (GRAM) TOPICAL NIGHTLY PRN
Status: DISCONTINUED | OUTPATIENT
Start: 2022-10-22 | End: 2022-10-25 | Stop reason: HOSPADM

## 2022-10-22 RX ORDER — MUPIROCIN 20 MG/G
OINTMENT TOPICAL 2 TIMES DAILY
Status: DISCONTINUED | OUTPATIENT
Start: 2022-10-22 | End: 2022-10-25 | Stop reason: HOSPADM

## 2022-10-22 RX ORDER — SODIUM CHLORIDE 9 MG/ML
INJECTION, SOLUTION INTRAVENOUS CONTINUOUS
Status: ACTIVE | OUTPATIENT
Start: 2022-10-22 | End: 2022-10-24

## 2022-10-22 RX ORDER — IBUPROFEN 200 MG
24 TABLET ORAL
Status: DISCONTINUED | OUTPATIENT
Start: 2022-10-22 | End: 2022-10-23

## 2022-10-22 RX ORDER — SODIUM CHLORIDE 0.9 % (FLUSH) 0.9 %
10 SYRINGE (ML) INJECTION
Status: DISCONTINUED | OUTPATIENT
Start: 2022-10-22 | End: 2022-10-25 | Stop reason: HOSPADM

## 2022-10-22 RX ORDER — ONDANSETRON 2 MG/ML
4 INJECTION INTRAMUSCULAR; INTRAVENOUS EVERY 8 HOURS PRN
Status: DISCONTINUED | OUTPATIENT
Start: 2022-10-22 | End: 2022-10-25 | Stop reason: HOSPADM

## 2022-10-22 RX ORDER — IBUPROFEN 200 MG
16 TABLET ORAL
Status: DISCONTINUED | OUTPATIENT
Start: 2022-10-22 | End: 2022-10-23

## 2022-10-22 RX ORDER — ACETAMINOPHEN 325 MG/1
650 TABLET ORAL EVERY 8 HOURS PRN
Status: DISCONTINUED | OUTPATIENT
Start: 2022-10-22 | End: 2022-10-25 | Stop reason: HOSPADM

## 2022-10-22 RX ADMIN — SODIUM CHLORIDE: 0.9 INJECTION, SOLUTION INTRAVENOUS at 02:10

## 2022-10-22 RX ADMIN — SODIUM CHLORIDE 1500 ML: 0.9 INJECTION, SOLUTION INTRAVENOUS at 12:10

## 2022-10-22 RX ADMIN — VANCOMYCIN 2 GRAM/500 ML IN 0.9 % SODIUM CHLORIDE INTRAVENOUS 2000 MG: at 12:10

## 2022-10-22 RX ADMIN — PIPERACILLIN AND TAZOBACTAM 4.5 G: 4; .5 INJECTION, POWDER, LYOPHILIZED, FOR SOLUTION INTRAVENOUS; PARENTERAL at 12:10

## 2022-10-22 RX ADMIN — PIPERACILLIN AND TAZOBACTAM 4.5 G: 4; .5 INJECTION, POWDER, LYOPHILIZED, FOR SOLUTION INTRAVENOUS; PARENTERAL at 10:10

## 2022-10-22 RX ADMIN — ATORVASTATIN CALCIUM 40 MG: 40 TABLET, FILM COATED ORAL at 01:10

## 2022-10-22 RX ADMIN — VANCOMYCIN HYDROCHLORIDE 1500 MG: 1.5 INJECTION, POWDER, LYOPHILIZED, FOR SOLUTION INTRAVENOUS at 06:10

## 2022-10-22 RX ADMIN — ASPIRIN 81 MG: 81 TABLET, COATED ORAL at 01:10

## 2022-10-22 RX ADMIN — ACETAMINOPHEN 650 MG: 325 TABLET ORAL at 11:10

## 2022-10-22 RX ADMIN — PIPERACILLIN AND TAZOBACTAM 4.5 G: 4; .5 INJECTION, POWDER, LYOPHILIZED, FOR SOLUTION INTRAVENOUS; PARENTERAL at 08:10

## 2022-10-22 RX ADMIN — MUPIROCIN: 20 OINTMENT TOPICAL at 09:10

## 2022-10-22 RX ADMIN — IOHEXOL 100 ML: 350 INJECTION, SOLUTION INTRAVENOUS at 12:10

## 2022-10-22 RX ADMIN — MUPIROCIN: 20 OINTMENT TOPICAL at 01:10

## 2022-10-22 NOTE — RESPIRATORY THERAPY
Reported ABG results to Dr. Augustin. Placed patient on NC 2L due to hypoxia on room air ABG. Will continue to monitor.

## 2022-10-22 NOTE — NURSING
Pt arrived to floor diaphoretic but awake and alert. ER RN states that he did just break his fever. Pt was able to answer all admission questions and transferred himself to bed. Fall precautions discussed. Urinal provided to patient, he did urinate 150cc. Right foot wound wrapped, dressing is dry and intact. PT states that he does see a doctor for his wound, and his wife performs the daily wound care for it, reports that dressing was last changed yesterday prior to arrival. No other wounds noted. O2 on NC. Rails up x2, bed low and locked.     10/22/2022 02:58 AM  Pt BP dropping since arriving to floor . Called  to give him an update on pt condition. Advised of diaphoresis and hypotension. MD gave orders for bladder scan, lamberto for scan result >200, and to start zosyn 4.5 q8hrs.     10/22/2022 03:12 AM  Pt placed on tele monitor, which shows a-fib. RN performed EKG which confirmed afib. RN advised  of EKG results, MD advised RN that he will check in on patient in the am as long as his rate is controlled, RN vu.   Bladder scan performed with result of 184mls.

## 2022-10-22 NOTE — ED PROVIDER NOTES
"Encounter Date: 10/21/2022       History     Chief Complaint   Patient presents with    Fatigue     Pt presents to the ER w/ complaints of dizziness "since early today." Pt was evaluated in the ER early today due to back pain and dizziness, states "he wasn't told anything and to go see his primary doctor." Pt states symptoms have worsened, fell while attempting to get out of bed due to weakness.     57-year-old male with a history of diabetes, hypertension, morbid obesity comes in complaining of dizziness and back pain and generalized weakness.  He was seen earlier today in the emergency department and diagnosed with dizziness and back pain but he says that symptoms have worsened.  He fell when he was trying to get out of bed due to generalized weakness but no head injury and no loss of consciousness and no injury sustained.    Review of patient's allergies indicates:  No Known Allergies  Past Medical History:   Diagnosis Date    Diabetes mellitus, type 2     Hyperlipidemia     Hypertension     Obese     Peripheral neuropathy      Past Surgical History:   Procedure Laterality Date    APPENDECTOMY      HERNIA REPAIR      INCISION AND DRAINAGE FOOT Bilateral 12/26/2018    Procedure: INCISION AND DRAINAGE, FOOT;  Surgeon: Rob Alas DPM;  Location: [facility] OR;  Service: Podiatry;  Laterality: Bilateral;    TOE AMPUTATION      right great toe    TOE AMPUTATION Left 12/26/2018    Procedure: AMPUTATION, TOE; left 3rd toe;  Surgeon: Rob Alas DPM;  Location: [facility] OR;  Service: Podiatry;  Laterality: Left;     No family history on file.  Social History     Tobacco Use    Smoking status: Never    Smokeless tobacco: Never   Substance Use Topics    Alcohol use: No    Drug use: No     Review of Systems   Constitutional:  Positive for fatigue. Negative for fever.   HENT:  Negative for sore throat.    Respiratory:  Negative for shortness of breath.    Cardiovascular:  Negative for chest pain.   Gastrointestinal:  " Negative for abdominal pain, blood in stool, diarrhea, nausea and vomiting.   Genitourinary:  Negative for dysuria.   Musculoskeletal:  Positive for back pain. Negative for neck pain and neck stiffness.   Skin:  Negative for rash.   Neurological:  Positive for dizziness. Negative for seizures, syncope, facial asymmetry, speech difficulty, weakness, light-headedness, numbness and headaches.   Hematological:  Does not bruise/bleed easily.   All other systems reviewed and are negative.    Physical Exam     Initial Vitals [10/21/22 2249]   BP Pulse Resp Temp SpO2   120/63 107 18 (!) 102.6 °F (39.2 °C) (!) 92 %      MAP       --         Physical Exam    Nursing note and vitals reviewed.  Constitutional: He appears well-developed and well-nourished. He is not diaphoretic.   HENT:   Head: Normocephalic and atraumatic.   Eyes: EOM are normal. Pupils are equal, round, and reactive to light.   Neck: Neck supple.   Normal range of motion.  Cardiovascular:  Normal rate and regular rhythm.           Pulmonary/Chest: Breath sounds normal. No respiratory distress. He has no wheezes.   Abdominal: Abdomen is soft. Bowel sounds are normal. There is no abdominal tenderness.   Musculoskeletal:         General: Normal range of motion.      Cervical back: Normal range of motion and neck supple.     Neurological: He is alert and oriented to person, place, and time. He has normal strength. No cranial nerve deficit or sensory deficit.   Skin: Skin is warm and dry.   Psychiatric: He has a normal mood and affect. Thought content normal.       ED Course   Procedures  Labs Reviewed   CBC W/ AUTO DIFFERENTIAL - Abnormal; Notable for the following components:       Result Value    Hemoglobin 13.3 (*)     Hematocrit 39.1 (*)     Immature Granulocytes 1.0 (*)     Immature Grans (Abs) 0.06 (*)     Lymph # 0.7 (*)     Gran % 82.5 (*)     Lymph % 11.7 (*)     All other components within normal limits   COMPREHENSIVE METABOLIC PANEL - Abnormal; Notable  for the following components:    Sodium 129 (*)     Glucose 243 (*)     BUN 32 (*)     Creatinine 1.5 (*)     Calcium 8.2 (*)     Albumin 2.6 (*)     Total Bilirubin 1.4 (*)     AST 60 (*)     eGFR 54.6 (*)     All other components within normal limits   URINALYSIS, REFLEX TO URINE CULTURE - Abnormal; Notable for the following components:    Appearance, UA Cloudy (*)     Protein, UA 2+ (*)     Glucose, UA 4+ (*)     Occult Blood UA 2+ (*)     All other components within normal limits    Narrative:     Preferred Collection Type->Urine, Clean Catch  Specimen Source->Urine   URINALYSIS MICROSCOPIC - Abnormal; Notable for the following components:    Hyaline Casts, UA 9.0 (*)     All other components within normal limits    Narrative:     Preferred Collection Type->Urine, Clean Catch  Specimen Source->Urine   POCT GLUCOSE - Abnormal; Notable for the following components:    POCT Glucose 349 (*)     All other components within normal limits   CULTURE, BLOOD   CULTURE, BLOOD   RESPIRATORY VIRAL PANEL BY PCR   NT-PRO NATRIURETIC PEPTIDE   LACTIC ACID, PLASMA   MAGNESIUM   TROPONIN I HIGH SENSITIVITY   ACETONE   LACTIC ACID, PLASMA   SARS-COV-2 RDRP GENE    Narrative:     This test utilizes isothermal nucleic acid amplification   technology to detect the SARS-CoV-2 RdRp nucleic acid segment.   The analytical sensitivity (limit of detection) is 125 genome   equivalents/mL.   A POSITIVE result implies infection with the SARS-CoV-2 virus;   the patient is presumed to be contagious.     A NEGATIVE result means that SARS-CoV-2 nucleic acids are not   present above the limit of detection. A NEGATIVE result should be   treated as presumptive. It does not rule out the possibility of   COVID-19 and should not be the sole basis for treatment decisions.   If COVID-19 is strongly suspected based on clinical and exposure   history, re-testing using an alternate molecular assay should be   considered.   This test is only for use under  the Food and Drug   Administration s Emergency Use Authorization (EUA).   Commercial kits are provided by [employer].   Performance characteristics of the EUA have been independently   verified by [facility] Department of   Pathology and Laboratory Medicine.   _________________________________________________________________   The authorized Fact Sheet for Healthcare Providers and the authorized Fact   Sheet for Patients of the ID NOW COVID-19 are available on the FDA   website:     https://www.fda.gov/media/713071/download  https://www.fda.gov/media/020540/download           POCT INFLUENZA A/B MOLECULAR   POCT GLUCOSE MONITORING CONTINUOUS          Imaging Results              CTA Chest Non-Coronary (PE Studies) (In process)                      X-Ray Chest AP Portable (In process)                      Medications   vancomycin 2 g in 0.9% sodium chloride 500 mL IVPB (2,000 mg Intravenous New Bag 10/22/22 0055)   sodium chloride 0.9% flush 10 mL (has no administration in time range)   melatonin tablet 6 mg (has no administration in time range)   0.9%  NaCl infusion (has no administration in time range)   ondansetron injection 4 mg (has no administration in time range)   acetaminophen tablet 650 mg (has no administration in time range)   glucose chewable tablet 16 g (has no administration in time range)   glucose chewable tablet 24 g (has no administration in time range)   glucagon (human recombinant) injection 1 mg (has no administration in time range)   dextrose 10% bolus 125 mL (has no administration in time range)   dextrose 10% bolus 250 mL (has no administration in time range)   ibuprofen tablet 800 mg (800 mg Oral Given 10/21/22 2308)   sodium chloride 0.9% bolus 1,000 mL (0 mLs Intravenous Stopped 10/22/22 0026)   prochlorperazine injection Soln 10 mg (10 mg Intravenous Given 10/21/22 2308)   diphenhydrAMINE injection 25 mg (25 mg Intravenous Given 10/21/22 2308)   albuterol-ipratropium  2.5 mg-0.5 mg/3 mL nebulizer solution 3 mL (3 mLs Nebulization Given 10/21/22 2336)   sodium chloride 0.9% bolus 1,500 mL (0 mLs Intravenous Stopped 10/22/22 0118)   piperacillin-tazobactam 4.5 g in dextrose 5 % 100 mL IVPB (ready to mix system) (0 g Intravenous Stopped 10/22/22 0054)   iohexoL (OMNIPAQUE 350) injection 100 mL (100 mLs Intravenous Given 10/22/22 0022)                Attending Attestation:             Attending ED Notes:   57-year-old male with a history of diabetes, hypertension, morbid obesity comes in complaining of dizziness and back pain and generalized weakness.  He was seen earlier today in the emergency department and diagnosed with dizziness and back pain but he says that symptoms have worsened.  He fell when he was trying to get out of bed due to generalized weakness but no head injury and no loss of consciousness and no injury sustained.    Patient tachycardic, febrile, tachypneic, hypoxic upon arrival.  SIRS criteria.  Blood cultures drawn, antibiotics given, IV fluids given.  Workup reveals no acute/emergent specific pathology or etiology however respiratory viral panel is pending.  No obvious pneumonia or PE or aortic dissection on CTA.  Patient with MAGDI.  IV fluids given. Pt hypoxic; 93% on 2 L. Lung sounds diminished. Given neb tx. Pt with hyperglycemia; no DKA. Patient has a normal neurologic exam. No vision changes, confusion, speech difficulty, numbness, weakness. No bowel or bladder incontinence or retention. Ambulates without difficulty. Discussed patient with admitting doctor, Ad[facility] [place] [facility] [place] and management. Pt agreed with POC. NAD, stable upon admission.                   Clinical Impression:   Final diagnoses:  [R42] Dizziness  [R53.83] Fatigue, unspecified type  [R53.1] Generalized weakness  [R09.02] Hypoxia (Primary)  [R73.9] Hyperglycemia  [R50.9] Fever, unspecified fever cause  [R65.10] SIRS (systemic inflammatory response syndrome)  [E86.0]  Dehydration  [N17.9] MAGDI (acute kidney injury)        ED Disposition Condition    Observation Stable                Rosa Maria Augustin MD  10/22/22 0218       Rosa Maria Augustin MD  10/22/22 0219

## 2022-10-22 NOTE — PLAN OF CARE
[facility]  Initial Discharge Assessment       Primary Care Provider: Olena Thornton DO    Admission Diagnosis: Dehydration [E86.0]  Dizziness [R42]  Hyperglycemia [R73.9]  Hypoxia [R09.02]  SIRS (systemic inflammatory response syndrome) [R65.10]  MAGDI (acute kidney injury) [N17.9]  Generalized weakness [R53.1]  Fever, unspecified fever cause [R50.9]  Fatigue, unspecified type [R53.83]    Admission Date: 10/21/2022  Expected Discharge Date:     Discharge Barriers Identified: None    Payor: MEDICAID / Plan: [facility] COMMUNITY PLAN [facility] ([place] MEDICAID) / Product Type: Managed Medicaid /     Extended Emergency Contact Information  Primary Emergency Contact: Aleena Garcia  Address: [address]  Home Phone: [phone]  Mobile Phone: [phone]  Relation: Spouse  Preferred language: English   needed? No    Discharge Plan A: Home  Discharge Plan B: Home with family      [id number] - [place], [place] - [address]  [address] [address]  Phone: [phone] Fax: [phone]    [id number] - [place], [place] - [address] [address]  Phone: [phone] Fax: [phone]    [facility] [address] [place], [place] - [address]  [address]  [facility] [address]  Phone: [phone] Fax: [phone]      Initial Assessment (most recent)       Adult Discharge Assessment - 10/22/22 1018          Discharge Assessment    Confirmed/corrected address, phone number and insurance Yes     Confirmed Demographics Correct on Facesheet     Source of Information patient     Communicated MICHAEL with patient/caregiver No     Reason For Admission hypoxia, fever.     Lives With child(milana), dependent;significant other     Facility Arrived From: home     Do you expect to return to your current living situation? Yes     Do you have help at home or someone to help  you manage your care at home? Yes     Who are your caregiver(s) and their phone number(s)? Lives with Lisa Garcia, [phone]     Prior to hospitilization cognitive status: Alert/Oriented     Current cognitive status: Alert/Oriented     Walking or Climbing Stairs Difficulty none     Dressing/Bathing Difficulty none     Equipment Currently Used at Home none     Readmission within 30 days? No     Patient currently being followed by outpatient case management? No     Do you currently have service(s) that help you manage your care at home? No     Do you take prescription medications? Yes     Do you have any problems affording any of your prescribed medications? No     Is the patient taking medications as prescribed? --   not known    Who is going to help you get home at discharge? wife     How do you get to doctors appointments? car, drives self     Are you on dialysis? No     Do you take coumadin? No     Discharge Plan A Home     Discharge Plan B Home with family     DME Needed Upon Discharge  none     Discharge Plan discussed with: Patient     Discharge Barriers Identified None                   Spoke with patient at bedside.  Awake alert, states he lives with Aleena Garcia, and [facility] is the [facility], confirmed her contadt number.  Patient is independent adult, drives, works at [employer].  Anticipates DC to home when medically appropriate.  Has no DME at home, does not anticipate needing any DME. [facility].  States wife manages his medications.

## 2022-10-22 NOTE — ASSESSMENT & PLAN NOTE
Body mass index is 49.08 kg/m². Morbid obesity complicates all aspects of disease management from diagnostic modalities to treatment. Weight loss encouraged and health benefits explained to patient.

## 2022-10-22 NOTE — PLAN OF CARE
Problem: Adult Inpatient Plan of Care  Goal: Plan of Care Review  Outcome: Ongoing, Progressing  Flowsheets (Taken 10/22/2022 0449)  Plan of Care Reviewed With: patient  Goal: Optimal Comfort and Wellbeing  Outcome: Ongoing, Progressing  Intervention: Monitor Pain and Promote Comfort  Flowsheets (Taken 10/22/2022 0449)  Pain Management Interventions: pain management plan reviewed with patient/caregiver  Intervention: Provide Person-Centered Care  Flowsheets (Taken 10/22/2022 0449)  Trust Relationship/Rapport:   care explained   choices provided   questions encouraged   reassurance provided     Problem: Bariatric Environmental Safety  Goal: Safety Maintained with Care  Outcome: Ongoing, Progressing  Intervention: Promote Safety and Comfort  Flowsheets (Taken 10/22/2022 0449)  Bariatric Safety: specialty bed utilized     Problem: Diabetes Comorbidity  Goal: Blood Glucose Level Within Targeted Range  Outcome: Ongoing, Progressing  Intervention: Monitor and Manage Glycemia  Flowsheets (Taken 10/22/2022 0449)  Glycemic Management: blood glucose monitored     Problem: Impaired Wound Healing  Goal: Optimal Wound Healing  Outcome: Ongoing, Progressing  Intervention: Promote Wound Healing  Flowsheets (Taken 10/22/2022 0449)  Oral Nutrition Promotion: calorie-dense foods provided  Sleep/Rest Enhancement: regular sleep/rest pattern promoted  Activity Management: Ambulated -L4  Pain Management Interventions: pain management plan reviewed with patient/caregiver

## 2022-10-22 NOTE — H&P
"[facility]  History & Physical    Patient Name: Andrew Del Cid Jr.  MRN: [id number]  Patient Class: IP- Inpatient  Admission Date: 10/21/2022  Attending Physician: Sukhwinder Martinez Jr., MD   Primary Care Provider: Olena Thornton DO         Patient information was obtained from ER records.     Subjective:     Principal Problem:Hyponatremia    Chief Complaint:   Chief Complaint   Patient presents with    Fatigue     Pt presents to the ER w/ complaints of dizziness "since early today." Pt was evaluated in the ER early today due to back pain and dizziness, states "he wasn't told anything and to go see his primary doctor." Pt states symptoms have worsened, fell while attempting to get out of bed due to weakness.        HPI:   Chief Complaint   Patient presents with    Weakness       Pt stated that he was experiencing right lower back pain yesterday - stayed in bed / sleeping most of the day and was only able to eat 1/2 sandwich. Hx DM. Presents to ED today with complaints of dizziness / lethargy, concerned blood sugar was low. CBG at triage 237mg/dl.       57-year-old male with a history of chronic back pain, diabetes mellitus, hypertension, obesity off the complaining lower back pain bilaterally that became worse 2 days ago, try tests sleep all day yesterday and rest which improved the pain.  Denies any fever.  No dysuria.  History of this multiple times in the past.  Eating of mild dizziness when he walks as well.  Denies any blood in stool.  No nausea vomiting.  Afebrile.  Not ill appearing, alert oriented x4, GCS is 15.  Dizziness is worse when he wakes up, better with holding head still.  Denies any shortness of breath or chest pain         Past Medical History:   Diagnosis Date    Diabetes mellitus, type 2     Hyperlipidemia     Hypertension     Obese     Peripheral neuropathy        Past Surgical History:   Procedure Laterality Date    APPENDECTOMY      HERNIA REPAIR   "    INCISION AND DRAINAGE FOOT Bilateral 2018    Procedure: INCISION AND DRAINAGE, FOOT;  Surgeon: Rob Alas DPM;  Location: [facility] OR;  Service: Podiatry;  Laterality: Bilateral;    TOE AMPUTATION      right great toe    TOE AMPUTATION Left 2018    Procedure: AMPUTATION, TOE; left 3rd toe;  Surgeon: Rob Alas DPM;  Location: [facility] OR;  Service: Podiatry;  Laterality: Left;       Review of patient's allergies indicates:  No Known Allergies    Current Facility-Administered Medications on File Prior to Encounter   Medication    [COMPLETED] sodium chloride 0.9% bolus 1,000 mL     Current Outpatient Medications on File Prior to Encounter   Medication Sig    amLODIPine (NORVASC) 5 MG tablet Take 5 mg by mouth.    aspirin (ECOTRIN) 81 MG EC tablet Take 81 mg by mouth once daily.    atorvastatin (LIPITOR) 40 MG tablet Take 40 mg by mouth.    cholecalciferol, vitamin D3, (VITAMIN D3) 25 mcg (1,000 unit) capsule 1 capsule    DULoxetine (CYMBALTA) 30 MG capsule Take 30 mg by mouth once daily.    insulin aspart U-100 (NOVOLOG) 100 unit/mL (3 mL) InPn pen 30 Units 2 (two) times daily.    JARDIANCE 25 mg tablet Take 25 mg by mouth once daily.    LANTUS SOLOSTAR U-100 INSULIN glargine 100 units/mL SubQ pen SMARTSI Unit(s) SUB-Q Twice Daily    losartan (COZAAR) 25 MG tablet 1 tablet    metFORMIN (GLUCOPHAGE) 1000 MG tablet Take 1,000 mg by mouth 2 (two) times daily.    TRULICITY 4.5 mg/0.5 mL pen injector Inject into the skin.     Family History    None       Tobacco Use    Smoking status: Never    Smokeless tobacco: Never   Substance and Sexual Activity    Alcohol use: No    Drug use: No    Sexual activity: Yes     Partners: Female     Comment:      Review of Systems   Constitutional:  Positive for fever. Negative for chills.   HENT:  Negative for rhinorrhea, sneezing and sore throat.    Eyes:  Negative for pain and visual disturbance.   Respiratory:  Negative for cough  and shortness of breath.    Cardiovascular:  Negative for chest pain.   Gastrointestinal:  Negative for abdominal pain, constipation and diarrhea.   Endocrine: Negative for cold intolerance and heat intolerance.   Genitourinary:  Positive for flank pain. Negative for difficulty urinating.   Musculoskeletal:  Negative for arthralgias and joint swelling.   Skin:  Negative for rash.   Allergic/Immunologic: Negative for environmental allergies.   Neurological:  Positive for dizziness. Negative for syncope and weakness.   Hematological:  Does not bruise/bleed easily.   Psychiatric/Behavioral:  Negative for dysphoric mood. The patient is not nervous/anxious.    Objective:     Vital Signs (Most Recent):  Temp: 98.6 °F (37 °C) (10/22/22 0757)  Pulse: (!) 131 (10/22/22 0757)  Resp: 20 (10/22/22 0757)  BP: 132/64 (10/22/22 0757)  SpO2: 96 % (10/22/22 0757)   Vital Signs (24h Range):  Temp:  [96.3 °F (35.7 °C)-102.6 °F (39.2 °C)] 98.6 °F (37 °C)  Pulse:  [] 131  Resp:  [18-32] 20  SpO2:  [90 %-96 %] 96 %  BP: ()/(52-70) 132/64     Weight: (!) 168.7 kg (372 lb)  Body mass index is 49.08 kg/m².    Physical Exam  Vitals and nursing note reviewed.   Constitutional:       Appearance: Normal appearance. He is obese.   HENT:      Head: Normocephalic and atraumatic.      Nose: Nose normal.   Eyes:      Extraocular Movements: Extraocular movements intact.      Pupils: Pupils are equal, round, and reactive to light.   Cardiovascular:      Rate and Rhythm: Normal rate and regular rhythm.      Heart sounds: No murmur heard.    No friction rub. No gallop.   Pulmonary:      Effort: Pulmonary effort is normal.      Breath sounds: Normal breath sounds.   Abdominal:      General: Abdomen is flat. Bowel sounds are normal.      Palpations: Abdomen is soft.   Musculoskeletal:         General: No swelling or deformity.      Cervical back: Normal range of motion and neck supple.   Skin:     General: Skin is warm and dry.      Capillary  Refill: Capillary refill takes less than 2 seconds.      Findings: Lesion present.      Comments: Left index finger cellulitis   Neurological:      General: No focal deficit present.      Mental Status: He is alert and oriented to person, place, and time.   Psychiatric:         Mood and Affect: Mood normal.         Behavior: Behavior normal.         CRANIAL NERVES     CN III, IV, VI   Pupils are equal, round, and reactive to light.     Significant Labs: All pertinent labs within the past 24 hours have been reviewed.    Significant Imaging: I have reviewed all pertinent imaging results/findings within the past 24 hours.    Assessment/Plan:     * Hyponatremia  Continue to monitor with volume resuscitation with sodium chloride.    Patient has hyponatremia.  Last electrolytes reviewed- Recent Labs   Lab 10/21/22  1130 10/21/22  2352   * 129*           Acute renal failure with tubular necrosis  Patient with acute kidney injury likely due to IVVD/dehydration and acute tubular necrosis MAGDI is currently improving. Labs reviewed- Renal function/electrolytes with Estimated Creatinine Clearance: 90.8 mL/min (A) (based on SCr of 1.5 mg/dL (H)). according to latest data. Monitor urine output and serial BMP and adjust therapy as needed. Avoid nephrotoxins and renally dose meds for GFR listed above.     Lab Results   Component Value Date    CREATININE 1.5 (H) 10/21/2022    CREATININE 1.2 10/21/2022    CREATININE 0.8 07/21/2022          Fever  Unclear etiology at this time.  Continue prophylactic antimicrobial coverage.  Blood cultures negative thus far.  Respiratory panel PCR pending.      Type 2 diabetes mellitus without complication, with long-term current use of insulin  Last A1c reviewed-   Lab Results   Component Value Date    HGBA1C 9.2 (H) 10/21/2022     Most recent fingerstick glucose reviewed-   Recent Labs   Lab 10/21/22  2300 10/22/22  0229   POCTGLUCOSE 349* 210*     Current correctional scale:   Low  Anti-hyperglycemic dose as follows-   Antihyperglycemics (From admission, onward)    None        Hold Oral hypoglycemics while patient is in the hospital.      Hyperlipidemia  Continue home medical therapy.      Essential hypertension  Will reinstitute home medical therapy as needed.    BP Readings from Last 3 Encounters:   10/22/22 132/64   10/21/22 127/73   10/04/22 132/77           Morbid obesity with BMI of 50.0-59.9, adult  Body mass index is 49.08 kg/m². Morbid obesity complicates all aspects of disease management from diagnostic modalities to treatment. Weight loss encouraged and health benefits explained to patient.           VTE Risk Mitigation (From admission, onward)         Ordered     IP VTE HIGH RISK PATIENT  Once         10/22/22 0210     Place sequential compression device  Until discontinued         10/22/22 0210                   Sukhwinder Martinez Jr, MD  Department of Hospital Medicine   [facility]

## 2022-10-22 NOTE — ASSESSMENT & PLAN NOTE
Will reinstitute home medical therapy as needed.    BP Readings from Last 3 Encounters:   10/22/22 132/64   10/21/22 127/73   10/04/22 132/77

## 2022-10-22 NOTE — HPI
Chief Complaint   Patient presents with    Weakness       Pt stated that he was experiencing right lower back pain yesterday - stayed in bed / sleeping most of the day and was only able to eat 1/2 sandwich. Hx DM. Presents to ED today with complaints of dizziness / lethargy, concerned blood sugar was low. CBG at triage 237mg/dl.       57-year-old male with a history of chronic back pain, diabetes mellitus, hypertension, obesity off the complaining lower back pain bilaterally that became worse 2 days ago, try tests sleep all day yesterday and rest which improved the pain.  Denies any fever.  No dysuria.  History of this multiple times in the past.  Eating of mild dizziness when he walks as well.  Denies any blood in stool.  No nausea vomiting.  Afebrile.  Not ill appearing, alert oriented x4, GCS is 15.  Dizziness is worse when he wakes up, better with holding head still.  Denies any shortness of breath or chest pain

## 2022-10-22 NOTE — ED NOTES
..Sepsis post fluid bolus tissue perfusion exam:    See current vital signs.    Cardiopulmonary:   Heart: Regular rate and rhythm   Lungs: Clear to auscultation bilaterally    Capillary refill: < 3 seconds  Peripheral pulses: radial 2+ bilaterally  Skin: warm/dry/pink with good turgor

## 2022-10-22 NOTE — ASSESSMENT & PLAN NOTE
Continue to monitor with volume resuscitation with sodium chloride.    Patient has hyponatremia.  Last electrolytes reviewed- Recent Labs   Lab 10/21/22  1130 10/21/22  2352   * 129*

## 2022-10-22 NOTE — ASSESSMENT & PLAN NOTE
Last A1c reviewed-   Lab Results   Component Value Date    HGBA1C 9.2 (H) 10/21/2022     Most recent fingerstick glucose reviewed-   Recent Labs   Lab 10/21/22  2300 10/22/22  0229   POCTGLUCOSE 349* 210*     Current correctional scale:  Low  Anti-hyperglycemic dose as follows-   Antihyperglycemics (From admission, onward)    None        Hold Oral hypoglycemics while patient is in the hospital.

## 2022-10-22 NOTE — ASSESSMENT & PLAN NOTE
Unclear etiology at this time.  Continue prophylactic antimicrobial coverage.  Blood cultures negative thus far.  Respiratory panel PCR pending.

## 2022-10-22 NOTE — ASSESSMENT & PLAN NOTE
Patient with acute kidney injury likely due to IVVD/dehydration and acute tubular necrosis MAGDI is currently improving. Labs reviewed- Renal function/electrolytes with Estimated Creatinine Clearance: 90.8 mL/min (A) (based on SCr of 1.5 mg/dL (H)). according to latest data. Monitor urine output and serial BMP and adjust therapy as needed. Avoid nephrotoxins and renally dose meds for GFR listed above.     Lab Results   Component Value Date    CREATININE 1.5 (H) 10/21/2022    CREATININE 1.2 10/21/2022    CREATININE 0.8 07/21/2022

## 2022-10-22 NOTE — PROGRESS NOTES
Pharmacokinetic Initial Assessment: IV Vancomycin    Assessment/Plan:    Initiate intravenous vancomycin with loading dose of 2000 mg (done in ED) once followed by a maintenance dose of vancomycin 1500mg IV every 12 hours  Desired empiric serum trough concentration is 15 to 20 mcg/mL  Draw vancomycin trough level 60 min prior to fourth dose on 10/24 at approximately 0515  Pharmacy will continue to follow and monitor vancomycin.      Thank you for allowing pharmacy participate in this patient's care.     Elke Teran, PharmD  Clinical Pharmacist, [facility] Pharmacy/[facility] Team  [email or web address]  office [phone]       Patient brief summary:  Andrew Del Cid Jr. is a 57 y.o. male initiated on antimicrobial therapy with IV Vancomycin for treatment of suspected bacteremia    Drug Allergies:   Review of patient's allergies indicates:  No Known Allergies    Actual Body Weight:   168.7 kg    Renal Function:   Estimated Creatinine Clearance: 90.8 mL/min (A) (based on SCr of 1.5 mg/dL (H)).,     Dialysis Method (if applicable):  N/A    CBC (last 72 hours):  Recent Labs   Lab Result Units 10/21/22  0213 10/21/22  1130 10/21/22  2352   WBC K/uL  --  7.39 5.75   Hemoglobin g/dL  --  13.4* 13.3*   Hemoglobin A1C % 9.2*  --   --    Hematocrit %  --  40.0 39.1*   Platelets K/uL  --  228 184   Gran % %  --  82.1* 82.5*   Lymph % %  --  11.4* 11.7*   Mono % %  --  5.4 4.5   Eosinophil % %  --  0.0 0.0   Basophil % %  --  0.3 0.3   Differential Method   --  Automated Automated       Metabolic Panel (last 72 hours):  Recent Labs   Lab Result Units 10/21/22  1130 10/21/22  1200 10/21/22  2352 10/22/22  0026   Sodium mmol/L 128*  --  129*  --    Potassium mmol/L 4.2  --  4.4  --    Chloride mmol/L 94*  --  97  --    CO2 mmol/L 26  --  24  --    Glucose mg/dL 278*  --  243*  --    Glucose, UA   --  4+*  --  4+*   BUN mg/dL 28*  --  32*  --    Creatinine mg/dL 1.2  --  1.5*  --    Albumin g/dL 2.6*  --  2.6*  --    Total Bilirubin  mg/dL 1.9*  --  1.4*  --    Alkaline Phosphatase U/L 108  --  102  --    AST U/L 58*  --  60*  --    ALT U/L 34  --  36  --    Magnesium mg/dL  --   --  2.0  --        Drug levels (last 3 results):  Recent Labs   Lab Result Units 10/22/22  1617   Vancomycin, Random ug/mL 8.6       Microbiologic Results:  Microbiology Results (last 7 days)       Procedure Component Value Units Date/Time    MRSA/SA Rapid ID by PCR from Blood culture [[id number]] Collected: 10/22/22 1634    Order Status: No result Updated: 10/22/22 1634    Blood culture #2 **CANNOT BE ORDERED STAT** [[id number]] Collected: 10/21/22 2348    Order Status: Completed Specimen: Blood Updated: 10/22/22 1634     Blood Culture, Routine Gram stain lizzy bottle: Gram positive cocci in clusters resembling Staph      Results called to and read back by: Tate Sanders RN 10/22/2022  15:16      Gram stain aer bottle: Gram positive cocci in clusters resembling Staph      Positive results previously called 10/22/2022  16:34    Blood culture #1 **CANNOT BE ORDERED STAT** [[id number]] Collected: 10/21/22 2331    Order Status: Completed Specimen: Blood Updated: 10/22/22 1518     Blood Culture, Routine Gram stain aer bottle: Gram positive cocci in clusters resembling Staph      Gram stain lizzy bottle: Gram positive cocci in clusters resembling Staph      Results called to and read back by: Tate Sanders RN 10/22/2022  15:16    Respiratory Viral Panel by PCR (Sources other than NP Swab) [[id number]] Collected: 10/22/22 0154    Order Status: No result Updated: 10/22/22 1403    Respiratory Infection Panel (PCR), Nasopharyngeal [[id number]] Collected: 10/22/22 0107    Order Status: Canceled Specimen: Nasopharyngeal Swab

## 2022-10-22 NOTE — SUBJECTIVE & OBJECTIVE
Past Medical History:   Diagnosis Date    Diabetes mellitus, type 2     Hyperlipidemia     Hypertension     Obese     Peripheral neuropathy        Past Surgical History:   Procedure Laterality Date    APPENDECTOMY      HERNIA REPAIR      INCISION AND DRAINAGE FOOT Bilateral 2018    Procedure: INCISION AND DRAINAGE, FOOT;  Surgeon: Rob Alas DPM;  Location: [facility] OR;  Service: Podiatry;  Laterality: Bilateral;    TOE AMPUTATION      right great toe    TOE AMPUTATION Left 2018    Procedure: AMPUTATION, TOE; left 3rd toe;  Surgeon: Rob Alas DPM;  Location: [facility];  Service: Podiatry;  Laterality: Left;       Review of patient's allergies indicates:  No Known Allergies    Current Facility-Administered Medications on File Prior to Encounter   Medication    [COMPLETED] sodium chloride 0.9% bolus 1,000 mL     Current Outpatient Medications on File Prior to Encounter   Medication Sig    amLODIPine (NORVASC) 5 MG tablet Take 5 mg by mouth.    aspirin (ECOTRIN) 81 MG EC tablet Take 81 mg by mouth once daily.    atorvastatin (LIPITOR) 40 MG tablet Take 40 mg by mouth.    cholecalciferol, vitamin D3, (VITAMIN D3) 25 mcg (1,000 unit) capsule 1 capsule    DULoxetine (CYMBALTA) 30 MG capsule Take 30 mg by mouth once daily.    insulin aspart U-100 (NOVOLOG) 100 unit/mL (3 mL) InPn pen 30 Units 2 (two) times daily.    JARDIANCE 25 mg tablet Take 25 mg by mouth once daily.    LANTUS SOLOSTAR U-100 INSULIN glargine 100 units/mL SubQ pen SMARTSI Unit(s) SUB-Q Twice Daily    losartan (COZAAR) 25 MG tablet 1 tablet    metFORMIN (GLUCOPHAGE) 1000 MG tablet Take 1,000 mg by mouth 2 (two) times daily.    TRULICITY 4.5 mg/0.5 mL pen injector Inject into the skin.     Family History    None       Tobacco Use    Smoking status: Never    Smokeless tobacco: Never   Substance and Sexual Activity    Alcohol use: No    Drug use: No    Sexual activity: Yes     Partners: Female     Comment:      Review of  Systems   Constitutional:  Positive for fever. Negative for chills.   HENT:  Negative for rhinorrhea, sneezing and sore throat.    Eyes:  Negative for pain and visual disturbance.   Respiratory:  Negative for cough and shortness of breath.    Cardiovascular:  Negative for chest pain.   Gastrointestinal:  Negative for abdominal pain, constipation and diarrhea.   Endocrine: Negative for cold intolerance and heat intolerance.   Genitourinary:  Positive for flank pain. Negative for difficulty urinating.   Musculoskeletal:  Negative for arthralgias and joint swelling.   Skin:  Negative for rash.   Allergic/Immunologic: Negative for environmental allergies.   Neurological:  Positive for dizziness. Negative for syncope and weakness.   Hematological:  Does not bruise/bleed easily.   Psychiatric/Behavioral:  Negative for dysphoric mood. The patient is not nervous/anxious.    Objective:     Vital Signs (Most Recent):  Temp: 98.6 °F (37 °C) (10/22/22 0757)  Pulse: (!) 131 (10/22/22 0757)  Resp: 20 (10/22/22 0757)  BP: 132/64 (10/22/22 0757)  SpO2: 96 % (10/22/22 0757)   Vital Signs (24h Range):  Temp:  [96.3 °F (35.7 °C)-102.6 °F (39.2 °C)] 98.6 °F (37 °C)  Pulse:  [] 131  Resp:  [18-32] 20  SpO2:  [90 %-96 %] 96 %  BP: ()/(52-70) 132/64     Weight: (!) 168.7 kg (372 lb)  Body mass index is 49.08 kg/m².    Physical Exam  Vitals and nursing note reviewed.   Constitutional:       Appearance: Normal appearance. He is obese.   HENT:      Head: Normocephalic and atraumatic.      Nose: Nose normal.   Eyes:      Extraocular Movements: Extraocular movements intact.      Pupils: Pupils are equal, round, and reactive to light.   Cardiovascular:      Rate and Rhythm: Normal rate and regular rhythm.      Heart sounds: No murmur heard.    No friction rub. No gallop.   Pulmonary:      Effort: Pulmonary effort is normal.      Breath sounds: Normal breath sounds.   Abdominal:      General: Abdomen is flat. Bowel sounds are normal.       Palpations: Abdomen is soft.   Musculoskeletal:         General: No swelling or deformity.      Cervical back: Normal range of motion and neck supple.   Skin:     General: Skin is warm and dry.      Capillary Refill: Capillary refill takes less than 2 seconds.      Findings: Lesion present.      Comments: Left index finger cellulitis   Neurological:      General: No focal deficit present.      Mental Status: He is alert and oriented to person, place, and time.   Psychiatric:         Mood and Affect: Mood normal.         Behavior: Behavior normal.         CRANIAL NERVES     CN III, IV, VI   Pupils are equal, round, and reactive to light.     Significant Labs: All pertinent labs within the past 24 hours have been reviewed.    Significant Imaging: I have reviewed all pertinent imaging results/findings within the past 24 hours.

## 2022-10-23 PROBLEM — E11.621 DIABETIC FOOT ULCER: Status: ACTIVE | Noted: 2022-10-23

## 2022-10-23 PROBLEM — R78.81 BACTEREMIA: Status: ACTIVE | Noted: 2022-10-23

## 2022-10-23 PROBLEM — L97.509 DIABETIC FOOT ULCER: Status: ACTIVE | Noted: 2022-10-23

## 2022-10-23 LAB
ALBUMIN SERPL BCP-MCNC: 2.2 G/DL (ref 3.5–5.2)
ALP SERPL-CCNC: 80 U/L (ref 55–135)
ALT SERPL W/O P-5'-P-CCNC: 35 U/L (ref 10–44)
ANION GAP SERPL CALC-SCNC: 9 MMOL/L (ref 8–16)
AST SERPL-CCNC: 48 U/L (ref 10–40)
BASOPHILS # BLD AUTO: 0.01 K/UL (ref 0–0.2)
BASOPHILS NFR BLD: 0.3 % (ref 0–1.9)
BILIRUB SERPL-MCNC: 0.9 MG/DL (ref 0.1–1)
BUN SERPL-MCNC: 19 MG/DL (ref 6–20)
CALCIUM SERPL-MCNC: 7.8 MG/DL (ref 8.7–10.5)
CHLORIDE SERPL-SCNC: 102 MMOL/L (ref 95–110)
CO2 SERPL-SCNC: 22 MMOL/L (ref 23–29)
CREAT SERPL-MCNC: 1 MG/DL (ref 0.5–1.4)
DIFFERENTIAL METHOD: ABNORMAL
EOSINOPHIL # BLD AUTO: 0 K/UL (ref 0–0.5)
EOSINOPHIL NFR BLD: 0.3 % (ref 0–8)
ERYTHROCYTE [DISTWIDTH] IN BLOOD BY AUTOMATED COUNT: 12.6 % (ref 11.5–14.5)
EST. GFR  (NO RACE VARIABLE): >60 ML/MIN/1.73 M^2
GLUCOSE SERPL-MCNC: 225 MG/DL (ref 70–110)
HCT VFR BLD AUTO: 36.6 % (ref 40–54)
HGB BLD-MCNC: 12.3 G/DL (ref 14–18)
IMM GRANULOCYTES # BLD AUTO: 0.01 K/UL (ref 0–0.04)
IMM GRANULOCYTES NFR BLD AUTO: 0.3 % (ref 0–0.5)
LYMPHOCYTES # BLD AUTO: 0.9 K/UL (ref 1–4.8)
LYMPHOCYTES NFR BLD: 24.1 % (ref 18–48)
MAGNESIUM SERPL-MCNC: 2.1 MG/DL (ref 1.6–2.6)
MCH RBC QN AUTO: 28 PG (ref 27–31)
MCHC RBC AUTO-ENTMCNC: 33.6 G/DL (ref 32–36)
MCV RBC AUTO: 83 FL (ref 82–98)
MONOCYTES # BLD AUTO: 0.3 K/UL (ref 0.3–1)
MONOCYTES NFR BLD: 7.8 % (ref 4–15)
NEUTROPHILS # BLD AUTO: 2.5 K/UL (ref 1.8–7.7)
NEUTROPHILS NFR BLD: 67.2 % (ref 38–73)
NRBC BLD-RTO: 0 /100 WBC
PLATELET # BLD AUTO: 168 K/UL (ref 150–450)
PMV BLD AUTO: 10.3 FL (ref 9.2–12.9)
POCT GLUCOSE: 253 MG/DL (ref 70–110)
POCT GLUCOSE: 266 MG/DL (ref 70–110)
POCT GLUCOSE: 338 MG/DL (ref 70–110)
POCT GLUCOSE: 357 MG/DL (ref 70–110)
POTASSIUM SERPL-SCNC: 3.5 MMOL/L (ref 3.5–5.1)
PROT SERPL-MCNC: 6.4 G/DL (ref 6–8.4)
RBC # BLD AUTO: 4.39 M/UL (ref 4.6–6.2)
SODIUM SERPL-SCNC: 133 MMOL/L (ref 136–145)
WBC # BLD AUTO: 3.73 K/UL (ref 3.9–12.7)

## 2022-10-23 PROCEDURE — 11000001 HC ACUTE MED/SURG PRIVATE ROOM

## 2022-10-23 PROCEDURE — 94761 N-INVAS EAR/PLS OXIMETRY MLT: CPT

## 2022-10-23 PROCEDURE — 99900035 HC TECH TIME PER 15 MIN (STAT)

## 2022-10-23 PROCEDURE — 76937 US GUIDE VASCULAR ACCESS: CPT

## 2022-10-23 PROCEDURE — 85025 COMPLETE CBC W/AUTO DIFF WBC: CPT | Performed by: INTERNAL MEDICINE

## 2022-10-23 PROCEDURE — 99900031 HC PATIENT EDUCATION (STAT)

## 2022-10-23 PROCEDURE — 83735 ASSAY OF MAGNESIUM: CPT | Performed by: INTERNAL MEDICINE

## 2022-10-23 PROCEDURE — 27000221 HC OXYGEN, UP TO 24 HOURS

## 2022-10-23 PROCEDURE — 80053 COMPREHEN METABOLIC PANEL: CPT | Performed by: INTERNAL MEDICINE

## 2022-10-23 PROCEDURE — 25000003 PHARM REV CODE 250: Performed by: INTERNAL MEDICINE

## 2022-10-23 PROCEDURE — 63600175 PHARM REV CODE 636 W HCPCS: Performed by: INTERNAL MEDICINE

## 2022-10-23 PROCEDURE — 36415 COLL VENOUS BLD VENIPUNCTURE: CPT | Performed by: INTERNAL MEDICINE

## 2022-10-23 PROCEDURE — C1751 CATH, INF, PER/CENT/MIDLINE: HCPCS

## 2022-10-23 PROCEDURE — 36410 VNPNXR 3YR/> PHY/QHP DX/THER: CPT

## 2022-10-23 RX ORDER — INSULIN ASPART 100 [IU]/ML
0-5 INJECTION, SOLUTION INTRAVENOUS; SUBCUTANEOUS
Status: DISCONTINUED | OUTPATIENT
Start: 2022-10-23 | End: 2022-10-25 | Stop reason: HOSPADM

## 2022-10-23 RX ORDER — IBUPROFEN 200 MG
16 TABLET ORAL
Status: DISCONTINUED | OUTPATIENT
Start: 2022-10-23 | End: 2022-10-25 | Stop reason: HOSPADM

## 2022-10-23 RX ADMIN — ATORVASTATIN CALCIUM 40 MG: 40 TABLET, FILM COATED ORAL at 09:10

## 2022-10-23 RX ADMIN — ASPIRIN 81 MG: 81 TABLET, COATED ORAL at 09:10

## 2022-10-23 RX ADMIN — PIPERACILLIN AND TAZOBACTAM 4.5 G: 4; .5 INJECTION, POWDER, LYOPHILIZED, FOR SOLUTION INTRAVENOUS; PARENTERAL at 09:10

## 2022-10-23 RX ADMIN — MUPIROCIN: 20 OINTMENT TOPICAL at 09:10

## 2022-10-23 RX ADMIN — SODIUM CHLORIDE: 0.9 INJECTION, SOLUTION INTRAVENOUS at 01:10

## 2022-10-23 RX ADMIN — INSULIN ASPART 2 UNITS: 100 INJECTION, SOLUTION INTRAVENOUS; SUBCUTANEOUS at 08:10

## 2022-10-23 RX ADMIN — PIPERACILLIN AND TAZOBACTAM 4.5 G: 4; .5 INJECTION, POWDER, LYOPHILIZED, FOR SOLUTION INTRAVENOUS; PARENTERAL at 04:10

## 2022-10-23 RX ADMIN — VANCOMYCIN HYDROCHLORIDE 1500 MG: 1.5 INJECTION, POWDER, LYOPHILIZED, FOR SOLUTION INTRAVENOUS at 05:10

## 2022-10-23 RX ADMIN — MUPIROCIN: 20 OINTMENT TOPICAL at 08:10

## 2022-10-23 RX ADMIN — PIPERACILLIN AND TAZOBACTAM 4.5 G: 4; .5 INJECTION, POWDER, LYOPHILIZED, FOR SOLUTION INTRAVENOUS; PARENTERAL at 01:10

## 2022-10-23 RX ADMIN — INSULIN ASPART 3 UNITS: 100 INJECTION, SOLUTION INTRAVENOUS; SUBCUTANEOUS at 04:10

## 2022-10-23 RX ADMIN — ACETAMINOPHEN 650 MG: 325 TABLET ORAL at 01:10

## 2022-10-23 RX ADMIN — SODIUM CHLORIDE: 0.9 INJECTION, SOLUTION INTRAVENOUS at 03:10

## 2022-10-23 RX ADMIN — INSULIN ASPART 4 UNITS: 100 INJECTION, SOLUTION INTRAVENOUS; SUBCUTANEOUS at 01:10

## 2022-10-23 NOTE — HOSPITAL COURSE
10/23/2022 [facility]:  Blood cultures positive for Staph aureus.  Will pursue transthoracic echocardiogram to rule out endocarditis.  Podiatry consult for foot ulceration.  10/24 MSSA bacteremia, will change abx to ancef Q8H. F/u surveillance blood cx and echocardiogram. Afebrile overnight.   10/25 Repeat blood cx NGTD x1 day, will continue with ancef therapy for total of 14 days, last date of antibiotics 11/7/22.  Per podiatry, foot wound healing and patient is to continue with current course of wound care at home with follow up outpatient.

## 2022-10-23 NOTE — ASSESSMENT & PLAN NOTE
Secondary to MRSA.  Pursue transthoracic echocardiography.  Will trend ESR.  Currently on vancomycin for therapy.

## 2022-10-23 NOTE — ASSESSMENT & PLAN NOTE
Unclear etiology at this time.  Continue prophylactic antimicrobial coverage.  Blood cultures negative thus far.  Respiratory panel PCR pending.    10/23/22:  Blood cultures positive for MRSA.

## 2022-10-23 NOTE — ASSESSMENT & PLAN NOTE
Last A1c reviewed-   Lab Results   Component Value Date    HGBA1C 9.2 (H) 10/21/2022     Most recent fingerstick glucose reviewed-   Recent Labs   Lab 10/22/22  1925 10/23/22  0615   POCTGLUCOSE 357* 253*     Current correctional scale:  Low  Anti-hyperglycemic dose as follows-   Antihyperglycemics (From admission, onward)    None        Hold Oral hypoglycemics while patient is in the hospital.

## 2022-10-23 NOTE — ASSESSMENT & PLAN NOTE
Patient with acute kidney injury likely due to IVVD/dehydration and acute tubular necrosis MAGDI is currently improving. Labs reviewed- Renal function/electrolytes with Estimated Creatinine Clearance: 136.2 mL/min (based on SCr of 1 mg/dL). according to latest data. Monitor urine output and serial BMP and adjust therapy as needed. Avoid nephrotoxins and renally dose meds for GFR listed above.     Lab Results   Component Value Date    CREATININE 1.0 10/23/2022    CREATININE 1.5 (H) 10/21/2022    CREATININE 1.2 10/21/2022

## 2022-10-23 NOTE — SUBJECTIVE & OBJECTIVE
Past Medical History:   Diagnosis Date    Diabetes mellitus, type 2     Hyperlipidemia     Hypertension     Obese     Peripheral neuropathy        Past Surgical History:   Procedure Laterality Date    APPENDECTOMY      HERNIA REPAIR      INCISION AND DRAINAGE FOOT Bilateral 2018    Procedure: INCISION AND DRAINAGE, FOOT;  Surgeon: Rob Alas DPM;  Location: [facility];  Service: Podiatry;  Laterality: Bilateral;    TOE AMPUTATION      right great toe    TOE AMPUTATION Left 2018    Procedure: AMPUTATION, TOE; left 3rd toe;  Surgeon: Rob Alas DPM;  Location: [facility];  Service: Podiatry;  Laterality: Left;       Review of patient's allergies indicates:  No Known Allergies    No current facility-administered medications on file prior to encounter.     Current Outpatient Medications on File Prior to Encounter   Medication Sig    amLODIPine (NORVASC) 5 MG tablet Take 5 mg by mouth.    aspirin (ECOTRIN) 81 MG EC tablet Take 81 mg by mouth once daily.    atorvastatin (LIPITOR) 40 MG tablet Take 40 mg by mouth.    cholecalciferol, vitamin D3, (VITAMIN D3) 25 mcg (1,000 unit) capsule 1 capsule    DULoxetine (CYMBALTA) 30 MG capsule Take 30 mg by mouth once daily.    insulin aspart U-100 (NOVOLOG) 100 unit/mL (3 mL) InPn pen 30 Units 2 (two) times daily.    JARDIANCE 25 mg tablet Take 25 mg by mouth once daily.    LANTUS SOLOSTAR U-100 INSULIN glargine 100 units/mL SubQ pen SMARTSI Unit(s) SUB-Q Twice Daily    losartan (COZAAR) 25 MG tablet 1 tablet    metFORMIN (GLUCOPHAGE) 1000 MG tablet Take 1,000 mg by mouth 2 (two) times daily.    TRULICITY 4.5 mg/0.5 mL pen injector Inject into the skin.     Family History    None       Tobacco Use    Smoking status: Never    Smokeless tobacco: Never   Substance and Sexual Activity    Alcohol use: No    Drug use: No    Sexual activity: Yes     Partners: Female     Comment:      Review of Systems   Constitutional:  Positive for fever. Negative for  chills.   HENT:  Negative for rhinorrhea, sneezing and sore throat.    Eyes:  Negative for pain and visual disturbance.   Respiratory:  Negative for cough and shortness of breath.    Cardiovascular:  Negative for chest pain.   Gastrointestinal:  Negative for abdominal pain, constipation and diarrhea.   Endocrine: Negative for cold intolerance and heat intolerance.   Genitourinary:  Positive for flank pain. Negative for difficulty urinating.   Musculoskeletal:  Negative for arthralgias and joint swelling.   Skin:  Negative for rash.   Allergic/Immunologic: Negative for environmental allergies.   Neurological:  Positive for dizziness. Negative for syncope and weakness.   Hematological:  Does not bruise/bleed easily.   Psychiatric/Behavioral:  Negative for dysphoric mood. The patient is not nervous/anxious.    Objective:     Vital Signs (Most Recent):  Temp: 99 °F (37.2 °C) (10/23/22 0755)  Pulse: 94 (10/23/22 0830)  Resp: 20 (10/23/22 0830)  BP: 133/63 (10/23/22 0755)  SpO2: 98 % (10/23/22 0830)   Vital Signs (24h Range):  Temp:  [97.8 °F (36.6 °C)-102.1 °F (38.9 °C)] 99 °F (37.2 °C)  Pulse:  [] 94  Resp:  [20] 20  SpO2:  [93 %-98 %] 98 %  BP: (132-146)/(63-92) 133/63     Weight: (!) 168.7 kg (372 lb)  Body mass index is 49.08 kg/m².    Physical Exam  Vitals and nursing note reviewed.   Constitutional:       Appearance: Normal appearance. He is obese.   HENT:      Head: Normocephalic and atraumatic.      Nose: Nose normal.   Eyes:      Extraocular Movements: Extraocular movements intact.      Pupils: Pupils are equal, round, and reactive to light.   Cardiovascular:      Rate and Rhythm: Normal rate and regular rhythm.      Heart sounds: No murmur heard.    No friction rub. No gallop.   Pulmonary:      Effort: Pulmonary effort is normal.      Breath sounds: Normal breath sounds.   Abdominal:      General: Abdomen is flat. Bowel sounds are normal.      Palpations: Abdomen is soft.   Musculoskeletal:          General: No swelling or deformity.      Cervical back: Normal range of motion and neck supple.   Skin:     General: Skin is warm and dry.      Capillary Refill: Capillary refill takes less than 2 seconds.      Findings: Lesion present.      Comments: Left index finger cellulitis  Right great toe amputation  Right diabetic foot ulceration   Neurological:      General: No focal deficit present.      Mental Status: He is alert and oriented to person, place, and time.   Psychiatric:         Mood and Affect: Mood normal.         Behavior: Behavior normal.         CRANIAL NERVES     CN III, IV, VI   Pupils are equal, round, and reactive to light.     Significant Labs: All pertinent labs within the past 24 hours have been reviewed.    Significant Imaging: I have reviewed all pertinent imaging results/findings within the past 24 hours.

## 2022-10-23 NOTE — ASSESSMENT & PLAN NOTE
Continue to monitor with volume resuscitation with sodium chloride.    Patient has hyponatremia.  Last electrolytes reviewed-   Recent Labs   Lab 10/21/22  2352 10/23/22  0530   * 133*

## 2022-10-23 NOTE — PROGRESS NOTES
[facility]  Progress Note    Patient Name: Andrew Del Cid Jr.  MRN: [id number]  Patient Class: IP- Inpatient   Admission Date: 10/21/2022  Length of Stay: 1 days  Attending Physician: Sukhwinder Martinez Jr., MD  Primary Care Provider: Olena Thornton DO        Subjective:     Principal Problem:Hyponatremia        HPI:  Chief Complaint   Patient presents with    Weakness       Pt stated that he was experiencing right lower back pain yesterday - stayed in bed / sleeping most of the day and was only able to eat 1/2 sandwich. Hx DM. Presents to ED today with complaints of dizziness / lethargy, concerned blood sugar was low. CBG at triage 237mg/dl.       57-year-old male with a history of chronic back pain, diabetes mellitus, hypertension, obesity off the complaining lower back pain bilaterally that became worse 2 days ago, try tests sleep all day yesterday and rest which improved the pain.  Denies any fever.  No dysuria.  History of this multiple times in the past.  Eating of mild dizziness when he walks as well.  Denies any blood in stool.  No nausea vomiting.  Afebrile.  Not ill appearing, alert oriented x4, GCS is 15.  Dizziness is worse when he wakes up, better with holding head still.  Denies any shortness of breath or chest pain         Overview/Hospital Course:  10/23/2022 WAC:  Blood cultures positive for Staph aureus.  Will pursue transthoracic echocardiogram to rule out endocarditis.  Podiatry consult for foot ulceration.      Past Medical History:   Diagnosis Date    Diabetes mellitus, type 2     Hyperlipidemia     Hypertension     Obese     Peripheral neuropathy        Past Surgical History:   Procedure Laterality Date    APPENDECTOMY      HERNIA REPAIR      INCISION AND DRAINAGE FOOT Bilateral 12/26/2018    Procedure: INCISION AND DRAINAGE, FOOT;  Surgeon: Rob Alas DPM;  Location: [facility];  Service: Podiatry;  Laterality: Bilateral;    TOE AMPUTATION       right great toe    TOE AMPUTATION Left 2018    Procedure: AMPUTATION, TOE; left 3rd toe;  Surgeon: Rob Alas DPM;  Location: [facility];  Service: Podiatry;  Laterality: Left;       Review of patient's allergies indicates:  No Known Allergies    No current facility-administered medications on file prior to encounter.     Current Outpatient Medications on File Prior to Encounter   Medication Sig    amLODIPine (NORVASC) 5 MG tablet Take 5 mg by mouth.    aspirin (ECOTRIN) 81 MG EC tablet Take 81 mg by mouth once daily.    atorvastatin (LIPITOR) 40 MG tablet Take 40 mg by mouth.    cholecalciferol, vitamin D3, (VITAMIN D3) 25 mcg (1,000 unit) capsule 1 capsule    DULoxetine (CYMBALTA) 30 MG capsule Take 30 mg by mouth once daily.    insulin aspart U-100 (NOVOLOG) 100 unit/mL (3 mL) InPn pen 30 Units 2 (two) times daily.    JARDIANCE 25 mg tablet Take 25 mg by mouth once daily.    LANTUS SOLOSTAR U-100 INSULIN glargine 100 units/mL SubQ pen SMARTSI Unit(s) SUB-Q Twice Daily    losartan (COZAAR) 25 MG tablet 1 tablet    metFORMIN (GLUCOPHAGE) 1000 MG tablet Take 1,000 mg by mouth 2 (two) times daily.    TRULICITY 4.5 mg/0.5 mL pen injector Inject into the skin.     Family History    None       Tobacco Use    Smoking status: Never    Smokeless tobacco: Never   Substance and Sexual Activity    Alcohol use: No    Drug use: No    Sexual activity: Yes     Partners: Female     Comment:      Review of Systems   Constitutional:  Positive for fever. Negative for chills.   HENT:  Negative for rhinorrhea, sneezing and sore throat.    Eyes:  Negative for pain and visual disturbance.   Respiratory:  Negative for cough and shortness of breath.    Cardiovascular:  Negative for chest pain.   Gastrointestinal:  Negative for abdominal pain, constipation and diarrhea.   Endocrine: Negative for cold intolerance and heat intolerance.   Genitourinary:  Positive for flank pain. Negative for difficulty  urinating.   Musculoskeletal:  Negative for arthralgias and joint swelling.   Skin:  Negative for rash.   Allergic/Immunologic: Negative for environmental allergies.   Neurological:  Positive for dizziness. Negative for syncope and weakness.   Hematological:  Does not bruise/bleed easily.   Psychiatric/Behavioral:  Negative for dysphoric mood. The patient is not nervous/anxious.    Objective:     Vital Signs (Most Recent):  Temp: 99 °F (37.2 °C) (10/23/22 0755)  Pulse: 94 (10/23/22 0830)  Resp: 20 (10/23/22 0830)  BP: 133/63 (10/23/22 0755)  SpO2: 98 % (10/23/22 0830)   Vital Signs (24h Range):  Temp:  [97.8 °F (36.6 °C)-102.1 °F (38.9 °C)] 99 °F (37.2 °C)  Pulse:  [] 94  Resp:  [20] 20  SpO2:  [93 %-98 %] 98 %  BP: (132-146)/(63-92) 133/63     Weight: (!) 168.7 kg (372 lb)  Body mass index is 49.08 kg/m².    Physical Exam  Vitals and nursing note reviewed.   Constitutional:       Appearance: Normal appearance. He is obese.   HENT:      Head: Normocephalic and atraumatic.      Nose: Nose normal.   Eyes:      Extraocular Movements: Extraocular movements intact.      Pupils: Pupils are equal, round, and reactive to light.   Cardiovascular:      Rate and Rhythm: Normal rate and regular rhythm.      Heart sounds: No murmur heard.    No friction rub. No gallop.   Pulmonary:      Effort: Pulmonary effort is normal.      Breath sounds: Normal breath sounds.   Abdominal:      General: Abdomen is flat. Bowel sounds are normal.      Palpations: Abdomen is soft.   Musculoskeletal:         General: No swelling or deformity.      Cervical back: Normal range of motion and neck supple.   Skin:     General: Skin is warm and dry.      Capillary Refill: Capillary refill takes less than 2 seconds.      Findings: Lesion present.      Comments: Left index finger cellulitis  Right great toe amputation  Right diabetic foot ulceration   Neurological:      General: No focal deficit present.      Mental Status: He is alert and oriented  to person, place, and time.   Psychiatric:         Mood and Affect: Mood normal.         Behavior: Behavior normal.         CRANIAL NERVES     CN III, IV, VI   Pupils are equal, round, and reactive to light.     Significant Labs: All pertinent labs within the past 24 hours have been reviewed.    Significant Imaging: I have reviewed all pertinent imaging results/findings within the past 24 hours.      Assessment/Plan:      * Hyponatremia  Continue to monitor with volume resuscitation with sodium chloride.    Patient has hyponatremia.  Last electrolytes reviewed-   Recent Labs   Lab 10/21/22  2352 10/23/22  0530   * 133*           Diabetic foot ulcer  Continue current antimicrobial therapy.  Will consult podiatry for further assistance.    Bacteremia  Secondary to MRSA.  Pursue transthoracic echocardiography.  Will trend ESR.  Currently on vancomycin for therapy.      Acute renal failure with tubular necrosis  Patient with acute kidney injury likely due to IVVD/dehydration and acute tubular necrosis MAGDI is currently improving. Labs reviewed- Renal function/electrolytes with Estimated Creatinine Clearance: 136.2 mL/min (based on SCr of 1 mg/dL). according to latest data. Monitor urine output and serial BMP and adjust therapy as needed. Avoid nephrotoxins and renally dose meds for GFR listed above.     Lab Results   Component Value Date    CREATININE 1.0 10/23/2022    CREATININE 1.5 (H) 10/21/2022    CREATININE 1.2 10/21/2022          Fever  Unclear etiology at this time.  Continue prophylactic antimicrobial coverage.  Blood cultures negative thus far.  Respiratory panel PCR pending.    10/23/22:  Blood cultures positive for MRSA.       Type 2 diabetes mellitus without complication, with long-term current use of insulin  Last A1c reviewed-   Lab Results   Component Value Date    HGBA1C 9.2 (H) 10/21/2022     Most recent fingerstick glucose reviewed-   Recent Labs   Lab 10/22/22  1925 10/23/22  0615   POCTGLUCOSE  357* 253*     Current correctional scale:  Low  Anti-hyperglycemic dose as follows-   Antihyperglycemics (From admission, onward)    None        Hold Oral hypoglycemics while patient is in the hospital.      Hyperlipidemia  Continue home medical therapy.      Essential hypertension  Will reinstitute home medical therapy as needed.    BP Readings from Last 3 Encounters:   10/23/22 133/63   10/21/22 127/73   10/04/22 132/77           Morbid obesity with BMI of 50.0-59.9, adult  Body mass index is 49.08 kg/m². Morbid obesity complicates all aspects of disease management from diagnostic modalities to treatment. Weight loss encouraged and health benefits explained to patient.           VTE Risk Mitigation (From admission, onward)         Ordered     IP VTE HIGH RISK PATIENT  Once         10/22/22 0210     Place sequential compression device  Until discontinued         10/22/22 0210                Discharge Planning   MICHAEL:      Code Status: Full Code   Is the patient medically ready for discharge?:     Reason for patient still in hospital (select all that apply): Treatment  Discharge Plan A: Home with family                  Sukhwinder Martinez Jr, MD  Department of Hospital Medicine   [facility]

## 2022-10-23 NOTE — ASSESSMENT & PLAN NOTE
Will reinstitute home medical therapy as needed.    BP Readings from Last 3 Encounters:   10/23/22 133/63   10/21/22 127/73   10/04/22 132/77

## 2022-10-23 NOTE — PLAN OF CARE
Patient found on floor by dietary at 1645. 2 staff nurses and 1 CNA responded to the patient. Patient states he fell to his knees and caught himself, he was helped back to the bed and assessed. Bruising and skin tear to left hand, and small scratch to left abdomen. Dr. Martinez was notified. Tele sitter placed in the room.

## 2022-10-23 NOTE — PLAN OF CARE
Problem: Adult Inpatient Plan of Care  Goal: Readiness for Transition of Care  Outcome: Ongoing, Not Progressing     Problem: Infection  Goal: Absence of Infection Signs and Symptoms  Outcome: Ongoing, Not Progressing         Problem: Adult Inpatient Plan of Care  Goal: Plan of Care Review  Outcome: Ongoing, Progressing  Goal: Patient-Specific Goal (Individualized)  Outcome: Ongoing, Progressing  Goal: Absence of Hospital-Acquired Illness or Injury  Outcome: Ongoing, Progressing  Goal: Optimal Comfort and Wellbeing  Outcome: Ongoing, Progressing     Problem: Bariatric Environmental Safety  Goal: Safety Maintained with Care  Outcome: Ongoing, Progressing     Problem: Diabetes Comorbidity  Goal: Blood Glucose Level Within Targeted Range  Outcome: Ongoing, Progressing     Problem: Impaired Wound Healing  Goal: Optimal Wound Healing  Outcome: Ongoing, Progressing     Problem: Fall Injury Risk  Goal: Absence of Fall and Fall-Related Injury  Outcome: Ongoing, Progressing

## 2022-10-24 ENCOUNTER — CLINICAL SUPPORT (OUTPATIENT)
Dept: CARDIOLOGY | Facility: HOSPITAL | Age: 57
DRG: 638 | End: 2022-10-24
Attending: INTERNAL MEDICINE
Payer: MEDICAID

## 2022-10-24 VITALS
WEIGHT: 315 LBS | DIASTOLIC BLOOD PRESSURE: 67 MMHG | BODY MASS INDEX: 41.75 KG/M2 | HEIGHT: 73 IN | SYSTOLIC BLOOD PRESSURE: 143 MMHG

## 2022-10-24 LAB
ALBUMIN SERPL BCP-MCNC: 2.2 G/DL (ref 3.5–5.2)
ALP SERPL-CCNC: 85 U/L (ref 55–135)
ALT SERPL W/O P-5'-P-CCNC: 34 U/L (ref 10–44)
ANION GAP SERPL CALC-SCNC: 9 MMOL/L (ref 8–16)
AST SERPL-CCNC: 40 U/L (ref 10–40)
BACTERIA BLD CULT: ABNORMAL
BASOPHILS # BLD AUTO: 0.01 K/UL (ref 0–0.2)
BASOPHILS NFR BLD: 0.2 % (ref 0–1.9)
BILIRUB SERPL-MCNC: 0.8 MG/DL (ref 0.1–1)
BUN SERPL-MCNC: 15 MG/DL (ref 6–20)
CALCIUM SERPL-MCNC: 8.1 MG/DL (ref 8.7–10.5)
CHLORIDE SERPL-SCNC: 100 MMOL/L (ref 95–110)
CO2 SERPL-SCNC: 24 MMOL/L (ref 23–29)
CREAT SERPL-MCNC: 1.1 MG/DL (ref 0.5–1.4)
DIFFERENTIAL METHOD: ABNORMAL
EOSINOPHIL # BLD AUTO: 0 K/UL (ref 0–0.5)
EOSINOPHIL NFR BLD: 0.5 % (ref 0–8)
ERYTHROCYTE [DISTWIDTH] IN BLOOD BY AUTOMATED COUNT: 12.8 % (ref 11.5–14.5)
ERYTHROCYTE [SEDIMENTATION RATE] IN BLOOD: 54 MM/HR (ref 0–10)
EST. GFR  (NO RACE VARIABLE): >60 ML/MIN/1.73 M^2
GLUCOSE SERPL-MCNC: 270 MG/DL (ref 70–110)
GLUCOSE SERPL-MCNC: 342 MG/DL (ref 70–110)
HCT VFR BLD AUTO: 34.8 % (ref 40–54)
HGB BLD-MCNC: 11.6 G/DL (ref 14–18)
IMM GRANULOCYTES # BLD AUTO: 0.02 K/UL (ref 0–0.04)
IMM GRANULOCYTES NFR BLD AUTO: 0.5 % (ref 0–0.5)
LYMPHOCYTES # BLD AUTO: 1 K/UL (ref 1–4.8)
LYMPHOCYTES NFR BLD: 24.2 % (ref 18–48)
MAGNESIUM SERPL-MCNC: 2.1 MG/DL (ref 1.6–2.6)
MCH RBC QN AUTO: 27.8 PG (ref 27–31)
MCHC RBC AUTO-ENTMCNC: 33.3 G/DL (ref 32–36)
MCV RBC AUTO: 84 FL (ref 82–98)
MONOCYTES # BLD AUTO: 0.4 K/UL (ref 0.3–1)
MONOCYTES NFR BLD: 9.6 % (ref 4–15)
NEUTROPHILS # BLD AUTO: 2.6 K/UL (ref 1.8–7.7)
NEUTROPHILS NFR BLD: 65 % (ref 38–73)
NRBC BLD-RTO: 0 /100 WBC
PLATELET # BLD AUTO: 170 K/UL (ref 150–450)
PMV BLD AUTO: 10.6 FL (ref 9.2–12.9)
POCT GLUCOSE: 249 MG/DL (ref 70–110)
POCT GLUCOSE: 335 MG/DL (ref 70–110)
POCT GLUCOSE: 352 MG/DL (ref 70–110)
POCT GLUCOSE: 388 MG/DL (ref 70–110)
POCT GLUCOSE: >500 MG/DL (ref 70–110)
POTASSIUM SERPL-SCNC: 3.8 MMOL/L (ref 3.5–5.1)
PROT SERPL-MCNC: 6.6 G/DL (ref 6–8.4)
RBC # BLD AUTO: 4.17 M/UL (ref 4.6–6.2)
SODIUM SERPL-SCNC: 133 MMOL/L (ref 136–145)
VANCOMYCIN TROUGH SERPL-MCNC: 12.1 UG/ML (ref 10–22)
WBC # BLD AUTO: 4.05 K/UL (ref 3.9–12.7)

## 2022-10-24 PROCEDURE — 99900035 HC TECH TIME PER 15 MIN (STAT)

## 2022-10-24 PROCEDURE — 36415 COLL VENOUS BLD VENIPUNCTURE: CPT | Performed by: STUDENT IN AN ORGANIZED HEALTH CARE EDUCATION/TRAINING PROGRAM

## 2022-10-24 PROCEDURE — 85651 RBC SED RATE NONAUTOMATED: CPT | Performed by: INTERNAL MEDICINE

## 2022-10-24 PROCEDURE — 99233 PR SUBSEQUENT HOSPITAL CARE,LEVL III: ICD-10-PCS | Mod: ,,, | Performed by: STUDENT IN AN ORGANIZED HEALTH CARE EDUCATION/TRAINING PROGRAM

## 2022-10-24 PROCEDURE — 99900031 HC PATIENT EDUCATION (STAT)

## 2022-10-24 PROCEDURE — 80202 ASSAY OF VANCOMYCIN: CPT | Performed by: INTERNAL MEDICINE

## 2022-10-24 PROCEDURE — 99233 SBSQ HOSP IP/OBS HIGH 50: CPT | Mod: ,,, | Performed by: STUDENT IN AN ORGANIZED HEALTH CARE EDUCATION/TRAINING PROGRAM

## 2022-10-24 PROCEDURE — 25000003 PHARM REV CODE 250: Performed by: INTERNAL MEDICINE

## 2022-10-24 PROCEDURE — 63600175 PHARM REV CODE 636 W HCPCS: Performed by: INTERNAL MEDICINE

## 2022-10-24 PROCEDURE — 82947 ASSAY GLUCOSE BLOOD QUANT: CPT | Performed by: STUDENT IN AN ORGANIZED HEALTH CARE EDUCATION/TRAINING PROGRAM

## 2022-10-24 PROCEDURE — 11000001 HC ACUTE MED/SURG PRIVATE ROOM

## 2022-10-24 PROCEDURE — 87040 BLOOD CULTURE FOR BACTERIA: CPT | Performed by: INTERNAL MEDICINE

## 2022-10-24 PROCEDURE — 93306 TTE W/DOPPLER COMPLETE: CPT

## 2022-10-24 PROCEDURE — 27000221 HC OXYGEN, UP TO 24 HOURS

## 2022-10-24 PROCEDURE — 83735 ASSAY OF MAGNESIUM: CPT | Performed by: INTERNAL MEDICINE

## 2022-10-24 PROCEDURE — 94799 UNLISTED PULMONARY SVC/PX: CPT

## 2022-10-24 PROCEDURE — 63600175 PHARM REV CODE 636 W HCPCS: Performed by: STUDENT IN AN ORGANIZED HEALTH CARE EDUCATION/TRAINING PROGRAM

## 2022-10-24 PROCEDURE — 94761 N-INVAS EAR/PLS OXIMETRY MLT: CPT

## 2022-10-24 PROCEDURE — 80053 COMPREHEN METABOLIC PANEL: CPT | Performed by: INTERNAL MEDICINE

## 2022-10-24 PROCEDURE — 85025 COMPLETE CBC W/AUTO DIFF WBC: CPT | Performed by: INTERNAL MEDICINE

## 2022-10-24 PROCEDURE — 25000003 PHARM REV CODE 250: Performed by: STUDENT IN AN ORGANIZED HEALTH CARE EDUCATION/TRAINING PROGRAM

## 2022-10-24 RX ORDER — L. ACIDOPHILUS/L.BULGARICUS 100MM CELL
1 GRANULES IN PACKET (EA) ORAL 2 TIMES DAILY
Status: DISCONTINUED | OUTPATIENT
Start: 2022-10-24 | End: 2022-10-25 | Stop reason: HOSPADM

## 2022-10-24 RX ORDER — CEFAZOLIN SODIUM 2 G/50ML
2 SOLUTION INTRAVENOUS
Status: DISCONTINUED | OUTPATIENT
Start: 2022-10-24 | End: 2022-10-25 | Stop reason: HOSPADM

## 2022-10-24 RX ORDER — LACTULOSE 10 G/15ML
20 SOLUTION ORAL EVERY 6 HOURS PRN
Status: DISCONTINUED | OUTPATIENT
Start: 2022-10-24 | End: 2022-10-25 | Stop reason: HOSPADM

## 2022-10-24 RX ORDER — METOCLOPRAMIDE 10 MG/1
10 TABLET ORAL
Status: DISCONTINUED | OUTPATIENT
Start: 2022-10-24 | End: 2022-10-25 | Stop reason: HOSPADM

## 2022-10-24 RX ORDER — SODIUM CHLORIDE 9 MG/ML
INJECTION, SOLUTION INTRAVENOUS CONTINUOUS
Status: ACTIVE | OUTPATIENT
Start: 2022-10-24 | End: 2022-10-25

## 2022-10-24 RX ORDER — VANCOMYCIN 1.75 GRAM/500 ML IN 0.9 % SODIUM CHLORIDE INTRAVENOUS
1750
Status: DISCONTINUED | OUTPATIENT
Start: 2022-10-24 | End: 2022-10-24

## 2022-10-24 RX ADMIN — LACTOBACILLUS ACIDOPHILUS / LACTOBACILLUS BULGARICUS 1 EACH: 100 MILLION CFU STRENGTH GRANULES at 08:10

## 2022-10-24 RX ADMIN — VANCOMYCIN HYDROCHLORIDE 1500 MG: 1.5 INJECTION, POWDER, LYOPHILIZED, FOR SOLUTION INTRAVENOUS at 07:10

## 2022-10-24 RX ADMIN — MUPIROCIN: 20 OINTMENT TOPICAL at 08:10

## 2022-10-24 RX ADMIN — METOCLOPRAMIDE 10 MG: 10 TABLET ORAL at 04:10

## 2022-10-24 RX ADMIN — ATORVASTATIN CALCIUM 40 MG: 40 TABLET, FILM COATED ORAL at 08:10

## 2022-10-24 RX ADMIN — METOCLOPRAMIDE 10 MG: 10 TABLET ORAL at 07:10

## 2022-10-24 RX ADMIN — INSULIN ASPART 3 UNITS: 100 INJECTION, SOLUTION INTRAVENOUS; SUBCUTANEOUS at 09:10

## 2022-10-24 RX ADMIN — SODIUM CHLORIDE: 0.9 INJECTION, SOLUTION INTRAVENOUS at 08:10

## 2022-10-24 RX ADMIN — INSULIN ASPART 5 UNITS: 100 INJECTION, SOLUTION INTRAVENOUS; SUBCUTANEOUS at 05:10

## 2022-10-24 RX ADMIN — MUPIROCIN: 20 OINTMENT TOPICAL at 09:10

## 2022-10-24 RX ADMIN — CEFAZOLIN SODIUM 2 G: 2 SOLUTION INTRAVENOUS at 05:10

## 2022-10-24 RX ADMIN — CEFAZOLIN SODIUM 2 G: 2 SOLUTION INTRAVENOUS at 10:10

## 2022-10-24 RX ADMIN — ASPIRIN 81 MG: 81 TABLET, COATED ORAL at 08:10

## 2022-10-24 RX ADMIN — INSULIN ASPART 2 UNITS: 100 INJECTION, SOLUTION INTRAVENOUS; SUBCUTANEOUS at 06:10

## 2022-10-24 RX ADMIN — LACTOBACILLUS ACIDOPHILUS / LACTOBACILLUS BULGARICUS 1 EACH: 100 MILLION CFU STRENGTH GRANULES at 09:10

## 2022-10-24 RX ADMIN — INSULIN DETEMIR 30 UNITS: 100 INJECTION, SOLUTION SUBCUTANEOUS at 09:10

## 2022-10-24 RX ADMIN — INSULIN DETEMIR 25 UNITS: 100 INJECTION, SOLUTION SUBCUTANEOUS at 08:10

## 2022-10-24 RX ADMIN — INSULIN ASPART 4 UNITS: 100 INJECTION, SOLUTION INTRAVENOUS; SUBCUTANEOUS at 01:10

## 2022-10-24 RX ADMIN — PIPERACILLIN AND TAZOBACTAM 4.5 G: 4; .5 INJECTION, POWDER, LYOPHILIZED, FOR SOLUTION INTRAVENOUS; PARENTERAL at 01:10

## 2022-10-24 RX ADMIN — LACTULOSE 20 G: 20 SOLUTION ORAL at 02:10

## 2022-10-24 RX ADMIN — METOCLOPRAMIDE 10 MG: 10 TABLET ORAL at 10:10

## 2022-10-24 NOTE — ASSESSMENT & PLAN NOTE
10/24: MSSA bacteremia secondary to soft tissue infection from right foot wound, no leukocytosis, follow up blood cultures, follow up echocardiogram.  - Will trend ESR  - discontinue vancomycin and start ancef 2g Q8H  - f/u echocardiogram

## 2022-10-24 NOTE — SUBJECTIVE & OBJECTIVE
Interval History: Patient seen and examined.     Review of Systems   Constitutional:  Positive for fatigue. Negative for activity change, appetite change, chills and fever.   HENT:  Negative for congestion, sinus pain and tinnitus.    Cardiovascular:  Negative for chest pain and palpitations.   Gastrointestinal:  Positive for constipation. Negative for abdominal distention, abdominal pain, blood in stool, diarrhea, nausea, rectal pain and vomiting.   Genitourinary:  Negative for dysuria and enuresis.   Musculoskeletal:  Positive for gait problem (baseline neuropathy and hx toe ampation). Negative for arthralgias and joint swelling.   Skin:  Negative for color change.   Neurological:  Negative for tremors, syncope, speech difficulty and weakness.   Psychiatric/Behavioral:  Negative for agitation, behavioral problems and confusion. The patient is not nervous/anxious.    Objective:     Vital Signs (Most Recent):  Temp: 98.2 °F (36.8 °C) (10/24/22 0723)  Pulse: 86 (10/24/22 0735)  Resp: (!) 22 (10/24/22 0723)  BP: (!) 143/67 (10/24/22 0723)  SpO2: (!) 90 % (10/24/22 0723)   Vital Signs (24h Range):  Temp:  [97.9 °F (36.6 °C)-101 °F (38.3 °C)] 98.2 °F (36.8 °C)  Pulse:  [] 86  Resp:  [18-22] 22  SpO2:  [90 %-98 %] 90 %  BP: (123-153)/(67-87) 143/67     Weight: (!) 168.7 kg (372 lb)  Body mass index is 49.08 kg/m².    Intake/Output Summary (Last 24 hours) at 10/24/2022 0814  Last data filed at 10/24/2022 0500  Gross per 24 hour   Intake 5137.91 ml   Output 3600 ml   Net 1537.91 ml      Physical Exam  Constitutional:       General: He is not in acute distress.     Appearance: Normal appearance. He is obese. He is ill-appearing. He is not toxic-appearing.   HENT:      Head: Normocephalic.      Right Ear: External ear normal.      Left Ear: External ear normal.      Nose: Nose normal.      Mouth/Throat:      Mouth: Mucous membranes are dry.      Pharynx: Oropharynx is clear. No oropharyngeal exudate or posterior  oropharyngeal erythema.   Eyes:      Extraocular Movements: Extraocular movements intact.      Conjunctiva/sclera: Conjunctivae normal.   Cardiovascular:      Rate and Rhythm: Normal rate.      Pulses: Normal pulses.      Heart sounds: No murmur heard.  Pulmonary:      Effort: Pulmonary effort is normal.      Breath sounds: Normal breath sounds.   Abdominal:      General: There is no distension.      Palpations: Abdomen is soft.      Tenderness: There is no abdominal tenderness. There is no right CVA tenderness, left CVA tenderness or guarding.   Musculoskeletal:         General: No swelling. Normal range of motion.      Cervical back: Normal range of motion and neck supple. No rigidity or tenderness.      Right lower leg: No edema.      Left lower leg: No edema.   Lymphadenopathy:      Cervical: No cervical adenopathy.   Skin:     General: Skin is warm and dry.      Capillary Refill: Capillary refill takes less than 2 seconds.      Comments: Right foot dressing clean and intact   Neurological:      General: No focal deficit present.      Mental Status: He is alert and oriented to person, place, and time. Mental status is at baseline.      Motor: No weakness.   Psychiatric:         Mood and Affect: Mood normal.         Behavior: Behavior normal.         Thought Content: Thought content normal.       Significant Labs: All pertinent labs within the past 24 hours have been reviewed.  A1C:   Recent Labs   Lab 10/21/22  0213   HGBA1C 9.2*     Blood Culture: No results for input(s): LABBLOO in the last 48 hours.  BMP:   Recent Labs   Lab 10/24/22  0550   *   *   K 3.8      CO2 24   BUN 15   CREATININE 1.1   CALCIUM 8.1*   MG 2.1     CBC:   Recent Labs   Lab 10/23/22  0530 10/24/22  0550   WBC 3.73* 4.05   HGB 12.3* 11.6*   HCT 36.6* 34.8*    170     CMP:   Recent Labs   Lab 10/23/22  0530 10/24/22  0550   * 133*   K 3.5 3.8    100   CO2 22* 24   * 270*   BUN 19 15   CREATININE  1.0 1.1   CALCIUM 7.8* 8.1*   PROT 6.4 6.6   ALBUMIN 2.2* 2.2*   BILITOT 0.9 0.8   ALKPHOS 80 85   AST 48* 40   ALT 35 34   ANIONGAP 9 9     CT Abdomen Pelvis  Without Contrast  Narrative: EXAMINATION:  CT ABDOMEN PELVIS WITHOUT CONTRAST    CLINICAL HISTORY:  Flank pain, kidney stone suspected;    CT/Cardiac Nuclear exams in prior 12 months: 1    TECHNIQUE:  Axial CT abdomen and pelvis without IV contrast.  Iterative reconstruction utilized.  Coronal reformats prepared.    COMPARISON:  None    FINDINGS:  No hydronephrosis.  No urinary tract stones evident.  There is some residual contrast within the urinary collecting system.  Mild right perinephric fat stranding.    Unremarkable liver, adrenals, spleen and pancreas.  No dilated or thick-walled bowel.  No suspicious mass lesion or lymphadenopathy.  No free air or ascites.  No acute osseous abnormalities detected.  Suspect prior ventral hernia repair/periumbilical.    Some of the images are degraded secondary to motion artifact  Impression: Mild right perinephric fat stranding    No other potential acute abnormalities detected    Electronically signed by: Beth Zuleta MD  Date:    10/22/2022  Time:    15:22  X-Ray Chest AP Portable  Narrative: EXAMINATION:  XR CHEST AP PORTABLE    CLINICAL HISTORY:  SOB;    FINDINGS:  Shallow inspiration.  No acute infiltrates or signs of CHF.  Impression: No acute findings    Electronically signed by: Beth Zuleta MD  Date:    10/22/2022  Time:    09:39  CTA Chest Non-Coronary (PE Studies)  Narrative: EXAMINATION:  CTA CHEST NON CORONARY (PE STUDIES)    CLINICAL HISTORY:  Pulmonary embolism (PE) suspected, high prob;    CT/Cardiac Nuclear exams in prior 12 months: 0    TECHNIQUE:  Pulmonary CT angiogram with IV contrast.  Coronal MIP reconstructions prepared.  Iterative reconstruction utilized.    FINDINGS:  Some of the images are degraded secondary to motion artifact.  Suboptimal contrast opacification of the pulmonary  arterial system.  No large central embolus.  Lungs are clear accounting for artifact.  No pleural or pericardial fluid.  No pneumothorax.  Impression: No evidence of pulmonary emboli or other acute process    Exam limitations secondary to motion artifact and lobe suboptimal opacification of the pulmonary arterial system    Electronically signed by: Beth Zuleta MD  Date:    10/22/2022  Time:    08:45   Significant Imaging: I have reviewed all pertinent imaging results/findings within the past 24 hours.

## 2022-10-24 NOTE — NURSING
Wound care consult received.  Patient is a current patient of Dr. Baldemar Esquivel.  Notified podiatry of consult.  MD will come by today to see patient.

## 2022-10-24 NOTE — PROGRESS NOTES
[facility] Medicine  Progress Note    Patient Name: Andrew Del Cid Jr.  MRN: [id number]  Patient Class: IP- Inpatient   Admission Date: 10/21/2022  Length of Stay: 2 days  Attending Physician: Olena Thornton DO  Primary Care Provider: Olena Thornton DO      Subjective:     Principal Problem:Bacteremia    HPI:  Chief Complaint   Patient presents with    Weakness       Pt stated that he was experiencing right lower back pain yesterday - stayed in bed / sleeping most of the day and was only able to eat 1/2 sandwich. Hx DM. Presents to ED today with complaints of dizziness / lethargy, concerned blood sugar was low. CBG at triage 237mg/dl.       57-year-old male with a history of chronic back pain, diabetes mellitus, hypertension, obesity off the complaining lower back pain bilaterally that became worse 2 days ago, try tests sleep all day yesterday and rest which improved the pain.  Denies any fever.  No dysuria.  History of this multiple times in the past.  Eating of mild dizziness when he walks as well.  Denies any blood in stool.  No nausea vomiting.  Afebrile.  Not ill appearing, alert oriented x4, GCS is 15.  Dizziness is worse when he wakes up, better with holding head still.  Denies any shortness of breath or chest pain         Overview/Hospital Course:  10/23/2022 WAC:  Blood cultures positive for Staph aureus.  Will pursue transthoracic echocardiogram to rule out endocarditis.  Podiatry consult for foot ulceration.  10/24 MSSA bacteremia, will change abx to ancef Q8H. F/u surveillance blood cx and echocardiogram. Afebrile overnight.       Interval History: Patient seen and examined.     Review of Systems   Constitutional:  Positive for fatigue. Negative for activity change, appetite change, chills and fever.   HENT:  Negative for congestion, sinus pain and tinnitus.    Cardiovascular:  Negative for chest pain and palpitations.   Gastrointestinal:  Positive for constipation.  Negative for abdominal distention, abdominal pain, blood in stool, diarrhea, nausea, rectal pain and vomiting.   Genitourinary:  Negative for dysuria and enuresis.   Musculoskeletal:  Positive for gait problem (baseline neuropathy and hx toe ampation). Negative for arthralgias and joint swelling.   Skin:  Negative for color change.   Neurological:  Negative for tremors, syncope, speech difficulty and weakness.   Psychiatric/Behavioral:  Negative for agitation, behavioral problems and confusion. The patient is not nervous/anxious.    Objective:     Vital Signs (Most Recent):  Temp: 98.2 °F (36.8 °C) (10/24/22 0723)  Pulse: 86 (10/24/22 0735)  Resp: (!) 22 (10/24/22 0723)  BP: (!) 143/67 (10/24/22 0723)  SpO2: (!) 90 % (10/24/22 0723)   Vital Signs (24h Range):  Temp:  [97.9 °F (36.6 °C)-101 °F (38.3 °C)] 98.2 °F (36.8 °C)  Pulse:  [] 86  Resp:  [18-22] 22  SpO2:  [90 %-98 %] 90 %  BP: (123-153)/(67-87) 143/67     Weight: (!) 168.7 kg (372 lb)  Body mass index is 49.08 kg/m².    Intake/Output Summary (Last 24 hours) at 10/24/2022 0814  Last data filed at 10/24/2022 0500  Gross per 24 hour   Intake 5137.91 ml   Output 3600 ml   Net 1537.91 ml      Physical Exam  Constitutional:       General: He is not in acute distress.     Appearance: Normal appearance. He is obese. He is ill-appearing. He is not toxic-appearing.   HENT:      Head: Normocephalic.      Right Ear: External ear normal.      Left Ear: External ear normal.      Nose: Nose normal.      Mouth/Throat:      Mouth: Mucous membranes are dry.      Pharynx: Oropharynx is clear. No oropharyngeal exudate or posterior oropharyngeal erythema.   Eyes:      Extraocular Movements: Extraocular movements intact.      Conjunctiva/sclera: Conjunctivae normal.   Cardiovascular:      Rate and Rhythm: Normal rate.      Pulses: Normal pulses.      Heart sounds: No murmur heard.  Pulmonary:      Effort: Pulmonary effort is normal.      Breath sounds: Normal breath sounds.    Abdominal:      General: There is no distension.      Palpations: Abdomen is soft.      Tenderness: There is no abdominal tenderness. There is no right CVA tenderness, left CVA tenderness or guarding.   Musculoskeletal:         General: No swelling. Normal range of motion.      Cervical back: Normal range of motion and neck supple. No rigidity or tenderness.      Right lower leg: No edema.      Left lower leg: No edema.   Lymphadenopathy:      Cervical: No cervical adenopathy.   Skin:     General: Skin is warm and dry.      Capillary Refill: Capillary refill takes less than 2 seconds.      Comments: Right foot dressing clean and intact   Neurological:      General: No focal deficit present.      Mental Status: He is alert and oriented to person, place, and time. Mental status is at baseline.      Motor: No weakness.   Psychiatric:         Mood and Affect: Mood normal.         Behavior: Behavior normal.         Thought Content: Thought content normal.       Significant Labs: All pertinent labs within the past 24 hours have been reviewed.  A1C:   Recent Labs   Lab 10/21/22  0213   HGBA1C 9.2*     Blood Culture: No results for input(s): LABBLOO in the last 48 hours.  BMP:   Recent Labs   Lab 10/24/22  0550   *   *   K 3.8      CO2 24   BUN 15   CREATININE 1.1   CALCIUM 8.1*   MG 2.1     CBC:   Recent Labs   Lab 10/23/22  0530 10/24/22  0550   WBC 3.73* 4.05   HGB 12.3* 11.6*   HCT 36.6* 34.8*    170     CMP:   Recent Labs   Lab 10/23/22  0530 10/24/22  0550   * 133*   K 3.5 3.8    100   CO2 22* 24   * 270*   BUN 19 15   CREATININE 1.0 1.1   CALCIUM 7.8* 8.1*   PROT 6.4 6.6   ALBUMIN 2.2* 2.2*   BILITOT 0.9 0.8   ALKPHOS 80 85   AST 48* 40   ALT 35 34   ANIONGAP 9 9     CT Abdomen Pelvis  Without Contrast  Narrative: EXAMINATION:  CT ABDOMEN PELVIS WITHOUT CONTRAST    CLINICAL HISTORY:  Flank pain, kidney stone suspected;    CT/Cardiac Nuclear exams in prior 12 months:  1    TECHNIQUE:  Axial CT abdomen and pelvis without IV contrast.  Iterative reconstruction utilized.  Coronal reformats prepared.    COMPARISON:  None    FINDINGS:  No hydronephrosis.  No urinary tract stones evident.  There is some residual contrast within the urinary collecting system.  Mild right perinephric fat stranding.    Unremarkable liver, adrenals, spleen and pancreas.  No dilated or thick-walled bowel.  No suspicious mass lesion or lymphadenopathy.  No free air or ascites.  No acute osseous abnormalities detected.  Suspect prior ventral hernia repair/periumbilical.    Some of the images are degraded secondary to motion artifact  Impression: Mild right perinephric fat stranding    No other potential acute abnormalities detected    Electronically signed by: Beth Zuleta MD  Date:    10/22/2022  Time:    15:22  X-Ray Chest AP Portable  Narrative: EXAMINATION:  XR CHEST AP PORTABLE    CLINICAL HISTORY:  SOB;    FINDINGS:  Shallow inspiration.  No acute infiltrates or signs of CHF.  Impression: No acute findings    Electronically signed by: Beth Zuleta MD  Date:    10/22/2022  Time:    09:39  CTA Chest Non-Coronary (PE Studies)  Narrative: EXAMINATION:  CTA CHEST NON CORONARY (PE STUDIES)    CLINICAL HISTORY:  Pulmonary embolism (PE) suspected, high prob;    CT/Cardiac Nuclear exams in prior 12 months: 0    TECHNIQUE:  Pulmonary CT angiogram with IV contrast.  Coronal MIP reconstructions prepared.  Iterative reconstruction utilized.    FINDINGS:  Some of the images are degraded secondary to motion artifact.  Suboptimal contrast opacification of the pulmonary arterial system.  No large central embolus.  Lungs are clear accounting for artifact.  No pleural or pericardial fluid.  No pneumothorax.  Impression: No evidence of pulmonary emboli or other acute process    Exam limitations secondary to motion artifact and lobe suboptimal opacification of the pulmonary arterial system    Electronically signed  by: Beth Zuleta MD  Date:    10/22/2022  Time:    08:45   Significant Imaging: I have reviewed all pertinent imaging results/findings within the past 24 hours.      Assessment/Plan:      * Bacteremia  10/24: MSSA bacteremia secondary to soft tissue infection from right foot wound, no leukocytosis, follow up blood cultures, follow up echocardiogram.  - Will trend ESR  - discontinue vancomycin and start ancef 2g Q8H  - f/u echocardiogram        Diabetic foot ulcer  Continue current antimicrobial therapy.  Will consult podiatry for further assistance.    Acute renal failure with tubular necrosis  Patient with acute kidney injury likely due to IVVD/dehydration and acute tubular necrosis MAGDI is currently improving. Labs reviewed- Renal function/electrolytes with Estimated Creatinine Clearance: 136.2 mL/min (based on SCr of 1 mg/dL). according to latest data. Monitor urine output and serial BMP and adjust therapy as needed. Avoid nephrotoxins and renally dose meds for GFR listed above.     Lab Results   Component Value Date    CREATININE 1.0 10/23/2022    CREATININE 1.5 (H) 10/21/2022    CREATININE 1.2 10/21/2022          Hyponatremia  Continue to monitor with volume resuscitation with sodium chloride.  Patient has hyponatremia.  Last electrolytes reviewed-   Recent Labs   Lab 10/23/22  0530 10/24/22  0550   * 133*           Fever  Unclear etiology at this time.  Continue prophylactic antimicrobial coverage.  Blood cultures negative thus far.  Respiratory panel PCR pending.    10/23/22:  Blood cultures positive for MSSA  10/24: MSSA bacteremia secondary to soft tissue infection from right foot wound, no leukocytosis, follow up blood cultures, follow up echocardiogram       Type 2 diabetes mellitus without complication, with long-term current use of insulin  Last A1c reviewed-   Lab Results   Component Value Date    HGBA1C 9.2 (H) 10/21/2022     Most recent fingerstick glucose reviewed-   Recent Labs   Lab  10/23/22  1242 10/23/22  1632 10/23/22  2050 10/24/22  0554   POCTGLUCOSE 338* 266* 335* 249*     Current correctional scale:  Low  Anti-hyperglycemic dose as follows-   Antihyperglycemics (From admission, onward)    Start     Stop Route Frequency Ordered    10/24/22 0900  insulin detemir U-100 pen 25 Units         -- SubQ 2 times daily 10/24/22 0703    10/23/22 1333  insulin aspart U-100 pen 0-5 Units         -- SubQ Before meals & nightly PRN 10/23/22 1233        Hold Oral hypoglycemics while patient is in the hospital.      Hyperlipidemia  Continue home medical therapy.      Essential hypertension  Will reinstitute home medical therapy as needed.    BP Readings from Last 3 Encounters:   10/24/22 (!) 143/67   10/21/22 127/73   10/04/22 132/77           Morbid obesity with BMI of 50.0-59.9, adult  Body mass index is 49.08 kg/m². Morbid obesity complicates all aspects of disease management from diagnostic modalities to treatment. Weight loss encouraged and health benefits explained to patient.           VTE Risk Mitigation (From admission, onward)         Ordered     IP VTE HIGH RISK PATIENT  Once         10/22/22 0210     Place sequential compression device  Until discontinued         10/22/22 0210                Discharge Planning   MICHAEL:      Code Status: Full Code   Is the patient medically ready for discharge?:     Reason for patient still in hospital (select all that apply): Patient trending condition, Treatment, Imaging, Consult recommendations and Pending disposition  Discharge Plan A: Home with family        Olena Thornton DO  Department of Hospital Medicine   [facility]

## 2022-10-24 NOTE — NURSING
Pt c/o pain to abdomen, reports needs to have BM. Dr Martinez notified, Lactulose 20 gm PO Q6 prn Constipation ordered. RBVTO Dr Martinez/Nixon

## 2022-10-24 NOTE — PROGRESS NOTES
Pharmacokinetic Assessment Follow Up: IV Vancomycin    Vancomycin serum concentration assessment(s):    The trough level was drawn correctly and can be used to guide therapy at this time. The measurement is below the desired definitive target range of 15 to 20 mcg/mL.    Vancomycin Regimen Plan:    Change regimen to Vancomycin 1750 mg IV every 12 hours with next serum trough concentration measured at 0600 prior to 4th dose on 10/26    Drug levels (last 3 results):  Recent Labs   Lab Result Units 10/22/22  1617 10/24/22  0550   Vancomycin, Random ug/mL 8.6  --    Vancomycin-Trough ug/mL  --  12.1       Pharmacy will continue to follow and monitor vancomycin.    Please contact pharmacy at extension [phone] for questions regarding this assessment.    Thank you for the consult,   Jojo Wahl       Patient brief summary:  Andrew Del Cid Jr. is a 57 y.o. male initiated on antimicrobial therapy with IV Vancomycin for treatment of bacteremia    The patient's current regimen is 1750 mg q 12h    Drug Allergies:   Review of patient's allergies indicates:  No Known Allergies    Actual Body Weight:   168.7 kg    Renal Function:   Estimated Creatinine Clearance: 123.9 mL/min (based on SCr of 1.1 mg/dL).,     Dialysis Method (if applicable):  N/A    CBC (last 72 hours):  Recent Labs   Lab Result Units 10/21/22  1130 10/21/22  2352 10/23/22  0530 10/24/22  0550   WBC K/uL 7.39 5.75 3.73* 4.05   Hemoglobin g/dL 13.4* 13.3* 12.3* 11.6*   Hematocrit % 40.0 39.1* 36.6* 34.8*   Platelets K/uL 228 184 168 170   Gran % % 82.1* 82.5* 67.2 65.0   Lymph % % 11.4* 11.7* 24.1 24.2   Mono % % 5.4 4.5 7.8 9.6   Eosinophil % % 0.0 0.0 0.3 0.5   Basophil % % 0.3 0.3 0.3 0.2   Differential Method  Automated Automated Automated Automated       Metabolic Panel (last 72 hours):  Recent Labs   Lab Result Units 10/21/22  1130 10/21/22  1200 10/21/22  2352 10/22/22  0026 10/23/22  0530 10/24/22  0550   Sodium mmol/L 128*  --  129*  --  133* 133*    Potassium mmol/L 4.2  --  4.4  --  3.5 3.8   Chloride mmol/L 94*  --  97  --  102 100   CO2 mmol/L 26  --  24  --  22* 24   Glucose mg/dL 278*  --  243*  --  225* 270*   Glucose, UA   --  4+*  --  4+*  --   --    BUN mg/dL 28*  --  32*  --  19 15   Creatinine mg/dL 1.2  --  1.5*  --  1.0 1.1   Albumin g/dL 2.6*  --  2.6*  --  2.2* 2.2*   Total Bilirubin mg/dL 1.9*  --  1.4*  --  0.9 0.8   Alkaline Phosphatase U/L 108  --  102  --  80 85   AST U/L 58*  --  60*  --  48* 40   ALT U/L 34  --  36  --  35 34   Magnesium mg/dL  --   --  2.0  --  2.1 2.1       Vancomycin Administrations:  vancomycin given in the last 96 hours                     vancomycin 1.5 g in dextrose 5 % 250 mL IVPB (ready to mix) (mg) 1,500 mg New Bag 10/24/22 0740     1,500 mg New Bag 10/23/22 1732     1,500 mg New Bag  0553     1,500 mg New Bag 10/22/22 1843    vancomycin 2 g in 0.9% sodium chloride 500 mL IVPB (mg) 2,000 mg New Bag 10/22/22 0055                    Microbiologic Results:  Microbiology Results (last 7 days)       Procedure Component Value Units Date/Time    Blood culture [[id number]] Collected: 10/24/22 0558    Order Status: Sent Specimen: Blood Updated: 10/24/22 0558    Blood culture [[id number]] Collected: 10/24/22 0550    Order Status: Sent Specimen: Blood Updated: 10/24/22 0558    Blood culture #2 **CANNOT BE ORDERED STAT** [[id number]]  (Abnormal) Collected: 10/21/22 4990    Order Status: Completed Specimen: Blood Updated: 10/23/22 1143     Blood Culture, Routine Gram stain lizzy bottle: Gram positive cocci in clusters resembling Staph      Results called to and read back by: Tate Sanders RN 10/22/2022  15:16      Gram stain aer bottle: Gram positive cocci in clusters resembling Staph      Positive results previously called 10/22/2022  16:34      STAPHYLOCOCCUS AUREUS  ID consult required at [facility].Ellie Low and Ravinder nur.  For susceptibility see order # [id number]      Blood culture #1 **CANNOT BE ORDERED STAT**  [[id number]]  (Abnormal) Collected: 10/21/22 2331    Order Status: Completed Specimen: Blood Updated: 10/23/22 1140     Blood Culture, Routine Gram stain aer bottle: Gram positive cocci in clusters resembling Staph      Gram stain lizzy bottle: Gram positive cocci in clusters resembling Staph      Results called to and read back by: Tate Sanders RN 10/22/2022  15:16      STAPHYLOCOCCUS AUREUS  Susceptibility pending  ID consult required at [facility] and [facility] locations.      MRSA/SA Rapid ID by PCR from Blood culture [[id number]]  (Abnormal) Collected: 10/22/22 1634    Order Status: Completed Updated: 10/22/22 1853     Staph aureus ID by PCR Positive     MRSA ID by PCR Negative    Respiratory Viral Panel by PCR (Sources other than NP Swab) [[id number]] Collected: 10/22/22 0154    Order Status: No result Updated: 10/22/22 1403    Respiratory Infection Panel (PCR), Nasopharyngeal [[id number]] Collected: 10/22/22 0107    Order Status: Canceled Specimen: Nasopharyngeal Swab

## 2022-10-24 NOTE — ASSESSMENT & PLAN NOTE
Will reinstitute home medical therapy as needed.    BP Readings from Last 3 Encounters:   10/24/22 (!) 143/67   10/21/22 127/73   10/04/22 132/77

## 2022-10-24 NOTE — ASSESSMENT & PLAN NOTE
Unclear etiology at this time.  Continue prophylactic antimicrobial coverage.  Blood cultures negative thus far.  Respiratory panel PCR pending.    10/23/22:  Blood cultures positive for MSSA  10/24: MSSA bacteremia secondary to soft tissue infection from right foot wound, no leukocytosis, follow up blood cultures, follow up echocardiogram

## 2022-10-24 NOTE — ASSESSMENT & PLAN NOTE
Last A1c reviewed-   Lab Results   Component Value Date    HGBA1C 9.2 (H) 10/21/2022     Most recent fingerstick glucose reviewed-   Recent Labs   Lab 10/23/22  1242 10/23/22  1632 10/23/22  2050 10/24/22  0554   POCTGLUCOSE 338* 266* 335* 249*     Current correctional scale:  Low  Anti-hyperglycemic dose as follows-   Antihyperglycemics (From admission, onward)    Start     Stop Route Frequency Ordered    10/24/22 0900  insulin detemir U-100 pen 25 Units         -- SubQ 2 times daily 10/24/22 0703    10/23/22 1333  insulin aspart U-100 pen 0-5 Units         -- SubQ Before meals & nightly PRN 10/23/22 1233        Hold Oral hypoglycemics while patient is in the hospital.

## 2022-10-24 NOTE — ASSESSMENT & PLAN NOTE
Continue to monitor with volume resuscitation with sodium chloride.  Patient has hyponatremia.  Last electrolytes reviewed-   Recent Labs   Lab 10/23/22  0530 10/24/22  0550   * 133*          pt is s/p ERCP    Impression:    ERCP with dilated CBD to 14-15 mm with a distal filling defect and distal CBD stricture s/p biliary sphincterotomy and removal of 1 dark pigmented stone and sludge.      ERCP showing distal CBD stricture s/p brush biopsy and placement of a 10 fr x 7 cm plastic biliary stent.      Plan:  -Monitor for post-ERCP complications  -If no complications, can start liquid diet today and advance as tolerated tomorrow  -Await pathology results  -Trend LFTs daily  -Repeat ERCP for stent removal in 4-6 weeks  -Rest of management per primary team pt is s/p ERCP    Impression:    ERCP with dilated CBD with a distal filling defect and distal CBD stricture s/p biliary sphincterotomy and removal of 1 stone and sludge. After ductal clearance the stricture became less pronounced.      ERCP showing distal CBD stricture s/p brush biopsy and placement of a 10 fr x 7 cm plastic biliary stent.       Plan:  -Monitor for post-ERCP complications  -If no complications, can start liquid diet today and advance as tolerated tomorrow  -Await pathology results  -Trend LFTs daily  -Repeat ERCP for stent removal in 4-6 weeks  -Rest of management per primary team

## 2022-10-25 VITALS
RESPIRATION RATE: 18 BRPM | TEMPERATURE: 98 F | HEART RATE: 107 BPM | HEIGHT: 73 IN | WEIGHT: 315 LBS | DIASTOLIC BLOOD PRESSURE: 84 MMHG | BODY MASS INDEX: 41.75 KG/M2 | OXYGEN SATURATION: 94 % | SYSTOLIC BLOOD PRESSURE: 178 MMHG

## 2022-10-25 PROBLEM — E87.1 HYPONATREMIA: Status: RESOLVED | Noted: 2022-10-22 | Resolved: 2022-10-25

## 2022-10-25 PROBLEM — N17.0 ACUTE RENAL FAILURE WITH TUBULAR NECROSIS: Status: RESOLVED | Noted: 2022-10-22 | Resolved: 2022-10-25

## 2022-10-25 PROBLEM — R50.9 FEVER: Status: RESOLVED | Noted: 2022-10-22 | Resolved: 2022-10-25

## 2022-10-25 LAB
ALBUMIN SERPL BCP-MCNC: 2.2 G/DL (ref 3.5–5.2)
ALP SERPL-CCNC: 88 U/L (ref 55–135)
ALT SERPL W/O P-5'-P-CCNC: 29 U/L (ref 10–44)
ANION GAP SERPL CALC-SCNC: 6 MMOL/L (ref 8–16)
AORTIC ROOT ANNULUS: 3.21 CM
AORTIC VALVE CUSP SEPERATION: 1.94 CM
AST SERPL-CCNC: 33 U/L (ref 10–40)
AV INDEX (PROSTH): 0.77
AV MEAN GRADIENT: 5 MMHG
AV PEAK GRADIENT: 9 MMHG
AV VALVE AREA: 2.59 CM2
AV VELOCITY RATIO: 0.71
BASOPHILS # BLD AUTO: 0.02 K/UL (ref 0–0.2)
BASOPHILS NFR BLD: 0.4 % (ref 0–1.9)
BILIRUB SERPL-MCNC: 0.6 MG/DL (ref 0.1–1)
BSA FOR ECHO PROCEDURE: 2.95 M2
BUN SERPL-MCNC: 15 MG/DL (ref 6–20)
CALCIUM SERPL-MCNC: 8.1 MG/DL (ref 8.7–10.5)
CHLORIDE SERPL-SCNC: 102 MMOL/L (ref 95–110)
CO2 SERPL-SCNC: 27 MMOL/L (ref 23–29)
CREAT SERPL-MCNC: 0.8 MG/DL (ref 0.5–1.4)
CV ECHO LV RWT: 0.56 CM
DIFFERENTIAL METHOD: ABNORMAL
DOP CALC AO PEAK VEL: 1.5 M/S
DOP CALC AO VTI: 21.3 CM
DOP CALC LVOT AREA: 3.4 CM2
DOP CALC LVOT DIAMETER: 2.07 CM
DOP CALC LVOT PEAK VEL: 1.07 M/S
DOP CALC LVOT STROKE VOLUME: 55.06 CM3
DOP CALCLVOT PEAK VEL VTI: 16.37 CM
E/E' RATIO: 7.43 M/S
ECHO LV POSTERIOR WALL: 1.4 CM (ref 0.6–1.1)
EJECTION FRACTION: 55 %
EOSINOPHIL # BLD AUTO: 0.1 K/UL (ref 0–0.5)
EOSINOPHIL NFR BLD: 1.1 % (ref 0–8)
ERYTHROCYTE [DISTWIDTH] IN BLOOD BY AUTOMATED COUNT: 12.7 % (ref 11.5–14.5)
ERYTHROCYTE [SEDIMENTATION RATE] IN BLOOD: 40 MM/HR (ref 0–10)
EST. GFR  (NO RACE VARIABLE): >60 ML/MIN/1.73 M^2
FRACTIONAL SHORTENING: 29 % (ref 28–44)
GLUCOSE SERPL-MCNC: 250 MG/DL (ref 70–110)
HCT VFR BLD AUTO: 36 % (ref 40–54)
HGB BLD-MCNC: 12.1 G/DL (ref 14–18)
IMM GRANULOCYTES # BLD AUTO: 0.03 K/UL (ref 0–0.04)
IMM GRANULOCYTES NFR BLD AUTO: 0.6 % (ref 0–0.5)
INTERVENTRICULAR SEPTUM: 1.28 CM (ref 0.6–1.1)
IVRT: 92.27 MSEC
LA MAJOR: 6.89 CM
LA WIDTH: 4 CM
LEFT ATRIUM SIZE: 4.86 CM
LEFT INTERNAL DIMENSION IN SYSTOLE: 3.57 CM (ref 2.1–4)
LEFT VENTRICLE DIASTOLIC VOLUME INDEX: 42.6 ML/M2
LEFT VENTRICLE DIASTOLIC VOLUME: 119.27 ML
LEFT VENTRICLE MASS INDEX: 98 G/M2
LEFT VENTRICLE SYSTOLIC VOLUME INDEX: 19 ML/M2
LEFT VENTRICLE SYSTOLIC VOLUME: 53.27 ML
LEFT VENTRICULAR INTERNAL DIMENSION IN DIASTOLE: 5.02 CM (ref 3.5–6)
LEFT VENTRICULAR MASS: 275.18 G
LV LATERAL E/E' RATIO: 8.13 M/S
LV SEPTAL E/E' RATIO: 6.84 M/S
LYMPHOCYTES # BLD AUTO: 1 K/UL (ref 1–4.8)
LYMPHOCYTES NFR BLD: 21.4 % (ref 18–48)
MAGNESIUM SERPL-MCNC: 1.9 MG/DL (ref 1.6–2.6)
MCH RBC QN AUTO: 27.9 PG (ref 27–31)
MCHC RBC AUTO-ENTMCNC: 33.6 G/DL (ref 32–36)
MCV RBC AUTO: 83 FL (ref 82–98)
MONOCYTES # BLD AUTO: 0.4 K/UL (ref 0.3–1)
MONOCYTES NFR BLD: 7.9 % (ref 4–15)
MV PEAK E VEL: 1.3 M/S
MV STENOSIS PRESSURE HALF TIME: 74.36 MS
MV VALVE AREA P 1/2 METHOD: 2.96 CM2
NEUTROPHILS # BLD AUTO: 3.2 K/UL (ref 1.8–7.7)
NEUTROPHILS NFR BLD: 68.6 % (ref 38–73)
NRBC BLD-RTO: 0 /100 WBC
PISA TR MAX VEL: 2.39 M/S
PLATELET # BLD AUTO: 224 K/UL (ref 150–450)
PMV BLD AUTO: 10.5 FL (ref 9.2–12.9)
POCT GLUCOSE: 284 MG/DL (ref 70–110)
POTASSIUM SERPL-SCNC: 3.6 MMOL/L (ref 3.5–5.1)
PROT SERPL-MCNC: 6.5 G/DL (ref 6–8.4)
PV PEAK VELOCITY: 0.92 CM/S
RA MAJOR: 5.79 CM
RA PRESSURE: 3 MMHG
RA WIDTH: 3.5 CM
RBC # BLD AUTO: 4.34 M/UL (ref 4.6–6.2)
RIGHT VENTRICULAR END-DIASTOLIC DIMENSION: 3.4 CM
SODIUM SERPL-SCNC: 135 MMOL/L (ref 136–145)
TDI LATERAL: 0.16 M/S
TDI SEPTAL: 0.19 M/S
TDI: 0.18 M/S
TR MAX PG: 23 MMHG
TRICUSPID ANNULAR PLANE SYSTOLIC EXCURSION: 2.34 CM
TV REST PULMONARY ARTERY PRESSURE: 26 MMHG
WBC # BLD AUTO: 4.71 K/UL (ref 3.9–12.7)

## 2022-10-25 PROCEDURE — 99239 PR HOSPITAL DISCHARGE DAY,>30 MIN: ICD-10-PCS | Mod: ,,, | Performed by: STUDENT IN AN ORGANIZED HEALTH CARE EDUCATION/TRAINING PROGRAM

## 2022-10-25 PROCEDURE — 25000003 PHARM REV CODE 250: Performed by: STUDENT IN AN ORGANIZED HEALTH CARE EDUCATION/TRAINING PROGRAM

## 2022-10-25 PROCEDURE — 25000003 PHARM REV CODE 250: Performed by: INTERNAL MEDICINE

## 2022-10-25 PROCEDURE — 99900035 HC TECH TIME PER 15 MIN (STAT)

## 2022-10-25 PROCEDURE — 80053 COMPREHEN METABOLIC PANEL: CPT | Performed by: INTERNAL MEDICINE

## 2022-10-25 PROCEDURE — 85651 RBC SED RATE NONAUTOMATED: CPT | Performed by: INTERNAL MEDICINE

## 2022-10-25 PROCEDURE — 63600175 PHARM REV CODE 636 W HCPCS: Performed by: STUDENT IN AN ORGANIZED HEALTH CARE EDUCATION/TRAINING PROGRAM

## 2022-10-25 PROCEDURE — 85025 COMPLETE CBC W/AUTO DIFF WBC: CPT | Performed by: INTERNAL MEDICINE

## 2022-10-25 PROCEDURE — 36415 COLL VENOUS BLD VENIPUNCTURE: CPT | Performed by: INTERNAL MEDICINE

## 2022-10-25 PROCEDURE — 99239 HOSP IP/OBS DSCHRG MGMT >30: CPT | Mod: ,,, | Performed by: STUDENT IN AN ORGANIZED HEALTH CARE EDUCATION/TRAINING PROGRAM

## 2022-10-25 PROCEDURE — 99900031 HC PATIENT EDUCATION (STAT)

## 2022-10-25 PROCEDURE — 94761 N-INVAS EAR/PLS OXIMETRY MLT: CPT

## 2022-10-25 PROCEDURE — 94799 UNLISTED PULMONARY SVC/PX: CPT

## 2022-10-25 PROCEDURE — 83735 ASSAY OF MAGNESIUM: CPT | Performed by: INTERNAL MEDICINE

## 2022-10-25 RX ORDER — LOSARTAN POTASSIUM 50 MG/1
100 TABLET ORAL DAILY
Status: DISCONTINUED | OUTPATIENT
Start: 2022-10-25 | End: 2022-10-25 | Stop reason: HOSPADM

## 2022-10-25 RX ORDER — LOSARTAN POTASSIUM 25 MG/1
100 TABLET ORAL DAILY
Qty: 360 TABLET | Refills: 1 | Status: ON HOLD | OUTPATIENT
Start: 2022-10-25 | End: 2022-11-09 | Stop reason: SDUPTHER

## 2022-10-25 RX ORDER — AMLODIPINE BESYLATE 5 MG/1
5 TABLET ORAL DAILY
Status: DISCONTINUED | OUTPATIENT
Start: 2022-10-25 | End: 2022-10-25 | Stop reason: HOSPADM

## 2022-10-25 RX ORDER — CEFAZOLIN SODIUM 2 G/50ML
2000 SOLUTION INTRAVENOUS EVERY 8 HOURS
Qty: 1950 ML | Refills: 0 | Status: ON HOLD | OUTPATIENT
Start: 2022-10-25 | End: 2022-11-09 | Stop reason: HOSPADM

## 2022-10-25 RX ORDER — METOCLOPRAMIDE 10 MG/1
10 TABLET ORAL
Qty: 21 TABLET | Refills: 0 | Status: ON HOLD | OUTPATIENT
Start: 2022-10-25 | End: 2022-11-09

## 2022-10-25 RX ORDER — MUPIROCIN 20 MG/G
OINTMENT TOPICAL 2 TIMES DAILY
Qty: 30 G | Refills: 2 | Status: SHIPPED | OUTPATIENT
Start: 2022-10-25 | End: 2022-12-16 | Stop reason: ALTCHOICE

## 2022-10-25 RX ORDER — L. ACIDOPHILUS/L.BULGARICUS 100MM CELL
1 GRANULES IN PACKET (EA) ORAL 2 TIMES DAILY
Qty: 60 PACKET | Refills: 2 | Status: SHIPPED | OUTPATIENT
Start: 2022-10-25 | End: 2023-01-23

## 2022-10-25 RX ADMIN — INSULIN ASPART 2 UNITS: 100 INJECTION, SOLUTION INTRAVENOUS; SUBCUTANEOUS at 07:10

## 2022-10-25 RX ADMIN — INSULIN ASPART 3 UNITS: 100 INJECTION, SOLUTION INTRAVENOUS; SUBCUTANEOUS at 11:10

## 2022-10-25 RX ADMIN — SODIUM CHLORIDE: 0.9 INJECTION, SOLUTION INTRAVENOUS at 01:10

## 2022-10-25 RX ADMIN — LACTOBACILLUS ACIDOPHILUS / LACTOBACILLUS BULGARICUS 1 EACH: 100 MILLION CFU STRENGTH GRANULES at 09:10

## 2022-10-25 RX ADMIN — LOSARTAN POTASSIUM 100 MG: 50 TABLET, FILM COATED ORAL at 10:10

## 2022-10-25 RX ADMIN — METOCLOPRAMIDE 10 MG: 10 TABLET ORAL at 10:10

## 2022-10-25 RX ADMIN — INSULIN DETEMIR 30 UNITS: 100 INJECTION, SOLUTION SUBCUTANEOUS at 09:10

## 2022-10-25 RX ADMIN — ATORVASTATIN CALCIUM 40 MG: 40 TABLET, FILM COATED ORAL at 09:10

## 2022-10-25 RX ADMIN — AMLODIPINE BESYLATE 5 MG: 5 TABLET ORAL at 10:10

## 2022-10-25 RX ADMIN — CEFAZOLIN SODIUM 2 G: 2 SOLUTION INTRAVENOUS at 09:10

## 2022-10-25 RX ADMIN — METOCLOPRAMIDE 10 MG: 10 TABLET ORAL at 06:10

## 2022-10-25 RX ADMIN — MUPIROCIN: 20 OINTMENT TOPICAL at 09:10

## 2022-10-25 RX ADMIN — CEFAZOLIN SODIUM 2 G: 2 SOLUTION INTRAVENOUS at 01:10

## 2022-10-25 RX ADMIN — ASPIRIN 81 MG: 81 TABLET, COATED ORAL at 09:10

## 2022-10-25 NOTE — CONSULTS
[facility]  Adult Nutrition  Diet Education Progress Note     SUMMARY     Andrew Del Cid   1965      Diet:   Intake: 75 - 100 %  Tolerance: tolerating by mouth      Diet Education & Materials Provided: Managing Sick Days; Consistent Carbohydrate Guide for People with Diabetes  Level of understanding: Fair  Theoretical stage of change: Contemplation    Nutrition Problem  Food and nutrition related knowledge deficit    Related to (etiology):   Not ready for diet/lifestyle change    Signs and Symptoms (as evidenced by):   Pt stating he doesn't eat any non starchy veggies and having no prior diabetes education    Interventions/Recommendations (treatment strategy):  RD provided Consistent CHO education to pt.  2. RD provided CHO education handouts to pt to take home.    Nutrition Diagnosis Status:   New      Follow-up: no    Chidi Carrillo RD  10/25/2022

## 2022-10-25 NOTE — PLAN OF CARE
10/25/22 1055   Post-Acute Status   Post-Acute Authorization HME   HME Status (!) Pending Delivery     Received a call from Jackie with [employer] to be delivered to hospital today. Primary nurse notified.

## 2022-10-25 NOTE — DISCHARGE SUMMARY
[facility]  Discharge Summary      Patient Name: Andrew Del Cid Jr.  MRN: [id number]  Patient Class: IP- Inpatient  Admission Date: 10/21/2022  Hospital Length of Stay: 3 days  Discharge Date and Time:  10/25/2022 10:15 AM  Attending Physician: Olena Thornton DO   Discharging Provider: Olena Thornton DO  Primary Care Provider: Olena Thornton DO      HPI:   Chief Complaint   Patient presents with    Weakness       Pt stated that he was experiencing right lower back pain yesterday - stayed in bed / sleeping most of the day and was only able to eat 1/2 sandwich. Hx DM. Presents to ED today with complaints of dizziness / lethargy, concerned blood sugar was low. CBG at triage 237mg/dl.       57-year-old male with a history of chronic back pain, diabetes mellitus, hypertension, obesity off the complaining lower back pain bilaterally that became worse 2 days ago, try tests sleep all day yesterday and rest which improved the pain.  Denies any fever.  No dysuria.  History of this multiple times in the past.  Eating of mild dizziness when he walks as well.  Denies any blood in stool.  No nausea vomiting.  Afebrile.  Not ill appearing, alert oriented x4, GCS is 15.  Dizziness is worse when he wakes up, better with holding head still.  Denies any shortness of breath or chest pain         * No surgery found *      Hospital Course:   10/23/2022 WAC:  Blood cultures positive for Staph aureus.  Will pursue transthoracic echocardiogram to rule out endocarditis.  Podiatry consult for foot ulceration.  10/24 MSSA bacteremia, will change abx to ancef Q8H. F/u surveillance blood cx and echocardiogram. Afebrile overnight.   10/25 Repeat blood cx NGTD x1 day, will continue with ancef therapy for total of 14 days, last date of antibiotics 11/7/22.  Per podiatry, foot wound healing and patient is to continue with current course of wound care at home with follow up outpatient.        Goals of  Care Treatment Preferences:  Code Status: Full Code      Consults:   Consults (From admission, onward)        Status Ordering Provider     Inpatient consult to Social Work/Case Management  Once        Provider:  (Not yet assigned)    Ordered CYNTHIA REYES     Inpatient consult to Registered Dietitian/Nutritionist  Once        Provider:  (Not yet assigned)    Completed CYNTHIA REYES     Inpatient consult to Social Work/Case Management  Once        Provider:  (Not yet assigned)    Completed CYNTHIA REYES     Inpatient consult to Podiatry  Once        Provider:  Baldemar Esquivel DPM    Completed TR SANDOVAL JR          * Bacteremia  10/24: MSSA bacteremia secondary to soft tissue infection from right foot wound, no leukocytosis, follow up blood cultures, follow up echocardiogram showed no concerns for endocarditis.   - Will trend ESR  - discontinue vancomycin  - continue ancef 2g Q8H start date 10/24/22 - 11/6/22 (total 14 days of therapy)  - f/u outpatient infectious disease specialist          Diabetic foot ulcer  Continue current antimicrobial therapy.    Continue wound care outpatient per podiatry recommendations.      Type 2 diabetes mellitus without complication, with long-term current use of insulin  Last A1c reviewed-   Lab Results   Component Value Date    HGBA1C 9.2 (H) 10/21/2022     Most recent fingerstick glucose reviewed-   Recent Labs   Lab 10/24/22  1156 10/24/22  1634 10/24/22  2018   POCTGLUCOSE >500* 352* 388*     Current correctional scale:  Low  Anti-hyperglycemic dose as follows-   Antihyperglycemics (From admission, onward)    Start     Stop Route Frequency Ordered    10/24/22 2100  insulin detemir U-100 pen 30 Units         -- SubQ 2 times daily 10/24/22 1953    10/23/22 1333  insulin aspart U-100 pen 0-5 Units         -- SubQ Before meals & nightly PRN 10/23/22 1233        Hold Oral hypoglycemics while patient is in the hospital.      Hyperlipidemia  Continue home  "medical therapy.      Essential hypertension  Will reinstitute home medical therapy as needed.    BP Readings from Last 3 Encounters:   10/25/22 (!) 178/86   10/24/22 (!) 143/67   10/21/22 127/73   Amlodipine 5 mg daily  Increase Losartan 100 mg daily          Morbid obesity with BMI of 50.0-59.9, adult  Body mass index is 49.08 kg/m². Morbid obesity complicates all aspects of disease management from diagnostic modalities to treatment. Weight loss encouraged and health benefits explained to patient.   Right foot wound healing, wrapped.   - order bariatric walker for discharge          Final Active Diagnoses:    Diagnosis Date Noted POA    PRINCIPAL PROBLEM:  Bacteremia [R78.81] 10/23/2022 Yes    Diabetic foot ulcer [E11.621, L97.509] 10/23/2022 Yes    Type 2 diabetes mellitus without complication, with long-term current use of insulin [E11.9, Z79.4] 10/05/2022 Not Applicable    Hyperlipidemia [E78.5] 12/25/2018 Yes     Chronic    Essential hypertension [I10] 06/30/2017 Yes     Chronic    Morbid obesity with BMI of 50.0-59.9, adult [E66.01, Z68.43] 06/04/2015 Not Applicable     Chronic      Problems Resolved During this Admission:    Diagnosis Date Noted Date Resolved POA    Fever [R50.9] 10/22/2022 10/25/2022 Yes    Hyponatremia [E87.1] 10/22/2022 10/25/2022 Yes    Acute renal failure with tubular necrosis [N17.0] 10/22/2022 10/25/2022 Yes       Discharged Condition: stable    Disposition:     Follow Up:   Contact information for after-discharge care     Dialysis/Infusion     [facility] .    Service: Infusion and IV Therapy  Contact information:  [address], [address]  [facility] [address]  [phone]                           Patient Instructions:      WALKER FOR HOME USE     Order Specific Question Answer Comments   Type of Walker: Heavy duty (300+ lbs)    With wheels? Yes    Height: 6' 1" (1.854 m)    Weight: 168.7 kg (372 lb)    Length of need (1-99 months): 99    Does patient " have medical equipment at home? none    Please check all that apply: Patient's condition impairs ambulation.      Ambulatory referral/consult to Infectious Disease   Standing Status: Future   Referral Priority: Routine Referral Type: Consultation   Referral Reason: Specialty Services Required   Requested Specialty: Infectious Diseases   Number of Visits Requested: 1     Ambulatory referral/consult to Infectious Disease   Standing Status: Future   Referral Priority: Routine Referral Type: Consultation   Referral Reason: Specialty Services Required   Requested Specialty: Infectious Diseases   Number of Visits Requested: 1       Significant Diagnostic Studies: Labs:   BMP:   Recent Labs   Lab 10/24/22  0550 10/24/22  1218 10/25/22  0348   * 342* 250*   *  --  135*   K 3.8  --  3.6     --  102   CO2 24  --  27   BUN 15  --  15   CREATININE 1.1  --  0.8   CALCIUM 8.1*  --  8.1*   MG 2.1  --  1.9   , CMP   Recent Labs   Lab 10/24/22  0550 10/24/22  1218 10/25/22  0348   *  --  135*   K 3.8  --  3.6     --  102   CO2 24  --  27   * 342* 250*   BUN 15  --  15   CREATININE 1.1  --  0.8   CALCIUM 8.1*  --  8.1*   PROT 6.6  --  6.5   ALBUMIN 2.2*  --  2.2*   BILITOT 0.8  --  0.6   ALKPHOS 85  --  88   AST 40  --  33   ALT 34  --  29   ANIONGAP 9  --  6*   , CBC   Recent Labs   Lab 10/24/22  0550 10/25/22  0348   WBC 4.05 4.71   HGB 11.6* 12.1*   HCT 34.8* 36.0*    224   , Lipid Panel   Lab Results   Component Value Date    CHOL 185 08/16/2017    HDL 43 08/16/2017    LDLCALC 105.2 08/16/2017    TRIG 184 (H) 08/16/2017    CHOLHDL 23.2 08/16/2017   , A1C:   Recent Labs   Lab 10/21/22  0213   HGBA1C 9.2*     Component      Latest Ref Rng & Units 10/22/2022   Staph aureus ID by PCR      Negative Positive (A)   MRSA ID by PCR      Negative Negative     Component      Latest Ref Rng & Units 10/24/2022 10/24/2022           6:00 AM  5:47 AM   Blood Culture, Routine       No Growth to date No  Growth to date     Component      Latest Ref Rng & Units 10/21/2022          11:48 PM   Blood Culture, Routine       STAPHYLOCOCCUS AUREUS (A) . . .     Component      Latest Ref Rng & Units 10/21/2022          11:48 PM   Blood Culture, Routine       Positive results previously called 10/22/2022  16:34     Component      Latest Ref Rng & Units 10/21/2022          11:48 PM   Blood Culture, Routine       Gram stain aer bottle: Gram positive cocci in clusters resembling Staph     Component      Latest Ref Rng & Units 10/21/2022          11:48 PM   Blood Culture, Routine       Results called to and read back by: Tate Sanders RN 10/22/2022  15:16     Component      Latest Ref Rng & Units 10/21/2022          11:48 PM   Blood Culture, Routine       Gram stain lizzy bottle: Gram positive cocci in clusters resembling Staph     Component      Latest Ref Rng & Units 10/21/2022          11:31 PM   Blood Culture, Routine       STAPHYLOCOCCUS AUREUS (A) . . .     Component      Latest Ref Rng & Units 10/21/2022          11:31 PM   Blood Culture, Routine       Results called to and read back by: Tate Sanders RN 10/22/2022  15:16     Component      Latest Ref Rng & Units 10/21/2022          11:31 PM   Blood Culture, Routine       Gram stain lizzy bottle: Gram positive cocci in clusters resembling Staph     Component      Latest Ref Rng & Units 10/21/2022          11:31 PM   Blood Culture, Routine       Gram stain aer bottle: Gram positive cocci in clusters resembling Staph     Blood Culture   Lab Results   Component Value Date    LABBLOO No Growth to date 10/24/2022     Echo  · The left ventricle is normal in size with concentric hypertrophy and   normal systolic function.  · The estimated ejection fraction is 55%.  · Normal left ventricular diastolic function.  · Moderate right ventricular enlargement.  · Mild tricuspid regurgitation.  · The estimated PA systolic pressure is 26 mmHg.  · Mild left atrial enlargement.  · The study is  a suboptimal study secondary to poor acoustic windows.   Valves are not well visualized. Although there is not large vegetation   visualized, endocarditis cannot be ruled out with this suboptimal study.     EXAMINATION:  CT ABDOMEN PELVIS WITHOUT CONTRAST     CLINICAL HISTORY:  Flank pain, kidney stone suspected;     CT/Cardiac Nuclear exams in prior 12 months: 1     TECHNIQUE:  Axial CT abdomen and pelvis without IV contrast.  Iterative reconstruction utilized.  Coronal reformats prepared.     COMPARISON:  None     FINDINGS:  No hydronephrosis.  No urinary tract stones evident.  There is some residual contrast within the urinary collecting system.  Mild right perinephric fat stranding.     Unremarkable liver, adrenals, spleen and pancreas.  No dilated or thick-walled bowel.  No suspicious mass lesion or lymphadenopathy.  No free air or ascites.  No acute osseous abnormalities detected.  Suspect prior ventral hernia repair/periumbilical.     Some of the images are degraded secondary to motion artifact     Impression:     Mild right perinephric fat stranding     No other potential acute abnormalities detected        Electronically signed by: Beth Zuleta MD  Date:                                            10/22/2022  Time:                                           15:22    EXAMINATION:  CTA CHEST NON CORONARY (PE STUDIES)     CLINICAL HISTORY:  Pulmonary embolism (PE) suspected, high prob;     CT/Cardiac Nuclear exams in prior 12 months: 0     TECHNIQUE:  Pulmonary CT angiogram with IV contrast.  Coronal MIP reconstructions prepared.  Iterative reconstruction utilized.     FINDINGS:  Some of the images are degraded secondary to motion artifact.  Suboptimal contrast opacification of the pulmonary arterial system.  No large central embolus.  Lungs are clear accounting for artifact.  No pleural or pericardial fluid.  No pneumothorax.     Impression:     No evidence of pulmonary emboli or other acute process     Exam  limitations secondary to motion artifact and lobe suboptimal opacification of the pulmonary arterial system        Electronically signed by: Beth Zuleta MD  Date:                                            10/22/2022  Time:                                           08:45  EXAMINATION:  XR CHEST AP PORTABLE     CLINICAL HISTORY:  SOB;     FINDINGS:  Shallow inspiration.  No acute infiltrates or signs of CHF.     Impression:     No acute findings        Electronically signed by: Beth Zuleta MD  Date:                                            10/22/2022  Time:                                           09:39  Pending Diagnostic Studies:     None         Medications:  Reconciled Home Medications:      Medication List      START taking these medications    ceFAZolin 2 g/50mL Dextrose IVPB 2 gram/50 mL Pgbk  Commonly known as: ANCEF  Inject 50 mLs (2,000 mg total) into the vein every 8 (eight) hours. for 13 days     lactobacillus acidophilus & bulgar 100 million cell packet  Commonly known as: LACTINEX  Take 1 packet (1 each total) by mouth 2 (two) times daily.     metoclopramide HCl 10 MG tablet  Commonly known as: REGLAN  Take 1 tablet (10 mg total) by mouth 3 (three) times daily before meals. for 7 days     mupirocin 2 % ointment  Commonly known as: BACTROBAN  Apply topically 2 (two) times daily.     psyllium husk (with sugar) 3.4 gram packet  Take 1 packet by mouth 3 (three) times daily.        CONTINUE taking these medications    amLODIPine 5 MG tablet  Commonly known as: NORVASC  Take 5 mg by mouth.     aspirin 81 MG EC tablet  Commonly known as: ECOTRIN  Take 81 mg by mouth once daily.     atorvastatin 40 MG tablet  Commonly known as: LIPITOR  Take 40 mg by mouth.     cholecalciferol (vitamin D3) 25 mcg (1,000 unit) capsule  Commonly known as: VITAMIN D3  1 capsule     DULoxetine 30 MG capsule  Commonly known as: CYMBALTA  Take 30 mg by mouth once daily.     JARDIANCE 25 mg tablet  Generic drug:  empagliflozin  Take 25 mg by mouth once daily.     LANTUS SOLOSTAR U-100 INSULIN glargine 100 units/mL SubQ pen  Generic drug: insulin  SMARTSI Unit(s) SUB-Q Twice Daily     losartan 25 MG tablet  Commonly known as: COZAAR  1 tablet     metFORMIN 1000 MG tablet  Commonly known as: GLUCOPHAGE  Take 1,000 mg by mouth 2 (two) times daily.     TRULICITY 4.5 mg/0.5 mL pen injector  Generic drug: dulaglutide  Inject into the skin.        ASK your doctor about these medications    insulin aspart U-100 100 unit/mL (3 mL) Inpn pen  Commonly known as: NovoLOG  30 Units 2 (two) times daily.            Indwelling Lines/Drains at time of discharge:   Lines/Drains/Airways     None               Time spent on the discharge of patient: 35 minutes    Olena Thornton DO  Department of Hospital Medicine  [facility]

## 2022-10-25 NOTE — ASSESSMENT & PLAN NOTE
Last A1c reviewed-   Lab Results   Component Value Date    HGBA1C 9.2 (H) 10/21/2022     Most recent fingerstick glucose reviewed-   Recent Labs   Lab 10/24/22  1156 10/24/22  1634 10/24/22  2018   POCTGLUCOSE >500* 352* 388*     Current correctional scale:  Low  Anti-hyperglycemic dose as follows-   Antihyperglycemics (From admission, onward)    Start     Stop Route Frequency Ordered    10/24/22 2100  insulin detemir U-100 pen 30 Units         -- SubQ 2 times daily 10/24/22 1953    10/23/22 1333  insulin aspart U-100 pen 0-5 Units         -- SubQ Before meals & nightly PRN 10/23/22 1233        Hold Oral hypoglycemics while patient is in the hospital.

## 2022-10-25 NOTE — PLAN OF CARE
10/25/22 1005   Post-Acute Status   Post-Acute Authorization HME   E Status Referrals Sent       Referral sent to [facility] in [place] for bariatric walker. Notified Shirley with [facility] of new referral and that plan is for patient to discharge today. She verbalizes understanding.

## 2022-10-25 NOTE — CONSULTS
[facility]  Podiatry  Consult Note    Patient Name: Andrew Del Cid Jr.  MRN: [id number]  Admission Date: 10/21/2022  Hospital Length of Stay: 3 days  Attending Physician: Olena Thornton DO  Primary Care Provider: Olena Thornton DO     Inpatient consult to Podiatry  Consult performed by: Baldemar Esquivel DPM  Consult ordered by: Sukhwinder Martinez Jr., MD  Reason for consult: Chronic right foot wound  Assessment/Recommendations: Wound slightly smaller in size compared to outpatient clinic visit on 10/04/2022, no obvious signs of infection, biofilm present on wound.  We will continue with local wound care, topical antibiotic powder, offloading and limited weight-bearing/activity to maximize wound healing potential.      Subjective:     History of Present Illness:  Patient is a 57-year-old male well known to me from outpatient clinic, with multiple comorbidities including morbid obesity, hypertension, hyperlipidemia, uncontrolled type 2 diabetes with Charcot foot deformity to right lower extremity as well as history of previous digital amputations, admitted to [facility] with weakness, found to be bacteremic with MSSA.  Patient states he had significant back pain, was fatigued and lethargic, sustained a fall at home prompting return to emergency room.  He has been receiving IV antibiotics over the past 3 days, feeling better today.  He denies any fever, chills, chest pain, shortness of breath.  Patient without any pain at site of wound due to significant neuropathy.    Scheduled Meds:   aspirin  81 mg Oral Daily    atorvastatin  40 mg Oral Daily    ceFAZolin (ANCEF) IVPB  2 g Intravenous Q8H    insulin detemir U-100  30 Units Subcutaneous BID    lactobacillus acidophilus & bulgar  1 packet Oral BID    metoclopramide HCl  10 mg Oral TID AC    mupirocin   Topical (Top) BID    psyllium husk (with sugar)  1 packet Oral TID     Continuous Infusions:  PRN Meds:acetaminophen, dextrose 10%,  dextrose 10%, glucose, insulin aspart U-100, lactulose, melatonin, ondansetron, sodium chloride 0.9%    Review of patient's allergies indicates:  No Known Allergies     Past Medical History:   Diagnosis Date    Diabetes mellitus, type 2     Hyperlipidemia     Hypertension     Obese     Peripheral neuropathy      Past Surgical History:   Procedure Laterality Date    APPENDECTOMY      HERNIA REPAIR      INCISION AND DRAINAGE FOOT Bilateral 12/26/2018    Procedure: INCISION AND DRAINAGE, FOOT;  Surgeon: Rob Alas DPM;  Location: [facility] OR;  Service: Podiatry;  Laterality: Bilateral;    TOE AMPUTATION      right great toe    TOE AMPUTATION Left 12/26/2018    Procedure: AMPUTATION, TOE; left 3rd toe;  Surgeon: Rob Alas DPM;  Location: [facility] OR;  Service: Podiatry;  Laterality: Left;       Family History    None       Tobacco Use    Smoking status: Never    Smokeless tobacco: Never   Substance and Sexual Activity    Alcohol use: No    Drug use: No    Sexual activity: Yes     Partners: Female     Comment:      Review of Systems   Constitutional:  Positive for fatigue. Negative for fever.   HENT:  Negative for hearing loss.    Respiratory:  Negative for shortness of breath.    Cardiovascular:  Negative for chest pain.   Gastrointestinal:  Negative for diarrhea and vomiting.   Musculoskeletal:  Positive for arthralgias and back pain.   Skin:  Positive for wound.   Neurological:  Positive for weakness.   Objective:     Vital Signs (Most Recent):  Temp: 98.5 °F (36.9 °C) (10/25/22 0733)  Pulse: 104 (10/25/22 0733)  Resp: 20 (10/25/22 0733)  BP: (!) 178/86 (10/25/22 0733)  SpO2: (!) 92 % (10/25/22 0733)   Vital Signs (24h Range):  Temp:  [97.6 °F (36.4 °C)-98.8 °F (37.1 °C)] 98.5 °F (36.9 °C)  Pulse:  [] 104  Resp:  [20-22] 20  SpO2:  [91 %-95 %] 92 %  BP: (129-178)/(67-89) 178/86     Weight: (!) 168.7 kg (372 lb)  Body mass index is 49.08 kg/m².    Foot Exam    General  General Appearance:  (morbidly obese male)  Orientation: alert and oriented to person, place, and time   Affect: appropriate       Right Foot/Ankle     Inspection and Palpation  Ecchymosis: none  Tenderness: none   Swelling: (Chronic to right foot from Charcot neuroarthropathy)  Hammertoes: second toe, third toe, fourth toe and fifth toe  Skin Exam: ulcer (2x1.7 cm fibrogranular wound at plantar aspect of foot, site of bony prominence from Charcot neuroarthropathy, no undermining, scant serous drainage noted, no purulence, no probing to bone); no erythema     Neurovascular  Dorsalis pedis: 2+  Posterior tibial: 2+  Saphenous nerve sensation: absent  Tibial nerve sensation: absent  Superficial peroneal nerve sensation: absent  Deep peroneal nerve sensation: absent  Sural nerve sensation: absent      Left Foot/Ankle      Inspection and Palpation  Ecchymosis: none  Tenderness: none   Hammertoes: third toe, fourth toe and fifth toe  Skin Exam: skin intact; no erythema     Neurovascular  Dorsalis pedis: 2+  Posterior tibial: 2+  Saphenous nerve sensation: absent  Tibial nerve sensation: absent  Superficial peroneal nerve sensation: absent  Deep peroneal nerve sensation: absent  Sural nerve sensation: absent        Laboratory:  A1C:   Recent Labs   Lab 10/21/22  0213   HGBA1C 9.2*     Blood Cultures:   Recent Labs   Lab 10/24/22  0547 10/24/22  0600   LABBLOO No Growth to date No Growth to date     CBC:   Recent Labs   Lab 10/25/22  0348   WBC 4.71   RBC 4.34*   HGB 12.1*   HCT 36.0*      MCV 83   MCH 27.9   MCHC 33.6     CMP:   Recent Labs   Lab 10/25/22  0348   *   CALCIUM 8.1*   ALBUMIN 2.2*   PROT 6.5   *   K 3.6   CO2 27      BUN 15   CREATININE 0.8   ALKPHOS 88   ALT 29   AST 33   BILITOT 0.6     ESR:   Recent Labs   Lab 10/25/22  0348   SEDRATE 40*       Diagnostic Results:  I have reviewed all pertinent imaging results/findings within the past 24 hours.        Assessment/Plan:     Active Diagnoses:     Diagnosis Date Noted POA    PRINCIPAL PROBLEM:  Bacteremia [R78.81] 10/23/2022 Yes    Diabetic foot ulcer [E11.621, L97.509] 10/23/2022 Yes    Fever [R50.9] 10/22/2022 Yes    Hyponatremia [E87.1] 10/22/2022 Yes    Acute renal failure with tubular necrosis [N17.0] 10/22/2022 Yes    Type 2 diabetes mellitus without complication, with long-term current use of insulin [E11.9, Z79.4] 10/05/2022 Not Applicable    Hyperlipidemia [E78.5] 12/25/2018 Yes     Chronic    Essential hypertension [I10] 06/30/2017 Yes     Chronic    Morbid obesity with BMI of 50.0-59.9, adult [E66.01, Z68.43] 06/04/2015 Not Applicable     Chronic      Problems Resolved During this Admission:       Diabetic foot ulcer  Patient with chronic wound from bony prominence due to Charcot neuroarthropathy, wound examined today, smaller in size, no obvious signs of infection.  We will continue with topical antibiotic powder, offloading measures.  Did discuss option of skin substitute if wound fails to progress once bacteremia is resolved.  Patient's wife will continue with local wound care at home.  He is to follow up in clinic with me in 1-2 weeks.    Bacteremia  Patient with midline placed, planned for 14 days of IV antibiotics as echocardiogram was negative for endocarditis.  This is being followed by PCP.    Uncontrolled type 2 diabetes  Once again discussed importance of good glucose control, protein intake to optimize wound healing.  Continue medical management.    Thank you for your consult. I will follow-up with patient. Please contact us if you have any additional questions.    Baldemar Esquivel, MARIA GUADALUPE  Podiatry  [facility]

## 2022-10-25 NOTE — PLAN OF CARE
10/25/22 0717   Post-Acute Status   Post-Acute Authorization IV Infusion   IV Infusion Status Referral(s) sent       Referral for home IV antibiotics sent to [facility] in [facility]. Notified Albert with [facility] of new referral. Awaiting approval of insurance and cost.     Addendum: 0815  Spoke to Albert with [facility]. Infusions will be covered 100%. Patient will get midline dressing changes done at [facility]'s [place] office. Each visit will be $55. Notified patient of this. Albert will be here for infusion teaching around noon or a little after noon today. Patient notified and will notify his wife to be here. Anticipate discharge after that. Dr. Thornton updated on all of the above.

## 2022-10-25 NOTE — ASSESSMENT & PLAN NOTE
Body mass index is 49.08 kg/m². Morbid obesity complicates all aspects of disease management from diagnostic modalities to treatment. Weight loss encouraged and health benefits explained to patient.   Right foot wound healing, wrapped.   - order bariatric walker for discharge

## 2022-10-25 NOTE — ASSESSMENT & PLAN NOTE
10/24: MSSA bacteremia secondary to soft tissue infection from right foot wound, no leukocytosis, follow up blood cultures, follow up echocardiogram showed no concerns for endocarditis.   - Will trend ESR  - discontinue vancomycin  - continue ancef 2g Q8H start date 10/24/22 - 11/6/22 (total 14 days of therapy)  - f/u outpatient infectious disease specialist

## 2022-10-25 NOTE — ASSESSMENT & PLAN NOTE
Will reinstitute home medical therapy as needed.    BP Readings from Last 3 Encounters:   10/25/22 (!) 178/86   10/24/22 (!) 143/67   10/21/22 127/73   Amlodipine 5 mg daily  Increase Losartan 100 mg daily

## 2022-10-25 NOTE — ASSESSMENT & PLAN NOTE
Continue current antimicrobial therapy.    Continue wound care outpatient per podiatry recommendations.

## 2022-10-26 ENCOUNTER — HOSPITAL ENCOUNTER (OUTPATIENT)
Facility: HOSPITAL | Age: 57
Discharge: NURSING, REHAB OR LONG-TERM CARE | DRG: 308 | End: 2022-10-31
Attending: EMERGENCY MEDICINE | Admitting: STUDENT IN AN ORGANIZED HEALTH CARE EDUCATION/TRAINING PROGRAM
Payer: MEDICAID

## 2022-10-26 ENCOUNTER — PATIENT OUTREACH (OUTPATIENT)
Dept: ADMINISTRATIVE | Facility: CLINIC | Age: 57
End: 2022-10-26
Payer: COMMERCIAL

## 2022-10-26 DIAGNOSIS — I48.91 A-FIB: ICD-10-CM

## 2022-10-26 DIAGNOSIS — L08.9 DIABETIC INFECTION OF RIGHT FOOT: Chronic | ICD-10-CM

## 2022-10-26 DIAGNOSIS — L97.509 DIABETIC FOOT ULCER: ICD-10-CM

## 2022-10-26 DIAGNOSIS — R27.8 CEREBELLAR DYSMETRIA: ICD-10-CM

## 2022-10-26 DIAGNOSIS — I63.9 CVA (CEREBRAL VASCULAR ACCIDENT): Primary | ICD-10-CM

## 2022-10-26 DIAGNOSIS — R78.81 BACTEREMIA: ICD-10-CM

## 2022-10-26 DIAGNOSIS — E11.621 DIABETIC FOOT ULCER: ICD-10-CM

## 2022-10-26 DIAGNOSIS — I63.9 STROKE: ICD-10-CM

## 2022-10-26 DIAGNOSIS — E11.628 DIABETIC INFECTION OF RIGHT FOOT: Chronic | ICD-10-CM

## 2022-10-26 PROBLEM — Z89.411 HISTORY OF AMPUTATION OF RIGHT GREAT TOE: Chronic | Status: RESOLVED | Noted: 2017-07-12 | Resolved: 2022-10-26

## 2022-10-26 PROBLEM — E11.610 DIABETIC CHARCOT FOOT: Chronic | Status: RESOLVED | Noted: 2017-06-30 | Resolved: 2022-10-26

## 2022-10-26 PROBLEM — E11.51 DIABETIC PERIPHERAL VASCULAR DISEASE: Chronic | Status: RESOLVED | Noted: 2017-07-03 | Resolved: 2022-10-26

## 2022-10-26 LAB
ALBUMIN SERPL BCP-MCNC: 2.2 G/DL (ref 3.5–5.2)
ALP SERPL-CCNC: 84 U/L (ref 55–135)
ALT SERPL W/O P-5'-P-CCNC: 24 U/L (ref 10–44)
ANION GAP SERPL CALC-SCNC: 5 MMOL/L (ref 8–16)
AST SERPL-CCNC: 32 U/L (ref 10–40)
BACTERIA #/AREA URNS HPF: NEGATIVE /HPF
BASOPHILS # BLD AUTO: 0.02 K/UL (ref 0–0.2)
BASOPHILS NFR BLD: 0.3 % (ref 0–1.9)
BILIRUB SERPL-MCNC: 0.8 MG/DL (ref 0.1–1)
BILIRUB UR QL STRIP: NEGATIVE
BUN SERPL-MCNC: 11 MG/DL (ref 6–20)
CALCIUM SERPL-MCNC: 8.5 MG/DL (ref 8.7–10.5)
CHLORIDE SERPL-SCNC: 104 MMOL/L (ref 95–110)
CLARITY UR: CLEAR
CO2 SERPL-SCNC: 29 MMOL/L (ref 23–29)
COLOR UR: YELLOW
CREAT SERPL-MCNC: 0.7 MG/DL (ref 0.5–1.4)
DIFFERENTIAL METHOD: ABNORMAL
ENTEROVIRUS/RHINOVIRUS: NOT DETECTED
EOSINOPHIL # BLD AUTO: 0.1 K/UL (ref 0–0.5)
EOSINOPHIL NFR BLD: 1.4 % (ref 0–8)
ERYTHROCYTE [DISTWIDTH] IN BLOOD BY AUTOMATED COUNT: 12.6 % (ref 11.5–14.5)
EST. GFR  (NO RACE VARIABLE): >60 ML/MIN/1.73 M^2
GLUCOSE SERPL-MCNC: 145 MG/DL (ref 70–110)
GLUCOSE UR QL STRIP: ABNORMAL
HCT VFR BLD AUTO: 34.6 % (ref 40–54)
HGB BLD-MCNC: 11.8 G/DL (ref 14–18)
HGB UR QL STRIP: NEGATIVE
HUMAN BOCAVIRUS: NOT DETECTED
HUMAN CORONAVIRUS, COMMON COLD VIRUS: NOT DETECTED
HYALINE CASTS #/AREA URNS LPF: 0.4 /LPF
IMM GRANULOCYTES # BLD AUTO: 0.07 K/UL (ref 0–0.04)
IMM GRANULOCYTES NFR BLD AUTO: 1.1 % (ref 0–0.5)
INFLUENZA A - H1N1-09: NOT DETECTED
INR PPP: 1.1 (ref 0.8–1.2)
KETONES UR QL STRIP: ABNORMAL
LEUKOCYTE ESTERASE UR QL STRIP: NEGATIVE
LYMPHOCYTES # BLD AUTO: 1.4 K/UL (ref 1–4.8)
LYMPHOCYTES NFR BLD: 22.4 % (ref 18–48)
MAGNESIUM SERPL-MCNC: 1.7 MG/DL (ref 1.6–2.6)
MCH RBC QN AUTO: 28.3 PG (ref 27–31)
MCHC RBC AUTO-ENTMCNC: 34.1 G/DL (ref 32–36)
MCV RBC AUTO: 83 FL (ref 82–98)
MICROSCOPIC COMMENT: ABNORMAL
MONOCYTES # BLD AUTO: 0.4 K/UL (ref 0.3–1)
MONOCYTES NFR BLD: 6.5 % (ref 4–15)
NEUTROPHILS # BLD AUTO: 4.3 K/UL (ref 1.8–7.7)
NEUTROPHILS NFR BLD: 68.3 % (ref 38–73)
NITRITE UR QL STRIP: NEGATIVE
NRBC BLD-RTO: 0 /100 WBC
PARAINFLUENZA: NOT DETECTED
PH UR STRIP: 6 [PH] (ref 5–8)
PLATELET # BLD AUTO: 313 K/UL (ref 150–450)
PMV BLD AUTO: 9.6 FL (ref 9.2–12.9)
POCT GLUCOSE: 188 MG/DL (ref 70–110)
POTASSIUM SERPL-SCNC: 3.8 MMOL/L (ref 3.5–5.1)
PROT SERPL-MCNC: 6.6 G/DL (ref 6–8.4)
PROT UR QL STRIP: NEGATIVE
PROTHROMBIN TIME: 11.6 SEC (ref 9–12.5)
RBC # BLD AUTO: 4.17 M/UL (ref 4.6–6.2)
RBC #/AREA URNS HPF: 2 /HPF (ref 0–4)
RVP - ADENOVIRUS: NOT DETECTED
RVP - HUMAN METAPNEUMOVIRUS (HMPV): NOT DETECTED
RVP - INFLUENZA A: NOT DETECTED
RVP - INFLUENZA B: NOT DETECTED
RVP - RESPIRATORY SYNCTIAL VIRUS (RSV) A: NOT DETECTED
RVP - RESPIRATORY VIRAL PANEL, SOURCE: NORMAL
SODIUM SERPL-SCNC: 138 MMOL/L (ref 136–145)
SP GR UR STRIP: 1.02 (ref 1–1.03)
SQUAMOUS #/AREA URNS HPF: 0 /HPF
URN SPEC COLLECT METH UR: ABNORMAL
UROBILINOGEN UR STRIP-ACNC: NEGATIVE EU/DL
WBC # BLD AUTO: 6.35 K/UL (ref 3.9–12.7)
WBC #/AREA URNS HPF: 1 /HPF (ref 0–5)
YEAST URNS QL MICRO: ABNORMAL

## 2022-10-26 PROCEDURE — 97161 PT EVAL LOW COMPLEX 20 MIN: CPT

## 2022-10-26 PROCEDURE — 81000 URINALYSIS NONAUTO W/SCOPE: CPT | Performed by: EMERGENCY MEDICINE

## 2022-10-26 PROCEDURE — 96375 TX/PRO/DX INJ NEW DRUG ADDON: CPT

## 2022-10-26 PROCEDURE — 25000003 PHARM REV CODE 250: Performed by: STUDENT IN AN ORGANIZED HEALTH CARE EDUCATION/TRAINING PROGRAM

## 2022-10-26 PROCEDURE — 63600175 PHARM REV CODE 636 W HCPCS: Performed by: STUDENT IN AN ORGANIZED HEALTH CARE EDUCATION/TRAINING PROGRAM

## 2022-10-26 PROCEDURE — 36415 COLL VENOUS BLD VENIPUNCTURE: CPT | Performed by: EMERGENCY MEDICINE

## 2022-10-26 PROCEDURE — 93010 EKG 12-LEAD: ICD-10-PCS | Mod: ,,, | Performed by: INTERNAL MEDICINE

## 2022-10-26 PROCEDURE — 96361 HYDRATE IV INFUSION ADD-ON: CPT | Mod: 59

## 2022-10-26 PROCEDURE — 83735 ASSAY OF MAGNESIUM: CPT | Performed by: EMERGENCY MEDICINE

## 2022-10-26 PROCEDURE — 11000001 HC ACUTE MED/SURG PRIVATE ROOM

## 2022-10-26 PROCEDURE — 25000003 PHARM REV CODE 250: Performed by: EMERGENCY MEDICINE

## 2022-10-26 PROCEDURE — 99291 CRITICAL CARE FIRST HOUR: CPT | Mod: 25

## 2022-10-26 PROCEDURE — G0378 HOSPITAL OBSERVATION PER HR: HCPCS

## 2022-10-26 PROCEDURE — 99900035 HC TECH TIME PER 15 MIN (STAT)

## 2022-10-26 PROCEDURE — 93010 ELECTROCARDIOGRAM REPORT: CPT | Mod: ,,, | Performed by: INTERNAL MEDICINE

## 2022-10-26 PROCEDURE — 96372 THER/PROPH/DIAG INJ SC/IM: CPT | Mod: 59 | Performed by: STUDENT IN AN ORGANIZED HEALTH CARE EDUCATION/TRAINING PROGRAM

## 2022-10-26 PROCEDURE — 85025 COMPLETE CBC W/AUTO DIFF WBC: CPT | Performed by: EMERGENCY MEDICINE

## 2022-10-26 PROCEDURE — 97166 OT EVAL MOD COMPLEX 45 MIN: CPT | Mod: 59

## 2022-10-26 PROCEDURE — 85610 PROTHROMBIN TIME: CPT | Performed by: EMERGENCY MEDICINE

## 2022-10-26 PROCEDURE — 93005 ELECTROCARDIOGRAM TRACING: CPT

## 2022-10-26 PROCEDURE — 94761 N-INVAS EAR/PLS OXIMETRY MLT: CPT

## 2022-10-26 PROCEDURE — 96366 THER/PROPH/DIAG IV INF ADDON: CPT

## 2022-10-26 PROCEDURE — 99223 PR INITIAL HOSPITAL CARE,LEVL III: ICD-10-PCS | Mod: ,,, | Performed by: STUDENT IN AN ORGANIZED HEALTH CARE EDUCATION/TRAINING PROGRAM

## 2022-10-26 PROCEDURE — 96374 THER/PROPH/DIAG INJ IV PUSH: CPT

## 2022-10-26 PROCEDURE — 99223 1ST HOSP IP/OBS HIGH 75: CPT | Mod: ,,, | Performed by: STUDENT IN AN ORGANIZED HEALTH CARE EDUCATION/TRAINING PROGRAM

## 2022-10-26 PROCEDURE — 80053 COMPREHEN METABOLIC PANEL: CPT | Performed by: EMERGENCY MEDICINE

## 2022-10-26 PROCEDURE — 96365 THER/PROPH/DIAG IV INF INIT: CPT

## 2022-10-26 RX ORDER — SODIUM CHLORIDE 0.9 % (FLUSH) 0.9 %
10 SYRINGE (ML) INJECTION
Status: DISCONTINUED | OUTPATIENT
Start: 2022-10-26 | End: 2022-10-31 | Stop reason: HOSPADM

## 2022-10-26 RX ORDER — SODIUM CHLORIDE 0.9 % (FLUSH) 0.9 %
10 SYRINGE (ML) INJECTION
Status: DISCONTINUED | OUTPATIENT
Start: 2022-10-26 | End: 2022-10-27

## 2022-10-26 RX ORDER — MUPIROCIN 20 MG/G
OINTMENT TOPICAL 2 TIMES DAILY
Status: DISCONTINUED | OUTPATIENT
Start: 2022-10-26 | End: 2022-10-31 | Stop reason: HOSPADM

## 2022-10-26 RX ORDER — ACETAMINOPHEN 325 MG/1
650 TABLET ORAL EVERY 6 HOURS PRN
Status: DISCONTINUED | OUTPATIENT
Start: 2022-10-26 | End: 2022-10-31 | Stop reason: HOSPADM

## 2022-10-26 RX ORDER — LOSARTAN POTASSIUM 50 MG/1
100 TABLET ORAL DAILY
Status: DISCONTINUED | OUTPATIENT
Start: 2022-10-27 | End: 2022-10-31 | Stop reason: HOSPADM

## 2022-10-26 RX ORDER — ASPIRIN 81 MG/1
81 TABLET ORAL DAILY
Status: DISCONTINUED | OUTPATIENT
Start: 2022-10-27 | End: 2022-10-26

## 2022-10-26 RX ORDER — LABETALOL HCL 20 MG/4 ML
10 SYRINGE (ML) INTRAVENOUS
Status: DISCONTINUED | OUTPATIENT
Start: 2022-10-26 | End: 2022-10-26

## 2022-10-26 RX ORDER — METOCLOPRAMIDE 10 MG/1
10 TABLET ORAL
Status: DISPENSED | OUTPATIENT
Start: 2022-10-27 | End: 2022-10-30

## 2022-10-26 RX ORDER — LABETALOL HCL 20 MG/4 ML
10 SYRINGE (ML) INTRAVENOUS
Status: DISCONTINUED | OUTPATIENT
Start: 2022-10-26 | End: 2022-10-31 | Stop reason: HOSPADM

## 2022-10-26 RX ORDER — CEFAZOLIN SODIUM 2 G/50ML
2 SOLUTION INTRAVENOUS
Status: DISCONTINUED | OUTPATIENT
Start: 2022-10-26 | End: 2022-10-31 | Stop reason: HOSPADM

## 2022-10-26 RX ORDER — AMIODARONE HYDROCHLORIDE 200 MG/1
200 TABLET ORAL 2 TIMES DAILY
Status: DISCONTINUED | OUTPATIENT
Start: 2022-10-26 | End: 2022-10-29

## 2022-10-26 RX ORDER — IBUPROFEN 200 MG
16 TABLET ORAL
Status: DISCONTINUED | OUTPATIENT
Start: 2022-10-26 | End: 2022-10-31 | Stop reason: HOSPADM

## 2022-10-26 RX ORDER — ATORVASTATIN CALCIUM 40 MG/1
40 TABLET, FILM COATED ORAL DAILY
Status: DISCONTINUED | OUTPATIENT
Start: 2022-10-27 | End: 2022-10-31 | Stop reason: HOSPADM

## 2022-10-26 RX ORDER — METOPROLOL TARTRATE 25 MG/1
25 TABLET, FILM COATED ORAL 2 TIMES DAILY
Status: DISCONTINUED | OUTPATIENT
Start: 2022-10-26 | End: 2022-10-27

## 2022-10-26 RX ORDER — TALC
6 POWDER (GRAM) TOPICAL NIGHTLY PRN
Status: DISCONTINUED | OUTPATIENT
Start: 2022-10-26 | End: 2022-10-31 | Stop reason: HOSPADM

## 2022-10-26 RX ORDER — PROCHLORPERAZINE EDISYLATE 5 MG/ML
5 INJECTION INTRAMUSCULAR; INTRAVENOUS EVERY 6 HOURS PRN
Status: DISCONTINUED | OUTPATIENT
Start: 2022-10-26 | End: 2022-10-31 | Stop reason: HOSPADM

## 2022-10-26 RX ORDER — METOPROLOL TARTRATE 1 MG/ML
5 INJECTION, SOLUTION INTRAVENOUS
Status: COMPLETED | OUTPATIENT
Start: 2022-10-26 | End: 2022-10-26

## 2022-10-26 RX ORDER — AMOXICILLIN 250 MG
1 CAPSULE ORAL 2 TIMES DAILY
Status: DISCONTINUED | OUTPATIENT
Start: 2022-10-26 | End: 2022-10-30

## 2022-10-26 RX ORDER — INSULIN ASPART 100 [IU]/ML
0-5 INJECTION, SOLUTION INTRAVENOUS; SUBCUTANEOUS
Status: DISCONTINUED | OUTPATIENT
Start: 2022-10-26 | End: 2022-10-31 | Stop reason: HOSPADM

## 2022-10-26 RX ORDER — ENOXAPARIN SODIUM 100 MG/ML
40 INJECTION SUBCUTANEOUS EVERY 12 HOURS
Status: DISCONTINUED | OUTPATIENT
Start: 2022-10-26 | End: 2022-10-26

## 2022-10-26 RX ORDER — L. ACIDOPHILUS/L.BULGARICUS 100MM CELL
1 GRANULES IN PACKET (EA) ORAL 2 TIMES DAILY
Status: DISCONTINUED | OUTPATIENT
Start: 2022-10-26 | End: 2022-10-31 | Stop reason: HOSPADM

## 2022-10-26 RX ORDER — ONDANSETRON 2 MG/ML
4 INJECTION INTRAMUSCULAR; INTRAVENOUS EVERY 8 HOURS PRN
Status: DISCONTINUED | OUTPATIENT
Start: 2022-10-26 | End: 2022-10-31 | Stop reason: HOSPADM

## 2022-10-26 RX ADMIN — SODIUM CHLORIDE 1000 ML: 0.9 INJECTION, SOLUTION INTRAVENOUS at 11:10

## 2022-10-26 RX ADMIN — CEFAZOLIN SODIUM 2 G: 2 SOLUTION INTRAVENOUS at 06:10

## 2022-10-26 RX ADMIN — INSULIN DETEMIR 30 UNITS: 100 INJECTION, SOLUTION SUBCUTANEOUS at 10:10

## 2022-10-26 RX ADMIN — METOROPROLOL TARTRATE 5 MG: 5 INJECTION, SOLUTION INTRAVENOUS at 11:10

## 2022-10-26 RX ADMIN — CEFAZOLIN SODIUM 2 G: 2 SOLUTION INTRAVENOUS at 11:10

## 2022-10-26 RX ADMIN — METOPROLOL TARTRATE 25 MG: 25 TABLET, FILM COATED ORAL at 08:10

## 2022-10-26 RX ADMIN — AMIODARONE HYDROCHLORIDE 200 MG: 200 TABLET ORAL at 08:10

## 2022-10-26 RX ADMIN — APIXABAN 5 MG: 5 TABLET, FILM COATED ORAL at 08:10

## 2022-10-26 RX ADMIN — SENNOSIDES AND DOCUSATE SODIUM 1 TABLET: 50; 8.6 TABLET ORAL at 08:10

## 2022-10-26 RX ADMIN — LACTOBACILLUS ACIDOPHILUS / LACTOBACILLUS BULGARICUS 1 EACH: 100 MILLION CFU STRENGTH GRANULES at 10:10

## 2022-10-26 RX ADMIN — MUPIROCIN: 20 OINTMENT TOPICAL at 10:10

## 2022-10-26 NOTE — PROGRESS NOTES
C3 RN attempted to contact Andrew Del Cid Jr. For a [facility] post-discharge hospital follow up call. The patient verbalizes that he has returned to the Emergency Department and is currently in Trauma Bed C. This nurse informed the patient that she would check the chart this afternoon and make a 2nd attempted follow-up call if discharge orders had been placed, however, a follow-up attempt will not be made at this time time the patient is re-admitted to the hospital. Patient did not express concerns at this time.     The patient does not have a HOSFU scheduled with Olena Thornton DO at this time.

## 2022-10-26 NOTE — PLAN OF CARE
Patient up from ER with ER nurse and ER tech. Patient able to move from stretcher to bed with assistance. Patient educated on fall risk and fall risk precautions.

## 2022-10-26 NOTE — PT/OT/SLP EVAL
Physical Therapy Evaluation    Patient Name:  Andrew Del Cid Jr.   MRN:  [id number]    Recommendations:     Discharge Recommendations:  rehabilitation facility   Discharge Equipment Recommendations: walker, rolling   Barriers to discharge:  pt has weaknees on left side and decreased fxnl mobility    Assessment:     Andrew Del Cid Jr. is a 57 y.o. male admitted with a medical diagnosis of <principal problem not specified>.  He presents with the following impairments/functional limitations:  weakness, impaired functional mobility, gait instability . He displays left side weakness and decreased fxnl mobility that would benefit from an acute rehab stay    Rehab Prognosis: Good; patient would benefit from acute skilled PT services to address these deficits and reach maximum level of function.    Recent Surgery: * No surgery found *      Plan:     During this hospitalization, patient to be seen daily to address the identified rehab impairments via gait training, therapeutic activities, therapeutic exercises and progress toward the following goals:    Plan of Care Expires:       Subjective     Chief Complaint: left weakness  Patient/Family Comments/goals: ind with fxn'l mobility  Pain/Comfort:       Patients cultural, spiritual, Amish conflicts given the current situation:      Living Environment:  Lives with wife in 2 story house, 5 steps , has an elevator  Prior to admission, patients level of function was ind.  Equipment used at home: none.  DME owned (not currently used):   Upon discharge, patient will have assistance from spouse    Objective:     Communicated with nurse prior to session.  Patient found supine with    upon PT entry to room.    General Precautions: Standard, fall   Orthopedic Precautions:    Braces:    Respiratory Status: Room air    Exams:  RLE ROM: WFL  RLE Strength: WFL  LLE ROM: WFL  LLE Strength: hip is 3+/5 knee 4-/5 ankle 4-/5    Functional Mobility:  Bed Mobility:     Rolling Left:   supervision  Rolling Right: supervision  Scooting: supervision  Supine to Sit: supervision  Sit to Supine: supervision  Transfers:     Sit to Stand:  minimum assistance with rolling walker  Bed to Chair: minimum assistance with  rolling walker  using  Step Transfer  Gait: ambulates with a rw 15 ft with moda seconadry to left leg slightly buckling and pt decreased safety      AM-PAC 6 CLICK MOBILITY  Total Score:18       Treatment & Education:      Patient left supine with call button in reach.    GOALS:   Multidisciplinary Problems       Physical Therapy Goals          Problem: Physical Therapy    Goal Priority Disciplines Outcome Goal Variances Interventions   Physical Therapy Goal     PT, PT/OT      Description: 1. Ind with bed mobility  2. Mod ind with transfers  3. Amb with rw 150ft with mod ind                       History:     Past Medical History:   Diagnosis Date    Acute renal failure with tubular necrosis 10/22/2022    Diabetes mellitus, type 2     Fever 10/22/2022    Hyperlipidemia     Hypertension     Hyponatremia 10/22/2022    Obese     Peripheral neuropathy        Past Surgical History:   Procedure Laterality Date    APPENDECTOMY      HERNIA REPAIR      INCISION AND DRAINAGE FOOT Bilateral 12/26/2018    Procedure: INCISION AND DRAINAGE, FOOT;  Surgeon: Rob Alas DPM;  Location: [facility];  Service: Podiatry;  Laterality: Bilateral;    TOE AMPUTATION      right great toe    TOE AMPUTATION Left 12/26/2018    Procedure: AMPUTATION, TOE; left 3rd toe;  Surgeon: Rob Alas DPM;  Location: [facility];  Service: Podiatry;  Laterality: Left;       Time Tracking:     PT Received On:    PT Start Time:       PT Stop Time:    PT Total Time (min):       Billable Minutes: Evaluation        10/26/2022

## 2022-10-26 NOTE — PT/OT/SLP EVAL
Occupational Therapy   Evaluation    Name: Andrew Del Cid Jr.  MRN: [id number]  Admitting Diagnosis:  Stroke  Recent Surgery: * No surgery found *      Recommendations:     Discharge Recommendations: Inpatient Rehabilitation  Discharge Equipment Recommendations:  walker, rolling, bedside commode, other (see comments) (To be further determined based on progress prior to discharge.)  Barriers to discharge:  Other (Comment) (Medical and functional status)    Assessment:     Andrew Del Cid Jr. is a 57 y.o. male with a medical diagnosis of Stroke.  He presents with functional deficits impacting independence with ADL's including functional mobility. Pt reports multiple falls at home with left side numbness and weakness. Performance deficits affecting function: weakness, impaired endurance, impaired sensation, impaired self care skills, impaired functional mobility, impaired balance, decreased coordination, decreased upper extremity function, decreased lower extremity function, decreased safety awareness, impaired fine motor.      Rehab Prognosis: Good; patient would benefit from acute skilled OT services to address these deficits and reach maximum level of function.       Plan:     Patient to be seen 5 x/week to address the above listed problems via self-care/home management, therapeutic activities, therapeutic exercises, neuromuscular re-education, sensory integration  Plan of Care Expires: 11/02/22  Plan of Care Reviewed with: patient    Subjective     Chief Complaint: left sided weakness, incoordination, and numbness  Patient/Family Comments/goals: Pt would like to regain functional use of (L) UE / LE while reducing falls and improving independence in order to return home with family.    Occupational Profile:  Living Environment: Pt lives with his spouse in a two story home with 6 steps and handrail on (L) to enter.   Previous level of function: Independent  Roles and Routines: ADL's and IADL's  Equipment Used at  Home:  none  Assistance upon Discharge: Pt's spouse can assist as needed.     Pain/Comfort:  Pain Rating 1: 0/10  Pain Rating Post-Intervention 1: 0/10    Patients cultural, spiritual, Rastafari conflicts given the current situation:      Objective:     Communicated with: nurse prior to session.  Patient found supine with telemetry, pulse ox (continuous), blood pressure cuff upon OT entry to room.    General Precautions: Standard, fall   Orthopedic Precautions:N/A   Braces: N/A  Respiratory Status: Room air    Occupational Performance:    Bed Mobility:    Patient completed Rolling/Turning to Left with  minimum assistance  Patient completed Scooting/Bridging with minimum assistance  Patient completed Supine to Sit with moderate assistance  Patient completed Sit to Supine with minimum assistance    Functional Mobility/Transfers:  Patient completed Sit <> Stand Transfer with minimum assistance  with  rolling walker   Patient completed Bed <> Chair Transfer using Step Transfer technique with minimum assistance with rolling walker  Patient completed Toilet Transfer Step Transfer technique with moderate assistance with  bedside commode  Functional Mobility: Pt ambulated 8' requiring steadying assist utilizing RW.    Activities of Daily Living:  Feeding:  setup assistance    Grooming: supervision    Bathing: moderate assistance    Upper Body Dressing: minimum assistance    Lower Body Dressing: moderate assistance    Toileting: moderate assistance      Cognitive/Visual Perceptual:  Cognitive/Psychosocial Skills:  -       Oriented to: Person, Place, and Situation   -       Follows Commands/attention:Follows multistep  commands  -       Communication: clear/fluent  -       Memory: No Deficits noted  -       Safety awareness/insight to disability: impaired   -       Mood/Affect/Coping skills/emotional control: Cooperative and Pleasant    Physical Exam:  Postural examination/scapula alignment: -       No postural abnormalities  identified  Skin integrity: Wound (R) foot  Sensation: -       Impaired  light/touch   and proprioception (L) UE  Dominant hand: -       Right  Upper Extremity Range of Motion:  -       Right Upper Extremity: WFL  -       Left Upper Extremity: WFL  Upper Extremity Strength: -       Right Upper Extremity: 4+ to 5/5  -       Left Upper Extremity: Deficits: 4- to 4+/5  Fine Motor Coordination: -       Impaired  Left hand, finger to nose   and Left hand, manipulation of objects    Gross motor coordination: (L) UE hemiplegia/paresis    AMPA 6 Click ADL:  AMPAC Total Score: 20    Treatment & Education:  Pt was provided education / instruction regarding role of OT and established OT POC.     Patient left supine with all lines intact, call button in reach, and nurse notified    GOALS:   Goals to be met by: 11/02/22     Patient will increase functional independence with ADLs by performing:    Feeding with [place].  UE Dressing with Set-up Assistance.  LE Dressing with Supervision.  Grooming while seated with Modified [place].  Toileting from toilet with Supervision for hygiene and clothing management.   Bathing from  shower chair/bench with Supervision.  Step transfer with Supervision  Toilet transfer to toilet with Supervision.  Increased functional strength to 4+ to 5/5 for (L) UE.      History:     Past Medical History:   Diagnosis Date    Acute renal failure with tubular necrosis 10/22/2022    Diabetes mellitus, type 2     Fever 10/22/2022    Hyperlipidemia     Hypertension     Hyponatremia 10/22/2022    Obese     Peripheral neuropathy          Past Surgical History:   Procedure Laterality Date    APPENDECTOMY      HERNIA REPAIR      INCISION AND DRAINAGE FOOT Bilateral 12/26/2018    Procedure: INCISION AND DRAINAGE, FOOT;  Surgeon: Rob Alas DPM;  Location: [facility];  Service: Podiatry;  Laterality: Bilateral;    TOE AMPUTATION      right great toe    TOE AMPUTATION Left 12/26/2018    Procedure:  AMPUTATION, TOE; left 3rd toe;  Surgeon: Rob Alas DPM;  Location: [facility];  Service: Podiatry;  Laterality: Left;       Time Tracking:     OT Date of Treatment: 10/26/22  OT Start Time: 1450  OT Stop Time: 1528  OT Total Time (min): 38 min    Billable Minutes:Evaluation 38    10/26/2022

## 2022-10-26 NOTE — ED PROVIDER NOTES
EMERGENCY DEPARTMENT HISTORY AND PHYSICAL EXAM     This note is dictated on M*Modal word recognition program.  There are word recognition mistakes and grammatical errors that are occasionally missed on review.     Date: 10/26/2022   Patient Name: Andrew Del Cid Jr.       History of Presenting Illness           Chief Complaint   Patient presents with    Weakness     EMS pt from home with c/o weakness to left arm, pt dc'd from our hospital yesterday with PICC Line Left arm for Sepsis.          History Provided By: Patient and Family Member    1130   Andrew Del Cid Jr. is a 57 y.o. male with PMHX of T2DM, HTN, Obesity, neuropathy, RLE diabetic foot ulcer, recent admission for sepsis 2/2 foot ulcer who presents to the emergency department C/O weakness.    Patient says at last week he had multiple falls at home.  He is typically ambulatory without assistance.  He states he fell sources left-sided and he believes this is due to being uncoordinated with his left extremities.  He denies weakness.  Patient came to the hospital for this problem on October 21st and met SIRS criteria.  Sepsis workup was started on room ultimately patient was admitted to the hospital and treated for sepsis believed  secondary to diabetic foot ulcer. He had BCX positive for staph and was subsequently discharged with LUE PICC line receiving Ancef,           PCP: Olena Thornton DO        Current Facility-Administered Medications   Medication Dose Route Frequency Provider Last Rate Last Admin    amiodarone tablet 200 mg  200 mg Oral BID Isai Royal MD        apixaban tablet 5 mg  5 mg Oral BID Isai Royal MD        cefazolin (ANCEF) 2 gram in dextrose 5% 50 mL IVPB (premix)  2 g Intravenous Q8H Olena Thornton DO        metoprolol tartrate (LOPRESSOR) tablet 25 mg  25 mg Oral BID Isai Royal MD         Current Outpatient Medications   Medication Sig Dispense Refill    amLODIPine (NORVASC) 5 MG  tablet Take 5 mg by mouth.      aspirin (ECOTRIN) 81 MG EC tablet Take 81 mg by mouth once daily.      atorvastatin (LIPITOR) 40 MG tablet Take 40 mg by mouth.      ceFAZolin 2 g/50mL Dextrose IVPB (ANCEF) 2 gram/50 mL PgBk Inject 50 mLs (2,000 mg total) into the vein every 8 (eight) hours. for 13 days 1950 mL 0    cholecalciferol, vitamin D3, (VITAMIN D3) 25 mcg (1,000 unit) capsule 1 capsule      DULoxetine (CYMBALTA) 30 MG capsule Take 30 mg by mouth once daily.      JARDIANCE 25 mg tablet Take 25 mg by mouth once daily.      lactobacillus acidophilus & bulgar (LACTINEX) 100 million cell packet Take 1 packet (1 each total) by mouth 2 (two) times daily. 60 packet 2    LANTUS SOLOSTAR U-100 INSULIN glargine 100 units/mL SubQ pen SMARTSI Unit(s) SUB-Q Twice Daily      losartan (COZAAR) 25 MG tablet Take 4 tablets (100 mg total) by mouth once daily. 360 tablet 1    metFORMIN (GLUCOPHAGE) 1000 MG tablet Take 1,000 mg by mouth 2 (two) times daily.      metoclopramide HCl (REGLAN) 10 MG tablet Take 1 tablet (10 mg total) by mouth 3 (three) times daily before meals. for 7 days 21 tablet 0    mupirocin (BACTROBAN) 2 % ointment Apply topically 2 (two) times daily. 30 g 2    psyllium husk, with sugar, 3.4 gram packet Take 1 packet by mouth 3 (three) times daily. 90 packet 2    TRULICITY 4.5 mg/0.5 mL pen injector Inject into the skin.             Past History     Past Medical History:   Past Medical History:   Diagnosis Date    Acute renal failure with tubular necrosis 10/22/2022    Diabetes mellitus, type 2     Fever 10/22/2022    Hyperlipidemia     Hypertension     Hyponatremia 10/22/2022    Obese     Peripheral neuropathy         Past Surgical History:   Past Surgical History:   Procedure Laterality Date    APPENDECTOMY      HERNIA REPAIR      INCISION AND DRAINAGE FOOT Bilateral 2018    Procedure: INCISION AND DRAINAGE, FOOT;  Surgeon: Rob Alas DPM;  Location: [facility] OR;   "Service: Podiatry;  Laterality: Bilateral;    TOE AMPUTATION      right great toe    TOE AMPUTATION Left 12/26/2018    Procedure: AMPUTATION, TOE; left 3rd toe;  Surgeon: Rob Alas DPM;  Location: [facility];  Service: Podiatry;  Laterality: Left;        Family History:   No family history on file.     Social History:   Social History     Tobacco Use    Smoking status: Never    Smokeless tobacco: Never   Substance Use Topics    Alcohol use: No    Drug use: No        Allergies:   Review of patient's allergies indicates:  No Known Allergies       Review of Systems   Review of Systems   Constitutional:  Negative for appetite change, chills and fever.   HENT:  Negative for congestion, rhinorrhea and sore throat.    Eyes:  Negative for pain and discharge.   Respiratory:  Negative for cough, chest tightness, shortness of breath and wheezing.    Cardiovascular:  Negative for chest pain and palpitations.   Gastrointestinal:  Negative for abdominal pain, nausea and vomiting.   Genitourinary:  Negative for difficulty urinating and dysuria.   Musculoskeletal:  Negative for myalgias and neck pain.   Skin:  Positive for wound (chronic RLE). Negative for rash.   Neurological:  Positive for numbness ("coordination"). Negative for dizziness, weakness and headaches.   Psychiatric/Behavioral:  Negative for confusion and dysphoric mood.    All other systems reviewed and are negative.             Physical Exam     Vitals:    10/26/22 1402 10/26/22 1417 10/26/22 1422 10/26/22 1423   BP: (!) 161/66   (!) 153/76   BP Location:       Patient Position:       Pulse: 97 97 99    Resp:       Temp:       TempSrc:       SpO2: 95% 95% 95%    Weight:       Height:          Physical Exam  Vitals and nursing note reviewed.   Constitutional:       General: He is not in acute distress.     Appearance: Normal appearance. He is well-developed. He is obese. He is not ill-appearing.   HENT:      Head: Normocephalic and atraumatic.   Eyes:     "  General: No visual field deficit.     Extraocular Movements: Extraocular movements intact.      Conjunctiva/sclera: Conjunctivae normal.      Pupils: Pupils are equal, round, and reactive to light.   Cardiovascular:      Rate and Rhythm: Tachycardia present. Rhythm irregular.      Heart sounds: Normal heart sounds.   Pulmonary:      Effort: Pulmonary effort is normal. No respiratory distress.   Abdominal:      General: There is no distension.      Palpations: Abdomen is soft.      Tenderness: There is no abdominal tenderness. There is no guarding or rebound.   Musculoskeletal:         General: No deformity or signs of injury. Normal range of motion.      Cervical back: Normal range of motion. No rigidity.      Comments: RLE wound dressed   Skin:     General: Skin is dry.      Coloration: Skin is not pale.      Findings: No rash.   Neurological:      General: No focal deficit present.      Mental Status: He is alert and oriented to person, place, and time.      GCS: GCS eye subscore is 4. GCS verbal subscore is 5. GCS motor subscore is 6.      Cranial Nerves: No cranial nerve deficit, dysarthria or facial asymmetry.      Sensory: Sensation is intact.      Motor: Motor function is intact. No weakness or abnormal muscle tone.      Coordination: Coordination normal. Finger-Nose-Finger Test abnormal (LUE). Heel to Shin Test normal. Rapid alternating movements normal.   Psychiatric:         Mood and Affect: Mood normal.         Behavior: Behavior normal.            Diagnostic Study Results      Labs -   Recent Results (from the past 12 hour(s))   CBC W/ AUTO DIFFERENTIAL    Collection Time: 10/26/22 11:43 AM   Result Value Ref Range    WBC 6.35 3.90 - 12.70 K/uL    RBC 4.17 (L) 4.60 - 6.20 M/uL    Hemoglobin 11.8 (L) 14.0 - 18.0 g/dL    Hematocrit 34.6 (L) 40.0 - 54.0 %    MCV 83 82 - 98 fL    MCH 28.3 27.0 - 31.0 pg    MCHC 34.1 32.0 - 36.0 g/dL    RDW 12.6 11.5 - 14.5 %    Platelets 313 150 - 450 K/uL    MPV 9.6 9.2 -  12.9 fL    Immature Granulocytes 1.1 (H) 0.0 - 0.5 %    Gran # (ANC) 4.3 1.8 - 7.7 K/uL    Immature Grans (Abs) 0.07 (H) 0.00 - 0.04 K/uL    Lymph # 1.4 1.0 - 4.8 K/uL    Mono # 0.4 0.3 - 1.0 K/uL    Eos # 0.1 0.0 - 0.5 K/uL    Baso # 0.02 0.00 - 0.20 K/uL    nRBC 0 0 /100 WBC    Gran % 68.3 38.0 - 73.0 %    Lymph % 22.4 18.0 - 48.0 %    Mono % 6.5 4.0 - 15.0 %    Eosinophil % 1.4 0.0 - 8.0 %    Basophil % 0.3 0.0 - 1.9 %    Differential Method Automated    Comprehensive metabolic panel    Collection Time: 10/26/22 11:43 AM   Result Value Ref Range    Sodium 138 136 - 145 mmol/L    Potassium 3.8 3.5 - 5.1 mmol/L    Chloride 104 95 - 110 mmol/L    CO2 29 23 - 29 mmol/L    Glucose 145 (H) 70 - 110 mg/dL    BUN 11 6 - 20 mg/dL    Creatinine 0.7 0.5 - 1.4 mg/dL    Calcium 8.5 (L) 8.7 - 10.5 mg/dL    Total Protein 6.6 6.0 - 8.4 g/dL    Albumin 2.2 (L) 3.5 - 5.2 g/dL    Total Bilirubin 0.8 0.1 - 1.0 mg/dL    Alkaline Phosphatase 84 55 - 135 U/L    AST 32 10 - 40 U/L    ALT 24 10 - 44 U/L    Anion Gap 5 (L) 8 - 16 mmol/L    eGFR >60.0 >60 mL/min/1.73 m^2   Protime-INR    Collection Time: 10/26/22 11:43 AM   Result Value Ref Range    Prothrombin Time 11.6 9.0 - 12.5 sec    INR 1.1 0.8 - 1.2   Magnesium    Collection Time: 10/26/22 11:43 AM   Result Value Ref Range    Magnesium 1.7 1.6 - 2.6 mg/dL   Urinalysis, Reflex to Urine Culture Urine, Clean Catch    Collection Time: 10/26/22 11:58 AM    Specimen: Urine   Result Value Ref Range    Specimen UA Urine, Unspecified     Color, UA Yellow Yellow, Straw, Velvet    Appearance, UA Clear Clear    pH, UA 6.0 5.0 - 8.0    Specific Gravity, UA 1.025 1.005 - 1.030    Protein, UA Negative Negative    Glucose, UA 4+ (A) Negative    Ketones, UA 1+ (A) Negative    Bilirubin (UA) Negative Negative    Occult Blood UA Negative Negative    Nitrite, UA Negative Negative    Urobilinogen, UA Negative <2.0 EU/dL    Leukocytes, UA Negative Negative   Urinalysis Microscopic    Collection Time:  10/26/22 11:58 AM   Result Value Ref Range    RBC, UA 2 0 - 4 /hpf    WBC, UA 1 0 - 5 /hpf    Bacteria Negative None-Occ /hpf    Yeast, UA None None    Squam Epithel, UA 0 /hpf    Hyaline Casts, UA 0.4 (A) 0-1/lpf /lpf    Microscopic Comment SEE COMMENT         Radiologic Studies -    CT Head Without Contrast   Final Result      No CT evidence of an acute intracranial abnormality.      Small ovoid hypodensity within the left cerebellar hemisphere, possibly encephalomalacia from an old infarct.  A follow-up MRI of the brain may be obtained on a nonemergent basis for further evaluation.         Electronically signed by: Ronan Oneill MD   Date:    10/26/2022   Time:    12:06      MRI Brain Without Contrast    (Results Pending)   US Carotid Bilateral    (Results Pending)        Medications given in the ED-   Medications   apixaban tablet 5 mg (has no administration in time range)   amiodarone tablet 200 mg (has no administration in time range)   metoprolol tartrate (LOPRESSOR) tablet 25 mg (has no administration in time range)   cefazolin (ANCEF) 2 gram in dextrose 5% 50 mL IVPB (premix) (has no administration in time range)   sodium chloride 0.9% bolus 1,000 mL (0 mLs Intravenous Stopped 10/26/22 1410)   metoprolol injection 5 mg (5 mg Intravenous Given 10/26/22 1144)           Medical Decision Making    I am the first provider for this patient.     I reviewed the vital signs, available nursing notes, past medical history, past surgical history, family history and social history.     Vital Signs:  Reviewed the patient's vital signs.     Pulse Oximetry Analysis and Interpretation:    95% on Room Air, normal    Cardiac Monitor:   Interpreted by Dr. Isai Royal at 1140    Rate: 115 bpm   Rhythm: a-fib RVR     EKG interpretation: (Preliminary)   Interpreted by Isai Royal MD at 1145   AFib RVR rate 117 no ST elevation       Records Reviewed: Old medical records.  Nursing notes.  Previous radiology  studies.        Provider Notes (Medical Decision Making): Andrew Del Cid Jr. is a 57 y.o. male who is here with left sided motor dysfunction.  No weakness on exam however patient appears to have dysmetria and reports frequent falls at home due to this issue.    Additionally he was found to be in AFib which is a new diagnosis for him.  He has mild RVR which was controlled with IV beta-blocker.    CT head was performed due to concern for stroke which showed hypodensity in cerebellum likely reflecting a subacute infarct..    Spoke with patient's primary care provider who will admit the patient for A-FIB and CVA work up.  MRI was ordered to better evaluate however patient is overweight limits for table.          Procedures:   Procedures      ED Course:    CONSULT NOTE:    1:41 PM      Dr. Royal discussed care with?Dr Thornton, Hospitalist   It was a standard discussion,?including history of patients chief complaint, available diagnostic results, and treatment course.?Discussed case. Agrees with admission        CONSULT NOTE:    1:52 PM      Dr. Royal discussed care with?Dr Miles, Cardiology   It was a standard discussion,?including history of patients chief complaint, available diagnostic results, and treatment course.?Discussed case. Reqs Eliquis 5mg BID, Amiodrone 200mg BID, Metoprolol 25mg BID                   Diagnosis and Disposition     3:13 PM     I have spent 34 minutes of critical care time involved in lab review, consultations with specialist, family decision-making, and documentation.  During this entire length of time I was immediately available to the patient.     Critical Care:  The reason for providing this level of medical care for this critically ill patient was due a critical illness that impaired one or more vital organ systems such that there was a high probability of imminent or life threatening deterioration in the patients condition. This care involved high complexity decision  making to assess, manipulate, and support vital system functions, to treat this degreee vital organ system failure and to prevent further life threatening deterioration of the patients condition.             CLINICAL IMPRESSION:         1. CVA (cerebral vascular accident)    2. A-fib    3. Diabetic infection of right foot    4. Bacteremia    5. Cerebellar dysmetria              PLAN:   1. Admit to Hospital  2.      Medication List        ASK your doctor about these medications      amLODIPine 5 MG tablet  Commonly known as: NORVASC     aspirin 81 MG EC tablet  Commonly known as: ECOTRIN     atorvastatin 40 MG tablet  Commonly known as: LIPITOR     ceFAZolin 2 g/50mL Dextrose IVPB 2 gram/50 mL Pgbk  Commonly known as: ANCEF  Inject 50 mLs (2,000 mg total) into the vein every 8 (eight) hours. for 13 days     cholecalciferol (vitamin D3) 25 mcg (1,000 unit) capsule  Commonly known as: VITAMIN D3     DULoxetine 30 MG capsule  Commonly known as: CYMBALTA     JARDIANCE 25 mg tablet  Generic drug: empagliflozin     lactobacillus acidophilus & bulgar 100 million cell packet  Commonly known as: LACTINEX  Take 1 packet (1 each total) by mouth 2 (two) times daily.     LANTUS SOLOSTAR U-100 INSULIN glargine 100 units/mL SubQ pen  Generic drug: insulin     losartan 25 MG tablet  Commonly known as: COZAAR  Take 4 tablets (100 mg total) by mouth once daily.     metFORMIN 1000 MG tablet  Commonly known as: GLUCOPHAGE     metoclopramide HCl 10 MG tablet  Commonly known as: REGLAN  Take 1 tablet (10 mg total) by mouth 3 (three) times daily before meals. for 7 days     mupirocin 2 % ointment  Commonly known as: BACTROBAN  Apply topically 2 (two) times daily.     psyllium husk (with sugar) 3.4 gram packet  Take 1 packet by mouth 3 (three) times daily.     TRULICITY 4.5 mg/0.5 mL pen injector  Generic drug: dulaglutide             3. No follow-up provider specified.     _______________________________     Please note that this  dictation was completed with [employer] ([employer] the [employer] voice recognition software.  Quite often unanticipated grammatical, syntax, homophones, and other interpretive errors are inadvertently transcribed by the computer software.  Please disregard these errors.  Please excuse any errors that have escaped final proofreading.             Isai Royal MD  10/26/22 2563

## 2022-10-27 ENCOUNTER — PATIENT OUTREACH (OUTPATIENT)
Dept: ADMINISTRATIVE | Facility: CLINIC | Age: 57
End: 2022-10-27
Payer: COMMERCIAL

## 2022-10-27 ENCOUNTER — TELEPHONE (OUTPATIENT)
Dept: PRIMARY CARE CLINIC | Facility: CLINIC | Age: 57
End: 2022-10-27
Payer: COMMERCIAL

## 2022-10-27 LAB
APTT BLDCRRT: 25.6 SEC (ref 21–32)
BASOPHILS # BLD AUTO: 0.03 K/UL (ref 0–0.2)
BASOPHILS NFR BLD: 0.5 % (ref 0–1.9)
CHOLEST SERPL-MCNC: 116 MG/DL (ref 120–199)
CHOLEST/HDLC SERPL: 5.3 {RATIO} (ref 2–5)
DIFFERENTIAL METHOD: ABNORMAL
EOSINOPHIL # BLD AUTO: 0.1 K/UL (ref 0–0.5)
EOSINOPHIL NFR BLD: 2 % (ref 0–8)
ERYTHROCYTE [DISTWIDTH] IN BLOOD BY AUTOMATED COUNT: 12.8 % (ref 11.5–14.5)
HCT VFR BLD AUTO: 36.3 % (ref 40–54)
HDLC SERPL-MCNC: 22 MG/DL (ref 40–75)
HDLC SERPL: 19 % (ref 20–50)
HGB BLD-MCNC: 11.8 G/DL (ref 14–18)
IMM GRANULOCYTES # BLD AUTO: 0.08 K/UL (ref 0–0.04)
IMM GRANULOCYTES NFR BLD AUTO: 1.3 % (ref 0–0.5)
INR PPP: 1 (ref 0.8–1.2)
LDLC SERPL CALC-MCNC: 65.8 MG/DL (ref 63–159)
LYMPHOCYTES # BLD AUTO: 0.9 K/UL (ref 1–4.8)
LYMPHOCYTES NFR BLD: 15.1 % (ref 18–48)
MAGNESIUM SERPL-MCNC: 1.7 MG/DL (ref 1.6–2.6)
MCH RBC QN AUTO: 27.6 PG (ref 27–31)
MCHC RBC AUTO-ENTMCNC: 32.5 G/DL (ref 32–36)
MCV RBC AUTO: 85 FL (ref 82–98)
MONOCYTES # BLD AUTO: 0.3 K/UL (ref 0.3–1)
MONOCYTES NFR BLD: 5.4 % (ref 4–15)
NEUTROPHILS # BLD AUTO: 4.6 K/UL (ref 1.8–7.7)
NEUTROPHILS NFR BLD: 75.7 % (ref 38–73)
NONHDLC SERPL-MCNC: 94 MG/DL
NRBC BLD-RTO: 0 /100 WBC
PHOSPHATE SERPL-MCNC: 3.7 MG/DL (ref 2.7–4.5)
PLATELET # BLD AUTO: 374 K/UL (ref 150–450)
PMV BLD AUTO: 9.5 FL (ref 9.2–12.9)
POCT GLUCOSE: 184 MG/DL (ref 70–110)
POCT GLUCOSE: 212 MG/DL (ref 70–110)
POCT GLUCOSE: 226 MG/DL (ref 70–110)
POCT GLUCOSE: 319 MG/DL (ref 70–110)
PROTHROMBIN TIME: 11.2 SEC (ref 9–12.5)
RBC # BLD AUTO: 4.27 M/UL (ref 4.6–6.2)
TRIGL SERPL-MCNC: 141 MG/DL (ref 30–150)
WBC # BLD AUTO: 6.11 K/UL (ref 3.9–12.7)

## 2022-10-27 PROCEDURE — 25000003 PHARM REV CODE 250: Performed by: EMERGENCY MEDICINE

## 2022-10-27 PROCEDURE — 97535 SELF CARE MNGMENT TRAINING: CPT

## 2022-10-27 PROCEDURE — 99900035 HC TECH TIME PER 15 MIN (STAT)

## 2022-10-27 PROCEDURE — 94761 N-INVAS EAR/PLS OXIMETRY MLT: CPT

## 2022-10-27 PROCEDURE — 83735 ASSAY OF MAGNESIUM: CPT | Performed by: STUDENT IN AN ORGANIZED HEALTH CARE EDUCATION/TRAINING PROGRAM

## 2022-10-27 PROCEDURE — 99900031 HC PATIENT EDUCATION (STAT)

## 2022-10-27 PROCEDURE — 11000001 HC ACUTE MED/SURG PRIVATE ROOM

## 2022-10-27 PROCEDURE — G0378 HOSPITAL OBSERVATION PER HR: HCPCS

## 2022-10-27 PROCEDURE — 25500020 PHARM REV CODE 255: Performed by: STUDENT IN AN ORGANIZED HEALTH CARE EDUCATION/TRAINING PROGRAM

## 2022-10-27 PROCEDURE — 97530 THERAPEUTIC ACTIVITIES: CPT

## 2022-10-27 PROCEDURE — 25000003 PHARM REV CODE 250: Performed by: INTERNAL MEDICINE

## 2022-10-27 PROCEDURE — 96366 THER/PROPH/DIAG IV INF ADDON: CPT

## 2022-10-27 PROCEDURE — 99233 PR SUBSEQUENT HOSPITAL CARE,LEVL III: ICD-10-PCS | Mod: ,,, | Performed by: STUDENT IN AN ORGANIZED HEALTH CARE EDUCATION/TRAINING PROGRAM

## 2022-10-27 PROCEDURE — 63600175 PHARM REV CODE 636 W HCPCS: Performed by: STUDENT IN AN ORGANIZED HEALTH CARE EDUCATION/TRAINING PROGRAM

## 2022-10-27 PROCEDURE — 85610 PROTHROMBIN TIME: CPT | Performed by: STUDENT IN AN ORGANIZED HEALTH CARE EDUCATION/TRAINING PROGRAM

## 2022-10-27 PROCEDURE — 80061 LIPID PANEL: CPT | Performed by: STUDENT IN AN ORGANIZED HEALTH CARE EDUCATION/TRAINING PROGRAM

## 2022-10-27 PROCEDURE — 99233 SBSQ HOSP IP/OBS HIGH 50: CPT | Mod: ,,, | Performed by: STUDENT IN AN ORGANIZED HEALTH CARE EDUCATION/TRAINING PROGRAM

## 2022-10-27 PROCEDURE — 85025 COMPLETE CBC W/AUTO DIFF WBC: CPT | Performed by: STUDENT IN AN ORGANIZED HEALTH CARE EDUCATION/TRAINING PROGRAM

## 2022-10-27 PROCEDURE — 36415 COLL VENOUS BLD VENIPUNCTURE: CPT | Performed by: STUDENT IN AN ORGANIZED HEALTH CARE EDUCATION/TRAINING PROGRAM

## 2022-10-27 PROCEDURE — 25000003 PHARM REV CODE 250: Performed by: STUDENT IN AN ORGANIZED HEALTH CARE EDUCATION/TRAINING PROGRAM

## 2022-10-27 PROCEDURE — 85730 THROMBOPLASTIN TIME PARTIAL: CPT | Performed by: STUDENT IN AN ORGANIZED HEALTH CARE EDUCATION/TRAINING PROGRAM

## 2022-10-27 PROCEDURE — 94799 UNLISTED PULMONARY SVC/PX: CPT

## 2022-10-27 PROCEDURE — 96372 THER/PROPH/DIAG INJ SC/IM: CPT | Mod: 59 | Performed by: STUDENT IN AN ORGANIZED HEALTH CARE EDUCATION/TRAINING PROGRAM

## 2022-10-27 PROCEDURE — 84100 ASSAY OF PHOSPHORUS: CPT | Performed by: STUDENT IN AN ORGANIZED HEALTH CARE EDUCATION/TRAINING PROGRAM

## 2022-10-27 RX ORDER — LANOLIN ALCOHOL/MO/W.PET/CERES
400 CREAM (GRAM) TOPICAL DAILY
Status: DISCONTINUED | OUTPATIENT
Start: 2022-10-27 | End: 2022-10-31 | Stop reason: HOSPADM

## 2022-10-27 RX ORDER — MEROPENEM 1 G/1
1 INJECTION, POWDER, FOR SOLUTION INTRAVENOUS DAILY
Status: DISCONTINUED | OUTPATIENT
Start: 2022-10-27 | End: 2022-10-31 | Stop reason: HOSPADM

## 2022-10-27 RX ORDER — AMLODIPINE BESYLATE 5 MG/1
5 TABLET ORAL DAILY
Status: DISCONTINUED | OUTPATIENT
Start: 2022-10-27 | End: 2022-10-28

## 2022-10-27 RX ORDER — NAPROXEN SODIUM 220 MG/1
81 TABLET, FILM COATED ORAL DAILY
Status: DISCONTINUED | OUTPATIENT
Start: 2022-10-27 | End: 2022-10-31 | Stop reason: HOSPADM

## 2022-10-27 RX ORDER — METOPROLOL SUCCINATE 50 MG/1
50 TABLET, EXTENDED RELEASE ORAL 2 TIMES DAILY
Status: DISCONTINUED | OUTPATIENT
Start: 2022-10-27 | End: 2022-10-30

## 2022-10-27 RX ORDER — MEROPENEM AND SODIUM CHLORIDE 1 G/50ML
1 INJECTION, SOLUTION INTRAVENOUS DAILY
Status: DISCONTINUED | OUTPATIENT
Start: 2022-10-27 | End: 2022-10-27

## 2022-10-27 RX ADMIN — ASPIRIN 81 MG CHEWABLE TABLET 81 MG: 81 TABLET CHEWABLE at 09:10

## 2022-10-27 RX ADMIN — APIXABAN 5 MG: 5 TABLET, FILM COATED ORAL at 09:10

## 2022-10-27 RX ADMIN — LACTOBACILLUS ACIDOPHILUS / LACTOBACILLUS BULGARICUS 1 EACH: 100 MILLION CFU STRENGTH GRANULES at 09:10

## 2022-10-27 RX ADMIN — METOCLOPRAMIDE 10 MG: 10 TABLET ORAL at 06:10

## 2022-10-27 RX ADMIN — IOHEXOL 80 ML: 350 INJECTION, SOLUTION INTRAVENOUS at 09:10

## 2022-10-27 RX ADMIN — METOPROLOL SUCCINATE 50 MG: 50 TABLET, EXTENDED RELEASE ORAL at 09:10

## 2022-10-27 RX ADMIN — INSULIN ASPART 2 UNITS: 100 INJECTION, SOLUTION INTRAVENOUS; SUBCUTANEOUS at 09:10

## 2022-10-27 RX ADMIN — AMLODIPINE BESYLATE 5 MG: 5 TABLET ORAL at 09:10

## 2022-10-27 RX ADMIN — AMIODARONE HYDROCHLORIDE 200 MG: 200 TABLET ORAL at 09:10

## 2022-10-27 RX ADMIN — INSULIN DETEMIR 30 UNITS: 100 INJECTION, SOLUTION SUBCUTANEOUS at 09:10

## 2022-10-27 RX ADMIN — INSULIN ASPART 3 UNITS: 100 INJECTION, SOLUTION INTRAVENOUS; SUBCUTANEOUS at 05:10

## 2022-10-27 RX ADMIN — METOPROLOL TARTRATE 25 MG: 25 TABLET, FILM COATED ORAL at 09:10

## 2022-10-27 RX ADMIN — Medication 400 MG: at 09:10

## 2022-10-27 RX ADMIN — ACETAMINOPHEN 650 MG: 325 TABLET ORAL at 12:10

## 2022-10-27 RX ADMIN — METOPROLOL SUCCINATE 50 MG: 50 TABLET, EXTENDED RELEASE ORAL at 01:10

## 2022-10-27 RX ADMIN — MUPIROCIN: 20 OINTMENT TOPICAL at 09:10

## 2022-10-27 RX ADMIN — LOSARTAN POTASSIUM 100 MG: 50 TABLET, FILM COATED ORAL at 09:10

## 2022-10-27 RX ADMIN — SENNOSIDES AND DOCUSATE SODIUM 1 TABLET: 50; 8.6 TABLET ORAL at 09:10

## 2022-10-27 RX ADMIN — ATORVASTATIN CALCIUM 40 MG: 40 TABLET, FILM COATED ORAL at 09:10

## 2022-10-27 RX ADMIN — CEFAZOLIN SODIUM 2 G: 2 SOLUTION INTRAVENOUS at 09:10

## 2022-10-27 RX ADMIN — CEFAZOLIN SODIUM 2 G: 2 SOLUTION INTRAVENOUS at 10:10

## 2022-10-27 RX ADMIN — METOCLOPRAMIDE 10 MG: 10 TABLET ORAL at 05:10

## 2022-10-27 RX ADMIN — METOCLOPRAMIDE 10 MG: 10 TABLET ORAL at 01:10

## 2022-10-27 RX ADMIN — CEFAZOLIN SODIUM 2 G: 2 SOLUTION INTRAVENOUS at 05:10

## 2022-10-27 NOTE — ASSESSMENT & PLAN NOTE
10/24: MSSA bacteremia secondary to soft tissue infection from right foot wound, no leukocytosis, follow up blood cultures 10/24  NGTD x 2days.  - continue ancef 2g Q8H start date 10/24/22 - 11/6/22 (total 14 days of therapy)  - D3 ancef  - f/u outpatient infectious disease specialist

## 2022-10-27 NOTE — PLAN OF CARE
10/27/22 1204   Post-Acute Status   Post-Acute Authorization Placement   Post-Acute Placement Status Referrals Sent  ([facility])   Discharge Delays None known at this time   Discharge Plan   Discharge Plan A Rehab   Discharge Plan B Long-term acute care facility (LTAC)   New referral. Spoke to the patient in his room. The patient agreed to inpatient rehab. The patient choice form was signed for [facility]. Beverley with IPR case management was notified.     Addendum: The patient was medically accepted for IPR by the accepting physician. Beverley with case management is going to move forward with submitting to the patient's insurance provider for the prior auth.

## 2022-10-27 NOTE — ASSESSMENT & PLAN NOTE
Old versus subacute, as patient has had recent worsening of gait instability reported with dizziness and frequent falls presenting to ER twice, including most recent hospitalization.   CT head showing no acute intracranial abnormality. Findings significant for small ovoid hypodensity within the left cerebellar hemisphere, possibly encephalomalacia from an old infarct.   Reported difficult exam:   Carotid US showed no significant stenosis bilaterally, but atherosclerotic disease with scattered plaque.   TTE (10/21/22), normal systolic function LVEF 55%, no large vegetations visualized  Follow-up MRI of the brain ordered, however this cannot be performed due to patient exceeding size limit of MRI table.   - f/u CTA of brain  - f/u echocardiogram with bubble study  - f/u PT/OT evaluation and treatment  - continue atorvastatin 40 mg daily  - started eliquis for paroxysmal atrial fibrillation

## 2022-10-27 NOTE — PT/OT/SLP PROGRESS
Occupational Therapy   Treatment    Name: Andrew Del Cid Jr.  MRN: [id number]  Admitting Diagnosis:  Stroke       Recommendations:     Discharge Recommendations: rehabilitation facility  Discharge Equipment Recommendations:  walker, rolling, bedside commode, other (see comments) (To be further determined based on progress prior to discharge.)  Barriers to discharge:  Other (Comment) (Medical and functional status)    Assessment:     Andrew Del Cid Jr. is a 57 y.o. male with a medical diagnosis of Stroke. Performance deficits affecting function are weakness, impaired endurance, impaired sensation, impaired self care skills, impaired functional mobility, impaired balance, decreased coordination, decreased upper extremity function, decreased lower extremity function, decreased safety awareness, impaired fine motor.     Rehab Prognosis:  Good; patient would benefit from acute skilled OT services to address these deficits and reach maximum level of function.       Plan:     Patient to be seen 5 x/week to address the above listed problems via self-care/home management, therapeutic activities, therapeutic exercises, neuromuscular re-education, sensory integration  Plan of Care Expires: 11/02/22  Plan of Care Reviewed with: patient    Subjective     Pain/Comfort:  Pain Rating 1: 0/10  Pain Rating Post-Intervention 1: 0/10    Objective:     Communicated with: nurse prior to session.  Patient found sitting edge of bed with telemetry upon OT entry to room.    General Precautions: Standard, fall   Orthopedic Precautions:N/A   Braces:    Respiratory Status: Room air     Occupational Performance:     Functional Mobility/Transfers:  Patient completed Sit <> Stand Transfer with minimum assistance  with  rolling walker   Patient completed Bed <> Chair Transfer using Step Transfer technique with minimum assistance with rolling walker  Patient completed Toilet Transfer Step Transfer technique with minimum assistance with  grab  bars  Functional Mobility: Pt ambulated 21' requiring steadying assist utilizing RW.    Activities of Daily Living:  Toileting: moderate assistance        AMPA 6 Click ADL:      Treatment & Education:  Pt was cooperative and motivated without verbal encouragement while exhibiting positive affect. He participated in functional transfer retraining to / from bed, toilet, and chair emphasizing fall prevention utilizing assistive devices including RW and grab bar providing extra time requiring min assist secondary to steadying assist with mod verbal and tactile cueing for safety and technique in which patient high risk for falls exhibiting LOBx3. Pt ambulated 21' between surfaces requiring steadying assist utilizing RW. Also, he participated in ADL retraining regarding toileting emphasizing fall prevention requiring mod assist secondary to assist with clothing management for safety to prevent LOB with verbal and tactile cueing for safety and technique.     Patient left sitting edge of bed with call button in reach and nurse notified    GOALS:   Multidisciplinary Problems       Occupational Therapy Goals          Problem: Occupational Therapy    Goal Priority Disciplines Outcome Interventions   Occupational Therapy Goal     OT, PT/OT     Description: Goals to be met by: 11/02/22     Patient will increase functional independence with ADLs by performing:    Feeding with [place].  UE Dressing with Set-up Assistance.  LE Dressing with Supervision.  Grooming while seated with Modified [place].  Toileting from toilet with Supervision for hygiene and clothing management.   Bathing from  shower chair/bench with Supervision.  Step transfer with Supervision  Toilet transfer to toilet with Supervision.  Increased functional strength to 4+ to 5/5 for (L) UE.                         Time Tracking:     OT Date of Treatment: 10/27/22  OT Start Time: 1611  OT Stop Time: 1650  OT Total Time (min): 39 min    Billable  Minutes:Self Care/Home Management 15  Therapeutic Activity 24    OT/ROBERT: OT          10/27/2022

## 2022-10-27 NOTE — CONSULTS
Cardiology Consultation Note  ([facility])    Today's Date:  10/27/2022  Patient Name: Andrew Del Cid Jr.    MRN: [id number]    Code Status: Full Code     Attending Physician: Olena Thornton DO   Consulting Physician: LUZ MARIA Miles MD  ______________________________________________________________________    Reason for Consult:   Atrial fibrillation with RVR    Temp: 98 °F (36.7 °C) (10/27/22 0715)  Pulse: 100 (10/27/22 0833)  Resp: (!) 21 (10/27/22 0833)  BP: (!) 160/80 (10/27/22 0715)  SpO2: (!) 94 % (10/27/22 0833)  Subjective:    The patient is a 57-year-old gentleman  who presented   To the emergency department after losing his balance, with associated mildly slurred speech.  He was also complaining of a generalized weakness.    EKG:  In the ED, he was found to be in atrial fibrillation with a rapid ventricular rate:  Vent. Rate : 117 BPM     Atrial Rate : 127 BPM      P-R Int : 000 ms          QRS Dur : 094 ms       QT Int : 316 ms       P-R-T Axes : 000 -47 082 degrees      QTc Int : 440 ms     Atrial fibrillation with rapid ventricular response   Left anterior fascicular block   Low anterior forces and R wave progression   Abnormal ECG   When compared with ECG of 22-OCT-2022 03:12,   No significant change was found   Confirmed by Rajan KING MD ([id number]) on 10/26/2022 3:28:52 PM     Referred By: [email or web address]    SELF           Confirmed By:Rajan KING MD     The ER physician discussed the case with me, at which time I recommended the following:  Amiodarone 200 mg twice daily  Discontinue Lovenox  Initiate Eliquis 5 mg twice daily  Initiate metoprolol for rate control.    This evening, the patient states that he is feeling little bit better.  He is not noticing any focal neurologic symptoms, and overall he is feeling pretty well.      It should be noted that the patient was recently discharged,  after presenting with a right foot ulceration with positive blood cultures for staph a.    The patient has  been on antibiotic therapy.  Echocardiography revealed the following:  The left ventricle is normal in size with concentric hypertrophy and normal systolic function.  The estimated ejection fraction is 55%.  Normal left ventricular diastolic function.  Moderate right ventricular enlargement.  Mild tricuspid regurgitation.  The estimated PA systolic pressure is 26 mmHg.  Mild left atrial enlargement.  The study is a suboptimal study secondary to poor acoustic windows. Valves are not well visualized. Although there is not large vegetation visualized, endocarditis cannot be ruled out with this suboptimal study.     The patient is afebrile without leukocytosis.  He was noted to have an elevated sed rate likely secondary to his chronic infection.    Past Medical History:  Past Medical History:   Diagnosis Date    Acute renal failure with tubular necrosis 10/22/2022    Diabetes mellitus, type 2     Fever 10/22/2022    Hyperlipidemia     Hypertension     Hyponatremia 10/22/2022    Obese     Peripheral neuropathy        Past Surgical History:   Procedure Laterality Date    APPENDECTOMY      HERNIA REPAIR      INCISION AND DRAINAGE FOOT Bilateral 12/26/2018    Procedure: INCISION AND DRAINAGE, FOOT;  Surgeon: Rob Alas DPM;  Location: [facility], [place], operated by [facility] OR;  Service: Podiatry;  Laterality: Bilateral;    TOE AMPUTATION      right great toe    TOE AMPUTATION Left 12/26/2018    Procedure: AMPUTATION, TOE; left 3rd toe;  Surgeon: Rob Alas DPM;  Location: [facility], [place], operated by [facility] OR;  Service: Podiatry;  Laterality: Left;       History reviewed. No pertinent family history.    Social History     Socioeconomic History    Marital status:    Tobacco Use    Smoking status: Never    Smokeless tobacco: Never   Substance and Sexual Activity    Alcohol use: No    Drug use: No    Sexual activity: Yes     Partners: Female     Comment:    Social History Narrative    Wife takes care of his medications 10/4/22 (gilbert dahl)        Medications:  Current Facility-Administered Medications   Medication Dose Route Frequency Provider Last Rate Last Admin    acetaminophen tablet 650 mg  650 mg Oral Q6H PRN Olena Thornton DO   650 mg at 10/27/22 0032    amiodarone tablet 200 mg  200 mg Oral BID Isai Royal MD   200 mg at 10/26/22 2010    amLODIPine tablet 5 mg  5 mg Oral Daily Olena Thornton DO        apixaban tablet 5 mg  5 mg Oral BID Isai Royal MD   5 mg at 10/26/22 2010    aspirin chewable tablet 81 mg  81 mg Oral Daily Olena Thornton DO        atorvastatin tablet 40 mg  40 mg Oral Daily Olena Thornton,         cefazolin (ANCEF) 2 gram in dextrose 5% 50 mL IVPB (premix)  2 g Intravenous Q8H Olena Thornton DO   Stopped at 10/27/22 0010    glucose chewable tablet 16 g  16 g Oral PRN Olena Thornton DO        insulin aspart U-100 pen 0-5 Units  0-5 Units Subcutaneous QID (AC + HS) PRN Olena Thornton DO        insulin detemir U-100 pen 30 Units  30 Units Subcutaneous BID Olena Thornton DO   30 Units at 10/26/22 2205    iohexoL (OMNIPAQUE 350) injection 80 mL  80 mL Intravenous ONCE PRN Olena Thornton DO        labetalol 20 mg/4 mL (5 mg/mL) IV syring  10 mg Intravenous Q15 Min PRN Olena Thornton DO        lactobacillus acidophilus & bulgar 100 million cell packet 1 each  1 packet Oral BID Olena Thornton, DO   1 each at 10/26/22 2205    losartan tablet 100 mg  100 mg Oral Daily Olena Thornton DO        magnesium oxide tablet 400 mg  400 mg Oral Daily Olena Thornton,         melatonin tablet 6 mg  6 mg Oral Nightly PRN Olena Thornton DO        metoclopramide HCl tablet 10 mg  10 mg Oral TID AC Olena Thornton, DO   10 mg at 10/27/22 0645    metoprolol tartrate (LOPRESSOR) tablet 25 mg  25 mg Oral BID Isai Royal MD   25 mg at 10/26/22 2010    mupirocin 2 % ointment   Topical (Top) BID Olena Thornton, DO   Given at 10/26/22 8300     ondansetron injection 4 mg  4 mg Intravenous Q8H PRN Olena Thornton DO        prochlorperazine injection Soln 5 mg  5 mg Intravenous Q6H PRN Olena Thornton DO        senna-docusate 8.6-50 mg per tablet 1 tablet  1 tablet Oral BID Olena Thornton DO   1 tablet at 10/26/22 2010    sodium chloride 0.9% flush 10 mL  10 mL Intravenous PRN Isai Royal MD           Allergies:  Review of patient's allergies indicates:  No Known Allergies     Labs: CMP   Recent Labs   Lab 10/26/22  1143      K 3.8      CO2 29   *   BUN 11   CREATININE 0.7   CALCIUM 8.5*   PROT 6.6   ALBUMIN 2.2*   BILITOT 0.8   ALKPHOS 84   AST 32   ALT 24   ANIONGAP 5*    and CBC   Recent Labs   Lab 10/26/22  1143 10/27/22  0520   WBC 6.35 6.11   HGB 11.8* 11.8*   HCT 34.6* 36.3*    374       Vital Signs (Most Recent):  Temp: 98 °F (36.7 °C) (10/27/22 0715)  Pulse: 100 (10/27/22 0833)  Resp: (!) 21 (10/27/22 0833)  BP: (!) 160/80 (10/27/22 0715)  SpO2: (!) 94 % (10/27/22 0833)   Vital Signs (24h Range):  Temp:  [97.4 °F (36.3 °C)-98.3 °F (36.8 °C)] 98 °F (36.7 °C)  Pulse:  [] 100  Resp:  [18-22] 21  SpO2:  [93 %-97 %] 94 %  BP: (132-180)/(66-92) 160/80     Weight: (!) 171.5 kg (378 lb 1.6 oz)  Body mass index is 49.88 kg/m².    SpO2: (!) 94 %  O2 Device (Oxygen Therapy): room air      Intake/Output Summary (Last 24 hours) at 10/27/2022 0916  Last data filed at 10/27/2022 0543  Gross per 24 hour   Intake 900 ml   Output 1775 ml   Net -875 ml       Review of Systems   Constitutional: Negative for fever, malaise/fatigue and night sweats.   Cardiovascular:  Positive for irregular heartbeat and leg swelling. Negative for chest pain.   Respiratory:  Positive for shortness of breath. Negative for sputum production.    Skin:  Positive for poor wound healing.   Neurological:  Positive for weakness.     Physical Exam  General:   Alert and oriented.  The patient seems to have mildly slurred speech confirmed  by his wife.  Heent:   No JVD or appreciable carotid bruits.  Lungs:    clear but with diminished breath sounds.  Heart:   Irregular regular rhythm; tachycardic.  No appreciable murmurs.  Extremities:   Mild bilateral edema.    Assessment and Plan:     1.  Status post CVA: secondary to atrial fibrillation.  The patient is currently communicating well with a mild speech deficit that the patient is unaware of.  Eliquis 5 mg 1 tablet twice daily  Aspirin 81 mg daily.     2.  Atrial fibrillation: patient with several independent risk factors for atrial fibrillation.  The patient, his wife and I have talked about these independent risk factors which include obesity, probable obstructive sleep apnea.  Push beta-blocker for rate control.  Amiodarone 200 mg twice daily  If the patient does not convert to normal sinus rhythm, after 3 weeks, we will schedule the patient for elective DC cardioversion.    3.  Hypertension: we will continue to monitor.    4.  Diabetes mellitus: as per  medicine service.    Patient with foot ulcer for which he remains on antibiotic therapy.    Aggressive glucose management.    5.  Morbid obesity: discussed importance of diet exercise and weight loss to reduce future morbidity and mortality.    Recommend sleep study.    6.  Hyperlipidemia: aggressive lipid management.

## 2022-10-27 NOTE — ASSESSMENT & PLAN NOTE
BP Readings from Last 3 Encounters:   10/26/22 (!) 180/77   10/25/22 (!) 178/84   10/24/22 (!) 143/67    Tachycardia 110s bpm and atrial fibrillation. Responded to metoprolol IV 5mg. Patient to be started on BB and amiodarone per cardiology.   - continue losartan 100 mg daily  - start metoprolol 25mg BID  - start amiodarone 200 mg BID  - monitor and adjust accordingly

## 2022-10-27 NOTE — ASSESSMENT & PLAN NOTE
Body mass index is 49.88 kg/m². Morbid obesity complicates all aspects of disease management from diagnostic modalities to treatment. Weight loss encouraged and health benefits explained to patient.

## 2022-10-27 NOTE — PROGRESS NOTES
Home Medication Information  Pharmacy Name: [facility]  Pharmacy Address: [address]  Prescription Number: [id number]  Medication: Meropenem  Strength: 1 gram  Directions For Use: empty contents of vial directly onto infection site. Perform 1 to 2 times daily.  Quantity: 9    Has medication been identified? yes    Does medication component name and strength chosen in Epic match identified name and strength of supplied medication? Yes    Does inpatient dose and frequency ordered match home medication label? no  (If no is answered to above question, contact provider and document response below.) Label reads one to two times daily - ordered once daily in hospital      Is this medication on the NIOSH list that would require special handling precautions? NO  If the answer is yes to the above question:    Send medication in the appropriate labeled bag alerting the person administering the medication that the medication is either Chemo [NIOSH Table 1] or Hazardous [NIOSH TABLE 2 or 3].   Document below that you notified patient's nurse and gave handling instructions. Ask nurse to share handling information in each hand off.

## 2022-10-27 NOTE — ASSESSMENT & PLAN NOTE
Patient with Paroxysmal (<7 days) atrial fibrillation which is rate controlled currently with Beta Blocker, Calcium Channel Blocker and Amiodarone. Patient is currently in atrial fibrillation.[id number] Score: 2. HASBLED Score: 3. Anticoagulation indicated. Anticoagulation done with Eliquis.   - continue telemetry  - f/u cardiology

## 2022-10-27 NOTE — PROGRESS NOTES
[facility] Medicine  Progress Note    Patient Name: Andrew Del Cid Jr.  MRN: [id number]  Patient Class: IP- Inpatient   Admission Date: 10/26/2022  Length of Stay: 1 days  Attending Physician: Olena Thornton DO  Primary Care Provider: Olena Thornton DO      Subjective:     Principal Problem:Stroke        HPI:  57-year-old male with uncontrolled diabetes mellitus type 2, poor healing chronic diabetic foot and severe neuropathy, hypertension, hx of toe amputations was recently hospitalized for sepsis secondary to MSSA bacteremia.   After returning home from the hospital, patient's wife noticed patient experiencing difficulty maintaining balance even with use of walker. Patient in the morning continued to complain of generalized weakness and balance issues with left side predominance for weakness.      ED course: Found to be in atrial fibrillation and RVR 110bpm and was given IV metoprolol. CT head showed hypodensity in cerebellum likely reflecting a subacute infarct.  MRI ordered, but due to patient's weight and body habitus exceeding table limits cannot be performed at this time. Will plan for CTA of brain.   Cardiology started patient on eliquis, amiodarone and metoprolol for rate control.  Patient to be admitted to medicine floor for further cardiac and stroke work up. Also to complete IV antibiotics for MSSA bacteremia.      Overview/Hospital Course:  10/27 No acute events overnight. Patient talking without shortness of breath, moderately disturbed from arranging his finances over phone with [employer]. Denies symptoms. F/u CTA head and neck studies. Agreeable to PT/OT therapy. F/u cardiology will need reevaluation on status of possible endocarditis to current bacteremia. Tolerating eliquis, ASA.        Interval History: Patient seen and examined. Telemetry readings show patient remains in atrial fibrillation and tachycardia 90-100s. Denies symptoms.      Review of Systems    Constitutional:  Negative for activity change, appetite change, chills, fatigue and fever.   HENT:  Negative for congestion, sinus pain and tinnitus.    Eyes:  Negative for visual disturbance.   Respiratory:  Negative for chest tightness, shortness of breath and wheezing.    Cardiovascular:  Negative for chest pain and palpitations.   Gastrointestinal:  Negative for abdominal distention, abdominal pain, blood in stool, constipation, diarrhea, nausea, rectal pain and vomiting.   Genitourinary:  Negative for dysuria and enuresis.   Musculoskeletal:  Positive for back pain and gait problem (baseline neuropathy and hx toe ampation). Negative for arthralgias, joint swelling, neck pain and neck stiffness.   Skin:  Negative for color change.   Neurological:  Positive for weakness. Negative for dizziness, tremors, seizures, syncope, facial asymmetry, speech difficulty, numbness and headaches.   Psychiatric/Behavioral:  Negative for agitation, behavioral problems and confusion. The patient is not nervous/anxious.    Objective:     Vital Signs (Most Recent):  Temp: 98.7 °F (37.1 °C) (10/27/22 1125)  Pulse: 96 (10/27/22 1125)  Resp: (!) 22 (10/27/22 1125)  BP: 122/83 (10/27/22 1125)  SpO2: 96 % (10/27/22 1125)   Vital Signs (24h Range):  Temp:  [97.4 °F (36.3 °C)-98.7 °F (37.1 °C)] 98.7 °F (37.1 °C)  Pulse:  [] 96  Resp:  [18-22] 22  SpO2:  [93 %-97 %] 96 %  BP: (122-180)/(66-92) 122/83     Weight: (!) 171.5 kg (378 lb 1.6 oz)  Body mass index is 49.88 kg/m².    Intake/Output Summary (Last 24 hours) at 10/27/2022 1250  Last data filed at 10/27/2022 0543  Gross per 24 hour   Intake 900 ml   Output 1775 ml   Net -875 ml      Physical Exam  Vitals and nursing note reviewed.   Constitutional:       General: He is not in acute distress.     Appearance: Normal appearance. He is not ill-appearing, toxic-appearing or diaphoretic.   HENT:      Head: Normocephalic and atraumatic.      Right Ear: External ear normal.      Left  Ear: External ear normal.      Nose: Nose normal. No congestion.      Mouth/Throat:      Mouth: Mucous membranes are moist.      Pharynx: Oropharynx is clear.   Eyes:      Extraocular Movements: Extraocular movements intact.      Conjunctiva/sclera: Conjunctivae normal.   Neck:      Vascular: No carotid bruit.   Cardiovascular:      Rate and Rhythm: Normal rate. Rhythm irregular.      Pulses: Normal pulses.      Heart sounds: No murmur heard.  Pulmonary:      Effort: Pulmonary effort is normal. No respiratory distress.      Breath sounds: No wheezing or rales.   Chest:      Chest wall: No tenderness.   Abdominal:      General: There is no distension.      Palpations: Abdomen is soft.      Tenderness: There is no abdominal tenderness. There is no right CVA tenderness, left CVA tenderness or guarding.   Musculoskeletal:         General: No swelling or tenderness.      Cervical back: Neck supple. No rigidity or tenderness.      Right lower leg: No edema.      Left lower leg: No edema.      Comments: Right foot dressed and wrapped in ace bandage, clean and intact, right toe with darkened scab, no tenderness, sensation diminished throughout   Lymphadenopathy:      Cervical: No cervical adenopathy.   Skin:     General: Skin is warm and dry.      Capillary Refill: Capillary refill takes less than 2 seconds.   Neurological:      General: No focal deficit present.      Mental Status: He is alert.      Motor: Weakness present.      Gait: Gait abnormal.      Comments: No sensation over foot bilaterally and toes, Right first toe amputated, Left third toe amputated, sit upright maintaining postural balance, strength equal bilaterally in upper and lower extremities.   Psychiatric:         Mood and Affect: Mood normal.         Behavior: Behavior normal.     Significant Labs: All pertinent labs within the past 24 hours have been reviewed.  Blood Culture: No results for input(s): LABBLOO in the last 48 hours.  BMP:   Recent Labs    Lab 10/26/22  1143 10/27/22  0520   *  --      --    K 3.8  --      --    CO2 29  --    BUN 11  --    CREATININE 0.7  --    CALCIUM 8.5*  --    MG 1.7 1.7     CBC:   Recent Labs   Lab 10/26/22  1143 10/27/22  0520   WBC 6.35 6.11   HGB 11.8* 11.8*   HCT 34.6* 36.3*    374     CMP:   Recent Labs   Lab 10/26/22  1143      K 3.8      CO2 29   *   BUN 11   CREATININE 0.7   CALCIUM 8.5*   PROT 6.6   ALBUMIN 2.2*   BILITOT 0.8   ALKPHOS 84   AST 32   ALT 24   ANIONGAP 5*     Recent Labs   Lab 10/26/22  1158   COLORU Yellow   APPEARANCEUA Clear   PHUR 6.0   SPECGRAV 1.025   PROTEINUA Negative   GLUCUA 4+*   KETONESU 1+*   BILIRUBINUA Negative   OCCULTUA Negative   NITRITE Negative   UROBILINOGEN Negative   LEUKOCYTESUR Negative   RBCUA 2   WBCUA 1   BACTERIA Negative   SQUAMEPITHEL 0   HYALINECASTS 0.4*     Significant Imaging: I have reviewed all pertinent imaging results/findings within the past 24 hours.      Assessment/Plan:      * Stroke  Old versus subacute, as patient has had recent worsening of gait instability reported with dizziness and frequent falls presenting to ER twice, including most recent hospitalization.   CT head showing no acute intracranial abnormality. Findings significant for small ovoid hypodensity within the left cerebellar hemisphere, possibly encephalomalacia from an old infarct.   Reported difficult exam:   Carotid US showed no significant stenosis bilaterally, but atherosclerotic disease with scattered plaque.   TTE (10/21/22), normal systolic function LVEF 55%, no large vegetations visualized  Follow-up MRI of the brain ordered, however this cannot be performed due to patient exceeding size limit of MRI table.   - f/u CTA of brain and neck   - f/u PT/OT evaluation and treatment  - continue ASA 81 mg   - continue atorvastatin 40 mg daily  - continue eliquis for paroxysmal atrial fibrillation            A-fib  Patient with Paroxysmal (<7 days)  atrial fibrillation which is rate controlled currently with Beta Blocker, Calcium Channel Blocker and Amiodarone. Patient is currently in atrial fibrillation.[id number] Score: 2. HASBLED Score: 3. Anticoagulation indicated. Anticoagulation done with Eliquis.   - continue telemetry  - f/u cardiology        Diabetic foot ulcer  Follow wound care team. Last dressing change by podiatry on 10/25.  Patient's FSGs are uncontrolled due to hyperglycemia on current medication regimen.  Last A1c reviewed-   Lab Results   Component Value Date    HGBA1C 9.2 (H) 10/21/2022     Most recent fingerstick glucose reviewed-   Recent Labs   Lab 10/26/22  1929 10/27/22  0517 10/27/22  1118   POCTGLUCOSE 188* 212* 184*     Current correctional scale  Low  Maintain anti-hyperglycemic dose as follows-   Antihyperglycemics (From admission, onward)    Start     Stop Route Frequency Ordered    10/26/22 2115  insulin detemir U-100 pen 30 Units         -- SubQ 2 times daily 10/26/22 2100    10/26/22 2054  insulin aspart U-100 pen 0-5 Units         -- SubQ Before meals & nightly PRN 10/26/22 2100        Hold Oral hypoglycemics while patient is in the hospital.    Bacteremia  10/24: MSSA bacteremia secondary to soft tissue infection from right foot wound, no leukocytosis, follow up blood cultures 10/24  NGTD x 3days.  - continue ancef 2g Q8H start date 10/24/22 - 11/6/22 (total 14 days of therapy)  - D4 ancef  - f/u outpatient infectious disease specialist        Diabetic infection of right foot  Chronic foot wound with history of past amputations of toes.   Patient's FSGs are uncontrolled due to hyperglycemia on current medication regimen.  Last A1c reviewed-   Lab Results   Component Value Date    HGBA1C 9.2 (H) 10/21/2022     Most recent fingerstick glucose reviewed-   Recent Labs   Lab 10/26/22  1929 10/27/22  0517 10/27/22  1118   POCTGLUCOSE 188* 212* 184*     Current correctional scale  Low  Maintain anti-hyperglycemic dose as follows-    Antihyperglycemics (From admission, onward)    Start     Stop Route Frequency Ordered    10/26/22 2115  insulin detemir U-100 pen 30 Units         -- SubQ 2 times daily 10/26/22 2100    10/26/22 2054  insulin aspart U-100 pen 0-5 Units         -- SubQ Before meals & nightly PRN 10/26/22 2100        Hold Oral hypoglycemics while patient is in the hospital.    Hyperlipidemia  - atorvastatin 40 mg daily      Essential hypertension  BP Readings from Last 3 Encounters:   10/27/22 122/83   10/25/22 (!) 178/84   10/24/22 (!) 143/67   Tachycardia 90-100s bpm and atrial fibrillation. Patient to be started on BB and amiodarone per cardiology.   - continue losartan 100 mg daily  - continue metoprolol 25mg BID  - continue amiodarone 200 mg BID  - monitor and adjust accordingly    Morbid obesity with BMI of 50.0-59.9, adult  Body mass index is 49.88 kg/m². Morbid obesity complicates all aspects of disease management from diagnostic modalities to treatment. Weight loss encouraged and health benefits explained to patient.           VTE Risk Mitigation (From admission, onward)         Ordered     apixaban tablet 5 mg  2 times daily         10/26/22 1351     Place sequential compression device  Until discontinued         10/26/22 2100     IP VTE HIGH RISK PATIENT  Once         10/26/22 1535     Place sequential compression device  Until discontinued         10/26/22 1535              Discharge Planning   MICHAEL:      Code Status: Full Code   Is the patient medically ready for discharge?:     Reason for patient still in hospital (select all that apply): Patient trending condition, Treatment, Imaging, Consult recommendations and PT / OT recommendations  Discharge Plan A: Rehab   Discharge Delays: None known at this time    Olena Thornton DO  Department of Hospital Medicine   [facility]

## 2022-10-27 NOTE — ASSESSMENT & PLAN NOTE
Old versus subacute, as patient has had recent worsening of gait instability reported with dizziness and frequent falls presenting to ER twice, including most recent hospitalization.   CT head showing no acute intracranial abnormality. Findings significant for small ovoid hypodensity within the left cerebellar hemisphere, possibly encephalomalacia from an old infarct.   Reported difficult exam:   Carotid US showed no significant stenosis bilaterally, but atherosclerotic disease with scattered plaque.   TTE (10/21/22), normal systolic function LVEF 55%, no large vegetations visualized  Follow-up MRI of the brain ordered, however this cannot be performed due to patient exceeding size limit of MRI table.   - f/u CTA of brain and neck   - f/u PT/OT evaluation and treatment  - continue ASA 81 mg   - continue atorvastatin 40 mg daily  - continue eliquis for paroxysmal atrial fibrillation

## 2022-10-27 NOTE — H&P
[facility]  History & Physical    Patient Name: Andrew Del Cid Jr.  MRN: [id number]  Patient Class: IP- Inpatient  Admission Date: 10/26/2022  Attending Physician: Olena Thornton DO   Primary Care Provider: Olena Thornton DO    Patient information was obtained from patient, spouse/SO and ER records.     Subjective:     Principal Problem:Stroke    Chief Complaint:   Chief Complaint   Patient presents with    Weakness     EMS pt from home with c/o weakness to left arm, pt dc'd from our hospital yesterday with PICC Line Left arm for Sepsis.         HPI: 57-year-old male with uncontrolled diabetes mellitus type 2, poor healing chronic diabetic foot and severe neuropathy, hypertension, hx of toe amputations was recently hospitalized for sepsis secondary to MSSA bacteremia.   After returning home from the hospital, patient's wife noticed patient experiencing difficulty maintaining balance even with use of walker. Patient in the morning continued to complain of generalized weakness and balance issues with left side predominance for weakness.      ED course: Found to be in atrial fibrillation and RVR 110bpm and was given IV metoprolol. CT head showed hypodensity in cerebellum likely reflecting a subacute infarct.  MRI ordered, but due to patient's weight and body habitus exceeding table limits cannot be performed at this time. Will plan for CTA of brain.   Cardiology started patient on eliquis, amiodarone and metoprolol for rate control.  Patient to be admitted to medicine floor for further cardiac and stroke work up. Also to complete IV antibiotics for MSSA bacteremia.      Past Medical History:   Diagnosis Date    Acute renal failure with tubular necrosis 10/22/2022    Diabetes mellitus, type 2     Fever 10/22/2022    Hyperlipidemia     Hypertension     Hyponatremia 10/22/2022    Obese     Peripheral neuropathy        Past Surgical History:   Procedure Laterality Date     APPENDECTOMY      HERNIA REPAIR      INCISION AND DRAINAGE FOOT Bilateral 2018    Procedure: INCISION AND DRAINAGE, FOOT;  Surgeon: Rob Alas DPM;  Location: [facility] OR;  Service: Podiatry;  Laterality: Bilateral;    TOE AMPUTATION      right great toe    TOE AMPUTATION Left 2018    Procedure: AMPUTATION, TOE; left 3rd toe;  Surgeon: Rob Alas DPM;  Location: [facility] OR;  Service: Podiatry;  Laterality: Left;       Review of patient's allergies indicates:  No Known Allergies    No current facility-administered medications on file prior to encounter.     Current Outpatient Medications on File Prior to Encounter   Medication Sig    amLODIPine (NORVASC) 5 MG tablet Take 5 mg by mouth.    aspirin (ECOTRIN) 81 MG EC tablet Take 81 mg by mouth once daily.    atorvastatin (LIPITOR) 40 MG tablet Take 40 mg by mouth.    ceFAZolin 2 g/50mL Dextrose IVPB (ANCEF) 2 gram/50 mL PgBk Inject 50 mLs (2,000 mg total) into the vein every 8 (eight) hours. for 13 days    cholecalciferol, vitamin D3, (VITAMIN D3) 25 mcg (1,000 unit) capsule 1 capsule    DULoxetine (CYMBALTA) 30 MG capsule Take 30 mg by mouth once daily.    JARDIANCE 25 mg tablet Take 25 mg by mouth once daily.    lactobacillus acidophilus & bulgar (LACTINEX) 100 million cell packet Take 1 packet (1 each total) by mouth 2 (two) times daily.    LANTUS SOLOSTAR U-100 INSULIN glargine 100 units/mL SubQ pen SMARTSI Unit(s) SUB-Q Twice Daily    losartan (COZAAR) 25 MG tablet Take 4 tablets (100 mg total) by mouth once daily.    metFORMIN (GLUCOPHAGE) 1000 MG tablet Take 1,000 mg by mouth 2 (two) times daily.    metoclopramide HCl (REGLAN) 10 MG tablet Take 1 tablet (10 mg total) by mouth 3 (three) times daily before meals. for 7 days    mupirocin (BACTROBAN) 2 % ointment Apply topically 2 (two) times daily.    psyllium husk, with sugar, 3.4 gram packet Take 1 packet by mouth 3 (three) times daily.    TRULICITY 4.5 mg/0.5 mL pen  injector Inject into the skin.     Family History    None       Tobacco Use    Smoking status: Never    Smokeless tobacco: Never   Substance and Sexual Activity    Alcohol use: No    Drug use: No    Sexual activity: Yes     Partners: Female     Comment:      Review of Systems   Constitutional:  Negative for activity change, appetite change, chills, fatigue and fever.   HENT:  Negative for congestion, sinus pain and tinnitus.    Eyes:  Negative for visual disturbance.   Respiratory:  Negative for chest tightness, shortness of breath and wheezing.    Cardiovascular:  Negative for chest pain and palpitations.   Gastrointestinal:  Negative for abdominal distention, abdominal pain, blood in stool, constipation, diarrhea, nausea, rectal pain and vomiting.   Genitourinary:  Negative for dysuria and enuresis.   Musculoskeletal:  Positive for back pain and gait problem (baseline neuropathy and hx toe ampation). Negative for arthralgias, joint swelling, neck pain and neck stiffness.   Skin:  Negative for color change.   Neurological:  Positive for weakness. Negative for dizziness, tremors, seizures, syncope, facial asymmetry, speech difficulty, numbness and headaches.   Psychiatric/Behavioral:  Negative for agitation, behavioral problems and confusion. The patient is not nervous/anxious.    Objective:     Vital Signs (Most Recent):  Temp: 97.9 °F (36.6 °C) (10/26/22 1935)  Pulse: 110 (10/26/22 1935)  Resp: (!) 22 (10/26/22 1935)  BP: (!) 180/77 (10/26/22 1935)  SpO2: 95 % (10/26/22 1935)   Vital Signs (24h Range):  Temp:  [97.4 °F (36.3 °C)-98 °F (36.7 °C)] 97.9 °F (36.6 °C)  Pulse:  [] 110  Resp:  [18-22] 22  SpO2:  [95 %-96 %] 95 %  BP: (132-180)/(66-90) 180/77     Weight: (!) 171.5 kg (378 lb 1.6 oz)  Body mass index is 49.88 kg/m².    Physical Exam  Vitals and nursing note reviewed.   Constitutional:       General: He is not in acute distress.     Appearance: Normal appearance. He is not ill-appearing,  toxic-appearing or diaphoretic.   HENT:      Head: Normocephalic and atraumatic.      Right Ear: External ear normal.      Left Ear: External ear normal.      Nose: Nose normal. No congestion.      Mouth/Throat:      Mouth: Mucous membranes are moist.      Pharynx: Oropharynx is clear.   Eyes:      Extraocular Movements: Extraocular movements intact.      Conjunctiva/sclera: Conjunctivae normal.   Neck:      Vascular: No carotid bruit.   Cardiovascular:      Rate and Rhythm: Normal rate. Rhythm irregular.      Pulses: Normal pulses.      Heart sounds: No murmur heard.  Abdominal:      General: There is no distension.      Palpations: Abdomen is soft.      Tenderness: There is no abdominal tenderness. There is no right CVA tenderness, left CVA tenderness or guarding.   Musculoskeletal:         General: No swelling or tenderness.      Cervical back: Neck supple. No rigidity or tenderness.      Right lower leg: No edema.      Left lower leg: No edema.      Comments: Right foot dressed and wrapped in ace bandage, clean and intact, right toe with darkened scab, no tenderness, sensation diminished throughout   Lymphadenopathy:      Cervical: No cervical adenopathy.   Skin:     General: Skin is warm and dry.      Capillary Refill: Capillary refill takes less than 2 seconds.   Neurological:      General: No focal deficit present.      Mental Status: He is alert.      Motor: Weakness present.      Gait: Gait abnormal.      Comments: No sensation over foot bilaterally and toes, Right first toe amputated, Left third toe amputated, sit upright maintaining postural balance, strength equal bilaterally in upper and lower extremities.   Psychiatric:         Mood and Affect: Mood normal.         Behavior: Behavior normal.           Significant Labs: All pertinent labs within the past 24 hours have been reviewed.  A1C:   Recent Labs   Lab 10/21/22  0213   HGBA1C 9.2*     Bilirubin:   Recent Labs   Lab 10/21/22  1932 10/23/22  3561  10/24/22  0550 10/25/22  0348 10/26/22  1143   BILITOT 1.4* 0.9 0.8 0.6 0.8     Blood Culture: No results for input(s): LABBLOO in the last 48 hours.  BMP:   Recent Labs   Lab 10/26/22  1143   *      K 3.8      CO2 29   BUN 11   CREATININE 0.7   CALCIUM 8.5*   MG 1.7     CBC:   Recent Labs   Lab 10/25/22  0348 10/26/22  1143   WBC 4.71 6.35   HGB 12.1* 11.8*   HCT 36.0* 34.6*    313     CMP:   Recent Labs   Lab 10/25/22  0348 10/26/22  1143   * 138   K 3.6 3.8    104   CO2 27 29   * 145*   BUN 15 11   CREATININE 0.8 0.7   CALCIUM 8.1* 8.5*   PROT 6.5 6.6   ALBUMIN 2.2* 2.2*   BILITOT 0.6 0.8   ALKPHOS 88 84   AST 33 32   ALT 29 24   ANIONGAP 6* 5*     Recent Labs   Lab 10/26/22  1143   INR 1.1     Recent Labs   Lab 10/25/22  0348 10/26/22  1143   MG 1.9 1.7     TSH: No results for input(s): TSH in the last 4320 hours.    US Carotid Bilateral  Narrative: EXAMINATION:  US CAROTID BILATERAL    CLINICAL HISTORY:  Cerebral infarction, unspecified    TECHNIQUE:  Grayscale, color, and duplex Doppler imaging was performed on the extracranial carotid system bilaterally. NASCET criteria used for stenosis circulation.    COMPARISON:  None.    FINDINGS:  There is intimal thickening and calcified smooth plaque on the right and intimal thickening on the left.  Difficult exam due to patient coughing and snoring.  There is scattered plaque throughout the patient is in AFib.    Right common carotid velocity is 72.4 cm/s in the internal is 70.2 with a ratio of 1.  Normal antegrade flow in both vertebrals and externals.    Left common velocity is 78.8 in the left internal is 65.9 in the ratio 0.8  Impression: Difficult study as above with scattered plaque but no evidence for focal hemodynamic significant stenosis    Electronically signed by: Jihan Felder MD  Date:    10/26/2022  Time:    15:22    CT Head Without Contrast  Narrative: EXAMINATION:  CT HEAD WITHOUT CONTRAST    CLINICAL  HISTORY:  Neuro deficit, acute, stroke suspected;    CT/nuclear cardiac exams in previous 12 months: 2    TECHNIQUE:  Axial CT images were obtained. Iterative reconstruction technique was used.    COMPARISON:  None    FINDINGS:  No intracranial hemorrhage, mass, mass effect or recent infarct evident.  A small ovoid area of low attenuation within the left cerebellar hemisphere may reflect encephalomalacia from an old infarct.  Gray-white matter differentiation appears maintained.  Ventricles are not enlarged.  Visualized paranasal sinuses are clear.  Calvarium is intact.  Impression: No CT evidence of an acute intracranial abnormality.    Small ovoid hypodensity within the left cerebellar hemisphere, possibly encephalomalacia from an old infarct.  A follow-up MRI of the brain may be obtained on a nonemergent basis for further evaluation.    Electronically signed by: Ronan Oneill MD  Date:    10/26/2022  Time:    12:06   Results for orders placed during the hospital encounter of 10/21/22    Echo    Interpretation Summary  · The left ventricle is normal in size with concentric hypertrophy and normal systolic function.  · The estimated ejection fraction is 55%.  · Normal left ventricular diastolic function.  · Moderate right ventricular enlargement.  · Mild tricuspid regurgitation.  · The estimated PA systolic pressure is 26 mmHg.  · Mild left atrial enlargement.  · The study is a suboptimal study secondary to poor acoustic windows. Valves are not well visualized. Although there is not large vegetation visualized, endocarditis cannot be ruled out with this suboptimal study.    Significant Imaging: I have reviewed all pertinent imaging results/findings within the past 24 hours.  CT: I have reviewed all pertinent results/findings within the past 24 hours   Echo: I have reviewed all pertinent results/findings within the past 24 hours   U/S: I have reviewed all pertinent results/findings within the past 24 hours      Assessment/Plan:     * Stroke  Old versus subacute, as patient has had recent worsening of gait instability reported with dizziness and frequent falls presenting to ER twice, including most recent hospitalization.   CT head showing no acute intracranial abnormality. Findings significant for small ovoid hypodensity within the left cerebellar hemisphere, possibly encephalomalacia from an old infarct.   Reported difficult exam:   Carotid US showed no significant stenosis bilaterally, but atherosclerotic disease with scattered plaque.   TTE (10/21/22), normal systolic function LVEF 55%, no large vegetations visualized  Follow-up MRI of the brain ordered, however this cannot be performed due to patient exceeding size limit of MRI table.   - f/u CTA of brain  - f/u echocardiogram with bubble study  - f/u PT/OT evaluation and treatment  - continue atorvastatin 40 mg daily  - started eliquis for paroxysmal atrial fibrillation            A-fib  Patient with Paroxysmal (<7 days) atrial fibrillation which is uncontrolled currently with Beta Blocker, Calcium Channel Blocker and Amiodarone. Patient is currently in atrial fibrillation.[id number] Score: 2. HASBLED Score: 3. Anticoagulation indicated. Anticoagulation done with Eliquis.   - continue telemetry  - f/u cardiology        Diabetic foot ulcer  Follow wound care team. Last dressing change by podiatry on 10/25.  Patient's FSGs are uncontrolled due to hyperglycemia on current medication regimen.  Last A1c reviewed-   Lab Results   Component Value Date    HGBA1C 9.2 (H) 10/21/2022     Most recent fingerstick glucose reviewed-   Recent Labs   Lab 10/26/22  1929   POCTGLUCOSE 188*     Current correctional scale  Low  Maintain anti-hyperglycemic dose as follows-   Antihyperglycemics (From admission, onward)    Start     Stop Route Frequency Ordered    10/26/22 2115  insulin detemir U-100 pen 30 Units         -- SubQ 2 times daily 10/26/22 2100    10/26/22 2054  insulin  aspart U-100 pen 0-5 Units         -- SubQ Before meals & nightly PRN 10/26/22 2100        Hold Oral hypoglycemics while patient is in the hospital.    Bacteremia  10/24: MSSA bacteremia secondary to soft tissue infection from right foot wound, no leukocytosis, follow up blood cultures 10/24  NGTD x 2days.  - continue ancef 2g Q8H start date 10/24/22 - 11/6/22 (total 14 days of therapy)  - D3 ancef  - f/u outpatient infectious disease specialist        Diabetic infection of right foot  Chronic foot wound with history of past amputations of toes.   Patient's FSGs are uncontrolled due to hyperglycemia on current medication regimen.  Last A1c reviewed-   Lab Results   Component Value Date    HGBA1C 9.2 (H) 10/21/2022     Most recent fingerstick glucose reviewed-   Recent Labs   Lab 10/26/22  1929   POCTGLUCOSE 188*     Current correctional scale  Low  Maintain anti-hyperglycemic dose as follows-   Antihyperglycemics (From admission, onward)    Start     Stop Route Frequency Ordered    10/26/22 2115  insulin detemir U-100 pen 30 Units         -- SubQ 2 times daily 10/26/22 2100    10/26/22 2054  insulin aspart U-100 pen 0-5 Units         -- SubQ Before meals & nightly PRN 10/26/22 2100        Hold Oral hypoglycemics while patient is in the hospital.    Hyperlipidemia  - atorvastatin 40 mg daily      Essential hypertension  BP Readings from Last 3 Encounters:   10/26/22 (!) 180/77   10/25/22 (!) 178/84   10/24/22 (!) 143/67    Tachycardia 110s bpm and atrial fibrillation. Responded to metoprolol IV 5mg. Patient to be started on BB and amiodarone per cardiology.   - continue losartan 100 mg daily  - start metoprolol 25mg BID  - start amiodarone 200 mg BID  - monitor and adjust accordingly    Morbid obesity with BMI of 50.0-59.9, adult  Body mass index is 49.88 kg/m². Morbid obesity complicates all aspects of disease management from diagnostic modalities to treatment. Weight loss encouraged and health benefits explained to  patient.           VTE Risk Mitigation (From admission, onward)         Ordered     apixaban tablet 5 mg  2 times daily         10/26/22 1351     Place sequential compression device  Until discontinued         10/26/22 2100     IP VTE HIGH RISK PATIENT  Once         10/26/22 1535     Place sequential compression device  Until discontinued         10/26/22 1535                Olena Thornton DO  Department of Hospital Medicine   [facility]

## 2022-10-27 NOTE — ASSESSMENT & PLAN NOTE
Chronic foot wound with history of past amputations of toes.   Patient's FSGs are uncontrolled due to hyperglycemia on current medication regimen.  Last A1c reviewed-   Lab Results   Component Value Date    HGBA1C 9.2 (H) 10/21/2022     Most recent fingerstick glucose reviewed-   Recent Labs   Lab 10/26/22  1929 10/27/22  0517 10/27/22  1118   POCTGLUCOSE 188* 212* 184*     Current correctional scale  Low  Maintain anti-hyperglycemic dose as follows-   Antihyperglycemics (From admission, onward)    Start     Stop Route Frequency Ordered    10/26/22 2115  insulin detemir U-100 pen 30 Units         -- SubQ 2 times daily 10/26/22 2100    10/26/22 2054  insulin aspart U-100 pen 0-5 Units         -- SubQ Before meals & nightly PRN 10/26/22 2100        Hold Oral hypoglycemics while patient is in the hospital.

## 2022-10-27 NOTE — TELEPHONE ENCOUNTER
Andrew bolaños's wife called today, she states you are seeing him in the hospital.  She would like an update on his status please. [phone]

## 2022-10-27 NOTE — PLAN OF CARE
Discussed plan of care with pt at bedside, pt verbalized understanding. Pt needs reminders to utilize call light when needing to get up to bedside commode. Pt still noted with slightly slurred speech. Pt states feeling better today. Will continue to monitor.     Problem: Adult Inpatient Plan of Care  Goal: Plan of Care Review  Outcome: Ongoing, Progressing  Goal: Patient-Specific Goal (Individualized)  Outcome: Ongoing, Progressing  Goal: Absence of Hospital-Acquired Illness or Injury  Outcome: Ongoing, Progressing  Goal: Optimal Comfort and Wellbeing  Outcome: Ongoing, Progressing  Goal: Readiness for Transition of Care  Outcome: Ongoing, Progressing     Problem: Bariatric Environmental Safety  Goal: Safety Maintained with Care  Outcome: Ongoing, Progressing     Problem: Diabetes Comorbidity  Goal: Blood Glucose Level Within Targeted Range  Outcome: Ongoing, Progressing     Problem: Infection  Goal: Absence of Infection Signs and Symptoms  Outcome: Ongoing, Progressing     Problem: Impaired Wound Healing  Goal: Optimal Wound Healing  Outcome: Ongoing, Progressing     Problem: Adjustment to Illness (Stroke, Ischemic/Transient Ischemic Attack)  Goal: Optimal Coping  Outcome: Ongoing, Progressing     Problem: Bowel Elimination Impaired (Stroke, Ischemic/Transient Ischemic Attack)  Goal: Effective Bowel Elimination  Outcome: Ongoing, Progressing     Problem: Cerebral Tissue Perfusion (Stroke, Ischemic/Transient Ischemic Attack)  Goal: Optimal Cerebral Tissue Perfusion  Outcome: Ongoing, Progressing     Problem: Cognitive Impairment (Stroke, Ischemic/Transient Ischemic Attack)  Goal: Optimal Cognitive Function  Outcome: Ongoing, Progressing     Problem: Communication Impairment (Stroke, Ischemic/Transient Ischemic Attack)  Goal: Improved Communication Skills  Outcome: Ongoing, Progressing     Problem: Functional Ability Impaired (Stroke, Ischemic/Transient Ischemic Attack)  Goal: Optimal Functional Ability  Outcome:  Ongoing, Progressing     Problem: Respiratory Compromise (Stroke, Ischemic/Transient Ischemic Attack)  Goal: Effective Oxygenation and Ventilation  Outcome: Ongoing, Progressing     Problem: Sensorimotor Impairment (Stroke, Ischemic/Transient Ischemic Attack)  Goal: Improved Sensorimotor Function  Outcome: Ongoing, Progressing     Problem: Swallowing Impairment (Stroke, Ischemic/Transient Ischemic Attack)  Goal: Optimal Eating and Swallowing without Aspiration  Outcome: Ongoing, Progressing     Problem: Urinary Elimination Impaired (Stroke, Ischemic/Transient Ischemic Attack)  Goal: Effective Urinary Elimination  Outcome: Ongoing, Progressing

## 2022-10-27 NOTE — SUBJECTIVE & OBJECTIVE
Interval History: Patient seen and examined. Telemetry readings show patient remains in atrial fibrillation and tachycardia 90-100s. Denies symptoms.      Review of Systems   Constitutional:  Negative for activity change, appetite change, chills, fatigue and fever.   HENT:  Negative for congestion, sinus pain and tinnitus.    Eyes:  Negative for visual disturbance.   Respiratory:  Negative for chest tightness, shortness of breath and wheezing.    Cardiovascular:  Negative for chest pain and palpitations.   Gastrointestinal:  Negative for abdominal distention, abdominal pain, blood in stool, constipation, diarrhea, nausea, rectal pain and vomiting.   Genitourinary:  Negative for dysuria and enuresis.   Musculoskeletal:  Positive for back pain and gait problem (baseline neuropathy and hx toe ampation). Negative for arthralgias, joint swelling, neck pain and neck stiffness.   Skin:  Negative for color change.   Neurological:  Positive for weakness. Negative for dizziness, tremors, seizures, syncope, facial asymmetry, speech difficulty, numbness and headaches.   Psychiatric/Behavioral:  Negative for agitation, behavioral problems and confusion. The patient is not nervous/anxious.    Objective:     Vital Signs (Most Recent):  Temp: 98.7 °F (37.1 °C) (10/27/22 1125)  Pulse: 96 (10/27/22 1125)  Resp: (!) 22 (10/27/22 1125)  BP: 122/83 (10/27/22 1125)  SpO2: 96 % (10/27/22 1125)   Vital Signs (24h Range):  Temp:  [97.4 °F (36.3 °C)-98.7 °F (37.1 °C)] 98.7 °F (37.1 °C)  Pulse:  [] 96  Resp:  [18-22] 22  SpO2:  [93 %-97 %] 96 %  BP: (122-180)/(66-92) 122/83     Weight: (!) 171.5 kg (378 lb 1.6 oz)  Body mass index is 49.88 kg/m².    Intake/Output Summary (Last 24 hours) at 10/27/2022 1250  Last data filed at 10/27/2022 0543  Gross per 24 hour   Intake 900 ml   Output 1775 ml   Net -875 ml      Physical Exam  Vitals and nursing note reviewed.   Constitutional:       General: He is not in acute distress.     Appearance:  Normal appearance. He is not ill-appearing, toxic-appearing or diaphoretic.   HENT:      Head: Normocephalic and atraumatic.      Right Ear: External ear normal.      Left Ear: External ear normal.      Nose: Nose normal. No congestion.      Mouth/Throat:      Mouth: Mucous membranes are moist.      Pharynx: Oropharynx is clear.   Eyes:      Extraocular Movements: Extraocular movements intact.      Conjunctiva/sclera: Conjunctivae normal.   Neck:      Vascular: No carotid bruit.   Cardiovascular:      Rate and Rhythm: Normal rate. Rhythm irregular.      Pulses: Normal pulses.      Heart sounds: No murmur heard.  Pulmonary:      Effort: Pulmonary effort is normal. No respiratory distress.      Breath sounds: No wheezing or rales.   Chest:      Chest wall: No tenderness.   Abdominal:      General: There is no distension.      Palpations: Abdomen is soft.      Tenderness: There is no abdominal tenderness. There is no right CVA tenderness, left CVA tenderness or guarding.   Musculoskeletal:         General: No swelling or tenderness.      Cervical back: Neck supple. No rigidity or tenderness.      Right lower leg: No edema.      Left lower leg: No edema.      Comments: Right foot dressed and wrapped in ace bandage, clean and intact, right toe with darkened scab, no tenderness, sensation diminished throughout   Lymphadenopathy:      Cervical: No cervical adenopathy.   Skin:     General: Skin is warm and dry.      Capillary Refill: Capillary refill takes less than 2 seconds.   Neurological:      General: No focal deficit present.      Mental Status: He is alert.      Motor: Weakness present.      Gait: Gait abnormal.      Comments: No sensation over foot bilaterally and toes, Right first toe amputated, Left third toe amputated, sit upright maintaining postural balance, strength equal bilaterally in upper and lower extremities.   Psychiatric:         Mood and Affect: Mood normal.         Behavior: Behavior normal.      Significant Labs: All pertinent labs within the past 24 hours have been reviewed.  Blood Culture: No results for input(s): LABBLOO in the last 48 hours.  BMP:   Recent Labs   Lab 10/26/22  1143 10/27/22  0520   *  --      --    K 3.8  --      --    CO2 29  --    BUN 11  --    CREATININE 0.7  --    CALCIUM 8.5*  --    MG 1.7 1.7     CBC:   Recent Labs   Lab 10/26/22  1143 10/27/22  0520   WBC 6.35 6.11   HGB 11.8* 11.8*   HCT 34.6* 36.3*    374     CMP:   Recent Labs   Lab 10/26/22  1143      K 3.8      CO2 29   *   BUN 11   CREATININE 0.7   CALCIUM 8.5*   PROT 6.6   ALBUMIN 2.2*   BILITOT 0.8   ALKPHOS 84   AST 32   ALT 24   ANIONGAP 5*     Recent Labs   Lab 10/26/22  1158   COLORU Yellow   APPEARANCEUA Clear   PHUR 6.0   SPECGRAV 1.025   PROTEINUA Negative   GLUCUA 4+*   KETONESU 1+*   BILIRUBINUA Negative   OCCULTUA Negative   NITRITE Negative   UROBILINOGEN Negative   LEUKOCYTESUR Negative   RBCUA 2   WBCUA 1   BACTERIA Negative   SQUAMEPITHEL 0   HYALINECASTS 0.4*     Significant Imaging: I have reviewed all pertinent imaging results/findings within the past 24 hours.

## 2022-10-27 NOTE — HPI
57-year-old male with uncontrolled diabetes mellitus type 2, poor healing chronic diabetic foot and severe neuropathy, hypertension, hx of toe amputations was recently hospitalized for sepsis secondary to MSSA bacteremia.   After returning home from the hospital, patient's wife noticed patient experiencing difficulty maintaining balance even with use of walker. Patient in the morning continued to complain of generalized weakness and balance issues with left side predominance for weakness.      ED course: Found to be in atrial fibrillation and RVR 110bpm and was given IV metoprolol. CT head showed hypodensity in cerebellum likely reflecting a subacute infarct.  MRI ordered, but due to patient's weight and body habitus exceeding table limits cannot be performed at this time. Will plan for CTA of brain.   Cardiology started patient on eliquis, amiodarone and metoprolol for rate control.  Patient to be admitted to medicine floor for further cardiac and stroke work up. Also to complete IV antibiotics for MSSA bacteremia.

## 2022-10-27 NOTE — ASSESSMENT & PLAN NOTE
BP Readings from Last 3 Encounters:   10/27/22 122/83   10/25/22 (!) 178/84   10/24/22 (!) 143/67   Tachycardia 90-100s bpm and atrial fibrillation. Patient to be started on BB and amiodarone per cardiology.   - continue losartan 100 mg daily  - continue metoprolol 25mg BID  - continue amiodarone 200 mg BID  - monitor and adjust accordingly

## 2022-10-27 NOTE — SUBJECTIVE & OBJECTIVE
Past Medical History:   Diagnosis Date    Acute renal failure with tubular necrosis 10/22/2022    Diabetes mellitus, type 2     Fever 10/22/2022    Hyperlipidemia     Hypertension     Hyponatremia 10/22/2022    Obese     Peripheral neuropathy        Past Surgical History:   Procedure Laterality Date    APPENDECTOMY      HERNIA REPAIR      INCISION AND DRAINAGE FOOT Bilateral 2018    Procedure: INCISION AND DRAINAGE, FOOT;  Surgeon: Rob Alas DPM;  Location: [facility] OR;  Service: Podiatry;  Laterality: Bilateral;    TOE AMPUTATION      right great toe    TOE AMPUTATION Left 2018    Procedure: AMPUTATION, TOE; left 3rd toe;  Surgeon: Rob Alas DPM;  Location: [facility] OR;  Service: Podiatry;  Laterality: Left;       Review of patient's allergies indicates:  No Known Allergies    No current facility-administered medications on file prior to encounter.     Current Outpatient Medications on File Prior to Encounter   Medication Sig    amLODIPine (NORVASC) 5 MG tablet Take 5 mg by mouth.    aspirin (ECOTRIN) 81 MG EC tablet Take 81 mg by mouth once daily.    atorvastatin (LIPITOR) 40 MG tablet Take 40 mg by mouth.    ceFAZolin 2 g/50mL Dextrose IVPB (ANCEF) 2 gram/50 mL PgBk Inject 50 mLs (2,000 mg total) into the vein every 8 (eight) hours. for 13 days    cholecalciferol, vitamin D3, (VITAMIN D3) 25 mcg (1,000 unit) capsule 1 capsule    DULoxetine (CYMBALTA) 30 MG capsule Take 30 mg by mouth once daily.    JARDIANCE 25 mg tablet Take 25 mg by mouth once daily.    lactobacillus acidophilus & bulgar (LACTINEX) 100 million cell packet Take 1 packet (1 each total) by mouth 2 (two) times daily.    LANTUS SOLOSTAR U-100 INSULIN glargine 100 units/mL SubQ pen SMARTSI Unit(s) SUB-Q Twice Daily    losartan (COZAAR) 25 MG tablet Take 4 tablets (100 mg total) by mouth once daily.    metFORMIN (GLUCOPHAGE) 1000 MG tablet Take 1,000 mg by mouth 2 (two) times daily.    metoclopramide HCl (REGLAN) 10 MG  tablet Take 1 tablet (10 mg total) by mouth 3 (three) times daily before meals. for 7 days    mupirocin (BACTROBAN) 2 % ointment Apply topically 2 (two) times daily.    psyllium husk, with sugar, 3.4 gram packet Take 1 packet by mouth 3 (three) times daily.    TRULICITY 4.5 mg/0.5 mL pen injector Inject into the skin.     Family History    None       Tobacco Use    Smoking status: Never    Smokeless tobacco: Never   Substance and Sexual Activity    Alcohol use: No    Drug use: No    Sexual activity: Yes     Partners: Female     Comment:      Review of Systems   Constitutional:  Negative for activity change, appetite change, chills, fatigue and fever.   HENT:  Negative for congestion, sinus pain and tinnitus.    Eyes:  Negative for visual disturbance.   Respiratory:  Negative for chest tightness, shortness of breath and wheezing.    Cardiovascular:  Negative for chest pain and palpitations.   Gastrointestinal:  Negative for abdominal distention, abdominal pain, blood in stool, constipation, diarrhea, nausea, rectal pain and vomiting.   Genitourinary:  Negative for dysuria and enuresis.   Musculoskeletal:  Positive for back pain and gait problem (baseline neuropathy and hx toe ampation). Negative for arthralgias, joint swelling, neck pain and neck stiffness.   Skin:  Negative for color change.   Neurological:  Positive for weakness. Negative for dizziness, tremors, seizures, syncope, facial asymmetry, speech difficulty, numbness and headaches.   Psychiatric/Behavioral:  Negative for agitation, behavioral problems and confusion. The patient is not nervous/anxious.    Objective:     Vital Signs (Most Recent):  Temp: 97.9 °F (36.6 °C) (10/26/22 1935)  Pulse: 110 (10/26/22 1935)  Resp: (!) 22 (10/26/22 1935)  BP: (!) 180/77 (10/26/22 1935)  SpO2: 95 % (10/26/22 1935)   Vital Signs (24h Range):  Temp:  [97.4 °F (36.3 °C)-98 °F (36.7 °C)] 97.9 °F (36.6 °C)  Pulse:  [] 110  Resp:  [18-22] 22  SpO2:  [95 %-96 %]  95 %  BP: (132-180)/(66-90) 180/77     Weight: (!) 171.5 kg (378 lb 1.6 oz)  Body mass index is 49.88 kg/m².    Physical Exam  Vitals and nursing note reviewed.   Constitutional:       General: He is not in acute distress.     Appearance: Normal appearance. He is not ill-appearing, toxic-appearing or diaphoretic.   HENT:      Head: Normocephalic and atraumatic.      Right Ear: External ear normal.      Left Ear: External ear normal.      Nose: Nose normal. No congestion.      Mouth/Throat:      Mouth: Mucous membranes are moist.      Pharynx: Oropharynx is clear.   Eyes:      Extraocular Movements: Extraocular movements intact.      Conjunctiva/sclera: Conjunctivae normal.   Neck:      Vascular: No carotid bruit.   Cardiovascular:      Rate and Rhythm: Normal rate. Rhythm irregular.      Pulses: Normal pulses.      Heart sounds: No murmur heard.  Abdominal:      General: There is no distension.      Palpations: Abdomen is soft.      Tenderness: There is no abdominal tenderness. There is no right CVA tenderness, left CVA tenderness or guarding.   Musculoskeletal:         General: No swelling or tenderness.      Cervical back: Neck supple. No rigidity or tenderness.      Right lower leg: No edema.      Left lower leg: No edema.      Comments: Right foot dressed and wrapped in ace bandage, clean and intact, right toe with darkened scab, no tenderness, sensation diminished throughout   Lymphadenopathy:      Cervical: No cervical adenopathy.   Skin:     General: Skin is warm and dry.      Capillary Refill: Capillary refill takes less than 2 seconds.   Neurological:      General: No focal deficit present.      Mental Status: He is alert.      Motor: Weakness present.      Gait: Gait abnormal.      Comments: No sensation over foot bilaterally and toes, Right first toe amputated, Left third toe amputated, sit upright maintaining postural balance, strength equal bilaterally in upper and lower extremities.   Psychiatric:          Mood and Affect: Mood normal.         Behavior: Behavior normal.           Significant Labs: All pertinent labs within the past 24 hours have been reviewed.  A1C:   Recent Labs   Lab 10/21/22  0213   HGBA1C 9.2*     Bilirubin:   Recent Labs   Lab 10/21/22  2352 10/23/22  0530 10/24/22  0550 10/25/22  0348 10/26/22  1143   BILITOT 1.4* 0.9 0.8 0.6 0.8     Blood Culture: No results for input(s): LABBLOO in the last 48 hours.  BMP:   Recent Labs   Lab 10/26/22  1143   *      K 3.8      CO2 29   BUN 11   CREATININE 0.7   CALCIUM 8.5*   MG 1.7     CBC:   Recent Labs   Lab 10/25/22  0348 10/26/22  1143   WBC 4.71 6.35   HGB 12.1* 11.8*   HCT 36.0* 34.6*    313     CMP:   Recent Labs   Lab 10/25/22  0348 10/26/22  1143   * 138   K 3.6 3.8    104   CO2 27 29   * 145*   BUN 15 11   CREATININE 0.8 0.7   CALCIUM 8.1* 8.5*   PROT 6.5 6.6   ALBUMIN 2.2* 2.2*   BILITOT 0.6 0.8   ALKPHOS 88 84   AST 33 32   ALT 29 24   ANIONGAP 6* 5*     Recent Labs   Lab 10/26/22  1143   INR 1.1     Recent Labs   Lab 10/25/22  0348 10/26/22  1143   MG 1.9 1.7     TSH: No results for input(s): TSH in the last 4320 hours.    US Carotid Bilateral  Narrative: EXAMINATION:  US CAROTID BILATERAL    CLINICAL HISTORY:  Cerebral infarction, unspecified    TECHNIQUE:  Grayscale, color, and duplex Doppler imaging was performed on the extracranial carotid system bilaterally. NASCET criteria used for stenosis circulation.    COMPARISON:  None.    FINDINGS:  There is intimal thickening and calcified smooth plaque on the right and intimal thickening on the left.  Difficult exam due to patient coughing and snoring.  There is scattered plaque throughout the patient is in AFib.    Right common carotid velocity is 72.4 cm/s in the internal is 70.2 with a ratio of 1.  Normal antegrade flow in both vertebrals and externals.    Left common velocity is 78.8 in the left internal is 65.9 in the ratio 0.8  Impression:  Difficult study as above with scattered plaque but no evidence for focal hemodynamic significant stenosis    Electronically signed by: Jihan Felder MD  Date:    10/26/2022  Time:    15:22    CT Head Without Contrast  Narrative: EXAMINATION:  CT HEAD WITHOUT CONTRAST    CLINICAL HISTORY:  Neuro deficit, acute, stroke suspected;    CT/nuclear cardiac exams in previous 12 months: 2    TECHNIQUE:  Axial CT images were obtained. Iterative reconstruction technique was used.    COMPARISON:  None    FINDINGS:  No intracranial hemorrhage, mass, mass effect or recent infarct evident.  A small ovoid area of low attenuation within the left cerebellar hemisphere may reflect encephalomalacia from an old infarct.  Gray-white matter differentiation appears maintained.  Ventricles are not enlarged.  Visualized paranasal sinuses are clear.  Calvarium is intact.  Impression: No CT evidence of an acute intracranial abnormality.    Small ovoid hypodensity within the left cerebellar hemisphere, possibly encephalomalacia from an old infarct.  A follow-up MRI of the brain may be obtained on a nonemergent basis for further evaluation.    Electronically signed by: Ronan Oneill MD  Date:    10/26/2022  Time:    12:06   Results for orders placed during the hospital encounter of 10/21/22    Echo    Interpretation Summary  · The left ventricle is normal in size with concentric hypertrophy and normal systolic function.  · The estimated ejection fraction is 55%.  · Normal left ventricular diastolic function.  · Moderate right ventricular enlargement.  · Mild tricuspid regurgitation.  · The estimated PA systolic pressure is 26 mmHg.  · Mild left atrial enlargement.  · The study is a suboptimal study secondary to poor acoustic windows. Valves are not well visualized. Although there is not large vegetation visualized, endocarditis cannot be ruled out with this suboptimal study.    Significant Imaging: I have reviewed all pertinent imaging  results/findings within the past 24 hours.  CT: I have reviewed all pertinent results/findings within the past 24 hours   Echo: I have reviewed all pertinent results/findings within the past 24 hours   U/S: I have reviewed all pertinent results/findings within the past 24 hours

## 2022-10-27 NOTE — HOSPITAL COURSE
10/27 No acute events overnight. Patient talking without shortness of breath, moderately disturbed from arranging his finances over phone with [employer]. Denies symptoms. F/u CTA head and neck studies. Agreeable to PT/OT therapy. F/u cardiology will need reevaluation on status of possible endocarditis to current bacteremia. Tolerating eliquis, ASA.    10/28 CTA head and neck with no stenosis. Right foot wound stable and slowly improving. MSSA bacteremia continue with Ancef. WBC count stable. Cardiology to review TTE and possible need for EDEN to rule out endocarditis. Continue PT/OT (challenge with therapy, right dominant gait with poor left balance, while right foot needs to be non-weight bearing, podiatry working on getting CAM boot for patient).   10/29 On telemetry remains in atrial fibrillation, rate controlled.  Awaiting IP rehab with continued PT/OT, right foot wound care and MSSA bacteremia treatment.   Outpatient setting patient will require close follow up with cardiology, infectious disease specialist, podiatry, wound care and PCP.   10/30 Telemetry tracings remain in atrial fibrillation, rate controlled. SBP >160s, will increase metoprolol. Adjust basal insulin.  10/31 IP rehab approved. Patient prepared for daily PT/OT to work on strength and gait balance, work on predominant left side weakness. Podiatry follow up will continue for right foot wound care. Patient is to continue current ASA and eliquis for subacute stroke and atrial fibrillation. He will require continued IV antibiotics ancef for MSSA bacteremia. Endocarditis was not entirely ruled out secondary to limited view with TTE. Patient's weight and body habitus have both restricted obtaining MRI to confirm stroke status. Will need outpatient follow up with both infectious disease and cardiology to determine the need for extended antibiotics therapy.

## 2022-10-27 NOTE — ASSESSMENT & PLAN NOTE
Patient with Paroxysmal (<7 days) atrial fibrillation which is uncontrolled currently with Beta Blocker, Calcium Channel Blocker and Amiodarone. Patient is currently in atrial fibrillation.[id number] Score: 2. HASBLED Score: 3. Anticoagulation indicated. Anticoagulation done with Eliquis.   - continue telemetry  - f/u cardiology

## 2022-10-27 NOTE — PROGRESS NOTES
Cardiology Progress Note  ([facility])    Today's Date:  10/27/2022  Patient Name: Andrew Del Cid Jr.    MRN: [id number]    Code Status: Full Code     Attending Physician: Olena Thornton DO   Consulting Physician: LUZ MARIA Miles MD    Hospital Course:   Remains in atrial fibrillation.  Speech remains slurred.   Interviewed patient after the following up from head CT scanning.    Clinical:    Remains in atrial fibrillation with RVR.  Blood pressures have been in the 160/80 range.    Telemetry:    Atrial fibrillation with an RVR but overall improved.    Relevant testing:      ROS  General: mildly slurred speech.  Heart: no complaints of palpitations.  Breathing is good.  Muscle strength: 5/5 upper extremities.    Vital Signs (Most Recent):  Temp: 98 °F (36.7 °C) (10/27/22 0715)  Pulse: 100 (10/27/22 0833)  Resp: (!) 21 (10/27/22 0833)  BP: (!) 160/80 (10/27/22 0715)  SpO2: (!) 94 % (10/27/22 0833)   Vital Signs (24h Range):  Temp:  [97.4 °F (36.3 °C)-98.3 °F (36.8 °C)] 98 °F (36.7 °C)  Pulse:  [] 100  Resp:  [18-22] 21  SpO2:  [93 %-97 %] 94 %  BP: (132-180)/(66-92) 160/80     Weight: (!) 171.5 kg (378 lb 1.6 oz)  Body mass index is 49.88 kg/m².    SpO2: (!) 94 %  O2 Device (Oxygen Therapy): room air      Intake/Output Summary (Last 24 hours) at 10/27/2022 0932  Last data filed at 10/27/2022 0543  Gross per 24 hour   Intake 900 ml   Output 1775 ml   Net -875 ml     Labs:  CMP   Recent Labs   Lab 10/26/22  1143      K 3.8      CO2 29   *   BUN 11   CREATININE 0.7   CALCIUM 8.5*   PROT 6.6   ALBUMIN 2.2*   BILITOT 0.8   ALKPHOS 84   AST 32   ALT 24   ANIONGAP 5*    and CBC   Recent Labs   Lab 10/26/22  1143 10/27/22  0520   WBC 6.35 6.11   HGB 11.8* 11.8*   HCT 34.6* 36.3*    374       Imaging:   Head CT scan: pending.  Physical Examination:  General:   Alert and oriented.  Communicating well.  Speech continues to remain mildly slurred -- no detectable interval change from  yesterday evening.  Heent:   No JVD.  Lungs:   Clear to auscultation.  Heart:   Irregular regular rhythm; tachycardic.  Extremities:   1+ bilateral edema.  Right lower foot is wrapped.  Assessment and Plan:        1.  Status post CVA: secondary to atrial fibrillation.  The patient is currently communicating well with a persistent mild speech deficit that the patient is unaware of.  Eliquis 5 mg 1 tablet twice daily  Aspirin 81 mg daily.  Head CT scan pending.     2.  Atrial fibrillation: patient with several independent risk factors for atrial fibrillation.  The patient, his wife and I have talked about these independent risk factors which include obesity, probable obstructive sleep apnea.  Push beta-blocker for rate control.  Amiodarone 200 mg twice daily  If the patient does not convert to normal sinus rhythm, after 3 weeks, we will schedule the patient for elective DC cardioversion.    3.  Hypertension:   Blood pressure is elevated.  Continue with losartan therapy  Continue with amlodipine  Increase metoprolol from tartrate to succinate at 50 mg twice daily.    4.  Diabetes mellitus: as per  medicine service.    Patient with foot ulcer for which he remains on antibiotic therapy.    Aggressive glucose management.    5.  Morbid obesity: discussed importance of diet exercise and weight loss to reduce future morbidity and mortality.    Recommend sleep study.    6.  Hyperlipidemia: aggressive lipid management.    *To address the issue of bacterial endocarditis:  Will review echocardiogram from actual scanner as opposed to software (which degrades imaging),   and make a decision whether or not to proceed with transesophageal echocardiography.  Patient is currently afebrile with a normal white count.

## 2022-10-27 NOTE — PT/OT/SLP PROGRESS
Physical Therapy  Treatment    Andrew Del Cid Jr.   MRN: [id number]   Admitting Diagnosis: Stroke                          Billable Minutes:  Therapeutic Activity 18 minutes                     General Precautions: Standard, fall  Braces:  note: pt reports wearing a L knee brace for stability at home; asked him to have his wife bring it in for therapy trial  Respiratory Status: Room air         Subjective:  Communicated with nurse prior to session.  Pt c/o decreased coordination L UE which is persistent and decreased strength L LE     Pain: 0/10    Objective:        Functional Mobility:  Bed Mobility:    NA, pt sitting up edge of bed working on his computer    Transfers:   -sit to/from stand from bed using RW min assist  -turn and back to bed using RW min assist; pt very cautious with L LE/knee    Gait:    -step to pattern using RW x 10' min assist, very cautious step to pattern, pt reports instability of L knee and having to maintain L knee extension as it will buckle with the slightest flexion with weightbearing.        Balance:   Static Sit: GOOD: Takes MODERATE challenges from all directions  Dynamic Sit: GOOD-: Incosistently Maintains balance through MODERATE excursions of active trunk movement,     Static Stand: FAIR: Maintains without assist but unable to take challenges using RW  Dynamic stand: POOR+: Needs MIN (minimal ) assist during gait         Patient left sitting edge of bed with all lines intact, call button in reach, and nurse notified.    Assessment:  Andrew Del Cdi Jr. is a 57 y.o. male with a medical diagnosis of Stroke and presents with weakness L LE at hip; decreased L knee stability in standing, decreased coordination L LE; decreased stability in standing/gait.    Rehab identified problem list/impairments:      Rehab potential is good.    Activity tolerance: Good    Discharge recommendations:   IPR    Barriers to discharge:      Equipment recommendations:       GOALS:   Multidisciplinary  Problems       Physical Therapy Goals          Problem: Physical Therapy    Goal Priority Disciplines Outcome Goal Variances Interventions   Physical Therapy Goal     PT, PT/OT      Description: 1. Ind with bed mobility  2. Mod ind with transfers  3. Amb with rw 150ft with mod ind                       PLAN:    Patient to be seen daily  to address the above listed problems via gait training, therapeutic activities, therapeutic exercises  Plan of Care expires:    Plan of Care reviewed with: patient         10/27/2022

## 2022-10-27 NOTE — ASSESSMENT & PLAN NOTE
Follow wound care team. Last dressing change by podiatry on 10/25.  Patient's FSGs are uncontrolled due to hyperglycemia on current medication regimen.  Last A1c reviewed-   Lab Results   Component Value Date    HGBA1C 9.2 (H) 10/21/2022     Most recent fingerstick glucose reviewed-   Recent Labs   Lab 10/26/22  1929   POCTGLUCOSE 188*     Current correctional scale  Low  Maintain anti-hyperglycemic dose as follows-   Antihyperglycemics (From admission, onward)    Start     Stop Route Frequency Ordered    10/26/22 2115  insulin detemir U-100 pen 30 Units         -- SubQ 2 times daily 10/26/22 2100    10/26/22 2054  insulin aspart U-100 pen 0-5 Units         -- SubQ Before meals & nightly PRN 10/26/22 2100        Hold Oral hypoglycemics while patient is in the hospital.

## 2022-10-27 NOTE — ASSESSMENT & PLAN NOTE
10/24: MSSA bacteremia secondary to soft tissue infection from right foot wound, no leukocytosis, follow up blood cultures 10/24  NGTD x 3days.  - continue ancef 2g Q8H start date 10/24/22 - 11/6/22 (total 14 days of therapy)  - D4 ancef  - f/u outpatient infectious disease specialist

## 2022-10-27 NOTE — ASSESSMENT & PLAN NOTE
Chronic foot wound with history of past amputations of toes.   Patient's FSGs are uncontrolled due to hyperglycemia on current medication regimen.  Last A1c reviewed-   Lab Results   Component Value Date    HGBA1C 9.2 (H) 10/21/2022     Most recent fingerstick glucose reviewed-   Recent Labs   Lab 10/26/22  1929   POCTGLUCOSE 188*     Current correctional scale  Low  Maintain anti-hyperglycemic dose as follows-   Antihyperglycemics (From admission, onward)    Start     Stop Route Frequency Ordered    10/26/22 2115  insulin detemir U-100 pen 30 Units         -- SubQ 2 times daily 10/26/22 2100    10/26/22 2054  insulin aspart U-100 pen 0-5 Units         -- SubQ Before meals & nightly PRN 10/26/22 2100        Hold Oral hypoglycemics while patient is in the hospital.

## 2022-10-28 LAB
ALBUMIN SERPL BCP-MCNC: 2.2 G/DL (ref 3.5–5.2)
ALP SERPL-CCNC: 92 U/L (ref 55–135)
ALT SERPL W/O P-5'-P-CCNC: 20 U/L (ref 10–44)
ANION GAP SERPL CALC-SCNC: 3 MMOL/L (ref 8–16)
AST SERPL-CCNC: 15 U/L (ref 10–40)
BASOPHILS # BLD AUTO: 0.04 K/UL (ref 0–0.2)
BASOPHILS NFR BLD: 0.6 % (ref 0–1.9)
BILIRUB SERPL-MCNC: 0.6 MG/DL (ref 0.1–1)
BUN SERPL-MCNC: 9 MG/DL (ref 6–20)
CALCIUM SERPL-MCNC: 8.3 MG/DL (ref 8.7–10.5)
CHLORIDE SERPL-SCNC: 105 MMOL/L (ref 95–110)
CO2 SERPL-SCNC: 32 MMOL/L (ref 23–29)
CREAT SERPL-MCNC: 0.7 MG/DL (ref 0.5–1.4)
DIFFERENTIAL METHOD: ABNORMAL
EOSINOPHIL # BLD AUTO: 0.2 K/UL (ref 0–0.5)
EOSINOPHIL NFR BLD: 3.2 % (ref 0–8)
ERYTHROCYTE [DISTWIDTH] IN BLOOD BY AUTOMATED COUNT: 13 % (ref 11.5–14.5)
EST. GFR  (NO RACE VARIABLE): >60 ML/MIN/1.73 M^2
GLUCOSE SERPL-MCNC: 154 MG/DL (ref 70–110)
HCT VFR BLD AUTO: 37.1 % (ref 40–54)
HGB BLD-MCNC: 11.8 G/DL (ref 14–18)
IMM GRANULOCYTES # BLD AUTO: 0.17 K/UL (ref 0–0.04)
IMM GRANULOCYTES NFR BLD AUTO: 2.6 % (ref 0–0.5)
LYMPHOCYTES # BLD AUTO: 1.8 K/UL (ref 1–4.8)
LYMPHOCYTES NFR BLD: 27 % (ref 18–48)
MCH RBC QN AUTO: 27.5 PG (ref 27–31)
MCHC RBC AUTO-ENTMCNC: 31.8 G/DL (ref 32–36)
MCV RBC AUTO: 87 FL (ref 82–98)
MONOCYTES # BLD AUTO: 0.4 K/UL (ref 0.3–1)
MONOCYTES NFR BLD: 6.8 % (ref 4–15)
NEUTROPHILS # BLD AUTO: 3.9 K/UL (ref 1.8–7.7)
NEUTROPHILS NFR BLD: 59.8 % (ref 38–73)
NRBC BLD-RTO: 0 /100 WBC
PLATELET # BLD AUTO: 454 K/UL (ref 150–450)
PMV BLD AUTO: 9.2 FL (ref 9.2–12.9)
POCT GLUCOSE: 204 MG/DL (ref 70–110)
POCT GLUCOSE: 280 MG/DL (ref 70–110)
POCT GLUCOSE: 349 MG/DL (ref 70–110)
POTASSIUM SERPL-SCNC: 3.6 MMOL/L (ref 3.5–5.1)
PROT SERPL-MCNC: 6.6 G/DL (ref 6–8.4)
RBC # BLD AUTO: 4.29 M/UL (ref 4.6–6.2)
SODIUM SERPL-SCNC: 140 MMOL/L (ref 136–145)
WBC # BLD AUTO: 6.49 K/UL (ref 3.9–12.7)

## 2022-10-28 PROCEDURE — 99900035 HC TECH TIME PER 15 MIN (STAT)

## 2022-10-28 PROCEDURE — 99900031 HC PATIENT EDUCATION (STAT)

## 2022-10-28 PROCEDURE — 11000001 HC ACUTE MED/SURG PRIVATE ROOM

## 2022-10-28 PROCEDURE — 96366 THER/PROPH/DIAG IV INF ADDON: CPT

## 2022-10-28 PROCEDURE — 97116 GAIT TRAINING THERAPY: CPT

## 2022-10-28 PROCEDURE — 99233 PR SUBSEQUENT HOSPITAL CARE,LEVL III: ICD-10-PCS | Mod: ,,, | Performed by: STUDENT IN AN ORGANIZED HEALTH CARE EDUCATION/TRAINING PROGRAM

## 2022-10-28 PROCEDURE — 80053 COMPREHEN METABOLIC PANEL: CPT | Performed by: STUDENT IN AN ORGANIZED HEALTH CARE EDUCATION/TRAINING PROGRAM

## 2022-10-28 PROCEDURE — 36415 COLL VENOUS BLD VENIPUNCTURE: CPT | Performed by: STUDENT IN AN ORGANIZED HEALTH CARE EDUCATION/TRAINING PROGRAM

## 2022-10-28 PROCEDURE — 97530 THERAPEUTIC ACTIVITIES: CPT | Mod: CO

## 2022-10-28 PROCEDURE — G0378 HOSPITAL OBSERVATION PER HR: HCPCS

## 2022-10-28 PROCEDURE — 25000003 PHARM REV CODE 250: Performed by: STUDENT IN AN ORGANIZED HEALTH CARE EDUCATION/TRAINING PROGRAM

## 2022-10-28 PROCEDURE — 63600175 PHARM REV CODE 636 W HCPCS: Performed by: STUDENT IN AN ORGANIZED HEALTH CARE EDUCATION/TRAINING PROGRAM

## 2022-10-28 PROCEDURE — 25000003 PHARM REV CODE 250: Performed by: INTERNAL MEDICINE

## 2022-10-28 PROCEDURE — 25000003 PHARM REV CODE 250: Performed by: EMERGENCY MEDICINE

## 2022-10-28 PROCEDURE — 99233 SBSQ HOSP IP/OBS HIGH 50: CPT | Mod: ,,, | Performed by: STUDENT IN AN ORGANIZED HEALTH CARE EDUCATION/TRAINING PROGRAM

## 2022-10-28 PROCEDURE — 94761 N-INVAS EAR/PLS OXIMETRY MLT: CPT

## 2022-10-28 PROCEDURE — 85025 COMPLETE CBC W/AUTO DIFF WBC: CPT | Performed by: STUDENT IN AN ORGANIZED HEALTH CARE EDUCATION/TRAINING PROGRAM

## 2022-10-28 RX ORDER — AMLODIPINE BESYLATE 10 MG/1
10 TABLET ORAL DAILY
Status: DISCONTINUED | OUTPATIENT
Start: 2022-10-29 | End: 2022-10-31 | Stop reason: HOSPADM

## 2022-10-28 RX ORDER — AMIODARONE HYDROCHLORIDE 200 MG/1
200 TABLET ORAL 2 TIMES DAILY
Qty: 60 TABLET | Refills: 11 | Status: CANCELLED | OUTPATIENT
Start: 2022-10-28 | End: 2023-10-28

## 2022-10-28 RX ORDER — POTASSIUM CHLORIDE 20 MEQ/1
20 TABLET, EXTENDED RELEASE ORAL
Status: COMPLETED | OUTPATIENT
Start: 2022-10-28 | End: 2022-10-28

## 2022-10-28 RX ORDER — METOPROLOL SUCCINATE 50 MG/1
50 TABLET, EXTENDED RELEASE ORAL 2 TIMES DAILY
Qty: 60 TABLET | Refills: 5 | Status: CANCELLED | OUTPATIENT
Start: 2022-10-28 | End: 2023-04-26

## 2022-10-28 RX ORDER — AMOXICILLIN 250 MG
1 CAPSULE ORAL 2 TIMES DAILY
Qty: 28 TABLET | Refills: 0 | Status: CANCELLED | OUTPATIENT
Start: 2022-10-28 | End: 2022-11-11

## 2022-10-28 RX ADMIN — CEFAZOLIN SODIUM 2 G: 2 SOLUTION INTRAVENOUS at 02:10

## 2022-10-28 RX ADMIN — POTASSIUM CHLORIDE 20 MEQ: 1500 TABLET, EXTENDED RELEASE ORAL at 02:10

## 2022-10-28 RX ADMIN — SENNOSIDES AND DOCUSATE SODIUM 1 TABLET: 50; 8.6 TABLET ORAL at 08:10

## 2022-10-28 RX ADMIN — METOCLOPRAMIDE 10 MG: 10 TABLET ORAL at 11:10

## 2022-10-28 RX ADMIN — INSULIN ASPART 3 UNITS: 100 INJECTION, SOLUTION INTRAVENOUS; SUBCUTANEOUS at 05:10

## 2022-10-28 RX ADMIN — METOPROLOL SUCCINATE 50 MG: 50 TABLET, EXTENDED RELEASE ORAL at 08:10

## 2022-10-28 RX ADMIN — METOCLOPRAMIDE 10 MG: 10 TABLET ORAL at 06:10

## 2022-10-28 RX ADMIN — ASPIRIN 81 MG CHEWABLE TABLET 81 MG: 81 TABLET CHEWABLE at 08:10

## 2022-10-28 RX ADMIN — AMLODIPINE BESYLATE 5 MG: 5 TABLET ORAL at 08:10

## 2022-10-28 RX ADMIN — MUPIROCIN: 20 OINTMENT TOPICAL at 08:10

## 2022-10-28 RX ADMIN — APIXABAN 5 MG: 5 TABLET, FILM COATED ORAL at 08:10

## 2022-10-28 RX ADMIN — INSULIN DETEMIR 30 UNITS: 100 INJECTION, SOLUTION SUBCUTANEOUS at 08:10

## 2022-10-28 RX ADMIN — AMIODARONE HYDROCHLORIDE 200 MG: 200 TABLET ORAL at 08:10

## 2022-10-28 RX ADMIN — INSULIN ASPART 2 UNITS: 100 INJECTION, SOLUTION INTRAVENOUS; SUBCUTANEOUS at 08:10

## 2022-10-28 RX ADMIN — CEFAZOLIN SODIUM 2 G: 2 SOLUTION INTRAVENOUS at 06:10

## 2022-10-28 RX ADMIN — POTASSIUM CHLORIDE 20 MEQ: 1500 TABLET, EXTENDED RELEASE ORAL at 03:10

## 2022-10-28 RX ADMIN — Medication 400 MG: at 08:10

## 2022-10-28 RX ADMIN — CEFAZOLIN SODIUM 2 G: 2 SOLUTION INTRAVENOUS at 11:10

## 2022-10-28 RX ADMIN — ATORVASTATIN CALCIUM 40 MG: 40 TABLET, FILM COATED ORAL at 08:10

## 2022-10-28 RX ADMIN — LOSARTAN POTASSIUM 100 MG: 50 TABLET, FILM COATED ORAL at 08:10

## 2022-10-28 RX ADMIN — LACTOBACILLUS ACIDOPHILUS / LACTOBACILLUS BULGARICUS 1 EACH: 100 MILLION CFU STRENGTH GRANULES at 08:10

## 2022-10-28 RX ADMIN — MUPIROCIN: 20 OINTMENT TOPICAL at 09:10

## 2022-10-28 NOTE — PT/OT/SLP PROGRESS
Care Management:    Transition of Care Plan:     RUR: 27%   Disposition: TBD- home w/ f/u appts  Follow up appointments: PCP, Nephrology? DME needed: None  Transportation at Discharge: Medicaid transport/ round trip  [address] or means to access home:        IM Medicare Letter: N/A    Caregiver Contact: BrotherCamryn Jackson, [phone]  *pt does NOT want his mother contacted*  Discharge Caregiver contacted prior to discharge? Admitted with hyperkalemia, ESRD and CHF. Currently being treated in the ICU. PT and OT consulted . Pt previously admitted on 8/8, d/c home w/ f/u appt on 8/14. Pt confirmed demographic information is up to date. Pt lives w/ a friend at [address] [place] [address], [address], [facility]. Pt reports he has been living there for ~2 months. PT reports being independent w/ ADL's/ IADL's; does not drive. Reports no use of DME or home O2. Pt receives HD at [facility], [facility] (center# [id number] fax# [phone]); CM to fax clinicals prior to d/c. Pt uses Medicaid transport & friends to assist w/ transportation.      Chart reviewed and we will cont to follow for discharge needs as appropriate.     Vasile Astorga RN ACM [id number] Occupational Therapy   Treatment    Name: Andrew Del Cid Jr.  MRN: [id number]  Admitting Diagnosis:  Stroke       Recommendations:     Discharge Recommendations: rehabilitation facility  Discharge Equipment Recommendations:  walker, rolling, bedside commode, other (see comments) (To be further determined based on progress prior to discharge.)  Barriers to discharge:   (Further medical care required)    Assessment:     Andrew Del Cid Jr. is a 57 y.o. male with a medical diagnosis of Stroke. Performance deficits affecting function are weakness, impaired endurance, impaired functional mobility, impaired balance, decreased safety awareness, decreased lower extremity function.     Rehab Prognosis:  Good; patient would benefit from acute skilled OT services to address these deficits and reach maximum level of function.       Plan:     Patient to be seen 5 x/week to address the above listed problems via self-care/home management, therapeutic activities, therapeutic exercises, neuromuscular re-education  Plan of Care Expires: 11/02/22  Plan of Care Reviewed with: patient    Subjective     Pain/Comfort:  Pain Rating 1: 0/10  Pain Rating Post-Intervention 1: 0/10    Objective:     Communicated with: Nurse prior to session.  Patient found supine with   upon OT entry to room.    General Precautions: Standard, fall   Orthopedic Precautions:N/A   Braces: N/A  Respiratory Status: Room air     Occupational Performance:     Bed Mobility:    Patient completed Scooting/Bridging with stand by assistance  Patient completed Supine to Sit with stand by assistance  Patient completed Sit to Supine with stand by assistance     Functional Mobility/Transfers:  Patient completed Bed <> Chair Transfer using Step Transfer technique with minimum assistance with bedside commode    Activities of Daily Living:  Toileting: minimum assistance        Treatment & Education:  Pt was motivated and exhibiting positive affect this day with  interventions. Pt transitioned to sitting EOB. Pt tolerated sitting EOB ~10 mins with education on home exercise program for ex with BUE in all directions; pt demonstrating competency and v/u. Pt then transferred to BS this day and left to toilet. Pt with call bell in reach if assistance is needed; pt requesting time and privacy to complete toileting.    Patient left  up on BSC  with all lines intact, call button in reach, and nurse notified    GOALS:   Multidisciplinary Problems       Occupational Therapy Goals          Problem: Occupational Therapy    Goal Priority Disciplines Outcome Interventions   Occupational Therapy Goal     OT, PT/OT Ongoing, Progressing    Description: Goals to be met by: 11/02/22     Patient will increase functional independence with ADLs by performing:    Feeding with [place].  UE Dressing with Set-up Assistance.  LE Dressing with Supervision.  Grooming while seated with Modified [place].  Toileting from toilet with Supervision for hygiene and clothing management.   Bathing from  shower chair/bench with Supervision.  Step transfer with Supervision  Toilet transfer to toilet with Supervision.  Increased functional strength to 4+ to 5/5 for (L) UE.                         Time Tracking:     OT Date of Treatment: 10/28/22  OT Start Time: 1120  OT Stop Time: 1135  OT Total Time (min): 15 min    Billable Minutes:Therapeutic Activity 15    OT/ROBERT: ROBERT          10/28/2022

## 2022-10-28 NOTE — ASSESSMENT & PLAN NOTE
Chronic charcot's foot wound with history of past amputations of toes.   Patient's FSGs are uncontrolled due to hyperglycemia on current medication regimen.  Continue with daily wound care  Continue daily off loading of right foot  Continue PT/OT  Last A1c reviewed-   Lab Results   Component Value Date    HGBA1C 9.2 (H) 10/21/2022     Most recent fingerstick glucose reviewed-   Recent Labs   Lab 10/27/22  1634 10/27/22  2048   POCTGLUCOSE 226* 319*     Current correctional scale  Low  Maintain anti-hyperglycemic dose as follows-   Antihyperglycemics (From admission, onward)    Start     Stop Route Frequency Ordered    10/26/22 2115  insulin detemir U-100 pen 30 Units         -- SubQ 2 times daily 10/26/22 2100    10/26/22 2054  insulin aspart U-100 pen 0-5 Units         -- SubQ Before meals & nightly PRN 10/26/22 2100        Hold Oral hypoglycemics while patient is in the hospital.

## 2022-10-28 NOTE — ASSESSMENT & PLAN NOTE
Old versus subacute, as patient has had recent worsening of gait instability reported with dizziness and frequent falls presenting to ER twice, including most recent hospitalization.   CT head showing no acute intracranial abnormality. Findings significant for small ovoid hypodensity within the left cerebellar hemisphere, possibly encephalomalacia from an old infarct.   Reported difficult exam:   Carotid US showed no significant stenosis bilaterally, but atherosclerotic disease with scattered plaque.   TTE (10/21/22), normal systolic function LVEF 55%, no large vegetations visualized  Follow-up MRI of the brain ordered, however this cannot be performed due to patient exceeding size limit of MRI table.   CTA of brain and neck with no occlusive disease  - f/u PT/OT evaluation and treatment  - continue ASA 81 mg   - continue atorvastatin 40 mg daily  - continue eliquis for paroxysmal atrial fibrillation

## 2022-10-28 NOTE — ASSESSMENT & PLAN NOTE
Follow wound care team. Last dressing change by podiatry on 10/25.  Patient's FSGs are uncontrolled due to hyperglycemia on current medication regimen.  Last A1c reviewed-   Lab Results   Component Value Date    HGBA1C 9.2 (H) 10/21/2022     Most recent fingerstick glucose reviewed-   Recent Labs   Lab 10/27/22  1634 10/27/22  2048   POCTGLUCOSE 226* 319*     Current correctional scale  Low  Maintain anti-hyperglycemic dose as follows-   Antihyperglycemics (From admission, onward)    Start     Stop Route Frequency Ordered    10/26/22 2115  insulin detemir U-100 pen 30 Units         -- SubQ 2 times daily 10/26/22 2100    10/26/22 2054  insulin aspart U-100 pen 0-5 Units         -- SubQ Before meals & nightly PRN 10/26/22 2100        Hold Oral hypoglycemics while patient is in the hospital.

## 2022-10-28 NOTE — ASSESSMENT & PLAN NOTE
Chronic vs. subacute, patient has had recent worsening of gait instability reported with dizziness and frequent falls presenting to ER twice, including most recent hospitalization.   CT head showing no acute intracranial abnormality. Findings significant for small ovoid hypodensity within the left cerebellar hemisphere, possibly encephalomalacia from an old infarct.  Reported difficult exam:   Carotid US showed no significant stenosis bilaterally, but atherosclerotic disease with scattered plaque.   TTE (10/21/22), normal systolic function LVEF 55%, no large vegetations visualized  Follow-up MRI of the brain ordered, however this cannot be performed due to patient exceeding size limit of MRI table.   CTA of brain and neck with no occlusive disease  - continue PT/OT evaluation and treatment  - continue ASA 81 mg   - continue atorvastatin 40 mg daily  - continue eliquis for paroxysmal atrial fibrillation

## 2022-10-28 NOTE — PLAN OF CARE
Plan of care reviewed at bedside, verbalized understanding. Call bell in reach, denies any needs. Will continue to monitor.

## 2022-10-28 NOTE — CONSULTS
[facility] Surg  Podiatry  Consult Note    Patient Name: Andrew Del Cid Jr.  MRN: [id number]  Admission Date: 10/26/2022  Hospital Length of Stay: 2 days  Attending Physician: Olena Thornton DO  Primary Care Provider: Olena Thornton DO     Inpatient consult to Podiatry  Consult performed by: Baldemar Esquivel DPM  Consult ordered by: Olena Thornton DO  Reason for consult: Chronic right foot wound  Assessment/Recommendations: Continue local wound care with meropenem powder.  CAM boot ordered to assist in offloading.  Inpt rehab consult pending.      Subjective:     History of Present Illness: Patient is a 57-year-old male well known to me from previous inpatient admission, as well as outpatient care with multiple comorbidities including morbid obesity, hypertension, hyperlipidemia, uncontrolled type 2 diabetes with Charcot foot deformity to right lower extremity as well as history of previous digital amputations, now admitted with sroke-like symptoms, weakness and new A. Fib changes.  Patient seen at bedside this morning, Resting comfortably, denies any acute changes to the right foot are wound.  Bandage placed on 10/25/22 remains intact.  He did bring meropenem powder with him to hospital.  Patient with more aggressive therapy plan, pending possible inpatient rehabilitation.    Scheduled Meds:   amiodarone  200 mg Oral BID    amLODIPine  5 mg Oral Daily    apixaban  5 mg Oral BID    aspirin  81 mg Oral Daily    atorvastatin  40 mg Oral Daily    ceFAZolin (ANCEF) IVPB  2 g Intravenous Q8H    insulin detemir U-100  30 Units Subcutaneous BID    lactobacillus acidophilus & bulgar  1 packet Oral BID    losartan  100 mg Oral Daily    magnesium oxide  400 mg Oral Daily    meropenem  1 g Intravenous Daily    metoclopramide HCl  10 mg Oral TID AC    metoprolol succinate  50 mg Oral BID    mupirocin   Topical (Top) BID    senna-docusate 8.6-50 mg  1 tablet Oral BID     Continuous Infusions:  PRN  Meds:acetaminophen, glucose, insulin aspart U-100, labetalol, melatonin, ondansetron, prochlorperazine, sodium chloride 0.9%    Review of patient's allergies indicates:  No Known Allergies     Past Medical History:   Diagnosis Date    Acute renal failure with tubular necrosis 10/22/2022    Diabetes mellitus, type 2     Fever 10/22/2022    Hyperlipidemia     Hypertension     Hyponatremia 10/22/2022    Obese     Peripheral neuropathy      Past Surgical History:   Procedure Laterality Date    APPENDECTOMY      HERNIA REPAIR      INCISION AND DRAINAGE FOOT Bilateral 12/26/2018    Procedure: INCISION AND DRAINAGE, FOOT;  Surgeon: Rob Alas DPM;  Location: [facility];  Service: Podiatry;  Laterality: Bilateral;    TOE AMPUTATION      right great toe    TOE AMPUTATION Left 12/26/2018    Procedure: AMPUTATION, TOE; left 3rd toe;  Surgeon: Rob Alas DPM;  Location: [facility];  Service: Podiatry;  Laterality: Left;       Family History    None       Tobacco Use    Smoking status: Never    Smokeless tobacco: Never   Substance and Sexual Activity    Alcohol use: No    Drug use: No    Sexual activity: Yes     Partners: Female     Comment:      Review of Systems   Constitutional:  Positive for fatigue. Negative for fever.   HENT:  Positive for dental problem (Poor dentition). Negative for hearing loss.    Eyes:  Negative for visual disturbance.   Respiratory:  Negative for cough.    Cardiovascular:  Negative for chest pain.   Gastrointestinal:  Negative for nausea and vomiting.   Musculoskeletal:  Positive for arthralgias and back pain.   Skin:  Positive for wound.   Neurological:  Positive for weakness.   Psychiatric/Behavioral:  Negative for confusion.      Objective:     Vital Signs (Most Recent):  Temp: 97.9 °F (36.6 °C) (10/28/22 0732)  Pulse: 96 (10/28/22 0754)  Resp: 19 (10/28/22 0732)  BP: (!) 166/81 (10/28/22 0732)  SpO2: 96 % (10/28/22 0732)   Vital Signs (24h Range):  Temp:  [97.5 °F (36.4  °C)-98.7 °F (37.1 °C)] 97.9 °F (36.6 °C)  Pulse:  [82-96] 96  Resp:  [17-22] 19  SpO2:  [93 %-98 %] 96 %  BP: (114-169)/(56-83) 166/81     Weight: (!) 171.5 kg (378 lb 1.6 oz)  Body mass index is 49.88 kg/m².    Foot Exam    General  General Appearance: (morbidly obese male)  Orientation: alert and oriented to person, place, and time   Affect: appropriate         Right Foot/Ankle      Inspection and Palpation  Ecchymosis: none  Tenderness: none   Swelling: (Chronic to right foot from Charcot neuroarthropathy)  Hammertoes: second toe, third toe, fourth toe and fifth toe  Skin Exam: ulcer (2x1.7 cm fibrogranular wound at plantar aspect of foot, site of bony prominence from Charcot neuroarthropathy, no undermining, scant serous drainage noted, no purulence, no probing to bone); no erythema      Neurovascular  Dorsalis pedis: 2+  Posterior tibial: 2+  Saphenous nerve sensation: absent  Tibial nerve sensation: absent  Superficial peroneal nerve sensation: absent  Deep peroneal nerve sensation: absent  Sural nerve sensation: absent        Left Foot/Ankle       Inspection and Palpation  Ecchymosis: none  Tenderness: none   Hammertoes: third toe, fourth toe and fifth toe  Skin Exam: skin intact; no erythema      Neurovascular  Dorsalis pedis: 2+  Posterior tibial: 2+  Saphenous nerve sensation: absent  Tibial nerve sensation: absent  Superficial peroneal nerve sensation: absent  Deep peroneal nerve sensation: absent  Sural nerve sensation: absent    Laboratory:  Blood Cultures: No results for input(s): LABBLOO in the last 48 hours.  CBC:   Recent Labs   Lab 10/28/22  0541   WBC 6.49   RBC 4.29*   HGB 11.8*   HCT 37.1*   *   MCV 87   MCH 27.5   MCHC 31.8*     CMP:   Recent Labs   Lab 10/28/22  0541   *   CALCIUM 8.3*   ALBUMIN 2.2*   PROT 6.6      K 3.6   CO2 32*      BUN 9   CREATININE 0.7   ALKPHOS 92   ALT 20   AST 15   BILITOT 0.6     CRP: No results for input(s): CRP in the last 168  hours.  ESR:   Recent Labs   Lab 10/25/22  0348   SEDRATE 40*       Diagnostic Results:  I have reviewed all pertinent imaging results/findings within the past 24 hours.      Assessment/Plan:     Active Diagnoses:    Diagnosis Date Noted POA    PRINCIPAL PROBLEM:  Stroke [I63.9] 10/26/2022 Yes    A-fib [I48.91] 10/26/2022 Yes    Bacteremia [R78.81] 10/23/2022 Yes    Diabetic foot ulcer [E11.621, L97.509] 10/23/2022 Yes    Type 2 diabetes mellitus with diabetic polyneuropathy, with long-term current use of insulin [E11.42, Z79.4]  Not Applicable    Diabetic infection of right foot [E11.628, L08.9] 10/04/2022 Yes     Chronic    Hyperlipidemia [E78.5] 12/25/2018 Yes     Chronic    Essential hypertension [I10] 06/30/2017 Yes     Chronic    Morbid obesity with BMI of 50.0-59.9, adult [E66.01, Z68.43] 06/04/2015 Not Applicable     Chronic      Problems Resolved During this Admission:    Diagnosis Date Noted Date Resolved POA    History of amputation of right great toe [Z89.411] 07/12/2017 10/26/2022 Not Applicable     Chronic    Diabetic peripheral vascular disease [E11.51] 07/03/2017 10/26/2022 Yes     Chronic    Diabetic Charcot foot [E11.610] 06/30/2017 10/26/2022 Yes     Chronic       Diabetic foot ulcer  Patient with chronic wound from bony prominence due to Charcot neuroarthropathy.  Wound stable, will continue with topical meropenem to wound daily - wound care orders written.  Cam boot ordered as well to assist and offloading during weightbearing, rehabilitation/therapy.    Bacteremia  Patient with MSSA bacteremia, 10/24 blood cultures with NGTD, in process of 14 days of IV ancef (10/24-11/6)    Stroke  Old vs subactue, pending inpt rehab, management per primary service    A-fib  Anticoagulation with Eliquis, cardiology following    HTN, Hyperlipidemia, Morbid obesity  Stable, management per primary service    Thank you for your consult. I will follow-up with patient. Please contact us if you have any additional  questions.    Baldemar Esquivel DPM  Podiatry  [facility] Surg

## 2022-10-28 NOTE — SUBJECTIVE & OBJECTIVE
Interval History: Patient seen and examined. No acute events overnight. Remains in atrial fibrillation, rate <100 bpm.     Review of Systems   Constitutional:  Negative for activity change, appetite change, chills, fatigue and fever.   HENT:  Negative for congestion, sinus pain and tinnitus.    Eyes:  Negative for visual disturbance.   Respiratory:  Negative for chest tightness, shortness of breath and wheezing.    Cardiovascular:  Negative for chest pain and palpitations.   Gastrointestinal:  Negative for abdominal distention, abdominal pain, blood in stool, constipation, diarrhea, nausea, rectal pain and vomiting.   Genitourinary:  Negative for dysuria and enuresis.   Musculoskeletal:  Positive for back pain and gait problem (baseline neuropathy and hx toe ampation). Negative for arthralgias, joint swelling, neck pain and neck stiffness.   Skin:  Negative for color change.   Neurological:  Negative for dizziness, tremors, seizures, syncope, facial asymmetry, speech difficulty, weakness, numbness and headaches.   Psychiatric/Behavioral:  Negative for agitation, behavioral problems and confusion. The patient is not nervous/anxious.    Objective:     Vital Signs (Most Recent):  Temp: 97.9 °F (36.6 °C) (10/28/22 0732)  Pulse: 95 (10/28/22 0805)  Resp: 18 (10/28/22 0805)  BP: (!) 166/81 (10/28/22 0732)  SpO2: 97 % (10/28/22 0805)   Vital Signs (24h Range):  Temp:  [97.5 °F (36.4 °C)-97.9 °F (36.6 °C)] 97.9 °F (36.6 °C)  Pulse:  [82-96] 95  Resp:  [17-19] 18  SpO2:  [93 %-98 %] 97 %  BP: (114-169)/(56-83) 166/81     Weight: (!) 171.5 kg (378 lb 1.6 oz)  Body mass index is 49.88 kg/m².    Intake/Output Summary (Last 24 hours) at 10/28/2022 1141  Last data filed at 10/28/2022 0540  Gross per 24 hour   Intake 3660 ml   Output 2875 ml   Net 785 ml      Physical Exam  Vitals and nursing note reviewed.   Constitutional:       General: He is not in acute distress.     Appearance: Normal appearance. He is not ill-appearing,  toxic-appearing or diaphoretic.   HENT:      Head: Normocephalic and atraumatic.      Right Ear: External ear normal.      Left Ear: External ear normal.      Nose: Nose normal. No congestion.      Mouth/Throat:      Mouth: Mucous membranes are moist.      Pharynx: Oropharynx is clear.   Eyes:      Extraocular Movements: Extraocular movements intact.      Conjunctiva/sclera: Conjunctivae normal.   Neck:      Vascular: No carotid bruit.   Cardiovascular:      Rate and Rhythm: Normal rate. Rhythm irregular.      Pulses: Normal pulses.      Heart sounds: No murmur heard.  Pulmonary:      Effort: Pulmonary effort is normal. No respiratory distress.      Breath sounds: No wheezing or rales.   Chest:      Chest wall: No tenderness.   Abdominal:      General: There is no distension.      Palpations: Abdomen is soft.      Tenderness: There is no abdominal tenderness. There is no right CVA tenderness, left CVA tenderness or guarding.   Musculoskeletal:         General: No swelling or tenderness.      Cervical back: Neck supple. No rigidity or tenderness.      Right lower leg: No edema.      Left lower leg: No edema.      Comments: Right foot wound quarter size 2mm deep crater, no bleeding or drainage, dressed and wrapped in ace bandage, clean and intact, right toe with darkened scab, no tenderness, sensation diminished   Lymphadenopathy:      Cervical: No cervical adenopathy.   Skin:     General: Skin is warm and dry.      Capillary Refill: Capillary refill takes less than 2 seconds.   Neurological:      General: No focal deficit present.      Mental Status: He is alert.      Motor: Weakness present.      Gait: Gait abnormal.      Comments: No sensation over foot bilaterally and toes, Right first toe amputated, Left third toe amputated, sit upright maintaining postural balance, strength equal bilaterally in upper and lower extremities.   Psychiatric:         Mood and Affect: Mood normal.         Behavior: Behavior normal.          Thought Content: Thought content normal.     Significant Labs: All pertinent labs within the past 24 hours have been reviewed.  BMP:   Recent Labs   Lab 10/27/22  0520 10/28/22  0541   GLU  --  154*   NA  --  140   K  --  3.6   CL  --  105   CO2  --  32*   BUN  --  9   CREATININE  --  0.7   CALCIUM  --  8.3*   MG 1.7  --      CBC:   Recent Labs   Lab 10/27/22  0520 10/28/22  0541   WBC 6.11 6.49   HGB 11.8* 11.8*   HCT 36.3* 37.1*    454*     CMP:   Recent Labs   Lab 10/28/22  0541      K 3.6      CO2 32*   *   BUN 9   CREATININE 0.7   CALCIUM 8.3*   PROT 6.6   ALBUMIN 2.2*   BILITOT 0.6   ALKPHOS 92   AST 15   ALT 20   ANIONGAP 3*     Cardiac Markers: No results for input(s): CKMB, MYOGLOBIN, BNP, TROPISTAT in the last 48 hours.  Lactic Acid: No results for input(s): LACTATE in the last 48 hours.  TSH: No results for input(s): TSH in the last 4320 hours.  Urine Culture: No results for input(s): LABURIN in the last 48 hours.  Urine Studies:   Recent Labs   Lab 10/26/22  1158   COLORU Yellow   APPEARANCEUA Clear   PHUR 6.0   SPECGRAV 1.025   PROTEINUA Negative   GLUCUA 4+*   KETONESU 1+*   BILIRUBINUA Negative   OCCULTUA Negative   NITRITE Negative   UROBILINOGEN Negative   LEUKOCYTESUR Negative   RBCUA 2   WBCUA 1   BACTERIA Negative   SQUAMEPITHEL 0   HYALINECASTS 0.4*     CTA Neck  Narrative: EXAMINATION:  CTA NECK    CLINICAL HISTORY:  Cerebral infarction, unspecified    TECHNIQUE:  Thin section post IV contrast images were obtained of the neck.  Multiplanar MIP images were performed.  Images were viewed at 3D workstation also.  Iterative reconstruction technique was used.  NASCET criteria used for stenosis calculation.    CT/cardiac nuclear exam/s in prior 12 months:  3.    COMPARISON:  Carotid ultrasound 10/26/2022.    FINDINGS:  Fine detail somewhat limited by patient's body habitus, especially in the lower neck and upper chest.    Aortic arch: Moderate artifact.  No visible  plaque.  Origins of great vessels patent.    Right Carotid: Mild calcified plaque at the distal CCA, bulb and proximal ICA.  No significant stenosis.    Left Carotid: Mild mixed plaque bulb and proximal ICA.  No significant stenosis.    Vertebrals: Origins in the neck obscured.  Otherwise patent.    Source CT images demonstrate no evidence of neck mass or lymph node enlargement.  Impression: No evidence of right or left ICA stenosis.    Electronically signed by: Jeison Mckinney MD  Date:    10/27/2022  Time:    14:13  CTA Head  Narrative: EXAMINATION:  CTA HEAD    CLINICAL HISTORY:  Stroke, follow up;    TECHNIQUE:  Thin section post IV contrast images were obtained through the cerebral vasculature.  Multiplanar MIP reformations were performed.  Iterative reconstruction technique was used.    CT/cardiac nuclear exam/s in prior 12 months:  3    COMPARISON:  None.    FINDINGS:  Distal internal carotid arteries patent.  Anterior and middle cerebral arteries and their proximal branches patent.  Distal vertebral and basilar arteries patent.  Posterior cerebral arteries patent.  No evidence of aneurysm.  Impression: No evidence of intracranial large vessel stenosis or aneurysm.    Electronically signed by: Jeison Mckinney MD  Date:    10/27/2022  Time:    14:06   Significant Imaging: I have reviewed all pertinent imaging results/findings within the past 24 hours.  CT: I have reviewed all pertinent results/findings within the past 24 hours.

## 2022-10-28 NOTE — ASSESSMENT & PLAN NOTE
Patient with Paroxysmal (<7 days) atrial fibrillation which is rate controlled currently with Beta Blocker, Calcium Channel Blocker and Amiodarone. Patient is currently in atrial fibrillation.[id number] Score: 2. HASBLED Score: 3. Anticoagulation indicated. Anticoagulation done with Eliquis.   - continue telemetry  - f/u cardiology

## 2022-10-28 NOTE — PLAN OF CARE
[facility]  Initial Discharge Assessment       Primary Care Provider: Olena Thornton DO    Admission Diagnosis: Bacteremia [R78.81]  A-fib [I48.91]  Cerebellar dysmetria [R27.8]  CVA (cerebral vascular accident) [I63.9]  Stroke [I63.9]  Diabetic infection of right foot [E11.628, L08.9]    Admission Date: 10/26/2022  Expected Discharge Date:          Payor: MEDICAID / Plan: [facility] COMMUNITY PLAN [facility] ([place] MEDICAID) / Product Type: Managed Medicaid /     Extended Emergency Contact Information  Primary Emergency Contact: Aleena Garcia  Address: [address], [place] [id number] [place] [address]  Home Phone: [phone]  Mobile Phone: [phone]  Relation: Spouse  Preferred language: English   needed? No    Discharge Plan A: Rehab  Discharge Plan B: Long-term acute care facility (LTAC)      [id number] - [place], [place] - [address]  [address] [address]  Phone: [phone] Fax: [phone]    [id number] - [place], [place] - [address]  [address] [address]  Phone: [phone] Fax: [phone]    [facility] [address], [place] [address]  [address]  [facility] [address]  Phone: [phone] Fax: [phone]      Initial Assessment (most recent)       Adult Discharge Assessment - 10/27/22 0744          Discharge Assessment    Assessment Type Discharge Planning Assessment     Confirmed/corrected address, phone number and insurance Yes     Confirmed Demographics Correct on Facesheet     Source of Information patient;family     Reason For Admission Stroke     Lives With spouse;child(milana), dependent     Do you expect to return to your current living situation? No     Do you have help at home or someone to help you manage your care at home? Yes     Who are your caregiver(s) and their phone number(s)? Aleena (Spouse)   [phone]     Prior to hospitilization  cognitive status: Alert/Oriented     Current cognitive status: Alert/Oriented     Walking or Climbing Stairs Difficulty ambulation difficulty, requires equipment;ambulation difficulty, assistance 1 person;transferring difficulty, requires equipment;transferring difficulty, assistance 1 person   The patient had a walker order for him during his last admission at the hospital    Mobility Management rolling walker     Home Accessibility stairs to enter home     Number of Stairs, Main Entrance six     Stair Railings, Main Entrance railing on left side (ascending)     Home Layout Bedroom on 2nd floor   The patient lives in a two story home    Equipment Currently Used at Home walker, rolling     Readmission within 30 days? Yes     Patient currently being followed by outpatient case management? No     Do you currently have service(s) that help you manage your care at home? No     Do you take prescription medications? Yes     Do you have prescription coverage? Yes     Coverage Medicaid     Who is going to help you get home at discharge? TBD     How do you get to doctors appointments? family or friend will provide     Are you on dialysis? No     Do you take coumadin? No     Discharge Plan A Rehab     Discharge Plan B Other     Discharge Plan discussed with: Patient;Spouse/sig other     Name(s) and Number(s) Aleena (Spouse)-[phone]                     Readmission Assessment (most recent)       Readmission Assessment - 10/27/22 0745          Readmission    Why were you hospitalized in the last 30 days? Hypoxia     Why were you readmitted? Alarmed about signs/symptoms     When you left the hospital where did you go? Home with Family     When did you start not feeling well? The patient started to experiencing left side weakness.     Did you try to manage your symptoms your self? No     Did you call anyone? Yes     Who did you call? PCP     Did you have  a follow-up appointment on discharge? Yes     Did you go? No     Why?  --   The patient readmitted to the hospital after he discharged on Tuesday, October 25, 2022.                Initial discharge assessment is completed. I spoke to the patient and his spouse, Aleena. The patient was receptive to the information that was given. The patient agreed to inpatient rehab located on the seventh floor. Case management will continue to monitor.

## 2022-10-28 NOTE — PROGRESS NOTES
[facility] Medicine  Progress Note    Patient Name: Andrew Del Cid Jr.  MRN: [id number]  Patient Class: IP- Inpatient   Admission Date: 10/26/2022  Length of Stay: 2 days  Attending Physician: Olena Thornton DO  Primary Care Provider: Olena Thornton DO        Subjective:     Principal Problem:Stroke    HPI:  57-year-old male with uncontrolled diabetes mellitus type 2, poor healing chronic diabetic foot and severe neuropathy, hypertension, hx of toe amputations was recently hospitalized for sepsis secondary to MSSA bacteremia.   After returning home from the hospital, patient's wife noticed patient experiencing difficulty maintaining balance even with use of walker. Patient in the morning continued to complain of generalized weakness and balance issues with left side predominance for weakness.      ED course: Found to be in atrial fibrillation and RVR 110bpm and was given IV metoprolol. CT head showed hypodensity in cerebellum likely reflecting a subacute infarct.  MRI ordered, but due to patient's weight and body habitus exceeding table limits cannot be performed at this time. Will plan for CTA of brain.   Cardiology started patient on eliquis, amiodarone and metoprolol for rate control.  Patient to be admitted to medicine floor for further cardiac and stroke work up. Also to complete IV antibiotics for MSSA bacteremia.      Overview/Hospital Course:  10/27 No acute events overnight. Patient talking without shortness of breath, moderately disturbed from arranging his finances over phone with [employer]. Denies symptoms. F/u CTA head and neck studies. Agreeable to PT/OT therapy. F/u cardiology will need reevaluation on status of possible endocarditis to current bacteremia. Tolerating eliquis, ASA.    10/28 CTA head and neck with no stenosis. Right foot wound stable and slowly improving. MSSA bacteremia continue with Ancef. WBC count stable. Cardiology to review TTE and possible need for  EDEN to rule out endocarditis. Continue PT/OT (challenge with therapy, right dominant gait with poor left balance, while right foot needs to be non-weight bearing, podiatry working on getting CAM boot for patient). Awaiting IP rehab.        Interval History: Patient seen and examined. No acute events overnight. Remains in atrial fibrillation, rate <100 bpm.     Review of Systems   Constitutional:  Negative for activity change, appetite change, chills, fatigue and fever.   HENT:  Negative for congestion, sinus pain and tinnitus.    Eyes:  Negative for visual disturbance.   Respiratory:  Negative for chest tightness, shortness of breath and wheezing.    Cardiovascular:  Negative for chest pain and palpitations.   Gastrointestinal:  Negative for abdominal distention, abdominal pain, blood in stool, constipation, diarrhea, nausea, rectal pain and vomiting.   Genitourinary:  Negative for dysuria and enuresis.   Musculoskeletal:  Positive for back pain and gait problem (baseline neuropathy and hx toe ampation). Negative for arthralgias, joint swelling, neck pain and neck stiffness.   Skin:  Negative for color change.   Neurological:  Negative for dizziness, tremors, seizures, syncope, facial asymmetry, speech difficulty, weakness, numbness and headaches.   Psychiatric/Behavioral:  Negative for agitation, behavioral problems and confusion. The patient is not nervous/anxious.    Objective:     Vital Signs (Most Recent):  Temp: 97.9 °F (36.6 °C) (10/28/22 0732)  Pulse: 95 (10/28/22 0805)  Resp: 18 (10/28/22 0805)  BP: (!) 166/81 (10/28/22 0732)  SpO2: 97 % (10/28/22 0805)   Vital Signs (24h Range):  Temp:  [97.5 °F (36.4 °C)-97.9 °F (36.6 °C)] 97.9 °F (36.6 °C)  Pulse:  [82-96] 95  Resp:  [17-19] 18  SpO2:  [93 %-98 %] 97 %  BP: (114-169)/(56-83) 166/81     Weight: (!) 171.5 kg (378 lb 1.6 oz)  Body mass index is 49.88 kg/m².    Intake/Output Summary (Last 24 hours) at 10/28/2022 1141  Last data filed at 10/28/2022  0540  Gross per 24 hour   Intake 3660 ml   Output 2875 ml   Net 785 ml      Physical Exam  Vitals and nursing note reviewed.   Constitutional:       General: He is not in acute distress.     Appearance: Normal appearance. He is not ill-appearing, toxic-appearing or diaphoretic.   HENT:      Head: Normocephalic and atraumatic.      Right Ear: External ear normal.      Left Ear: External ear normal.      Nose: Nose normal. No congestion.      Mouth/Throat:      Mouth: Mucous membranes are moist.      Pharynx: Oropharynx is clear.   Eyes:      Extraocular Movements: Extraocular movements intact.      Conjunctiva/sclera: Conjunctivae normal.   Neck:      Vascular: No carotid bruit.   Cardiovascular:      Rate and Rhythm: Normal rate. Rhythm irregular.      Pulses: Normal pulses.      Heart sounds: No murmur heard.  Pulmonary:      Effort: Pulmonary effort is normal. No respiratory distress.      Breath sounds: No wheezing or rales.   Chest:      Chest wall: No tenderness.   Abdominal:      General: There is no distension.      Palpations: Abdomen is soft.      Tenderness: There is no abdominal tenderness. There is no right CVA tenderness, left CVA tenderness or guarding.   Musculoskeletal:         General: No swelling or tenderness.      Cervical back: Neck supple. No rigidity or tenderness.      Right lower leg: No edema.      Left lower leg: No edema.      Comments: Right foot wound quarter size 2mm deep crater, no bleeding or drainage, dressed and wrapped in ace bandage, clean and intact, right toe with darkened scab, no tenderness, sensation diminished   Lymphadenopathy:      Cervical: No cervical adenopathy.   Skin:     General: Skin is warm and dry.      Capillary Refill: Capillary refill takes less than 2 seconds.   Neurological:      General: No focal deficit present.      Mental Status: He is alert.      Motor: Weakness present.      Gait: Gait abnormal.      Comments: No sensation over foot bilaterally and  toes, Right first toe amputated, Left third toe amputated, sit upright maintaining postural balance, strength equal bilaterally in upper and lower extremities.   Psychiatric:         Mood and Affect: Mood normal.         Behavior: Behavior normal.         Thought Content: Thought content normal.     Significant Labs: All pertinent labs within the past 24 hours have been reviewed.  BMP:   Recent Labs   Lab 10/27/22  0520 10/28/22  0541   GLU  --  154*   NA  --  140   K  --  3.6   CL  --  105   CO2  --  32*   BUN  --  9   CREATININE  --  0.7   CALCIUM  --  8.3*   MG 1.7  --      CBC:   Recent Labs   Lab 10/27/22  0520 10/28/22  0541   WBC 6.11 6.49   HGB 11.8* 11.8*   HCT 36.3* 37.1*    454*     CMP:   Recent Labs   Lab 10/28/22  0541      K 3.6      CO2 32*   *   BUN 9   CREATININE 0.7   CALCIUM 8.3*   PROT 6.6   ALBUMIN 2.2*   BILITOT 0.6   ALKPHOS 92   AST 15   ALT 20   ANIONGAP 3*     Cardiac Markers: No results for input(s): CKMB, MYOGLOBIN, BNP, TROPISTAT in the last 48 hours.  Lactic Acid: No results for input(s): LACTATE in the last 48 hours.  TSH: No results for input(s): TSH in the last 4320 hours.  Urine Culture: No results for input(s): LABURIN in the last 48 hours.  Urine Studies:   Recent Labs   Lab 10/26/22  1158   COLORU Yellow   APPEARANCEUA Clear   PHUR 6.0   SPECGRAV 1.025   PROTEINUA Negative   GLUCUA 4+*   KETONESU 1+*   BILIRUBINUA Negative   OCCULTUA Negative   NITRITE Negative   UROBILINOGEN Negative   LEUKOCYTESUR Negative   RBCUA 2   WBCUA 1   BACTERIA Negative   SQUAMEPITHEL 0   HYALINECASTS 0.4*     CTA Neck  Narrative: EXAMINATION:  CTA NECK    CLINICAL HISTORY:  Cerebral infarction, unspecified    TECHNIQUE:  Thin section post IV contrast images were obtained of the neck.  Multiplanar MIP images were performed.  Images were viewed at 3D workstation also.  Iterative reconstruction technique was used.  NASCET criteria used for stenosis calculation.    CT/cardiac  nuclear exam/s in prior 12 months:  3.    COMPARISON:  Carotid ultrasound 10/26/2022.    FINDINGS:  Fine detail somewhat limited by patient's body habitus, especially in the lower neck and upper chest.    Aortic arch: Moderate artifact.  No visible plaque.  Origins of great vessels patent.    Right Carotid: Mild calcified plaque at the distal CCA, bulb and proximal ICA.  No significant stenosis.    Left Carotid: Mild mixed plaque bulb and proximal ICA.  No significant stenosis.    Vertebrals: Origins in the neck obscured.  Otherwise patent.    Source CT images demonstrate no evidence of neck mass or lymph node enlargement.  Impression: No evidence of right or left ICA stenosis.    Electronically signed by: Jeison Mckinney MD  Date:    10/27/2022  Time:    14:13  CTA Head  Narrative: EXAMINATION:  CTA HEAD    CLINICAL HISTORY:  Stroke, follow up;    TECHNIQUE:  Thin section post IV contrast images were obtained through the cerebral vasculature.  Multiplanar MIP reformations were performed.  Iterative reconstruction technique was used.    CT/cardiac nuclear exam/s in prior 12 months:  3    COMPARISON:  None.    FINDINGS:  Distal internal carotid arteries patent.  Anterior and middle cerebral arteries and their proximal branches patent.  Distal vertebral and basilar arteries patent.  Posterior cerebral arteries patent.  No evidence of aneurysm.  Impression: No evidence of intracranial large vessel stenosis or aneurysm.    Electronically signed by: Jeison Mckinney MD  Date:    10/27/2022  Time:    14:06   Significant Imaging: I have reviewed all pertinent imaging results/findings within the past 24 hours.  CT: I have reviewed all pertinent results/findings within the past 24 hours.       Assessment/Plan:      * Stroke  Old versus subacute, as patient has had recent worsening of gait instability reported with dizziness and frequent falls presenting to ER twice, including most recent hospitalization.   CT head showing no  acute intracranial abnormality. Findings significant for small ovoid hypodensity within the left cerebellar hemisphere, possibly encephalomalacia from an old infarct.   Reported difficult exam:   Carotid US showed no significant stenosis bilaterally, but atherosclerotic disease with scattered plaque.   TTE (10/21/22), normal systolic function LVEF 55%, no large vegetations visualized  Follow-up MRI of the brain ordered, however this cannot be performed due to patient exceeding size limit of MRI table.   CTA of brain and neck with no occlusive disease  - f/u PT/OT evaluation and treatment  - continue ASA 81 mg   - continue atorvastatin 40 mg daily  - continue eliquis for paroxysmal atrial fibrillation            A-fib  Patient with Paroxysmal (<7 days) atrial fibrillation which is rate controlled currently with Beta Blocker, Calcium Channel Blocker and Amiodarone. Patient is currently in atrial fibrillation.[id number] Score: 2. HASBLED Score: 3. Anticoagulation indicated. Anticoagulation done with Eliquis.   - continue telemetry  - f/u cardiology        Diabetic foot ulcer  Follow wound care team. Last dressing change by podiatry on 10/25.  Patient's FSGs are uncontrolled due to hyperglycemia on current medication regimen.  Last A1c reviewed-   Lab Results   Component Value Date    HGBA1C 9.2 (H) 10/21/2022     Most recent fingerstick glucose reviewed-   Recent Labs   Lab 10/27/22  1634 10/27/22  2048   POCTGLUCOSE 226* 319*     Current correctional scale  Low  Maintain anti-hyperglycemic dose as follows-   Antihyperglycemics (From admission, onward)    Start     Stop Route Frequency Ordered    10/26/22 2115  insulin detemir U-100 pen 30 Units         -- SubQ 2 times daily 10/26/22 2100    10/26/22 2054  insulin aspart U-100 pen 0-5 Units         -- SubQ Before meals & nightly PRN 10/26/22 2100        Hold Oral hypoglycemics while patient is in the hospital.    Bacteremia  10/24: MSSA bacteremia secondary to soft  tissue infection from right foot wound, no leukocytosis, follow up blood cultures 10/24  NGTD x 4 days.  - continue ancef 2g Q8H start date 10/24/22 - 11/6/22 (total 14 days of therapy)  - D5 ancef  - f/u outpatient infectious disease specialist        Diabetic infection of right foot  Chronic charcot's foot wound with history of past amputations of toes.   Patient's FSGs are uncontrolled due to hyperglycemia on current medication regimen.  Continue with daily wound care  Continue daily off loading of right foot  Continue PT/OT  Last A1c reviewed-   Lab Results   Component Value Date    HGBA1C 9.2 (H) 10/21/2022     Most recent fingerstick glucose reviewed-   Recent Labs   Lab 10/27/22  1634 10/27/22  2048   POCTGLUCOSE 226* 319*     Current correctional scale  Low  Maintain anti-hyperglycemic dose as follows-   Antihyperglycemics (From admission, onward)    Start     Stop Route Frequency Ordered    10/26/22 2115  insulin detemir U-100 pen 30 Units         -- SubQ 2 times daily 10/26/22 2100    10/26/22 2054  insulin aspart U-100 pen 0-5 Units         -- SubQ Before meals & nightly PRN 10/26/22 2100        Hold Oral hypoglycemics while patient is in the hospital.    Hyperlipidemia  - atorvastatin 40 mg daily      Essential hypertension  BP Readings from Last 3 Encounters:   10/28/22 (!) 166/81   10/25/22 (!) 178/84   10/24/22 (!) 143/67   Tachycardia 90-100s bpm and atrial fibrillation. Patient to be started on BB and amiodarone per cardiology.   - continue losartan 100 mg daily  - continue amlodipine 10 mg daily   - continue metoprolol 50 mg  BID  - continue amiodarone 200 mg BID  - monitor and adjust accordingly    Morbid obesity with BMI of 50.0-59.9, adult  Body mass index is 49.88 kg/m². Morbid obesity complicates all aspects of disease management from diagnostic modalities to treatment. Weight loss encouraged and health benefits explained to patient.           VTE Risk Mitigation (From admission, onward)          Ordered     apixaban tablet 5 mg  2 times daily         10/26/22 1351     Place sequential compression device  Until discontinued         10/26/22 2100     IP VTE HIGH RISK PATIENT  Once         10/26/22 1535     Place sequential compression device  Until discontinued         10/26/22 1535                Discharge Planning   MICHAEL:      Code Status: Full Code   Is the patient medically ready for discharge?:     Reason for patient still in hospital (select all that apply): Patient trending condition, Treatment, Consult recommendations, PT / OT recommendations and Pending disposition  Discharge Plan A: Rehab   Discharge Delays: None known at this time      Olena Thornton DO  Department of Hospital Medicine   [facility]

## 2022-10-28 NOTE — DISCHARGE SUMMARY
[facility] (formerly [facility]) Medicine  Discharge Summary      Patient Name: Andrew Del Cid Jr.  MRN: [id number]  Patient Class: IP- Inpatient  Admission Date: 10/26/2022  Hospital Length of Stay: 2 days  Discharge Date and Time:  10/28/2022 1:16 PM  Attending Physician: Olena Thornton DO   Discharging Provider: Olena Thornton DO  Primary Care Provider: Olena Thornton DO      HPI:   57-year-old male with uncontrolled diabetes mellitus type 2, poor healing chronic diabetic foot and severe neuropathy, hypertension, hx of toe amputations was recently hospitalized for sepsis secondary to MSSA bacteremia.   After returning home from the hospital, patient's wife noticed patient experiencing difficulty maintaining balance even with use of walker. Patient in the morning continued to complain of generalized weakness and balance issues with left side predominance for weakness.      ED course: Found to be in atrial fibrillation and RVR 110bpm and was given IV metoprolol. CT head showed hypodensity in cerebellum likely reflecting a subacute infarct.  MRI ordered, but due to patient's weight and body habitus exceeding table limits cannot be performed at this time. Will plan for CTA of brain.   Cardiology started patient on eliquis, amiodarone and metoprolol for rate control.  Patient to be admitted to medicine floor for further cardiac and stroke work up. Also to complete IV antibiotics for MSSA bacteremia.      * No surgery found *      Hospital Course:   10/27 No acute events overnight. Patient talking without shortness of breath, moderately disturbed from arranging his finances over phone with [employer]. Denies symptoms. F/u CTA head and neck studies. Agreeable to PT/OT therapy. F/u cardiology will need reevaluation on status of possible endocarditis to current bacteremia. Tolerating eliquis, ASA.    10/28 CTA head and neck with no stenosis. Right foot wound stable and slowly improving. MSSA bacteremia  continue with Ancef. WBC count stable. Cardiology to review TTE and possible need for EDEN to rule out endocarditis. Continue PT/OT (challenge with therapy, right dominant gait with poor left balance, while right foot needs to be non-weight bearing, podiatry working on getting CAM boot for patient).   Patient to be admitted to IP rehab with continued PT/OT, right foot wound care and bacteremia.   Outpatient setting patient will require close follow up with cardiology, infectious disease specialist, podiatry, wound care and PCP.        Goals of Care Treatment Preferences:  Code Status: Full Code      Consults:   Consults (From admission, onward)        Status Ordering Provider     Inpatient consult to Podiatry  Once        Provider:  Baldemar Esquivel DPM    Completed CYNTHIA REYES     IP consult to case management/social work  Once        Provider:  (Not yet assigned)    Acknowledged CYNTHIA REYES     Inpatient consult to Social Work/Case Management  Once        Provider:  (Not yet assigned)    Acknowledged CYNTHIA REYES     Inpatient consult to Cardiology  Once        Provider:  (Not yet assigned)    Completed NOEL SIERRA          * Stroke  Chronic vs. subacute, patient has had recent worsening of gait instability reported with dizziness and frequent falls presenting to ER twice, including most recent hospitalization.   CT head showing no acute intracranial abnormality. Findings significant for small ovoid hypodensity within the left cerebellar hemisphere, possibly encephalomalacia from an old infarct.  Reported difficult exam:   Carotid US showed no significant stenosis bilaterally, but atherosclerotic disease with scattered plaque.   TTE (10/21/22), normal systolic function LVEF 55%, no large vegetations visualized  Follow-up MRI of the brain ordered, however this cannot be performed due to patient exceeding size limit of MRI table.   CTA of brain and neck with no occlusive  disease  - f/u PT/OT evaluation and treatment  - continue ASA 81 mg   - continue atorvastatin 40 mg daily  - continue eliquis for paroxysmal atrial fibrillation              Final Active Diagnoses:    Diagnosis Date Noted POA    PRINCIPAL PROBLEM:  Stroke [I63.9] 10/26/2022 Yes    A-fib [I48.91] 10/26/2022 Yes    Bacteremia [R78.81] 10/23/2022 Yes    Diabetic foot ulcer [E11.621, L97.509] 10/23/2022 Yes    Type 2 diabetes mellitus with diabetic polyneuropathy, with long-term current use of insulin [E11.42, Z79.4]  Not Applicable    Diabetic infection of right foot [E11.628, L08.9] 10/04/2022 Yes     Chronic    Hyperlipidemia [E78.5] 12/25/2018 Yes     Chronic    Essential hypertension [I10] 06/30/2017 Yes     Chronic    Morbid obesity with BMI of 50.0-59.9, adult [E66.01, Z68.43] 06/04/2015 Not Applicable     Chronic      Problems Resolved During this Admission:    Diagnosis Date Noted Date Resolved POA    History of amputation of right great toe [Z89.411] 07/12/2017 10/26/2022 Not Applicable     Chronic    Diabetic peripheral vascular disease [E11.51] 07/03/2017 10/26/2022 Yes     Chronic    Diabetic Charcot foot [E11.610] 06/30/2017 10/26/2022 Yes     Chronic     Discharged Condition: stable    Disposition: Inpatient rehabilitation with aggressive PT/OT post stroke, right foot wound care and bacteremia treatment with IV antibiotics.     Follow Up: Infectious Disease, Cardiology, Podiatry, Wound care and PCP    Patient Instructions:   No discharge procedures on file.    Significant Diagnostic Studies: Labs:   BMP:   Recent Labs   Lab 10/27/22  0520 10/28/22  0541   GLU  --  154*   NA  --  140   K  --  3.6   CL  --  105   CO2  --  32*   BUN  --  9   CREATININE  --  0.7   CALCIUM  --  8.3*   MG 1.7  --    , CMP   Recent Labs   Lab 10/28/22  0541      K 3.6      CO2 32*   *   BUN 9   CREATININE 0.7   CALCIUM 8.3*   PROT 6.6   ALBUMIN 2.2*   BILITOT 0.6   ALKPHOS 92   AST 15   ALT 20    ANIONGAP 3*     Recent Labs   Lab 10/27/22  0520 10/28/22  0541   WBC 6.11 6.49   HGB 11.8* 11.8*   HCT 36.3* 37.1*    454*   , INR   Lab Results   Component Value Date    INR 1.0 10/27/2022    INR 1.1 10/26/2022    INR 1.0 06/01/2016   , Lipid Panel   Lab Results   Component Value Date    CHOL 116 (L) 10/27/2022    HDL 22 (L) 10/27/2022    LDLCALC 65.8 10/27/2022    TRIG 141 10/27/2022    CHOLHDL 19.0 (L) 10/27/2022     Recent Labs   Lab 10/21/22  0213   HGBA1C 9.2*    and All labs within the past 24 hours have been reviewed    Microbiology:   Blood Culture   Lab Results   Component Value Date    LABBLOO No Growth to date 10/24/2022    LABBLOO No Growth to date 10/24/2022    LABBLOO No Growth to date 10/24/2022    LABBLOO No Growth to date 10/24/2022     CTA Neck  Narrative: EXAMINATION:  CTA NECK    CLINICAL HISTORY:  Cerebral infarction, unspecified    TECHNIQUE:  Thin section post IV contrast images were obtained of the neck.  Multiplanar MIP images were performed.  Images were viewed at 3D workstation also.  Iterative reconstruction technique was used.  NASCET criteria used for stenosis calculation.    CT/cardiac nuclear exam/s in prior 12 months:  3.    COMPARISON:  Carotid ultrasound 10/26/2022.    FINDINGS:  Fine detail somewhat limited by patient's body habitus, especially in the lower neck and upper chest.    Aortic arch: Moderate artifact.  No visible plaque.  Origins of great vessels patent.    Right Carotid: Mild calcified plaque at the distal CCA, bulb and proximal ICA.  No significant stenosis.    Left Carotid: Mild mixed plaque bulb and proximal ICA.  No significant stenosis.    Vertebrals: Origins in the neck obscured.  Otherwise patent.    Source CT images demonstrate no evidence of neck mass or lymph node enlargement.  Impression: No evidence of right or left ICA stenosis.    Electronically signed by: Jeison Mckinney MD  Date:    10/27/2022  Time:    14:13  CTA Head  Narrative:  EXAMINATION:  CTA HEAD    CLINICAL HISTORY:  Stroke, follow up;    TECHNIQUE:  Thin section post IV contrast images were obtained through the cerebral vasculature.  Multiplanar MIP reformations were performed.  Iterative reconstruction technique was used.    CT/cardiac nuclear exam/s in prior 12 months:  3    COMPARISON:  None.    FINDINGS:  Distal internal carotid arteries patent.  Anterior and middle cerebral arteries and their proximal branches patent.  Distal vertebral and basilar arteries patent.  Posterior cerebral arteries patent.  No evidence of aneurysm.  Impression: No evidence of intracranial large vessel stenosis or aneurysm.    Electronically signed by: Jeison Mckinney MD  Date:    10/27/2022  Time:    14:06    Pending Diagnostic Studies:     Procedure Component Value Units Date/Time    Comprehensive Metabolic Panel [[id number]] Collected: 10/27/22 0520    Order Status: Sent Lab Status: In process Updated: 10/27/22 0557    Specimen: Blood          Medications:  Reconciled Home Medications:      Medication List      ASK your doctor about these medications    amLODIPine 5 MG tablet  Commonly known as: NORVASC  Take 5 mg by mouth.     aspirin 81 MG EC tablet  Commonly known as: ECOTRIN  Take 81 mg by mouth once daily.     atorvastatin 40 MG tablet  Commonly known as: LIPITOR  Take 40 mg by mouth.     ceFAZolin 2 g/50mL Dextrose IVPB 2 gram/50 mL Pgbk  Commonly known as: ANCEF  Inject 50 mLs (2,000 mg total) into the vein every 8 (eight) hours. for 13 days     cholecalciferol (vitamin D3) 25 mcg (1,000 unit) capsule  Commonly known as: VITAMIN D3  1 capsule     DULoxetine 30 MG capsule  Commonly known as: CYMBALTA  Take 30 mg by mouth once daily.     JARDIANCE 25 mg tablet  Generic drug: empagliflozin  Take 25 mg by mouth once daily.     lactobacillus acidophilus & bulgar 100 million cell packet  Commonly known as: LACTINEX  Take 1 packet (1 each total) by mouth 2 (two) times daily.     LANTUS SOLOSTAR U-100  INSULIN glargine 100 units/mL SubQ pen  Generic drug: insulin  SMARTSI Unit(s) SUB-Q Twice Daily     losartan 25 MG tablet  Commonly known as: COZAAR  Take 4 tablets (100 mg total) by mouth once daily.     metFORMIN 1000 MG tablet  Commonly known as: GLUCOPHAGE  Take 1,000 mg by mouth 2 (two) times daily.     metoclopramide HCl 10 MG tablet  Commonly known as: REGLAN  Take 1 tablet (10 mg total) by mouth 3 (three) times daily before meals. for 7 days     mupirocin 2 % ointment  Commonly known as: BACTROBAN  Apply topically 2 (two) times daily.     psyllium husk (with sugar) 3.4 gram packet  Take 1 packet by mouth 3 (three) times daily.     TRULICITY 4.5 mg/0.5 mL pen injector  Generic drug: dulaglutide  Inject into the skin.          Indwelling Lines/Drains at time of discharge:   Lines/Drains/Airways     None  Left midline access (10/23/22)              Time spent on the discharge of patient: 30 minutes    Olena Thornton DO  Department of Hospital Medicine  [facility]

## 2022-10-28 NOTE — ASSESSMENT & PLAN NOTE
10/24: MSSA bacteremia secondary to soft tissue infection from right foot wound, no leukocytosis, follow up blood cultures 10/24  NGTD x 4 days.  - continue ancef 2g Q8H start date 10/24/22 - 11/6/22 (total 14 days of therapy)  - D5 ancef  - f/u outpatient infectious disease specialist

## 2022-10-28 NOTE — PROGRESS NOTES
Cardiology Progress Note  ([facility])    Today's Date:  10/28/2022  Patient Name: Andrew Del Cid Jr.    MRN: [id number]    Code Status: Full Code     Attending Physician: Olena Thornton DO   Consulting Physician: LUZ MARIA Mlies MD    Hospital Course:  Patient is doing well this morning.  Is sitting up eating breakfast, stating that he feels much better.  Clinical:    Alert and oriented.  Speech is improved and no cognitive dysfunction.    Telemetry:    Atrial fibrillation with an RVR but overall improved.    Relevant testing:      ROS  General: mildly slurred speech, but improved.  Heart: no complaints of palpitations.  Breathing is good.  Muscle strength: 5/5 upper extremities.    Vital Signs (Most Recent):  Temp: 97.9 °F (36.6 °C) (10/28/22 0732)  Pulse: 96 (10/28/22 0754)  Resp: 19 (10/28/22 0732)  BP: (!) 166/81 (10/28/22 0732)  SpO2: 96 % (10/28/22 0732)   Vital Signs (24h Range):  Temp:  [97.5 °F (36.4 °C)-98.7 °F (37.1 °C)] 97.9 °F (36.6 °C)  Pulse:  [82-96] 96  Resp:  [17-22] 19  SpO2:  [93 %-98 %] 96 %  BP: (114-169)/(56-83) 166/81     Weight: (!) 171.5 kg (378 lb 1.6 oz)  Body mass index is 49.88 kg/m².    SpO2: 96 %  O2 Device (Oxygen Therapy): room air      Intake/Output Summary (Last 24 hours) at 10/28/2022 0921  Last data filed at 10/28/2022 0540  Gross per 24 hour   Intake 3660 ml   Output 2875 ml   Net 785 ml       Labs:  CMP   Recent Labs   Lab 10/26/22  1143 10/28/22  0541    140   K 3.8 3.6    105   CO2 29 32*   * 154*   BUN 11 9   CREATININE 0.7 0.7   CALCIUM 8.5* 8.3*   PROT 6.6 6.6   ALBUMIN 2.2* 2.2*   BILITOT 0.8 0.6   ALKPHOS 84 92   AST 32 15   ALT 24 20   ANIONGAP 5* 3*      and CBC   Recent Labs   Lab 10/26/22  1143 10/27/22  0520 10/28/22  0541   WBC 6.35 6.11 6.49   HGB 11.8* 11.8* 11.8*   HCT 34.6* 36.3* 37.1*    374 454*         Imaging:   Head CT scan: pending.  Physical Examination:  General:   Alert and oriented.  Communicating well.  Speech  continues to remain mildly slurred -- no detectable interval change from yesterday evening.  Heent:   No JVD.  Lungs:   Clear to auscultation.  Heart:   Irregular regular rhythm; tachycardic.  Extremities:   1+ bilateral edema.  Right lower foot is wrapped.  Assessment and Plan:        1.  Status post CVA: secondary to atrial fibrillation.  The patient is currently communicating well .  Eliquis 5 mg 1 tablet twice daily  Aspirin 81 mg daily.       2.  Atrial fibrillation: patient with several independent risk factors for atrial fibrillation.  The patient, his wife and I have talked about these independent risk factors which include obesity, probable obstructive sleep apnea.  Blood pressure is elevated and heart rate remains tachycardic, therefore will increase beta-blocker therapy.  Amiodarone 200 mg twice daily  If the patient does not convert to normal sinus rhythm, after 3 weeks, we will schedule the patient for elective DC cardioversion.    3.  Hypertension:   Blood pressure is elevated.  Continue with losartan therapy  Increase amlodipine to 10 mg daily.  Increase metoprolol from tartrate to succinate at 50 mg twice daily.    4.  Diabetes mellitus: as per  medicine service.    Patient with foot ulcer for which he remains on antibiotic therapy.    Aggressive glucose management.    5.  Morbid obesity: discussed importance of diet exercise and weight loss to reduce future morbidity and mortality.    Recommend sleep study.    6.  Hyperlipidemia: aggressive lipid management.    *To address the issue of bacterial endocarditis:  Will review echocardiogram from actual scanner as opposed to software (which degrades imaging),   and make a decision whether or not to proceed with transesophageal echocardiography.  Patient is currently afebrile with a normal white count.

## 2022-10-28 NOTE — ASSESSMENT & PLAN NOTE
BP Readings from Last 3 Encounters:   10/28/22 (!) 166/81   10/25/22 (!) 178/84   10/24/22 (!) 143/67   Tachycardia 90-100s bpm and atrial fibrillation. Patient to be started on BB and amiodarone per cardiology.   - continue losartan 100 mg daily  - continue amlodipine 10 mg daily   - continue metoprolol 50 mg  BID  - continue amiodarone 200 mg BID  - monitor and adjust accordingly

## 2022-10-29 LAB
ANION GAP SERPL CALC-SCNC: 5 MMOL/L (ref 8–16)
BACTERIA BLD CULT: NORMAL
BACTERIA BLD CULT: NORMAL
BASOPHILS # BLD AUTO: 0.04 K/UL (ref 0–0.2)
BASOPHILS # BLD AUTO: 0.04 K/UL (ref 0–0.2)
BASOPHILS NFR BLD: 0.6 % (ref 0–1.9)
BASOPHILS NFR BLD: 0.6 % (ref 0–1.9)
BUN SERPL-MCNC: 10 MG/DL (ref 6–20)
CALCIUM SERPL-MCNC: 8.9 MG/DL (ref 8.7–10.5)
CHLORIDE SERPL-SCNC: 107 MMOL/L (ref 95–110)
CO2 SERPL-SCNC: 31 MMOL/L (ref 23–29)
CREAT SERPL-MCNC: 0.7 MG/DL (ref 0.5–1.4)
DIFFERENTIAL METHOD: ABNORMAL
DIFFERENTIAL METHOD: ABNORMAL
EOSINOPHIL # BLD AUTO: 0.2 K/UL (ref 0–0.5)
EOSINOPHIL # BLD AUTO: 0.2 K/UL (ref 0–0.5)
EOSINOPHIL NFR BLD: 3.1 % (ref 0–8)
EOSINOPHIL NFR BLD: 3.1 % (ref 0–8)
ERYTHROCYTE [DISTWIDTH] IN BLOOD BY AUTOMATED COUNT: 13.1 % (ref 11.5–14.5)
ERYTHROCYTE [DISTWIDTH] IN BLOOD BY AUTOMATED COUNT: 13.1 % (ref 11.5–14.5)
EST. GFR  (NO RACE VARIABLE): >60 ML/MIN/1.73 M^2
FERRITIN SERPL-MCNC: 135 NG/ML (ref 20–300)
GLUCOSE SERPL-MCNC: 114 MG/DL (ref 70–110)
HCT VFR BLD AUTO: 38.1 % (ref 40–54)
HCT VFR BLD AUTO: 38.1 % (ref 40–54)
HGB BLD-MCNC: 12.1 G/DL (ref 14–18)
HGB BLD-MCNC: 12.1 G/DL (ref 14–18)
IMM GRANULOCYTES # BLD AUTO: 0.15 K/UL (ref 0–0.04)
IMM GRANULOCYTES # BLD AUTO: 0.15 K/UL (ref 0–0.04)
IMM GRANULOCYTES NFR BLD AUTO: 2.2 % (ref 0–0.5)
IMM GRANULOCYTES NFR BLD AUTO: 2.2 % (ref 0–0.5)
IRON SATN MFR SERPL: 15 % (ref 20–50)
IRON SERPL-MCNC: 34 UG/DL (ref 45–160)
LYMPHOCYTES # BLD AUTO: 2 K/UL (ref 1–4.8)
LYMPHOCYTES # BLD AUTO: 2 K/UL (ref 1–4.8)
LYMPHOCYTES NFR BLD: 28.7 % (ref 18–48)
LYMPHOCYTES NFR BLD: 28.7 % (ref 18–48)
MCH RBC QN AUTO: 27.8 PG (ref 27–31)
MCH RBC QN AUTO: 27.8 PG (ref 27–31)
MCHC RBC AUTO-ENTMCNC: 31.8 G/DL (ref 32–36)
MCHC RBC AUTO-ENTMCNC: 31.8 G/DL (ref 32–36)
MCV RBC AUTO: 87 FL (ref 82–98)
MCV RBC AUTO: 87 FL (ref 82–98)
MONOCYTES # BLD AUTO: 0.5 K/UL (ref 0.3–1)
MONOCYTES # BLD AUTO: 0.5 K/UL (ref 0.3–1)
MONOCYTES NFR BLD: 7.1 % (ref 4–15)
MONOCYTES NFR BLD: 7.1 % (ref 4–15)
NEUTROPHILS # BLD AUTO: 4 K/UL (ref 1.8–7.7)
NEUTROPHILS # BLD AUTO: 4 K/UL (ref 1.8–7.7)
NEUTROPHILS NFR BLD: 58.3 % (ref 38–73)
NEUTROPHILS NFR BLD: 58.3 % (ref 38–73)
NRBC BLD-RTO: 0 /100 WBC
NRBC BLD-RTO: 0 /100 WBC
PLATELET # BLD AUTO: 492 K/UL (ref 150–450)
PLATELET # BLD AUTO: 492 K/UL (ref 150–450)
PMV BLD AUTO: 9 FL (ref 9.2–12.9)
PMV BLD AUTO: 9 FL (ref 9.2–12.9)
POCT GLUCOSE: 113 MG/DL (ref 70–110)
POCT GLUCOSE: 285 MG/DL (ref 70–110)
POCT GLUCOSE: 317 MG/DL (ref 70–110)
POTASSIUM SERPL-SCNC: 4.7 MMOL/L (ref 3.5–5.1)
RBC # BLD AUTO: 4.36 M/UL (ref 4.6–6.2)
RBC # BLD AUTO: 4.36 M/UL (ref 4.6–6.2)
SODIUM SERPL-SCNC: 143 MMOL/L (ref 136–145)
T4 FREE SERPL-MCNC: 1.41 NG/DL (ref 0.71–1.51)
TOTAL IRON BINDING CAPACITY: 234 UG/DL (ref 250–450)
TSH SERPL DL<=0.005 MIU/L-ACNC: 2.37 UIU/ML (ref 0.4–4)
WBC # BLD AUTO: 6.86 K/UL (ref 3.9–12.7)
WBC # BLD AUTO: 6.86 K/UL (ref 3.9–12.7)

## 2022-10-29 PROCEDURE — 96372 THER/PROPH/DIAG INJ SC/IM: CPT | Performed by: STUDENT IN AN ORGANIZED HEALTH CARE EDUCATION/TRAINING PROGRAM

## 2022-10-29 PROCEDURE — 25000003 PHARM REV CODE 250: Performed by: INTERNAL MEDICINE

## 2022-10-29 PROCEDURE — 84443 ASSAY THYROID STIM HORMONE: CPT | Performed by: STUDENT IN AN ORGANIZED HEALTH CARE EDUCATION/TRAINING PROGRAM

## 2022-10-29 PROCEDURE — 96366 THER/PROPH/DIAG IV INF ADDON: CPT

## 2022-10-29 PROCEDURE — G0378 HOSPITAL OBSERVATION PER HR: HCPCS

## 2022-10-29 PROCEDURE — 83540 ASSAY OF IRON: CPT | Performed by: STUDENT IN AN ORGANIZED HEALTH CARE EDUCATION/TRAINING PROGRAM

## 2022-10-29 PROCEDURE — 97535 SELF CARE MNGMENT TRAINING: CPT

## 2022-10-29 PROCEDURE — 99233 PR SUBSEQUENT HOSPITAL CARE,LEVL III: ICD-10-PCS | Mod: ,,, | Performed by: STUDENT IN AN ORGANIZED HEALTH CARE EDUCATION/TRAINING PROGRAM

## 2022-10-29 PROCEDURE — 99900035 HC TECH TIME PER 15 MIN (STAT)

## 2022-10-29 PROCEDURE — 25000003 PHARM REV CODE 250: Performed by: STUDENT IN AN ORGANIZED HEALTH CARE EDUCATION/TRAINING PROGRAM

## 2022-10-29 PROCEDURE — 82728 ASSAY OF FERRITIN: CPT | Performed by: STUDENT IN AN ORGANIZED HEALTH CARE EDUCATION/TRAINING PROGRAM

## 2022-10-29 PROCEDURE — 36415 COLL VENOUS BLD VENIPUNCTURE: CPT | Performed by: STUDENT IN AN ORGANIZED HEALTH CARE EDUCATION/TRAINING PROGRAM

## 2022-10-29 PROCEDURE — 99233 SBSQ HOSP IP/OBS HIGH 50: CPT | Mod: ,,, | Performed by: STUDENT IN AN ORGANIZED HEALTH CARE EDUCATION/TRAINING PROGRAM

## 2022-10-29 PROCEDURE — 85025 COMPLETE CBC W/AUTO DIFF WBC: CPT | Performed by: STUDENT IN AN ORGANIZED HEALTH CARE EDUCATION/TRAINING PROGRAM

## 2022-10-29 PROCEDURE — 25000003 PHARM REV CODE 250: Performed by: EMERGENCY MEDICINE

## 2022-10-29 PROCEDURE — 99900031 HC PATIENT EDUCATION (STAT)

## 2022-10-29 PROCEDURE — 84439 ASSAY OF FREE THYROXINE: CPT | Performed by: STUDENT IN AN ORGANIZED HEALTH CARE EDUCATION/TRAINING PROGRAM

## 2022-10-29 PROCEDURE — 11000001 HC ACUTE MED/SURG PRIVATE ROOM

## 2022-10-29 PROCEDURE — 97116 GAIT TRAINING THERAPY: CPT

## 2022-10-29 PROCEDURE — 63600175 PHARM REV CODE 636 W HCPCS: Performed by: STUDENT IN AN ORGANIZED HEALTH CARE EDUCATION/TRAINING PROGRAM

## 2022-10-29 PROCEDURE — 97530 THERAPEUTIC ACTIVITIES: CPT

## 2022-10-29 PROCEDURE — 94761 N-INVAS EAR/PLS OXIMETRY MLT: CPT

## 2022-10-29 PROCEDURE — 80048 BASIC METABOLIC PNL TOTAL CA: CPT | Performed by: STUDENT IN AN ORGANIZED HEALTH CARE EDUCATION/TRAINING PROGRAM

## 2022-10-29 RX ORDER — AMIODARONE HYDROCHLORIDE 200 MG/1
200 TABLET ORAL DAILY
Status: DISCONTINUED | OUTPATIENT
Start: 2022-10-30 | End: 2022-10-31 | Stop reason: HOSPADM

## 2022-10-29 RX ADMIN — METOPROLOL SUCCINATE 50 MG: 50 TABLET, EXTENDED RELEASE ORAL at 08:10

## 2022-10-29 RX ADMIN — LACTOBACILLUS ACIDOPHILUS / LACTOBACILLUS BULGARICUS 1 EACH: 100 MILLION CFU STRENGTH GRANULES at 08:10

## 2022-10-29 RX ADMIN — ATORVASTATIN CALCIUM 40 MG: 40 TABLET, FILM COATED ORAL at 08:10

## 2022-10-29 RX ADMIN — AMIODARONE HYDROCHLORIDE 200 MG: 200 TABLET ORAL at 08:10

## 2022-10-29 RX ADMIN — AMLODIPINE BESYLATE 10 MG: 10 TABLET ORAL at 08:10

## 2022-10-29 RX ADMIN — MUPIROCIN: 20 OINTMENT TOPICAL at 08:10

## 2022-10-29 RX ADMIN — LACTOBACILLUS ACIDOPHILUS / LACTOBACILLUS BULGARICUS 1 EACH: 100 MILLION CFU STRENGTH GRANULES at 09:10

## 2022-10-29 RX ADMIN — SENNOSIDES AND DOCUSATE SODIUM 1 TABLET: 50; 8.6 TABLET ORAL at 08:10

## 2022-10-29 RX ADMIN — MUPIROCIN: 20 OINTMENT TOPICAL at 09:10

## 2022-10-29 RX ADMIN — CEFAZOLIN SODIUM 2 G: 2 SOLUTION INTRAVENOUS at 02:10

## 2022-10-29 RX ADMIN — INSULIN ASPART 2 UNITS: 100 INJECTION, SOLUTION INTRAVENOUS; SUBCUTANEOUS at 09:10

## 2022-10-29 RX ADMIN — SENNOSIDES AND DOCUSATE SODIUM 1 TABLET: 50; 8.6 TABLET ORAL at 09:10

## 2022-10-29 RX ADMIN — METOCLOPRAMIDE 10 MG: 10 TABLET ORAL at 06:10

## 2022-10-29 RX ADMIN — CEFAZOLIN SODIUM 2 G: 2 SOLUTION INTRAVENOUS at 08:10

## 2022-10-29 RX ADMIN — ASPIRIN 81 MG CHEWABLE TABLET 81 MG: 81 TABLET CHEWABLE at 08:10

## 2022-10-29 RX ADMIN — CEFAZOLIN SODIUM 2 G: 2 SOLUTION INTRAVENOUS at 11:10

## 2022-10-29 RX ADMIN — APIXABAN 5 MG: 5 TABLET, FILM COATED ORAL at 09:10

## 2022-10-29 RX ADMIN — INSULIN DETEMIR 30 UNITS: 100 INJECTION, SOLUTION SUBCUTANEOUS at 09:10

## 2022-10-29 RX ADMIN — INSULIN DETEMIR 30 UNITS: 100 INJECTION, SOLUTION SUBCUTANEOUS at 08:10

## 2022-10-29 RX ADMIN — METOCLOPRAMIDE 10 MG: 10 TABLET ORAL at 04:10

## 2022-10-29 RX ADMIN — METOPROLOL SUCCINATE 50 MG: 50 TABLET, EXTENDED RELEASE ORAL at 09:10

## 2022-10-29 RX ADMIN — APIXABAN 5 MG: 5 TABLET, FILM COATED ORAL at 08:10

## 2022-10-29 RX ADMIN — METOCLOPRAMIDE 10 MG: 10 TABLET ORAL at 10:10

## 2022-10-29 RX ADMIN — Medication 400 MG: at 08:10

## 2022-10-29 RX ADMIN — LOSARTAN POTASSIUM 100 MG: 50 TABLET, FILM COATED ORAL at 08:10

## 2022-10-29 NOTE — PROGRESS NOTES
[facility] Medicine  Progress Note    Patient Name: Andrew Del Cid Jr.  MRN: [id number]  Patient Class: IP- Inpatient   Admission Date: 10/26/2022  Length of Stay: 3 days  Attending Physician: Olena Thornton DO  Primary Care Provider: Olena Thornton DO    Subjective:     Principal Problem:Stroke  HPI:  57-year-old male with uncontrolled diabetes mellitus type 2, poor healing chronic diabetic foot and severe neuropathy, hypertension, hx of toe amputations was recently hospitalized for sepsis secondary to MSSA bacteremia.   After returning home from the hospital, patient's wife noticed patient experiencing difficulty maintaining balance even with use of walker. Patient in the morning continued to complain of generalized weakness and balance issues with left side predominance for weakness.      ED course: Found to be in atrial fibrillation and RVR 110bpm and was given IV metoprolol. CT head showed hypodensity in cerebellum likely reflecting a subacute infarct.  MRI ordered, but due to patient's weight and body habitus exceeding table limits cannot be performed at this time. Will plan for CTA of brain.   Cardiology started patient on eliquis, amiodarone and metoprolol for rate control.  Patient to be admitted to medicine floor for further cardiac and stroke work up. Also to complete IV antibiotics for MSSA bacteremia.      Overview/Hospital Course:  10/27 No acute events overnight. Patient talking without shortness of breath, moderately disturbed from arranging his finances over phone with [employer]. Denies symptoms. F/u CTA head and neck studies. Agreeable to PT/OT therapy. F/u cardiology will need reevaluation on status of possible endocarditis to current bacteremia. Tolerating eliquis, ASA.    10/28 CTA head and neck with no stenosis. Right foot wound stable and slowly improving. MSSA bacteremia continue with Ancef. WBC count stable. Cardiology to review TTE and possible need for EDEN  to rule out endocarditis. Continue PT/OT (challenge with therapy, right dominant gait with poor left balance, while right foot needs to be non-weight bearing, podiatry working on getting CAM boot for patient).   10/29 On telemetry remains in atrial fibrillation, rate controlled.  Awaiting IP rehab with continued PT/OT, right foot wound care and MSSA bacteremia treatment.   Outpatient setting patient will require close follow up with cardiology, infectious disease specialist, podiatry, wound care and PCP.       Interval History: Patient seen and examined seated in bedside chair watching TV and eating without new complaints.    Review of Systems   Constitutional:  Negative for activity change, appetite change, chills, fatigue and fever.   HENT:  Negative for congestion, sinus pain and tinnitus.    Eyes:  Negative for visual disturbance.   Respiratory:  Negative for chest tightness, shortness of breath and wheezing.    Cardiovascular:  Negative for chest pain and palpitations.   Gastrointestinal:  Negative for abdominal distention, abdominal pain, blood in stool, constipation, diarrhea, nausea, rectal pain and vomiting.   Genitourinary:  Negative for dysuria and enuresis.   Musculoskeletal:  Positive for back pain and gait problem (baseline neuropathy and hx toe ampation). Negative for arthralgias, joint swelling, neck pain and neck stiffness.   Skin:  Negative for color change.   Neurological:  Negative for dizziness, tremors, seizures, syncope, facial asymmetry, speech difficulty, weakness, numbness and headaches.   Psychiatric/Behavioral:  Negative for agitation, behavioral problems and confusion. The patient is not nervous/anxious.    Objective:     Vital Signs (Most Recent):  Temp: 97.9 °F (36.6 °C) (10/29/22 1206)  Pulse: 82 (10/29/22 1206)  Resp: 19 (10/29/22 1206)  BP: (!) 170/73 (10/29/22 1206)  SpO2: 97 % (10/29/22 1206)   Vital Signs (24h Range):  Temp:  [97.6 °F (36.4 °C)-97.9 °F (36.6 °C)] 97.9 °F (36.6  °C)  Pulse:  [76-91] 82  Resp:  [18-20] 19  SpO2:  [93 %-97 %] 97 %  BP: (129-170)/(65-86) 170/73     Weight: (!) 171.5 kg (378 lb 1.6 oz)  Body mass index is 49.88 kg/m².    Intake/Output Summary (Last 24 hours) at 10/29/2022 1445  Last data filed at 10/29/2022 0516  Gross per 24 hour   Intake 3680 ml   Output 3600 ml   Net 80 ml      Physical Exam  Vitals and nursing note reviewed.   Constitutional:       General: He is not in acute distress.     Appearance: Normal appearance. He is not ill-appearing, toxic-appearing or diaphoretic.   HENT:      Head: Normocephalic and atraumatic.      Right Ear: External ear normal.      Left Ear: External ear normal.      Nose: Nose normal. No congestion.      Mouth/Throat:      Mouth: Mucous membranes are moist.      Pharynx: Oropharynx is clear.   Eyes:      Extraocular Movements: Extraocular movements intact.      Conjunctiva/sclera: Conjunctivae normal.   Neck:      Vascular: No carotid bruit.   Cardiovascular:      Rate and Rhythm: Normal rate. Rhythm irregular.      Pulses: Normal pulses.      Heart sounds: No murmur heard.  Pulmonary:      Effort: Pulmonary effort is normal. No respiratory distress.      Breath sounds: No wheezing or rales.   Chest:      Chest wall: No tenderness.   Abdominal:      General: There is no distension.      Palpations: Abdomen is soft.      Tenderness: There is no abdominal tenderness. There is no right CVA tenderness, left CVA tenderness or guarding.   Musculoskeletal:         General: No swelling or tenderness.      Cervical back: Neck supple. No rigidity or tenderness.      Right lower leg: No edema.      Left lower leg: No edema.      Comments: Right foot wound, no bleeding or drainage, dressed and wrapped in ace bandage, clean and intact, right toe with darkened scab, no tenderness, sensation diminished   Lymphadenopathy:      Cervical: No cervical adenopathy.   Skin:     General: Skin is warm and dry.      Capillary Refill: Capillary  refill takes less than 2 seconds.   Neurological:      General: No focal deficit present.      Mental Status: He is alert.      Motor: Weakness present.      Gait: Gait abnormal.      Comments: No sensation over foot bilaterally and toes, Right first toe amputated, Left third toe amputated, sit upright maintaining postural balance, strength equal bilaterally in upper and lower extremities.   Psychiatric:         Mood and Affect: Mood normal.         Behavior: Behavior normal.         Thought Content: Thought content normal.     Significant Labs: All pertinent labs within the past 24 hours have been reviewed.  BMP:   Recent Labs   Lab 10/29/22  0513   *      K 4.7      CO2 31*   BUN 10   CREATININE 0.7   CALCIUM 8.9     CBC:   Recent Labs   Lab 10/28/22  0541 10/29/22  0513   WBC 6.49 6.86  6.86   HGB 11.8* 12.1*  12.1*   HCT 37.1* 38.1*  38.1*   * 492*  492*     CMP:   Recent Labs   Lab 10/28/22  0541 10/29/22  0513    143   K 3.6 4.7    107   CO2 32* 31*   * 114*   BUN 9 10   CREATININE 0.7 0.7   CALCIUM 8.3* 8.9   PROT 6.6  --    ALBUMIN 2.2*  --    BILITOT 0.6  --    ALKPHOS 92  --    AST 15  --    ALT 20  --    ANIONGAP 3* 5*     Significant Imaging: I have reviewed all pertinent imaging results/findings within the past 24 hours.      Assessment/Plan:      * Stroke  Chronic vs. subacute, patient has had recent worsening of gait instability reported with dizziness and frequent falls presenting to ER twice, including most recent hospitalization.   CT head showing no acute intracranial abnormality. Findings significant for small ovoid hypodensity within the left cerebellar hemisphere, possibly encephalomalacia from an old infarct.  Reported difficult exam:   Carotid US showed no significant stenosis bilaterally, but atherosclerotic disease with scattered plaque.   TTE (10/21/22), normal systolic function LVEF 55%, no large vegetations visualized  Follow-up MRI of the  brain ordered, however this cannot be performed due to patient exceeding size limit of MRI table.   CTA of brain and neck with no occlusive disease  - continue PT/OT evaluation and treatment  - continue ASA 81 mg   - continue atorvastatin 40 mg daily  - continue eliquis for paroxysmal atrial fibrillation            A-fib  Patient with Paroxysmal (<7 days) atrial fibrillation which is rate controlled currently with Beta Blocker, Calcium Channel Blocker and Amiodarone. Patient is currently in atrial fibrillation.[id number] Score: 2. HASBLED Score: 3. Anticoagulation indicated. Anticoagulation done with Eliquis.   - continue telemetry  - f/u cardiology        Diabetic foot ulcer  Follow wound care team. Last dressing change by podiatry on 10/25.  Patient's FSGs are uncontrolled due to hyperglycemia on current medication regimen.  Last A1c reviewed-   Lab Results   Component Value Date    HGBA1C 9.2 (H) 10/21/2022     Most recent fingerstick glucose reviewed-   Recent Labs   Lab 10/28/22  1645 10/28/22  1925 10/29/22  0535 10/29/22  1211   POCTGLUCOSE 280* 349* 113* 285*     Current correctional scale  Low  Maintain anti-hyperglycemic dose as follows-   Antihyperglycemics (From admission, onward)    Start     Stop Route Frequency Ordered    10/26/22 2115  insulin detemir U-100 pen 30 Units         -- SubQ 2 times daily 10/26/22 2100    10/26/22 2054  insulin aspart U-100 pen 0-5 Units         -- SubQ Before meals & nightly PRN 10/26/22 2100        Hold Oral hypoglycemics while patient is in the hospital.    Bacteremia  10/24: MSSA bacteremia secondary to soft tissue infection from right foot wound, no leukocytosis, follow up blood cultures 10/24  Final report NGTD   - continue ancef 2g Q8H start date 10/24/22 - 11/6/22 (total 14 days of therapy)  - D6 ancef  - f/u outpatient infectious disease specialist        Diabetic infection of right foot  Chronic charcot's foot wound with history of past amputations of toes.  Patient's FSGs are uncontrolled due to hyperglycemia on current medication regimen.  Continue with daily wound care  Continue daily off loading of right foot  Continue PT/OT  Last A1c reviewed-   Lab Results   Component Value Date    HGBA1C 9.2 (H) 10/21/2022     Most recent fingerstick glucose reviewed-   Recent Labs   Lab 10/28/22  1645 10/28/22  1925 10/29/22  0535 10/29/22  1211   POCTGLUCOSE 280* 349* 113* 285*   Current correctional scale  Low  Maintain anti-hyperglycemic dose as follows-   Antihyperglycemics (From admission, onward)    Start     Stop Route Frequency Ordered    10/26/22 2115  insulin detemir U-100 pen 30 Units         -- SubQ 2 times daily 10/26/22 2100    10/26/22 2054  insulin aspart U-100 pen 0-5 Units         -- SubQ Before meals & nightly PRN 10/26/22 2100        Hold Oral hypoglycemics while patient is in the hospital.    Hyperlipidemia  - atorvastatin 40 mg daily      Essential hypertension  BP Readings from Last 3 Encounters:   10/29/22 (!) 170/73   10/25/22 (!) 178/84   10/24/22 (!) 143/67   Tachycardia 90-100s bpm and atrial fibrillation. Patient to be started on BB and amiodarone per cardiology.   - continue losartan 100 mg daily  - continue amlodipine 10 mg daily   - continue metoprolol 50 mg  BID  - continue amiodarone 200 mg BID  - monitor and adjust accordingly    Morbid obesity with BMI of 50.0-59.9, adult  Body mass index is 49.88 kg/m². Morbid obesity complicates all aspects of disease management from diagnostic modalities to treatment. Weight loss encouraged and health benefits explained to patient.           VTE Risk Mitigation (From admission, onward)         Ordered     apixaban tablet 5 mg  2 times daily         10/26/22 1351     Place sequential compression device  Until discontinued         10/26/22 2100     IP VTE HIGH RISK PATIENT  Once         10/26/22 1535     Place sequential compression device  Until discontinued         10/26/22 1535                Discharge  Planning   MICHAEL:      Code Status: Full Code   Is the patient medically ready for discharge?:     Reason for patient still in hospital (select all that apply): Patient trending condition, Treatment, PT / OT recommendations and Pending disposition  Discharge Plan A: Rehab   Discharge Delays: None known at this time    Olena Thornton, DO  Department of Hospital Medicine   [facility]

## 2022-10-29 NOTE — ASSESSMENT & PLAN NOTE
Follow wound care team. Last dressing change by podiatry on 10/25.  Patient's FSGs are uncontrolled due to hyperglycemia on current medication regimen.  Last A1c reviewed-   Lab Results   Component Value Date    HGBA1C 9.2 (H) 10/21/2022     Most recent fingerstick glucose reviewed-   Recent Labs   Lab 10/28/22  1645 10/28/22  1925 10/29/22  0535 10/29/22  1211   POCTGLUCOSE 280* 349* 113* 285*     Current correctional scale  Low  Maintain anti-hyperglycemic dose as follows-   Antihyperglycemics (From admission, onward)    Start     Stop Route Frequency Ordered    10/26/22 2115  insulin detemir U-100 pen 30 Units         -- SubQ 2 times daily 10/26/22 2100    10/26/22 2054  insulin aspart U-100 pen 0-5 Units         -- SubQ Before meals & nightly PRN 10/26/22 2100        Hold Oral hypoglycemics while patient is in the hospital.

## 2022-10-29 NOTE — PT/OT/SLP PROGRESS
Occupational Therapy   Treatment    Name: Andrew Del Cid Jr.  MRN: [id number]  Admitting Diagnosis:  Stroke       Recommendations:     Discharge Recommendations: rehabilitation facility  Discharge Equipment Recommendations:  walker, rolling, bedside commode, other (see comments) (To be further determined based on progress prior to discharge.)  Barriers to discharge:  Other (Comment) (Medical and functional status)    Assessment:     Andrew Del Cid Jr. is a 57 y.o. male with a medical diagnosis of Stroke. Performance deficits affecting function are weakness, impaired endurance, impaired sensation, impaired self care skills, impaired functional mobility, impaired balance, decreased coordination, decreased upper extremity function, decreased lower extremity function, decreased safety awareness, impaired fine motor.     Rehab Prognosis:  Good; patient would benefit from acute skilled OT services to address these deficits and reach maximum level of function.       Plan:     Patient to be seen 5 x/week to address the above listed problems via self-care/home management, therapeutic activities, therapeutic exercises, neuromuscular re-education  Plan of Care Expires: 11/02/22  Plan of Care Reviewed with: patient    Subjective     Pain/Comfort:  Pain Rating 1: 0/10  Pain Rating Post-Intervention 1: 0/10    Objective:     Communicated with: nurse prior to session.  Patient found sitting edge of bed with telemetry upon OT entry to room.    General Precautions: Standard, fall   Orthopedic Precautions:N/A   Braces:    Respiratory Status: Room air     Occupational Performance:     Functional Mobility/Transfers:  Patient completed Sit <> Stand Transfer with minimum assistance  with  rolling walker   Patient completed Bed <> Chair Transfer using Step Transfer technique with minimum assistance with rolling walker  Patient completed Toilet Transfer Step Transfer technique with minimum assistance with  grab bars  Functional  Mobility: Pt ambulated 62' requiring steadying assist utilizing RW.    Activities of Daily Living:  Lower Body Dressing: moderate assistance        [facility] 6 Click ADL:      Treatment & Education:  Pt was cooperative and highly motivated without verbal encouragement while exhibiting positive affect. He participated in ADL retraining regarding LB dressing including donning / doffing of (R) LE CAM boot emphasizing fall prevention and use of compensatory strategies requiring mod assist secondary to assist to yoav (R) CAM boot and (L) shoe with additional steadying assist when pulling / adjust underwear to waist, and mod verbal and tactile cueing for safety and technique. Pt then participated in functional transfer retraining to / from bed, toilet, and chair emphasizing fall prevention utilizing assistive devices including RW and grab bar providing extra time with repetition requiring min assist secondary to steadying assist with mod verbal and tactile cueing for safety awareness and technique. Pt ambulated 62' between surfaces requiring steadying assist utilizing RW.    Patient left sitting edge of bed with all lines intact, call button in reach, and nurse notified    GOALS:   Multidisciplinary Problems       Occupational Therapy Goals          Problem: Occupational Therapy    Goal Priority Disciplines Outcome Interventions   Occupational Therapy Goal     OT, PT/OT Ongoing, Progressing    Description: Goals to be met by: 11/02/22     Patient will increase functional independence with ADLs by performing:    Feeding with [place].  UE Dressing with Set-up Assistance.  LE Dressing with Supervision.  Grooming while seated with Modified [place].  Toileting from toilet with Supervision for hygiene and clothing management.   Bathing from  shower chair/bench with Supervision.  Step transfer with Supervision  Toilet transfer to toilet with Supervision.  Increased functional strength to 4+ to 5/5 for (L) UE.                          Time Tracking:     OT Date of Treatment: 10/29/22  OT Start Time: 1530  OT Stop Time: 1613  OT Total Time (min): 43 min    Billable Minutes:Self Care/Home Management 15  Therapeutic Activity 28    OT/ROBERT: OT          10/29/2022

## 2022-10-29 NOTE — ASSESSMENT & PLAN NOTE
10/24: MSSA bacteremia secondary to soft tissue infection from right foot wound, no leukocytosis, follow up blood cultures 10/24  Final report NGTD   - continue ancef 2g Q8H start date 10/24/22 - 11/6/22 (total 14 days of therapy)  - D6 ancef  - f/u outpatient infectious disease specialist

## 2022-10-29 NOTE — PT/OT/SLP PROGRESS
Physical Therapy Treatment    Patient Name:  Andrew Del Cid Jr.   MRN:  [id number]    Recommendations:     Discharge Recommendations:  rehabilitation facility   Discharge Equipment Recommendations: walker, rolling   Barriers to discharge: Decreased caregiver support    Assessment:     Andrew Del Cid Jr. is a 57 y.o. male admitted with a medical diagnosis of Stroke.  He presents with the following impairments/functional limitations:  weakness, gait instability, impaired endurance impairing independence and safety with functional mobility. Patient does not ambulate as far today 2/2 L knee buckling and weakness. Pt ambulates 15 feet in room with RW and SBA/CGA. Pt performing bed mobility with SBA. Pt requiring min A to come to stand.    Rehab Prognosis: Good; patient would benefit from acute skilled PT services to address these deficits and reach maximum level of function.    Recent Surgery: * No surgery found *      Plan:     During this hospitalization, patient to be seen 6 x/week to address the identified rehab impairments via gait training, therapeutic activities, therapeutic exercises and progress toward the following goals:    Plan of Care Expires:  11/11/22    Subjective     Chief Complaint: LLE weakness  Patient/Family Comments/goals: Pt reports being able to walk in the grady yesterday  Pain/Comfort:  Pain Rating 1: 0/10      Objective:     Communicated with RN prior to session.  Patient found HOB elevated with peripheral IV, telemetry upon PT entry to room.     General Precautions: Standard, fall   Orthopedic Precautions:    Braces:  (walking shoe, knee brace)  Respiratory Status: Room air     Functional Mobility:  Bed Mobility:     Rolling Left:  stand by assistance  Rolling Right: stand by assistance  Supine to Sit: stand by assistance  Sit to Supine: stand by assistance  Transfers:     Sit to Stand:  minimum assistance with rolling walker  Gait: PT ambulates 15 ft using RW and knee brace with  CGA/SBA      AM-PAC 6 CLICK MOBILITY  Turning over in bed (including adjusting bedclothes, sheets and blankets)?: 4  Sitting down on and standing up from a chair with arms (e.g., wheelchair, bedside commode, etc.): 3  Moving from lying on back to sitting on the side of the bed?: 4  Moving to and from a bed to a chair (including a wheelchair)?: 3  Need to walk in hospital room?: 3  Climbing 3-5 steps with a railing?: 3  Basic Mobility Total Score: 20       Patient left HOB elevated with all lines intact, call button in reach, and family present..    GOALS:   Multidisciplinary Problems       Physical Therapy Goals          Problem: Physical Therapy    Goal Priority Disciplines Outcome Goal Variances Interventions   Physical Therapy Goal     PT, PT/OT Ongoing, Progressing     Description: 1. Ind with bed mobility  2. Mod ind with transfers  3. Amb with rw 150ft with mod ind                       Time Tracking:     PT Received On: 10/29/22  PT Start Time: 1022     PT Stop Time: 1035  PT Total Time (min): 13 min     Billable Minutes: Gait Training 13 minutes    Treatment Type: Treatment  PT/PTA: PT     PTA Visit Number: 0     10/29/2022

## 2022-10-29 NOTE — ASSESSMENT & PLAN NOTE
BP Readings from Last 3 Encounters:   10/29/22 (!) 170/73   10/25/22 (!) 178/84   10/24/22 (!) 143/67   Tachycardia 90-100s bpm and atrial fibrillation. Patient to be started on BB and amiodarone per cardiology.   - continue losartan 100 mg daily  - continue amlodipine 10 mg daily   - continue metoprolol 50 mg  BID  - continue amiodarone 200 mg BID  - monitor and adjust accordingly

## 2022-10-29 NOTE — ASSESSMENT & PLAN NOTE
Chronic charcot's foot wound with history of past amputations of toes. Patient's FSGs are uncontrolled due to hyperglycemia on current medication regimen.  Continue with daily wound care  Continue daily off loading of right foot  Continue PT/OT  Last A1c reviewed-   Lab Results   Component Value Date    HGBA1C 9.2 (H) 10/21/2022     Most recent fingerstick glucose reviewed-   Recent Labs   Lab 10/28/22  1645 10/28/22  1925 10/29/22  0535 10/29/22  1211   POCTGLUCOSE 280* 349* 113* 285*   Current correctional scale  Low  Maintain anti-hyperglycemic dose as follows-   Antihyperglycemics (From admission, onward)    Start     Stop Route Frequency Ordered    10/26/22 2115  insulin detemir U-100 pen 30 Units         -- SubQ 2 times daily 10/26/22 2100    10/26/22 2054  insulin aspart U-100 pen 0-5 Units         -- SubQ Before meals & nightly PRN 10/26/22 2100        Hold Oral hypoglycemics while patient is in the hospital.

## 2022-10-29 NOTE — SUBJECTIVE & OBJECTIVE
Interval History: Patient seen and examined seated in bedside chair watching TV and eating without new complaints.    Review of Systems   Constitutional:  Negative for activity change, appetite change, chills, fatigue and fever.   HENT:  Negative for congestion, sinus pain and tinnitus.    Eyes:  Negative for visual disturbance.   Respiratory:  Negative for chest tightness, shortness of breath and wheezing.    Cardiovascular:  Negative for chest pain and palpitations.   Gastrointestinal:  Negative for abdominal distention, abdominal pain, blood in stool, constipation, diarrhea, nausea, rectal pain and vomiting.   Genitourinary:  Negative for dysuria and enuresis.   Musculoskeletal:  Positive for back pain and gait problem (baseline neuropathy and hx toe ampation). Negative for arthralgias, joint swelling, neck pain and neck stiffness.   Skin:  Negative for color change.   Neurological:  Negative for dizziness, tremors, seizures, syncope, facial asymmetry, speech difficulty, weakness, numbness and headaches.   Psychiatric/Behavioral:  Negative for agitation, behavioral problems and confusion. The patient is not nervous/anxious.    Objective:     Vital Signs (Most Recent):  Temp: 97.9 °F (36.6 °C) (10/29/22 1206)  Pulse: 82 (10/29/22 1206)  Resp: 19 (10/29/22 1206)  BP: (!) 170/73 (10/29/22 1206)  SpO2: 97 % (10/29/22 1206)   Vital Signs (24h Range):  Temp:  [97.6 °F (36.4 °C)-97.9 °F (36.6 °C)] 97.9 °F (36.6 °C)  Pulse:  [76-91] 82  Resp:  [18-20] 19  SpO2:  [93 %-97 %] 97 %  BP: (129-170)/(65-86) 170/73     Weight: (!) 171.5 kg (378 lb 1.6 oz)  Body mass index is 49.88 kg/m².    Intake/Output Summary (Last 24 hours) at 10/29/2022 1445  Last data filed at 10/29/2022 0516  Gross per 24 hour   Intake 3680 ml   Output 3600 ml   Net 80 ml      Physical Exam  Vitals and nursing note reviewed.   Constitutional:       General: He is not in acute distress.     Appearance: Normal appearance. He is not ill-appearing,  toxic-appearing or diaphoretic.   HENT:      Head: Normocephalic and atraumatic.      Right Ear: External ear normal.      Left Ear: External ear normal.      Nose: Nose normal. No congestion.      Mouth/Throat:      Mouth: Mucous membranes are moist.      Pharynx: Oropharynx is clear.   Eyes:      Extraocular Movements: Extraocular movements intact.      Conjunctiva/sclera: Conjunctivae normal.   Neck:      Vascular: No carotid bruit.   Cardiovascular:      Rate and Rhythm: Normal rate. Rhythm irregular.      Pulses: Normal pulses.      Heart sounds: No murmur heard.  Pulmonary:      Effort: Pulmonary effort is normal. No respiratory distress.      Breath sounds: No wheezing or rales.   Chest:      Chest wall: No tenderness.   Abdominal:      General: There is no distension.      Palpations: Abdomen is soft.      Tenderness: There is no abdominal tenderness. There is no right CVA tenderness, left CVA tenderness or guarding.   Musculoskeletal:         General: No swelling or tenderness.      Cervical back: Neck supple. No rigidity or tenderness.      Right lower leg: No edema.      Left lower leg: No edema.      Comments: Right foot wound, no bleeding or drainage, dressed and wrapped in ace bandage, clean and intact, right toe with darkened scab, no tenderness, sensation diminished   Lymphadenopathy:      Cervical: No cervical adenopathy.   Skin:     General: Skin is warm and dry.      Capillary Refill: Capillary refill takes less than 2 seconds.   Neurological:      General: No focal deficit present.      Mental Status: He is alert.      Motor: Weakness present.      Gait: Gait abnormal.      Comments: No sensation over foot bilaterally and toes, Right first toe amputated, Left third toe amputated, sit upright maintaining postural balance, strength equal bilaterally in upper and lower extremities.   Psychiatric:         Mood and Affect: Mood normal.         Behavior: Behavior normal.         Thought Content:  Thought content normal.     Significant Labs: All pertinent labs within the past 24 hours have been reviewed.  BMP:   Recent Labs   Lab 10/29/22  0513   *      K 4.7      CO2 31*   BUN 10   CREATININE 0.7   CALCIUM 8.9     CBC:   Recent Labs   Lab 10/28/22  0541 10/29/22  0513   WBC 6.49 6.86  6.86   HGB 11.8* 12.1*  12.1*   HCT 37.1* 38.1*  38.1*   * 492*  492*     CMP:   Recent Labs   Lab 10/28/22  0541 10/29/22  0513    143   K 3.6 4.7    107   CO2 32* 31*   * 114*   BUN 9 10   CREATININE 0.7 0.7   CALCIUM 8.3* 8.9   PROT 6.6  --    ALBUMIN 2.2*  --    BILITOT 0.6  --    ALKPHOS 92  --    AST 15  --    ALT 20  --    ANIONGAP 3* 5*     Significant Imaging: I have reviewed all pertinent imaging results/findings within the past 24 hours.

## 2022-10-30 LAB
ANION GAP SERPL CALC-SCNC: 4 MMOL/L (ref 8–16)
BASOPHILS # BLD AUTO: 0.05 K/UL (ref 0–0.2)
BASOPHILS # BLD AUTO: 0.05 K/UL (ref 0–0.2)
BASOPHILS NFR BLD: 0.7 % (ref 0–1.9)
BASOPHILS NFR BLD: 0.7 % (ref 0–1.9)
BUN SERPL-MCNC: 10 MG/DL (ref 6–20)
C PEPTIDE SERPL-MCNC: 2.18 NG/ML (ref 0.78–5.19)
CALCIUM SERPL-MCNC: 9 MG/DL (ref 8.7–10.5)
CHLORIDE SERPL-SCNC: 105 MMOL/L (ref 95–110)
CO2 SERPL-SCNC: 32 MMOL/L (ref 23–29)
CREAT SERPL-MCNC: 0.7 MG/DL (ref 0.5–1.4)
DIFFERENTIAL METHOD: ABNORMAL
DIFFERENTIAL METHOD: ABNORMAL
EOSINOPHIL # BLD AUTO: 0.3 K/UL (ref 0–0.5)
EOSINOPHIL # BLD AUTO: 0.3 K/UL (ref 0–0.5)
EOSINOPHIL NFR BLD: 3.5 % (ref 0–8)
EOSINOPHIL NFR BLD: 3.5 % (ref 0–8)
ERYTHROCYTE [DISTWIDTH] IN BLOOD BY AUTOMATED COUNT: 13 % (ref 11.5–14.5)
ERYTHROCYTE [DISTWIDTH] IN BLOOD BY AUTOMATED COUNT: 13 % (ref 11.5–14.5)
EST. GFR  (NO RACE VARIABLE): >60 ML/MIN/1.73 M^2
GLUCOSE SERPL-MCNC: 192 MG/DL (ref 70–110)
HCT VFR BLD AUTO: 38.2 % (ref 40–54)
HCT VFR BLD AUTO: 38.2 % (ref 40–54)
HGB BLD-MCNC: 11.9 G/DL (ref 14–18)
HGB BLD-MCNC: 11.9 G/DL (ref 14–18)
IMM GRANULOCYTES # BLD AUTO: 0.17 K/UL (ref 0–0.04)
IMM GRANULOCYTES # BLD AUTO: 0.17 K/UL (ref 0–0.04)
IMM GRANULOCYTES NFR BLD AUTO: 2.4 % (ref 0–0.5)
IMM GRANULOCYTES NFR BLD AUTO: 2.4 % (ref 0–0.5)
LYMPHOCYTES # BLD AUTO: 2.2 K/UL (ref 1–4.8)
LYMPHOCYTES # BLD AUTO: 2.2 K/UL (ref 1–4.8)
LYMPHOCYTES NFR BLD: 29.9 % (ref 18–48)
LYMPHOCYTES NFR BLD: 29.9 % (ref 18–48)
MCH RBC QN AUTO: 27.2 PG (ref 27–31)
MCH RBC QN AUTO: 27.2 PG (ref 27–31)
MCHC RBC AUTO-ENTMCNC: 31.2 G/DL (ref 32–36)
MCHC RBC AUTO-ENTMCNC: 31.2 G/DL (ref 32–36)
MCV RBC AUTO: 87 FL (ref 82–98)
MCV RBC AUTO: 87 FL (ref 82–98)
MONOCYTES # BLD AUTO: 0.6 K/UL (ref 0.3–1)
MONOCYTES # BLD AUTO: 0.6 K/UL (ref 0.3–1)
MONOCYTES NFR BLD: 8.8 % (ref 4–15)
MONOCYTES NFR BLD: 8.8 % (ref 4–15)
NEUTROPHILS # BLD AUTO: 4 K/UL (ref 1.8–7.7)
NEUTROPHILS # BLD AUTO: 4 K/UL (ref 1.8–7.7)
NEUTROPHILS NFR BLD: 54.7 % (ref 38–73)
NEUTROPHILS NFR BLD: 54.7 % (ref 38–73)
NRBC BLD-RTO: 0 /100 WBC
NRBC BLD-RTO: 0 /100 WBC
PLATELET # BLD AUTO: 513 K/UL (ref 150–450)
PLATELET # BLD AUTO: 513 K/UL (ref 150–450)
PMV BLD AUTO: 8.8 FL (ref 9.2–12.9)
PMV BLD AUTO: 8.8 FL (ref 9.2–12.9)
POCT GLUCOSE: 263 MG/DL (ref 70–110)
POCT GLUCOSE: 279 MG/DL (ref 70–110)
POCT GLUCOSE: 313 MG/DL (ref 70–110)
POCT GLUCOSE: 335 MG/DL (ref 70–110)
POTASSIUM SERPL-SCNC: 4.4 MMOL/L (ref 3.5–5.1)
RBC # BLD AUTO: 4.37 M/UL (ref 4.6–6.2)
RBC # BLD AUTO: 4.37 M/UL (ref 4.6–6.2)
SODIUM SERPL-SCNC: 141 MMOL/L (ref 136–145)
WBC # BLD AUTO: 7.2 K/UL (ref 3.9–12.7)
WBC # BLD AUTO: 7.2 K/UL (ref 3.9–12.7)

## 2022-10-30 PROCEDURE — 99900035 HC TECH TIME PER 15 MIN (STAT)

## 2022-10-30 PROCEDURE — 99233 SBSQ HOSP IP/OBS HIGH 50: CPT | Mod: ,,, | Performed by: STUDENT IN AN ORGANIZED HEALTH CARE EDUCATION/TRAINING PROGRAM

## 2022-10-30 PROCEDURE — 96366 THER/PROPH/DIAG IV INF ADDON: CPT

## 2022-10-30 PROCEDURE — 99233 PR SUBSEQUENT HOSPITAL CARE,LEVL III: ICD-10-PCS | Mod: ,,, | Performed by: STUDENT IN AN ORGANIZED HEALTH CARE EDUCATION/TRAINING PROGRAM

## 2022-10-30 PROCEDURE — 96372 THER/PROPH/DIAG INJ SC/IM: CPT | Performed by: STUDENT IN AN ORGANIZED HEALTH CARE EDUCATION/TRAINING PROGRAM

## 2022-10-30 PROCEDURE — 80048 BASIC METABOLIC PNL TOTAL CA: CPT | Performed by: STUDENT IN AN ORGANIZED HEALTH CARE EDUCATION/TRAINING PROGRAM

## 2022-10-30 PROCEDURE — 25000003 PHARM REV CODE 250: Performed by: EMERGENCY MEDICINE

## 2022-10-30 PROCEDURE — G0378 HOSPITAL OBSERVATION PER HR: HCPCS

## 2022-10-30 PROCEDURE — 94761 N-INVAS EAR/PLS OXIMETRY MLT: CPT

## 2022-10-30 PROCEDURE — 99900031 HC PATIENT EDUCATION (STAT)

## 2022-10-30 PROCEDURE — 25000003 PHARM REV CODE 250: Performed by: INTERNAL MEDICINE

## 2022-10-30 PROCEDURE — 63600175 PHARM REV CODE 636 W HCPCS: Performed by: STUDENT IN AN ORGANIZED HEALTH CARE EDUCATION/TRAINING PROGRAM

## 2022-10-30 PROCEDURE — 25000003 PHARM REV CODE 250: Performed by: STUDENT IN AN ORGANIZED HEALTH CARE EDUCATION/TRAINING PROGRAM

## 2022-10-30 PROCEDURE — 84681 ASSAY OF C-PEPTIDE: CPT | Performed by: STUDENT IN AN ORGANIZED HEALTH CARE EDUCATION/TRAINING PROGRAM

## 2022-10-30 PROCEDURE — 36415 COLL VENOUS BLD VENIPUNCTURE: CPT | Performed by: STUDENT IN AN ORGANIZED HEALTH CARE EDUCATION/TRAINING PROGRAM

## 2022-10-30 PROCEDURE — 11000001 HC ACUTE MED/SURG PRIVATE ROOM

## 2022-10-30 PROCEDURE — 85025 COMPLETE CBC W/AUTO DIFF WBC: CPT | Performed by: STUDENT IN AN ORGANIZED HEALTH CARE EDUCATION/TRAINING PROGRAM

## 2022-10-30 RX ORDER — AMOXICILLIN 250 MG
2 CAPSULE ORAL NIGHTLY
Status: DISCONTINUED | OUTPATIENT
Start: 2022-10-31 | End: 2022-10-31 | Stop reason: HOSPADM

## 2022-10-30 RX ORDER — LANOLIN ALCOHOL/MO/W.PET/CERES
1 CREAM (GRAM) TOPICAL 2 TIMES DAILY
Status: DISCONTINUED | OUTPATIENT
Start: 2022-10-30 | End: 2022-10-31 | Stop reason: HOSPADM

## 2022-10-30 RX ORDER — METOPROLOL SUCCINATE 100 MG/1
100 TABLET, EXTENDED RELEASE ORAL 2 TIMES DAILY
Status: DISCONTINUED | OUTPATIENT
Start: 2022-10-30 | End: 2022-10-31 | Stop reason: HOSPADM

## 2022-10-30 RX ORDER — TIZANIDINE 4 MG/1
4 TABLET ORAL EVERY 8 HOURS PRN
Status: DISCONTINUED | OUTPATIENT
Start: 2022-10-30 | End: 2022-10-30

## 2022-10-30 RX ADMIN — CEFAZOLIN SODIUM 2 G: 2 SOLUTION INTRAVENOUS at 10:10

## 2022-10-30 RX ADMIN — METOCLOPRAMIDE 10 MG: 10 TABLET ORAL at 06:10

## 2022-10-30 RX ADMIN — MUPIROCIN: 20 OINTMENT TOPICAL at 09:10

## 2022-10-30 RX ADMIN — INSULIN ASPART 2 UNITS: 100 INJECTION, SOLUTION INTRAVENOUS; SUBCUTANEOUS at 08:10

## 2022-10-30 RX ADMIN — APIXABAN 5 MG: 5 TABLET, FILM COATED ORAL at 09:10

## 2022-10-30 RX ADMIN — LOSARTAN POTASSIUM 100 MG: 50 TABLET, FILM COATED ORAL at 09:10

## 2022-10-30 RX ADMIN — AMIODARONE HYDROCHLORIDE 200 MG: 200 TABLET ORAL at 09:10

## 2022-10-30 RX ADMIN — Medication 400 MG: at 09:10

## 2022-10-30 RX ADMIN — FERROUS SULFATE TAB 325 MG (65 MG ELEMENTAL FE) 1 EACH: 325 (65 FE) TAB at 08:10

## 2022-10-30 RX ADMIN — INSULIN DETEMIR 35 UNITS: 100 INJECTION, SOLUTION SUBCUTANEOUS at 09:10

## 2022-10-30 RX ADMIN — LACTOBACILLUS ACIDOPHILUS / LACTOBACILLUS BULGARICUS 1 EACH: 100 MILLION CFU STRENGTH GRANULES at 09:10

## 2022-10-30 RX ADMIN — CEFAZOLIN SODIUM 2 G: 2 SOLUTION INTRAVENOUS at 02:10

## 2022-10-30 RX ADMIN — SENNOSIDES AND DOCUSATE SODIUM 1 TABLET: 50; 8.6 TABLET ORAL at 09:10

## 2022-10-30 RX ADMIN — METOPROLOL SUCCINATE 100 MG: 100 TABLET, EXTENDED RELEASE ORAL at 08:10

## 2022-10-30 RX ADMIN — ASPIRIN 81 MG CHEWABLE TABLET 81 MG: 81 TABLET CHEWABLE at 09:10

## 2022-10-30 RX ADMIN — MUPIROCIN: 20 OINTMENT TOPICAL at 08:10

## 2022-10-30 RX ADMIN — CEFAZOLIN SODIUM 2 G: 2 SOLUTION INTRAVENOUS at 06:10

## 2022-10-30 RX ADMIN — AMLODIPINE BESYLATE 10 MG: 10 TABLET ORAL at 09:10

## 2022-10-30 RX ADMIN — ATORVASTATIN CALCIUM 40 MG: 40 TABLET, FILM COATED ORAL at 09:10

## 2022-10-30 RX ADMIN — METOPROLOL SUCCINATE 50 MG: 50 TABLET, EXTENDED RELEASE ORAL at 09:10

## 2022-10-30 RX ADMIN — LACTOBACILLUS ACIDOPHILUS / LACTOBACILLUS BULGARICUS 1 EACH: 100 MILLION CFU STRENGTH GRANULES at 08:10

## 2022-10-30 RX ADMIN — APIXABAN 5 MG: 5 TABLET, FILM COATED ORAL at 08:10

## 2022-10-30 RX ADMIN — INSULIN DETEMIR 35 UNITS: 100 INJECTION, SOLUTION SUBCUTANEOUS at 08:10

## 2022-10-30 NOTE — PROGRESS NOTES
[facility]  Progress Note    Patient Name: Andrew Del Cid Jr.  MRN: [id number]  Patient Class: IP- Inpatient   Admission Date: 10/26/2022  Length of Stay: 4 days  Attending Physician: Olena Thornton DO  Primary Care Provider: Olena Thornton DO        Subjective:     Principal Problem:Diabetic foot ulcer        HPI:  57-year-old male with uncontrolled diabetes mellitus type 2, poor healing chronic diabetic foot and severe neuropathy, hypertension, hx of toe amputations was recently hospitalized for sepsis secondary to MSSA bacteremia.   After returning home from the hospital, patient's wife noticed patient experiencing difficulty maintaining balance even with use of walker. Patient in the morning continued to complain of generalized weakness and balance issues with left side predominance for weakness.      ED course: Found to be in atrial fibrillation and RVR 110bpm and was given IV metoprolol. CT head showed hypodensity in cerebellum likely reflecting a subacute infarct.  MRI ordered, but due to patient's weight and body habitus exceeding table limits cannot be performed at this time. Will plan for CTA of brain.   Cardiology started patient on eliquis, amiodarone and metoprolol for rate control.  Patient to be admitted to medicine floor for further cardiac and stroke work up. Also to complete IV antibiotics for MSSA bacteremia.      Overview/Hospital Course:  10/27 No acute events overnight. Patient talking without shortness of breath, moderately disturbed from arranging his finances over phone with [employer]. Denies symptoms. F/u CTA head and neck studies. Agreeable to PT/OT therapy. F/u cardiology will need reevaluation on status of possible endocarditis to current bacteremia. Tolerating eliquis, ASA.    10/28 CTA head and neck with no stenosis. Right foot wound stable and slowly improving. MSSA bacteremia continue with Ancef. WBC count stable. Cardiology to review TTE and  possible need for EDEN to rule out endocarditis. Continue PT/OT (challenge with therapy, right dominant gait with poor left balance, while right foot needs to be non-weight bearing, podiatry working on getting CAM boot for patient).   10/29 On telemetry remains in atrial fibrillation, rate controlled.  Awaiting IP rehab with continued PT/OT, right foot wound care and MSSA bacteremia treatment.   Outpatient setting patient will require close follow up with cardiology, infectious disease specialist, podiatry, wound care and PCP.   10/30      Interval History: Patient seen and examined. Comfortably seated at side of bed. [place] at bedside. Awaiting inpatient rehabilitation bed approval.     Review of Systems   Constitutional:  Negative for activity change, appetite change, chills, fatigue and fever.   HENT:  Negative for congestion, sinus pain and tinnitus.    Eyes:  Negative for visual disturbance.   Respiratory:  Negative for chest tightness, shortness of breath and wheezing.    Cardiovascular:  Negative for chest pain and palpitations.   Gastrointestinal:  Negative for abdominal distention, abdominal pain, blood in stool, constipation, diarrhea, nausea, rectal pain and vomiting.   Genitourinary:  Negative for dysuria and enuresis.   Musculoskeletal:  Positive for back pain and gait problem (baseline neuropathy and hx toe ampation). Negative for arthralgias, joint swelling, neck pain and neck stiffness.   Skin:  Negative for color change.   Neurological:  Negative for dizziness, tremors, seizures, syncope, facial asymmetry, speech difficulty, weakness, numbness and headaches.   Psychiatric/Behavioral:  Negative for agitation, behavioral problems and confusion. The patient is not nervous/anxious.    Objective:     Vital Signs (Most Recent):  Temp: 97.8 °F (36.6 °C) (10/30/22 0803)  Pulse: 70 (10/30/22 0803)  Resp: 19 (10/30/22 0803)  BP: (!) 161/89 (10/30/22 0803)  SpO2: 96 % (10/30/22 0803)   Vital Signs (24h  Range):  Temp:  [97.8 °F (36.6 °C)-98.7 °F (37.1 °C)] 97.8 °F (36.6 °C)  Pulse:  [67-85] 70  Resp:  [17-20] 19  SpO2:  [95 %-97 %] 96 %  BP: (147-184)/(67-89) 161/89     Weight: (!) 171.5 kg (378 lb 1.6 oz)  Body mass index is 49.88 kg/m².    Intake/Output Summary (Last 24 hours) at 10/30/2022 1240  Last data filed at 10/30/2022 0441  Gross per 24 hour   Intake 4000 ml   Output 2777 ml   Net 1223 ml      Physical Exam  Vitals and nursing note reviewed.   Constitutional:       General: He is not in acute distress.     Appearance: Normal appearance. He is not ill-appearing, toxic-appearing or diaphoretic.   HENT:      Head: Normocephalic and atraumatic.      Right Ear: External ear normal.      Left Ear: External ear normal.      Nose: Nose normal. No congestion.      Mouth/Throat:      Mouth: Mucous membranes are moist.      Pharynx: Oropharynx is clear.   Eyes:      Extraocular Movements: Extraocular movements intact.      Conjunctiva/sclera: Conjunctivae normal.   Neck:      Vascular: No carotid bruit.   Cardiovascular:      Rate and Rhythm: Normal rate. Rhythm irregular.      Pulses: Normal pulses.      Heart sounds: No murmur heard.  Pulmonary:      Effort: Pulmonary effort is normal. No respiratory distress.      Breath sounds: No wheezing or rales.   Chest:      Chest wall: No tenderness.   Abdominal:      General: There is no distension.      Palpations: Abdomen is soft.      Tenderness: There is no abdominal tenderness. There is no right CVA tenderness, left CVA tenderness or guarding.   Musculoskeletal:         General: No swelling or tenderness.      Cervical back: Neck supple. No rigidity or tenderness.      Right lower leg: No edema.      Left lower leg: No edema.      Comments: Right foot wound, no bleeding or drainage, dressed and wrapped in ace bandage, clean and intact, right toe with darkened scab, no tenderness, sensation diminished   Lymphadenopathy:      Cervical: No cervical adenopathy.   Skin:      General: Skin is warm and dry.      Capillary Refill: Capillary refill takes less than 2 seconds.   Neurological:      General: No focal deficit present.      Mental Status: He is alert.      Motor: Weakness present.      Gait: Gait abnormal.      Comments: No sensation over foot bilaterally and toes, Right first toe amputated, Left third toe amputated, sit upright maintaining postural balance, strength equal bilaterally in upper and lower extremities.   Psychiatric:         Mood and Affect: Mood normal.         Behavior: Behavior normal.         Thought Content: Thought content normal.     Component      Latest Ref Rng & Units 10/24/2022 10/24/2022           6:00 AM  5:47 AM   Blood Culture, Routine       No growth after 5 days. No growth after 5 days.     Component      Latest Ref Rng & Units 10/21/2022          11:48 PM   Blood Culture, Routine       STAPHYLOCOCCUS AUREUS (A) . . .     Significant Labs: All pertinent labs within the past 24 hours have been reviewed.  BMP:   Recent Labs   Lab 10/30/22  0549   *      K 4.4      CO2 32*   BUN 10   CREATININE 0.7   CALCIUM 9.0     CBC:   Recent Labs   Lab 10/29/22  0513 10/30/22  0550   WBC 6.86  6.86 7.20  7.20   HGB 12.1*  12.1* 11.9*  11.9*   HCT 38.1*  38.1* 38.2*  38.2*   *  492* 513*  513*     CMP:   Recent Labs   Lab 10/29/22  0513 10/30/22  0549    141   K 4.7 4.4    105   CO2 31* 32*   * 192*   BUN 10 10   CREATININE 0.7 0.7   CALCIUM 8.9 9.0   ANIONGAP 5* 4*       Assessment/Plan:      * Diabetic foot ulcer  Follow wound care team. Last dressing change by podiatry on 10/25.  Patient's FSGs are uncontrolled due to hyperglycemia on current medication regimen.  Last A1c reviewed-   Lab Results   Component Value Date    HGBA1C 9.2 (H) 10/21/2022     Most recent fingerstick glucose reviewed-   Recent Labs   Lab 10/28/22  1645 10/28/22  1925 10/29/22  0535 10/29/22  1211   POCTGLUCOSE 280* 349* 113* 285*      Current correctional scale  Low  Maintain anti-hyperglycemic dose as follows-   Antihyperglycemics (From admission, onward)    Start     Stop Route Frequency Ordered    10/26/22 2115  insulin detemir U-100 pen 30 Units         -- SubQ 2 times daily 10/26/22 2100    10/26/22 2054  insulin aspart U-100 pen 0-5 Units         -- SubQ Before meals & nightly PRN 10/26/22 2100        Hold Oral hypoglycemics while patient is in the hospital.    Stroke  No neurovascular compromise on exam. Secondary prevention with ASA and eliquis with paroxysmal atrial fibrillation, BP control and statin.     A-fib  Patient with Paroxysmal (<7 days) atrial fibrillation which is rate controlled currently with Beta Blocker, Calcium Channel Blocker and Amiodarone. Patient is currently in atrial fibrillation.[id number] Score: 2. HASBLED Score: 3.  - continue Eliquis BID  - continue telemetry  - f/u cardiology        Bacteremia  10/24: MSSA bacteremia secondary to soft tissue infection from right foot wound, no leukocytosis, follow up blood cultures 10/24  Final report NGTD   - continue ancef 2g Q8H start date 10/24/22 - 11/6/22 (total 14 days of therapy)  - D6 ancef  - f/u outpatient infectious disease specialist        Diabetic infection of right foot  Chronic charcot's foot wound with history of past amputations of toes. Patient's FSGs are uncontrolled due to hyperglycemia on current medication regimen.  Continue with daily wound care  Continue daily off loading of right foot  Continue PT/OT  Last A1c reviewed-   Lab Results   Component Value Date    HGBA1C 9.2 (H) 10/21/2022     Most recent fingerstick glucose reviewed-   Recent Labs   Lab 10/29/22  1716   POCTGLUCOSE 317*   Current correctional scale  Low  Maintain anti-hyperglycemic dose as follows-   Antihyperglycemics (From admission, onward)    Start     Stop Route Frequency Ordered    10/30/22 0900  insulin detemir U-100 pen 35 Units         -- SubQ 2 times daily 10/30/22 0747     10/26/22 2054  insulin aspart U-100 pen 0-5 Units         -- SubQ Before meals & nightly PRN 10/26/22 2100        Hold Oral hypoglycemics while patient is in the hospital.    Hyperlipidemia  - atorvastatin 40 mg daily      Essential hypertension  BP Readings from Last 3 Encounters:   10/30/22 (!) 161/89   10/25/22 (!) 178/84   10/24/22 (!) 143/67   Tachycardia 90-100s bpm and atrial fibrillation. Patient to be started on BB and amiodarone per cardiology.   - continue losartan 100 mg daily  - continue amlodipine 10 mg daily   - increased metoprolol 100 mg  from 50 mg BID  - continue amiodarone 200 mg BID  - monitor and adjust accordingly    Morbid obesity with BMI of 50.0-59.9, adult  Body mass index is 49.88 kg/m². Morbid obesity complicates all aspects of disease management from diagnostic modalities to treatment. Weight loss encouraged and health benefits explained to patient.           VTE Risk Mitigation (From admission, onward)         Ordered     apixaban tablet 5 mg  2 times daily         10/26/22 1351     Place sequential compression device  Until discontinued         10/26/22 2100     IP VTE HIGH RISK PATIENT  Once         10/26/22 1535     Place sequential compression device  Until discontinued         10/26/22 1535                Discharge Planning   MICHAEL:      Code Status: Full Code   Is the patient medically ready for discharge?:     Reason for patient still in hospital (select all that apply): Patient trending condition and Pending disposition  Discharge Plan A: Rehab   Discharge Delays: None known at this time      Olena Thornton DO  Department of Hospital Medicine   [facility]

## 2022-10-30 NOTE — ASSESSMENT & PLAN NOTE
BP Readings from Last 3 Encounters:   10/30/22 (!) 161/89   10/25/22 (!) 178/84   10/24/22 (!) 143/67   Tachycardia 90-100s bpm and atrial fibrillation. Patient to be started on BB and amiodarone per cardiology.   - continue losartan 100 mg daily  - continue amlodipine 10 mg daily   - increased metoprolol 100 mg  from 50 mg BID  - continue amiodarone 200 mg BID  - monitor and adjust accordingly

## 2022-10-30 NOTE — ASSESSMENT & PLAN NOTE
Patient with Paroxysmal (<7 days) atrial fibrillation which is rate controlled currently with Beta Blocker, Calcium Channel Blocker and Amiodarone. Patient is currently in atrial fibrillation.[id number] Score: 2. HASBLED Score: 3.  - continue Eliquis BID  - continue telemetry  - f/u cardiology

## 2022-10-30 NOTE — ASSESSMENT & PLAN NOTE
No neurovascular compromise on exam. Secondary prevention with ASA and eliquis with paroxysmal atrial fibrillation, BP control and statin.

## 2022-10-30 NOTE — SUBJECTIVE & OBJECTIVE
Interval History: Patient seen and examined. Comfortably seated at side of bed. [place] at bedside. Awaiting inpatient rehabilitation bed approval.     Review of Systems   Constitutional:  Negative for activity change, appetite change, chills, fatigue and fever.   HENT:  Negative for congestion, sinus pain and tinnitus.    Eyes:  Negative for visual disturbance.   Respiratory:  Negative for chest tightness, shortness of breath and wheezing.    Cardiovascular:  Negative for chest pain and palpitations.   Gastrointestinal:  Negative for abdominal distention, abdominal pain, blood in stool, constipation, diarrhea, nausea, rectal pain and vomiting.   Genitourinary:  Negative for dysuria and enuresis.   Musculoskeletal:  Positive for back pain and gait problem (baseline neuropathy and hx toe ampation). Negative for arthralgias, joint swelling, neck pain and neck stiffness.   Skin:  Negative for color change.   Neurological:  Negative for dizziness, tremors, seizures, syncope, facial asymmetry, speech difficulty, weakness, numbness and headaches.   Psychiatric/Behavioral:  Negative for agitation, behavioral problems and confusion. The patient is not nervous/anxious.    Objective:     Vital Signs (Most Recent):  Temp: 97.8 °F (36.6 °C) (10/30/22 0803)  Pulse: 70 (10/30/22 0803)  Resp: 19 (10/30/22 0803)  BP: (!) 161/89 (10/30/22 0803)  SpO2: 96 % (10/30/22 0803)   Vital Signs (24h Range):  Temp:  [97.8 °F (36.6 °C)-98.7 °F (37.1 °C)] 97.8 °F (36.6 °C)  Pulse:  [67-85] 70  Resp:  [17-20] 19  SpO2:  [95 %-97 %] 96 %  BP: (147-184)/(67-89) 161/89     Weight: (!) 171.5 kg (378 lb 1.6 oz)  Body mass index is 49.88 kg/m².    Intake/Output Summary (Last 24 hours) at 10/30/2022 1240  Last data filed at 10/30/2022 0441  Gross per 24 hour   Intake 4000 ml   Output 2777 ml   Net 1223 ml      Physical Exam  Vitals and nursing note reviewed.   Constitutional:       General: He is not in acute distress.     Appearance: Normal  appearance. He is not ill-appearing, toxic-appearing or diaphoretic.   HENT:      Head: Normocephalic and atraumatic.      Right Ear: External ear normal.      Left Ear: External ear normal.      Nose: Nose normal. No congestion.      Mouth/Throat:      Mouth: Mucous membranes are moist.      Pharynx: Oropharynx is clear.   Eyes:      Extraocular Movements: Extraocular movements intact.      Conjunctiva/sclera: Conjunctivae normal.   Neck:      Vascular: No carotid bruit.   Cardiovascular:      Rate and Rhythm: Normal rate. Rhythm irregular.      Pulses: Normal pulses.      Heart sounds: No murmur heard.  Pulmonary:      Effort: Pulmonary effort is normal. No respiratory distress.      Breath sounds: No wheezing or rales.   Chest:      Chest wall: No tenderness.   Abdominal:      General: There is no distension.      Palpations: Abdomen is soft.      Tenderness: There is no abdominal tenderness. There is no right CVA tenderness, left CVA tenderness or guarding.   Musculoskeletal:         General: No swelling or tenderness.      Cervical back: Neck supple. No rigidity or tenderness.      Right lower leg: No edema.      Left lower leg: No edema.      Comments: Right foot wound, no bleeding or drainage, dressed and wrapped in ace bandage, clean and intact, right toe with darkened scab, no tenderness, sensation diminished   Lymphadenopathy:      Cervical: No cervical adenopathy.   Skin:     General: Skin is warm and dry.      Capillary Refill: Capillary refill takes less than 2 seconds.   Neurological:      General: No focal deficit present.      Mental Status: He is alert.      Motor: Weakness present.      Gait: Gait abnormal.      Comments: No sensation over foot bilaterally and toes, Right first toe amputated, Left third toe amputated, sit upright maintaining postural balance, strength equal bilaterally in upper and lower extremities.   Psychiatric:         Mood and Affect: Mood normal.         Behavior: Behavior  normal.         Thought Content: Thought content normal.     Component      Latest Ref Rng & Units 10/24/2022 10/24/2022           6:00 AM  5:47 AM   Blood Culture, Routine       No growth after 5 days. No growth after 5 days.     Component      Latest Ref Rng & Units 10/21/2022          11:48 PM   Blood Culture, Routine       STAPHYLOCOCCUS AUREUS (A) . . .     Significant Labs: All pertinent labs within the past 24 hours have been reviewed.  BMP:   Recent Labs   Lab 10/30/22  0549   *      K 4.4      CO2 32*   BUN 10   CREATININE 0.7   CALCIUM 9.0     CBC:   Recent Labs   Lab 10/29/22  0513 10/30/22  0550   WBC 6.86  6.86 7.20  7.20   HGB 12.1*  12.1* 11.9*  11.9*   HCT 38.1*  38.1* 38.2*  38.2*   *  492* 513*  513*     CMP:   Recent Labs   Lab 10/29/22  0513 10/30/22  0549    141   K 4.7 4.4    105   CO2 31* 32*   * 192*   BUN 10 10   CREATININE 0.7 0.7   CALCIUM 8.9 9.0   ANIONGAP 5* 4*

## 2022-10-30 NOTE — ASSESSMENT & PLAN NOTE
Chronic charcot's foot wound with history of past amputations of toes. Patient's FSGs are uncontrolled due to hyperglycemia on current medication regimen.  Continue with daily wound care  Continue daily off loading of right foot  Continue PT/OT  Last A1c reviewed-   Lab Results   Component Value Date    HGBA1C 9.2 (H) 10/21/2022     Most recent fingerstick glucose reviewed-   Recent Labs   Lab 10/29/22  1716   POCTGLUCOSE 317*   Current correctional scale  Low  Maintain anti-hyperglycemic dose as follows-   Antihyperglycemics (From admission, onward)    Start     Stop Route Frequency Ordered    10/30/22 0900  insulin detemir U-100 pen 35 Units         -- SubQ 2 times daily 10/30/22 0747    10/26/22 2054  insulin aspart U-100 pen 0-5 Units         -- SubQ Before meals & nightly PRN 10/26/22 2100        Hold Oral hypoglycemics while patient is in the hospital.

## 2022-10-31 ENCOUNTER — HOSPITAL ENCOUNTER (INPATIENT)
Facility: HOSPITAL | Age: 57
LOS: 9 days | Discharge: HOME | DRG: 057 | End: 2022-11-09
Attending: INTERNAL MEDICINE | Admitting: INTERNAL MEDICINE
Payer: COMMERCIAL

## 2022-10-31 VITALS
SYSTOLIC BLOOD PRESSURE: 131 MMHG | DIASTOLIC BLOOD PRESSURE: 68 MMHG | RESPIRATION RATE: 18 BRPM | HEIGHT: 73 IN | WEIGHT: 315 LBS | TEMPERATURE: 98 F | BODY MASS INDEX: 41.75 KG/M2 | HEART RATE: 71 BPM | OXYGEN SATURATION: 98 %

## 2022-10-31 DIAGNOSIS — I63.9 CVA (CEREBRAL VASCULAR ACCIDENT): ICD-10-CM

## 2022-10-31 DIAGNOSIS — I48.91 A-FIB: ICD-10-CM

## 2022-10-31 PROBLEM — R27.8 CEREBELLAR DYSMETRIA: Status: ACTIVE | Noted: 2022-10-31

## 2022-10-31 PROBLEM — B95.61 BACTEREMIA DUE TO METHICILLIN SUSCEPTIBLE STAPHYLOCOCCUS AUREUS (MSSA): Status: ACTIVE | Noted: 2022-10-23

## 2022-10-31 PROBLEM — G45.9 TIA (TRANSIENT ISCHEMIC ATTACK): Status: ACTIVE | Noted: 2022-10-26

## 2022-10-31 LAB
ANION GAP SERPL CALC-SCNC: 3 MMOL/L (ref 8–16)
BUN SERPL-MCNC: 8 MG/DL (ref 6–20)
CALCIUM SERPL-MCNC: 9 MG/DL (ref 8.7–10.5)
CHLORIDE SERPL-SCNC: 104 MMOL/L (ref 95–110)
CO2 SERPL-SCNC: 34 MMOL/L (ref 23–29)
CREAT SERPL-MCNC: 0.7 MG/DL (ref 0.5–1.4)
EST. GFR  (NO RACE VARIABLE): >60 ML/MIN/1.73 M^2
GLUCOSE SERPL-MCNC: 94 MG/DL (ref 70–110)
MAGNESIUM SERPL-MCNC: 1.7 MG/DL (ref 1.6–2.6)
POCT GLUCOSE: 247 MG/DL (ref 70–110)
POCT GLUCOSE: 274 MG/DL (ref 70–110)
POTASSIUM SERPL-SCNC: 4 MMOL/L (ref 3.5–5.1)
SODIUM SERPL-SCNC: 141 MMOL/L (ref 136–145)

## 2022-10-31 PROCEDURE — 99239 PR HOSPITAL DISCHARGE DAY,>30 MIN: ICD-10-PCS | Mod: ,,, | Performed by: STUDENT IN AN ORGANIZED HEALTH CARE EDUCATION/TRAINING PROGRAM

## 2022-10-31 PROCEDURE — 92610 EVALUATE SWALLOWING FUNCTION: CPT

## 2022-10-31 PROCEDURE — 99900031 HC PATIENT EDUCATION (STAT)

## 2022-10-31 PROCEDURE — 97166 OT EVAL MOD COMPLEX 45 MIN: CPT

## 2022-10-31 PROCEDURE — 99239 HOSP IP/OBS DSCHRG MGMT >30: CPT | Mod: ,,, | Performed by: STUDENT IN AN ORGANIZED HEALTH CARE EDUCATION/TRAINING PROGRAM

## 2022-10-31 PROCEDURE — 92523 SPEECH SOUND LANG COMPREHEN: CPT

## 2022-10-31 PROCEDURE — 94761 N-INVAS EAR/PLS OXIMETRY MLT: CPT

## 2022-10-31 PROCEDURE — 11800000 HC REHAB PRIVATE ROOM

## 2022-10-31 PROCEDURE — 25000003 PHARM REV CODE 250: Performed by: EMERGENCY MEDICINE

## 2022-10-31 PROCEDURE — 25000003 PHARM REV CODE 250: Performed by: STUDENT IN AN ORGANIZED HEALTH CARE EDUCATION/TRAINING PROGRAM

## 2022-10-31 PROCEDURE — 25000003 PHARM REV CODE 250: Performed by: INTERNAL MEDICINE

## 2022-10-31 PROCEDURE — 63600175 PHARM REV CODE 636 W HCPCS: Performed by: STUDENT IN AN ORGANIZED HEALTH CARE EDUCATION/TRAINING PROGRAM

## 2022-10-31 PROCEDURE — 80048 BASIC METABOLIC PNL TOTAL CA: CPT | Performed by: STUDENT IN AN ORGANIZED HEALTH CARE EDUCATION/TRAINING PROGRAM

## 2022-10-31 PROCEDURE — 36415 COLL VENOUS BLD VENIPUNCTURE: CPT | Performed by: STUDENT IN AN ORGANIZED HEALTH CARE EDUCATION/TRAINING PROGRAM

## 2022-10-31 PROCEDURE — 96372 THER/PROPH/DIAG INJ SC/IM: CPT | Performed by: STUDENT IN AN ORGANIZED HEALTH CARE EDUCATION/TRAINING PROGRAM

## 2022-10-31 PROCEDURE — 83735 ASSAY OF MAGNESIUM: CPT | Performed by: STUDENT IN AN ORGANIZED HEALTH CARE EDUCATION/TRAINING PROGRAM

## 2022-10-31 PROCEDURE — 99900035 HC TECH TIME PER 15 MIN (STAT)

## 2022-10-31 PROCEDURE — G0378 HOSPITAL OBSERVATION PER HR: HCPCS

## 2022-10-31 RX ORDER — MUPIROCIN 20 MG/G
OINTMENT TOPICAL 2 TIMES DAILY
Status: CANCELLED | OUTPATIENT
Start: 2022-10-31

## 2022-10-31 RX ORDER — ATORVASTATIN CALCIUM 40 MG/1
40 TABLET, FILM COATED ORAL DAILY
Status: DISCONTINUED | OUTPATIENT
Start: 2022-11-01 | End: 2022-10-31

## 2022-10-31 RX ORDER — AMLODIPINE BESYLATE 10 MG/1
10 TABLET ORAL DAILY
Qty: 30 TABLET | Refills: 11 | OUTPATIENT
Start: 2022-10-31 | End: 2023-10-31

## 2022-10-31 RX ORDER — ONDANSETRON 2 MG/ML
4 INJECTION INTRAMUSCULAR; INTRAVENOUS EVERY 8 HOURS PRN
Status: DISCONTINUED | OUTPATIENT
Start: 2022-10-31 | End: 2022-11-09 | Stop reason: HOSPADM

## 2022-10-31 RX ORDER — LOSARTAN POTASSIUM 50 MG/1
100 TABLET ORAL DAILY
Status: CANCELLED | OUTPATIENT
Start: 2022-10-31

## 2022-10-31 RX ORDER — LOSARTAN POTASSIUM 50 MG/1
100 TABLET ORAL DAILY
Status: DISCONTINUED | OUTPATIENT
Start: 2022-11-01 | End: 2022-10-31

## 2022-10-31 RX ORDER — CHOLECALCIFEROL (VITAMIN D3) 125 MCG
5000 CAPSULE ORAL DAILY
Status: CANCELLED | OUTPATIENT
Start: 2022-10-31

## 2022-10-31 RX ORDER — PROCHLORPERAZINE EDISYLATE 5 MG/ML
5 INJECTION INTRAMUSCULAR; INTRAVENOUS EVERY 6 HOURS PRN
Status: CANCELLED | OUTPATIENT
Start: 2022-10-31

## 2022-10-31 RX ORDER — MUPIROCIN 20 MG/G
OINTMENT TOPICAL 2 TIMES DAILY
Status: DISCONTINUED | OUTPATIENT
Start: 2022-10-31 | End: 2022-10-31

## 2022-10-31 RX ORDER — MEROPENEM 1 G/1
1 INJECTION, POWDER, FOR SOLUTION INTRAVENOUS DAILY
Status: DISCONTINUED | OUTPATIENT
Start: 2022-11-01 | End: 2022-11-01

## 2022-10-31 RX ORDER — LANOLIN ALCOHOL/MO/W.PET/CERES
400 CREAM (GRAM) TOPICAL DAILY
Status: CANCELLED | OUTPATIENT
Start: 2022-11-01

## 2022-10-31 RX ORDER — INSULIN ASPART 100 [IU]/ML
0-5 INJECTION, SOLUTION INTRAVENOUS; SUBCUTANEOUS
Status: DISCONTINUED | OUTPATIENT
Start: 2022-10-31 | End: 2022-11-09 | Stop reason: HOSPADM

## 2022-10-31 RX ORDER — METOPROLOL SUCCINATE 100 MG/1
100 TABLET, EXTENDED RELEASE ORAL 2 TIMES DAILY
Status: CANCELLED | OUTPATIENT
Start: 2022-10-31

## 2022-10-31 RX ORDER — ATORVASTATIN CALCIUM 40 MG/1
40 TABLET, FILM COATED ORAL DAILY
Status: DISCONTINUED | OUTPATIENT
Start: 2022-11-01 | End: 2022-11-09 | Stop reason: HOSPADM

## 2022-10-31 RX ORDER — LANOLIN ALCOHOL/MO/W.PET/CERES
400 CREAM (GRAM) TOPICAL DAILY
Status: DISCONTINUED | OUTPATIENT
Start: 2022-11-01 | End: 2022-11-09 | Stop reason: HOSPADM

## 2022-10-31 RX ORDER — IBUPROFEN 200 MG
16 TABLET ORAL
Status: CANCELLED | OUTPATIENT
Start: 2022-10-31

## 2022-10-31 RX ORDER — MUPIROCIN 20 MG/G
OINTMENT TOPICAL 2 TIMES DAILY
Status: DISCONTINUED | OUTPATIENT
Start: 2022-10-31 | End: 2022-11-09 | Stop reason: HOSPADM

## 2022-10-31 RX ORDER — METFORMIN HYDROCHLORIDE 1000 MG/1
1000 TABLET ORAL 2 TIMES DAILY
Status: DISCONTINUED | OUTPATIENT
Start: 2022-10-31 | End: 2022-11-09 | Stop reason: HOSPADM

## 2022-10-31 RX ORDER — L. ACIDOPHILUS/L.BULGARICUS 100MM CELL
1 GRANULES IN PACKET (EA) ORAL 2 TIMES DAILY
Status: CANCELLED | OUTPATIENT
Start: 2022-10-31

## 2022-10-31 RX ORDER — DULOXETIN HYDROCHLORIDE 30 MG/1
30 CAPSULE, DELAYED RELEASE ORAL DAILY
Status: CANCELLED | OUTPATIENT
Start: 2022-10-31

## 2022-10-31 RX ORDER — LABETALOL HCL 20 MG/4 ML
10 SYRINGE (ML) INTRAVENOUS
Status: DISCONTINUED | OUTPATIENT
Start: 2022-10-31 | End: 2022-11-01

## 2022-10-31 RX ORDER — AMOXICILLIN 250 MG
2 CAPSULE ORAL NIGHTLY
Status: DISCONTINUED | OUTPATIENT
Start: 2022-10-31 | End: 2022-11-02

## 2022-10-31 RX ORDER — ASPIRIN 81 MG/1
81 TABLET ORAL DAILY
Status: CANCELLED | OUTPATIENT
Start: 2022-10-31

## 2022-10-31 RX ORDER — CEFAZOLIN SODIUM 2 G/50ML
2 SOLUTION INTRAVENOUS
Status: CANCELLED | OUTPATIENT
Start: 2022-10-31

## 2022-10-31 RX ORDER — DULOXETIN HYDROCHLORIDE 30 MG/1
30 CAPSULE, DELAYED RELEASE ORAL DAILY
Status: DISCONTINUED | OUTPATIENT
Start: 2022-11-01 | End: 2022-11-09 | Stop reason: HOSPADM

## 2022-10-31 RX ORDER — ONDANSETRON 2 MG/ML
4 INJECTION INTRAMUSCULAR; INTRAVENOUS EVERY 8 HOURS PRN
Status: CANCELLED | OUTPATIENT
Start: 2022-10-31

## 2022-10-31 RX ORDER — ATORVASTATIN CALCIUM 40 MG/1
40 TABLET, FILM COATED ORAL DAILY
Status: CANCELLED | OUTPATIENT
Start: 2022-10-31

## 2022-10-31 RX ORDER — CEFAZOLIN SODIUM 2 G/50ML
2 SOLUTION INTRAVENOUS
Status: COMPLETED | OUTPATIENT
Start: 2022-10-31 | End: 2022-11-06

## 2022-10-31 RX ORDER — MEROPENEM 1 G/1
1 INJECTION, POWDER, FOR SOLUTION INTRAVENOUS DAILY
Qty: 30 G | Refills: 0 | OUTPATIENT
Start: 2022-10-31 | End: 2022-11-30

## 2022-10-31 RX ORDER — AMOXICILLIN 250 MG
2 CAPSULE ORAL NIGHTLY
Status: CANCELLED | OUTPATIENT
Start: 2022-10-31

## 2022-10-31 RX ORDER — AMLODIPINE BESYLATE 10 MG/1
10 TABLET ORAL DAILY
Status: CANCELLED | OUTPATIENT
Start: 2022-11-01

## 2022-10-31 RX ORDER — IBUPROFEN 200 MG
16 TABLET ORAL
Status: DISCONTINUED | OUTPATIENT
Start: 2022-10-31 | End: 2022-11-09 | Stop reason: HOSPADM

## 2022-10-31 RX ORDER — SODIUM CHLORIDE 0.9 % (FLUSH) 0.9 %
10 SYRINGE (ML) INJECTION
Status: CANCELLED | OUTPATIENT
Start: 2022-10-31

## 2022-10-31 RX ORDER — LOSARTAN POTASSIUM 50 MG/1
100 TABLET ORAL DAILY
Status: DISCONTINUED | OUTPATIENT
Start: 2022-11-01 | End: 2022-11-01

## 2022-10-31 RX ORDER — INSULIN ASPART 100 [IU]/ML
0-5 INJECTION, SOLUTION INTRAVENOUS; SUBCUTANEOUS
Status: CANCELLED | OUTPATIENT
Start: 2022-10-31

## 2022-10-31 RX ORDER — NAPROXEN SODIUM 220 MG/1
81 TABLET, FILM COATED ORAL DAILY
Status: DISCONTINUED | OUTPATIENT
Start: 2022-11-01 | End: 2022-10-31

## 2022-10-31 RX ORDER — LABETALOL HCL 20 MG/4 ML
10 SYRINGE (ML) INTRAVENOUS
Status: CANCELLED | OUTPATIENT
Start: 2022-10-31

## 2022-10-31 RX ORDER — ACETAMINOPHEN 325 MG/1
650 TABLET ORAL EVERY 6 HOURS PRN
Status: DISCONTINUED | OUTPATIENT
Start: 2022-10-31 | End: 2022-11-09 | Stop reason: HOSPADM

## 2022-10-31 RX ORDER — CEFAZOLIN SODIUM 2 G/50ML
2 SOLUTION INTRAVENOUS
Status: DISCONTINUED | OUTPATIENT
Start: 2022-10-31 | End: 2022-10-31

## 2022-10-31 RX ORDER — METOPROLOL SUCCINATE 50 MG/1
50 TABLET, EXTENDED RELEASE ORAL DAILY
Status: DISCONTINUED | OUTPATIENT
Start: 2022-11-01 | End: 2022-11-09 | Stop reason: HOSPADM

## 2022-10-31 RX ORDER — AMLODIPINE BESYLATE 10 MG/1
10 TABLET ORAL DAILY
Status: CANCELLED | OUTPATIENT
Start: 2022-10-31

## 2022-10-31 RX ORDER — ATORVASTATIN CALCIUM 40 MG/1
40 TABLET, FILM COATED ORAL DAILY
Status: CANCELLED | OUTPATIENT
Start: 2022-11-01

## 2022-10-31 RX ORDER — L. ACIDOPHILUS/L.BULGARICUS 100MM CELL
1 GRANULES IN PACKET (EA) ORAL 2 TIMES DAILY
Status: DISCONTINUED | OUTPATIENT
Start: 2022-10-31 | End: 2022-11-09 | Stop reason: HOSPADM

## 2022-10-31 RX ORDER — METOCLOPRAMIDE 10 MG/1
10 TABLET ORAL
Status: DISCONTINUED | OUTPATIENT
Start: 2022-11-01 | End: 2022-11-09 | Stop reason: HOSPADM

## 2022-10-31 RX ORDER — LANOLIN ALCOHOL/MO/W.PET/CERES
1 CREAM (GRAM) TOPICAL 2 TIMES DAILY
Status: CANCELLED | OUTPATIENT
Start: 2022-10-31

## 2022-10-31 RX ORDER — LOSARTAN POTASSIUM 50 MG/1
100 TABLET ORAL DAILY
Status: CANCELLED | OUTPATIENT
Start: 2022-11-01

## 2022-10-31 RX ORDER — TALC
6 POWDER (GRAM) TOPICAL NIGHTLY PRN
Status: DISCONTINUED | OUTPATIENT
Start: 2022-10-31 | End: 2022-11-09 | Stop reason: HOSPADM

## 2022-10-31 RX ORDER — CHOLECALCIFEROL (VITAMIN D3) 125 MCG
5000 CAPSULE ORAL DAILY
Status: DISCONTINUED | OUTPATIENT
Start: 2022-11-01 | End: 2022-11-01

## 2022-10-31 RX ORDER — NAPROXEN SODIUM 220 MG/1
81 TABLET, FILM COATED ORAL DAILY
Qty: 30 TABLET | Refills: 11 | OUTPATIENT
Start: 2022-10-31 | End: 2023-10-31

## 2022-10-31 RX ORDER — AMLODIPINE BESYLATE 5 MG/1
10 TABLET ORAL DAILY
Status: DISCONTINUED | OUTPATIENT
Start: 2022-11-01 | End: 2022-10-31

## 2022-10-31 RX ORDER — METOPROLOL SUCCINATE 50 MG/1
100 TABLET, EXTENDED RELEASE ORAL 2 TIMES DAILY
Status: DISCONTINUED | OUTPATIENT
Start: 2022-10-31 | End: 2022-10-31

## 2022-10-31 RX ORDER — ACETAMINOPHEN 325 MG/1
650 TABLET ORAL EVERY 6 HOURS PRN
Qty: 120 TABLET | Refills: 0 | OUTPATIENT
Start: 2022-10-31 | End: 2022-11-30

## 2022-10-31 RX ORDER — ASPIRIN 81 MG/1
81 TABLET ORAL DAILY
Status: DISCONTINUED | OUTPATIENT
Start: 2022-11-01 | End: 2022-11-09 | Stop reason: HOSPADM

## 2022-10-31 RX ORDER — METFORMIN HYDROCHLORIDE 500 MG/1
1000 TABLET ORAL 2 TIMES DAILY
Status: CANCELLED | OUTPATIENT
Start: 2022-10-31

## 2022-10-31 RX ORDER — AMLODIPINE BESYLATE 5 MG/1
10 TABLET ORAL DAILY
Status: DISCONTINUED | OUTPATIENT
Start: 2022-11-01 | End: 2022-11-01

## 2022-10-31 RX ORDER — PROCHLORPERAZINE EDISYLATE 5 MG/ML
5 INJECTION INTRAMUSCULAR; INTRAVENOUS EVERY 6 HOURS PRN
Status: DISCONTINUED | OUTPATIENT
Start: 2022-10-31 | End: 2022-11-09 | Stop reason: HOSPADM

## 2022-10-31 RX ORDER — L. ACIDOPHILUS/L.BULGARICUS 100MM CELL
1 GRANULES IN PACKET (EA) ORAL 2 TIMES DAILY
Status: DISCONTINUED | OUTPATIENT
Start: 2022-10-31 | End: 2022-10-31

## 2022-10-31 RX ORDER — TALC
6 POWDER (GRAM) TOPICAL NIGHTLY PRN
Status: CANCELLED | OUTPATIENT
Start: 2022-10-31

## 2022-10-31 RX ORDER — NAPROXEN SODIUM 220 MG/1
81 TABLET, FILM COATED ORAL DAILY
Status: CANCELLED | OUTPATIENT
Start: 2022-11-01

## 2022-10-31 RX ORDER — LANOLIN ALCOHOL/MO/W.PET/CERES
1 CREAM (GRAM) TOPICAL 2 TIMES DAILY
Status: DISCONTINUED | OUTPATIENT
Start: 2022-10-31 | End: 2022-11-09 | Stop reason: HOSPADM

## 2022-10-31 RX ORDER — SODIUM CHLORIDE 0.9 % (FLUSH) 0.9 %
10 SYRINGE (ML) INJECTION
Status: DISCONTINUED | OUTPATIENT
Start: 2022-10-31 | End: 2022-11-09 | Stop reason: HOSPADM

## 2022-10-31 RX ORDER — ACETAMINOPHEN 325 MG/1
650 TABLET ORAL EVERY 6 HOURS PRN
Status: CANCELLED | OUTPATIENT
Start: 2022-10-31

## 2022-10-31 RX ORDER — AMIODARONE HYDROCHLORIDE 200 MG/1
200 TABLET ORAL DAILY
Status: CANCELLED | OUTPATIENT
Start: 2022-11-01

## 2022-10-31 RX ORDER — MEROPENEM 1 G/1
1 INJECTION, POWDER, FOR SOLUTION INTRAVENOUS DAILY
Status: CANCELLED | OUTPATIENT
Start: 2022-11-01

## 2022-10-31 RX ORDER — LANOLIN ALCOHOL/MO/W.PET/CERES
400 CREAM (GRAM) TOPICAL DAILY
Qty: 14 TABLET | Refills: 0 | OUTPATIENT
Start: 2022-10-31 | End: 2022-11-14

## 2022-10-31 RX ORDER — METOCLOPRAMIDE 10 MG/1
10 TABLET ORAL
Status: CANCELLED | OUTPATIENT
Start: 2022-10-31

## 2022-10-31 RX ORDER — ATORVASTATIN CALCIUM 40 MG/1
40 TABLET, FILM COATED ORAL DAILY
Qty: 90 TABLET | Refills: 3 | OUTPATIENT
Start: 2022-10-31 | End: 2023-10-31

## 2022-10-31 RX ORDER — AMIODARONE HYDROCHLORIDE 200 MG/1
200 TABLET ORAL DAILY
Status: DISCONTINUED | OUTPATIENT
Start: 2022-11-01 | End: 2022-11-01

## 2022-10-31 RX ADMIN — MUPIROCIN: 20 OINTMENT TOPICAL at 08:10

## 2022-10-31 RX ADMIN — FERROUS SULFATE TAB 325 MG (65 MG ELEMENTAL FE) 1 EACH: 325 (65 FE) TAB at 09:10

## 2022-10-31 RX ADMIN — AMLODIPINE BESYLATE 10 MG: 10 TABLET ORAL at 09:10

## 2022-10-31 RX ADMIN — INSULIN DETEMIR 35 UNITS: 100 INJECTION, SOLUTION SUBCUTANEOUS at 08:10

## 2022-10-31 RX ADMIN — APIXABAN 5 MG: 5 TABLET, FILM COATED ORAL at 09:10

## 2022-10-31 RX ADMIN — Medication 400 MG: at 09:10

## 2022-10-31 RX ADMIN — INSULIN ASPART 2 UNITS: 100 INJECTION, SOLUTION INTRAVENOUS; SUBCUTANEOUS at 08:10

## 2022-10-31 RX ADMIN — METFORMIN HYDROCHLORIDE 1000 MG: 1000 TABLET, FILM COATED ORAL at 08:10

## 2022-10-31 RX ADMIN — INSULIN DETEMIR 35 UNITS: 100 INJECTION, SOLUTION SUBCUTANEOUS at 09:10

## 2022-10-31 RX ADMIN — CEFAZOLIN SODIUM 2 G: 2 SOLUTION INTRAVENOUS at 03:10

## 2022-10-31 RX ADMIN — AMIODARONE HYDROCHLORIDE 200 MG: 200 TABLET ORAL at 09:10

## 2022-10-31 RX ADMIN — LACTOBACILLUS ACIDOPHILUS / LACTOBACILLUS BULGARICUS 1 EACH: 100 MILLION CFU STRENGTH GRANULES at 09:10

## 2022-10-31 RX ADMIN — CEFAZOLIN SODIUM 2 G: 2 SOLUTION INTRAVENOUS at 11:10

## 2022-10-31 RX ADMIN — ATORVASTATIN CALCIUM 40 MG: 40 TABLET, FILM COATED ORAL at 09:10

## 2022-10-31 RX ADMIN — METOPROLOL SUCCINATE 100 MG: 100 TABLET, EXTENDED RELEASE ORAL at 09:10

## 2022-10-31 RX ADMIN — MUPIROCIN: 20 OINTMENT TOPICAL at 09:10

## 2022-10-31 RX ADMIN — FERROUS SULFATE TAB 325 MG (65 MG ELEMENTAL FE) 1 EACH: 325 (65 FE) TAB at 08:10

## 2022-10-31 RX ADMIN — APIXABAN 5 MG: 2.5 TABLET, FILM COATED ORAL at 08:10

## 2022-10-31 RX ADMIN — INSULIN ASPART 3 UNITS: 100 INJECTION, SOLUTION INTRAVENOUS; SUBCUTANEOUS at 04:10

## 2022-10-31 RX ADMIN — CEFAZOLIN SODIUM 2 G: 2 SOLUTION INTRAVENOUS at 06:10

## 2022-10-31 RX ADMIN — LOSARTAN POTASSIUM 100 MG: 50 TABLET, FILM COATED ORAL at 09:10

## 2022-10-31 RX ADMIN — ASPIRIN 81 MG CHEWABLE TABLET 81 MG: 81 TABLET CHEWABLE at 09:10

## 2022-10-31 RX ADMIN — LACTOBACILLUS ACIDOPHILUS / LACTOBACILLUS BULGARICUS 1 EACH: 100 MILLION CFU STRENGTH GRANULES at 08:10

## 2022-10-31 NOTE — PLAN OF CARE
Per [facility]PR case management, the patient was approved for rehab by his insurance provider. Dr. Thornton and Dr. Miles was notified. Will continue to monitor.

## 2022-10-31 NOTE — PROGRESS NOTES
[facility] Medicine  Progress Note    Patient Name: Andrew Del Cid Jr.  MRN: [id number]  Patient Class: IP- Inpatient   Admission Date: 10/26/2022  Length of Stay: 5 days  Attending Physician: Olena Thornton DO  Primary Care Provider: Olena Thornton DO    Subjective:     Principal Problem:Diabetic foot ulcer    HPI:  57-year-old male with uncontrolled diabetes mellitus type 2, poor healing chronic diabetic foot and severe neuropathy, hypertension, hx of toe amputations was recently hospitalized for sepsis secondary to MSSA bacteremia.   After returning home from the hospital, patient's wife noticed patient experiencing difficulty maintaining balance even with use of walker. Patient in the morning continued to complain of generalized weakness and balance issues with left side predominance for weakness.      ED course: Found to be in atrial fibrillation and RVR 110bpm and was given IV metoprolol. CT head showed hypodensity in cerebellum likely reflecting a subacute infarct.  MRI ordered, but due to patient's weight and body habitus exceeding table limits cannot be performed at this time. Will plan for CTA of brain.   Cardiology started patient on eliquis, amiodarone and metoprolol for rate control.  Patient to be admitted to medicine floor for further cardiac and stroke work up. Also to complete IV antibiotics for MSSA bacteremia.      Overview/Hospital Course:  10/27 No acute events overnight. Patient talking without shortness of breath, moderately disturbed from arranging his finances over phone with [employer]. Denies symptoms. F/u CTA head and neck studies. Agreeable to PT/OT therapy. F/u cardiology will need reevaluation on status of possible endocarditis to current bacteremia. Tolerating eliquis, ASA.    10/28 CTA head and neck with no stenosis. Right foot wound stable and slowly improving. MSSA bacteremia continue with Ancef. WBC count stable. Cardiology to review TTE and possible  need for EDEN to rule out endocarditis. Continue PT/OT (challenge with therapy, right dominant gait with poor left balance, while right foot needs to be non-weight bearing, podiatry working on getting CAM boot for patient).   10/29 On telemetry remains in atrial fibrillation, rate controlled.  Awaiting IP rehab with continued PT/OT, right foot wound care and MSSA bacteremia treatment.   Outpatient setting patient will require close follow up with cardiology, infectious disease specialist, podiatry, wound care and PCP.   10/30 Telemetry tracings remain in atrial fibrillation, rate controlled. SBP >160s, will increase metoprolol. Adjust basal insulin.  10/31 IP rehab approved. Patient prepared for daily PT/OT to work on strength and gait balance, work on predominant left side weakness. Podiatry follow up will continue for right foot wound care. Patient is to continue current ASA and eliquis for subacute stroke and atrial fibrillation. He will require continued IV antibiotics ancef for MSSA bacteremia. Endocarditis was not entirely ruled out secondary to limited view with TTE. Patient's weight and body habitus have both restricted obtaining MRI to confirm stroke status. Will need outpatient follow up with both infectious disease and cardiology to determine the need for extended antibiotics therapy.          Interval History: Patient seen and examined.     Review of Systems   Constitutional:  Negative for activity change, appetite change, chills, fatigue and fever.   HENT:  Negative for congestion, sinus pain and tinnitus.    Eyes:  Negative for visual disturbance.   Respiratory:  Negative for chest tightness, shortness of breath and wheezing.    Cardiovascular:  Negative for chest pain and palpitations.   Gastrointestinal:  Negative for abdominal distention, abdominal pain, blood in stool, constipation, diarrhea, nausea, rectal pain and vomiting.   Genitourinary:  Negative for dysuria and enuresis.   Musculoskeletal:   Positive for back pain and gait problem. Negative for arthralgias, joint swelling, neck pain and neck stiffness.   Skin:  Negative for color change.   Neurological:  Positive for dizziness, weakness, light-headedness and numbness. Negative for tremors, seizures, syncope, facial asymmetry, speech difficulty and headaches.   Psychiatric/Behavioral:  Negative for agitation, behavioral problems and confusion. The patient is not nervous/anxious.    Objective:     Vital Signs (Most Recent):  Temp: 98.2 °F (36.8 °C) (10/31/22 0705)  Pulse: 82 (10/31/22 0705)  Resp: 17 (10/31/22 0705)  BP: (!) 160/71 (10/31/22 0705)  SpO2: 96 % (10/31/22 0705)   Vital Signs (24h Range):  Temp:  [96.6 °F (35.9 °C)-98.5 °F (36.9 °C)] 98.2 °F (36.8 °C)  Pulse:  [65-82] 82  Resp:  [17-20] 17  SpO2:  [95 %-97 %] 96 %  BP: (112-173)/(61-86) 160/71     Weight: (!) 171.5 kg (378 lb 1.6 oz)  Body mass index is 49.88 kg/m².    Intake/Output Summary (Last 24 hours) at 10/31/2022 1052  Last data filed at 10/31/2022 0442  Gross per 24 hour   Intake 2300 ml   Output 3902 ml   Net -1602 ml      Physical Exam  Vitals and nursing note reviewed.   Constitutional:       General: He is not in acute distress.     Appearance: Normal appearance. He is obese. He is not ill-appearing, toxic-appearing or diaphoretic.   HENT:      Head: Normocephalic and atraumatic.      Right Ear: External ear normal.      Left Ear: External ear normal.      Nose: Nose normal. No congestion.      Mouth/Throat:      Mouth: Mucous membranes are moist.      Pharynx: Oropharynx is clear.   Eyes:      Extraocular Movements: Extraocular movements intact.      Conjunctiva/sclera: Conjunctivae normal.   Neck:      Vascular: No carotid bruit.   Cardiovascular:      Rate and Rhythm: Normal rate. Rhythm irregular.      Pulses: Normal pulses.      Heart sounds: No murmur heard.  Pulmonary:      Effort: Pulmonary effort is normal. No respiratory distress.      Breath sounds: No wheezing or  rales.   Chest:      Chest wall: No tenderness.   Abdominal:      General: There is no distension.      Palpations: Abdomen is soft.      Tenderness: There is no abdominal tenderness. There is no right CVA tenderness, left CVA tenderness or guarding.   Musculoskeletal:         General: No swelling or tenderness.      Cervical back: Neck supple. No rigidity or tenderness.      Right lower leg: No edema.      Left lower leg: No edema.      Comments: Right foot wound, no bleeding or drainage, dressed and wrapped in ace bandage, clean and intact, right toe with darkened scab, no tenderness, sensation diminished   Lymphadenopathy:      Cervical: No cervical adenopathy.   Skin:     General: Skin is warm and dry.      Capillary Refill: Capillary refill takes less than 2 seconds.   Neurological:      General: No focal deficit present.      Mental Status: He is alert and oriented to person, place, and time. Mental status is at baseline.      Motor: No weakness.      Gait: Gait abnormal.      Comments: No sensation over foot bilaterally and toes, Right first toe amputated, Left third toe amputated, sit upright maintaining postural balance, strength equal bilaterally in upper and lower extremities.   Psychiatric:         Mood and Affect: Mood normal.         Behavior: Behavior normal.         Thought Content: Thought content normal.     Significant Labs: All pertinent labs within the past 24 hours have been reviewed.  BMP:   Recent Labs   Lab 10/31/22  0523   GLU 94      K 4.0      CO2 34*   BUN 8   CREATININE 0.7   CALCIUM 9.0   MG 1.7     CMP:   Recent Labs   Lab 10/30/22  0549 10/31/22  0523    141   K 4.4 4.0    104   CO2 32* 34*   * 94   BUN 10 8   CREATININE 0.7 0.7   CALCIUM 9.0 9.0   ANIONGAP 4* 3*     Significant Imaging: I have reviewed all pertinent imaging results/findings within the past 24 hours.      Assessment/Plan:      * Diabetic foot ulcer  Follow up podiatry for right foot  wound. Continue with wound care efforts as patient transitions to IP rehabilitation. Will continue with current insulin regimen with strict diet to maintain glycemic control.   Patient's FSGs are uncontrolled due to hyperglycemia on current medication regimen.  Last A1c reviewed-   Lab Results   Component Value Date    HGBA1C 9.2 (H) 10/21/2022     Most recent fingerstick glucose reviewed-   Recent Labs   Lab 10/30/22  1311 10/30/22  1639 10/30/22  1933 10/31/22  1043   POCTGLUCOSE 279* 263* 335* 274*     Current correctional scale  Low  Maintain anti-hyperglycemic dose as follows-   Antihyperglycemics (From admission, onward)    Start     Stop Route Frequency Ordered    10/30/22 0900  insulin detemir U-100 pen 35 Units         -- SubQ 2 times daily 10/30/22 0747    10/26/22 2054  insulin aspart U-100 pen 0-5 Units         -- SubQ Before meals & nightly PRN 10/26/22 2100        Hold Oral hypoglycemics while patient is in the hospital.    Cerebellar dysmetria  See above management.       TIA (transient ischemic attack)  Clinical findings unremarkable. Still describes left sided weakness with preference over the right. Multifactorial sources of weakness, right foot wound over plantar surface, multiple toe amputations in both feet, atrial fibrillation, no evidence of emboli from recent CTA head, neck and lungs, uncontrolled diabetes superimposed with subacute or chronic cerebellar foci.    Limited by clear history from patient, patient still endorses steppage problems and near fall experiences. Patient has been accepted to inpatient rehabilitation to address the above.     - continue secondary prevention with ASA and eliquis   - continue statin  - continue BP control  - continue diabetes control  - transfer to IP rehab    A-fib  Patient with Paroxysmal (<7 days) atrial fibrillation which is rate controlled currently with Beta Blocker, Calcium Channel Blocker and Amiodarone. Patient is currently in atrial  fibrillation.[id number] Score: 2. HASBLED Score: 3.  - continue with new meds, metoprolol, amiodarone  - continue Eliquis BID  - f/u cardiology        Bacteremia due to methicillin susceptible Staphylococcus aureus (MSSA)  10/31: MSSA bacteremia secondary to soft tissue infection from right foot wound, no leukocytosis, follow up blood cultures 10/24  Final report NGTD.   - continue ancef 2g Q8H start date 10/24/22 - 11/6/22 (total 14 days of therapy)  - D7 ancef  - f/u outpatient infectious disease specialist for monitoring of bacteremia  - f/u cardiology with possible schedule for EDEN        Diabetic infection of right foot  Chronic charcot's foot wound with history of past amputations of toes. Patient's FSGs are uncontrolled due to hyperglycemia on current medication regimen.  Continue with daily wound care  Continue daily off loading of right foot  Continue PT/OT  Last A1c reviewed-   Lab Results   Component Value Date    HGBA1C 9.2 (H) 10/21/2022     Most recent fingerstick glucose reviewed-   Recent Labs   Lab 10/30/22  1311 10/30/22  1639 10/30/22  1933   POCTGLUCOSE 279* 263* 335*   Current correctional scale  Low  Maintain anti-hyperglycemic dose as follows-   Antihyperglycemics (From admission, onward)    Start     Stop Route Frequency Ordered    10/30/22 0900  insulin detemir U-100 pen 35 Units         -- SubQ 2 times daily 10/30/22 0747    10/26/22 2054  insulin aspart U-100 pen 0-5 Units         -- SubQ Before meals & nightly PRN 10/26/22 2100        Hold Oral hypoglycemics while patient is in the hospital.    Hyperlipidemia  - atorvastatin 40 mg daily      Essential hypertension  BP Readings from Last 3 Encounters:   10/31/22 (!) 160/71   10/25/22 (!) 178/84   10/24/22 (!) 143/67   Tachycardia 90-100s bpm and atrial fibrillation. Patient to be started on BB and amiodarone per cardiology.   - continue losartan 100 mg daily  - continue amlodipine 10 mg daily   - continue metoprolol 100 mg BID  - continue  amiodarone 200 mg BID  - monitor and adjust accordingly    Morbid obesity with BMI of 50.0-59.9, adult  Body mass index is 49.88 kg/m². Morbid obesity complicates all aspects of disease management from diagnostic modalities to treatment. Weight loss encouraged and health benefits explained to patient.  - will need to continue carbohydrate restricted diet  - nutrition and dietitian education completed during recent hospitalization   - salt restriction to <2g per day           VTE Risk Mitigation (From admission, onward)         Ordered     apixaban tablet 5 mg  2 times daily         10/26/22 1351     Place sequential compression device  Until discontinued         10/26/22 2100     IP VTE HIGH RISK PATIENT  Once         10/26/22 1535     Place sequential compression device  Until discontinued         10/26/22 1535                Discharge Planning   MICHAEL: 10/31/2022     Code Status: Full Code   Is the patient medically ready for discharge?:     Reason for patient still in hospital (select all that apply):To be discharged and transferred to inpatient rehabilitation  Discharge Plan A: Rehab   Discharge Delays: None known at this time      Olena Thornton DO  Department of Hospital Medicine   [facility]

## 2022-10-31 NOTE — ASSESSMENT & PLAN NOTE
Follow up podiatry for right foot wound. Continue with wound care efforts as patient transitions to IP rehabilitation. Will continue with current insulin regimen with strict diet to maintain glycemic control.   Patient's FSGs are uncontrolled due to hyperglycemia on current medication regimen.  Last A1c reviewed-   Lab Results   Component Value Date    HGBA1C 9.2 (H) 10/21/2022     Most recent fingerstick glucose reviewed-   Recent Labs   Lab 10/30/22  1311 10/30/22  1639 10/30/22  1933 10/31/22  1043   POCTGLUCOSE 279* 263* 335* 274*     Current correctional scale  Low  Maintain anti-hyperglycemic dose as follows-   Antihyperglycemics (From admission, onward)    Start     Stop Route Frequency Ordered    10/30/22 0900  insulin detemir U-100 pen 35 Units         -- SubQ 2 times daily 10/30/22 0747    10/26/22 2054  insulin aspart U-100 pen 0-5 Units         -- SubQ Before meals & nightly PRN 10/26/22 2100        Hold Oral hypoglycemics while patient is in the hospital.

## 2022-10-31 NOTE — NURSING
1110   REPORT GIVEN TO KIMBERLY UCRRY ON IN-PATIENT REHAB UNIT.  PATIENT LEFT FLOOR IN STABLE CONDITION PER WHEEL CHAIR.  SAFETY MAINTAINED.   LYNDA AGUILA LPN

## 2022-10-31 NOTE — ASSESSMENT & PLAN NOTE
Chronic charcot's foot wound with history of past amputations of toes. Patient's FSGs are uncontrolled due to hyperglycemia on current medication regimen.  Continue with daily wound care  Continue daily off loading of right foot  Continue PT/OT  Last A1c reviewed-   Lab Results   Component Value Date    HGBA1C 9.2 (H) 10/21/2022     Most recent fingerstick glucose reviewed-   Recent Labs   Lab 10/30/22  1311 10/30/22  1639 10/30/22  1933   POCTGLUCOSE 279* 263* 335*   Current correctional scale  Low  Maintain anti-hyperglycemic dose as follows-   Antihyperglycemics (From admission, onward)    Start     Stop Route Frequency Ordered    10/30/22 0900  insulin detemir U-100 pen 35 Units         -- SubQ 2 times daily 10/30/22 0747    10/26/22 2054  insulin aspart U-100 pen 0-5 Units         -- SubQ Before meals & nightly PRN 10/26/22 2100        Hold Oral hypoglycemics while patient is in the hospital.

## 2022-10-31 NOTE — ASSESSMENT & PLAN NOTE
Clinical findings unremarkable. Still describes left sided weakness with preference over the right. Multifactorial sources of weakness, right foot wound over plantar surface, multiple toe amputations in both feet, atrial fibrillation, no evidence of emboli from recent CTA head, neck and lungs, uncontrolled diabetes superimposed with subacute or chronic cerebellar foci.    Limited by clear history from patient, patient still endorses steppage problems and near fall experiences. Patient has been accepted to inpatient rehabilitation to address the above.     - continue secondary prevention with ASA and eliquis   - continue statin  - continue BP control  - continue diabetes control  - transfer to IP rehab

## 2022-10-31 NOTE — ASSESSMENT & PLAN NOTE
Body mass index is 49.88 kg/m². Morbid obesity complicates all aspects of disease management from diagnostic modalities to treatment. Weight loss encouraged and health benefits explained to patient.  - will need to continue carbohydrate restricted diet  - nutrition and dietitian education completed during recent hospitalization   - salt restriction to <2g per day

## 2022-10-31 NOTE — ASSESSMENT & PLAN NOTE
BP Readings from Last 3 Encounters:   10/31/22 (!) 160/71   10/25/22 (!) 178/84   10/24/22 (!) 143/67   Tachycardia 90-100s bpm and atrial fibrillation. Patient to be started on BB and amiodarone per cardiology.   - continue losartan 100 mg daily  - continue amlodipine 10 mg daily   - continue metoprolol 100 mg BID  - continue amiodarone 200 mg BID  - monitor and adjust accordingly

## 2022-10-31 NOTE — PT/OT/SLP EVAL
Occupational Therapy  Evaluation    Andrew Del Cid Jr.   MRN: [id number]   Admitting Diagnosis: CVA    OT Date of Treatment: 10/31/22   OT Start Time: 1220  OT Stop Time: 1320  OT Total Time (min): 60 min    Billable Minutes:  Evaluation 60  Total Minutes: 60    Diagnosis: Stroke      Past Medical History:   Diagnosis Date    Acute renal failure with tubular necrosis 10/22/2022    Diabetes mellitus, type 2     Fever 10/22/2022    Hyperlipidemia     Hypertension     Hyponatremia 10/22/2022    Obese     Peripheral neuropathy       Past Surgical History:   Procedure Laterality Date    APPENDECTOMY      HERNIA REPAIR      INCISION AND DRAINAGE FOOT Bilateral 12/26/2018    Procedure: INCISION AND DRAINAGE, FOOT;  Surgeon: Rob Alas DPM;  Location: [facility] OR;  Service: Podiatry;  Laterality: Bilateral;    TOE AMPUTATION      right great toe    TOE AMPUTATION Left 12/26/2018    Procedure: AMPUTATION, TOE; left 3rd toe;  Surgeon: Rob Alas DPM;  Location: [facility] OR;  Service: Podiatry;  Laterality: Left;       Referring physician: Dr. Olena Thornton  Date referred to OT: 10/31/22    General Precautions: Standard, fall  Orthopedic Precautions: N/A  Braces:  (CAM boot to (R) LE and (L) knee brace)    Spiritual, Cultural Beliefs, Caodaism Practices, Values that Affect Care: no     Patient History:  Living Environment  Lives With: spouse (Pt lives with his wife.)  Living Arrangements: house (Pt lives in a Two Story home, but able to live on 1st floor.)  Home Accessibility: stairs to enter home, stairs within home (Pt has 6 steps with handrail on (L) to enter.)  Home Layout: Able to live on 1st floor  Stair Railings at Home: outside, present on left side  Transportation Anticipated: car, drives self, family or friend will provide  Equipment Currently Used at Home: none, other (see comments) (But, has a RW.)    Prior level of function:    Bed Mobility/Transfers: independent  Grooming: independent  Bathing:  independent  Upper Body Dressing: independent  Lower Body Dressing: independent  Toileting: independent  Homemaking Responsibilities: Yes  Meal Prep Responsibility: Secondary  Laundry Responsibility: Secondary  Cleaning Responsibility: Secondary  Bill Paying/Finance Responsibility: Secondary  Shopping Responsibility: Secondary   Responsibility: Secondary  Driving License: Yes  Mode of Transportation: Car  Education: 12th grade  Occupation: Full time employment  Type of Occupation:  for [employer]  Leisure and Hobbies: Video Games     Dominant hand: right    Subjective:  Communicated with nurse prior to session.    Chief Complaint: left sided weakness, incoordination, and instability  Patient/Family stated goals: Pt would like to regain functional use of (L) UE / LE while reducing falls and improving independence in order to return home with family.    Pain/Comfort  Pain Rating 1: 0/10  Pain Rating Post-Intervention 1: 0/10    Objective:       Cognitive Exam:  Oriented to: Person, Place, Time, and Situation  Follows Commands/attention: Follows multistep  commands  Communication: clear/fluent  Memory:  No Deficits noted  Safety awareness/insight to disability: impaired  Coping skills/emotional control: Appropriate to situation    Visual/perceptual:  Impaired  acuity    Physical Exam:  Postural examination/scapula alignment: -       No postural abnormalities identified  Skin integrity: Wound (R) foot  Edema: Moderate (R) foot    Sensation:   -       Impaired  light/touch mild    Upper Extremity Range of Motion:  Right Upper Extremity: WFL  Left Upper Extremity: WFL    Upper Extremity Strength:  Right Upper Extremity:  4+ to 5/5  Left Upper Extremity:  4- to 4+/5   Strength: (R) 76 lbs; (L) 43 lbs    Fine motor coordination:   -       Impaired  Left hand, finger to nose   and Left hand, manipulation of objects      Gross motor coordination:  (L) UE hemiplegia/paresis    Functional  Mobility:  Bed Mobility:   Supine to sit: Modified Independent   Sit to supine: Modified Independent   Rolling: Modified Independent   Scooting: Modified Independent    Transfers:   Sit to stand:Minimal Assistance with Rolling Walker .  Bed <> Chair:  Step Transfer with Minimal Assistance with Rolling Walker .  Toilet Transfer:  Pt Step Transfer with Minimal Assistance with Grab bars .  Patient performed shower transfer Step Transfer with Minimal Assistance with Grab bars and TTB.    Feeding:  Patient performed feeding with Setup Assistance.    Grooming:  Patient peformed hand washing with Setup Assistance at sitting at sink.  Patient performed face washing with Setup Assistance at sitting at sink.  Patient performed oral hygeine with Setup Assistance at sitting at sink.  Patient performe hair grooming with Setup Assistance at sitting at sink.    Bathing:  Patient performed bathing with Moderate Assistance with grab bar, Handheld shower head, and tub bench at Shower.    UE Dressing:  Patient performed UE Dressing with Minimal Assistance with extra time at Wheelchair.    LE Dressing:  Patient don/doffed socks with Maximum Assistance, Patient don/doffed shoes with Maximum Assistance, Patient performed don/doffed adult brief with Moderate Assistance, and Patient performed don/doffed pants with Moderate Assistance    Toileting:  Pt performed toileting with Moderate Assistance with Grab  bar at Commode.    Balance:   Static Sit: GOOD: Takes MODERATE challenges from all directions  Dynamic Sit:  GOOD-: Incosistently Maintains balance through MODERATE excursions of active trunk movement,     Static Stand: FAIR: Maintains without assist but unable to take challenges  Dynamic stand: POOR+: Needs MIN (minimal ) assist during gait    Special Tests:  9-Hole Peg Test: (R) 45 sec and (L) 99 sec    Patient left sitting edge of bed with call button in reach and nurse notified    Assessment:  Andrew Del Cid Jr. is a 57 y.o. male  with a medical diagnosis of CVA and presents with weakness, impaired endurance, impaired sensation, impaired self care skills, impaired functional mobility, impaired balance, decreased coordination, decreased upper extremity function, decreased lower extremity function, decreased safety awareness,  and impaired fine motor.    Rehab potential is good    Activity tolerance: Fair    Discharge recommendations: other (see comments) (To be further determined based on progress prior to discharge.)     Equipment recommendations: bedside commode, other (see comments) (To be further determined based on progress prior to discharge.)     GOALS:   Short Term Goals to be met by: 11/07/22     Patient will increase functional independence with ADLs by performing:    UE Dressing with Supervision.  LE Dressing with Minimal Assistance.  Toileting from toilet with Minimal Assistance for hygiene and clothing management.   Bathing from  shower chair/bench with Minimal Assistance.  Step transfer with Stand-by Assistance  Toilet transfer to toilet with Supervision.  Increased functional strength to 4/5 for (L) UE.  Increased fine motor coordination as measured by decrease in 9-Hole Peg Test to less than 80 sec.    Long Term Goals to be met by: 11/14/22     Patient will increase functional independence with ADLs by performing:    Feeding with [place].  UE Dressing with Modified [place].  LE Dressing with Modified [place].  Grooming while seated at sink with Modified [place].  Toileting from toilet with Modified [place] for hygiene and clothing management.   Bathing from  shower chair/bench with Modified [place].  Step transfer with Modified [place]  Toilet transfer to toilet with Modified [place].  Increased functional strength to 4+/5 for (L) UE.  Increased fine motor coordination as measured by decrease in 9-Hole Peg Test to less than 65 sec.      PLAN: Patient to be seen 5 x/week (for 90 min per  day, for 14 days) to address the above listed problems via self-care/home management, community/work re-entry, therapeutic activities, therapeutic exercises, neuromuscular re-education, wheelchair management/training  Plan of Care expires: 11/14/22  Plan of Care reviewed with: patient         10/31/2022

## 2022-10-31 NOTE — PT/OT/SLP EVAL
Speech Language Pathology  Evaluation    Andrew Del Cid Jr.   MRN: [id number]   Admitting Diagnosis: <principal problem not specified>     Diet recommendations: Solid Diet Level: Regular  Liquid Diet Level: Thin liquids - IDDSI Level 0 Frequent oral care, HOB to 90 degrees, Small bites/sips, and Standard aspiration precautions    Therapy Minutes  SLP Treatment Date: 10/31/22  Speech Start Time: 1321  Speech Stop Time: 1421  Speech Total (min): 60 min    Billable Minutes:  Eval x60 mins      Diagnosis: <principal problem not specified>    Past Medical History:   Diagnosis Date    Acute renal failure with tubular necrosis 10/22/2022    Diabetes mellitus, type 2     Fever 10/22/2022    Hyperlipidemia     Hypertension     Hyponatremia 10/22/2022    Obese     Peripheral neuropathy      Past Surgical History:   Procedure Laterality Date    APPENDECTOMY      HERNIA REPAIR      INCISION AND DRAINAGE FOOT Bilateral 12/26/2018    Procedure: INCISION AND DRAINAGE, FOOT;  Surgeon: Rob Alas DPM;  Location: [facility];  Service: Podiatry;  Laterality: Bilateral;    TOE AMPUTATION      right great toe    TOE AMPUTATION Left 12/26/2018    Procedure: AMPUTATION, TOE; left 3rd toe;  Surgeon: Rob Alas DPM;  Location: [facility];  Service: Podiatry;  Laterality: Left;       Has the patient been evaluated by SLP for swallowing? : Yes  Keep patient NPO?: No   General Precautions: Standard, fall          SUBJECTIVE:  General patient information:    Dominant hand: Right    Hemiparesis : Left side is a little weak but no paralysis     Primary Language: English    Primary Communication used : Verbal    Prior Level of Functioning:  Functional: communication, comprehension, expression, cognition, memory, attention, and swallowing    Impaired:  none       Premorbid Status: Driving, Bills, Employed, Yard Work, Preparing Meals, Household Activities, and Wife helps with household activities, cooking, and managing medication        Living Situation: Lives with family    Occupation:  [employer]      Educational Level: 12th     Visual Deficits: Reading glasses    Hearing Deficits: WFL    Premorbid Assistive Device: glasses for reading     Assistive Device used during evaluation:  none    Behavior: alert and appropriate    OBJECTIVE:  Clinical Observations: alert and verbal communication    Treatment Observations:  none    Pain Level   Pain/Comfort  Pain Rating 1: 0/10     Oral Musculature Evaluation  Oral Musculature Evaluation  Oral Musculature: WFL  Dentition: scattered dentition  Secretion Management: adequate  Mucosal Quality: good  Mandibular Strength and Mobility: WFL  Oral Labial Strength and Mobility: WFL  Lingual Strength and Mobility: WFL  Velar Elevation: WFL  Buccal Strength and Mobility: WFL  Volitional Cough: WFL  Volitional Swallow: WFL  Voice Prior to PO Intake: WFL    Motor Speech:  Diadochokinetic Rate: WFL    Intelligibility:   Word WFL and Conversation WFL ; pt presents with minimal slurring to speech. Strategies for clear speech intelligibility recommended such as opening mouth wider to articulate, speaking slower, and projecting voice.     Voice:  Vocal Intensity: WFL  Vocal Quality/Resonance: WFL    Fluency/Prosody: WFL; pt states that his wife says he has somewhat of a stutter follow incident; however, pt presents with functional fluency at this time     Receptive Language:   Following commands: One step WFL  two step basic commands WFL  multistep basic commands WFL  Answering yes/no questions: personal WFL  Imitate actions/gestures: WFL  Identify: Objects WFL    Expressive Language:  Repetition: Repetition of isolated sounds WFL     Automatic speech: Counting WFL     Responsive speech: WFL     Naming: WFL     Word finding: WFL    Higher Level Linguistic/Cognition:  Orientation: Person WFL, Place WFL, Time WFL, Situation WFL    Long term memory: WFL     Short term memory: Immediate WFL and Delayed  WFL     Problem solving: basic WFL     Reasoning: basic WFL     Functional math: WFL    Visual Spatial: Attend to Right WFL and Left WFL    Reading: Word level [facility]        Pragmatics:   [facility]   Attention: No obvious deficits observed WFL    Cognitive/Linguistic Assessment  Activity Capabilities: family support, cooperative, motivated, orientated x4, alert, verbal communication, receptive language, expressive language, cognitive-linguistic, swallowing, and memory    Activity Limitations: intelligibility    ST prognosis: Good    Impressions/interpretations: Person served presents with WFL expressive language deficits, WFL receptive language deficits, WFL memory deficits, and WFL problem solving deficits. Pt presents with functional skills at this time.      Swallow Assessment:  Reason for Referral: A bedside swallow evaluation was performed to assess the efficiency of the patient's swallow function, rule out aspiration and make recommendations regarding safe dietary consistencies, and effective compensatory strategies.    Oral Musculature Evaluation  Oral Musculature: WFL  Dentition: scattered dentition  Secretion Management: adequate  Mucosal Quality: good  Mandibular Strength and Mobility: [facility]  Oral Labial Strength and Mobility: [facility]  Lingual Strength and Mobility: [facility]  Velar Elevation: [facility]  Buccal Strength and Mobility: [facility]  Volitional Cough: [facility]  Volitional Swallow: [facility]  Voice Prior to PO Intake: [facility]    Bedside Swallow Eval:   Consistencies Assessed:  Thin liquids via straw sip   Cracker via small bites    Pt denies any pain/coughing/choking/ regurgitation/globus sensation with regular consistency foods, thin liquids or pills.     Oral Phase:   Thin liquids WFL  Cracker WFL    Pharyngeal Phase:   NO OVERT S/S ASPIRATION OR DYSPHAGIA     Compensatory Strategies  None    Interpretation: Patient with functional swallow of the oral, pharyngeal, and esophageal stage of the swallow.    Activity Capabilities: alertness, oral  motor abilities, comprehension, expression , cognition , participation , and cooperation     Activity Limitations: poor dentition and absent dentition    Treatment:  not warranted       PLAN:  Recommendations: Speech therapy Is not warranted at this time.   ST recommended to improve Educated patient on: role of SLP and standard aspiration precautions, as well as, clear speech intelligibility strategies.     Patient report understanding of the information provided: Demonstrates understanding       Assessment:  Andrew Del Cid Jr. is a 57 y.o. male with a medical diagnosis of <principal problem not specified> and presents with functional speech, language, and swallowing abilities.    Spiritual, Cultural Beliefs, Orthodoxy Practices, Values that Affect Care: no    Discharge recommendations: Discharge Facility/Level of Care Needs: home     Goals:   Multidisciplinary Problems       SLP Goals       Not on file                     Plan:   Patient to be seen    Planned Interventions:    Plan of Care expires:    Plan of Care reviewed with: patient  SLP Follow-up?: No         10/31/2022

## 2022-10-31 NOTE — NURSING
Spoke to Romy at Dr. Cruz office to let physician know that patient has moved to rehab for wound care. Romy said that she is clinic right now but she south let her know about patient moving.

## 2022-10-31 NOTE — SUBJECTIVE & OBJECTIVE
Interval History: Patient seen and examined.     Review of Systems   Constitutional:  Negative for activity change, appetite change, chills, fatigue and fever.   HENT:  Negative for congestion, sinus pain and tinnitus.    Eyes:  Negative for visual disturbance.   Respiratory:  Negative for chest tightness, shortness of breath and wheezing.    Cardiovascular:  Negative for chest pain and palpitations.   Gastrointestinal:  Negative for abdominal distention, abdominal pain, blood in stool, constipation, diarrhea, nausea, rectal pain and vomiting.   Genitourinary:  Negative for dysuria and enuresis.   Musculoskeletal:  Positive for back pain and gait problem. Negative for arthralgias, joint swelling, neck pain and neck stiffness.   Skin:  Negative for color change.   Neurological:  Positive for dizziness, weakness, light-headedness and numbness. Negative for tremors, seizures, syncope, facial asymmetry, speech difficulty and headaches.   Psychiatric/Behavioral:  Negative for agitation, behavioral problems and confusion. The patient is not nervous/anxious.    Objective:     Vital Signs (Most Recent):  Temp: 98.2 °F (36.8 °C) (10/31/22 0705)  Pulse: 82 (10/31/22 0705)  Resp: 17 (10/31/22 0705)  BP: (!) 160/71 (10/31/22 0705)  SpO2: 96 % (10/31/22 0705)   Vital Signs (24h Range):  Temp:  [96.6 °F (35.9 °C)-98.5 °F (36.9 °C)] 98.2 °F (36.8 °C)  Pulse:  [65-82] 82  Resp:  [17-20] 17  SpO2:  [95 %-97 %] 96 %  BP: (112-173)/(61-86) 160/71     Weight: (!) 171.5 kg (378 lb 1.6 oz)  Body mass index is 49.88 kg/m².    Intake/Output Summary (Last 24 hours) at 10/31/2022 1052  Last data filed at 10/31/2022 0442  Gross per 24 hour   Intake 2300 ml   Output 3902 ml   Net -1602 ml      Physical Exam  Vitals and nursing note reviewed.   Constitutional:       General: He is not in acute distress.     Appearance: Normal appearance. He is obese. He is not ill-appearing, toxic-appearing or diaphoretic.   HENT:      Head: Normocephalic and  atraumatic.      Right Ear: External ear normal.      Left Ear: External ear normal.      Nose: Nose normal. No congestion.      Mouth/Throat:      Mouth: Mucous membranes are moist.      Pharynx: Oropharynx is clear.   Eyes:      Extraocular Movements: Extraocular movements intact.      Conjunctiva/sclera: Conjunctivae normal.   Neck:      Vascular: No carotid bruit.   Cardiovascular:      Rate and Rhythm: Normal rate. Rhythm irregular.      Pulses: Normal pulses.      Heart sounds: No murmur heard.  Pulmonary:      Effort: Pulmonary effort is normal. No respiratory distress.      Breath sounds: No wheezing or rales.   Chest:      Chest wall: No tenderness.   Abdominal:      General: There is no distension.      Palpations: Abdomen is soft.      Tenderness: There is no abdominal tenderness. There is no right CVA tenderness, left CVA tenderness or guarding.   Musculoskeletal:         General: No swelling or tenderness.      Cervical back: Neck supple. No rigidity or tenderness.      Right lower leg: No edema.      Left lower leg: No edema.      Comments: Right foot wound, no bleeding or drainage, dressed and wrapped in ace bandage, clean and intact, right toe with darkened scab, no tenderness, sensation diminished   Lymphadenopathy:      Cervical: No cervical adenopathy.   Skin:     General: Skin is warm and dry.      Capillary Refill: Capillary refill takes less than 2 seconds.   Neurological:      General: No focal deficit present.      Mental Status: He is alert and oriented to person, place, and time. Mental status is at baseline.      Motor: No weakness.      Gait: Gait abnormal.      Comments: No sensation over foot bilaterally and toes, Right first toe amputated, Left third toe amputated, sit upright maintaining postural balance, strength equal bilaterally in upper and lower extremities.   Psychiatric:         Mood and Affect: Mood normal.         Behavior: Behavior normal.         Thought Content: Thought  content normal.     Significant Labs: All pertinent labs within the past 24 hours have been reviewed.  BMP:   Recent Labs   Lab 10/31/22  0523   GLU 94      K 4.0      CO2 34*   BUN 8   CREATININE 0.7   CALCIUM 9.0   MG 1.7     CMP:   Recent Labs   Lab 10/30/22  0549 10/31/22  0523    141   K 4.4 4.0    104   CO2 32* 34*   * 94   BUN 10 8   CREATININE 0.7 0.7   CALCIUM 9.0 9.0   ANIONGAP 4* 3*     Significant Imaging: I have reviewed all pertinent imaging results/findings within the past 24 hours.

## 2022-10-31 NOTE — PLAN OF CARE
[place] - Rehab (Hospital)  Initial Discharge Assessment       Primary Care Provider: Olena Thornton DO    Admission Diagnosis: CVA (cerebral vascular accident) [I63.9]  CVA (cerebral vascular accident) [I63.9]    Admission Date: 10/31/2022  Expected Discharge Date:     Discharge Barriers Identified: None    Payor: MEDICAID / Plan: [facility] COMMUNITY PLAN [facility] ([place] MEDICAID) / Product Type: Managed Medicaid /     Extended Emergency Contact Information  Primary Emergency Contact: Aleena Garcia  Address: [address], [place] [address] [place] [address]  Home Phone: [phone]  Mobile Phone: [phone]  Relation: Spouse  Preferred language: English   needed? No    Discharge Plan A: Home with family  Discharge Plan B: Home with family      [id number] - [place], [place] - [address]  [address] [facility] [address]  Phone: [phone] Fax: [phone]    [id number] - [place], [place] - [address]  [address] [facility] [address] [address]  Phone: [phone] Fax: [phone]    [facility] [address] [place], [place] [address]  [address]  [facility] [address]  Phone: [phone] Fax: [phone]      Initial Assessment (most recent)       Adult Discharge Assessment - 10/31/22 1442          Discharge Assessment    Assessment Type Discharge Planning Assessment     Confirmed/corrected address, phone number and insurance Yes     Confirmed Demographics Correct on Facesheet     Source of Information patient     When was your last doctors appointment? --   Pt stated he has not seen his PCP in clinic only during his hospital stay.    Communicated MICHAEL with patient/caregiver Date not available/Unable to determine     Reason For Admission Stroke     Lives With other (see comments);spouse   Mother in law, grandmother, and 3 children    Facility Arrived From: [facility]     Do you expect to return to your current  living situation? Yes     Do you have help at home or someone to help you manage your care at home? Yes     Who are your caregiver(s) and their phone number(s)? Pt does not have a caregiver but has assistance from Aleena (spouse) if needed     Prior to hospitilization cognitive status: Alert/Oriented     Current cognitive status: Alert/Oriented     Walking or Climbing Stairs Difficulty none     Dressing/Bathing Difficulty none     Home Accessibility not wheelchair accessible     Number of Stairs, Main Entrance six     Stair Railings, Main Entrance railing on left side (ascending)     Equipment Currently Used at Home walker, rolling     Readmission within 30 days? No     Patient currently being followed by outpatient case management? No     Do you currently have service(s) that help you manage your care at home? No     Do you take prescription medications? Yes     Do you have prescription coverage? Yes     Coverage [facility] Medicaid     Do you have any problems affording any of your prescribed medications? No     Is the patient taking medications as prescribed? no     If no, which medications is patient not taking? Pt cannot recall which medication he is not taking but reported that sometimes he does forget to take his medications.     Who is going to help you get home at discharge? Aleena Garcia     How do you get to doctors appointments? family or friend will provide     Are you on dialysis? No     Do you take coumadin? No     Discharge Plan A Home with family     Discharge Plan B Home with family     DME Needed Upon Discharge  other (see comments)   TBD    Discharge Plan discussed with: Patient     Discharge Barriers Identified None        Physical Activity    On average, how many days per week do you engage in moderate to strenuous exercise (like a brisk walk)? 0 days     On average, how many minutes do you engage in exercise at this level? 0 min        Financial Resource Strain    How hard is it for you to pay for  the very basics like food, housing, medical care, and heating? Somewhat hard        Housing Stability    In the last 12 months, was there a time when you were not able to pay the mortgage or rent on time? No     In the last 12 months, how many places have you lived? 1     In the last 12 months, was there a time when you did not have a steady place to sleep or slept in a shelter (including now)? No        Transportation Needs    In the past 12 months, has lack of transportation kept you from medical appointments or from getting medications? No     In the past 12 months, has lack of transportation kept you from meetings, work, or from getting things needed for daily living? No        Food Insecurity    Within the past 12 months, you worried that your food would run out before you got the money to buy more. Never true     Within the past 12 months, the food you bought just didn't last and you didn't have money to get more. Never true        Stress    Do you feel stress - tense, restless, nervous, or anxious, or unable to sleep at night because your mind is troubled all the time - these days? Not at all        Social Connections    In a typical week, how many times do you talk on the phone with family, friends, or neighbors? Never     How often do you get together with friends or relatives? Never     How often do you attend Bahai or Jain services? Never     Do you belong to any clubs or organizations such as Bahai groups, unions, fraternal or athletic groups, or school groups? No     How often do you attend meetings of the clubs or organizations you belong to? Never     Are you , , , , never , or living with a partner?         Alcohol Use    Q1: How often do you have a drink containing alcohol? Never     Q2: How many drinks containing alcohol do you have on a typical day when you are drinking? Patient does not drink     Q3: How often do you have six or more drinks on  one occasion? Never                   CM discharge assessment complete with patient. Patient awake, alert and oriented to the questions being asked. Pt lives in a two story home with his wife, 3 children, mother-in-law, and grandmother. Pt reported that prior to admission, he had no difficulty performing ADLs and utilized no equipment with ambulation. Pt was still working and driving up until his recent hospital admission. At discharge, he is interested in Outpatient Therapy at [facility]. Attempted to reach pt's wife Aleena Garcia to inform her of pt's admission. Unable to reach, but left a message with CM contact information. CM will continue to follow and assist as needed.

## 2022-10-31 NOTE — ASSESSMENT & PLAN NOTE
10/31: MSSA bacteremia secondary to soft tissue infection from right foot wound, no leukocytosis, follow up blood cultures 10/24  Final report NGTD.   - continue ancef 2g Q8H start date 10/24/22 - 11/6/22 (total 14 days of therapy)  - D7 ancef  - f/u outpatient infectious disease specialist for monitoring of bacteremia  - f/u cardiology with possible schedule for [facility]

## 2022-10-31 NOTE — PT/OT/SLP EVAL
PhysicalTherapy   Evaluation    Andrew Del Cid Jr.   MRN: [id number]     PT Received On: 10/31/22  PT Start Time: 1118     PT Stop Time: 1218    PT Total Time (min): 60 min       Billable Minutes:  Evaluation 60  Total Minutes: 60    Diagnosis:CVA  Past Medical History:   Diagnosis Date    Acute renal failure with tubular necrosis 10/22/2022    Diabetes mellitus, type 2     Fever 10/22/2022    Hyperlipidemia     Hypertension     Hyponatremia 10/22/2022    Obese     Peripheral neuropathy       Past Surgical History:   Procedure Laterality Date    APPENDECTOMY      HERNIA REPAIR      INCISION AND DRAINAGE FOOT Bilateral 12/26/2018    Procedure: INCISION AND DRAINAGE, FOOT;  Surgeon: Rob Alas DPM;  Location: [facility] OR;  Service: Podiatry;  Laterality: Bilateral;    TOE AMPUTATION      right great toe    TOE AMPUTATION Left 12/26/2018    Procedure: AMPUTATION, TOE; left 3rd toe;  Surgeon: Rob Alas DPM;  Location: [facility] OR;  Service: Podiatry;  Laterality: Left;       Referring physician: Dr. Mittal  Date referred to PT: 10/31/2022    General Precautions: Standard, fall  Orthopedic Precautions:  (CAM boot (R) LE for foot wound, (L) knee brace due to buckling of (L) knee occasionally)   Braces:  ((L) velcro closure knee brace, (R) CAM boot)     Spiritual, Cultural Beliefs, Jewish Practices, Values that Affect Care: no    Patient History:  Lives With: child(milana), dependent, spouse, other relative(s)  Living Arrangements: house  Home Accessibility: stairs to enter home (2 story house with 5 steps to enter home but patient has an elevator.)  Home Layout: Bedroom on 2nd floor  Transportation Anticipated: family or friend will provide  Equipment Currently Used at Home:  ((L) knee brace)    DME owned (not currently used): none    Previous Level of Function:  Ambulation Skills: independent  Stairs: independent  Transfer Skills: independent  ADL Skills: independent  Work/Leisure Activity:  independent  Cognitive Communication: independent    Subjective:  Communicated with nurse prior to session.    Chief Complaint: Patient reports no complaints at this time. Patient motivated to participate in therapy.  Patient goals: to return home to PLOF  Family goals: no family present    Pain/Comfort  Pain Rating 1: 0/10  Pain Rating Post-Intervention 1: 0/10    Objective:  Patient found up in dining room with patient care technician, with Patient found with:  (CAM boot (R) LE)    Cognitive Exam:  Oriented to: Person, Place, Time, and Situation  Follows Commands/attention: Follows multistep  commands  Communication: speech slightly slurred but easily understandable by PT  Safety awareness/insight to disability: impaired    Physical Exam:  Skin integrity: Wound (R) foot    Upper Extremity Range of Motion:  Refer to OT evaluation for UE ROM.    Upper Extremity Strength:  Refer to OT evaluation for UE strength.    Lower Extremity Range of Motion:  Right Lower Extremity: WFL grossly assessed  Left Lower Extremity: WFL grossly assessed    Lower Extremity Strength:  Right Lower Extremity: grossly assessed  Left Lower Extremity:  3+ to 4-/5 grossly assessed   Gross motor coordination:  impaired    Functional Mobility:    Bed Mobility :   Supine to sit: Modified Independent   Sit to supine: Modified Independent   Rolling: Modified Independent   Scooting: Modified Independent    Transfers:  Sit to stand:Contact Guard Assistance with Rolling Walker cues for safety  Bed <> Chair:  Step Transfer with Contact Guard Assistance with Rolling Walker cues for safety    Wheelchair:  Pt propelled Standard wheelchair x 50 feet on Level tile with  Bilateral lower extremity with Stand-by Assistance.     Gait:  Patient ambulated FWB/WBAT: bilateral lower extremity with CAM boot on (R)   feet on level tile with Rolling Walker with Minimal Assistance.  Pt with demonstarting a  step to gait pattern with decreased estefania, increased  time in double stance, decreased step length, decreased weight-shifting ability, and history of buckling (L) knee.Impairments contributing to gait deviations include impaired balance, impaired coordination, impaired motor control, and decreased strength    Stairs:  Not assessed today      Balance:   Static Sit: GOOD: Takes MODERATE challenges from all directions  Dynamic Sit:  GOOD: Maintains balance through MODERATE excursions of active trunk movement  Static Stand: FAIR: Maintains without assist but unable to take challenges  Dynamic stand: POOR+: Needs MIN (minimal ) assist during gait    Patient left up in chair with  nurse notified and in dining room .    Assessment:  Andrew Del Cid Jr. is a 57 y.o. male with a medical diagnosis of <principal problem not specified>. He presents with impaired gait, balance, coordination impacting functional mobility.  Patient reports history of CVA affecting (L) side extremities and wears (L) knee brace due to occasional buckling. Patient wears CAM boot on (R) LE due to (R) foot wound.   Rehab potential is good.    Activity tolerance: Good    Plan: plan care intiated, bed mobility initiated, transfer training iniated, gait training, balance training, strengthening, neuromuscular re-education, and motor coordination training    Discharge recommendations: home, outpatient PT     Equipment recommendations:  (to be determined closer to discharge)    GOALS:   Multidisciplinary Problems       Physical Therapy Goals          Problem: Physical Therapy    Goal Priority Disciplines Outcome Goal Variances Interventions   Physical Therapy Goal     PT, PT/OT      Description: Goals to be met by: 2022     Patient will increase functional independence with mobility by performin. Sit to stand transfer with Cameron  2. Bed to chair transfer with Cameron using No Assistive Device  3. Gait  x 200+ feet with Modified Cameron using Rolling Walker.   4. Wheelchair  propulsion x150+ feet with Modified [place] using bilateral lower extremities  5. Ascend/descend 12 stair with bilateral Handrails Modified [place] using No Assistive Device.   6. Ascend/Descend standard curb step with Modified [place] using Rolling Walker.                       PLAN:    Patient to be seen 5 x/week to address the above listed problems via gait training, therapeutic activities, therapeutic exercises, therapeutic groups, neuromuscular re-education, wheelchair management/training  Plan of Care expires: 11/14/22  Plan of Care reviewed with: patient          10/31/2022

## 2022-10-31 NOTE — PLAN OF CARE
[place] [facility]  Discharge Final Note    Primary Care Provider: Olena Thornton DO    Expected Discharge Date: 10/31/2022    Final Discharge Note (most recent)       Final Note - 10/31/22 1114          Final Note    Assessment Type Final Discharge Note     Anticipated Discharge Disposition Rehab Facility        Post-Acute Status    Post-Acute Authorization Placement     Post-Acute Placement Status Set-up Complete/Auth obtained     Discharge Delays None known at this time                 Final note is completed. The patient was accept to [facility]. He did not require any additional assistance from case management at this time.

## 2022-10-31 NOTE — ASSESSMENT & PLAN NOTE
Patient with Paroxysmal (<7 days) atrial fibrillation which is rate controlled currently with Beta Blocker, Calcium Channel Blocker and Amiodarone. Patient is currently in atrial fibrillation.[id number] Score: 2. HASBLED Score: 3.  - continue with new meds, metoprolol, amiodarone  - continue Eliquis BID  - f/u cardiology

## 2022-10-31 NOTE — DISCHARGE SUMMARY
[facility] Medicine  Discharge Summary      Patient Name: Andrew Del Cid Jr.  MRN: [id number]  Patient Class: IP- Inpatient  Admission Date: 10/26/2022  Hospital Length of Stay: 5 days  Discharge Date and Time:  10/31/2022 11:00 AM  Attending Physician: Olena Thornton DO   Discharging Provider: Olena Thornton DO  Primary Care Provider: Olena Thornton DO      HPI:   57-year-old male with uncontrolled diabetes mellitus type 2, poor healing chronic diabetic foot and severe neuropathy, hypertension, hx of toe amputations was recently hospitalized for sepsis secondary to MSSA bacteremia.   After returning home from the hospital, patient's wife noticed patient experiencing difficulty maintaining balance even with use of walker. Patient in the morning continued to complain of generalized weakness and balance issues with left side predominance for weakness.      ED course: Found to be in atrial fibrillation and RVR 110bpm and was given IV metoprolol. CT head showed hypodensity in cerebellum likely reflecting a subacute infarct.  MRI ordered, but due to patient's weight and body habitus exceeding table limits cannot be performed at this time. Will plan for CTA of brain.   Cardiology started patient on eliquis, amiodarone and metoprolol for rate control.  Patient to be admitted to medicine floor for further cardiac and stroke work up. Also to complete IV antibiotics for MSSA bacteremia.      * No surgery found *      Hospital Course:   10/27 No acute events overnight. Patient talking without shortness of breath, moderately disturbed from arranging his finances over phone with [employer]. Denies symptoms. F/u CTA head and neck studies. Agreeable to PT/OT therapy. F/u cardiology will need reevaluation on status of possible endocarditis to current bacteremia. Tolerating eliquis, ASA.    10/28 CTA head and neck with no stenosis. Right foot wound stable and slowly improving. MSSA bacteremia  continue with Ancef. WBC count stable. Cardiology to review TTE and possible need for EDEN to rule out endocarditis. Continue PT/OT (challenge with therapy, right dominant gait with poor left balance, while right foot needs to be non-weight bearing, podiatry working on getting CAM boot for patient).   10/29 On telemetry remains in atrial fibrillation, rate controlled.  Awaiting IP rehab with continued PT/OT, right foot wound care and MSSA bacteremia treatment.   Outpatient setting patient will require close follow up with cardiology, infectious disease specialist, podiatry, wound care and PCP.   10/30 Telemetry tracings remain in atrial fibrillation, rate controlled. SBP >160s, will increase metoprolol. Adjust basal insulin.  10/31 IP rehab approved. Patient prepared for daily PT/OT to work on strength and gait balance, work on predominant left side weakness. Podiatry follow up will continue for right foot wound care. Patient is to continue current ASA and eliquis for subacute stroke and atrial fibrillation. He will require continued IV antibiotics ancef for MSSA bacteremia. Endocarditis was not entirely ruled out secondary to limited view with TTE. Patient's weight and body habitus have both restricted obtaining MRI to confirm stroke status. Will need outpatient follow up with both infectious disease and cardiology to determine the need for extended antibiotics therapy.         Goals of Care Treatment Preferences:  Code Status: Full Code    Consults:   Consults (From admission, onward)        Status Ordering Provider     Inpatient consult to Podiatry  Once        Provider:  Baldemar Esquivel DPM    Completed CYNTHIA REYES     IP consult to case management/social work  Once        Provider:  (Not yet assigned)    Completed CYNTHIA REYES     Inpatient consult to Social Work/Case Management  Once        Provider:  (Not yet assigned)    Completed CYNTHIA REYES     Inpatient consult to Cardiology   Once        Provider:  (Not yet assigned)    Completed NOEL SIERRA new Assessment & Plan notes have been filed under this hospital service since the last note was generated.  Service: Hospital Medicine    Final Active Diagnoses:    Diagnosis Date Noted POA    PRINCIPAL PROBLEM:  Diabetic foot ulcer [E11.621, L97.509] 10/23/2022 Yes    Cerebellar dysmetria [R27.8] 10/31/2022 Yes    A-fib [I48.91] 10/26/2022 Yes    TIA (transient ischemic attack) [G45.9] 10/26/2022 Yes    Bacteremia due to methicillin susceptible Staphylococcus aureus (MSSA) [R78.81, B95.61] 10/23/2022 Yes    Type 2 diabetes mellitus with diabetic polyneuropathy, with long-term current use of insulin [E11.42, Z79.4]  Not Applicable    Diabetic infection of right foot [E11.628, L08.9] 10/04/2022 Yes     Chronic    Hyperlipidemia [E78.5] 12/25/2018 Yes     Chronic    Essential hypertension [I10] 06/30/2017 Yes     Chronic    Morbid obesity with BMI of 50.0-59.9, adult [E66.01, Z68.43] 06/04/2015 Not Applicable     Chronic      Problems Resolved During this Admission:    Diagnosis Date Noted Date Resolved POA    History of amputation of right great toe [Z89.411] 07/12/2017 10/26/2022 Not Applicable     Chronic       Discharged Condition: stable    Disposition: Rehab Facility    Follow Up:    Patient Instructions:   No discharge procedures on file.    Significant Diagnostic Studies: Labs:   BMP:   Recent Labs   Lab 10/30/22  0549 10/31/22  0523   * 94    141   K 4.4 4.0    104   CO2 32* 34*   BUN 10 8   CREATININE 0.7 0.7   CALCIUM 9.0 9.0   MG  --  1.7   , CMP   Recent Labs   Lab 10/30/22  0549 10/31/22  0523    141   K 4.4 4.0    104   CO2 32* 34*   * 94   BUN 10 8   CREATININE 0.7 0.7   CALCIUM 9.0 9.0   ANIONGAP 4* 3*   , CBC   Recent Labs   Lab 10/30/22  0550   WBC 7.20  7.20   HGB 11.9*  11.9*   HCT 38.2*  38.2*   *  513*    and Lipid Panel   Lab Results   Component Value  Date    CHOL 116 (L) 10/27/2022    HDL 22 (L) 10/27/2022    LDLCALC 65.8 10/27/2022    TRIG 141 10/27/2022    CHOLHDL 19.0 (L) 10/27/2022     Microbiology:   Blood Culture   Lab Results   Component Value Date    LABBLOO No growth after 5 days. 10/24/2022     CTA Neck  Narrative: EXAMINATION:  CTA NECK    CLINICAL HISTORY:  Cerebral infarction, unspecified    TECHNIQUE:  Thin section post IV contrast images were obtained of the neck.  Multiplanar MIP images were performed.  Images were viewed at 3D workstation also.  Iterative reconstruction technique was used.  NASCET criteria used for stenosis calculation.    CT/cardiac nuclear exam/s in prior 12 months:  3.    COMPARISON:  Carotid ultrasound 10/26/2022.    FINDINGS:  Fine detail somewhat limited by patient's body habitus, especially in the lower neck and upper chest.    Aortic arch: Moderate artifact.  No visible plaque.  Origins of great vessels patent.    Right Carotid: Mild calcified plaque at the distal CCA, bulb and proximal ICA.  No significant stenosis.    Left Carotid: Mild mixed plaque bulb and proximal ICA.  No significant stenosis.    Vertebrals: Origins in the neck obscured.  Otherwise patent.    Source CT images demonstrate no evidence of neck mass or lymph node enlargement.  Impression: No evidence of right or left ICA stenosis.    Electronically signed by: Jeison Mckinney MD  Date:    10/27/2022  Time:    14:13  CTA Head  Narrative: EXAMINATION:  CTA HEAD    CLINICAL HISTORY:  Stroke, follow up;    TECHNIQUE:  Thin section post IV contrast images were obtained through the cerebral vasculature.  Multiplanar MIP reformations were performed.  Iterative reconstruction technique was used.    CT/cardiac nuclear exam/s in prior 12 months:  3    COMPARISON:  None.    FINDINGS:  Distal internal carotid arteries patent.  Anterior and middle cerebral arteries and their proximal branches patent.  Distal vertebral and basilar arteries patent.  Posterior cerebral  arteries patent.  No evidence of aneurysm.  Impression: No evidence of intracranial large vessel stenosis or aneurysm.    Electronically signed by: Jeison Mckinney MD  Date:    10/27/2022  Time:    14:06    Pending Diagnostic Studies:     Procedure Component Value Units Date/Time    Comprehensive Metabolic Panel [[id number]] Collected: 10/27/22 0520    Order Status: Sent Lab Status: In process Updated: 10/27/22 0557    Specimen: Blood          Medications:  Reconciled Home Medications:      Medication List      ASK your doctor about these medications    amLODIPine 5 MG tablet  Commonly known as: NORVASC  Take 5 mg by mouth.     aspirin 81 MG EC tablet  Commonly known as: ECOTRIN  Take 81 mg by mouth once daily.     atorvastatin 40 MG tablet  Commonly known as: LIPITOR  Take 40 mg by mouth.     ceFAZolin 2 g/50mL Dextrose IVPB 2 gram/50 mL Pgbk  Commonly known as: ANCEF  Inject 50 mLs (2,000 mg total) into the vein every 8 (eight) hours. for 13 days     cholecalciferol (vitamin D3) 25 mcg (1,000 unit) capsule  Commonly known as: VITAMIN D3  1 capsule     DULoxetine 30 MG capsule  Commonly known as: CYMBALTA  Take 30 mg by mouth once daily.     JARDIANCE 25 mg tablet  Generic drug: empagliflozin  Take 25 mg by mouth once daily.     lactobacillus acidophilus & bulgar 100 million cell packet  Commonly known as: LACTINEX  Take 1 packet (1 each total) by mouth 2 (two) times daily.     LANTUS SOLOSTAR U-100 INSULIN glargine 100 units/mL SubQ pen  Generic drug: insulin  SMARTSI Unit(s) SUB-Q Twice Daily     losartan 25 MG tablet  Commonly known as: COZAAR  Take 4 tablets (100 mg total) by mouth once daily.     metFORMIN 1000 MG tablet  Commonly known as: GLUCOPHAGE  Take 1,000 mg by mouth 2 (two) times daily.     metoclopramide HCl 10 MG tablet  Commonly known as: REGLAN  Take 1 tablet (10 mg total) by mouth 3 (three) times daily before meals. for 7 days     mupirocin 2 % ointment  Commonly known as: BACTROBAN  Apply  topically 2 (two) times daily.     psyllium husk (with sugar) 3.4 gram packet  Take 1 packet by mouth 3 (three) times daily.     TRULICITY 4.5 mg/0.5 mL pen injector  Generic drug: dulaglutide  Inject into the skin.          Indwelling Lines/Drains at time of discharge:   Lines/Drains/Airways     None               Time spent on the discharge of patient: 35 minutes    Olena Thornton DO  Department of Hospital Medicine  [facility] Surg

## 2022-10-31 NOTE — NURSING
Notified Dr. Mittal to check if he wanted him on tele since he has a history of afib. No new orders at this time.Will continue to monitor.

## 2022-11-01 PROBLEM — I63.9 CVA (CEREBRAL VASCULAR ACCIDENT): Status: ACTIVE | Noted: 2022-11-01

## 2022-11-01 PROBLEM — R21 RASH: Status: ACTIVE | Noted: 2022-11-01

## 2022-11-01 LAB
POCT GLUCOSE: 160 MG/DL (ref 70–110)
POCT GLUCOSE: 198 MG/DL (ref 70–110)
POCT GLUCOSE: 305 MG/DL (ref 70–110)

## 2022-11-01 PROCEDURE — 97530 THERAPEUTIC ACTIVITIES: CPT

## 2022-11-01 PROCEDURE — 94761 N-INVAS EAR/PLS OXIMETRY MLT: CPT

## 2022-11-01 PROCEDURE — 97116 GAIT TRAINING THERAPY: CPT

## 2022-11-01 PROCEDURE — 11800000 HC REHAB PRIVATE ROOM

## 2022-11-01 PROCEDURE — 99900031 HC PATIENT EDUCATION (STAT)

## 2022-11-01 PROCEDURE — 63600175 PHARM REV CODE 636 W HCPCS: Performed by: STUDENT IN AN ORGANIZED HEALTH CARE EDUCATION/TRAINING PROGRAM

## 2022-11-01 PROCEDURE — 25000003 PHARM REV CODE 250: Performed by: INTERNAL MEDICINE

## 2022-11-01 PROCEDURE — 97110 THERAPEUTIC EXERCISES: CPT

## 2022-11-01 PROCEDURE — 99900035 HC TECH TIME PER 15 MIN (STAT)

## 2022-11-01 PROCEDURE — 25000003 PHARM REV CODE 250: Performed by: STUDENT IN AN ORGANIZED HEALTH CARE EDUCATION/TRAINING PROGRAM

## 2022-11-01 RX ORDER — ACETAMINOPHEN 500 MG
5000 TABLET ORAL DAILY
Status: DISCONTINUED | OUTPATIENT
Start: 2022-11-01 | End: 2022-11-09 | Stop reason: HOSPADM

## 2022-11-01 RX ORDER — LOSARTAN POTASSIUM 50 MG/1
50 TABLET ORAL DAILY
Status: DISCONTINUED | OUTPATIENT
Start: 2022-11-02 | End: 2022-11-09 | Stop reason: HOSPADM

## 2022-11-01 RX ORDER — MEROPENEM 1 G/1
1 INJECTION, POWDER, FOR SOLUTION INTRAVENOUS
Status: DISCONTINUED | OUTPATIENT
Start: 2022-11-03 | End: 2022-11-02

## 2022-11-01 RX ORDER — MICONAZOLE NITRATE 2 %
POWDER (GRAM) TOPICAL 2 TIMES DAILY
Status: DISCONTINUED | OUTPATIENT
Start: 2022-11-01 | End: 2022-11-09 | Stop reason: HOSPADM

## 2022-11-01 RX ADMIN — SENNOSIDES AND DOCUSATE SODIUM 2 TABLET: 50; 8.6 TABLET ORAL at 08:11

## 2022-11-01 RX ADMIN — Medication 6 MG: at 08:11

## 2022-11-01 RX ADMIN — MUPIROCIN: 20 OINTMENT TOPICAL at 08:11

## 2022-11-01 RX ADMIN — FERROUS SULFATE TAB 325 MG (65 MG ELEMENTAL FE) 1 EACH: 325 (65 FE) TAB at 08:11

## 2022-11-01 RX ADMIN — ATORVASTATIN CALCIUM 40 MG: 40 TABLET, FILM COATED ORAL at 08:11

## 2022-11-01 RX ADMIN — METOPROLOL SUCCINATE 50 MG: 50 TABLET, EXTENDED RELEASE ORAL at 08:11

## 2022-11-01 RX ADMIN — LACTOBACILLUS ACIDOPHILUS / LACTOBACILLUS BULGARICUS 1 EACH: 100 MILLION CFU STRENGTH GRANULES at 08:11

## 2022-11-01 RX ADMIN — INSULIN ASPART 3 UNITS: 100 INJECTION, SOLUTION INTRAVENOUS; SUBCUTANEOUS at 03:11

## 2022-11-01 RX ADMIN — CEFAZOLIN SODIUM 2 G: 2 SOLUTION INTRAVENOUS at 02:11

## 2022-11-01 RX ADMIN — METOCLOPRAMIDE 10 MG: 10 TABLET ORAL at 07:11

## 2022-11-01 RX ADMIN — METOCLOPRAMIDE 10 MG: 10 TABLET ORAL at 03:11

## 2022-11-01 RX ADMIN — INSULIN ASPART 1 UNITS: 100 INJECTION, SOLUTION INTRAVENOUS; SUBCUTANEOUS at 08:11

## 2022-11-01 RX ADMIN — CHOLECALCIFEROL TAB 125 MCG (5000 UNIT) 5000 UNITS: 125 TAB at 01:11

## 2022-11-01 RX ADMIN — APIXABAN 5 MG: 2.5 TABLET, FILM COATED ORAL at 08:11

## 2022-11-01 RX ADMIN — METFORMIN HYDROCHLORIDE 1000 MG: 1000 TABLET, FILM COATED ORAL at 08:11

## 2022-11-01 RX ADMIN — CEFAZOLIN SODIUM 2 G: 2 SOLUTION INTRAVENOUS at 07:11

## 2022-11-01 RX ADMIN — METOCLOPRAMIDE 10 MG: 10 TABLET ORAL at 12:11

## 2022-11-01 RX ADMIN — DULOXETINE 30 MG: 30 CAPSULE, DELAYED RELEASE ORAL at 08:11

## 2022-11-01 RX ADMIN — ASPIRIN 81 MG: 81 TABLET, COATED ORAL at 08:11

## 2022-11-01 RX ADMIN — INSULIN DETEMIR 35 UNITS: 100 INJECTION, SOLUTION SUBCUTANEOUS at 08:11

## 2022-11-01 RX ADMIN — Medication 1 PACKET: at 08:11

## 2022-11-01 RX ADMIN — MEROPENEM 1 G: 1 INJECTION, POWDER, FOR SOLUTION INTRAVENOUS at 09:11

## 2022-11-01 RX ADMIN — MICONAZOLE NITRATE 2 % TOPICAL POWDER: at 09:11

## 2022-11-01 RX ADMIN — CEFAZOLIN SODIUM 2 G: 2 SOLUTION INTRAVENOUS at 11:11

## 2022-11-01 RX ADMIN — MICONAZOLE NITRATE 2 % TOPICAL POWDER: at 10:11

## 2022-11-01 NOTE — HPI
Patient is a 57-year-old male with a past medical history of type 2 diabetes hypertension morbid obesity neuropathy and a right lower extremity foot ulcer who presented to our acute care facility with sepsis.  He was found to have MSSA bacteremia and started on IV Ancef.  He was discharged home on IV antibiotics and then return back to the ED with an acute neurological change.  Patient had left-sided hemiparesis had falls and on evaluation and CT scan was found to have an acute right-sided CV A.  Patient was admitted underwent diagnostic testing had an MRI of the brain was also found to have newly diagnosed atrial fibrillation.  The patient was seen by cardiology eventually started on amiodarone and a CTA of the head neck and brain were ordered because he was unable to complete his MRI because of his size and weight.  The patient was seen by Podiatry a Cam boot was ordered to assist with offloading weight-bearing and ultimately the patient was stabilized and we were asked to assist with rehabilitative care.  The patient was transferred on IV Ancef wound care orders and has had a rapid recovery in his left-sided symptoms.  On evaluation this morning the patient has a new issue which is a diffuse bilateral symmetric starting centrally extending peripherally rash.  He has multiple areas of urticarial type lesions therapeutic he has dermatographia some where he has had the telemetry stickers placed as well as other tape that was placed for various parts of his care.  The patient is only complaining of some itchiness from the rash.  The patient's therapist to bathe him yesterday states the rash was not present a call and spoke to his primary care physician and reviewed reviewed all new medications that was started which include amiodarone.

## 2022-11-01 NOTE — PLAN OF CARE
Recommendations  1. Rec'd Cardiac Diabetic diet.   2. Rec'd Sidney BID for 14 days to promote wound healing and provide 192kcal and 5g protein.   3. RD to follow and make rec's accordingly.  Goals:   1. Pt will consume > 75% EEN via PO intake by next RD follow up.  Nutrition Goal Status: new

## 2022-11-01 NOTE — CONSULTS
"  [facility])  Adult Nutrition  Consult Note    SUMMARY     Recommendations  1. Rec'd Cardiac Diabetic diet.   2. Rec'd Sidney BID for 14 days to promote wound healing and provide 192kcal and 5g protein.   3. RD to follow and make rec's accordingly.  Goals:   1. Pt will consume > 75% EEN via PO intake by next RD follow up.  Nutrition Goal Status: new  Communication of RD Recs: discussed on rounds    Assessment and Plan    Nutrition Problem  Obese, Class III    Related to (etiology):   Not ready for diet/lifestyle change and limited adherence to nutrition related recommendations.    Signs and Symptoms (as evidenced by):   Pt stating he hates vegetables and doesn't follow a diet at home    Interventions/Recommendations (treatment strategy):  1. Rec'd Cardiac Diabetic diet.   2. Rec'd Sidney BID for 14 days to promote wound healing and provide 192kcal and 5g protein.   3. RD to follow and make rec's accordingly.    Nutrition Diagnosis Status:   New    Reason for Assessment    Reason For Assessment: consult (T2DM and Obesity)  Diagnosis: stroke/CVA  Relevant Medical History: Acute renal failure with tubular necrosis, Type 2 Diabetes mellitus, Fever, Hyperlipidemia, Hypertension, Hyponatremia, Obese, Peripheral neuropathy  Interdisciplinary Rounds: attended  General Information Comments: RD consulted for new IPR admit, T2DM, and obesity. Pt has a good appetite and is eating 100% of meals. RD to follow and make rec's accordingly.  Nutrition Discharge Planning: TBD as care progresses    Nutrition Risk Screen    Nutrition Risk Screen: large or nonhealing wound, burn or pressure injury, other (see comments) (Obese)    Nutrition/Diet History    Patient Reported Diet/Restrictions/Preferences: general  Spiritual, Cultural Beliefs, [employer] Practices, Values that Affect Care: no  Food Allergies: NKFA    Anthropometrics    Temp: 97.8 °F (36.6 °C)  Height Method: Stated  Height: 6' 1" (185.4 cm)  Height (inches): 73 " in  Weight Method: Bed Scale  Weight: (!) 166.3 kg (366 lb 10 oz)  Weight (lb): (!) 366.63 lb  Ideal Body Weight (IBW), Male: 184 lb  % Ideal Body Weight, Male (lb): 199.26 %  BMI (Calculated): 48.4  BMI Grade: greater than 40 - morbid obesity  Amputation %:  (Right great toe, left 3rd toe)    Lab/Procedures/Meds    Pertinent Labs Reviewed: reviewed  Pertinent Medications Reviewed: reviewed    Estimated/Assessed Needs    Weight Used For Calorie Calculations: 104.1 kg (229 lb 8 oz) (AdjBW)  Energy Calorie Requirements (kcal): 2082-2602 (20-25kcal/kg AdjBW)  Energy Need Method: Kcal/kg  Protein Requirements: 100-125 (1.2-1.5g/kg IBW)  Weight Used For Protein Calculations: 83.5 kg (183 lb 15.9 oz) (IBW)  Fluid Requirements (mL): 2082-2602 (1mL/kcal)  Estimated Fluid Requirement Method: RDA Method  RDA Method (mL): 2082    Nutrition Prescription Ordered    Current Diet Order: Consistent Carbohydrate  Oral Nutrition Supplement: Glucerna (TID)    Evaluation of Received Nutrient/Fluid Intake    % Kcal Needs: 100%  % Protein Needs: 100%  I/O: +620  Energy Calories Required: meeting needs  Protein Required: meeting needs  Tolerance: tolerating  % Intake of Estimated Energy Needs: 75 - 100 %  % Meal Intake: 75 - 100 %    Nutrition Risk    Level of Risk/Frequency of Follow-up: moderate     Monitor and Evaluation    Food and Nutrient Intake: energy intake, food and beverage intake  Food and Nutrient Adminstration: diet order  Knowledge/Beliefs/Attitudes: food and nutrition knowledge/skill, beliefs and attitudes  Physical Activity and Function: nutrition-related ADLs and IADLs  Anthropometric Measurements: height/length, weight, weight change, body mass index  Biochemical Data, Medical Tests and Procedures: electrolyte and renal panel, glucose/endocrine profile, gastrointestinal profile, inflammatory profile, lipid profile  Nutrition-Focused Physical Findings: overall appearance     Nutrition Follow-Up    RD Follow-up?: Yes

## 2022-11-01 NOTE — ASSESSMENT & PLAN NOTE
Strong suspicion for drug eruption.  I have reviewed all new medications with his primary.  Stopping amiodarone today.

## 2022-11-01 NOTE — H&P
[facility]  History & Physical    Patient Name: Andrew Del Cid Jr.  MRN: [id number]  Patient Class: IP- Rehab  Admission Date: 10/31/2022  Attending Physician: Cl Mittal III, MD  Primary Care Provider: Olena Thornton DO         Patient information was obtained from patient and ER records.     Subjective:     Principal Problem:CVA (cerebral vascular accident)    Chief Complaint:   Chief Complaint   Patient presents with    Cerebrovascular Accident        HPI: POST ADMISSION PHYSICIAN ASSESSMENT AND   REHAB HISTORICAL/PHYSICAL        CHIEF COMPLAINT: CVA    PRIMARY REHAB IMPAIRMENT GROUP: [place]/Left Body Involvement, Right Brain Infarct    ETIOLOGIC DIAGNOSIS:  Cerebrovascular accident    SECONDARY AND RELATED DIAGNOSES:  Atrial fibrillation new diagnosis, type 2 diabetes, edema, essential hypertension, falls, hyperlipidemia, left-sided weakness, morbid obesity, neuropathy, pain, weakness, anemia    CO-MORBIDITIES PRESENT ON ADMISSION:  Diabetic infection of the right foot MSSA bacteremia cerebellar dysmetria, rash suspect drug eruption    Risk:  Patient is at high risk for progressive decline in function muscle weakness atrophy joint stiffness and contractures.  Patient is at risk for falls and more serious injury.  Patient is at risk for polyneuropathy gastroparesis DKA coma delayed healing fractures head injury trauma worsening balance muscle atrophy contractures extension of stroke seizures DVT PE skin soft tissue breakdown worsening infection sepsis death and repeat hospitalizations.    Patient is a 57-year-old male with a past medical history of type 2 diabetes hypertension morbid obesity neuropathy and a right lower extremity foot ulcer who presented to our acute care facility with sepsis.  He was found to have MSSA bacteremia and started on IV Ancef.  He was discharged home on IV antibiotics and then return back to the ED with an acute neurological change.   Patient had left-sided hemiparesis had falls and on evaluation and CT scan was found to have an acute right-sided CV A.  Patient was admitted underwent diagnostic testing had an MRI of the brain was also found to have newly diagnosed atrial fibrillation.  The patient was seen by cardiology eventually started on amiodarone and a CTA of the head neck and brain were ordered because he was unable to complete his MRI because of his size and weight.  The patient was seen by Podiatry a Cam boot was ordered to assist with offloading weight-bearing and ultimately the patient was stabilized and we were asked to assist with rehabilitative care.  The patient was transferred on IV Ancef wound care orders and has had a rapid recovery in his left-sided symptoms.  On evaluation this morning the patient has a new issue which is a diffuse bilateral symmetric starting centrally extending peripherally rash.  He has multiple areas of urticarial type lesions therapeutic he has dermatographia some where he has had the telemetry stickers placed as well as other tape that was placed for various parts of his care.  The patient is only complaining of some itchiness from the rash.  The patient's therapist to bathe him yesterday states the rash was not present a call and spoke to his primary care physician and reviewed reviewed all new medications that was started which include amiodarone.        Past Medical History:   Diagnosis Date    Acute renal failure with tubular necrosis 10/22/2022    Diabetes mellitus, type 2     Fever 10/22/2022    Hyperlipidemia     Hypertension     Hyponatremia 10/22/2022    Obese     Peripheral neuropathy        Past Surgical History:   Procedure Laterality Date    APPENDECTOMY      HERNIA REPAIR      INCISION AND DRAINAGE FOOT Bilateral 12/26/2018    Procedure: INCISION AND DRAINAGE, FOOT;  Surgeon: Rob Alas DPM;  Location: [facility];  Service: Podiatry;  Laterality: Bilateral;    TOE  AMPUTATION      right great toe    TOE AMPUTATION Left 12/26/2018    Procedure: AMPUTATION, TOE; left 3rd toe;  Surgeon: Rob Alas DPM;  Location: [facility];  Service: Podiatry;  Laterality: Left;       Review of patient's allergies indicates:  No Known Allergies    Current Facility-Administered Medications on File Prior to Encounter   Medication    [DISCONTINUED] acetaminophen tablet 650 mg    [DISCONTINUED] amiodarone tablet 200 mg    [DISCONTINUED] amLODIPine tablet 10 mg    [DISCONTINUED] apixaban tablet 5 mg    [DISCONTINUED] aspirin chewable tablet 81 mg    [DISCONTINUED] atorvastatin tablet 40 mg    [DISCONTINUED] cefazolin (ANCEF) 2 gram in dextrose 5% 50 mL IVPB (premix)    [DISCONTINUED] ferrous sulfate tablet 1 each    [DISCONTINUED] glucose chewable tablet 16 g    [DISCONTINUED] insulin aspart U-100 pen 0-5 Units    [DISCONTINUED] insulin detemir U-100 pen 35 Units    [DISCONTINUED] labetalol 20 mg/4 mL (5 mg/mL) IV syring    [DISCONTINUED] lactobacillus acidophilus & bulgar 100 million cell packet 1 each    [DISCONTINUED] losartan tablet 100 mg    [DISCONTINUED] magnesium oxide tablet 400 mg    [DISCONTINUED] melatonin tablet 6 mg    [DISCONTINUED] meropenem injection 1 g    [DISCONTINUED] metoprolol succinate (TOPROL-XL) 24 hr tablet 100 mg    [DISCONTINUED] mupirocin 2 % ointment    [DISCONTINUED] ondansetron injection 4 mg    [DISCONTINUED] prochlorperazine injection Soln 5 mg    [DISCONTINUED] senna-docusate 8.6-50 mg per tablet 2 tablet    [DISCONTINUED] sodium chloride 0.9% flush 10 mL     Current Outpatient Medications on File Prior to Encounter   Medication Sig    amLODIPine (NORVASC) 5 MG tablet Take 5 mg by mouth.    aspirin (ECOTRIN) 81 MG EC tablet Take 81 mg by mouth once daily.    atorvastatin (LIPITOR) 40 MG tablet Take 40 mg by mouth.    ceFAZolin 2 g/50mL Dextrose IVPB (ANCEF) 2 gram/50 mL PgBk Inject 50 mLs (2,000 mg total) into the vein every 8  (eight) hours. for 13 days    cholecalciferol, vitamin D3, (VITAMIN D3) 25 mcg (1,000 unit) capsule 1 capsule    DULoxetine (CYMBALTA) 30 MG capsule Take 30 mg by mouth once daily.    JARDIANCE 25 mg tablet Take 25 mg by mouth once daily.    lactobacillus acidophilus & bulgar (LACTINEX) 100 million cell packet Take 1 packet (1 each total) by mouth 2 (two) times daily.    LANTUS SOLOSTAR U-100 INSULIN glargine 100 units/mL SubQ pen SMARTSI Unit(s) SUB-Q Twice Daily    losartan (COZAAR) 25 MG tablet Take 4 tablets (100 mg total) by mouth once daily.    metFORMIN (GLUCOPHAGE) 1000 MG tablet Take 1,000 mg by mouth 2 (two) times daily.    metoclopramide HCl (REGLAN) 10 MG tablet Take 1 tablet (10 mg total) by mouth 3 (three) times daily before meals. for 7 days    mupirocin (BACTROBAN) 2 % ointment Apply topically 2 (two) times daily.    psyllium husk, with sugar, 3.4 gram packet Take 1 packet by mouth 3 (three) times daily.    TRULICITY 4.5 mg/0.5 mL pen injector Inject into the skin.     Family History    None       Tobacco Use    Smoking status: Never    Smokeless tobacco: Never   Substance and Sexual Activity    Alcohol use: No    Drug use: No    Sexual activity: Yes     Partners: Female     Comment:      Review of Systems   Constitutional:  Positive for activity change, appetite change and fatigue. Negative for chills and fever.   HENT:  Negative for ear pain, mouth sores, nosebleeds and sore throat.    Eyes:  Negative for visual disturbance.   Respiratory:  Positive for shortness of breath. Negative for cough and wheezing.    Cardiovascular:  Positive for leg swelling. Negative for chest pain and palpitations.   Gastrointestinal:  Positive for constipation. Negative for abdominal distention, abdominal pain, blood in stool, diarrhea, nausea and vomiting.   Endocrine: Negative for cold intolerance, heat intolerance and polyphagia.   Genitourinary:  Negative for difficulty urinating,  dysuria, flank pain and frequency.   Musculoskeletal:  Positive for arthralgias. Negative for back pain and myalgias.   Skin:  Positive for rash.   Neurological:  Positive for weakness. Negative for dizziness, tremors, seizures, syncope, facial asymmetry, speech difficulty and headaches.   Hematological:  Negative for adenopathy.   Psychiatric/Behavioral:  Negative for agitation, confusion and hallucinations. The patient is not nervous/anxious.    Objective:     Vital Signs (Most Recent):  Temp: 97.8 °F (36.6 °C) (11/01/22 0457)  Pulse: 77 (11/01/22 0457)  Resp: 18 (11/01/22 0457)  BP: 114/84 (11/01/22 0818)  SpO2: 96 % (11/01/22 0457) Vital Signs (24h Range):  Temp:  [97.2 °F (36.2 °C)-98.5 °F (36.9 °C)] 97.8 °F (36.6 °C)  Pulse:  [70-80] 77  Resp:  [18-20] 18  SpO2:  [95 %-98 %] 96 %  BP: (102-132)/(55-84) 114/84     Weight: (!) 166.3 kg (366 lb 9.6 oz)  Body mass index is 48.37 kg/m².    Physical Exam  Constitutional:       General: He is not in acute distress.     Appearance: He is obese. He is ill-appearing.   HENT:      Head: Normocephalic and atraumatic.      Right Ear: External ear normal.      Left Ear: External ear normal.      Nose: Nose normal. No congestion or rhinorrhea.      Mouth/Throat:      Mouth: Mucous membranes are moist.      Pharynx: No oropharyngeal exudate.   Eyes:      General: No scleral icterus.     Extraocular Movements: Extraocular movements intact.   Neck:      Vascular: No carotid bruit.   Cardiovascular:      Rate and Rhythm: Normal rate and regular rhythm.      Heart sounds: Normal heart sounds. No murmur heard.    No friction rub. No gallop.   Pulmonary:      Effort: Pulmonary effort is normal. No respiratory distress.      Breath sounds: Normal breath sounds. No stridor. No wheezing, rhonchi or rales.   Chest:      Chest wall: No tenderness.   Abdominal:      General: Bowel sounds are normal. There is no distension.      Palpations: Abdomen is soft. There is no mass.       Tenderness: There is no abdominal tenderness. There is no right CVA tenderness, left CVA tenderness, guarding or rebound.      Hernia: No hernia is present.   Musculoskeletal:         General: No deformity.      Cervical back: Neck supple. No rigidity.      Right lower leg: Edema present.      Left lower leg: Edema present.      Comments: + foot wound, boot in place   Lymphadenopathy:      Cervical: No cervical adenopathy.   Skin:     Capillary Refill: Capillary refill takes less than 2 seconds.      Coloration: Skin is not jaundiced or pale.      Findings: Rash present.      Comments: + bilateral, diffuse, urticarial rash, + dermatographism   Neurological:      Mental Status: He is alert and oriented to person, place, and time.      Cranial Nerves: No cranial nerve deficit.      Sensory: No sensory deficit.      Motor: Weakness present.      Coordination: Coordination normal.      Gait: Gait abnormal.   Psychiatric:         Mood and Affect: Mood normal.         Behavior: Behavior normal.         Thought Content: Thought content normal.         Judgment: Judgment normal.           Significant Labs: CBC: No results for input(s): WBC, HGB, HCT, PLT in the last 48 hours.  CMP:   Recent Labs   Lab 10/31/22  0523      K 4.0      CO2 34*   GLU 94   BUN 8   CREATININE 0.7   CALCIUM 9.0   ANIONGAP 3*       Significant Imaging: I have reviewed all pertinent imaging results/findings within the past 24 hours.    Assessment/Plan:     * CVA (cerebral vascular accident)  ESTIMATED LENGTH OF STAY:  The patient's estimated length of stay is 10 days and she is expected to be able to be discharged to home with  .    REHABILITATION PLAN/GOALS  The patient is being admitted to a comprehensive acute inpatient rehabilitation program consisting of at least 3 hours of combined physical ( 1.5hrs./day, 5 days a week), and occupational therapy (1.5 hrs. A day, 5 days a week),speech therapy services if necessary, 24-hour skilled  rehabilitation nursing care, and close supervision of a physician with special training and experience in rehabilitation medicine. Patient's prognosis for significant practical improvement within a reasonable period of time appears fair . Given the patient's complex medical condition and risk of further medical complications, rehabilitation services could not be safely provided at a lower level of care such as a skilled nursing facility.                    1. Physical Therapy to Evaluate and Treat, Work on bed mobility, Assist with transfers, Strength, Gait, Fall Prevention, Balance and Modalities as Needed   2. Occupational Therapy to ADL Retraining, Functional Transfer Training, Therapeutic Activity and Exercises, Neuromuscular Re-education, Manual Therapy, Use of Adaptive Equipment and Retraining, Safety Awareness and Fall Prevention and Home Modification   3. Speech Pathology to Evaluate and assist with dysphagia, Make recommendations for a safe diet, Evaluate and Treat Cognitive linguistic deficits, Evaluate and Treat expressive and receptive language deficits and Evaluate and treat motor speech deficits   4. Specialized 24-hour Rehabilitation Nursing Care to Protection of Skin and Soft Tissue, Assisting with continence, Bowel and bladder training, Neurological checks, Medicine administration and medicine and management, Wound care and Fall protection and general encouragement   5. Dietary to educate on wgt loss and health diet.   6. Social Work/Case management to assist with discharge planning activities, acquisition of durable medical equipment, and ordering of follow up services as necessary.    The services provided in the acute medical rehab setting are under my supervision 24 hours a day. These services are necessary for this patient to achieve the most appropriate functional outcome and to determine the most appropriate discharge disposition.  I have reviewed the treatment plan with the patient and  answered all of her questions, discussed goals and expectation.    REVIEW OF PRE-SCREEN ASSESSMENT  I have reviewed the patient's preadmission screen including her medical and functional status and compared it with her status upon arrival to the rehab unit and see ++ rash has developed . Acute rehab services are still necessary and appropriate for this patient to achieve the best functional outcome while determining the most appropriate discharge disposition and services. The patient will require close medical management of multiple medical co-morbidities including as noted above .      Rash  Strong suspicion for drug eruption.  I have reviewed all new medications with his primary.  Stopping amiodarone today.      Cerebellar dysmetria        A-fib  Stopping amiodarone today because of rash and possible drug eruption patient is in a normal sinus rhythm with controlled rate      Diabetic foot ulcer  Patient's FSGs are controlled on current medication regimen.  Last A1c reviewed-   Lab Results   Component Value Date    HGBA1C 9.2 (H) 10/21/2022     Most recent fingerstick glucose reviewed-   Recent Labs   Lab 10/31/22  1043 10/31/22  1525 10/31/22  1907   POCTGLUCOSE 274* 247* 305*     Current correctional scale  High  Maintain anti-hyperglycemic dose as follows-   Antihyperglycemics (From admission, onward)    Start     Stop Route Frequency Ordered    11/01/22 0900  dulaglutide pen injector 4.5 mg         -- SubQ Every 7 days 10/31/22 1755    10/31/22 2100  insulin detemir U-100 pen 35 Units         -- SubQ 2 times daily 10/31/22 1133    10/31/22 2100  metFORMIN tablet 1,000 mg         -- Oral 2 times daily 10/31/22 1755    10/31/22 1132  insulin aspart U-100 pen 0-5 Units         -- SubQ Before meals & nightly PRN 10/31/22 1133        Hold Oral hypoglycemics while patient is in the hospital.    Bacteremia due to methicillin susceptible Staphylococcus aureus (MSSA)  Continue current antibiotics but I am concerned  about his drug eruption and this may need to be changed.    Hyperlipidemia        Essential hypertension    Reducing med slightly today.    Morbid obesity with BMI of 50.0-59.9, adult  Body mass index is 48.37 kg/m². Morbid obesity complicates all aspects of disease management from diagnostic modalities to treatment. Weight loss encouraged and health benefits explained to patient.           VTE Risk Mitigation (From admission, onward)         Ordered     apixaban tablet 5 mg  2 times daily         10/31/22 1133     IP VTE HIGH RISK PATIENT  Once         10/31/22 1133     Place sequential compression device  Until discontinued         10/31/22 1133                   lC Mittal III, MD  Department of Hospital Medicine   [facility])

## 2022-11-01 NOTE — NURSING
I spoke to the patient about trulicity pen. He stated that he did not have it but he was going to call his wife to tell her to bring it.l

## 2022-11-01 NOTE — PT/OT/SLP PROGRESS
Physical Therapy         Treatment        Andrew Del Cid Jr.   MRN: [id number]                           Billable Minutes:  Gait [id number], Therapeutic Activity 30, and Therapeutic Exercise 30  Total Minutes: 90 minutes 60 minutes individual and 30 minutes concurrent therapy                     General Precautions: Standard, fall  Orthopedic Precautions: Orthopedic Precautions :  (CAM boot (R) LE for foot wound, (L) knee brace due to buckling of (L) knee occasionally)   Braces:  R CAM boot    Spiritual, Cultural Beliefs, Alevism Practices, Values that Affect Care: no    Subjective:  Communicated with nurse prior to session.     Pain: 3/10 back; chronic/pinched nerve    Objective:  Patient found sitting up in dining area; willing to participate      Functional Mobility:  Bed Mobility:   Supine to sit: Modified Independent   Sit to supine: Modified Independent   Rolling: Modified Independent   Scooting: Modified Independent    Balance:   Static Sit: GOOD+: Takes MAXIMAL challenges from all directions.    Dynamic Sit:  GOOD-: Incosistently Maintains balance through MODERATE excursions of active trunk movement,     Static Stand: FAIR+: Takes MINIMAL challenges from all directions  Dynamic stand: FAIR: Needs CONTACT GUARD during gait    Transfer Training:  Sit to stand:Contact Guard Assistance and Minimal Assistance with Rolling Walker    Bed <> Chair:  Stand Pivot and Step Transfer with Contact Guard Assistance and Minimal Assistance with Rolling Walker      Wheelchair Training:  Independent using primarily his feet to walk the '    Gait Training:  Performed gait training x 4 trials of 35'-50' using RW, R Cam boot with min assist for safety and balance, step to pattern with WBOS, rapid fatigue with heavy reliance on RW for safety    Stair Training:  Pt able to ascend/descend 4 steps using L handrail going up; step to pattern min assist      Additional Treatment:  Performed B LE there ex using 5# cuff wts for  "LAQ's 4 x 15 reps R LE and 3 x 15 reps L LE; standing step taps to 6" step using B UE support 10 reps x 2 with seated rest breaks due to fatigue. Sit to/from stand x 10 reps contact guard to min assist with vc's on proper hand placement, 3 instance LOB during activities 1 ant and 2 posterior due to limited ankle strategies R ankle due to CAM boot    Activity Tolerance:  Patient tolerated treatment well and Patient limited by fatigue    Patient left  up in gym with OT .    Assessment:  Andrew Del Cid Jr. is a 57 y.o. male with a medical diagnosis of CVA (cerebral vascular accident). He presents with improved activity munira and mobility though improved L LE strength; decreased balance due to limited ankle strategies R LE due to CAM boot.    Rehab potential is good.    Activity tolerance: Good    Discharge recommendations:       Equipment recommendations:       GOALS:   Multidisciplinary Problems       Physical Therapy Goals          Problem: Physical Therapy    Goal Priority Disciplines Outcome Goal Variances Interventions   Physical Therapy Goal     PT, PT/OT      Description: Goals to be met by: 2022     Patient will increase functional independence with mobility by performin. Sit to stand transfer with Rush  2. Bed to chair transfer with Rush using No Assistive Device  3. Gait  x 200+ feet with Modified Rush using Rolling Walker.   4. Wheelchair propulsion x150+ feet with Modified Rush using bilateral lower extremities  5. Ascend/descend 12 stair with bilateral Handrails Modified Rush using No Assistive Device.   6. Ascend/Descend standard curb step with Modified Rush using Rolling Walker.                       PLAN:    Patient to be seen 5 x/week  to address the above listed problems via gait training, therapeutic activities, therapeutic exercises, therapeutic groups, neuromuscular re-education, wheelchair management/training  Plan of Care expires: " 11/14/22  Plan of Care reviewed with: patient         11/1/2022

## 2022-11-01 NOTE — ASSESSMENT & PLAN NOTE
ESTIMATED LENGTH OF STAY:  The patient's estimated length of stay is 10 days and she is expected to be able to be discharged to home with  .    REHABILITATION PLAN/GOALS  The patient is being admitted to a comprehensive acute inpatient rehabilitation program consisting of at least 3 hours of combined physical ( 1.5hrs./day, 5 days a week), and occupational therapy (1.5 hrs. A day, 5 days a week),speech therapy services if necessary, 24-hour skilled rehabilitation nursing care, and close supervision of a physician with special training and experience in rehabilitation medicine. Patient's prognosis for significant practical improvement within a reasonable period of time appears fair . Given the patient's complex medical condition and risk of further medical complications, rehabilitation services could not be safely provided at a lower level of care such as a skilled nursing facility.                    1. Physical Therapy to Evaluate and Treat, Work on bed mobility, Assist with transfers, Strength, Gait, Fall Prevention, Balance and Modalities as Needed   2. Occupational Therapy to ADL Retraining, Functional Transfer Training, Therapeutic Activity and Exercises, Neuromuscular Re-education, Manual Therapy, Use of Adaptive Equipment and Retraining, Safety Awareness and Fall Prevention and Home Modification   3. Speech Pathology to Evaluate and assist with dysphagia, Make recommendations for a safe diet, Evaluate and Treat Cognitive linguistic deficits, Evaluate and Treat expressive and receptive language deficits and Evaluate and treat motor speech deficits   4. Specialized 24-hour Rehabilitation Nursing Care to Protection of Skin and Soft Tissue, Assisting with continence, Bowel and bladder training, Neurological checks, Medicine administration and medicine and management, Wound care and Fall protection and general encouragement   5. Dietary to educate on wgt loss and health diet.   6. Social Work/Case management to  assist with discharge planning activities, acquisition of durable medical equipment, and ordering of follow up services as necessary.    The services provided in the acute medical rehab setting are under my supervision 24 hours a day. These services are necessary for this patient to achieve the most appropriate functional outcome and to determine the most appropriate discharge disposition.  I have reviewed the treatment plan with the patient and answered all of her questions, discussed goals and expectation.    REVIEW OF PRE-SCREEN ASSESSMENT  I have reviewed the patient's preadmission screen including her medical and functional status and compared it with her status upon arrival to the rehab unit and see ++ rash has developed . Acute rehab services are still necessary and appropriate for this patient to achieve the best functional outcome while determining the most appropriate discharge disposition and services. The patient will require close medical management of multiple medical co-morbidities including as noted above .

## 2022-11-01 NOTE — PT/OT/SLP PROGRESS
Occupational Therapy  Treatment    Andrew Del Cid Jr.   MRN: [id number]   Admitting Diagnosis: CVA (cerebral vascular accident)    OT Date of Treatment: 11/01/22   AM Start Time: 1110  AM End Time: 1140  PM Start Time: 1200  PM End Time: 1300  OT Total Time (min): 90 min    Treatment Type: Individual 90     Billable Minutes:  Therapeutic Activity 75 and Therapeutic Exercise 15  Total Minutes: 90     OT/ROBERT: OT          General Precautions: Standard, fall  Orthopedic Precautions: N/A  Braces:  (CAM boot to (R) LE)    Spiritual, Cultural Beliefs, Buddhist Practices, Values that Affect Care: no    Subjective:  Communicated with nurse prior to session.    Pain/Comfort  Pain Rating 1: 5/10  Location - Side 1: Right  Location - Orientation 1: lower  Location 1: back  Pain Addressed 1: Reposition, Cessation of Activity, Nurse notified, Other (see comments) (BioFreeze application)  Pain Rating Post-Intervention 1: 0/10    Objective:  Patient found with: Other (comments) (CAM boot to (R) LE). Pt was cooperative and motivated with minimal verbal encouragement while exhibiting positive affect. He participated in therapeutic activity addressing trunk mobility, trunk control, dynamic sitting balance, trunk strength, and stretching of bilateral shoulders to end range with flexion and abduction utilizing large stability ball challenging him to perform trunk flexion, extension, and lateral flexion with bilateral hands stabilized on ball requiring verbal and tactile cueing to facilitate optimal movement patterns and increased range per trial in order to improve active ROM impacting performance with functional tasks below waist. Pt then participated in therapeutic exercise performing 3x8 seated pushups requiring verbal and tactile cueing for technique challenging him to lift buttocks off seated surface to end range elbow extension in order to strengthen triceps brachii to improve performance with sit <> stand transitions  particularly from lower surfaces.     During second treatment session, he participated in therapeutic activity addressing fine motor coordination, pinch /  strength, and functional use of bilateral hands challenging him to pinch / grasp various items including coins, buttons, pegs, and foam pieces with and without use of resistive medium including resistive clothespins and calibrated hand gripper utilizing tip-to-tip, tripod, lateral prehension, or power grasp to retrieve, stabilize, lift, manipulate, and place items at specified locations requiring verbal and tactile cueing to facilitate proper pinch / grasp and more coordinated movements. Also, he participated in functional transfer retraining throughout session to / from wheelchair, chair, toilet, and bed emphasizing fall prevention providing extra time with repetition requiring mod to min assist secondary to steadying assist with intermittent lifting assist particularly from lower surfaces, and mod verbal and tactile cueing for safety awareness and technique with use of AD.    Functional Mobility:  Transfer Training:   Sit to stand:Moderate Assistance with Rolling Walker .  Bed <> Chair:  Step Transfer with Moderate Assistance with Rolling Walker .  Toilet Transfer:  Pt Step Transfer with Moderate Assistance with Rolling Walker and Grab bars .    Balance:   Static Sit: GOOD: Takes MODERATE challenges from all directions  Dynamic Sit:  GOOD-: Incosistently Maintains balance through MODERATE excursions of active trunk movement,     Static Stand: FAIR: Maintains without assist but unable to take challenges  Dynamic stand: POOR+: Needs MIN (minimal ) assist during gait      Patient left sitting edge of bed with call button in reach and nurse notified    ASSESSMENT:  Pt demonstrated increased difficulty with transfers in PM, particularly from lower surfaces in which he required lifting assist in addition to increased steadying assist for safety to prevent LOB. As  a result, he required mostly mod assist for transfers when compared to min assist during previous evaluation.     Rehab potential is good    Activity tolerance: Fair    Discharge recommendations: other (see comments) (To be further determined based on progress prior to discharge.)     Equipment recommendations: bedside commode, other (see comments) (To be further determined based on progress prior to discharge.)     GOALS:   Multidisciplinary Problems       Occupational Therapy Goals          Problem: Occupational Therapy    Goal Priority Disciplines Outcome Interventions   Occupational Therapy Goal     OT, PT/OT     Description: Long Term Goals to be met by: 11/14/22     Patient will increase functional independence with ADLs by performing:    Feeding with [place].  UE Dressing with Modified [place].  LE Dressing with Modified [place].  Grooming while seated at sink with Modified [place].  Toileting from toilet with Modified [place] for hygiene and clothing management.   Bathing from  shower chair/bench with Modified [place].  Step transfer with Modified [place]  Toilet transfer to toilet with Modified [place].  Increased functional strength to 4+/5 for (L) UE.  Increased fine motor coordination as measured by decrease in 9-Hole Peg Test to less than 65 sec.                         Plan:  Patient to be seen 5 x/week (for 90 min per day, for 14 days) to address the above listed problems via self-care/home management, community/work re-entry, therapeutic activities, therapeutic exercises, neuromuscular re-education, wheelchair management/training  Plan of Care expires: 11/14/22  Plan of Care reviewed with: patient         11/01/2022

## 2022-11-01 NOTE — ASSESSMENT & PLAN NOTE
Body mass index is 48.37 kg/m². Morbid obesity complicates all aspects of disease management from diagnostic modalities to treatment. Weight loss encouraged and health benefits explained to patient.

## 2022-11-01 NOTE — PLAN OF CARE
Problem: Adult Behavioral Health Plan of Care  Goal: Plan of Care Review  Outcome: Ongoing, Progressing  Goal: Patient-Specific Goal (Individualization)  Outcome: Ongoing, Progressing  Goal: Adheres to Safety Considerations for Self and Others  Outcome: Ongoing, Progressing  Goal: Absence of New-Onset Illness or Injury  Outcome: Ongoing, Progressing  Goal: Optimized Coping Skills in Response to Life Stressors  Outcome: Ongoing, Progressing  Goal: Develops/Participates in Therapeutic [place] to Support Successful Transition  Outcome: Ongoing, Progressing  Goal: Rounds/Family Conference  Outcome: Ongoing, Progressing

## 2022-11-01 NOTE — SUBJECTIVE & OBJECTIVE
Past Medical History:   Diagnosis Date    Acute renal failure with tubular necrosis 10/22/2022    Diabetes mellitus, type 2     Fever 10/22/2022    Hyperlipidemia     Hypertension     Hyponatremia 10/22/2022    Obese     Peripheral neuropathy        Past Surgical History:   Procedure Laterality Date    APPENDECTOMY      HERNIA REPAIR      INCISION AND DRAINAGE FOOT Bilateral 12/26/2018    Procedure: INCISION AND DRAINAGE, FOOT;  Surgeon: Rob Alas DPM;  Location: [facility];  Service: Podiatry;  Laterality: Bilateral;    TOE AMPUTATION      right great toe    TOE AMPUTATION Left 12/26/2018    Procedure: AMPUTATION, TOE; left 3rd toe;  Surgeon: Rob Alas DPM;  Location: [facility];  Service: Podiatry;  Laterality: Left;       Review of patient's allergies indicates:  No Known Allergies    Current Facility-Administered Medications on File Prior to Encounter   Medication    [DISCONTINUED] acetaminophen tablet 650 mg    [DISCONTINUED] amiodarone tablet 200 mg    [DISCONTINUED] amLODIPine tablet 10 mg    [DISCONTINUED] apixaban tablet 5 mg    [DISCONTINUED] aspirin chewable tablet 81 mg    [DISCONTINUED] atorvastatin tablet 40 mg    [DISCONTINUED] cefazolin (ANCEF) 2 gram in dextrose 5% 50 mL IVPB (premix)    [DISCONTINUED] ferrous sulfate tablet 1 each    [DISCONTINUED] glucose chewable tablet 16 g    [DISCONTINUED] insulin aspart U-100 pen 0-5 Units    [DISCONTINUED] insulin detemir U-100 pen 35 Units    [DISCONTINUED] labetalol 20 mg/4 mL (5 mg/mL) IV syring    [DISCONTINUED] lactobacillus acidophilus & bulgar 100 million cell packet 1 each    [DISCONTINUED] losartan tablet 100 mg    [DISCONTINUED] magnesium oxide tablet 400 mg    [DISCONTINUED] melatonin tablet 6 mg    [DISCONTINUED] meropenem injection 1 g    [DISCONTINUED] metoprolol succinate (TOPROL-XL) 24 hr tablet 100 mg    [DISCONTINUED] mupirocin 2 % ointment    [DISCONTINUED] ondansetron injection 4 mg    [DISCONTINUED] prochlorperazine  injection Soln 5 mg    [DISCONTINUED] senna-docusate 8.6-50 mg per tablet 2 tablet    [DISCONTINUED] sodium chloride 0.9% flush 10 mL     Current Outpatient Medications on File Prior to Encounter   Medication Sig    amLODIPine (NORVASC) 5 MG tablet Take 5 mg by mouth.    aspirin (ECOTRIN) 81 MG EC tablet Take 81 mg by mouth once daily.    atorvastatin (LIPITOR) 40 MG tablet Take 40 mg by mouth.    ceFAZolin 2 g/50mL Dextrose IVPB (ANCEF) 2 gram/50 mL PgBk Inject 50 mLs (2,000 mg total) into the vein every 8 (eight) hours. for 13 days    cholecalciferol, vitamin D3, (VITAMIN D3) 25 mcg (1,000 unit) capsule 1 capsule    DULoxetine (CYMBALTA) 30 MG capsule Take 30 mg by mouth once daily.    JARDIANCE 25 mg tablet Take 25 mg by mouth once daily.    lactobacillus acidophilus & bulgar (LACTINEX) 100 million cell packet Take 1 packet (1 each total) by mouth 2 (two) times daily.    LANTUS SOLOSTAR U-100 INSULIN glargine 100 units/mL SubQ pen SMARTSI Unit(s) SUB-Q Twice Daily    losartan (COZAAR) 25 MG tablet Take 4 tablets (100 mg total) by mouth once daily.    metFORMIN (GLUCOPHAGE) 1000 MG tablet Take 1,000 mg by mouth 2 (two) times daily.    metoclopramide HCl (REGLAN) 10 MG tablet Take 1 tablet (10 mg total) by mouth 3 (three) times daily before meals. for 7 days    mupirocin (BACTROBAN) 2 % ointment Apply topically 2 (two) times daily.    psyllium husk, with sugar, 3.4 gram packet Take 1 packet by mouth 3 (three) times daily.    TRULICITY 4.5 mg/0.5 mL pen injector Inject into the skin.     Family History    None       Tobacco Use    Smoking status: Never    Smokeless tobacco: Never   Substance and Sexual Activity    Alcohol use: No    Drug use: No    Sexual activity: Yes     Partners: Female     Comment:      Review of Systems   Constitutional:  Positive for activity change, appetite change and fatigue. Negative for chills and fever.   HENT:  Negative for ear pain, mouth sores, nosebleeds and sore throat.     Eyes:  Negative for visual disturbance.   Respiratory:  Positive for shortness of breath. Negative for cough and wheezing.    Cardiovascular:  Positive for leg swelling. Negative for chest pain and palpitations.   Gastrointestinal:  Positive for constipation. Negative for abdominal distention, abdominal pain, blood in stool, diarrhea, nausea and vomiting.   Endocrine: Negative for cold intolerance, heat intolerance and polyphagia.   Genitourinary:  Negative for difficulty urinating, dysuria, flank pain and frequency.   Musculoskeletal:  Positive for arthralgias. Negative for back pain and myalgias.   Skin:  Positive for rash.   Neurological:  Positive for weakness. Negative for dizziness, tremors, seizures, syncope, facial asymmetry, speech difficulty and headaches.   Hematological:  Negative for adenopathy.   Psychiatric/Behavioral:  Negative for agitation, confusion and hallucinations. The patient is not nervous/anxious.    Objective:     Vital Signs (Most Recent):  Temp: 97.8 °F (36.6 °C) (11/01/22 0457)  Pulse: 77 (11/01/22 0457)  Resp: 18 (11/01/22 0457)  BP: 114/84 (11/01/22 0818)  SpO2: 96 % (11/01/22 0457) Vital Signs (24h Range):  Temp:  [97.2 °F (36.2 °C)-98.5 °F (36.9 °C)] 97.8 °F (36.6 °C)  Pulse:  [70-80] 77  Resp:  [18-20] 18  SpO2:  [95 %-98 %] 96 %  BP: (102-132)/(55-84) 114/84     Weight: (!) 166.3 kg (366 lb 9.6 oz)  Body mass index is 48.37 kg/m².    Physical Exam  Constitutional:       General: He is not in acute distress.     Appearance: He is obese. He is ill-appearing.   HENT:      Head: Normocephalic and atraumatic.      Right Ear: External ear normal.      Left Ear: External ear normal.      Nose: Nose normal. No congestion or rhinorrhea.      Mouth/Throat:      Mouth: Mucous membranes are moist.      Pharynx: No oropharyngeal exudate.   Eyes:      General: No scleral icterus.     Extraocular Movements: Extraocular movements intact.   Neck:      Vascular: No carotid bruit.    Cardiovascular:      Rate and Rhythm: Normal rate and regular rhythm.      Heart sounds: Normal heart sounds. No murmur heard.    No friction rub. No gallop.   Pulmonary:      Effort: Pulmonary effort is normal. No respiratory distress.      Breath sounds: Normal breath sounds. No stridor. No wheezing, rhonchi or rales.   Chest:      Chest wall: No tenderness.   Abdominal:      General: Bowel sounds are normal. There is no distension.      Palpations: Abdomen is soft. There is no mass.      Tenderness: There is no abdominal tenderness. There is no right CVA tenderness, left CVA tenderness, guarding or rebound.      Hernia: No hernia is present.   Musculoskeletal:         General: No deformity.      Cervical back: Neck supple. No rigidity.      Right lower leg: Edema present.      Left lower leg: Edema present.      Comments: + foot wound, boot in place   Lymphadenopathy:      Cervical: No cervical adenopathy.   Skin:     Capillary Refill: Capillary refill takes less than 2 seconds.      Coloration: Skin is not jaundiced or pale.      Findings: Rash present.      Comments: + bilateral, diffuse, urticarial rash, + dermatographism   Neurological:      Mental Status: He is alert and oriented to person, place, and time.      Cranial Nerves: No cranial nerve deficit.      Sensory: No sensory deficit.      Motor: Weakness present.      Coordination: Coordination normal.      Gait: Gait abnormal.   Psychiatric:         Mood and Affect: Mood normal.         Behavior: Behavior normal.         Thought Content: Thought content normal.         Judgment: Judgment normal.           Significant Labs: CBC: No results for input(s): WBC, HGB, HCT, PLT in the last 48 hours.  CMP:   Recent Labs   Lab 10/31/22  0523      K 4.0      CO2 34*   GLU 94   BUN 8   CREATININE 0.7   CALCIUM 9.0   ANIONGAP 3*       Significant Imaging: I have reviewed all pertinent imaging results/findings within the past 24 hours.

## 2022-11-01 NOTE — PLAN OF CARE
Plan of care reviewed with patient and wife. Educated on fall precautions, glucose management, signs and symptoms of cva, cva prevention, wound education, call bell use, and calling for needs.Verbalized understanding. Will cont to monitor

## 2022-11-01 NOTE — ASSESSMENT & PLAN NOTE
Continue current antibiotics but I am concerned about his drug eruption and this may need to be changed.

## 2022-11-01 NOTE — ASSESSMENT & PLAN NOTE
Patient's FSGs are controlled on current medication regimen.  Last A1c reviewed-   Lab Results   Component Value Date    HGBA1C 9.2 (H) 10/21/2022     Most recent fingerstick glucose reviewed-   Recent Labs   Lab 10/31/22  1043 10/31/22  1525 10/31/22  1907   POCTGLUCOSE 274* 247* 305*     Current correctional scale  High  Maintain anti-hyperglycemic dose as follows-   Antihyperglycemics (From admission, onward)    Start     Stop Route Frequency Ordered    11/01/22 0900  dulaglutide pen injector 4.5 mg         -- SubQ Every 7 days 10/31/22 1755    10/31/22 2100  insulin detemir U-100 pen 35 Units         -- SubQ 2 times daily 10/31/22 1133    10/31/22 2100  metFORMIN tablet 1,000 mg         -- Oral 2 times daily 10/31/22 1755    10/31/22 1132  insulin aspart U-100 pen 0-5 Units         -- SubQ Before meals & nightly PRN 10/31/22 1133        Hold Oral hypoglycemics while patient is in the hospital.

## 2022-11-01 NOTE — ASSESSMENT & PLAN NOTE
Stopping amiodarone today because of rash and possible drug eruption patient is in a normal sinus rhythm with controlled rate

## 2022-11-01 NOTE — NURSING
When I assessed pt this morning patient had a rash upper and lower abdomen, bilateral arms and legs. The rash was red and raised. He also had a small area on his back. Dr. Mittal was notified. He adjusted and d/c some of his medications that he had recently started. I also told Dr. Mittal that he had yeast in his groin area and if he could give him something for it. See Mar

## 2022-11-02 LAB
ALBUMIN SERPL BCP-MCNC: 2.4 G/DL (ref 3.5–5.2)
ALP SERPL-CCNC: 100 U/L (ref 55–135)
ALT SERPL W/O P-5'-P-CCNC: 19 U/L (ref 10–44)
ANION GAP SERPL CALC-SCNC: 4 MMOL/L (ref 8–16)
AST SERPL-CCNC: 12 U/L (ref 10–40)
BASOPHILS # BLD AUTO: 0.04 K/UL (ref 0–0.2)
BASOPHILS NFR BLD: 0.5 % (ref 0–1.9)
BILIRUB SERPL-MCNC: 0.6 MG/DL (ref 0.1–1)
BUN SERPL-MCNC: 18 MG/DL (ref 6–20)
CALCIUM SERPL-MCNC: 8.6 MG/DL (ref 8.7–10.5)
CHLORIDE SERPL-SCNC: 105 MMOL/L (ref 95–110)
CO2 SERPL-SCNC: 30 MMOL/L (ref 23–29)
CREAT SERPL-MCNC: 0.8 MG/DL (ref 0.5–1.4)
DIFFERENTIAL METHOD: ABNORMAL
EOSINOPHIL # BLD AUTO: 0.1 K/UL (ref 0–0.5)
EOSINOPHIL NFR BLD: 1.6 % (ref 0–8)
ERYTHROCYTE [DISTWIDTH] IN BLOOD BY AUTOMATED COUNT: 12.8 % (ref 11.5–14.5)
EST. GFR  (NO RACE VARIABLE): >60 ML/MIN/1.73 M^2
GLUCOSE SERPL-MCNC: 149 MG/DL (ref 70–110)
HCT VFR BLD AUTO: 37.9 % (ref 40–54)
HGB BLD-MCNC: 11.9 G/DL (ref 14–18)
IMM GRANULOCYTES # BLD AUTO: 0.05 K/UL (ref 0–0.04)
IMM GRANULOCYTES NFR BLD AUTO: 0.7 % (ref 0–0.5)
LYMPHOCYTES # BLD AUTO: 2 K/UL (ref 1–4.8)
LYMPHOCYTES NFR BLD: 27.7 % (ref 18–48)
MAGNESIUM SERPL-MCNC: 1.8 MG/DL (ref 1.6–2.6)
MCH RBC QN AUTO: 27.6 PG (ref 27–31)
MCHC RBC AUTO-ENTMCNC: 31.4 G/DL (ref 32–36)
MCV RBC AUTO: 88 FL (ref 82–98)
MONOCYTES # BLD AUTO: 0.5 K/UL (ref 0.3–1)
MONOCYTES NFR BLD: 6.7 % (ref 4–15)
NEUTROPHILS # BLD AUTO: 4.6 K/UL (ref 1.8–7.7)
NEUTROPHILS NFR BLD: 62.8 % (ref 38–73)
NRBC BLD-RTO: 0 /100 WBC
PLATELET # BLD AUTO: 454 K/UL (ref 150–450)
PMV BLD AUTO: 8.7 FL (ref 9.2–12.9)
POCT GLUCOSE: 140 MG/DL (ref 70–110)
POCT GLUCOSE: 155 MG/DL (ref 70–110)
POCT GLUCOSE: 182 MG/DL (ref 70–110)
POCT GLUCOSE: 253 MG/DL (ref 70–110)
POCT GLUCOSE: 275 MG/DL (ref 70–110)
POTASSIUM SERPL-SCNC: 4.2 MMOL/L (ref 3.5–5.1)
PROT SERPL-MCNC: 6.6 G/DL (ref 6–8.4)
RBC # BLD AUTO: 4.31 M/UL (ref 4.6–6.2)
SODIUM SERPL-SCNC: 139 MMOL/L (ref 136–145)
WBC # BLD AUTO: 7.29 K/UL (ref 3.9–12.7)

## 2022-11-02 PROCEDURE — 80053 COMPREHEN METABOLIC PANEL: CPT | Performed by: INTERNAL MEDICINE

## 2022-11-02 PROCEDURE — 63600175 PHARM REV CODE 636 W HCPCS: Performed by: INTERNAL MEDICINE

## 2022-11-02 PROCEDURE — 85025 COMPLETE CBC W/AUTO DIFF WBC: CPT | Performed by: INTERNAL MEDICINE

## 2022-11-02 PROCEDURE — 99900035 HC TECH TIME PER 15 MIN (STAT)

## 2022-11-02 PROCEDURE — 99900031 HC PATIENT EDUCATION (STAT)

## 2022-11-02 PROCEDURE — 63600175 PHARM REV CODE 636 W HCPCS: Performed by: STUDENT IN AN ORGANIZED HEALTH CARE EDUCATION/TRAINING PROGRAM

## 2022-11-02 PROCEDURE — 97530 THERAPEUTIC ACTIVITIES: CPT

## 2022-11-02 PROCEDURE — 83735 ASSAY OF MAGNESIUM: CPT | Performed by: INTERNAL MEDICINE

## 2022-11-02 PROCEDURE — 25000003 PHARM REV CODE 250: Performed by: INTERNAL MEDICINE

## 2022-11-02 PROCEDURE — 11800000 HC REHAB PRIVATE ROOM

## 2022-11-02 PROCEDURE — 94761 N-INVAS EAR/PLS OXIMETRY MLT: CPT

## 2022-11-02 PROCEDURE — 25000003 PHARM REV CODE 250: Performed by: STUDENT IN AN ORGANIZED HEALTH CARE EDUCATION/TRAINING PROGRAM

## 2022-11-02 PROCEDURE — 97535 SELF CARE MNGMENT TRAINING: CPT

## 2022-11-02 PROCEDURE — 97110 THERAPEUTIC EXERCISES: CPT

## 2022-11-02 PROCEDURE — 36415 COLL VENOUS BLD VENIPUNCTURE: CPT | Performed by: INTERNAL MEDICINE

## 2022-11-02 RX ORDER — MEROPENEM 1 G/1
1 INJECTION, POWDER, FOR SOLUTION INTRAVENOUS
Status: DISCONTINUED | OUTPATIENT
Start: 2022-11-02 | End: 2022-11-09 | Stop reason: HOSPADM

## 2022-11-02 RX ADMIN — METOPROLOL SUCCINATE 50 MG: 50 TABLET, EXTENDED RELEASE ORAL at 08:11

## 2022-11-02 RX ADMIN — INSULIN DETEMIR 35 UNITS: 100 INJECTION, SOLUTION SUBCUTANEOUS at 08:11

## 2022-11-02 RX ADMIN — CEFAZOLIN SODIUM 2 G: 2 SOLUTION INTRAVENOUS at 07:11

## 2022-11-02 RX ADMIN — Medication 400 MG: at 08:11

## 2022-11-02 RX ADMIN — ASPIRIN 81 MG: 81 TABLET, COATED ORAL at 08:11

## 2022-11-02 RX ADMIN — FERROUS SULFATE TAB 325 MG (65 MG ELEMENTAL FE) 1 EACH: 325 (65 FE) TAB at 08:11

## 2022-11-02 RX ADMIN — LACTOBACILLUS ACIDOPHILUS / LACTOBACILLUS BULGARICUS 1 EACH: 100 MILLION CFU STRENGTH GRANULES at 08:11

## 2022-11-02 RX ADMIN — Medication 1 PACKET: at 03:11

## 2022-11-02 RX ADMIN — ATORVASTATIN CALCIUM 40 MG: 40 TABLET, FILM COATED ORAL at 08:11

## 2022-11-02 RX ADMIN — APIXABAN 5 MG: 2.5 TABLET, FILM COATED ORAL at 08:11

## 2022-11-02 RX ADMIN — MICONAZOLE NITRATE 2 % TOPICAL POWDER: at 08:11

## 2022-11-02 RX ADMIN — CEFAZOLIN SODIUM 2 G: 2 SOLUTION INTRAVENOUS at 10:11

## 2022-11-02 RX ADMIN — METOCLOPRAMIDE 10 MG: 10 TABLET ORAL at 07:11

## 2022-11-02 RX ADMIN — MEROPENEM 1 G: 1 INJECTION, POWDER, FOR SOLUTION INTRAVENOUS at 09:11

## 2022-11-02 RX ADMIN — Medication 1 PACKET: at 08:11

## 2022-11-02 RX ADMIN — CHOLECALCIFEROL TAB 125 MCG (5000 UNIT) 5000 UNITS: 125 TAB at 08:11

## 2022-11-02 RX ADMIN — METFORMIN HYDROCHLORIDE 1000 MG: 1000 TABLET, FILM COATED ORAL at 08:11

## 2022-11-02 RX ADMIN — DULOXETINE 30 MG: 30 CAPSULE, DELAYED RELEASE ORAL at 08:11

## 2022-11-02 RX ADMIN — LOSARTAN POTASSIUM 50 MG: 50 TABLET, FILM COATED ORAL at 08:11

## 2022-11-02 RX ADMIN — METOCLOPRAMIDE 10 MG: 10 TABLET ORAL at 11:11

## 2022-11-02 RX ADMIN — METOCLOPRAMIDE 10 MG: 10 TABLET ORAL at 03:11

## 2022-11-02 RX ADMIN — CEFAZOLIN SODIUM 2 G: 2 SOLUTION INTRAVENOUS at 03:11

## 2022-11-02 NOTE — PT/OT/SLP PROGRESS
Occupational Therapy  Treatment    Andrew Del Cid Jr.   MRN: [id number]   Admitting Diagnosis: CVA (cerebral vascular accident)    OT Date of Treatment: 11/02/22   OT Start Time: 1000  OT Stop Time: 1200  OT Total Time (min): 120 min    Treatment Type: Individual 90 and Concurrent 30     Billable Minutes:  Self Care/Home Management 30, Therapeutic Activity 60, and Therapeutic Exercise 30  Total Minutes: 120     OT/ROBERT: OT          General Precautions: Standard, fall  Orthopedic Precautions: N/A  Braces:  (CAM boot to (R) LE)    Spiritual, Cultural Beliefs, Congregation Practices, Values that Affect Care: no    Subjective:  Communicated with nurse prior to session.    Pain/Comfort  Pain Rating 1: 8/10  Location - Orientation 1: lower  Location 1: back  Pain Addressed 1: Reposition, Cessation of Activity, Other (see comments) (BioFreeze application)  Pain Rating Post-Intervention 1: 1/10    Objective:  Pt was cooperative and motivated with minimal verbal encouragement while exhibiting positive affect. He participated in ADL retraining regarding UB and LB dressing emphasizing fall prevention and use of compensatory strategies requiring setup assist ance with UB dressing while requiring mod assist with LB dressing secondary to assist to pull / adjust shorts to waist while standing with steadying and assist to yoav (R) CAM boot with verbal and tactile cueing for safety and technique. Pt then participated in functional transfer retraining throughout session to / from wheelchair, chair, toilet, and bed emphasizing fall prevention providing extra time with repetition requiring min assist secondary to steadying assist with mod verbal and tactile cueing for safety awareness and technique with use of AD. Next, he participated in therapeutic activity addressing trunk mobility, trunk control, dynamic sitting balance, trunk strength, and stretching of bilateral shoulders to end range with flexion and abduction utilizing large  stability ball challenging him to perform trunk flexion, extension, and lateral flexion with bilateral hands stabilized on ball requiring verbal and tactile cueing to facilitate optimal movement patterns and increased range per trial in order to improve active ROM impacting performance with functional tasks below waist. Pt then participated in therapeutic exercise performing 4x8 seated pushups requiring lifting assist with verbal and tactile cueing for technique challenging him to lift buttocks off seated surface to end range elbow extension in order to strengthen triceps brachii to improve performance with sit <> stand transitions particularly from lower surfaces. Also, he participated in therapeutic activity addressing fine motor coordination, pinch /  strength, and functional use of bilateral hands challenging him to pinch / grasp various items including coins, buttons, pegs, and foam pieces with and without use of resistive medium including resistive clothespins and calibrated hand gripper utilizing tip-to-tip, tripod, lateral prehension, or power grasp to retrieve, stabilize, lift, manipulate, and place items at specified locations requiring verbal and tactile cueing to facilitate proper pinch / grasp and more coordinated movements.     Functional Mobility:  Transfer Training:   Sit to stand:Minimal Assistance with Rolling Walker .  Bed <> Chair:  Step Transfer with Minimal Assistance with Rolling Walker .  Toilet Transfer:  Pt Step Transfer with Minimal Assistance with Grab bars .    UE Dressing:  Patient performed UE Dressing with Setup Assistance with extra time at Edge of bed.    LE Dressing:  Patient don/doffed shoes with Moderate Assistance, Patient performed don/doffed adult brief with Moderate Assistance, and Patient performed don/doffed pants with Moderate Assistance    Balance:   Static Sit: GOOD: Takes MODERATE challenges from all directions  Dynamic Sit:  GOOD-: Incosistently Maintains balance  through MODERATE excursions of active trunk movement,     Static Stand: FAIR+: Takes MINIMAL challenges from all directions  Dynamic stand: FAIR: Needs CONTACT GUARD during gait      Patient left up in chair with  nurse notified    ASSESSMENT:  Pt demonstrated improved functional performance with UB dressing as noted by setup assistance in which patient able to pull / adjust shirt to waist on back while seated EOB unsupported impacted by increased trunk control and strength when reaching behind back utilizing internal and external rotation with adduction, abduction, and extension of shoulder.     Rehab potential is good    Activity tolerance: Fair    Discharge recommendations: other (see comments) (To be further determined based on progress prior to discharge.)     Equipment recommendations: bedside commode, other (see comments) (To be further determined based on progress prior to discharge.)     GOALS:   Multidisciplinary Problems       Occupational Therapy Goals          Problem: Occupational Therapy    Goal Priority Disciplines Outcome Interventions   Occupational Therapy Goal     OT, PT/OT     Description: Long Term Goals to be met by: 11/14/22     Patient will increase functional independence with ADLs by performing:    Feeding with [place].  UE Dressing with Modified [place].  LE Dressing with Modified [place].  Grooming while seated at sink with Modified [place].  Toileting from toilet with Modified [place] for hygiene and clothing management.   Bathing from  shower chair/bench with Modified [place].  Step transfer with Modified [place]  Toilet transfer to toilet with Modified [place].  Increased functional strength to 4+/5 for (L) UE.  Increased fine motor coordination as measured by decrease in 9-Hole Peg Test to less than 65 sec.                         Plan:  Patient to be seen 5 x/week (for 90 min per day, for 14 days) to address the above listed problems via  self-care/home management, community/work re-entry, therapeutic activities, therapeutic exercises, neuromuscular re-education, wheelchair management/training  Plan of Care expires: 11/14/22  Plan of Care reviewed with: patient         11/02/2022

## 2022-11-02 NOTE — PT/OT/SLP PROGRESS
Physical Therapy         Treatment        Andrew Del Cid Jr.   MRN: [id number]     PT Received On: 11/02/22  PT Start Time: 1430     PT Stop Time: 1530    PT Total Time (min): 60 min     60 min ind  Billable Minutes:  Gait [id number], Therapeutic Activity 15, and Therapeutic Exercise 30  Total Minutes: 60    Treatment Type: Treatment  PT/PTA: PT             General Precautions: Standard, fall  Orthopedic Precautions: Orthopedic Precautions :  (CAM boot (R) LE for foot wound, (L) knee brace due to buckling of (L) knee occasionally)   Braces:      Spiritual, Cultural Beliefs, Confucianism Practices, Values that Affect Care: no    Subjective:  Communicated with nurse prior to session.         Objective:  Patient found in bed with      Functional Mobility:  Bed Mobility:   Supine to sit: Modified Independent   Sit to supine: Modified Independent   Rolling: Modified Independent   Scooting: Modified Independent    Balance:   Static Sit: GOOD+: Takes MAXIMAL challenges from all directions.    Dynamic Sit:  GOOD-: Incosistently Maintains balance through MODERATE excursions of active trunk movement,     Static Stand: FAIR+: Takes MINIMAL challenges from all directions  Dynamic stand: FAIR: Needs CONTACT GUARD during gait    Transfer Training:  Sit to stand:Minimal Assistance with Rolling Walker    Bed <> Chair:  Step Transfer with Minimal Assistance with Rolling Walker      Wheelchair Training:  Ind with wc mobility using his legs    Gait Training:  Amb 1 time for 100ft with cga with ney rw, and 3 times for about 50ft with cga with a rw    Stair Training:  Pt did not do steps today      Additional Treatment:  Pt performed supported sitting BLE exs 4 sets 20 reps with 5lb ankle wts. He then performed standing BLE exs on 4 inch step box 3 sets of 10reps on RLE and 15 reps each set omn LLE    Activity Tolerance:  Getting stronger but still get fatigued fairly quick with resistance training     Patient left supine with call button  in reach.    Assessment:  Andrew Del Cid Jr. is a 57 y.o. male with a medical diagnosis of CVA (cerebral vascular accident). He presents with  some 7/10  low back pain today, and still with fatigue while performing resistance training using same wt as yesterday    Rehab potential is good.    Activity tolerance: Good    Discharge recommendations:       Equipment recommendations:       GOALS:   Multidisciplinary Problems       Physical Therapy Goals          Problem: Physical Therapy    Goal Priority Disciplines Outcome Goal Variances Interventions   Physical Therapy Goal     PT, PT/OT      Description: Goals to be met by: 2022     Patient will increase functional independence with mobility by performin. Sit to stand transfer with [place]  2. Bed to chair transfer with [place] using No Assistive Device  3. Gait  x 200+ feet with Modified [place] using Rolling Walker.   4. Wheelchair propulsion x150+ feet with Modified [place] using bilateral lower extremities  5. Ascend/descend 12 stair with bilateral Handrails Modified [place] using No Assistive Device.   6. Ascend/Descend standard curb step with Modified [place] using Rolling Walker.                       PLAN:    Patient to be seen 5 x/week  to address the above listed problems via gait training, therapeutic activities, therapeutic exercises, therapeutic groups, neuromuscular re-education, wheelchair management/training  Plan of Care expires: 22  Plan of Care reviewed with: patient         2022

## 2022-11-02 NOTE — CONSULTS
[facility])  Podiatry  Consult Note    Patient Name: Andrew Del Cid Jr.  MRN: [id number]  Admission Date: 10/31/2022  Hospital Length of Stay: 2 days  Attending Physician: Cl Mittal III, MD  Primary Care Provider: Olena Thornton DO     Inpatient consult to Podiatry  Consult performed by: Baldemar Esquivel DPM  Consult ordered by: Cl Mittal III, MD  Reason for consult: Right foot wound      Subjective:     History of Present Illness:  Patient is a 57-year-old male well known to me from previous inpatient admissions, as well as outpatient clinical visits, with multiple morbidities including morbid obesity, hypertension, hyperlipidemia, uncontrolled type 2 diabetes with Charcot foot deformity right lower extremity as well as history of previous digital amputations and chronic wound to this right foot, with MSSA bacteremia now admitted to inpatient rehab after sustaining stroke.  Patient seen at bedside this morning, resting comfortably, able to participate in all modalities of therapy yesterday, tolerating cam boot to right lower extremity well.  He is without pain at this site due to significant neuropathy.  He is complaining of progressive rash, no itching, this was noted on admission, concern for drug eruption rash.    Scheduled Meds:   apixaban  5 mg Oral BID    aspirin  81 mg Oral Daily    atorvastatin  40 mg Oral Daily    ceFAZolin (ANCEF) IVPB  2 g Intravenous Q8H    cholecalciferol (vitamin D3)  5,000 Units Oral Daily    dulaglutide  4.5 mg Subcutaneous Q7 Days    DULoxetine  30 mg Oral Daily    ferrous sulfate  1 tablet Oral BID    insulin detemir U-100  35 Units Subcutaneous BID    lactobacillus acidophilus & bulgar  1 packet Oral BID    losartan  50 mg Oral Daily    magnesium oxide  400 mg Oral Daily    [START ON 11/3/2022] meropenem  1 g Intravenous Q48H    metFORMIN  1,000 mg Oral BID    metoclopramide HCl  10 mg Oral TID AC    metoprolol succinate  50 mg Oral Daily     miconazole NITRATE 2 %   Topical (Top) BID    mupirocin   Topical (Top) BID    psyllium husk (with sugar)  1 packet Oral TID    senna-docusate 8.6-50 mg  2 tablet Oral QHS     Continuous Infusions:  PRN Meds:acetaminophen, glucose, insulin aspart U-100, melatonin, ondansetron, prochlorperazine, sodium chloride 0.9%    Review of patient's allergies indicates:  No Known Allergies     Past Medical History:   Diagnosis Date    Acute renal failure with tubular necrosis 10/22/2022    Diabetes mellitus, type 2     Fever 10/22/2022    Hyperlipidemia     Hypertension     Hyponatremia 10/22/2022    Obese     Peripheral neuropathy      Past Surgical History:   Procedure Laterality Date    APPENDECTOMY      HERNIA REPAIR      INCISION AND DRAINAGE FOOT Bilateral 12/26/2018    Procedure: INCISION AND DRAINAGE, FOOT;  Surgeon: Rob Alas DPM;  Location: [facility];  Service: Podiatry;  Laterality: Bilateral;    TOE AMPUTATION      right great toe    TOE AMPUTATION Left 12/26/2018    Procedure: AMPUTATION, TOE; left 3rd toe;  Surgeon: Rob Alas DPM;  Location: [facility];  Service: Podiatry;  Laterality: Left;       Family History    None       Tobacco Use    Smoking status: Never    Smokeless tobacco: Never   Substance and Sexual Activity    Alcohol use: No    Drug use: No    Sexual activity: Yes     Partners: Female     Comment:      Review of Systems   Constitutional:  Positive for fatigue. Negative for chills and fever.   HENT:  Positive for dental problem (poor dentitian).    Eyes:  Negative for visual disturbance.   Respiratory:  Negative for cough.    Cardiovascular:  Negative for chest pain.   Gastrointestinal:  Negative for nausea and vomiting.   Musculoskeletal:  Positive for arthralgias and back pain.        Charcot neuroarthropathy, right foot   Skin:  Positive for rash and wound (right foot, chronic).   Neurological:  Positive for weakness.   Psychiatric/Behavioral:  Negative for confusion.     Objective:     Vital Signs (Most Recent):  Temp: 97.2 °F (36.2 °C) (11/02/22 0502)  Pulse: 72 (11/02/22 0502)  Resp: 18 (11/02/22 0502)  BP: 126/64 (11/02/22 0502)  SpO2: 96 % (11/02/22 0502) Vital Signs (24h Range):  Temp:  [97.2 °F (36.2 °C)-97.9 °F (36.6 °C)] 97.2 °F (36.2 °C)  Pulse:  [72-74] 72  Resp:  [18] 18  SpO2:  [94 %-96 %] 96 %  BP: (114-126)/(56-84) 126/64     Weight: (!) 166.3 kg (366 lb 10 oz)  Body mass index is 48.37 kg/m².    Foot Exam    General  General Appearance: (morbidly obese male)  Orientation: alert and oriented to person, place, and time   Affect: appropriate         Right Foot/Ankle      Inspection and Palpation  Ecchymosis: none  Tenderness: none   Swelling: (Chronic to right foot from Charcot neuroarthropathy)  Hammertoes: second toe, third toe, fourth toe and fifth toe  Skin Exam: ulcer (1.6x1.6 cm fibrogranular wound at plantar aspect of foot, site of bony prominence from Charcot neuroarthropathy, no undermining, minimal surrounding hyperkeratotic skin, scant serous drainage noted, no purulence, no malodor, no probing to bone); no erythema ; skin abrasion to dorsal aspect 2nd toe, superficial, no SOI     Neurovascular  Dorsalis pedis: 2+  Posterior tibial: 2+  Saphenous nerve sensation: absent  Tibial nerve sensation: absent  Superficial peroneal nerve sensation: absent  Deep peroneal nerve sensation: absent  Sural nerve sensation: absent        Left Foot/Ankle       Inspection and Palpation  Ecchymosis: none  Tenderness: none   Hammertoes: third toe, fourth toe and fifth toe  Skin Exam: skin intact; no erythema      Neurovascular  Dorsalis pedis: 2+  Posterior tibial: 2+  Saphenous nerve sensation: absent  Tibial nerve sensation: absent  Superficial peroneal nerve sensation: absent  Deep peroneal nerve sensation: absent  Sural nerve sensation: absent    Laboratory:  Blood Cultures: No results for input(s): LABBLOO in the last 48 hours.  CBC:   Recent Labs   Lab 11/02/22  0517    WBC 7.29   RBC 4.31*   HGB 11.9*   HCT 37.9*   *   MCV 88   MCH 27.6   MCHC 31.4*     CMP:   Recent Labs   Lab 11/02/22  0529   *   CALCIUM 8.6*   ALBUMIN 2.4*   PROT 6.6      K 4.2   CO2 30*      BUN 18   CREATININE 0.8   ALKPHOS 100   ALT 19   AST 12   BILITOT 0.6         Diagnostic Results:  None        Assessment/Plan:     Active Diagnoses:    Diagnosis Date Noted POA    PRINCIPAL PROBLEM:  CVA (cerebral vascular accident) [I63.9] 11/01/2022 Yes    Rash [R21] 11/01/2022 Yes    Cerebellar dysmetria [R27.8] 10/31/2022 Yes    A-fib [I48.91] 10/26/2022 Yes    Bacteremia due to methicillin susceptible Staphylococcus aureus (MSSA) [R78.81, B95.61] 10/23/2022 Yes    Diabetic foot ulcer [E11.621, L97.509] 10/23/2022 Yes    Type 2 diabetes mellitus with diabetic polyneuropathy, with long-term current use of insulin [E11.42, Z79.4]  Not Applicable    Hyperlipidemia [E78.5] 12/25/2018 Yes     Chronic    Essential hypertension [I10] 06/30/2017 Yes     Chronic    Morbid obesity with BMI of 50.0-59.9, adult [E66.01, Z68.43] 06/04/2015 Not Applicable     Chronic      Problems Resolved During this Admission:       Diabetic foot ulcer  Patient with chronic wound from bony prominence due to Charcot neuroarthropathy.  Wound with slight decrease in size, will continue with topical meropenem to wound every other day, wound care orders discussed with nursing team yesterday.  He is to continue with Cam boot to right lower extremity to assist in  offloading during weightbearing, rehabilitation/therapy.     Bacteremia  Patient with MSSA bacteremia, 10/24 blood cultures with NGTD, in process of 14 days of IV ancef (10/24-11/6).  Patient was new rash, possible drug reaction to medication, amiodarone versus Ancef.  Primary team following.     Stroke  Old vs subactue, continue inpatient rehab efforts.     A-fib  Anticoagulation with Eliquis, cardiology following     HTN, Hyperlipidemia, Morbid obesity  Stable,  management per primary service    Thank you for your consult. I will follow-up with patient. Please contact us if you have any additional questions.    Baldemar Esquivel DPM  Podiatry  [facility] ([facility])

## 2022-11-02 NOTE — NURSING
Patient reported that stool is starting to get loose. Dr. Mittal notified and I received order to chelsi pena. Will continue to monitor.

## 2022-11-03 LAB
POCT GLUCOSE: 162 MG/DL (ref 70–110)
POCT GLUCOSE: 191 MG/DL (ref 70–110)
POCT GLUCOSE: 210 MG/DL (ref 70–110)
POCT GLUCOSE: 81 MG/DL (ref 70–110)

## 2022-11-03 PROCEDURE — 97110 THERAPEUTIC EXERCISES: CPT

## 2022-11-03 PROCEDURE — 63600175 PHARM REV CODE 636 W HCPCS: Performed by: INTERNAL MEDICINE

## 2022-11-03 PROCEDURE — 97116 GAIT TRAINING THERAPY: CPT

## 2022-11-03 PROCEDURE — 94761 N-INVAS EAR/PLS OXIMETRY MLT: CPT

## 2022-11-03 PROCEDURE — 25000003 PHARM REV CODE 250: Performed by: INTERNAL MEDICINE

## 2022-11-03 PROCEDURE — 63600175 PHARM REV CODE 636 W HCPCS: Performed by: STUDENT IN AN ORGANIZED HEALTH CARE EDUCATION/TRAINING PROGRAM

## 2022-11-03 PROCEDURE — 25000003 PHARM REV CODE 250

## 2022-11-03 PROCEDURE — 99900035 HC TECH TIME PER 15 MIN (STAT)

## 2022-11-03 PROCEDURE — 11800000 HC REHAB PRIVATE ROOM

## 2022-11-03 PROCEDURE — 99900031 HC PATIENT EDUCATION (STAT)

## 2022-11-03 PROCEDURE — 25000003 PHARM REV CODE 250: Performed by: STUDENT IN AN ORGANIZED HEALTH CARE EDUCATION/TRAINING PROGRAM

## 2022-11-03 PROCEDURE — 97530 THERAPEUTIC ACTIVITIES: CPT

## 2022-11-03 RX ORDER — TRAMADOL HYDROCHLORIDE 50 MG/1
50 TABLET ORAL 3 TIMES DAILY PRN
Status: DISCONTINUED | OUTPATIENT
Start: 2022-11-03 | End: 2022-11-09 | Stop reason: HOSPADM

## 2022-11-03 RX ADMIN — ACETAMINOPHEN 650 MG: 325 TABLET ORAL at 07:11

## 2022-11-03 RX ADMIN — CEFAZOLIN SODIUM 2 G: 2 SOLUTION INTRAVENOUS at 11:11

## 2022-11-03 RX ADMIN — Medication 1 PACKET: at 08:11

## 2022-11-03 RX ADMIN — METOPROLOL SUCCINATE 50 MG: 50 TABLET, EXTENDED RELEASE ORAL at 08:11

## 2022-11-03 RX ADMIN — APIXABAN 5 MG: 2.5 TABLET, FILM COATED ORAL at 08:11

## 2022-11-03 RX ADMIN — METFORMIN HYDROCHLORIDE 1000 MG: 1000 TABLET, FILM COATED ORAL at 08:11

## 2022-11-03 RX ADMIN — INSULIN ASPART 2 UNITS: 100 INJECTION, SOLUTION INTRAVENOUS; SUBCUTANEOUS at 04:11

## 2022-11-03 RX ADMIN — FERROUS SULFATE TAB 325 MG (65 MG ELEMENTAL FE) 1 EACH: 325 (65 FE) TAB at 08:11

## 2022-11-03 RX ADMIN — CHOLECALCIFEROL TAB 125 MCG (5000 UNIT) 5000 UNITS: 125 TAB at 08:11

## 2022-11-03 RX ADMIN — Medication 1 PACKET: at 02:11

## 2022-11-03 RX ADMIN — DULOXETINE 30 MG: 30 CAPSULE, DELAYED RELEASE ORAL at 08:11

## 2022-11-03 RX ADMIN — MICONAZOLE NITRATE 2 % TOPICAL POWDER: at 08:11

## 2022-11-03 RX ADMIN — ATORVASTATIN CALCIUM 40 MG: 40 TABLET, FILM COATED ORAL at 08:11

## 2022-11-03 RX ADMIN — CEFAZOLIN SODIUM 2 G: 2 SOLUTION INTRAVENOUS at 02:11

## 2022-11-03 RX ADMIN — LACTOBACILLUS ACIDOPHILUS / LACTOBACILLUS BULGARICUS 1 EACH: 100 MILLION CFU STRENGTH GRANULES at 08:11

## 2022-11-03 RX ADMIN — TRAMADOL HYDROCHLORIDE 50 MG: 50 TABLET ORAL at 10:11

## 2022-11-03 RX ADMIN — INSULIN DETEMIR 35 UNITS: 100 INJECTION, SOLUTION SUBCUTANEOUS at 08:11

## 2022-11-03 RX ADMIN — MUPIROCIN: 20 OINTMENT TOPICAL at 08:11

## 2022-11-03 RX ADMIN — METOCLOPRAMIDE 10 MG: 10 TABLET ORAL at 11:11

## 2022-11-03 RX ADMIN — MICONAZOLE NITRATE 2 % TOPICAL POWDER: at 09:11

## 2022-11-03 RX ADMIN — ASPIRIN 81 MG: 81 TABLET, COATED ORAL at 08:11

## 2022-11-03 RX ADMIN — METOCLOPRAMIDE 10 MG: 10 TABLET ORAL at 05:11

## 2022-11-03 RX ADMIN — METOCLOPRAMIDE 10 MG: 10 TABLET ORAL at 04:11

## 2022-11-03 RX ADMIN — Medication 400 MG: at 08:11

## 2022-11-03 RX ADMIN — LOSARTAN POTASSIUM 50 MG: 50 TABLET, FILM COATED ORAL at 08:11

## 2022-11-03 RX ADMIN — Medication 6 MG: at 08:11

## 2022-11-03 RX ADMIN — CEFAZOLIN SODIUM 2 G: 2 SOLUTION INTRAVENOUS at 07:11

## 2022-11-03 NOTE — PT/OT/SLP PROGRESS
Occupational Therapy  Treatment    Andrew Del Cid Jr.   MRN: [id number]   Admitting Diagnosis: CVA (cerebral vascular accident)    OT Date of Treatment: 11/03/22   OT Start Time: 0930  OT Stop Time: 1130  OT Total Time (min): 120 min    Treatment Type: Individual 120     Billable Minutes:  Self Care/Home Management 30, Therapeutic Activity 30, and Therapeutic Exercise 60  Total Minutes: 120     OT/ROBERT: OT          General Precautions: Standard, fall  Orthopedic Precautions: N/A  Braces:  (CAM Boot to Right LE)    Spiritual, Cultural Beliefs, Yazidi Practices, Values that Affect Care: no    Subjective:  Communicated with nurse prior to session.    Pain/Comfort  Pain Rating 1: 8/10  Location - Orientation 1: lower  Location 1: back  Pain Addressed 1: Pre-medicate for activity, Reposition, Distraction, Cessation of Activity, Nurse notified, Other (see comments) (BioFreeze application)  Pain Rating Post-Intervention 1: 4/10  Pain Rating 2: 7/10  Location - Side 2: Right  Location - Orientation 2: proximal  Location 2: shoulder  Pain Addressed 2: Reposition, Cessation of Activity, Nurse notified, Other (see comments) (BioFreeze application)  Pain Rating Post-Intervention 2: 0/10    Objective:  Pt was cooperative and motivated with minimal verbal encouragement while exhibiting positive affect. He participated in ADL retraining regarding LB dressing emphasizing fall prevention and use of compensatory strategies requiring mod assist secondary to assist to pull / adjust shorts to waist while standing with steadying and assist to yoav (R) CAM boot with verbal and tactile cueing for safety and technique. Pt then participated in functional transfer retraining throughout session to / from bed, toilet, chair and wheelchair emphasizing fall prevention providing extra time with repetition requiring min assist secondary to steadying assist with mod verbal and tactile cueing for safety awareness and technique with use of AD.  Next, he participated in ADL retraining regarding toileting emphasizing fall prevention providing extra time requiring min assist secondary to steadying assist during perineal hygiene and clothing management with verbal and tactile cueing for safety awareness and technique. Pt then participated in therapeutic activity addressing trunk mobility, trunk control, dynamic sitting balance, trunk strength, and stretching of bilateral shoulders to end range with flexion and abduction utilizing large stability ball challenging him to perform trunk flexion, extension, and lateral flexion with bilateral hands stabilized on ball requiring verbal and tactile cueing to facilitate optimal movement patterns and increased range per trial in order to improve active ROM impacting performance with functional tasks below waist. Also, he participated in therapeutic exercise performing 5x8 seated pushups requiring without lifting assist, but verbal and tactile cueing for technique challenging him to lift buttocks off seated surface to end range elbow extension in order to strengthen triceps brachii to improve performance with sit <> stand transitions particularly from lower surfaces. Also, he participated in therapeutic exercise performing 3x12 (L) UE strengthening exercises utilizing heavy resistance theraband with scapular retraction, shoulder extension, shoulder adduction, horizontal abduction, shoulder flexion in plane of scaption, internal rotation, external rotation, elbow flexion, supination / pronation, wrist flexion, and wrist extension requiring continuous verbal and tactile cueing for technique emphasizing quality of movement.     Functional Mobility:  Bed Mobility:   Supine to sit: Modified Independent   Sit to supine: Modified Independent   Rolling: Modified Independent   Scooting: Modified Independent    Transfer Training:   Sit to stand:Minimal Assistance with Rolling Walker .  Bed <> Chair:  Step Transfer with Minimal  Assistance with Rolling Walker .  Toilet Transfer:  Pt Step Transfer with Minimal Assistance with Grab bars .    LE Dressing:  Patient don/doffed shoes with Moderate Assistance, Patient performed don/doffed adult brief with Moderate Assistance, and Patient performed don/doffed pants with Moderate Assistance    Toilet Training:  Pt performed toileting with Minimal Assistance with Grab  bar at [employer].    Balance:   Static Sit: GOOD: Takes MODERATE challenges from all directions  Dynamic Sit:  GOOD-: Incosistently Maintains balance through MODERATE excursions of active trunk movement,     Static Stand: FAIR+: Takes MINIMAL challenges from all directions  Dynamic stand: FAIR: Needs CONTACT GUARD during gait      Patient left sitting edge of bed with call button in reach and nurse notified    ASSESSMENT:  Pt demonstrated improved functional performance with toileting as noted by min assist in which patient able to perform perineal hygiene and clothing management with steadying assist for safety, and verbal and tactile cueing for safety and technique when weight shifting to perform perineal hygiene.    Rehab potential is good    Activity tolerance: Fair    Discharge recommendations: other (see comments) (To be further determined based on progress prior to discharge.)     Equipment recommendations: bedside commode, other (see comments) (To be further determined based on progress prior to discharge.)     GOALS:   Multidisciplinary Problems       Occupational Therapy Goals          Problem: Occupational Therapy    Goal Priority Disciplines Outcome Interventions   Occupational Therapy Goal     OT, PT/OT     Description: Long Term Goals to be met by: 11/14/22     Patient will increase functional independence with ADLs by performing:    Feeding with [place].  UE Dressing with Modified [place].  LE Dressing with Modified [place].  Grooming while seated at sink with Modified [place].  Toileting from toilet  with Modified [place] for hygiene and clothing management.   Bathing from  shower chair/bench with Modified [place].  Step transfer with Modified [place]  Toilet transfer to toilet with Modified [place].  Increased functional strength to 4+/5 for (L) UE.  Increased fine motor coordination as measured by decrease in 9-Hole Peg Test to less than 65 sec.                         Plan:  Patient to be seen 5 x/week (for 90 min per day, for 14 days) to address the above listed problems via self-care/home management, community/work re-entry, therapeutic activities, therapeutic exercises, neuromuscular re-education, wheelchair management/training  Plan of Care expires: 11/14/22  Plan of Care reviewed with: patient         11/03/2022

## 2022-11-03 NOTE — PROGRESS NOTES
[facility] Medicine  Progress Note    Patient Name: Andrew Del Cid Jr.  MRN: [id number]  Patient Class: IP- Rehab   Admission Date: 10/31/2022  Length of Stay: 3 days  Attending Physician: Cl Mittal III, MD  Primary Care Provider: Olena Thornton DO        Subjective:     Principal Problem:CVA (cerebral vascular accident)        HPI:  Patient is a 57-year-old male with a past medical history of type 2 diabetes hypertension morbid obesity neuropathy and a right lower extremity foot ulcer who presented to our acute care facility with sepsis.  He was found to have MSSA bacteremia and started on IV Ancef.  He was discharged home on IV antibiotics and then return back to the ED with an acute neurological change.  Patient had left-sided hemiparesis had falls and on evaluation and CT scan was found to have an acute right-sided CV A.  Patient was admitted underwent diagnostic testing had an MRI of the brain was also found to have newly diagnosed atrial fibrillation.  The patient was seen by cardiology eventually started on amiodarone and a CTA of the head neck and brain were ordered because he was unable to complete his MRI because of his size and weight.  The patient was seen by Podiatry a Cam boot was ordered to assist with offloading weight-bearing and ultimately the patient was stabilized and we were asked to assist with rehabilitative care.  The patient was transferred on IV Ancef wound care orders and has had a rapid recovery in his left-sided symptoms.  On evaluation this morning the patient has a new issue which is a diffuse bilateral symmetric starting centrally extending peripherally rash.  He has multiple areas of urticarial type lesions therapeutic he has dermatographia some where he has had the telemetry stickers placed as well as other tape that was placed for various parts of his care.  The patient is only complaining of some itchiness from the rash.  The patient's  therapist to bathe him yesterday states the rash was not present a call and spoke to his primary care physician and reviewed reviewed all new medications that was started which include amiodarone.        Overview/Hospital Course:  11/03/22 DL:  Patient reports difficulty participating in therapy due to increased generalized.  Orders placed for PRN tramadol.  Despite pain, patient has been the patient has been progressing well per the therapist.  Patient's rash has not continued to spread.      Past Medical History:   Diagnosis Date    Acute renal failure with tubular necrosis 10/22/2022    Diabetes mellitus, type 2     Fever 10/22/2022    Hyperlipidemia     Hypertension     Hyponatremia 10/22/2022    Obese     Peripheral neuropathy        Past Surgical History:   Procedure Laterality Date    APPENDECTOMY      HERNIA REPAIR      INCISION AND DRAINAGE FOOT Bilateral 12/26/2018    Procedure: INCISION AND DRAINAGE, FOOT;  Surgeon: Rob Alas DPM;  Location: [facility];  Service: Podiatry;  Laterality: Bilateral;    TOE AMPUTATION      right great toe    TOE AMPUTATION Left 12/26/2018    Procedure: AMPUTATION, TOE; left 3rd toe;  Surgeon: Rob Alas DPM;  Location: [facility] OR;  Service: Podiatry;  Laterality: Left;       Review of patient's allergies indicates:  No Known Allergies    No current facility-administered medications on file prior to encounter.     Current Outpatient Medications on File Prior to Encounter   Medication Sig    amLODIPine (NORVASC) 5 MG tablet Take 5 mg by mouth.    aspirin (ECOTRIN) 81 MG EC tablet Take 81 mg by mouth once daily.    atorvastatin (LIPITOR) 40 MG tablet Take 40 mg by mouth.    ceFAZolin 2 g/50mL Dextrose IVPB (ANCEF) 2 gram/50 mL PgBk Inject 50 mLs (2,000 mg total) into the vein every 8 (eight) hours. for 13 days    cholecalciferol, vitamin D3, (VITAMIN D3) 25 mcg (1,000 unit) capsule 1 capsule    DULoxetine (CYMBALTA) 30 MG capsule Take 30 mg by  mouth once daily.    JARDIANCE 25 mg tablet Take 25 mg by mouth once daily.    lactobacillus acidophilus & bulgar (LACTINEX) 100 million cell packet Take 1 packet (1 each total) by mouth 2 (two) times daily.    LANTUS SOLOSTAR U-100 INSULIN glargine 100 units/mL SubQ pen SMARTSI Unit(s) SUB-Q Twice Daily    losartan (COZAAR) 25 MG tablet Take 4 tablets (100 mg total) by mouth once daily.    metFORMIN (GLUCOPHAGE) 1000 MG tablet Take 1,000 mg by mouth 2 (two) times daily.    mupirocin (BACTROBAN) 2 % ointment Apply topically 2 (two) times daily.    psyllium husk, with sugar, 3.4 gram packet Take 1 packet by mouth 3 (three) times daily.    TRULICITY 4.5 mg/0.5 mL pen injector Inject into the skin.     Family History    None       Tobacco Use    Smoking status: Never    Smokeless tobacco: Never   Substance and Sexual Activity    Alcohol use: No    Drug use: No    Sexual activity: Yes     Partners: Female     Comment:      Review of Systems   Constitutional:  Positive for activity change, appetite change and fatigue. Negative for chills and fever.   HENT:  Negative for ear pain, mouth sores, nosebleeds and sore throat.    Eyes:  Negative for visual disturbance.   Respiratory:  Positive for shortness of breath. Negative for cough and wheezing.    Cardiovascular:  Positive for leg swelling. Negative for chest pain and palpitations.   Gastrointestinal:  Positive for constipation. Negative for abdominal distention, abdominal pain, blood in stool, diarrhea, nausea and vomiting.   Endocrine: Negative for cold intolerance, heat intolerance and polyphagia.   Genitourinary:  Negative for difficulty urinating, dysuria, flank pain and frequency.   Musculoskeletal:  Positive for arthralgias. Negative for back pain and myalgias.   Skin:  Positive for rash.   Neurological:  Positive for weakness. Negative for dizziness, tremors, seizures, syncope, facial asymmetry, speech difficulty and headaches.    Hematological:  Negative for adenopathy.   Psychiatric/Behavioral:  Negative for agitation, confusion and hallucinations. The patient is not nervous/anxious.    Objective:     Vital Signs (Most Recent):  Temp: 97.6 °F (36.4 °C) (11/03/22 0512)  Pulse: 75 (11/03/22 0822)  Resp: 18 (11/03/22 0512)  BP: (!) 151/68 (11/03/22 0822)  SpO2: 96 % (11/03/22 0512) Vital Signs (24h Range):  Temp:  [97.6 °F (36.4 °C)-97.8 °F (36.6 °C)] 97.6 °F (36.4 °C)  Pulse:  [66-79] 75  Resp:  [18-20] 18  SpO2:  [95 %-96 %] 96 %  BP: (123-151)/(62-68) 151/68     Weight: (!) 166.3 kg (366 lb 10 oz)  Body mass index is 48.37 kg/m².    Physical Exam  Constitutional:       General: He is not in acute distress.     Appearance: He is obese. He is ill-appearing.   HENT:      Head: Normocephalic and atraumatic.      Right Ear: External ear normal.      Left Ear: External ear normal.      Nose: Nose normal. No congestion or rhinorrhea.      Mouth/Throat:      Mouth: Mucous membranes are moist.      Pharynx: No oropharyngeal exudate.   Eyes:      General: No scleral icterus.     Extraocular Movements: Extraocular movements intact.   Neck:      Vascular: No carotid bruit.   Cardiovascular:      Rate and Rhythm: Normal rate and regular rhythm.      Heart sounds: Normal heart sounds. No murmur heard.    No friction rub. No gallop.   Pulmonary:      Effort: Pulmonary effort is normal. No respiratory distress.      Breath sounds: Normal breath sounds. No stridor. No wheezing, rhonchi or rales.   Chest:      Chest wall: No tenderness.   Abdominal:      General: Bowel sounds are normal. There is no distension.      Palpations: Abdomen is soft. There is no mass.      Tenderness: There is no abdominal tenderness. There is no right CVA tenderness, left CVA tenderness, guarding or rebound.      Hernia: No hernia is present.   Musculoskeletal:         General: No deformity.      Cervical back: Neck supple. No rigidity.      Right lower leg: Edema present.       Left lower leg: Edema present.      Comments: + foot wound, boot in place   Lymphadenopathy:      Cervical: No cervical adenopathy.   Skin:     Capillary Refill: Capillary refill takes less than 2 seconds.      Coloration: Skin is not jaundiced or pale.      Findings: Rash present.      Comments: + bilateral, diffuse, urticarial rash, + dermatographism   Neurological:      Mental Status: He is alert and oriented to person, place, and time.      Cranial Nerves: No cranial nerve deficit.      Sensory: No sensory deficit.      Motor: Weakness present.      Coordination: Coordination normal.      Gait: Gait abnormal.   Psychiatric:         Mood and Affect: Mood normal.         Behavior: Behavior normal.         Thought Content: Thought content normal.         Judgment: Judgment normal.           Significant Labs: CBC:   Recent Labs   Lab 11/02/22 0529   WBC 7.29   HGB 11.9*   HCT 37.9*   *     CMP:   Recent Labs   Lab 11/02/22  0529      K 4.2      CO2 30*   *   BUN 18   CREATININE 0.8   CALCIUM 8.6*   PROT 6.6   ALBUMIN 2.4*   BILITOT 0.6   ALKPHOS 100   AST 12   ALT 19   ANIONGAP 4*         Significant Imaging: I have reviewed all pertinent imaging results/findings within the past 24 hours.      Assessment/Plan:      * CVA (cerebral vascular accident)  Continue rehab efforts and current medications for risk reduction.      Rash  Strong suspicion for drug eruption.  I have reviewed all new medications with his primary.  Stopping amiodarone today.      Cerebellar dysmetria  Continue rehab efforts.      A-fib  Stopping amiodarone today because of rash and possible drug eruption patient is in a normal sinus rhythm with controlled rate.  Rate still controlled.    Diabetic foot ulcer  Patient's FSGs are controlled on current medication regimen.  Last A1c reviewed-   Lab Results   Component Value Date    HGBA1C 9.2 (H) 10/21/2022     Most recent fingerstick glucose reviewed-   Recent Labs   Lab  11/02/22  1151 11/02/22  1632 11/02/22  1949 11/03/22  0721   POCTGLUCOSE 140* 155* 182* 81     Current correctional scale  High  Maintain anti-hyperglycemic dose as follows-   Antihyperglycemics (From admission, onward)    Start     Stop Route Frequency Ordered    11/01/22 0900  dulaglutide pen injector 4.5 mg         -- SubQ Every 7 days 10/31/22 1755    10/31/22 2100  insulin detemir U-100 pen 35 Units         -- SubQ 2 times daily 10/31/22 1133    10/31/22 2100  metFORMIN tablet 1,000 mg         -- Oral 2 times daily 10/31/22 1755    10/31/22 1132  insulin aspart U-100 pen 0-5 Units         -- SubQ Before meals & nightly PRN 10/31/22 1133        Hold Oral hypoglycemics while patient is in the hospital.    Bacteremia due to methicillin susceptible Staphylococcus aureus (MSSA)  Continue current antibiotics but I am concerned about his drug eruption and this may need to be changed.  Rash seems to be improving with no abx change.    Type 2 diabetes mellitus with diabetic polyneuropathy, with long-term current use of insulin  Patient's FSGs are controlled on current medication regimen.  Last A1c reviewed-   Lab Results   Component Value Date    HGBA1C 9.2 (H) 10/21/2022     Most recent fingerstick glucose reviewed-   Recent Labs   Lab 11/02/22  1151 11/02/22  1632 11/02/22  1949 11/03/22  0721   POCTGLUCOSE 140* 155* 182* 81     Current correctional scale  High  Maintain anti-hyperglycemic dose as follows-   Antihyperglycemics (From admission, onward)    Start     Stop Route Frequency Ordered    11/01/22 0900  dulaglutide pen injector 4.5 mg         -- SubQ Every 7 days 10/31/22 1755    10/31/22 2100  insulin detemir U-100 pen 35 Units         -- SubQ 2 times daily 10/31/22 1133    10/31/22 2100  metFORMIN tablet 1,000 mg         -- Oral 2 times daily 10/31/22 1755    10/31/22 1132  insulin aspart U-100 pen 0-5 Units         -- SubQ Before meals & nightly PRN 10/31/22 1133        Hold Oral hypoglycemics while patient  is in the hospital.    Hyperlipidemia  Continue home medications.      Essential hypertension  BP stable, will adjust PRN.    Morbid obesity with BMI of 50.0-59.9, adult  Body mass index is 48.37 kg/m². Morbid obesity complicates all aspects of disease management from diagnostic modalities to treatment. Weight loss encouraged and health benefits explained to patient.           VTE Risk Mitigation (From admission, onward)         Ordered     apixaban tablet 5 mg  2 times daily         10/31/22 1133     IP VTE HIGH RISK PATIENT  Once         10/31/22 1133     Place sequential compression device  Until discontinued         10/31/22 1133                Discharge Planning   MICHAEL:      Code Status: Full Code   Is the patient medically ready for discharge?:     Reason for patient still in hospital (select all that apply): Patient trending condition  Discharge Plan A: Home with family                  Gerardo Montoya NP  Department of Hospital Medicine   [facility] ([facility])

## 2022-11-03 NOTE — SUBJECTIVE & OBJECTIVE
Past Medical History:   Diagnosis Date    Acute renal failure with tubular necrosis 10/22/2022    Diabetes mellitus, type 2     Fever 10/22/2022    Hyperlipidemia     Hypertension     Hyponatremia 10/22/2022    Obese     Peripheral neuropathy        Past Surgical History:   Procedure Laterality Date    APPENDECTOMY      HERNIA REPAIR      INCISION AND DRAINAGE FOOT Bilateral 2018    Procedure: INCISION AND DRAINAGE, FOOT;  Surgeon: Rob Alas DPM;  Location: [facility] OR;  Service: Podiatry;  Laterality: Bilateral;    TOE AMPUTATION      right great toe    TOE AMPUTATION Left 2018    Procedure: AMPUTATION, TOE; left 3rd toe;  Surgeon: Rob Alas DPM;  Location: [facility] OR;  Service: Podiatry;  Laterality: Left;       Review of patient's allergies indicates:  No Known Allergies    No current facility-administered medications on file prior to encounter.     Current Outpatient Medications on File Prior to Encounter   Medication Sig    amLODIPine (NORVASC) 5 MG tablet Take 5 mg by mouth.    aspirin (ECOTRIN) 81 MG EC tablet Take 81 mg by mouth once daily.    atorvastatin (LIPITOR) 40 MG tablet Take 40 mg by mouth.    ceFAZolin 2 g/50mL Dextrose IVPB (ANCEF) 2 gram/50 mL PgBk Inject 50 mLs (2,000 mg total) into the vein every 8 (eight) hours. for 13 days    cholecalciferol, vitamin D3, (VITAMIN D3) 25 mcg (1,000 unit) capsule 1 capsule    DULoxetine (CYMBALTA) 30 MG capsule Take 30 mg by mouth once daily.    JARDIANCE 25 mg tablet Take 25 mg by mouth once daily.    lactobacillus acidophilus & bulgar (LACTINEX) 100 million cell packet Take 1 packet (1 each total) by mouth 2 (two) times daily.    LANTUS SOLOSTAR U-100 INSULIN glargine 100 units/mL SubQ pen SMARTSI Unit(s) SUB-Q Twice Daily    losartan (COZAAR) 25 MG tablet Take 4 tablets (100 mg total) by mouth once daily.    metFORMIN (GLUCOPHAGE) 1000 MG tablet Take 1,000 mg by mouth 2 (two) times daily.    mupirocin (BACTROBAN) 2 % ointment  Apply topically 2 (two) times daily.    psyllium husk, with sugar, 3.4 gram packet Take 1 packet by mouth 3 (three) times daily.    TRULICITY 4.5 mg/0.5 mL pen injector Inject into the skin.     Family History    None       Tobacco Use    Smoking status: Never    Smokeless tobacco: Never   Substance and Sexual Activity    Alcohol use: No    Drug use: No    Sexual activity: Yes     Partners: Female     Comment:      Review of Systems   Constitutional:  Positive for activity change, appetite change and fatigue. Negative for chills and fever.   HENT:  Negative for ear pain, mouth sores, nosebleeds and sore throat.    Eyes:  Negative for visual disturbance.   Respiratory:  Positive for shortness of breath. Negative for cough and wheezing.    Cardiovascular:  Positive for leg swelling. Negative for chest pain and palpitations.   Gastrointestinal:  Positive for constipation. Negative for abdominal distention, abdominal pain, blood in stool, diarrhea, nausea and vomiting.   Endocrine: Negative for cold intolerance, heat intolerance and polyphagia.   Genitourinary:  Negative for difficulty urinating, dysuria, flank pain and frequency.   Musculoskeletal:  Positive for arthralgias. Negative for back pain and myalgias.   Skin:  Positive for rash.   Neurological:  Positive for weakness. Negative for dizziness, tremors, seizures, syncope, facial asymmetry, speech difficulty and headaches.   Hematological:  Negative for adenopathy.   Psychiatric/Behavioral:  Negative for agitation, confusion and hallucinations. The patient is not nervous/anxious.    Objective:     Vital Signs (Most Recent):  Temp: 97.6 °F (36.4 °C) (11/03/22 0512)  Pulse: 75 (11/03/22 0822)  Resp: 18 (11/03/22 0512)  BP: (!) 151/68 (11/03/22 0822)  SpO2: 96 % (11/03/22 0512) Vital Signs (24h Range):  Temp:  [97.6 °F (36.4 °C)-97.8 °F (36.6 °C)] 97.6 °F (36.4 °C)  Pulse:  [66-75] 75  Resp:  [18-20] 18  SpO2:  [95 %-96 %] 96 %  BP: (123-151)/(64-68) 151/68      Weight: (!) 166.3 kg (366 lb 10 oz)  Body mass index is 48.37 kg/m².    Physical Exam  Constitutional:       General: He is not in acute distress.     Appearance: He is obese. He is ill-appearing.   HENT:      Head: Normocephalic and atraumatic.      Right Ear: External ear normal.      Left Ear: External ear normal.      Nose: Nose normal. No congestion or rhinorrhea.      Mouth/Throat:      Mouth: Mucous membranes are moist.      Pharynx: No oropharyngeal exudate.   Eyes:      General: No scleral icterus.     Extraocular Movements: Extraocular movements intact.   Neck:      Vascular: No carotid bruit.   Cardiovascular:      Rate and Rhythm: Normal rate and regular rhythm.      Heart sounds: Normal heart sounds. No murmur heard.    No friction rub. No gallop.   Pulmonary:      Effort: Pulmonary effort is normal. No respiratory distress.      Breath sounds: Normal breath sounds. No stridor. No wheezing, rhonchi or rales.   Chest:      Chest wall: No tenderness.   Abdominal:      General: Bowel sounds are normal. There is no distension.      Palpations: Abdomen is soft. There is no mass.      Tenderness: There is no abdominal tenderness. There is no right CVA tenderness, left CVA tenderness, guarding or rebound.      Hernia: No hernia is present.   Musculoskeletal:         General: No deformity.      Cervical back: Neck supple. No rigidity.      Right lower leg: Edema present.      Left lower leg: Edema present.      Comments: + foot wound, boot in place   Lymphadenopathy:      Cervical: No cervical adenopathy.   Skin:     Capillary Refill: Capillary refill takes less than 2 seconds.      Coloration: Skin is not jaundiced or pale.      Findings: Rash present.      Comments: + bilateral, diffuse, urticarial rash, + dermatographism   Neurological:      Mental Status: He is alert and oriented to person, place, and time.      Cranial Nerves: No cranial nerve deficit.      Sensory: No sensory deficit.      Motor:  Weakness present.      Coordination: Coordination normal.      Gait: Gait abnormal.   Psychiatric:         Mood and Affect: Mood normal.         Behavior: Behavior normal.         Thought Content: Thought content normal.         Judgment: Judgment normal.           Significant Labs: CBC:   Recent Labs   Lab 11/02/22  0529   WBC 7.29   HGB 11.9*   HCT 37.9*   *     CMP:   Recent Labs   Lab 11/02/22  0529      K 4.2      CO2 30*   *   BUN 18   CREATININE 0.8   CALCIUM 8.6*   PROT 6.6   ALBUMIN 2.4*   BILITOT 0.6   ALKPHOS 100   AST 12   ALT 19   ANIONGAP 4*         Significant Imaging: I have reviewed all pertinent imaging results/findings within the past 24 hours.

## 2022-11-03 NOTE — PLAN OF CARE
Problem: Adult Behavioral Health Plan of Care  Goal: Patient-Specific Goal (Individualization)  Outcome: Ongoing, Progressing  Goal: Adheres to Safety Considerations for Self and Others  Outcome: Ongoing, Progressing  Goal: Absence of New-Onset Illness or Injury  Outcome: Ongoing, Progressing  Goal: Optimized Coping Skills in Response to Life Stressors  Outcome: Ongoing, Progressing  Goal: Develops/Participates in Therapeutic [place] to Support Successful Transition  Outcome: Ongoing, Progressing  Goal: Rounds/Family Conference  Outcome: Ongoing, Progressing     Problem: Diabetes Comorbidity  Goal: Blood Glucose Level Within Targeted Range  Outcome: Ongoing, Progressing     Problem: Bariatric Environmental Safety  Goal: Safety Maintained with Care  Outcome: Ongoing, Progressing     Problem: Impaired Wound Healing  Goal: Optimal Wound Healing  Outcome: Ongoing, Progressing     Problem: Adjustment to Illness (Stroke, Ischemic/Transient Ischemic Attack)  Goal: Optimal Coping  Outcome: Ongoing, Progressing     Problem: Bowel Elimination Impaired (Stroke, Ischemic/Transient Ischemic Attack)  Goal: Effective Bowel Elimination  Outcome: Ongoing, Progressing     Problem: Cerebral Tissue Perfusion (Stroke, Ischemic/Transient Ischemic Attack)  Goal: Optimal Cerebral Tissue Perfusion  Outcome: Ongoing, Progressing     Problem: Cognitive Impairment (Stroke, Ischemic/Transient Ischemic Attack)  Goal: Optimal Cognitive Function  Outcome: Ongoing, Progressing     Problem: Communication Impairment (Stroke, Ischemic/Transient Ischemic Attack)  Goal: Improved Communication Skills  Outcome: Ongoing, Progressing     Problem: Functional Ability Impaired (Stroke, Ischemic/Transient Ischemic Attack)  Goal: Optimal Functional Ability  Outcome: Ongoing, Progressing     Problem: Respiratory Compromise (Stroke, Ischemic/Transient Ischemic Attack)  Goal: Effective Oxygenation and Ventilation  Outcome: Ongoing, Progressing     Problem:  Sensorimotor Impairment (Stroke, Ischemic/Transient Ischemic Attack)  Goal: Improved Sensorimotor Function  Outcome: Ongoing, Progressing     Problem: Swallowing Impairment (Stroke, Ischemic/Transient Ischemic Attack)  Goal: Optimal Eating and Swallowing without Aspiration  Outcome: Ongoing, Progressing     Problem: Urinary Elimination Impaired (Stroke, Ischemic/Transient Ischemic Attack)  Goal: Effective Urinary Elimination  Outcome: Ongoing, Progressing     Problem: Skin Injury Risk Increased  Goal: Skin Health and Integrity  Outcome: Ongoing, Progressing     Problem: Mobility Impairment  Goal: Optimal Mobility  Outcome: Ongoing, Progressing     Problem: Fall Injury Risk  Goal: Absence of Fall and Fall-Related Injury  Outcome: Ongoing, Progressing     Problem: Pain Acute  Goal: Acceptable Pain Control and Functional Ability  Outcome: Ongoing, Progressing     Problem: Rehabilitation (IRF) Plan of Care  Goal: Plan of Care Review  Outcome: Ongoing, Progressing  Goal: Patient-Specific Goal (Individualized)  Outcome: Ongoing, Progressing  Goal: Absence of New-Onset Illness or Injury  Outcome: Ongoing, Progressing  Goal: Optimal Comfort and Wellbeing  Outcome: Ongoing, Progressing  Goal: Readiness for Transition of Care  Outcome: Ongoing, Progressing

## 2022-11-03 NOTE — ASSESSMENT & PLAN NOTE
Patient's FSGs are controlled on current medication regimen.  Last A1c reviewed-   Lab Results   Component Value Date    HGBA1C 9.2 (H) 10/21/2022     Most recent fingerstick glucose reviewed-   Recent Labs   Lab 11/02/22  1151 11/02/22  1632 11/02/22  1949 11/03/22  0721   POCTGLUCOSE 140* 155* 182* 81     Current correctional scale  High  Maintain anti-hyperglycemic dose as follows-   Antihyperglycemics (From admission, onward)    Start     Stop Route Frequency Ordered    11/01/22 0900  dulaglutide pen injector 4.5 mg         -- SubQ Every 7 days 10/31/22 1755    10/31/22 2100  insulin detemir U-100 pen 35 Units         -- SubQ 2 times daily 10/31/22 1133    10/31/22 2100  metFORMIN tablet 1,000 mg         -- Oral 2 times daily 10/31/22 1755    10/31/22 1132  insulin aspart U-100 pen 0-5 Units         -- SubQ Before meals & nightly PRN 10/31/22 1133        Hold Oral hypoglycemics while patient is in the hospital.

## 2022-11-03 NOTE — PROGRESS NOTES
[facility] Medicine  Progress Note    Patient Name: Andrew Del Cid Jr.  MRN: [id number]  Patient Class: IP- Rehab   Admission Date: 10/31/2022  Length of Stay: 3 days  Attending Physician: Cl Mittal III, MD  Primary Care Provider: Olena Thornton DO        Subjective:     Principal Problem:CVA (cerebral vascular accident)        HPI:  Patient is a 57-year-old male with a past medical history of type 2 diabetes hypertension morbid obesity neuropathy and a right lower extremity foot ulcer who presented to our acute care facility with sepsis.  He was found to have MSSA bacteremia and started on IV Ancef.  He was discharged home on IV antibiotics and then return back to the ED with an acute neurological change.  Patient had left-sided hemiparesis had falls and on evaluation and CT scan was found to have an acute right-sided CV A.  Patient was admitted underwent diagnostic testing had an MRI of the brain was also found to have newly diagnosed atrial fibrillation.  The patient was seen by cardiology eventually started on amiodarone and a CTA of the head neck and brain were ordered because he was unable to complete his MRI because of his size and weight.  The patient was seen by Podiatry a Cam boot was ordered to assist with offloading weight-bearing and ultimately the patient was stabilized and we were asked to assist with rehabilitative care.  The patient was transferred on IV Ancef wound care orders and has had a rapid recovery in his left-sided symptoms.  On evaluation this morning the patient has a new issue which is a diffuse bilateral symmetric starting centrally extending peripherally rash.  He has multiple areas of urticarial type lesions therapeutic he has dermatographia some where he has had the telemetry stickers placed as well as other tape that was placed for various parts of his care.  The patient is only complaining of some itchiness from the rash.  The patient's  therapist to bathe him yesterday states the rash was not present a call and spoke to his primary care physician and reviewed reviewed all new medications that was started which include amiodarone.        Overview/Hospital Course:  11/2/22 FM:  Patient's rash seems to be less intense and overall improved.  The patient is tolerating therapy states he feels he is getting stronger and overall improving.  Podiatry notes reviewed we are continuing antibiotics.      Past Medical History:   Diagnosis Date    Acute renal failure with tubular necrosis 10/22/2022    Diabetes mellitus, type 2     Fever 10/22/2022    Hyperlipidemia     Hypertension     Hyponatremia 10/22/2022    Obese     Peripheral neuropathy        Past Surgical History:   Procedure Laterality Date    APPENDECTOMY      HERNIA REPAIR      INCISION AND DRAINAGE FOOT Bilateral 12/26/2018    Procedure: INCISION AND DRAINAGE, FOOT;  Surgeon: Rob Alas DPM;  Location: [facility];  Service: Podiatry;  Laterality: Bilateral;    TOE AMPUTATION      right great toe    TOE AMPUTATION Left 12/26/2018    Procedure: AMPUTATION, TOE; left 3rd toe;  Surgeon: Rob Alas DPM;  Location: [facility] OR;  Service: Podiatry;  Laterality: Left;       Review of patient's allergies indicates:  No Known Allergies    No current facility-administered medications on file prior to encounter.     Current Outpatient Medications on File Prior to Encounter   Medication Sig    amLODIPine (NORVASC) 5 MG tablet Take 5 mg by mouth.    aspirin (ECOTRIN) 81 MG EC tablet Take 81 mg by mouth once daily.    atorvastatin (LIPITOR) 40 MG tablet Take 40 mg by mouth.    ceFAZolin 2 g/50mL Dextrose IVPB (ANCEF) 2 gram/50 mL PgBk Inject 50 mLs (2,000 mg total) into the vein every 8 (eight) hours. for 13 days    cholecalciferol, vitamin D3, (VITAMIN D3) 25 mcg (1,000 unit) capsule 1 capsule    DULoxetine (CYMBALTA) 30 MG capsule Take 30 mg by mouth once daily.    JARDIANCE 25 mg  tablet Take 25 mg by mouth once daily.    lactobacillus acidophilus & bulgar (LACTINEX) 100 million cell packet Take 1 packet (1 each total) by mouth 2 (two) times daily.    LANTUS SOLOSTAR U-100 INSULIN glargine 100 units/mL SubQ pen SMARTSI Unit(s) SUB-Q Twice Daily    losartan (COZAAR) 25 MG tablet Take 4 tablets (100 mg total) by mouth once daily.    metFORMIN (GLUCOPHAGE) 1000 MG tablet Take 1,000 mg by mouth 2 (two) times daily.    mupirocin (BACTROBAN) 2 % ointment Apply topically 2 (two) times daily.    psyllium husk, with sugar, 3.4 gram packet Take 1 packet by mouth 3 (three) times daily.    TRULICITY 4.5 mg/0.5 mL pen injector Inject into the skin.     Family History    None       Tobacco Use    Smoking status: Never    Smokeless tobacco: Never   Substance and Sexual Activity    Alcohol use: No    Drug use: No    Sexual activity: Yes     Partners: Female     Comment:      Review of Systems   Constitutional:  Positive for activity change, appetite change and fatigue. Negative for chills and fever.   HENT:  Negative for ear pain, mouth sores, nosebleeds and sore throat.    Eyes:  Negative for visual disturbance.   Respiratory:  Positive for shortness of breath. Negative for cough and wheezing.    Cardiovascular:  Positive for leg swelling. Negative for chest pain and palpitations.   Gastrointestinal:  Positive for constipation. Negative for abdominal distention, abdominal pain, blood in stool, diarrhea, nausea and vomiting.   Endocrine: Negative for cold intolerance, heat intolerance and polyphagia.   Genitourinary:  Negative for difficulty urinating, dysuria, flank pain and frequency.   Musculoskeletal:  Positive for arthralgias. Negative for back pain and myalgias.   Skin:  Positive for rash.   Neurological:  Positive for weakness. Negative for dizziness, tremors, seizures, syncope, facial asymmetry, speech difficulty and headaches.   Hematological:  Negative for adenopathy.    Psychiatric/Behavioral:  Negative for agitation, confusion and hallucinations. The patient is not nervous/anxious.    Objective:     Vital Signs (Most Recent):  Temp: 97.6 °F (36.4 °C) (11/03/22 0512)  Pulse: 75 (11/03/22 0822)  Resp: 18 (11/03/22 0512)  BP: (!) 151/68 (11/03/22 0822)  SpO2: 96 % (11/03/22 0512) Vital Signs (24h Range):  Temp:  [97.6 °F (36.4 °C)-97.8 °F (36.6 °C)] 97.6 °F (36.4 °C)  Pulse:  [66-75] 75  Resp:  [18-20] 18  SpO2:  [95 %-96 %] 96 %  BP: (123-151)/(64-68) 151/68     Weight: (!) 166.3 kg (366 lb 10 oz)  Body mass index is 48.37 kg/m².    Physical Exam  Constitutional:       General: He is not in acute distress.     Appearance: He is obese. He is ill-appearing.   HENT:      Head: Normocephalic and atraumatic.      Right Ear: External ear normal.      Left Ear: External ear normal.      Nose: Nose normal. No congestion or rhinorrhea.      Mouth/Throat:      Mouth: Mucous membranes are moist.      Pharynx: No oropharyngeal exudate.   Eyes:      General: No scleral icterus.     Extraocular Movements: Extraocular movements intact.   Neck:      Vascular: No carotid bruit.   Cardiovascular:      Rate and Rhythm: Normal rate and regular rhythm.      Heart sounds: Normal heart sounds. No murmur heard.    No friction rub. No gallop.   Pulmonary:      Effort: Pulmonary effort is normal. No respiratory distress.      Breath sounds: Normal breath sounds. No stridor. No wheezing, rhonchi or rales.   Chest:      Chest wall: No tenderness.   Abdominal:      General: Bowel sounds are normal. There is no distension.      Palpations: Abdomen is soft. There is no mass.      Tenderness: There is no abdominal tenderness. There is no right CVA tenderness, left CVA tenderness, guarding or rebound.      Hernia: No hernia is present.   Musculoskeletal:         General: No deformity.      Cervical back: Neck supple. No rigidity.      Right lower leg: Edema present.      Left lower leg: Edema present.       Comments: + foot wound, boot in place   Lymphadenopathy:      Cervical: No cervical adenopathy.   Skin:     Capillary Refill: Capillary refill takes less than 2 seconds.      Coloration: Skin is not jaundiced or pale.      Findings: Rash present.      Comments: + bilateral, diffuse, urticarial rash, + dermatographism   Neurological:      Mental Status: He is alert and oriented to person, place, and time.      Cranial Nerves: No cranial nerve deficit.      Sensory: No sensory deficit.      Motor: Weakness present.      Coordination: Coordination normal.      Gait: Gait abnormal.   Psychiatric:         Mood and Affect: Mood normal.         Behavior: Behavior normal.         Thought Content: Thought content normal.         Judgment: Judgment normal.           Significant Labs: CBC:   Recent Labs   Lab 11/02/22 0529   WBC 7.29   HGB 11.9*   HCT 37.9*   *     CMP:   Recent Labs   Lab 11/02/22 0529      K 4.2      CO2 30*   *   BUN 18   CREATININE 0.8   CALCIUM 8.6*   PROT 6.6   ALBUMIN 2.4*   BILITOT 0.6   ALKPHOS 100   AST 12   ALT 19   ANIONGAP 4*         Significant Imaging: I have reviewed all pertinent imaging results/findings within the past 24 hours.      Assessment/Plan:      * CVA (cerebral vascular accident)  Continue rehab efforts and current medications for risk reduction.      Rash  Strong suspicion for drug eruption.  I have reviewed all new medications with his primary.  Stopping amiodarone today.      Cerebellar dysmetria  Continue rehab efforts.      A-fib  Stopping amiodarone today because of rash and possible drug eruption patient is in a normal sinus rhythm with controlled rate.  Rate still controlled.    Diabetic foot ulcer  Patient's FSGs are controlled on current medication regimen.  Last A1c reviewed-   Lab Results   Component Value Date    HGBA1C 9.2 (H) 10/21/2022     Most recent fingerstick glucose reviewed-   Recent Labs   Lab 11/02/22  1151 11/02/22  1632  11/02/22 1949 11/03/22  0721   POCTGLUCOSE 140* 155* 182* 81     Current correctional scale  High  Maintain anti-hyperglycemic dose as follows-   Antihyperglycemics (From admission, onward)    Start     Stop Route Frequency Ordered    11/01/22 0900  dulaglutide pen injector 4.5 mg         -- SubQ Every 7 days 10/31/22 1755    10/31/22 2100  insulin detemir U-100 pen 35 Units         -- SubQ 2 times daily 10/31/22 1133    10/31/22 2100  metFORMIN tablet 1,000 mg         -- Oral 2 times daily 10/31/22 1755    10/31/22 1132  insulin aspart U-100 pen 0-5 Units         -- SubQ Before meals & nightly PRN 10/31/22 1133        Hold Oral hypoglycemics while patient is in the hospital.    Bacteremia due to methicillin susceptible Staphylococcus aureus (MSSA)  Continue current antibiotics but I am concerned about his drug eruption and this may need to be changed.  Rash seems to be improving with no abx change.    Type 2 diabetes mellitus with diabetic polyneuropathy, with long-term current use of insulin  Patient's FSGs are controlled on current medication regimen.  Last A1c reviewed-   Lab Results   Component Value Date    HGBA1C 9.2 (H) 10/21/2022     Most recent fingerstick glucose reviewed-   Recent Labs   Lab 11/02/22  1151 11/02/22  1632 11/02/22 1949 11/03/22  0721   POCTGLUCOSE 140* 155* 182* 81     Current correctional scale  High  Maintain anti-hyperglycemic dose as follows-   Antihyperglycemics (From admission, onward)    Start     Stop Route Frequency Ordered    11/01/22 0900  dulaglutide pen injector 4.5 mg         -- SubQ Every 7 days 10/31/22 1755    10/31/22 2100  insulin detemir U-100 pen 35 Units         -- SubQ 2 times daily 10/31/22 1133    10/31/22 2100  metFORMIN tablet 1,000 mg         -- Oral 2 times daily 10/31/22 1755    10/31/22 1132  insulin aspart U-100 pen 0-5 Units         -- SubQ Before meals & nightly PRN 10/31/22 1133        Hold Oral hypoglycemics while patient is in the  hospital.    Hyperlipidemia  Continue home medications.      Essential hypertension  BP stable, will adjust PRN.    Morbid obesity with BMI of 50.0-59.9, adult  Body mass index is 48.37 kg/m². Morbid obesity complicates all aspects of disease management from diagnostic modalities to treatment. Weight loss encouraged and health benefits explained to patient.           VTE Risk Mitigation (From admission, onward)         Ordered     apixaban tablet 5 mg  2 times daily         10/31/22 1133     IP VTE HIGH RISK PATIENT  Once         10/31/22 1133     Place sequential compression device  Until discontinued         10/31/22 1133                Discharge Planning   MICHAEL:      Code Status: Full Code   Is the patient medically ready for discharge?:     Reason for patient still in hospital (select all that apply): Patient trending condition  Discharge Plan A: Home with family                  Cl Mittal III, MD  Department of Hospital Medicine   [facility] ([facility])

## 2022-11-03 NOTE — HOSPITAL COURSE
11/2/22 FM:  Patient's rash seems to be less intense and overall improved.  The patient is tolerating therapy states he feels he is getting stronger and overall improving.  Podiatry notes reviewed we are continuing antibiotics.    11/03/22 DL:  Patient reports difficulty participating in therapy due to increased generalized.  Orders placed for PRN tramadol.  Despite pain, patient has been the patient has been progressing well per the therapist.  Patient's rash has not continued to spread.    11/04/22 FM:  Patient is doing well, tolerating therapy, making gains, tolerating abx, rash seems unchanged.  Continue current efforts.    11/6/22 FM:  Patient rash has resolved, mood geat, doing well with treatment plan.  Encouraged patient to keep it up, home next week.    11/7/22 FM:  Patient mood is good, abx completed, will remove PIV, patient rash has resolved.  No furthur Afib.  Patient doing well with therapy and making good function gains.  Patient DC in am.    Discharge Note:  Patient had a good functional recovery.  Patient participated with therapy met his time requirements and overall made good functional gains.  Patient's safety strength endurance gait all improved.  Patient completed antibiotics during this admission for several problems including a positive blood culture and a foot infection started by Podiatry.  Patient will be discharged home with follow-up with multiple specialists and his primary care doctor we have encouraged him to keep working on therapy and to follow instructed wound care orders.  Patient's mood was good blood sugars and blood pressures were all stable.

## 2022-11-03 NOTE — PT/OT/SLP PROGRESS
Physical Therapy         Treatment        Andrew Del Cid Jr.   MRN: [id number]     PT Received On: 11/03/22  PT Start Time: 1315     PT Stop Time: 1430    PT Total Time (min): 75 min       Billable Minutes:  Gait [id number], Therapeutic Activity 15, and Therapeutic Exercise 30  Total Minutes: 75    Treatment Type: Treatment  PT/PTA: PT             General Precautions: Standard, fall  Orthopedic Precautions: Orthopedic Precautions :  (CAM boot (R) LE for foot wound, (L) knee brace due to buckling of (L) knee occasionally)   Braces:      Spiritual, Cultural Beliefs, Denominational Practices, Values that Affect Care: no    Subjective:  Communicated with nurse prior to session.         Objective:  Patient found in bed with      Functional Mobility:  Bed Mobility:   Supine to sit: Modified Independent   Sit to supine: Modified Independent   Rolling: Modified Independent   Scooting: Modified Independent    Balance:   Static Sit: GOOD: Takes MODERATE challenges from all directions  Dynamic Sit:  GOOD: Maintains balance through MODERATE excursions of active trunk movement  Static Stand: FAIR+: Takes MINIMAL challenges from all directions  Dynamic stand: FAIR: Needs CONTACT GUARD during gait    Transfer Training:  Sit to stand:Contact Guard Assistance with Rolling Walker    Bed <> Chair:  Step Transfer with Contact Guard Assistance with Rolling Walker      Wheelchair Training:  Ind with wc mobility 150ft    Gait Training:  Ambulated 100ft x 3 attempts with rw with sba/cga    Stair Training:  No steps today      Additional Treatment:  Pt used 6lb ankle wts on each leg and performed in sitting 3 sets 15 reps , followed by standing in parallel bars where pt performed knee lifts with each leg 15 times consecutively before switching to other leg. He then performed wc mobility using just his leg with 6lb ankle wts on him 3 sets 100ft    Activity Tolerance:  Patient tolerated treatment well    Patient left supine with call button in  reach.    Assessment:  Andrew Del Cid Jr. is a 57 y.o. male with a medical diagnosis of CVA (cerebral vascular accident). He presents with  doing better with consistent walks of at least 100ft with a tall RW, and he did well with increase in ex intensity once again    Rehab potential is good.    Activity tolerance: Good    Discharge recommendations:       Equipment recommendations:       GOALS:   Multidisciplinary Problems       Physical Therapy Goals          Problem: Physical Therapy    Goal Priority Disciplines Outcome Goal Variances Interventions   Physical Therapy Goal     PT, PT/OT      Description: Goals to be met by: 2022     Patient will increase functional independence with mobility by performin. Sit to stand transfer with [place]  2. Bed to chair transfer with [place] using No Assistive Device  3. Gait  x 200+ feet with Modified [place] using Rolling Walker.   4. Wheelchair propulsion x150+ feet with Modified [place] using bilateral lower extremities  5. Ascend/descend 12 stair with bilateral Handrails Modified [place] using No Assistive Device.   6. Ascend/Descend standard curb step with Modified [place] using Rolling Walker.                       PLAN:    Patient to be seen 5 x/week  to address the above listed problems via gait training, therapeutic activities, therapeutic exercises, therapeutic groups, neuromuscular re-education, wheelchair management/training  Plan of Care expires: 22  Plan of Care reviewed with: patient         11/3/2022

## 2022-11-03 NOTE — SUBJECTIVE & OBJECTIVE
Past Medical History:   Diagnosis Date    Acute renal failure with tubular necrosis 10/22/2022    Diabetes mellitus, type 2     Fever 10/22/2022    Hyperlipidemia     Hypertension     Hyponatremia 10/22/2022    Obese     Peripheral neuropathy        Past Surgical History:   Procedure Laterality Date    APPENDECTOMY      HERNIA REPAIR      INCISION AND DRAINAGE FOOT Bilateral 2018    Procedure: INCISION AND DRAINAGE, FOOT;  Surgeon: Rob Alas DPM;  Location: [facility] OR;  Service: Podiatry;  Laterality: Bilateral;    TOE AMPUTATION      right great toe    TOE AMPUTATION Left 2018    Procedure: AMPUTATION, TOE; left 3rd toe;  Surgeon: Rob Alas DPM;  Location: [facility] OR;  Service: Podiatry;  Laterality: Left;       Review of patient's allergies indicates:  No Known Allergies    No current facility-administered medications on file prior to encounter.     Current Outpatient Medications on File Prior to Encounter   Medication Sig    amLODIPine (NORVASC) 5 MG tablet Take 5 mg by mouth.    aspirin (ECOTRIN) 81 MG EC tablet Take 81 mg by mouth once daily.    atorvastatin (LIPITOR) 40 MG tablet Take 40 mg by mouth.    ceFAZolin 2 g/50mL Dextrose IVPB (ANCEF) 2 gram/50 mL PgBk Inject 50 mLs (2,000 mg total) into the vein every 8 (eight) hours. for 13 days    cholecalciferol, vitamin D3, (VITAMIN D3) 25 mcg (1,000 unit) capsule 1 capsule    DULoxetine (CYMBALTA) 30 MG capsule Take 30 mg by mouth once daily.    JARDIANCE 25 mg tablet Take 25 mg by mouth once daily.    lactobacillus acidophilus & bulgar (LACTINEX) 100 million cell packet Take 1 packet (1 each total) by mouth 2 (two) times daily.    LANTUS SOLOSTAR U-100 INSULIN glargine 100 units/mL SubQ pen SMARTSI Unit(s) SUB-Q Twice Daily    losartan (COZAAR) 25 MG tablet Take 4 tablets (100 mg total) by mouth once daily.    metFORMIN (GLUCOPHAGE) 1000 MG tablet Take 1,000 mg by mouth 2 (two) times daily.    mupirocin (BACTROBAN) 2 % ointment  Apply topically 2 (two) times daily.    psyllium husk, with sugar, 3.4 gram packet Take 1 packet by mouth 3 (three) times daily.    TRULICITY 4.5 mg/0.5 mL pen injector Inject into the skin.     Family History    None       Tobacco Use    Smoking status: Never    Smokeless tobacco: Never   Substance and Sexual Activity    Alcohol use: No    Drug use: No    Sexual activity: Yes     Partners: Female     Comment:      Review of Systems   Constitutional:  Positive for activity change, appetite change and fatigue. Negative for chills and fever.   HENT:  Negative for ear pain, mouth sores, nosebleeds and sore throat.    Eyes:  Negative for visual disturbance.   Respiratory:  Positive for shortness of breath. Negative for cough and wheezing.    Cardiovascular:  Positive for leg swelling. Negative for chest pain and palpitations.   Gastrointestinal:  Positive for constipation. Negative for abdominal distention, abdominal pain, blood in stool, diarrhea, nausea and vomiting.   Endocrine: Negative for cold intolerance, heat intolerance and polyphagia.   Genitourinary:  Negative for difficulty urinating, dysuria, flank pain and frequency.   Musculoskeletal:  Positive for arthralgias. Negative for back pain and myalgias.   Skin:  Positive for rash.   Neurological:  Positive for weakness. Negative for dizziness, tremors, seizures, syncope, facial asymmetry, speech difficulty and headaches.   Hematological:  Negative for adenopathy.   Psychiatric/Behavioral:  Negative for agitation, confusion and hallucinations. The patient is not nervous/anxious.    Objective:     Vital Signs (Most Recent):  Temp: 97.6 °F (36.4 °C) (11/03/22 0512)  Pulse: 75 (11/03/22 0822)  Resp: 18 (11/03/22 0512)  BP: (!) 151/68 (11/03/22 0822)  SpO2: 96 % (11/03/22 0512) Vital Signs (24h Range):  Temp:  [97.6 °F (36.4 °C)-97.8 °F (36.6 °C)] 97.6 °F (36.4 °C)  Pulse:  [66-79] 75  Resp:  [18-20] 18  SpO2:  [95 %-96 %] 96 %  BP: (123-151)/(62-68) 151/68      Weight: (!) 166.3 kg (366 lb 10 oz)  Body mass index is 48.37 kg/m².    Physical Exam  Constitutional:       General: He is not in acute distress.     Appearance: He is obese. He is ill-appearing.   HENT:      Head: Normocephalic and atraumatic.      Right Ear: External ear normal.      Left Ear: External ear normal.      Nose: Nose normal. No congestion or rhinorrhea.      Mouth/Throat:      Mouth: Mucous membranes are moist.      Pharynx: No oropharyngeal exudate.   Eyes:      General: No scleral icterus.     Extraocular Movements: Extraocular movements intact.   Neck:      Vascular: No carotid bruit.   Cardiovascular:      Rate and Rhythm: Normal rate and regular rhythm.      Heart sounds: Normal heart sounds. No murmur heard.    No friction rub. No gallop.   Pulmonary:      Effort: Pulmonary effort is normal. No respiratory distress.      Breath sounds: Normal breath sounds. No stridor. No wheezing, rhonchi or rales.   Chest:      Chest wall: No tenderness.   Abdominal:      General: Bowel sounds are normal. There is no distension.      Palpations: Abdomen is soft. There is no mass.      Tenderness: There is no abdominal tenderness. There is no right CVA tenderness, left CVA tenderness, guarding or rebound.      Hernia: No hernia is present.   Musculoskeletal:         General: No deformity.      Cervical back: Neck supple. No rigidity.      Right lower leg: Edema present.      Left lower leg: Edema present.      Comments: + foot wound, boot in place   Lymphadenopathy:      Cervical: No cervical adenopathy.   Skin:     Capillary Refill: Capillary refill takes less than 2 seconds.      Coloration: Skin is not jaundiced or pale.      Findings: Rash present.      Comments: + bilateral, diffuse, urticarial rash, + dermatographism   Neurological:      Mental Status: He is alert and oriented to person, place, and time.      Cranial Nerves: No cranial nerve deficit.      Sensory: No sensory deficit.      Motor:  Weakness present.      Coordination: Coordination normal.      Gait: Gait abnormal.   Psychiatric:         Mood and Affect: Mood normal.         Behavior: Behavior normal.         Thought Content: Thought content normal.         Judgment: Judgment normal.           Significant Labs: CBC:   Recent Labs   Lab 11/02/22  0529   WBC 7.29   HGB 11.9*   HCT 37.9*   *     CMP:   Recent Labs   Lab 11/02/22  0529      K 4.2      CO2 30*   *   BUN 18   CREATININE 0.8   CALCIUM 8.6*   PROT 6.6   ALBUMIN 2.4*   BILITOT 0.6   ALKPHOS 100   AST 12   ALT 19   ANIONGAP 4*         Significant Imaging: I have reviewed all pertinent imaging results/findings within the past 24 hours.

## 2022-11-03 NOTE — ASSESSMENT & PLAN NOTE
Continue current antibiotics but I am concerned about his drug eruption and this may need to be changed.  Rash seems to be improving with no abx change.

## 2022-11-03 NOTE — ASSESSMENT & PLAN NOTE
Stopping amiodarone today because of rash and possible drug eruption patient is in a normal sinus rhythm with controlled rate.  Rate still controlled.

## 2022-11-04 LAB — POCT GLUCOSE: 179 MG/DL (ref 70–110)

## 2022-11-04 PROCEDURE — 97535 SELF CARE MNGMENT TRAINING: CPT

## 2022-11-04 PROCEDURE — 97116 GAIT TRAINING THERAPY: CPT

## 2022-11-04 PROCEDURE — 94761 N-INVAS EAR/PLS OXIMETRY MLT: CPT

## 2022-11-04 PROCEDURE — 63600175 PHARM REV CODE 636 W HCPCS: Performed by: INTERNAL MEDICINE

## 2022-11-04 PROCEDURE — 97110 THERAPEUTIC EXERCISES: CPT

## 2022-11-04 PROCEDURE — 99900035 HC TECH TIME PER 15 MIN (STAT)

## 2022-11-04 PROCEDURE — 11800000 HC REHAB PRIVATE ROOM

## 2022-11-04 PROCEDURE — 94799 UNLISTED PULMONARY SVC/PX: CPT

## 2022-11-04 PROCEDURE — 97530 THERAPEUTIC ACTIVITIES: CPT

## 2022-11-04 PROCEDURE — 99900031 HC PATIENT EDUCATION (STAT)

## 2022-11-04 PROCEDURE — 25000003 PHARM REV CODE 250: Performed by: INTERNAL MEDICINE

## 2022-11-04 PROCEDURE — 25000003 PHARM REV CODE 250: Performed by: STUDENT IN AN ORGANIZED HEALTH CARE EDUCATION/TRAINING PROGRAM

## 2022-11-04 RX ADMIN — LOSARTAN POTASSIUM 50 MG: 50 TABLET, FILM COATED ORAL at 08:11

## 2022-11-04 RX ADMIN — FERROUS SULFATE TAB 325 MG (65 MG ELEMENTAL FE) 1 EACH: 325 (65 FE) TAB at 08:11

## 2022-11-04 RX ADMIN — Medication 1 PACKET: at 08:11

## 2022-11-04 RX ADMIN — Medication 1 PACKET: at 03:11

## 2022-11-04 RX ADMIN — METOPROLOL SUCCINATE 50 MG: 50 TABLET, EXTENDED RELEASE ORAL at 08:11

## 2022-11-04 RX ADMIN — ATORVASTATIN CALCIUM 40 MG: 40 TABLET, FILM COATED ORAL at 08:11

## 2022-11-04 RX ADMIN — APIXABAN 5 MG: 2.5 TABLET, FILM COATED ORAL at 08:11

## 2022-11-04 RX ADMIN — CHOLECALCIFEROL TAB 125 MCG (5000 UNIT) 5000 UNITS: 125 TAB at 08:11

## 2022-11-04 RX ADMIN — METOCLOPRAMIDE 10 MG: 10 TABLET ORAL at 10:11

## 2022-11-04 RX ADMIN — MICONAZOLE NITRATE 2 % TOPICAL POWDER: at 09:11

## 2022-11-04 RX ADMIN — METFORMIN HYDROCHLORIDE 1000 MG: 1000 TABLET, FILM COATED ORAL at 08:11

## 2022-11-04 RX ADMIN — MICONAZOLE NITRATE 2 % TOPICAL POWDER: at 08:11

## 2022-11-04 RX ADMIN — MEROPENEM 1 G: 1 INJECTION, POWDER, FOR SOLUTION INTRAVENOUS at 09:11

## 2022-11-04 RX ADMIN — CEFAZOLIN SODIUM 2 G: 2 SOLUTION INTRAVENOUS at 11:11

## 2022-11-04 RX ADMIN — MUPIROCIN: 20 OINTMENT TOPICAL at 08:11

## 2022-11-04 RX ADMIN — ASPIRIN 81 MG: 81 TABLET, COATED ORAL at 08:11

## 2022-11-04 RX ADMIN — INSULIN DETEMIR 35 UNITS: 100 INJECTION, SOLUTION SUBCUTANEOUS at 08:11

## 2022-11-04 RX ADMIN — Medication 400 MG: at 08:11

## 2022-11-04 RX ADMIN — LACTOBACILLUS ACIDOPHILUS / LACTOBACILLUS BULGARICUS 1 EACH: 100 MILLION CFU STRENGTH GRANULES at 08:11

## 2022-11-04 RX ADMIN — METOCLOPRAMIDE 10 MG: 10 TABLET ORAL at 03:11

## 2022-11-04 RX ADMIN — METOCLOPRAMIDE 10 MG: 10 TABLET ORAL at 05:11

## 2022-11-04 RX ADMIN — Medication 6 MG: at 08:11

## 2022-11-04 RX ADMIN — CEFAZOLIN SODIUM 2 G: 2 SOLUTION INTRAVENOUS at 06:11

## 2022-11-04 RX ADMIN — CEFAZOLIN SODIUM 2 G: 2 SOLUTION INTRAVENOUS at 03:11

## 2022-11-04 RX ADMIN — DULOXETINE 30 MG: 30 CAPSULE, DELAYED RELEASE ORAL at 08:11

## 2022-11-04 NOTE — PT/OT/SLP PROGRESS
Occupational Therapy  Treatment    Andrew Del Cid Jr.   MRN: [id number]   Admitting Diagnosis: CVA (cerebral vascular accident)    OT Date of Treatment: 11/04/22   OT Start Time: 0830  OT Stop Time: 1030  OT Total Time (min): 120 min    Treatment Type: Individual 120     Billable Minutes:  Self Care/Home Management 30, Therapeutic Activity 60, and Therapeutic Exercise 30  Total Minutes: 120     OT/ROBERT: OT          General Precautions: Standard, fall  Orthopedic Precautions: N/A  Braces:  (CAM Boot to Right LE)    Spiritual, Cultural Beliefs, Shinto Practices, Values that Affect Care: no    Subjective:  Communicated with nurse prior to session.    Pain/Comfort  Pain Rating 1: 5/10  Location - Orientation 1: lower  Location 1: back  Pain Addressed 1: Reposition, Cessation of Activity, Other (see comments) (Bio[employer]ze application)  Pain Rating Post-Intervention 1: 2/10    Objective:  Pt was cooperative and motivated without verbal encouragement while exhibiting positive affect. He participated in ADL retraining regarding UB dressing, LB dressing, grooming, and toileting emphasizing fall prevention, and use of compensatory strategies and assistive devices providing extra time requiring setup assistance with UB dressing, min assist with LB dressing secondary to steadying assist when pulling / adjust shorts to waist, and donning (L) sock and shoe requiring assist to yoav (R) CAM Boot, modified independent with grooming while seated in wheelchair at sink, and min assist secondary to steadying assist during perineal hygiene and clothing management with verbal and tactile cueing for safety awareness and technique. Pt then participated in functional transfer retraining throughout session to / from bed, toilet, chair and wheelchair emphasizing fall prevention providing extra time with repetition requiring CGA secondary to intermittent steadying assist for safety with min-mod verbal and tactile cueing for safety awareness  and technique with use of AD. Next, he participated in therapeutic activity addressing trunk mobility, trunk control, dynamic sitting balance, trunk strength, and stretching of bilateral shoulders to end range with flexion and abduction utilizing large stability ball challenging him to perform trunk flexion, extension, and lateral flexion with bilateral hands stabilized on ball requiring verbal and tactile cueing to facilitate optimal movement patterns and increased range per trial in order to improve active ROM impacting performance with functional tasks below waist. Pt then participated in therapeutic exercise performing 5x10 seated pushups requiring without lifting assist, but verbal and tactile cueing for technique challenging him to lift buttocks off seated surface to end range elbow extension in order to strengthen triceps brachii to improve performance with sit <> stand transitions particularly from lower surfaces. Also, he participated in therapeutic activity addressing fine motor coordination, pinch /  strength, and functional use of bilateral hands challenging him to pinch / grasp various items including coins, buttons, pegs, and foam pieces with and without use of resistive medium including resistive clothespins and calibrated hand gripper utilizing tip-to-tip, tripod, lateral prehension, or power grasp to retrieve, stabilize, lift, manipulate, and place items at specified locations requiring verbal and tactile cueing to facilitate proper pinch / grasp and more coordinated movements.     Functional Mobility:  Bed Mobility:   Supine to sit: Modified Independent   Sit to supine: Modified Independent   Rolling: Modified Independent   Scooting: Modified Independent    Transfer Training:   Sit to stand:Contact Guard Assistance with Rolling Walker .  Bed <> Chair:  Step Transfer with Contact Guard Assistance with Rolling Walker .  Toilet Transfer:  Pt Step Transfer with Contact Guard Assistance with [place]  bars .    Grooming:  Patient peformed hand washing with Modified Independent at sitting at sink.  Patient performed face washing with Modified Independent at sitting at sink.  Patient performed oral hygeine with Modified Independent at sitting at sink.  Patient performe hair grooming with Modified Independent at sitting at sink.    UE Dressing:  Patient performed UE Dressing with Setup Assistance with extra time at Edge of bed.    LE Dressing:  Patient don/doffed socks with Minimal Assistance, Patient don/doffed shoes with Moderate Assistance, Patient performed don/doffed adult brief with Minimal Assistance, and Patient performed don/doffed pants with Minimal Assistance    Toilet Training:  Pt performed toileting with Minimal Assistance with Grab  bar at Commode.    Balance:   Static Sit: GOOD: Takes MODERATE challenges from all directions  Dynamic Sit:  GOOD-: Incosistently Maintains balance through MODERATE excursions of active trunk movement,     Static Stand: FAIR+: Takes MINIMAL challenges from all directions  Dynamic stand: FAIR: Needs CONTACT GUARD during gait      Patient left sitting edge of bed with call button in reach and nurse notified    ASSESSMENT:  Pt demonstrated improved functional performance with grooming and LB dressing as noted by modified independent and min assist, respectively, in which patient able to perform all grooming tasks while gathering supplies seated in wheelchair while pulling / adjusting shorts to waist and donning (L) sock and shoe with steadying assist and cueing for safety awareness and technique.     Rehab potential is good    Activity tolerance: Good    Discharge recommendations: other (see comments) (To be further determined based on progress prior to discharge.)     Equipment recommendations: bedside commode, other (see comments) (To be further determined based on progress prior to discharge.)     GOALS:   Multidisciplinary Problems       Occupational Therapy Goals           Problem: Occupational Therapy    Goal Priority Disciplines Outcome Interventions   Occupational Therapy Goal     OT, PT/OT     Description: Long Term Goals to be met by: 11/14/22     Patient will increase functional independence with ADLs by performing:    Feeding with [place].  UE Dressing with Modified [place].  LE Dressing with Modified [place].  Grooming while seated at sink with Modified [place].  Toileting from toilet with Modified [place] for hygiene and clothing management.   Bathing from  shower chair/bench with Modified [place].  Step transfer with Modified [place]  Toilet transfer to toilet with Modified [place].  Increased functional strength to 4+/5 for (L) UE.  Increased fine motor coordination as measured by decrease in 9-Hole Peg Test to less than 65 sec.                         Plan:  Patient to be seen 5 x/week (for 90 min per day, for 14 days) to address the above listed problems via self-care/home management, community/work re-entry, therapeutic activities, therapeutic exercises, neuromuscular re-education, wheelchair management/training  Plan of Care expires: 11/14/22  Plan of Care reviewed with: patient         11/04/2022

## 2022-11-04 NOTE — PLAN OF CARE
was informed that during assessment, patient identified that covering basics like food, housing, medical care and heating were somewhat hard.  attempted to reach patient via cell phone and room phone to provide [place] [phone] resource - no answer at either number.     Additional contact to be attempted.

## 2022-11-04 NOTE — PT/OT/SLP PROGRESS
Physical Therapy         Treatment        Andrew Del Cid Jr.   MRN: [id number]     PT Received On: 11/04/22  PT Start Time: 1215     PT Stop Time: 1315    PT Total Time (min): 60 min     Ind 60  Billable Minutes:      Treatment Type: Treatment  PT/PTA: PT             General Precautions: Standard, fall  Orthopedic Precautions: Orthopedic Precautions :  (CAM boot (R) LE for foot wound, (L) knee brace due to buckling of (L) knee occasionally)   Braces:      Spiritual, Cultural Beliefs, Jainism Practices, Values that Affect Care: no    Subjective:  Communicated with nurse prior to session.         Objective:  Patient found in bed, with      Functional Mobility:  Bed Mobility:   Supine to sit: Modified Independent   Sit to supine: Modified Independent   Rolling: Modified Independent   Scooting: Modified Independent    Balance:   Static Sit: GOOD: Takes MODERATE challenges from all directions  Dynamic Sit:  GOOD: Maintains balance through MODERATE excursions of active trunk movement  Static Stand: FAIR+: Takes MINIMAL challenges from all directions  Dynamic stand: FAIR+: Needs CLOSE SUPERVISION during gait and is able to right self with minor LOB    Transfer Training:  Sit to stand:Stand-by Assistance with Rolling Walker    Bed <> Chair:  Step Transfer with Stand-by Assistance with Rolling Walker      Wheelchair Training:  Pt propelled wc 150ft ind     Gait Training:  Pt amb 150ft,150ft,and 80ft respectively with a rw with sba    Stair Training:  Pt went up and down 8 steps with rail cga      Additional Treatment:  Pt performed 3 sets 10 reps sit to stand to a rw with sba. Pt performed sitting BLE exs with 6lb ankle wts  3 sets 15 reps. He also performed  wc mobility just using bles 100ft x 3 reps    Activity Tolerance:  Patient tolerated treatment well    Patient left supine with call button in reach.    Assessment:  Andrew Del Cid Jr. is a 57 y.o. male with a medical diagnosis of CVA (cerebral vascular  accident). He presents with doing better in all aspects of his fxn'l mobility.    Rehab potential is good.    Activity tolerance: Good    Discharge recommendations:       Equipment recommendations:       GOALS:   Multidisciplinary Problems       Physical Therapy Goals          Problem: Physical Therapy    Goal Priority Disciplines Outcome Goal Variances Interventions   Physical Therapy Goal     PT, PT/OT      Description: Goals to be met by: 2022     Patient will increase functional independence with mobility by performin. Sit to stand transfer with [place]  2. Bed to chair transfer with [place] using No Assistive Device  3. Gait  x 200+ feet with Modified [place] using Rolling Walker.   4. Wheelchair propulsion x150+ feet with Modified [place] using bilateral lower extremities  5. Ascend/descend 12 stair with bilateral Handrails Modified [place] using No Assistive Device.   6. Ascend/Descend standard curb step with Modified [place] using Rolling Walker.                       PLAN:    Patient to be seen 5 x/week  to address the above listed problems via gait training, therapeutic activities, therapeutic exercises, therapeutic groups, neuromuscular re-education, wheelchair management/training  Plan of Care expires: 22  Plan of Care reviewed with: patient         2022

## 2022-11-04 NOTE — PLAN OF CARE
Problem: Diabetes Comorbidity  Goal: Blood Glucose Level Within Targeted Range  Outcome: Ongoing, Progressing     Problem: Bariatric Environmental Safety  Goal: Safety Maintained with Care  Outcome: Ongoing, Progressing     Problem: Impaired Wound Healing  Goal: Optimal Wound Healing  Outcome: Ongoing, Progressing     Problem: Adjustment to Illness (Stroke, Ischemic/Transient Ischemic Attack)  Goal: Optimal Coping  Outcome: Ongoing, Progressing     Problem: Bowel Elimination Impaired (Stroke, Ischemic/Transient Ischemic Attack)  Goal: Effective Bowel Elimination  Outcome: Ongoing, Progressing     Problem: Cerebral Tissue Perfusion (Stroke, Ischemic/Transient Ischemic Attack)  Goal: Optimal Cerebral Tissue Perfusion  Outcome: Ongoing, Progressing     Problem: Cognitive Impairment (Stroke, Ischemic/Transient Ischemic Attack)  Goal: Optimal Cognitive Function  Outcome: Ongoing, Progressing     Problem: Communication Impairment (Stroke, Ischemic/Transient Ischemic Attack)  Goal: Improved Communication Skills  Outcome: Ongoing, Progressing     Problem: Functional Ability Impaired (Stroke, Ischemic/Transient Ischemic Attack)  Goal: Optimal Functional Ability  Outcome: Ongoing, Progressing     Problem: Respiratory Compromise (Stroke, Ischemic/Transient Ischemic Attack)  Goal: Effective Oxygenation and Ventilation  Outcome: Ongoing, Progressing     Problem: Sensorimotor Impairment (Stroke, Ischemic/Transient Ischemic Attack)  Goal: Improved Sensorimotor Function  Outcome: Ongoing, Progressing     Problem: Swallowing Impairment (Stroke, Ischemic/Transient Ischemic Attack)  Goal: Optimal Eating and Swallowing without Aspiration  Outcome: Ongoing, Progressing     Problem: Urinary Elimination Impaired (Stroke, Ischemic/Transient Ischemic Attack)  Goal: Effective Urinary Elimination  Outcome: Ongoing, Progressing     Problem: Skin Injury Risk Increased  Goal: Skin Health and Integrity  Outcome: Ongoing, Progressing      Problem: Mobility Impairment  Goal: Optimal Mobility  Outcome: Ongoing, Progressing     Problem: Fall Injury Risk  Goal: Absence of Fall and Fall-Related Injury  Outcome: Ongoing, Progressing     Problem: Pain Acute  Goal: Acceptable Pain Control and Functional Ability  Outcome: Ongoing, Progressing     Problem: Rehabilitation (IRF) Plan of Care  Goal: Plan of Care Review  Outcome: Ongoing, Progressing  Goal: Patient-Specific Goal (Individualized)  Outcome: Ongoing, Progressing  Goal: Absence of New-Onset Illness or Injury  Outcome: Ongoing, Progressing  Goal: Optimal Comfort and Wellbeing  Outcome: Ongoing, Progressing  Goal: Readiness for Transition of Care  Outcome: Ongoing, Progressing     Plan of care discussed with patient.  Performed wound care per MD orders.  Patient complained of no pain and tolerated well.  Educated on importance of diabetic foot care. Patient verbalized understanding. VSS entire shift. Patient resting in bed in locked lowest position with call bell and all personal belongings within reach currently planning on personal cell phone. Will continue to monitor.

## 2022-11-05 LAB
ALBUMIN SERPL BCP-MCNC: 2.6 G/DL (ref 3.5–5.2)
ALP SERPL-CCNC: 87 U/L (ref 55–135)
ALT SERPL W/O P-5'-P-CCNC: 16 U/L (ref 10–44)
ANION GAP SERPL CALC-SCNC: 3 MMOL/L (ref 8–16)
AST SERPL-CCNC: 14 U/L (ref 10–40)
BASOPHILS # BLD AUTO: 0.05 K/UL (ref 0–0.2)
BASOPHILS NFR BLD: 0.8 % (ref 0–1.9)
BILIRUB SERPL-MCNC: 1 MG/DL (ref 0.1–1)
BUN SERPL-MCNC: 14 MG/DL (ref 6–20)
CALCIUM SERPL-MCNC: 8.7 MG/DL (ref 8.7–10.5)
CHLORIDE SERPL-SCNC: 105 MMOL/L (ref 95–110)
CO2 SERPL-SCNC: 31 MMOL/L (ref 23–29)
CREAT SERPL-MCNC: 0.7 MG/DL (ref 0.5–1.4)
DIFFERENTIAL METHOD: ABNORMAL
EOSINOPHIL # BLD AUTO: 0.2 K/UL (ref 0–0.5)
EOSINOPHIL NFR BLD: 3.3 % (ref 0–8)
ERYTHROCYTE [DISTWIDTH] IN BLOOD BY AUTOMATED COUNT: 12.8 % (ref 11.5–14.5)
EST. GFR  (NO RACE VARIABLE): >60 ML/MIN/1.73 M^2
GLUCOSE SERPL-MCNC: 74 MG/DL (ref 70–110)
HCT VFR BLD AUTO: 38.9 % (ref 40–54)
HGB BLD-MCNC: 12.4 G/DL (ref 14–18)
IMM GRANULOCYTES # BLD AUTO: 0.02 K/UL (ref 0–0.04)
IMM GRANULOCYTES NFR BLD AUTO: 0.3 % (ref 0–0.5)
LYMPHOCYTES # BLD AUTO: 2.1 K/UL (ref 1–4.8)
LYMPHOCYTES NFR BLD: 33.7 % (ref 18–48)
MAGNESIUM SERPL-MCNC: 1.6 MG/DL (ref 1.6–2.6)
MCH RBC QN AUTO: 27.5 PG (ref 27–31)
MCHC RBC AUTO-ENTMCNC: 31.9 G/DL (ref 32–36)
MCV RBC AUTO: 86 FL (ref 82–98)
MONOCYTES # BLD AUTO: 0.5 K/UL (ref 0.3–1)
MONOCYTES NFR BLD: 7.3 % (ref 4–15)
NEUTROPHILS # BLD AUTO: 3.4 K/UL (ref 1.8–7.7)
NEUTROPHILS NFR BLD: 54.6 % (ref 38–73)
NRBC BLD-RTO: 0 /100 WBC
PLATELET # BLD AUTO: 369 K/UL (ref 150–450)
PMV BLD AUTO: 8.8 FL (ref 9.2–12.9)
POCT GLUCOSE: 103 MG/DL (ref 70–110)
POCT GLUCOSE: 137 MG/DL (ref 70–110)
POCT GLUCOSE: 154 MG/DL (ref 70–110)
POCT GLUCOSE: 71 MG/DL (ref 70–110)
POCT GLUCOSE: 76 MG/DL (ref 70–110)
POTASSIUM SERPL-SCNC: 4 MMOL/L (ref 3.5–5.1)
PROT SERPL-MCNC: 6.9 G/DL (ref 6–8.4)
RBC # BLD AUTO: 4.51 M/UL (ref 4.6–6.2)
SODIUM SERPL-SCNC: 139 MMOL/L (ref 136–145)
WBC # BLD AUTO: 6.14 K/UL (ref 3.9–12.7)

## 2022-11-05 PROCEDURE — 99900035 HC TECH TIME PER 15 MIN (STAT)

## 2022-11-05 PROCEDURE — 94799 UNLISTED PULMONARY SVC/PX: CPT

## 2022-11-05 PROCEDURE — 63600175 PHARM REV CODE 636 W HCPCS: Performed by: INTERNAL MEDICINE

## 2022-11-05 PROCEDURE — 25000003 PHARM REV CODE 250: Performed by: STUDENT IN AN ORGANIZED HEALTH CARE EDUCATION/TRAINING PROGRAM

## 2022-11-05 PROCEDURE — 80053 COMPREHEN METABOLIC PANEL: CPT | Performed by: INTERNAL MEDICINE

## 2022-11-05 PROCEDURE — 94761 N-INVAS EAR/PLS OXIMETRY MLT: CPT

## 2022-11-05 PROCEDURE — 85025 COMPLETE CBC W/AUTO DIFF WBC: CPT | Performed by: INTERNAL MEDICINE

## 2022-11-05 PROCEDURE — 36415 COLL VENOUS BLD VENIPUNCTURE: CPT | Performed by: INTERNAL MEDICINE

## 2022-11-05 PROCEDURE — 11800000 HC REHAB PRIVATE ROOM

## 2022-11-05 PROCEDURE — 83735 ASSAY OF MAGNESIUM: CPT | Performed by: INTERNAL MEDICINE

## 2022-11-05 PROCEDURE — 25000003 PHARM REV CODE 250: Performed by: INTERNAL MEDICINE

## 2022-11-05 PROCEDURE — 99900031 HC PATIENT EDUCATION (STAT)

## 2022-11-05 RX ADMIN — CHOLECALCIFEROL TAB 125 MCG (5000 UNIT) 5000 UNITS: 125 TAB at 09:11

## 2022-11-05 RX ADMIN — FERROUS SULFATE TAB 325 MG (65 MG ELEMENTAL FE) 1 EACH: 325 (65 FE) TAB at 09:11

## 2022-11-05 RX ADMIN — MUPIROCIN: 20 OINTMENT TOPICAL at 09:11

## 2022-11-05 RX ADMIN — MICONAZOLE NITRATE 2 % TOPICAL POWDER: at 08:11

## 2022-11-05 RX ADMIN — LOSARTAN POTASSIUM 50 MG: 50 TABLET, FILM COATED ORAL at 09:11

## 2022-11-05 RX ADMIN — ATORVASTATIN CALCIUM 40 MG: 40 TABLET, FILM COATED ORAL at 09:11

## 2022-11-05 RX ADMIN — INSULIN ASPART 2 UNITS: 100 INJECTION, SOLUTION INTRAVENOUS; SUBCUTANEOUS at 04:11

## 2022-11-05 RX ADMIN — INSULIN DETEMIR 35 UNITS: 100 INJECTION, SOLUTION SUBCUTANEOUS at 09:11

## 2022-11-05 RX ADMIN — LACTOBACILLUS ACIDOPHILUS / LACTOBACILLUS BULGARICUS 1 EACH: 100 MILLION CFU STRENGTH GRANULES at 08:11

## 2022-11-05 RX ADMIN — DULOXETINE 30 MG: 30 CAPSULE, DELAYED RELEASE ORAL at 09:11

## 2022-11-05 RX ADMIN — METFORMIN HYDROCHLORIDE 1000 MG: 1000 TABLET, FILM COATED ORAL at 09:11

## 2022-11-05 RX ADMIN — Medication 1 PACKET: at 09:11

## 2022-11-05 RX ADMIN — METOCLOPRAMIDE 10 MG: 10 TABLET ORAL at 06:11

## 2022-11-05 RX ADMIN — METOPROLOL SUCCINATE 50 MG: 50 TABLET, EXTENDED RELEASE ORAL at 09:11

## 2022-11-05 RX ADMIN — METOCLOPRAMIDE 10 MG: 10 TABLET ORAL at 03:11

## 2022-11-05 RX ADMIN — METOCLOPRAMIDE 10 MG: 10 TABLET ORAL at 11:11

## 2022-11-05 RX ADMIN — APIXABAN 5 MG: 2.5 TABLET, FILM COATED ORAL at 09:11

## 2022-11-05 RX ADMIN — FERROUS SULFATE TAB 325 MG (65 MG ELEMENTAL FE) 1 EACH: 325 (65 FE) TAB at 08:11

## 2022-11-05 RX ADMIN — CEFAZOLIN SODIUM 2 G: 2 SOLUTION INTRAVENOUS at 06:11

## 2022-11-05 RX ADMIN — Medication 1 PACKET: at 08:11

## 2022-11-05 RX ADMIN — METFORMIN HYDROCHLORIDE 1000 MG: 1000 TABLET, FILM COATED ORAL at 08:11

## 2022-11-05 RX ADMIN — Medication 400 MG: at 09:11

## 2022-11-05 RX ADMIN — APIXABAN 5 MG: 2.5 TABLET, FILM COATED ORAL at 08:11

## 2022-11-05 RX ADMIN — MUPIROCIN: 20 OINTMENT TOPICAL at 08:11

## 2022-11-05 RX ADMIN — Medication 1 PACKET: at 03:11

## 2022-11-05 RX ADMIN — ASPIRIN 81 MG: 81 TABLET, COATED ORAL at 09:11

## 2022-11-05 RX ADMIN — INSULIN DETEMIR 35 UNITS: 100 INJECTION, SOLUTION SUBCUTANEOUS at 08:11

## 2022-11-05 RX ADMIN — LACTOBACILLUS ACIDOPHILUS / LACTOBACILLUS BULGARICUS 1 EACH: 100 MILLION CFU STRENGTH GRANULES at 09:11

## 2022-11-05 RX ADMIN — Medication 6 MG: at 08:11

## 2022-11-05 RX ADMIN — CEFAZOLIN SODIUM 2 G: 2 SOLUTION INTRAVENOUS at 03:11

## 2022-11-05 RX ADMIN — CEFAZOLIN SODIUM 2 G: 2 SOLUTION INTRAVENOUS at 11:11

## 2022-11-05 NOTE — SUBJECTIVE & OBJECTIVE
Past Medical History:   Diagnosis Date    Acute renal failure with tubular necrosis 10/22/2022    Diabetes mellitus, type 2     Fever 10/22/2022    Hyperlipidemia     Hypertension     Hyponatremia 10/22/2022    Obese     Peripheral neuropathy        Past Surgical History:   Procedure Laterality Date    APPENDECTOMY      HERNIA REPAIR      INCISION AND DRAINAGE FOOT Bilateral 2018    Procedure: INCISION AND DRAINAGE, FOOT;  Surgeon: Rob Alas DPM;  Location: [facility] OR;  Service: Podiatry;  Laterality: Bilateral;    TOE AMPUTATION      right great toe    TOE AMPUTATION Left 2018    Procedure: AMPUTATION, TOE; left 3rd toe;  Surgeon: Rob Alas DPM;  Location: [facility] OR;  Service: Podiatry;  Laterality: Left;       Review of patient's allergies indicates:  No Known Allergies    No current facility-administered medications on file prior to encounter.     Current Outpatient Medications on File Prior to Encounter   Medication Sig    amLODIPine (NORVASC) 5 MG tablet Take 5 mg by mouth.    aspirin (ECOTRIN) 81 MG EC tablet Take 81 mg by mouth once daily.    atorvastatin (LIPITOR) 40 MG tablet Take 40 mg by mouth.    ceFAZolin 2 g/50mL Dextrose IVPB (ANCEF) 2 gram/50 mL PgBk Inject 50 mLs (2,000 mg total) into the vein every 8 (eight) hours. for 13 days    cholecalciferol, vitamin D3, (VITAMIN D3) 25 mcg (1,000 unit) capsule 1 capsule    DULoxetine (CYMBALTA) 30 MG capsule Take 30 mg by mouth once daily.    JARDIANCE 25 mg tablet Take 25 mg by mouth once daily.    lactobacillus acidophilus & bulgar (LACTINEX) 100 million cell packet Take 1 packet (1 each total) by mouth 2 (two) times daily.    LANTUS SOLOSTAR U-100 INSULIN glargine 100 units/mL SubQ pen SMARTSI Unit(s) SUB-Q Twice Daily    losartan (COZAAR) 25 MG tablet Take 4 tablets (100 mg total) by mouth once daily.    metFORMIN (GLUCOPHAGE) 1000 MG tablet Take 1,000 mg by mouth 2 (two) times daily.    mupirocin (BACTROBAN) 2 % ointment  Apply topically 2 (two) times daily.    psyllium husk, with sugar, 3.4 gram packet Take 1 packet by mouth 3 (three) times daily.    TRULICITY 4.5 mg/0.5 mL pen injector Inject into the skin.     Family History    None       Tobacco Use    Smoking status: Never    Smokeless tobacco: Never   Substance and Sexual Activity    Alcohol use: No    Drug use: No    Sexual activity: Yes     Partners: Female     Comment:      Review of Systems   Constitutional:  Positive for activity change, appetite change and fatigue. Negative for chills and fever.   HENT:  Negative for ear pain, mouth sores, nosebleeds and sore throat.    Eyes:  Negative for visual disturbance.   Respiratory:  Positive for shortness of breath. Negative for cough and wheezing.    Cardiovascular:  Positive for leg swelling. Negative for chest pain and palpitations.   Gastrointestinal:  Positive for constipation. Negative for abdominal distention, abdominal pain, blood in stool, diarrhea, nausea and vomiting.   Endocrine: Negative for cold intolerance, heat intolerance and polyphagia.   Genitourinary:  Negative for difficulty urinating, dysuria, flank pain and frequency.   Musculoskeletal:  Positive for arthralgias. Negative for back pain and myalgias.   Skin:  Positive for rash.   Neurological:  Positive for weakness. Negative for dizziness, tremors, seizures, syncope, facial asymmetry, speech difficulty and headaches.   Hematological:  Negative for adenopathy.   Psychiatric/Behavioral:  Negative for agitation, confusion and hallucinations. The patient is not nervous/anxious.    Objective:     Vital Signs (Most Recent):  Temp: 97.5 °F (36.4 °C) (11/04/22 1638)  Pulse: 71 (11/04/22 1638)  Resp: 20 (11/04/22 1638)  BP: 130/73 (11/04/22 1638)  SpO2: 97 % (11/04/22 1958) Vital Signs (24h Range):  Temp:  [97.5 °F (36.4 °C)] 97.5 °F (36.4 °C)  Pulse:  [68-73] 71  Resp:  [18-20] 20  SpO2:  [94 %-97 %] 97 %  BP: (130-155)/(73-85) 130/73     Weight: (!) 166.3  kg (366 lb 10 oz)  Body mass index is 48.37 kg/m².    Physical Exam  Constitutional:       General: He is not in acute distress.     Appearance: He is obese. He is ill-appearing.   HENT:      Head: Normocephalic and atraumatic.      Right Ear: External ear normal.      Left Ear: External ear normal.      Nose: Nose normal. No congestion or rhinorrhea.      Mouth/Throat:      Mouth: Mucous membranes are moist.      Pharynx: No oropharyngeal exudate.   Eyes:      General: No scleral icterus.     Extraocular Movements: Extraocular movements intact.   Neck:      Vascular: No carotid bruit.   Cardiovascular:      Rate and Rhythm: Normal rate and regular rhythm.      Heart sounds: Normal heart sounds. No murmur heard.    No friction rub. No gallop.   Pulmonary:      Effort: Pulmonary effort is normal. No respiratory distress.      Breath sounds: Normal breath sounds. No stridor. No wheezing, rhonchi or rales.   Chest:      Chest wall: No tenderness.   Abdominal:      General: Bowel sounds are normal. There is no distension.      Palpations: Abdomen is soft. There is no mass.      Tenderness: There is no abdominal tenderness. There is no right CVA tenderness, left CVA tenderness, guarding or rebound.      Hernia: No hernia is present.   Musculoskeletal:         General: No deformity.      Cervical back: Neck supple. No rigidity.      Right lower leg: Edema present.      Left lower leg: Edema present.      Comments: + foot wound, boot in place   Lymphadenopathy:      Cervical: No cervical adenopathy.   Skin:     Capillary Refill: Capillary refill takes less than 2 seconds.      Coloration: Skin is not jaundiced or pale.      Findings: Rash present.      Comments: + bilateral, diffuse, urticarial rash, + dermatographism (improved)   Neurological:      Mental Status: He is alert and oriented to person, place, and time.      Cranial Nerves: No cranial nerve deficit.      Sensory: No sensory deficit.      Motor: Weakness  present.      Coordination: Coordination normal.      Gait: Gait abnormal.   Psychiatric:         Mood and Affect: Mood normal.         Behavior: Behavior normal.         Thought Content: Thought content normal.         Judgment: Judgment normal.           Significant Labs: CBC: No results for input(s): WBC, HGB, HCT, PLT in the last 48 hours.    CMP:   No results for input(s): NA, K, CL, CO2, GLU, BUN, CREATININE, CALCIUM, PROT, ALBUMIN, BILITOT, ALKPHOS, AST, ALT, ANIONGAP, EGFRNONAA in the last 48 hours.    Invalid input(s): ESTGFAFR[place]      Significant Imaging: I have reviewed all pertinent imaging results/findings within the past 24 hours.

## 2022-11-05 NOTE — PLAN OF CARE
Problem: Diabetes Comorbidity  Goal: Blood Glucose Level Within Targeted Range  Outcome: Ongoing, Progressing     Problem: Bariatric Environmental Safety  Goal: Safety Maintained with Care  Outcome: Ongoing, Progressing     Problem: Impaired Wound Healing  Goal: Optimal Wound Healing  Outcome: Ongoing, Progressing     Problem: Adjustment to Illness (Stroke, Ischemic/Transient Ischemic Attack)  Goal: Optimal Coping  Outcome: Ongoing, Progressing     Problem: Bowel Elimination Impaired (Stroke, Ischemic/Transient Ischemic Attack)  Goal: Effective Bowel Elimination  Outcome: Ongoing, Progressing     Problem: Cerebral Tissue Perfusion (Stroke, Ischemic/Transient Ischemic Attack)  Goal: Optimal Cerebral Tissue Perfusion  Outcome: Ongoing, Progressing     Problem: Cognitive Impairment (Stroke, Ischemic/Transient Ischemic Attack)  Goal: Optimal Cognitive Function  Outcome: Ongoing, Progressing     Problem: Communication Impairment (Stroke, Ischemic/Transient Ischemic Attack)  Goal: Improved Communication Skills  Outcome: Ongoing, Progressing     Problem: Functional Ability Impaired (Stroke, Ischemic/Transient Ischemic Attack)  Goal: Optimal Functional Ability  Outcome: Ongoing, Progressing     Problem: Respiratory Compromise (Stroke, Ischemic/Transient Ischemic Attack)  Goal: Effective Oxygenation and Ventilation  Outcome: Ongoing, Progressing     Problem: Sensorimotor Impairment (Stroke, Ischemic/Transient Ischemic Attack)  Goal: Improved Sensorimotor Function  Outcome: Ongoing, Progressing     Problem: Swallowing Impairment (Stroke, Ischemic/Transient Ischemic Attack)  Goal: Optimal Eating and Swallowing without Aspiration  Outcome: Ongoing, Progressing     Problem: Urinary Elimination Impaired (Stroke, Ischemic/Transient Ischemic Attack)  Goal: Effective Urinary Elimination  Outcome: Ongoing, Progressing     Problem: Skin Injury Risk Increased  Goal: Skin Health and Integrity  Outcome: Ongoing, Progressing      Problem: Mobility Impairment  Goal: Optimal Mobility  Outcome: Ongoing, Progressing     Problem: Fall Injury Risk  Goal: Absence of Fall and Fall-Related Injury  Outcome: Ongoing, Progressing   Will continue to monitoe

## 2022-11-05 NOTE — PROGRESS NOTES
[facility]  Progress Note    Patient Name: Andrew Del Cid Jr.  MRN: [id number]  Patient Class: IP- Rehab   Admission Date: 10/31/2022  Length of Stay: 4 days  Attending Physician: Cl Mittal III, MD  Primary Care Provider: Olena Thornton DO        Subjective:     Principal Problem:CVA (cerebral vascular accident)        HPI:  Patient is a 57-year-old male with a past medical history of type 2 diabetes hypertension morbid obesity neuropathy and a right lower extremity foot ulcer who presented to our acute care facility with sepsis.  He was found to have MSSA bacteremia and started on IV Ancef.  He was discharged home on IV antibiotics and then return back to the ED with an acute neurological change.  Patient had left-sided hemiparesis had falls and on evaluation and CT scan was found to have an acute right-sided CV A.  Patient was admitted underwent diagnostic testing had an MRI of the brain was also found to have newly diagnosed atrial fibrillation.  The patient was seen by cardiology eventually started on amiodarone and a CTA of the head neck and brain were ordered because he was unable to complete his MRI because of his size and weight.  The patient was seen by Podiatry a Cam boot was ordered to assist with offloading weight-bearing and ultimately the patient was stabilized and we were asked to assist with rehabilitative care.  The patient was transferred on IV Ancef wound care orders and has had a rapid recovery in his left-sided symptoms.  On evaluation this morning the patient has a new issue which is a diffuse bilateral symmetric starting centrally extending peripherally rash.  He has multiple areas of urticarial type lesions therapeutic he has dermatographia some where he has had the telemetry stickers placed as well as other tape that was placed for various parts of his care.  The patient is only complaining of some itchiness from the rash.  The patient's  therapist to bathe him yesterday states the rash was not present a call and spoke to his primary care physician and reviewed reviewed all new medications that was started which include amiodarone.        Overview/Hospital Course:  11/2/22 FM:  Patient's rash seems to be less intense and overall improved.  The patient is tolerating therapy states he feels he is getting stronger and overall improving.  Podiatry notes reviewed we are continuing antibiotics.    11/03/22 DL:  Patient reports difficulty participating in therapy due to increased generalized.  Orders placed for PRN tramadol.  Despite pain, patient has been the patient has been progressing well per the therapist.  Patient's rash has not continued to spread.    11/04/22 FM:  Patient is doing well, tolerating therapy, making gains, tolerating abx, rash seems unchanged.  Continue current efforts.      Past Medical History:   Diagnosis Date    Acute renal failure with tubular necrosis 10/22/2022    Diabetes mellitus, type 2     Fever 10/22/2022    Hyperlipidemia     Hypertension     Hyponatremia 10/22/2022    Obese     Peripheral neuropathy        Past Surgical History:   Procedure Laterality Date    APPENDECTOMY      HERNIA REPAIR      INCISION AND DRAINAGE FOOT Bilateral 12/26/2018    Procedure: INCISION AND DRAINAGE, FOOT;  Surgeon: Rob Alas DPM;  Location: [facility] OR;  Service: Podiatry;  Laterality: Bilateral;    TOE AMPUTATION      right great toe    TOE AMPUTATION Left 12/26/2018    Procedure: AMPUTATION, TOE; left 3rd toe;  Surgeon: Rob Alas DPM;  Location: [facility] OR;  Service: Podiatry;  Laterality: Left;       Review of patient's allergies indicates:  No Known Allergies    No current facility-administered medications on file prior to encounter.     Current Outpatient Medications on File Prior to Encounter   Medication Sig    amLODIPine (NORVASC) 5 MG tablet Take 5 mg by mouth.    aspirin (ECOTRIN) 81 MG EC tablet Take 81  mg by mouth once daily.    atorvastatin (LIPITOR) 40 MG tablet Take 40 mg by mouth.    ceFAZolin 2 g/50mL Dextrose IVPB (ANCEF) 2 gram/50 mL PgBk Inject 50 mLs (2,000 mg total) into the vein every 8 (eight) hours. for 13 days    cholecalciferol, vitamin D3, (VITAMIN D3) 25 mcg (1,000 unit) capsule 1 capsule    DULoxetine (CYMBALTA) 30 MG capsule Take 30 mg by mouth once daily.    JARDIANCE 25 mg tablet Take 25 mg by mouth once daily.    lactobacillus acidophilus & bulgar (LACTINEX) 100 million cell packet Take 1 packet (1 each total) by mouth 2 (two) times daily.    LANTUS SOLOSTAR U-100 INSULIN glargine 100 units/mL SubQ pen SMARTSI Unit(s) SUB-Q Twice Daily    losartan (COZAAR) 25 MG tablet Take 4 tablets (100 mg total) by mouth once daily.    metFORMIN (GLUCOPHAGE) 1000 MG tablet Take 1,000 mg by mouth 2 (two) times daily.    mupirocin (BACTROBAN) 2 % ointment Apply topically 2 (two) times daily.    psyllium husk, with sugar, 3.4 gram packet Take 1 packet by mouth 3 (three) times daily.    TRULICITY 4.5 mg/0.5 mL pen injector Inject into the skin.     Family History    None       Tobacco Use    Smoking status: Never    Smokeless tobacco: Never   Substance and Sexual Activity    Alcohol use: No    Drug use: No    Sexual activity: Yes     Partners: Female     Comment:      Review of Systems   Constitutional:  Positive for activity change, appetite change and fatigue. Negative for chills and fever.   HENT:  Negative for ear pain, mouth sores, nosebleeds and sore throat.    Eyes:  Negative for visual disturbance.   Respiratory:  Positive for shortness of breath. Negative for cough and wheezing.    Cardiovascular:  Positive for leg swelling. Negative for chest pain and palpitations.   Gastrointestinal:  Positive for constipation. Negative for abdominal distention, abdominal pain, blood in stool, diarrhea, nausea and vomiting.   Endocrine: Negative for cold intolerance, heat intolerance and  polyphagia.   Genitourinary:  Negative for difficulty urinating, dysuria, flank pain and frequency.   Musculoskeletal:  Positive for arthralgias. Negative for back pain and myalgias.   Skin:  Positive for rash.   Neurological:  Positive for weakness. Negative for dizziness, tremors, seizures, syncope, facial asymmetry, speech difficulty and headaches.   Hematological:  Negative for adenopathy.   Psychiatric/Behavioral:  Negative for agitation, confusion and hallucinations. The patient is not nervous/anxious.    Objective:     Vital Signs (Most Recent):  Temp: 97.5 °F (36.4 °C) (11/04/22 1638)  Pulse: 71 (11/04/22 1638)  Resp: 20 (11/04/22 1638)  BP: 130/73 (11/04/22 1638)  SpO2: 97 % (11/04/22 1958) Vital Signs (24h Range):  Temp:  [97.5 °F (36.4 °C)] 97.5 °F (36.4 °C)  Pulse:  [68-73] 71  Resp:  [18-20] 20  SpO2:  [94 %-97 %] 97 %  BP: (130-155)/(73-85) 130/73     Weight: (!) 166.3 kg (366 lb 10 oz)  Body mass index is 48.37 kg/m².    Physical Exam  Constitutional:       General: He is not in acute distress.     Appearance: He is obese. He is ill-appearing.   HENT:      Head: Normocephalic and atraumatic.      Right Ear: External ear normal.      Left Ear: External ear normal.      Nose: Nose normal. No congestion or rhinorrhea.      Mouth/Throat:      Mouth: Mucous membranes are moist.      Pharynx: No oropharyngeal exudate.   Eyes:      General: No scleral icterus.     Extraocular Movements: Extraocular movements intact.   Neck:      Vascular: No carotid bruit.   Cardiovascular:      Rate and Rhythm: Normal rate and regular rhythm.      Heart sounds: Normal heart sounds. No murmur heard.    No friction rub. No gallop.   Pulmonary:      Effort: Pulmonary effort is normal. No respiratory distress.      Breath sounds: Normal breath sounds. No stridor. No wheezing, rhonchi or rales.   Chest:      Chest wall: No tenderness.   Abdominal:      General: Bowel sounds are normal. There is no distension.      Palpations:  Abdomen is soft. There is no mass.      Tenderness: There is no abdominal tenderness. There is no right CVA tenderness, left CVA tenderness, guarding or rebound.      Hernia: No hernia is present.   Musculoskeletal:         General: No deformity.      Cervical back: Neck supple. No rigidity.      Right lower leg: Edema present.      Left lower leg: Edema present.      Comments: + foot wound, boot in place   Lymphadenopathy:      Cervical: No cervical adenopathy.   Skin:     Capillary Refill: Capillary refill takes less than 2 seconds.      Coloration: Skin is not jaundiced or pale.      Findings: Rash present.      Comments: + bilateral, diffuse, urticarial rash, + dermatographism (improved)   Neurological:      Mental Status: He is alert and oriented to person, place, and time.      Cranial Nerves: No cranial nerve deficit.      Sensory: No sensory deficit.      Motor: Weakness present.      Coordination: Coordination normal.      Gait: Gait abnormal.   Psychiatric:         Mood and Affect: Mood normal.         Behavior: Behavior normal.         Thought Content: Thought content normal.         Judgment: Judgment normal.           Significant Labs: CBC: No results for input(s): WBC, HGB, HCT, PLT in the last 48 hours.    CMP:   No results for input(s): NA, K, CL, CO2, GLU, BUN, CREATININE, CALCIUM, PROT, ALBUMIN, BILITOT, ALKPHOS, AST, ALT, ANIONGAP, EGFRNONAA in the last 48 hours.    Invalid input(s): ESTGF[place]      Significant Imaging: I have reviewed all pertinent imaging results/findings within the past 24 hours.      Assessment/Plan:      * CVA (cerebral vascular accident)  Continue rehab efforts and current medications for risk reduction.      Rash  Strong suspicion for drug eruption.  I have reviewed all new medications with his primary.  Stopping amiodarone today.      Cerebellar dysmetria  Continue rehab efforts.      A-fib  Stopping amiodarone today because of rash and possible drug eruption patient is  in a normal sinus rhythm with controlled rate.  Rate still controlled.    Diabetic foot ulcer  Patient's FSGs are controlled on current medication regimen.  Last A1c reviewed-   Lab Results   Component Value Date    HGBA1C 9.2 (H) 10/21/2022     Most recent fingerstick glucose reviewed-   Recent Labs   Lab 11/04/22  1618   POCTGLUCOSE 179*     Current correctional scale  High  Maintain anti-hyperglycemic dose as follows-   Antihyperglycemics (From admission, onward)    Start     Stop Route Frequency Ordered    11/01/22 0900  dulaglutide pen injector 4.5 mg         -- SubQ Every 7 days 10/31/22 1755    10/31/22 2100  insulin detemir U-100 pen 35 Units         -- SubQ 2 times daily 10/31/22 1133    10/31/22 2100  metFORMIN tablet 1,000 mg         -- Oral 2 times daily 10/31/22 1755    10/31/22 1132  insulin aspart U-100 pen 0-5 Units         -- SubQ Before meals & nightly PRN 10/31/22 1133        Hold Oral hypoglycemics while patient is in the hospital.    Bacteremia due to methicillin susceptible Staphylococcus aureus (MSSA)  Continue current antibiotics but I am concerned about his drug eruption and this may need to be changed.  Rash seems to be improving with no abx change.    Type 2 diabetes mellitus with diabetic polyneuropathy, with long-term current use of insulin  Patient's FSGs are controlled on current medication regimen.  Last A1c reviewed-   Lab Results   Component Value Date    HGBA1C 9.2 (H) 10/21/2022     Most recent fingerstick glucose reviewed-   Recent Labs   Lab 11/04/22  1618   POCTGLUCOSE 179*     Current correctional scale  High  Maintain anti-hyperglycemic dose as follows-   Antihyperglycemics (From admission, onward)    Start     Stop Route Frequency Ordered    11/01/22 0900  dulaglutide pen injector 4.5 mg         -- SubQ Every 7 days 10/31/22 1755    10/31/22 2100  insulin detemir U-100 pen 35 Units         -- SubQ 2 times daily 10/31/22 1133    10/31/22 2100  metFORMIN tablet 1,000 mg          -- Oral 2 times daily 10/31/22 1755    10/31/22 1132  insulin aspart U-100 pen 0-5 Units         -- SubQ Before meals & nightly PRN 10/31/22 1133        Hold Oral hypoglycemics while patient is in the hospital.    Hyperlipidemia  Continue home medications.      Essential hypertension  BP stable, will adjust PRN.    Morbid obesity with BMI of 50.0-59.9, adult  Body mass index is 48.37 kg/m². Morbid obesity complicates all aspects of disease management from diagnostic modalities to treatment. Weight loss encouraged and health benefits explained to patient.           VTE Risk Mitigation (From admission, onward)         Ordered     apixaban tablet 5 mg  2 times daily         10/31/22 1133     IP VTE HIGH RISK PATIENT  Once         10/31/22 1133     Place sequential compression device  Until discontinued         10/31/22 1133                Discharge Planning   MICHAEL:      Code Status: Full Code   Is the patient medically ready for discharge?:     Reason for patient still in hospital (select all that apply): Patient trending condition  Discharge Plan A: Home with family                  Cl Mittal III, MD  Department of Hospital Medicine   [facility] ([facility])

## 2022-11-05 NOTE — ASSESSMENT & PLAN NOTE
Patient's FSGs are controlled on current medication regimen.  Last A1c reviewed-   Lab Results   Component Value Date    HGBA1C 9.2 (H) 10/21/2022     Most recent fingerstick glucose reviewed-   Recent Labs   Lab 11/04/22  1618   POCTGLUCOSE 179*     Current correctional scale  High  Maintain anti-hyperglycemic dose as follows-   Antihyperglycemics (From admission, onward)    Start     Stop Route Frequency Ordered    11/01/22 0900  dulaglutide pen injector 4.5 mg         -- SubQ Every 7 days 10/31/22 1755    10/31/22 2100  insulin detemir U-100 pen 35 Units         -- SubQ 2 times daily 10/31/22 1133    10/31/22 2100  metFORMIN tablet 1,000 mg         -- Oral 2 times daily 10/31/22 1755    10/31/22 1132  insulin aspart U-100 pen 0-5 Units         -- SubQ Before meals & nightly PRN 10/31/22 1133        Hold Oral hypoglycemics while patient is in the hospital.

## 2022-11-06 LAB
POCT GLUCOSE: 195 MG/DL (ref 70–110)
POCT GLUCOSE: 205 MG/DL (ref 70–110)
POCT GLUCOSE: 235 MG/DL (ref 70–110)
POCT GLUCOSE: 238 MG/DL (ref 70–110)
POCT GLUCOSE: 249 MG/DL (ref 70–110)
POCT GLUCOSE: 87 MG/DL (ref 70–110)

## 2022-11-06 PROCEDURE — 94761 N-INVAS EAR/PLS OXIMETRY MLT: CPT

## 2022-11-06 PROCEDURE — 99900031 HC PATIENT EDUCATION (STAT)

## 2022-11-06 PROCEDURE — 11800000 HC REHAB PRIVATE ROOM

## 2022-11-06 PROCEDURE — 99900035 HC TECH TIME PER 15 MIN (STAT)

## 2022-11-06 PROCEDURE — 63600175 PHARM REV CODE 636 W HCPCS: Performed by: INTERNAL MEDICINE

## 2022-11-06 PROCEDURE — 94799 UNLISTED PULMONARY SVC/PX: CPT

## 2022-11-06 PROCEDURE — 25000003 PHARM REV CODE 250: Performed by: INTERNAL MEDICINE

## 2022-11-06 PROCEDURE — 25000003 PHARM REV CODE 250: Performed by: STUDENT IN AN ORGANIZED HEALTH CARE EDUCATION/TRAINING PROGRAM

## 2022-11-06 RX ADMIN — FERROUS SULFATE TAB 325 MG (65 MG ELEMENTAL FE) 1 EACH: 325 (65 FE) TAB at 09:11

## 2022-11-06 RX ADMIN — MUPIROCIN: 20 OINTMENT TOPICAL at 08:11

## 2022-11-06 RX ADMIN — ASPIRIN 81 MG: 81 TABLET, COATED ORAL at 09:11

## 2022-11-06 RX ADMIN — Medication 400 MG: at 09:11

## 2022-11-06 RX ADMIN — CHOLECALCIFEROL TAB 125 MCG (5000 UNIT) 5000 UNITS: 125 TAB at 09:11

## 2022-11-06 RX ADMIN — METOCLOPRAMIDE 10 MG: 10 TABLET ORAL at 05:11

## 2022-11-06 RX ADMIN — LACTOBACILLUS ACIDOPHILUS / LACTOBACILLUS BULGARICUS 1 EACH: 100 MILLION CFU STRENGTH GRANULES at 09:11

## 2022-11-06 RX ADMIN — Medication 1 PACKET: at 09:11

## 2022-11-06 RX ADMIN — APIXABAN 5 MG: 2.5 TABLET, FILM COATED ORAL at 08:11

## 2022-11-06 RX ADMIN — METOPROLOL SUCCINATE 50 MG: 50 TABLET, EXTENDED RELEASE ORAL at 09:11

## 2022-11-06 RX ADMIN — MICONAZOLE NITRATE 2 % TOPICAL POWDER: at 09:11

## 2022-11-06 RX ADMIN — APIXABAN 5 MG: 2.5 TABLET, FILM COATED ORAL at 09:11

## 2022-11-06 RX ADMIN — DULOXETINE 30 MG: 30 CAPSULE, DELAYED RELEASE ORAL at 09:11

## 2022-11-06 RX ADMIN — METOCLOPRAMIDE 10 MG: 10 TABLET ORAL at 03:11

## 2022-11-06 RX ADMIN — INSULIN ASPART 2 UNITS: 100 INJECTION, SOLUTION INTRAVENOUS; SUBCUTANEOUS at 08:11

## 2022-11-06 RX ADMIN — METOCLOPRAMIDE 10 MG: 10 TABLET ORAL at 11:11

## 2022-11-06 RX ADMIN — INSULIN DETEMIR 35 UNITS: 100 INJECTION, SOLUTION SUBCUTANEOUS at 09:11

## 2022-11-06 RX ADMIN — Medication 1 PACKET: at 03:11

## 2022-11-06 RX ADMIN — INSULIN DETEMIR 35 UNITS: 100 INJECTION, SOLUTION SUBCUTANEOUS at 08:11

## 2022-11-06 RX ADMIN — METFORMIN HYDROCHLORIDE 1000 MG: 1000 TABLET, FILM COATED ORAL at 08:11

## 2022-11-06 RX ADMIN — MEROPENEM 1 G: 1 INJECTION, POWDER, FOR SOLUTION INTRAVENOUS at 09:11

## 2022-11-06 RX ADMIN — FERROUS SULFATE TAB 325 MG (65 MG ELEMENTAL FE) 1 EACH: 325 (65 FE) TAB at 08:11

## 2022-11-06 RX ADMIN — ATORVASTATIN CALCIUM 40 MG: 40 TABLET, FILM COATED ORAL at 09:11

## 2022-11-06 RX ADMIN — LOSARTAN POTASSIUM 50 MG: 50 TABLET, FILM COATED ORAL at 09:11

## 2022-11-06 RX ADMIN — Medication 1 PACKET: at 08:11

## 2022-11-06 RX ADMIN — LACTOBACILLUS ACIDOPHILUS / LACTOBACILLUS BULGARICUS 1 EACH: 100 MILLION CFU STRENGTH GRANULES at 08:11

## 2022-11-06 RX ADMIN — INSULIN ASPART 2 UNITS: 100 INJECTION, SOLUTION INTRAVENOUS; SUBCUTANEOUS at 04:11

## 2022-11-06 RX ADMIN — CEFAZOLIN SODIUM 2 G: 2 SOLUTION INTRAVENOUS at 07:11

## 2022-11-06 RX ADMIN — METFORMIN HYDROCHLORIDE 1000 MG: 1000 TABLET, FILM COATED ORAL at 09:11

## 2022-11-06 RX ADMIN — MUPIROCIN: 20 OINTMENT TOPICAL at 09:11

## 2022-11-06 NOTE — NURSING
Wound care to the bottom of the right foot completed as ordered after his shower. Patient tolerated this procedure well.

## 2022-11-06 NOTE — PLAN OF CARE
Problem: Diabetes Comorbidity  Goal: Blood Glucose Level Within Targeted Range  Outcome: Ongoing, Progressing     Problem: Bariatric Environmental Safety  Goal: Safety Maintained with Care  Outcome: Ongoing, Progressing     Problem: Impaired Wound Healing  Goal: Optimal Wound Healing  Outcome: Ongoing, Progressing     Problem: Adjustment to Illness (Stroke, Ischemic/Transient Ischemic Attack)  Goal: Optimal Coping  Outcome: Ongoing, Progressing     Problem: Bowel Elimination Impaired (Stroke, Ischemic/Transient Ischemic Attack)  Goal: Effective Bowel Elimination  Outcome: Ongoing, Progressing     Problem: Cerebral Tissue Perfusion (Stroke, Ischemic/Transient Ischemic Attack)  Goal: Optimal Cerebral Tissue Perfusion  Outcome: Ongoing, Progressing     Problem: Cognitive Impairment (Stroke, Ischemic/Transient Ischemic Attack)  Goal: Optimal Cognitive Function  Outcome: Ongoing, Progressing     Problem: Communication Impairment (Stroke, Ischemic/Transient Ischemic Attack)  Goal: Improved Communication Skills  Outcome: Ongoing, Progressing     Problem: Functional Ability Impaired (Stroke, Ischemic/Transient Ischemic Attack)  Goal: Optimal Functional Ability  Outcome: Ongoing, Progressing     Problem: Respiratory Compromise (Stroke, Ischemic/Transient Ischemic Attack)  Goal: Effective Oxygenation and Ventilation  Outcome: Ongoing, Progressing     Problem: Sensorimotor Impairment (Stroke, Ischemic/Transient Ischemic Attack)  Goal: Improved Sensorimotor Function  Outcome: Ongoing, Progressing     Problem: Swallowing Impairment (Stroke, Ischemic/Transient Ischemic Attack)  Goal: Optimal Eating and Swallowing without Aspiration  Outcome: Ongoing, Progressing     Problem: Urinary Elimination Impaired (Stroke, Ischemic/Transient Ischemic Attack)  Goal: Effective Urinary Elimination  Outcome: Ongoing, Progressing   [place] continue to monitor and will maintain a safe environment.

## 2022-11-07 LAB
POCT GLUCOSE: 124 MG/DL (ref 70–110)
POCT GLUCOSE: 85 MG/DL (ref 70–110)

## 2022-11-07 PROCEDURE — 25000003 PHARM REV CODE 250

## 2022-11-07 PROCEDURE — 25000003 PHARM REV CODE 250: Performed by: STUDENT IN AN ORGANIZED HEALTH CARE EDUCATION/TRAINING PROGRAM

## 2022-11-07 PROCEDURE — 97535 SELF CARE MNGMENT TRAINING: CPT

## 2022-11-07 PROCEDURE — 25000003 PHARM REV CODE 250: Performed by: INTERNAL MEDICINE

## 2022-11-07 PROCEDURE — 11800000 HC REHAB PRIVATE ROOM

## 2022-11-07 PROCEDURE — 97116 GAIT TRAINING THERAPY: CPT

## 2022-11-07 PROCEDURE — 94761 N-INVAS EAR/PLS OXIMETRY MLT: CPT

## 2022-11-07 PROCEDURE — 99900035 HC TECH TIME PER 15 MIN (STAT)

## 2022-11-07 PROCEDURE — 94799 UNLISTED PULMONARY SVC/PX: CPT

## 2022-11-07 PROCEDURE — 97530 THERAPEUTIC ACTIVITIES: CPT

## 2022-11-07 PROCEDURE — 97110 THERAPEUTIC EXERCISES: CPT

## 2022-11-07 PROCEDURE — 99900031 HC PATIENT EDUCATION (STAT)

## 2022-11-07 RX ADMIN — FERROUS SULFATE TAB 325 MG (65 MG ELEMENTAL FE) 1 EACH: 325 (65 FE) TAB at 08:11

## 2022-11-07 RX ADMIN — ASPIRIN 81 MG: 81 TABLET, COATED ORAL at 08:11

## 2022-11-07 RX ADMIN — METOPROLOL SUCCINATE 50 MG: 50 TABLET, EXTENDED RELEASE ORAL at 08:11

## 2022-11-07 RX ADMIN — INSULIN DETEMIR 35 UNITS: 100 INJECTION, SOLUTION SUBCUTANEOUS at 08:11

## 2022-11-07 RX ADMIN — ATORVASTATIN CALCIUM 40 MG: 40 TABLET, FILM COATED ORAL at 08:11

## 2022-11-07 RX ADMIN — LOSARTAN POTASSIUM 50 MG: 50 TABLET, FILM COATED ORAL at 08:11

## 2022-11-07 RX ADMIN — Medication 6 MG: at 08:11

## 2022-11-07 RX ADMIN — MUPIROCIN: 20 OINTMENT TOPICAL at 08:11

## 2022-11-07 RX ADMIN — METOCLOPRAMIDE 10 MG: 10 TABLET ORAL at 11:11

## 2022-11-07 RX ADMIN — Medication 400 MG: at 08:11

## 2022-11-07 RX ADMIN — MICONAZOLE NITRATE 2 % TOPICAL POWDER: at 09:11

## 2022-11-07 RX ADMIN — APIXABAN 5 MG: 2.5 TABLET, FILM COATED ORAL at 08:11

## 2022-11-07 RX ADMIN — TRAMADOL HYDROCHLORIDE 50 MG: 50 TABLET ORAL at 08:11

## 2022-11-07 RX ADMIN — CHOLECALCIFEROL TAB 125 MCG (5000 UNIT) 5000 UNITS: 125 TAB at 08:11

## 2022-11-07 RX ADMIN — Medication 1 PACKET: at 03:11

## 2022-11-07 RX ADMIN — METFORMIN HYDROCHLORIDE 1000 MG: 1000 TABLET, FILM COATED ORAL at 08:11

## 2022-11-07 RX ADMIN — MICONAZOLE NITRATE 2 % TOPICAL POWDER: at 08:11

## 2022-11-07 RX ADMIN — LACTOBACILLUS ACIDOPHILUS / LACTOBACILLUS BULGARICUS 1 EACH: 100 MILLION CFU STRENGTH GRANULES at 08:11

## 2022-11-07 RX ADMIN — DULOXETINE 30 MG: 30 CAPSULE, DELAYED RELEASE ORAL at 08:11

## 2022-11-07 RX ADMIN — Medication 1 PACKET: at 08:11

## 2022-11-07 RX ADMIN — METOCLOPRAMIDE 10 MG: 10 TABLET ORAL at 05:11

## 2022-11-07 RX ADMIN — INSULIN ASPART 1 UNITS: 100 INJECTION, SOLUTION INTRAVENOUS; SUBCUTANEOUS at 08:11

## 2022-11-07 RX ADMIN — METOCLOPRAMIDE 10 MG: 10 TABLET ORAL at 03:11

## 2022-11-07 NOTE — ASSESSMENT & PLAN NOTE
Patient's FSGs are controlled on current medication regimen.  Last A1c reviewed-   Lab Results   Component Value Date    HGBA1C 9.2 (H) 10/21/2022     Most recent fingerstick glucose reviewed-   Recent Labs   Lab 11/06/22  0639 11/06/22  1102   POCTGLUCOSE 87 195*     Current correctional scale  High  Maintain anti-hyperglycemic dose as follows-   Antihyperglycemics (From admission, onward)    Start     Stop Route Frequency Ordered    11/01/22 0900  dulaglutide pen injector 4.5 mg         -- SubQ Every 7 days 10/31/22 1755    10/31/22 2100  insulin detemir U-100 pen 35 Units         -- SubQ 2 times daily 10/31/22 1133    10/31/22 2100  metFORMIN tablet 1,000 mg         -- Oral 2 times daily 10/31/22 1755    10/31/22 1132  insulin aspart U-100 pen 0-5 Units         -- SubQ Before meals & nightly PRN 10/31/22 1133        Hold Oral hypoglycemics while patient is in the hospital.

## 2022-11-07 NOTE — PT/OT/SLP PROGRESS
Occupational Therapy  Weekly Progress Note    Andrew Del Cid Jr.   MRN: [id number]   Admitting Diagnosis: CVA (cerebral vascular accident)    OT Date of Treatment: 11/07/22   OT Start Time: 0830  OT Stop Time: 1030  OT Total Time (min): 120 min    Treatment Type: Individual 90 and Concurrent 30     Billable Minutes:  Self Care/Home Management 45, Therapeutic Activity 30, and Therapeutic Exercise 45  Total Minutes: 120     OT/ROBERT: OT          General Precautions: Standard, fall  Orthopedic Precautions: N/A  Braces:  (CAM Boot (R) LE)    Spiritual, Cultural Beliefs, Hoahaoism Practices, Values that Affect Care: no    Subjective:  Communicated with nurse prior to session.    Pain/Comfort  Pain Rating 1: 5/10  Location - Orientation 1: lower  Location 1: back  Pain Addressed 1: Reposition, Cessation of Activity, Nurse notified, Other (see comments) ([employer] application)  Pain Rating Post-Intervention 1: 0/10    Objective:  Pt was cooperative and motivated with minimal verbal encouragement while exhibiting positive affect. He participated in extensive ADL retraining as further noted below emphasizing fall prevention, and use of compensatory strategies and assistive devices providing extra time requiring min verbal cueing for safety awareness and technique. Pt then participated in therapeutic activity addressing fine motor coordination, pinch /  strength, and functional use of (L) hand challenging him to pinch / grasp various items including coins, buttons, pegs, and foam pieces with and without use of resistive medium including resistive clothespins and calibrated hand gripper utilizing tip-to-tip, tripod, lateral prehension, or power grasp to retrieve, stabilize, lift, manipulate, and place items at specified locations requiring verbal and tactile cueing to facilitate proper pinch / grasp and more coordinated movements. Also, he participated in therapeutic exercise performing 3x15 (L) UE strengthening exercises  utilizing extra heavy resistance theraband with scapular retraction, shoulder extension, shoulder adduction, horizontal abduction, shoulder flexion in plane of scaption, internal rotation, external rotation, elbow flexion, supination / pronation, wrist flexion, and wrist extension requiring min verbal and tactile cueing for technique emphasizing quality of movement.       Functional Mobility:  Bed Mobility:   Supine to sit: Independent   Sit to supine: Independent   Rolling: Independent   Scooting: Independent    Transfer Training:   Sit to stand:Modified Independent with Rolling Walker .  Bed <> Chair:  Step Transfer with Supervision with Rolling Walker .  Toilet Transfer:  Pt Step Transfer with Modified Independent with Grab bars .  Patient performed shower transfer Step Transfer with Supervision with Grab bars and TTB.    Feeding:  Patient performed feeding with Independent.    Grooming:  Patient peformed hand washing with Modified Independent at sitting at sink.  Patient performed face washing with Modified Independent at sitting at sink.  Patient performed oral hygeine with Modified Independent at sitting at sink.  Patient performe hair grooming with Modified Independent at sitting at sink.    Bathing:  Patient performed bathing with Setup Assistance with grab bar, Handheld shower head, and tub bench at Shower.    UE Dressing:  Patient performed UE Dressing with Modified Independent with extra time at Wheelchair.    LE Dressing:  Patient don/doffed socks with Modified Independent, Patient don/doffed shoes with Setup Assistance, Patient performed don/doffed adult brief with Modified Independent, and Patient performed don/doffed pants with Modified Independent    Toilet Training:  Pt performed toileting with Modified Independent with Grab  bar at Commode.    Balance:   Static Sit: GOOD: Takes MODERATE challenges from all directions  Dynamic Sit:  GOOD: Maintains balance through MODERATE excursions of active trunk  movement  Static Stand: GOOD-: Takes MODERATE challenges from all directions inconsistently  Dynamic stand: GOOD-: Needs SUPERVISION only during gait and able to self right with moderate LOB inconsistently      Patient left sitting edge of bed with call button in reach and nurse notified    ASSESSMENT:  Pt demonstrated good progress with functional goals as noted by changes in functional scores in which patient able to achieve 8/8 STG's and 7/10 LTG's. Pt now supervision to modified independent with ADL's including functional mobility with extra time, and use of compensatory strategies and assistive devices. He demonstrated increased fine motor coordination of affected (L) UE as noted by a decrease of 38 seconds from 99 to 61 regarding 9-Hole Peg Test allowing patient to button shirts, and fasten clothing and shoes requiring less time. Also, he demonstrated increased (L) UE strength to 4+ / 5 manual muscle grades allowing for improved functional performance with ADL's and meaningful activities.     Rehab potential is good    Activity tolerance: Good    Discharge recommendations: other (see comments) (To be further determined based on progress prior to discharge.)     Equipment recommendations: bedside commode, other (see comments) (To be further determined based on progress prior to discharge.)     GOALS:   Short Term Goals to be met by: 11/07/22      Patient will increase functional independence with ADLs by performing:     UE Dressing with Supervision. MET  LE Dressing with Minimal Assistance. MET  Toileting from toilet with Minimal Assistance for hygiene and clothing management. MET  Bathing from  shower chair/bench with Minimal Assistance. MET  Step transfer with Stand-by Assistance. MET  Toilet transfer to toilet with Supervision. MET  Increased functional strength to 4/5 for (L) UE. MET  Increased fine motor coordination as measured by decrease in 9-Hole Peg Test to less than 80 sec. MET     Long Term Goals to  be met by: 11/14/22      Patient will increase functional independence with ADLs by performing:     Feeding with [place]. MET  UE Dressing with Modified [place]. MET  LE Dressing with Modified [place]. ONGOING - Setup Assist of CAM Boot to (L) LE  Grooming while seated at sink with Modified [place]. MET  Toileting from toilet with Modified [place] for hygiene and clothing management. MET  Bathing from  shower chair/bench with Modified [place]. ONGOING - Setup Assist  Step transfer with Modified [place]. ONGOING - Supervision  Toilet transfer to toilet with Modified [place]. MET  Increased functional strength to 4+/5 for (L) UE. MET  Increased fine motor coordination as measured by decrease in 9-Hole Peg Test to less than 65 sec. MET      Plan:  Patient to be seen 5 x/week (for 90 min per day, for 14 days) to address the above listed problems via self-care/home management, community/work re-entry, therapeutic activities, therapeutic exercises, neuromuscular re-education, wheelchair management/training  Plan of Care expires: 11/14/22  Plan of Care reviewed with: patient         11/07/2022

## 2022-11-07 NOTE — SUBJECTIVE & OBJECTIVE
Past Medical History:   Diagnosis Date    Acute renal failure with tubular necrosis 10/22/2022    Diabetes mellitus, type 2     Fever 10/22/2022    Hyperlipidemia     Hypertension     Hyponatremia 10/22/2022    Obese     Peripheral neuropathy        Past Surgical History:   Procedure Laterality Date    APPENDECTOMY      HERNIA REPAIR      INCISION AND DRAINAGE FOOT Bilateral 2018    Procedure: INCISION AND DRAINAGE, FOOT;  Surgeon: Rob Alas DPM;  Location: [facility] OR;  Service: Podiatry;  Laterality: Bilateral;    TOE AMPUTATION      right great toe    TOE AMPUTATION Left 2018    Procedure: AMPUTATION, TOE; left 3rd toe;  Surgeon: Rob Alas DPM;  Location: [facility] OR;  Service: Podiatry;  Laterality: Left;       Review of patient's allergies indicates:  No Known Allergies    No current facility-administered medications on file prior to encounter.     Current Outpatient Medications on File Prior to Encounter   Medication Sig    amLODIPine (NORVASC) 5 MG tablet Take 5 mg by mouth.    aspirin (ECOTRIN) 81 MG EC tablet Take 81 mg by mouth once daily.    atorvastatin (LIPITOR) 40 MG tablet Take 40 mg by mouth.    ceFAZolin 2 g/50mL Dextrose IVPB (ANCEF) 2 gram/50 mL PgBk Inject 50 mLs (2,000 mg total) into the vein every 8 (eight) hours. for 13 days    cholecalciferol, vitamin D3, (VITAMIN D3) 25 mcg (1,000 unit) capsule 1 capsule    DULoxetine (CYMBALTA) 30 MG capsule Take 30 mg by mouth once daily.    JARDIANCE 25 mg tablet Take 25 mg by mouth once daily.    lactobacillus acidophilus & bulgar (LACTINEX) 100 million cell packet Take 1 packet (1 each total) by mouth 2 (two) times daily.    LANTUS SOLOSTAR U-100 INSULIN glargine 100 units/mL SubQ pen SMARTSI Unit(s) SUB-Q Twice Daily    losartan (COZAAR) 25 MG tablet Take 4 tablets (100 mg total) by mouth once daily.    metFORMIN (GLUCOPHAGE) 1000 MG tablet Take 1,000 mg by mouth 2 (two) times daily.    mupirocin (BACTROBAN) 2 % ointment  Apply topically 2 (two) times daily.    psyllium husk, with sugar, 3.4 gram packet Take 1 packet by mouth 3 (three) times daily.    TRULICITY 4.5 mg/0.5 mL pen injector Inject into the skin.     Family History    None       Tobacco Use    Smoking status: Never    Smokeless tobacco: Never   Substance and Sexual Activity    Alcohol use: No    Drug use: No    Sexual activity: Yes     Partners: Female     Comment:      Review of Systems   Constitutional:  Positive for activity change, appetite change and fatigue. Negative for chills and fever.   HENT:  Negative for ear pain, mouth sores, nosebleeds and sore throat.    Eyes:  Negative for visual disturbance.   Respiratory:  Positive for shortness of breath. Negative for cough and wheezing.    Cardiovascular:  Positive for leg swelling. Negative for chest pain and palpitations.   Gastrointestinal:  Positive for constipation. Negative for abdominal distention, abdominal pain, blood in stool, diarrhea, nausea and vomiting.   Endocrine: Negative for cold intolerance, heat intolerance and polyphagia.   Genitourinary:  Negative for difficulty urinating, dysuria, flank pain and frequency.   Musculoskeletal:  Positive for arthralgias. Negative for back pain and myalgias.   Skin:  Positive for rash.   Neurological:  Positive for weakness. Negative for dizziness, tremors, seizures, syncope, facial asymmetry, speech difficulty and headaches.   Hematological:  Negative for adenopathy.   Psychiatric/Behavioral:  Negative for agitation, confusion and hallucinations. The patient is not nervous/anxious.    Objective:     Vital Signs (Most Recent):  Temp: 97.8 °F (36.6 °C) (11/06/22 1754)  Pulse: 73 (11/06/22 1754)  Resp: 18 (11/06/22 1754)  BP: 137/74 (11/06/22 1754)  SpO2: 96 % (11/06/22 1754) Vital Signs (24h Range):  Temp:  [97.2 °F (36.2 °C)-97.8 °F (36.6 °C)] 97.8 °F (36.6 °C)  Pulse:  [73-82] 73  Resp:  [18-20] 18  SpO2:  [96 %-98 %] 96 %  BP: (137-138)/(64-74) 137/74      Weight: (!) 166.3 kg (366 lb 10 oz)  Body mass index is 48.37 kg/m².    Physical Exam  Constitutional:       General: He is not in acute distress.     Appearance: He is obese. He is ill-appearing.   HENT:      Head: Normocephalic and atraumatic.      Right Ear: External ear normal.      Left Ear: External ear normal.      Nose: Nose normal. No congestion or rhinorrhea.      Mouth/Throat:      Mouth: Mucous membranes are moist.      Pharynx: No oropharyngeal exudate.   Eyes:      General: No scleral icterus.     Extraocular Movements: Extraocular movements intact.   Neck:      Vascular: No carotid bruit.   Cardiovascular:      Rate and Rhythm: Normal rate and regular rhythm.      Heart sounds: Normal heart sounds. No murmur heard.    No friction rub. No gallop.   Pulmonary:      Effort: Pulmonary effort is normal. No respiratory distress.      Breath sounds: Normal breath sounds. No stridor. No wheezing, rhonchi or rales.   Chest:      Chest wall: No tenderness.   Abdominal:      General: Bowel sounds are normal. There is no distension.      Palpations: Abdomen is soft. There is no mass.      Tenderness: There is no abdominal tenderness. There is no right CVA tenderness, left CVA tenderness, guarding or rebound.      Hernia: No hernia is present.   Musculoskeletal:         General: No deformity.      Cervical back: Neck supple. No rigidity.      Right lower leg: Edema present.      Left lower leg: Edema present.      Comments: + foot wound, boot in place   Lymphadenopathy:      Cervical: No cervical adenopathy.   Skin:     Capillary Refill: Capillary refill takes less than 2 seconds.      Coloration: Skin is not jaundiced or pale.      Findings: Rash present.      Comments: + bilateral, diffuse, urticarial rash, + dermatographism (improved)   Neurological:      Mental Status: He is alert and oriented to person, place, and time.      Cranial Nerves: No cranial nerve deficit.      Sensory: No sensory deficit.       Motor: Weakness present.      Coordination: Coordination normal.      Gait: Gait abnormal.   Psychiatric:         Mood and Affect: Mood normal.         Behavior: Behavior normal.         Thought Content: Thought content normal.         Judgment: Judgment normal.           Significant Labs: CBC:   Recent Labs   Lab 11/05/22  0505   WBC 6.14   HGB 12.4*   HCT 38.9*          CMP:   Recent Labs   Lab 11/05/22  0505      K 4.0      CO2 31*   GLU 74   BUN 14   CREATININE 0.7   CALCIUM 8.7   PROT 6.9   ALBUMIN 2.6*   BILITOT 1.0   ALKPHOS 87   AST 14   ALT 16   ANIONGAP 3*         Significant Imaging: I have reviewed all pertinent imaging results/findings within the past 24 hours.

## 2022-11-07 NOTE — PT/OT/SLP PROGRESS
Physical Therapy         Treatment        Andrew Del Cid Jr.   MRN: [id number]     PT Received On: 11/07/22  PT Start Time: 1430     PT Stop Time: 1530    PT Total Time (min): 60 min       Billable Minutes:  Gait [id number], Therapeutic Activity 15, and Therapeutic Exercise 15  Total Minutes: 60    Treatment Type: Treatment  PT/PTA: PT             General Precautions: Standard, fall  Orthopedic Precautions: Orthopedic Precautions :  (CAM boot (R) LE for foot wound, (L) knee brace due to buckling of (L) knee occasionally)   Braces:      Spiritual, Cultural Beliefs, Islam Practices, Values that Affect Care: no    Subjective:  Communicated with nurse prior to session.         Objective:  Patient found in bed, with      Functional Mobility:  Bed Mobility:   Supine to sit: Independent   Sit to supine: Independent   Rolling: Independent   Scooting: Independent    Balance:   Static Sit: GOOD: Takes MODERATE challenges from all directions  Dynamic Sit:  GOOD: Maintains balance through MODERATE excursions of active trunk movement  Static Stand: GOOD: Takes MODERATE challenges from all directions  Dynamic stand: GOOD: Needs SUPERVISION only during gait and able to self right with moderate LOB    Transfer Training:  Sit to stand:Modified Independent with Rolling Walker    Bed <> Chair:  Step Transfer with Modified Independent with Rolling Walker      Wheelchair Training:  Ind with wc mobility    Gait Training:  Mod ind with rw 150ft     Stair Training:  Pt went up and down 8 steps using rail and right sided cane going up with cga/sba      Additional Treatment:  Pt performed 4 sets 10 reps sit to stand with focus on going down very slow each time. He also performed knee lifts on right in standing to work on one legged standing on left leg    Activity Tolerance:  Patient tolerated treatment well    Patient left supine with call button in reach.    Assessment:  Andrew Del Cid Jr. is a 57 y.o. male with a medical  diagnosis of CVA (cerebral vascular accident). He presents with better fxnl mobility in every category.    Rehab potential is good.    Activity tolerance: Good    Discharge recommendations:       Equipment recommendations:       GOALS:   Multidisciplinary Problems       Physical Therapy Goals          Problem: Physical Therapy    Goal Priority Disciplines Outcome Goal Variances Interventions   Physical Therapy Goal     PT, PT/OT      Description: Goals to be met by: 2022     Patient will increase functional independence with mobility by performin. Sit to stand transfer with [place]  2. Bed to chair transfer with [place] using No Assistive Device  3. Gait  x 200+ feet with Modified [place] using Rolling Walker.   4. Wheelchair propulsion x150+ feet with Modified [place] using bilateral lower extremities  5. Ascend/descend 12 stair with bilateral Handrails Modified [place] using No Assistive Device.   6. Ascend/Descend standard curb step with Modified [place] using Rolling Walker.                       PLAN:    Patient to be seen 5 x/week  to address the above listed problems via gait training, therapeutic activities, therapeutic exercises, therapeutic groups, neuromuscular re-education, wheelchair management/training  Plan of Care expires: 22  Plan of Care reviewed with: patient         2022

## 2022-11-07 NOTE — PROGRESS NOTES
[facility] Medicine  Progress Note    Patient Name: Andrew Del Cid Jr.  MRN: [id number]  Patient Class: IP- Rehab   Admission Date: 10/31/2022  Length of Stay: 6 days  Attending Physician: Cl Mittal III, MD  Primary Care Provider: Olena Thornton DO        Subjective:     Principal Problem:CVA (cerebral vascular accident)        HPI:  Patient is a 57-year-old male with a past medical history of type 2 diabetes hypertension morbid obesity neuropathy and a right lower extremity foot ulcer who presented to our acute care facility with sepsis.  He was found to have MSSA bacteremia and started on IV Ancef.  He was discharged home on IV antibiotics and then return back to the ED with an acute neurological change.  Patient had left-sided hemiparesis had falls and on evaluation and CT scan was found to have an acute right-sided CV A.  Patient was admitted underwent diagnostic testing had an MRI of the brain was also found to have newly diagnosed atrial fibrillation.  The patient was seen by cardiology eventually started on amiodarone and a CTA of the head neck and brain were ordered because he was unable to complete his MRI because of his size and weight.  The patient was seen by Podiatry a Cam boot was ordered to assist with offloading weight-bearing and ultimately the patient was stabilized and we were asked to assist with rehabilitative care.  The patient was transferred on IV Ancef wound care orders and has had a rapid recovery in his left-sided symptoms.  On evaluation this morning the patient has a new issue which is a diffuse bilateral symmetric starting centrally extending peripherally rash.  He has multiple areas of urticarial type lesions therapeutic he has dermatographia some where he has had the telemetry stickers placed as well as other tape that was placed for various parts of his care.  The patient is only complaining of some itchiness from the rash.  The patient's  therapist to bathe him yesterday states the rash was not present a call and spoke to his primary care physician and reviewed reviewed all new medications that was started which include amiodarone.        Overview/Hospital Course:  11/2/22 FM:  Patient's rash seems to be less intense and overall improved.  The patient is tolerating therapy states he feels he is getting stronger and overall improving.  Podiatry notes reviewed we are continuing antibiotics.    11/03/22 DL:  Patient reports difficulty participating in therapy due to increased generalized.  Orders placed for PRN tramadol.  Despite pain, patient has been the patient has been progressing well per the therapist.  Patient's rash has not continued to spread.    11/04/22 FM:  Patient is doing well, tolerating therapy, making gains, tolerating abx, rash seems unchanged.  Continue current efforts.    11/6/22 FM:  Patient rash has resolved, mood geat, doing well with treatment plan.  Encouraged patient to keep it up, home next week.      Past Medical History:   Diagnosis Date    Acute renal failure with tubular necrosis 10/22/2022    Diabetes mellitus, type 2     Fever 10/22/2022    Hyperlipidemia     Hypertension     Hyponatremia 10/22/2022    Obese     Peripheral neuropathy        Past Surgical History:   Procedure Laterality Date    APPENDECTOMY      HERNIA REPAIR      INCISION AND DRAINAGE FOOT Bilateral 12/26/2018    Procedure: INCISION AND DRAINAGE, FOOT;  Surgeon: Rob Alas DPM;  Location: [facility] OR;  Service: Podiatry;  Laterality: Bilateral;    TOE AMPUTATION      right great toe    TOE AMPUTATION Left 12/26/2018    Procedure: AMPUTATION, TOE; left 3rd toe;  Surgeon: Rob Alas DPM;  Location: [facility] OR;  Service: Podiatry;  Laterality: Left;       Review of patient's allergies indicates:  No Known Allergies    No current facility-administered medications on file prior to encounter.     Current Outpatient Medications on File  Prior to Encounter   Medication Sig    amLODIPine (NORVASC) 5 MG tablet Take 5 mg by mouth.    aspirin (ECOTRIN) 81 MG EC tablet Take 81 mg by mouth once daily.    atorvastatin (LIPITOR) 40 MG tablet Take 40 mg by mouth.    ceFAZolin 2 g/50mL Dextrose IVPB (ANCEF) 2 gram/50 mL PgBk Inject 50 mLs (2,000 mg total) into the vein every 8 (eight) hours. for 13 days    cholecalciferol, vitamin D3, (VITAMIN D3) 25 mcg (1,000 unit) capsule 1 capsule    DULoxetine (CYMBALTA) 30 MG capsule Take 30 mg by mouth once daily.    JARDIANCE 25 mg tablet Take 25 mg by mouth once daily.    lactobacillus acidophilus & bulgar (LACTINEX) 100 million cell packet Take 1 packet (1 each total) by mouth 2 (two) times daily.    LANTUS SOLOSTAR U-100 INSULIN glargine 100 units/mL SubQ pen SMARTSI Unit(s) SUB-Q Twice Daily    losartan (COZAAR) 25 MG tablet Take 4 tablets (100 mg total) by mouth once daily.    metFORMIN (GLUCOPHAGE) 1000 MG tablet Take 1,000 mg by mouth 2 (two) times daily.    mupirocin (BACTROBAN) 2 % ointment Apply topically 2 (two) times daily.    psyllium husk, with sugar, 3.4 gram packet Take 1 packet by mouth 3 (three) times daily.    TRULICITY 4.5 mg/0.5 mL pen injector Inject into the skin.     Family History    None       Tobacco Use    Smoking status: Never    Smokeless tobacco: Never   Substance and Sexual Activity    Alcohol use: No    Drug use: No    Sexual activity: Yes     Partners: Female     Comment:      Review of Systems   Constitutional:  Positive for activity change, appetite change and fatigue. Negative for chills and fever.   HENT:  Negative for ear pain, mouth sores, nosebleeds and sore throat.    Eyes:  Negative for visual disturbance.   Respiratory:  Positive for shortness of breath. Negative for cough and wheezing.    Cardiovascular:  Positive for leg swelling. Negative for chest pain and palpitations.   Gastrointestinal:  Positive for constipation. Negative for abdominal  distention, abdominal pain, blood in stool, diarrhea, nausea and vomiting.   Endocrine: Negative for cold intolerance, heat intolerance and polyphagia.   Genitourinary:  Negative for difficulty urinating, dysuria, flank pain and frequency.   Musculoskeletal:  Positive for arthralgias. Negative for back pain and myalgias.   Skin:  Positive for rash.   Neurological:  Positive for weakness. Negative for dizziness, tremors, seizures, syncope, facial asymmetry, speech difficulty and headaches.   Hematological:  Negative for adenopathy.   Psychiatric/Behavioral:  Negative for agitation, confusion and hallucinations. The patient is not nervous/anxious.    Objective:     Vital Signs (Most Recent):  Temp: 97.8 °F (36.6 °C) (11/06/22 1754)  Pulse: 73 (11/06/22 1754)  Resp: 18 (11/06/22 1754)  BP: 137/74 (11/06/22 1754)  SpO2: 96 % (11/06/22 1754) Vital Signs (24h Range):  Temp:  [97.2 °F (36.2 °C)-97.8 °F (36.6 °C)] 97.8 °F (36.6 °C)  Pulse:  [73-82] 73  Resp:  [18-20] 18  SpO2:  [96 %-98 %] 96 %  BP: (137-138)/(64-74) 137/74     Weight: (!) 166.3 kg (366 lb 10 oz)  Body mass index is 48.37 kg/m².    Physical Exam  Constitutional:       General: He is not in acute distress.     Appearance: He is obese. He is ill-appearing.   HENT:      Head: Normocephalic and atraumatic.      Right Ear: External ear normal.      Left Ear: External ear normal.      Nose: Nose normal. No congestion or rhinorrhea.      Mouth/Throat:      Mouth: Mucous membranes are moist.      Pharynx: No oropharyngeal exudate.   Eyes:      General: No scleral icterus.     Extraocular Movements: Extraocular movements intact.   Neck:      Vascular: No carotid bruit.   Cardiovascular:      Rate and Rhythm: Normal rate and regular rhythm.      Heart sounds: Normal heart sounds. No murmur heard.    No friction rub. No gallop.   Pulmonary:      Effort: Pulmonary effort is normal. No respiratory distress.      Breath sounds: Normal breath sounds. No stridor. No  wheezing, rhonchi or rales.   Chest:      Chest wall: No tenderness.   Abdominal:      General: Bowel sounds are normal. There is no distension.      Palpations: Abdomen is soft. There is no mass.      Tenderness: There is no abdominal tenderness. There is no right CVA tenderness, left CVA tenderness, guarding or rebound.      Hernia: No hernia is present.   Musculoskeletal:         General: No deformity.      Cervical back: Neck supple. No rigidity.      Right lower leg: Edema present.      Left lower leg: Edema present.      Comments: + foot wound, boot in place   Lymphadenopathy:      Cervical: No cervical adenopathy.   Skin:     Capillary Refill: Capillary refill takes less than 2 seconds.      Coloration: Skin is not jaundiced or pale.      Findings: Rash present.      Comments: + bilateral, diffuse, urticarial rash, + dermatographism (improved)   Neurological:      Mental Status: He is alert and oriented to person, place, and time.      Cranial Nerves: No cranial nerve deficit.      Sensory: No sensory deficit.      Motor: Weakness present.      Coordination: Coordination normal.      Gait: Gait abnormal.   Psychiatric:         Mood and Affect: Mood normal.         Behavior: Behavior normal.         Thought Content: Thought content normal.         Judgment: Judgment normal.           Significant Labs: CBC:   Recent Labs   Lab 11/05/22  0505   WBC 6.14   HGB 12.4*   HCT 38.9*          CMP:   Recent Labs   Lab 11/05/22  0505      K 4.0      CO2 31*   GLU 74   BUN 14   CREATININE 0.7   CALCIUM 8.7   PROT 6.9   ALBUMIN 2.6*   BILITOT 1.0   ALKPHOS 87   AST 14   ALT 16   ANIONGAP 3*         Significant Imaging: I have reviewed all pertinent imaging results/findings within the past 24 hours.      Assessment/Plan:      * CVA (cerebral vascular accident)  Continue rehab efforts and current medications for risk reduction.      Rash  Strong suspicion for drug eruption.  I have reviewed all new  medications with his primary.  Stopping amiodarone today.      Cerebellar dysmetria  Continue rehab efforts.      A-fib  Stopping amiodarone today because of rash and possible drug eruption patient is in a normal sinus rhythm with controlled rate.  Rate still controlled.    Diabetic foot ulcer  Patient's FSGs are controlled on current medication regimen.  Last A1c reviewed-   Lab Results   Component Value Date    HGBA1C 9.2 (H) 10/21/2022     Most recent fingerstick glucose reviewed-   Recent Labs   Lab 11/06/22  0639 11/06/22  1102   POCTGLUCOSE 87 195*     Current correctional scale  High  Maintain anti-hyperglycemic dose as follows-   Antihyperglycemics (From admission, onward)    Start     Stop Route Frequency Ordered    11/01/22 0900  dulaglutide pen injector 4.5 mg         -- SubQ Every 7 days 10/31/22 1755    10/31/22 2100  insulin detemir U-100 pen 35 Units         -- SubQ 2 times daily 10/31/22 1133    10/31/22 2100  metFORMIN tablet 1,000 mg         -- Oral 2 times daily 10/31/22 1755    10/31/22 1132  insulin aspart U-100 pen 0-5 Units         -- SubQ Before meals & nightly PRN 10/31/22 1133        Hold Oral hypoglycemics while patient is in the hospital.    Bacteremia due to methicillin susceptible Staphylococcus aureus (MSSA)  Continue current antibiotics but I am concerned about his drug eruption and this may need to be changed.  Rash seems to be improving with no abx change.    Type 2 diabetes mellitus with diabetic polyneuropathy, with long-term current use of insulin  Patient's FSGs are controlled on current medication regimen.  Last A1c reviewed-   Lab Results   Component Value Date    HGBA1C 9.2 (H) 10/21/2022     Most recent fingerstick glucose reviewed-   Recent Labs   Lab 11/06/22  0639 11/06/22  1102   POCTGLUCOSE 87 195*     Current correctional scale  High  Maintain anti-hyperglycemic dose as follows-   Antihyperglycemics (From admission, onward)    Start     Stop Route Frequency Ordered     11/01/22 0900  dulaglutide pen injector 4.5 mg         -- SubQ Every 7 days 10/31/22 1755    10/31/22 2100  insulin detemir U-100 pen 35 Units         -- SubQ 2 times daily 10/31/22 1133    10/31/22 2100  metFORMIN tablet 1,000 mg         -- Oral 2 times daily 10/31/22 1755    10/31/22 1132  insulin aspart U-100 pen 0-5 Units         -- SubQ Before meals & nightly PRN 10/31/22 1133        Hold Oral hypoglycemics while patient is in the hospital.    Hyperlipidemia  Continue home medications.      Essential hypertension  BP stable, will adjust PRN.    Morbid obesity with BMI of 50.0-59.9, adult  Body mass index is 48.37 kg/m². Morbid obesity complicates all aspects of disease management from diagnostic modalities to treatment. Weight loss encouraged and health benefits explained to patient.           VTE Risk Mitigation (From admission, onward)         Ordered     apixaban tablet 5 mg  2 times daily         10/31/22 1133     IP VTE HIGH RISK PATIENT  Once         10/31/22 1133     Place sequential compression device  Until discontinued         10/31/22 1133                Discharge Planning   MICHAEL:      Code Status: Full Code   Is the patient medically ready for discharge?:     Reason for patient still in hospital (select all that apply): Patient trending condition  Discharge Plan A: Home with family                  Cl Mittal III, MD  Department of Hospital Medicine   [facility] ([facility])

## 2022-11-08 LAB
ALBUMIN SERPL BCP-MCNC: 2.8 G/DL (ref 3.5–5.2)
ALP SERPL-CCNC: 93 U/L (ref 55–135)
ALT SERPL W/O P-5'-P-CCNC: 18 U/L (ref 10–44)
ANION GAP SERPL CALC-SCNC: 2 MMOL/L (ref 8–16)
AST SERPL-CCNC: 14 U/L (ref 10–40)
BASOPHILS # BLD AUTO: 0.08 K/UL (ref 0–0.2)
BASOPHILS NFR BLD: 1.2 % (ref 0–1.9)
BILIRUB SERPL-MCNC: 1.2 MG/DL (ref 0.1–1)
BUN SERPL-MCNC: 16 MG/DL (ref 6–20)
CALCIUM SERPL-MCNC: 9 MG/DL (ref 8.7–10.5)
CHLORIDE SERPL-SCNC: 102 MMOL/L (ref 95–110)
CO2 SERPL-SCNC: 34 MMOL/L (ref 23–29)
CREAT SERPL-MCNC: 0.7 MG/DL (ref 0.5–1.4)
DIFFERENTIAL METHOD: ABNORMAL
EOSINOPHIL # BLD AUTO: 0.1 K/UL (ref 0–0.5)
EOSINOPHIL NFR BLD: 1.7 % (ref 0–8)
ERYTHROCYTE [DISTWIDTH] IN BLOOD BY AUTOMATED COUNT: 12.8 % (ref 11.5–14.5)
EST. GFR  (NO RACE VARIABLE): >60 ML/MIN/1.73 M^2
GLUCOSE SERPL-MCNC: 86 MG/DL (ref 70–110)
HCT VFR BLD AUTO: 41.1 % (ref 40–54)
HGB BLD-MCNC: 13 G/DL (ref 14–18)
IMM GRANULOCYTES # BLD AUTO: 0.02 K/UL (ref 0–0.04)
IMM GRANULOCYTES NFR BLD AUTO: 0.3 % (ref 0–0.5)
LYMPHOCYTES # BLD AUTO: 2.7 K/UL (ref 1–4.8)
LYMPHOCYTES NFR BLD: 39.3 % (ref 18–48)
MAGNESIUM SERPL-MCNC: 1.8 MG/DL (ref 1.6–2.6)
MCH RBC QN AUTO: 27.7 PG (ref 27–31)
MCHC RBC AUTO-ENTMCNC: 31.6 G/DL (ref 32–36)
MCV RBC AUTO: 87 FL (ref 82–98)
MONOCYTES # BLD AUTO: 0.5 K/UL (ref 0.3–1)
MONOCYTES NFR BLD: 6.5 % (ref 4–15)
NEUTROPHILS # BLD AUTO: 3.5 K/UL (ref 1.8–7.7)
NEUTROPHILS NFR BLD: 51 % (ref 38–73)
NRBC BLD-RTO: 0 /100 WBC
PLATELET # BLD AUTO: 297 K/UL (ref 150–450)
PMV BLD AUTO: 9.3 FL (ref 9.2–12.9)
POTASSIUM SERPL-SCNC: 4.5 MMOL/L (ref 3.5–5.1)
PROT SERPL-MCNC: 7.5 G/DL (ref 6–8.4)
RBC # BLD AUTO: 4.7 M/UL (ref 4.6–6.2)
SODIUM SERPL-SCNC: 138 MMOL/L (ref 136–145)
WBC # BLD AUTO: 6.92 K/UL (ref 3.9–12.7)

## 2022-11-08 PROCEDURE — 94799 UNLISTED PULMONARY SVC/PX: CPT

## 2022-11-08 PROCEDURE — 80053 COMPREHEN METABOLIC PANEL: CPT | Performed by: INTERNAL MEDICINE

## 2022-11-08 PROCEDURE — 97535 SELF CARE MNGMENT TRAINING: CPT

## 2022-11-08 PROCEDURE — 36415 COLL VENOUS BLD VENIPUNCTURE: CPT | Performed by: INTERNAL MEDICINE

## 2022-11-08 PROCEDURE — 94761 N-INVAS EAR/PLS OXIMETRY MLT: CPT

## 2022-11-08 PROCEDURE — 85025 COMPLETE CBC W/AUTO DIFF WBC: CPT | Performed by: INTERNAL MEDICINE

## 2022-11-08 PROCEDURE — 97110 THERAPEUTIC EXERCISES: CPT

## 2022-11-08 PROCEDURE — 99900031 HC PATIENT EDUCATION (STAT)

## 2022-11-08 PROCEDURE — 25000003 PHARM REV CODE 250

## 2022-11-08 PROCEDURE — 63600175 PHARM REV CODE 636 W HCPCS: Performed by: INTERNAL MEDICINE

## 2022-11-08 PROCEDURE — 83735 ASSAY OF MAGNESIUM: CPT | Performed by: INTERNAL MEDICINE

## 2022-11-08 PROCEDURE — 97530 THERAPEUTIC ACTIVITIES: CPT

## 2022-11-08 PROCEDURE — 11800000 HC REHAB PRIVATE ROOM

## 2022-11-08 PROCEDURE — 25000003 PHARM REV CODE 250: Performed by: INTERNAL MEDICINE

## 2022-11-08 PROCEDURE — 97116 GAIT TRAINING THERAPY: CPT

## 2022-11-08 PROCEDURE — 99900035 HC TECH TIME PER 15 MIN (STAT)

## 2022-11-08 PROCEDURE — 25000003 PHARM REV CODE 250: Performed by: STUDENT IN AN ORGANIZED HEALTH CARE EDUCATION/TRAINING PROGRAM

## 2022-11-08 RX ADMIN — INSULIN DETEMIR 35 UNITS: 100 INJECTION, SOLUTION SUBCUTANEOUS at 08:11

## 2022-11-08 RX ADMIN — FERROUS SULFATE TAB 325 MG (65 MG ELEMENTAL FE) 1 EACH: 325 (65 FE) TAB at 08:11

## 2022-11-08 RX ADMIN — METOPROLOL SUCCINATE 50 MG: 50 TABLET, EXTENDED RELEASE ORAL at 08:11

## 2022-11-08 RX ADMIN — INSULIN ASPART 2 UNITS: 100 INJECTION, SOLUTION INTRAVENOUS; SUBCUTANEOUS at 09:11

## 2022-11-08 RX ADMIN — Medication 1 PACKET: at 08:11

## 2022-11-08 RX ADMIN — DULOXETINE 30 MG: 30 CAPSULE, DELAYED RELEASE ORAL at 08:11

## 2022-11-08 RX ADMIN — MICONAZOLE NITRATE 2 % TOPICAL POWDER: at 08:11

## 2022-11-08 RX ADMIN — INSULIN DETEMIR 35 UNITS: 100 INJECTION, SOLUTION SUBCUTANEOUS at 09:11

## 2022-11-08 RX ADMIN — LACTOBACILLUS ACIDOPHILUS / LACTOBACILLUS BULGARICUS 1 EACH: 100 MILLION CFU STRENGTH GRANULES at 09:11

## 2022-11-08 RX ADMIN — CHOLECALCIFEROL TAB 125 MCG (5000 UNIT) 5000 UNITS: 125 TAB at 08:11

## 2022-11-08 RX ADMIN — LACTOBACILLUS ACIDOPHILUS / LACTOBACILLUS BULGARICUS 1 EACH: 100 MILLION CFU STRENGTH GRANULES at 08:11

## 2022-11-08 RX ADMIN — LOSARTAN POTASSIUM 50 MG: 50 TABLET, FILM COATED ORAL at 08:11

## 2022-11-08 RX ADMIN — Medication 400 MG: at 08:11

## 2022-11-08 RX ADMIN — ATORVASTATIN CALCIUM 40 MG: 40 TABLET, FILM COATED ORAL at 08:11

## 2022-11-08 RX ADMIN — METOCLOPRAMIDE 10 MG: 10 TABLET ORAL at 10:11

## 2022-11-08 RX ADMIN — METOCLOPRAMIDE 10 MG: 10 TABLET ORAL at 06:11

## 2022-11-08 RX ADMIN — Medication 6 MG: at 09:11

## 2022-11-08 RX ADMIN — METFORMIN HYDROCHLORIDE 1000 MG: 1000 TABLET, FILM COATED ORAL at 08:11

## 2022-11-08 RX ADMIN — FERROUS SULFATE TAB 325 MG (65 MG ELEMENTAL FE) 1 EACH: 325 (65 FE) TAB at 09:11

## 2022-11-08 RX ADMIN — METFORMIN HYDROCHLORIDE 1000 MG: 1000 TABLET, FILM COATED ORAL at 09:11

## 2022-11-08 RX ADMIN — Medication 1 PACKET: at 09:11

## 2022-11-08 RX ADMIN — Medication 1 PACKET: at 02:11

## 2022-11-08 RX ADMIN — METOCLOPRAMIDE 10 MG: 10 TABLET ORAL at 04:11

## 2022-11-08 RX ADMIN — TRAMADOL HYDROCHLORIDE 50 MG: 50 TABLET ORAL at 02:11

## 2022-11-08 RX ADMIN — TRAMADOL HYDROCHLORIDE 50 MG: 50 TABLET ORAL at 09:11

## 2022-11-08 RX ADMIN — MUPIROCIN: 20 OINTMENT TOPICAL at 09:11

## 2022-11-08 RX ADMIN — APIXABAN 5 MG: 2.5 TABLET, FILM COATED ORAL at 08:11

## 2022-11-08 RX ADMIN — MEROPENEM 1 G: 1 INJECTION, POWDER, FOR SOLUTION INTRAVENOUS at 09:11

## 2022-11-08 RX ADMIN — APIXABAN 5 MG: 2.5 TABLET, FILM COATED ORAL at 09:11

## 2022-11-08 RX ADMIN — ASPIRIN 81 MG: 81 TABLET, COATED ORAL at 08:11

## 2022-11-08 NOTE — PROGRESS NOTES
[facility] Medicine  Progress Note    Patient Name: Andrew Del Cid Jr.  MRN: [id number]  Patient Class: IP- Rehab   Admission Date: 10/31/2022  Length of Stay: 8 days  Attending Physician: Cl Mittal III, MD  Primary Care Provider: Olena Thornton DO        Subjective:     Principal Problem:CVA (cerebral vascular accident)        HPI:  Patient is a 57-year-old male with a past medical history of type 2 diabetes hypertension morbid obesity neuropathy and a right lower extremity foot ulcer who presented to our acute care facility with sepsis.  He was found to have MSSA bacteremia and started on IV Ancef.  He was discharged home on IV antibiotics and then return back to the ED with an acute neurological change.  Patient had left-sided hemiparesis had falls and on evaluation and CT scan was found to have an acute right-sided CV A.  Patient was admitted underwent diagnostic testing had an MRI of the brain was also found to have newly diagnosed atrial fibrillation.  The patient was seen by cardiology eventually started on amiodarone and a CTA of the head neck and brain were ordered because he was unable to complete his MRI because of his size and weight.  The patient was seen by Podiatry a Cam boot was ordered to assist with offloading weight-bearing and ultimately the patient was stabilized and we were asked to assist with rehabilitative care.  The patient was transferred on IV Ancef wound care orders and has had a rapid recovery in his left-sided symptoms.  On evaluation this morning the patient has a new issue which is a diffuse bilateral symmetric starting centrally extending peripherally rash.  He has multiple areas of urticarial type lesions therapeutic he has dermatographia some where he has had the telemetry stickers placed as well as other tape that was placed for various parts of his care.  The patient is only complaining of some itchiness from the rash.  The patient's  therapist to bathe him yesterday states the rash was not present a call and spoke to his primary care physician and reviewed reviewed all new medications that was started which include amiodarone.        Overview/Hospital Course:  11/2/22 FM:  Patient's rash seems to be less intense and overall improved.  The patient is tolerating therapy states he feels he is getting stronger and overall improving.  Podiatry notes reviewed we are continuing antibiotics.    11/03/22 DL:  Patient reports difficulty participating in therapy due to increased generalized.  Orders placed for PRN tramadol.  Despite pain, patient has been the patient has been progressing well per the therapist.  Patient's rash has not continued to spread.    11/04/22 FM:  Patient is doing well, tolerating therapy, making gains, tolerating abx, rash seems unchanged.  Continue current efforts.    11/6/22 FM:  Patient rash has resolved, mood geat, doing well with treatment plan.  Encouraged patient to keep it up, home next week.    11/7/22 FM:  Patient mood is good, abx completed, will remove PIV, patient rash has resolved.  No furthur Afib.  Patient doing well with therapy and making good function gains.  Patient DC in am.      Past Medical History:   Diagnosis Date    Acute renal failure with tubular necrosis 10/22/2022    Diabetes mellitus, type 2     Fever 10/22/2022    Hyperlipidemia     Hypertension     Hyponatremia 10/22/2022    Obese     Peripheral neuropathy        Past Surgical History:   Procedure Laterality Date    APPENDECTOMY      HERNIA REPAIR      INCISION AND DRAINAGE FOOT Bilateral 12/26/2018    Procedure: INCISION AND DRAINAGE, FOOT;  Surgeon: Rob Alas DPM;  Location: [facility] OR;  Service: Podiatry;  Laterality: Bilateral;    TOE AMPUTATION      right great toe    TOE AMPUTATION Left 12/26/2018    Procedure: AMPUTATION, TOE; left 3rd toe;  Surgeon: Rob Alas DPM;  Location: [facility] OR;  Service: Podiatry;   Laterality: Left;       Review of patient's allergies indicates:  No Known Allergies    No current facility-administered medications on file prior to encounter.     Current Outpatient Medications on File Prior to Encounter   Medication Sig    amLODIPine (NORVASC) 5 MG tablet Take 5 mg by mouth.    aspirin (ECOTRIN) 81 MG EC tablet Take 81 mg by mouth once daily.    atorvastatin (LIPITOR) 40 MG tablet Take 40 mg by mouth.    [] ceFAZolin 2 g/50mL Dextrose IVPB (ANCEF) 2 gram/50 mL PgBk Inject 50 mLs (2,000 mg total) into the vein every 8 (eight) hours. for 13 days    cholecalciferol, vitamin D3, (VITAMIN D3) 25 mcg (1,000 unit) capsule 1 capsule    DULoxetine (CYMBALTA) 30 MG capsule Take 30 mg by mouth once daily.    JARDIANCE 25 mg tablet Take 25 mg by mouth once daily.    lactobacillus acidophilus & bulgar (LACTINEX) 100 million cell packet Take 1 packet (1 each total) by mouth 2 (two) times daily.    LANTUS SOLOSTAR U-100 INSULIN glargine 100 units/mL SubQ pen SMARTSI Unit(s) SUB-Q Twice Daily    losartan (COZAAR) 25 MG tablet Take 4 tablets (100 mg total) by mouth once daily.    metFORMIN (GLUCOPHAGE) 1000 MG tablet Take 1,000 mg by mouth 2 (two) times daily.    mupirocin (BACTROBAN) 2 % ointment Apply topically 2 (two) times daily.    psyllium husk, with sugar, 3.4 gram packet Take 1 packet by mouth 3 (three) times daily.    TRULICITY 4.5 mg/0.5 mL pen injector Inject into the skin.     Family History    None       Tobacco Use    Smoking status: Never    Smokeless tobacco: Never   Substance and Sexual Activity    Alcohol use: No    Drug use: No    Sexual activity: Yes     Partners: Female     Comment:      Review of Systems   Constitutional:  Positive for activity change, appetite change and fatigue. Negative for chills and fever.   HENT:  Negative for ear pain, mouth sores, nosebleeds and sore throat.    Eyes:  Negative for visual disturbance.   Respiratory:  Positive for  shortness of breath. Negative for cough and wheezing.    Cardiovascular:  Positive for leg swelling. Negative for chest pain and palpitations.   Gastrointestinal:  Positive for constipation. Negative for abdominal distention, abdominal pain, blood in stool, diarrhea, nausea and vomiting.   Endocrine: Negative for cold intolerance, heat intolerance and polyphagia.   Genitourinary:  Negative for difficulty urinating, dysuria, flank pain and frequency.   Musculoskeletal:  Positive for arthralgias. Negative for back pain and myalgias.   Skin:  Positive for rash.   Neurological:  Positive for weakness. Negative for dizziness, tremors, seizures, syncope, facial asymmetry, speech difficulty and headaches.   Hematological:  Negative for adenopathy.   Psychiatric/Behavioral:  Negative for agitation, confusion and hallucinations. The patient is not nervous/anxious.    Objective:     Vital Signs (Most Recent):  Temp: 98 °F (36.7 °C) (11/07/22 1643)  Pulse: 64 (11/07/22 2101)  Resp: (!) 22 (11/07/22 2101)  BP: (!) 143/63 (11/07/22 2101)  SpO2: 95 % (11/07/22 2101) Vital Signs (24h Range):  Temp:  [97.2 °F (36.2 °C)-98 °F (36.7 °C)] 98 °F (36.7 °C)  Pulse:  [] 64  Resp:  [18-22] 22  SpO2:  [93 %-96 %] 95 %  BP: (116-143)/(63-80) 143/63     Weight: (!) 166.3 kg (366 lb 10 oz)  Body mass index is 48.37 kg/m².    Physical Exam  Constitutional:       General: He is not in acute distress.     Appearance: He is obese. He is ill-appearing.   HENT:      Head: Normocephalic and atraumatic.      Right Ear: External ear normal.      Left Ear: External ear normal.      Nose: Nose normal. No congestion or rhinorrhea.      Mouth/Throat:      Mouth: Mucous membranes are moist.      Pharynx: No oropharyngeal exudate.   Eyes:      General: No scleral icterus.     Extraocular Movements: Extraocular movements intact.   Neck:      Vascular: No carotid bruit.   Cardiovascular:      Rate and Rhythm: Normal rate and regular rhythm.      Heart  sounds: Normal heart sounds. No murmur heard.    No friction rub. No gallop.   Pulmonary:      Effort: Pulmonary effort is normal. No respiratory distress.      Breath sounds: Normal breath sounds. No stridor. No wheezing, rhonchi or rales.   Chest:      Chest wall: No tenderness.   Abdominal:      General: Bowel sounds are normal. There is no distension.      Palpations: Abdomen is soft. There is no mass.      Tenderness: There is no abdominal tenderness. There is no right CVA tenderness, left CVA tenderness, guarding or rebound.      Hernia: No hernia is present.   Musculoskeletal:         General: No deformity.      Cervical back: Neck supple. No rigidity.      Right lower leg: Edema present.      Left lower leg: Edema present.      Comments: + foot wound, boot in place   Lymphadenopathy:      Cervical: No cervical adenopathy.   Skin:     Capillary Refill: Capillary refill takes less than 2 seconds.      Coloration: Skin is not jaundiced or pale.      Findings: No rash.      Comments: + bilateral, diffuse, urticarial rash, + dermatographism (improved)   Neurological:      Mental Status: He is alert and oriented to person, place, and time.      Cranial Nerves: No cranial nerve deficit.      Sensory: No sensory deficit.      Motor: Weakness present.      Coordination: Coordination normal.      Gait: Gait abnormal.   Psychiatric:         Mood and Affect: Mood normal.         Behavior: Behavior normal.         Thought Content: Thought content normal.         Judgment: Judgment normal.           Significant Labs: CBC: No results for input(s): WBC, HGB, HCT, PLT in the last 48 hours.    CMP:   No results for input(s): NA, K, CL, CO2, GLU, BUN, CREATININE, CALCIUM, PROT, ALBUMIN, BILITOT, ALKPHOS, AST, ALT, ANIONGAP, EGFRNONAA in the last 48 hours.    Invalid input(s): ESTGFAFRICA      Significant Imaging: I have reviewed all pertinent imaging results/findings within the past 24 hours.      Assessment/Plan:      * CVA  (cerebral vascular accident)  Continue rehab efforts and current medications for risk reduction.      Rash  Strong suspicion for drug eruption.  I have reviewed all new medications with his primary.  Stopping amiodarone today.  Rash has resolved.      Cerebellar dysmetria  Continue rehab efforts.      A-fib  Stopping amiodarone today because of rash and possible drug eruption patient is in a normal sinus rhythm with controlled rate.  Rate still controlled.    Diabetic foot ulcer  Patient's FSGs are controlled on current medication regimen.  Last A1c reviewed-   Lab Results   Component Value Date    HGBA1C 9.2 (H) 10/21/2022     Most recent fingerstick glucose reviewed-   Recent Labs   Lab 11/07/22  0711 11/07/22  1107   POCTGLUCOSE 85 124*     Current correctional scale  High  Maintain anti-hyperglycemic dose as follows-   Antihyperglycemics (From admission, onward)    Start     Stop Route Frequency Ordered    11/01/22 0900  dulaglutide pen injector 4.5 mg         -- SubQ Every 7 days 10/31/22 1755    10/31/22 2100  insulin detemir U-100 pen 35 Units         -- SubQ 2 times daily 10/31/22 1133    10/31/22 2100  metFORMIN tablet 1,000 mg         -- Oral 2 times daily 10/31/22 1755    10/31/22 1132  insulin aspart U-100 pen 0-5 Units         -- SubQ Before meals & nightly PRN 10/31/22 1133        Hold Oral hypoglycemics while patient is in the hospital.    Bacteremia due to methicillin susceptible Staphylococcus aureus (MSSA)  Continue current antibiotics but I am concerned about his drug eruption and this may need to be changed.  Rash seems to be improving with no abx change.    Type 2 diabetes mellitus with diabetic polyneuropathy, with long-term current use of insulin  Patient's FSGs are controlled on current medication regimen.  Last A1c reviewed-   Lab Results   Component Value Date    HGBA1C 9.2 (H) 10/21/2022     Most recent fingerstick glucose reviewed-   Recent Labs   Lab 11/07/22  0711 11/07/22  1107    POCTGLUCOSE 85 124*     Current correctional scale  High  Maintain anti-hyperglycemic dose as follows-   Antihyperglycemics (From admission, onward)    Start     Stop Route Frequency Ordered    11/01/22 0900  dulaglutide pen injector 4.5 mg         -- SubQ Every 7 days 10/31/22 1755    10/31/22 2100  insulin detemir U-100 pen 35 Units         -- SubQ 2 times daily 10/31/22 1133    10/31/22 2100  metFORMIN tablet 1,000 mg         -- Oral 2 times daily 10/31/22 1755    10/31/22 1132  insulin aspart U-100 pen 0-5 Units         -- SubQ Before meals & nightly PRN 10/31/22 1133        Hold Oral hypoglycemics while patient is in the hospital.    Hyperlipidemia  Continue home medications.      Essential hypertension  BP stable, will adjust PRN.    Morbid obesity with BMI of 50.0-59.9, adult  Body mass index is 48.37 kg/m². Morbid obesity complicates all aspects of disease management from diagnostic modalities to treatment. Weight loss encouraged and health benefits explained to patient.           VTE Risk Mitigation (From admission, onward)         Ordered     apixaban tablet 5 mg  2 times daily         10/31/22 1133     IP VTE HIGH RISK PATIENT  Once         10/31/22 1133     Place sequential compression device  Until discontinued         10/31/22 1133                Discharge Planning   MICHAEL:      Code Status: Full Code   Is the patient medically ready for discharge?:     Reason for patient still in hospital (select all that apply): Patient trending condition  Discharge Plan A: Home with family                  Cl Mittal III, MD  Department of Hospital Medicine   [facility] ([facility])

## 2022-11-08 NOTE — PLAN OF CARE
Recommendations  1. Rec'd Cardiac Diabetic diet.   2. Rec'd Sidney BID for 14 days to promote wound healing and provide 192kcal and 5g protein.   3. RD to follow and make rec's accordingly.  Goals: 1. Pt will consume > 75% EEN via PO intake by next RD follow up.  Nutrition Goal Status: goal met

## 2022-11-08 NOTE — PROGRESS NOTES
[facility])  Adult Nutrition  Progress Note    SUMMARY       Recommendations  1. Rec'd Cardiac Diabetic diet.   2. Rec'd Sidney BID for 14 days to promote wound healing and provide 192kcal and 5g protein.   3. RD to follow and make rec's accordingly.  Goals: 1. Pt will consume > 75% EEN via PO intake by next RD follow up.  Nutrition Goal Status: goal met  Communication of RD Recs: discussed on rounds    Assessment and Plan    Nutrition Problem  Obese, Class III     Related to (etiology):   Not ready for diet/lifestyle change and limited adherence to nutrition related recommendations.     Signs and Symptoms (as evidenced by):   Pt stating he hates vegetables and doesn't follow a diet at home     Interventions/Recommendations (treatment strategy):  1. Rec'd Cardiac Diabetic diet.   2. Rec'd Sidney BID for 14 days to promote wound healing and provide 192kcal and 5g protein.   3. RD to follow and make rec's accordingly.     Nutrition Diagnosis Status:   Continues    Reason for Assessment    Reason For Assessment: RD follow-up  Diagnosis: stroke/CVA  Relevant Medical History: Acute renal failure with tubular necrosis, Type 2 Diabetes mellitus, Fever, Hyperlipidemia, Hypertension, Hyponatremia, Obese, Peripheral neuropathy  Interdisciplinary Rounds: attended  General Information Comments: Followed up on pt this afternoon. Spoke with pt about current appetite and PO intake. Pt stated that his appetite is great, but he still does not like vegetables. Asked pt if there are any veggies he likes and pt stated carrots, pickles, lettuce, tomatoes. Encouraged pt to continue to try vegetables to try to find some he likes. Pt also stated that his wife will take over his diet when he gets home to try to keep him eating the way he is in the hospital. RD to follow and make rec's accordingly.  Nutrition Discharge Planning: Diabetic diet    Nutrition Risk Screen    Nutrition Risk Screen: large or nonhealing wound, burn or  "pressure injury    Nutrition/Diet History    Patient Reported Diet/Restrictions/Preferences: general  Spiritual, Cultural Beliefs, Moravian Practices, Values that Affect Care: no  Food Allergies: NKFA    Anthropometrics    Temp: 97.2 °F (36.2 °C)  Height Method: Stated  Height: 6' 1" (185.4 cm)  Height (inches): 73 in  Weight Method: Bed Scale  Weight: (!) 168.6 kg (371 lb 12.8 oz)  Weight (lb): (!) 371.8 lb  Ideal Body Weight (IBW), Male: 184 lb  % Ideal Body Weight, Male (lb): 199.26 %  BMI (Calculated): 49.1  BMI Grade: greater than 40 - morbid obesity  Amputation %:  (Right great toe, left 3rd toe)    Lab/Procedures/Meds    Pertinent Labs Reviewed: reviewed  Pertinent Medications Reviewed: reviewed    Estimated/Assessed Needs    Weight Used For Calorie Calculations: 104.1 kg (229 lb 8 oz) (AdjBW)  Energy Calorie Requirements (kcal): 2082-2602 (20-25kcal/kg AdjBW)  Energy Need Method: Kcal/kg  Protein Requirements: 100-125 (1.2-1.5g/kg IBW)  Weight Used For Protein Calculations: 83.5 kg (183 lb 15.9 oz) (IBW)  Fluid Requirements (mL): 2082-2602 (1mL/kcal)  Estimated Fluid Requirement Method: RDA Method  RDA Method (mL): 2082    Nutrition Prescription Ordered    Current Diet Order: Consistent Carbohydrate  Oral Nutrition Supplement: Glucerna (TID)    Evaluation of Received Nutrient/Fluid Intake    % Kcal Needs: 100%  % Protein Needs: 100%  I/O: +940  Energy Calories Required: meeting needs  Protein Required: meeting needs  Tolerance: tolerating  % Intake of Estimated Energy Needs: 75 - 100 %  % Meal Intake: 75 - 100 %    Nutrition Risk    Level of Risk/Frequency of Follow-up: low - moderate     Monitor and Evaluation    Food and Nutrient Intake: energy intake, food and beverage intake  Food and Nutrient Adminstration: diet order  Knowledge/Beliefs/Attitudes: food and nutrition knowledge/skill, beliefs and attitudes  Physical Activity and Function: nutrition-related ADLs and IADLs  Anthropometric Measurements: " height/length, weight, weight change, body mass index  Biochemical Data, Medical Tests and Procedures: electrolyte and renal panel, glucose/endocrine profile, gastrointestinal profile, inflammatory profile, lipid profile  Nutrition-Focused Physical Findings: overall appearance     Nutrition Follow-Up    RD Follow-up?: Yes

## 2022-11-08 NOTE — SUBJECTIVE & OBJECTIVE
Past Medical History:   Diagnosis Date    Acute renal failure with tubular necrosis 10/22/2022    Diabetes mellitus, type 2     Fever 10/22/2022    Hyperlipidemia     Hypertension     Hyponatremia 10/22/2022    Obese     Peripheral neuropathy        Past Surgical History:   Procedure Laterality Date    APPENDECTOMY      HERNIA REPAIR      INCISION AND DRAINAGE FOOT Bilateral 2018    Procedure: INCISION AND DRAINAGE, FOOT;  Surgeon: Rob Alas DPM;  Location: [facility] OR;  Service: Podiatry;  Laterality: Bilateral;    TOE AMPUTATION      right great toe    TOE AMPUTATION Left 2018    Procedure: AMPUTATION, TOE; left 3rd toe;  Surgeon: Rob Alas DPM;  Location: [facility] OR;  Service: Podiatry;  Laterality: Left;       Review of patient's allergies indicates:  No Known Allergies    No current facility-administered medications on file prior to encounter.     Current Outpatient Medications on File Prior to Encounter   Medication Sig    amLODIPine (NORVASC) 5 MG tablet Take 5 mg by mouth.    aspirin (ECOTRIN) 81 MG EC tablet Take 81 mg by mouth once daily.    atorvastatin (LIPITOR) 40 MG tablet Take 40 mg by mouth.    [] ceFAZolin 2 g/50mL Dextrose IVPB (ANCEF) 2 gram/50 mL PgBk Inject 50 mLs (2,000 mg total) into the vein every 8 (eight) hours. for 13 days    cholecalciferol, vitamin D3, (VITAMIN D3) 25 mcg (1,000 unit) capsule 1 capsule    DULoxetine (CYMBALTA) 30 MG capsule Take 30 mg by mouth once daily.    JARDIANCE 25 mg tablet Take 25 mg by mouth once daily.    lactobacillus acidophilus & bulgar (LACTINEX) 100 million cell packet Take 1 packet (1 each total) by mouth 2 (two) times daily.    LANTUS SOLOSTAR U-100 INSULIN glargine 100 units/mL SubQ pen SMARTSI Unit(s) SUB-Q Twice Daily    losartan (COZAAR) 25 MG tablet Take 4 tablets (100 mg total) by mouth once daily.    metFORMIN (GLUCOPHAGE) 1000 MG tablet Take 1,000 mg by mouth 2 (two) times daily.    mupirocin (BACTROBAN) 2 %  ointment Apply topically 2 (two) times daily.    psyllium husk, with sugar, 3.4 gram packet Take 1 packet by mouth 3 (three) times daily.    TRULICITY 4.5 mg/0.5 mL pen injector Inject into the skin.     Family History    None       Tobacco Use    Smoking status: Never    Smokeless tobacco: Never   Substance and Sexual Activity    Alcohol use: No    Drug use: No    Sexual activity: Yes     Partners: Female     Comment:      Review of Systems   Constitutional:  Positive for activity change, appetite change and fatigue. Negative for chills and fever.   HENT:  Negative for ear pain, mouth sores, nosebleeds and sore throat.    Eyes:  Negative for visual disturbance.   Respiratory:  Positive for shortness of breath. Negative for cough and wheezing.    Cardiovascular:  Positive for leg swelling. Negative for chest pain and palpitations.   Gastrointestinal:  Positive for constipation. Negative for abdominal distention, abdominal pain, blood in stool, diarrhea, nausea and vomiting.   Endocrine: Negative for cold intolerance, heat intolerance and polyphagia.   Genitourinary:  Negative for difficulty urinating, dysuria, flank pain and frequency.   Musculoskeletal:  Positive for arthralgias. Negative for back pain and myalgias.   Skin:  Positive for rash.   Neurological:  Positive for weakness. Negative for dizziness, tremors, seizures, syncope, facial asymmetry, speech difficulty and headaches.   Hematological:  Negative for adenopathy.   Psychiatric/Behavioral:  Negative for agitation, confusion and hallucinations. The patient is not nervous/anxious.    Objective:     Vital Signs (Most Recent):  Temp: 98 °F (36.7 °C) (11/07/22 1643)  Pulse: 64 (11/07/22 2101)  Resp: (!) 22 (11/07/22 2101)  BP: (!) 143/63 (11/07/22 2101)  SpO2: 95 % (11/07/22 2101) Vital Signs (24h Range):  Temp:  [97.2 °F (36.2 °C)-98 °F (36.7 °C)] 98 °F (36.7 °C)  Pulse:  [] 64  Resp:  [18-22] 22  SpO2:  [93 %-96 %] 95 %  BP: (116-143)/(63-80)  143/63     Weight: (!) 166.3 kg (366 lb 10 oz)  Body mass index is 48.37 kg/m².    Physical Exam  Constitutional:       General: He is not in acute distress.     Appearance: He is obese. He is ill-appearing.   HENT:      Head: Normocephalic and atraumatic.      Right Ear: External ear normal.      Left Ear: External ear normal.      Nose: Nose normal. No congestion or rhinorrhea.      Mouth/Throat:      Mouth: Mucous membranes are moist.      Pharynx: No oropharyngeal exudate.   Eyes:      General: No scleral icterus.     Extraocular Movements: Extraocular movements intact.   Neck:      Vascular: No carotid bruit.   Cardiovascular:      Rate and Rhythm: Normal rate and regular rhythm.      Heart sounds: Normal heart sounds. No murmur heard.    No friction rub. No gallop.   Pulmonary:      Effort: Pulmonary effort is normal. No respiratory distress.      Breath sounds: Normal breath sounds. No stridor. No wheezing, rhonchi or rales.   Chest:      Chest wall: No tenderness.   Abdominal:      General: Bowel sounds are normal. There is no distension.      Palpations: Abdomen is soft. There is no mass.      Tenderness: There is no abdominal tenderness. There is no right CVA tenderness, left CVA tenderness, guarding or rebound.      Hernia: No hernia is present.   Musculoskeletal:         General: No deformity.      Cervical back: Neck supple. No rigidity.      Right lower leg: Edema present.      Left lower leg: Edema present.      Comments: + foot wound, boot in place   Lymphadenopathy:      Cervical: No cervical adenopathy.   Skin:     Capillary Refill: Capillary refill takes less than 2 seconds.      Coloration: Skin is not jaundiced or pale.      Findings: No rash.      Comments: + bilateral, diffuse, urticarial rash, + dermatographism (improved)   Neurological:      Mental Status: He is alert and oriented to person, place, and time.      Cranial Nerves: No cranial nerve deficit.      Sensory: No sensory deficit.       Motor: Weakness present.      Coordination: Coordination normal.      Gait: Gait abnormal.   Psychiatric:         Mood and Affect: Mood normal.         Behavior: Behavior normal.         Thought Content: Thought content normal.         Judgment: Judgment normal.           Significant Labs: CBC: No results for input(s): WBC, HGB, HCT, PLT in the last 48 hours.    CMP:   No results for input(s): NA, K, CL, CO2, GLU, BUN, CREATININE, CALCIUM, PROT, ALBUMIN, BILITOT, ALKPHOS, AST, ALT, ANIONGAP, EGFRNONAA in the last 48 hours.    Invalid input(s): ESTGFAFR[place]      Significant Imaging: I have reviewed all pertinent imaging results/findings within the past 24 hours.

## 2022-11-08 NOTE — NURSING
Patient has a purple/blue blood blister to the lateral plantar aspect of his right foot. I applied skin prep to the area, then applied several layers of gauze to pad the area. See picture in the computer. Dr. Cruz was notified and I will notify Dr. Mittal team meeting today. Will continue to monitor.

## 2022-11-08 NOTE — ASSESSMENT & PLAN NOTE
Strong suspicion for drug eruption.  I have reviewed all new medications with his primary.  Stopping amiodarone today.  Rash has resolved.

## 2022-11-08 NOTE — ASSESSMENT & PLAN NOTE
Patient's FSGs are controlled on current medication regimen.  Last A1c reviewed-   Lab Results   Component Value Date    HGBA1C 9.2 (H) 10/21/2022     Most recent fingerstick glucose reviewed-   Recent Labs   Lab 11/07/22  0711 11/07/22  1107   POCTGLUCOSE 85 124*     Current correctional scale  High  Maintain anti-hyperglycemic dose as follows-   Antihyperglycemics (From admission, onward)    Start     Stop Route Frequency Ordered    11/01/22 0900  dulaglutide pen injector 4.5 mg         -- SubQ Every 7 days 10/31/22 1755    10/31/22 2100  insulin detemir U-100 pen 35 Units         -- SubQ 2 times daily 10/31/22 1133    10/31/22 2100  metFORMIN tablet 1,000 mg         -- Oral 2 times daily 10/31/22 1755    10/31/22 1132  insulin aspart U-100 pen 0-5 Units         -- SubQ Before meals & nightly PRN 10/31/22 1133        Hold Oral hypoglycemics while patient is in the hospital.

## 2022-11-08 NOTE — PT/OT/SLP PROGRESS
Physical Therapy         Treatment        Andrew Del Cid Jr.   MRN: [id number]     PT Received On: 11/08/22  PT Start Time: 1200     PT Stop Time: 1315    PT Total Time (min): 75 min       Billable Minutes:  Gait [id number], Therapeutic Activity 15, and Therapeutic Exercise 30  Total Minutes: 75    Treatment Type: Treatment  PT/PTA: PT             General Precautions: Standard, fall  Orthopedic Precautions: Orthopedic Precautions :  (CAM boot (R) LE for foot wound, (L) knee brace due to buckling of (L) knee occasionally)   Braces:      Spiritual, Cultural Beliefs, Mosque Practices, Values that Affect Care: no    Subjective:  Communicated with  nurse prior to session.         Objective:  Patient found in chair, with      Functional Mobility:  Bed Mobility:   Supine to sit: Independent   Sit to supine: Independent   Rolling: Independent   Scooting: Independent    Balance:   Static Sit: GOOD: Takes MODERATE challenges from all directions  Dynamic Sit:  GOOD: Maintains balance through MODERATE excursions of active trunk movement  Static Stand: GOOD: Takes MODERATE challenges from all directions  Dynamic stand: GOOD: Needs SUPERVISION only during gait and able to self right with moderate LOB    Transfer Training:  Sit to stand:Modified Independent with Rolling Walker    Bed <> Chair:  Step Transfer with Modified Independent with Rolling Walker      Wheelchair Training:  Ind with wc mobility    Gait Training:  Amb 200ft on level surface  with mod ind     Stair Training:  Goes up and down 12 steps with rails with cga      Additional Treatment:  Pt performed all home exs in sitting and supported standing with 6lb ankle wts 4 sets 20 reps    Activity Tolerance:  Patient tolerated treatment well    Patient left supine with call button in reach.    Assessment:  Andrew Del Cid Jr. is a 57 y.o. male with a medical diagnosis of CVA (cerebral vascular accident). He presents with mod ind with almost all fxn'l mobility and  is reday for dc tomorrow.    Rehab potential is excellent.    Activity tolerance: Good    Discharge recommendations:       Equipment recommendations:       GOALS:   Multidisciplinary Problems       Physical Therapy Goals          Problem: Physical Therapy    Goal Priority Disciplines Outcome Goal Variances Interventions   Physical Therapy Goal     PT, PT/OT      Description: Goals to be met by: 2022     Patient will increase functional independence with mobility by performin. Sit to stand transfer with [place]  2. Bed to chair transfer with [place] using No Assistive Device  3. Gait  x 200+ feet with Modified [place] using Rolling Walker.   4. Wheelchair propulsion x150+ feet with Modified [place] using bilateral lower extremities  5. Ascend/descend 12 stair with bilateral Handrails Modified [place] using No Assistive Device.   6. Ascend/Descend standard curb step with Modified [place] using Rolling Walker.                       PLAN:    Patient to be seen 5 x/week  to address the above listed problems via gait training, therapeutic activities, therapeutic exercises, therapeutic groups, neuromuscular re-education, wheelchair management/training  Plan of Care expires: 22  Plan of Care reviewed with: patient         2022

## 2022-11-08 NOTE — PT/OT/SLP PROGRESS
Occupational Therapy  Treatment    Andrew Del Cid Jr.   MRN: [id number]   Admitting Diagnosis: CVA (cerebral vascular accident)    OT Date of Treatment: 11/08/22   OT Start Time: 0830  OT Stop Time: 1030  OT Total Time (min): 120 min    Treatment Type: Individual 90 and Concurrent 30     Billable Minutes:  Self Care/Home Management 30, Therapeutic Activity 45, and Therapeutic Exercise 45  Total Minutes: 120     OT/ROBERT: OT          General Precautions: Standard, fall  Orthopedic Precautions: N/A  Braces:  (CAM Boot (R) LE)    Spiritual, Cultural Beliefs, Jain Practices, Values that Affect Care: no    Subjective:  Communicated with nurse prior to session.    Pain/Comfort  Pain Rating 1: 4/10  Location - Orientation 1: lower  Location 1: back  Pain Addressed 1: Reposition, Cessation of Activity, Other (see comments) (BioFreeze application)  Pain Rating Post-Intervention 1: 0/10    Objective:  Pt was cooperative and motivated without verbal encouragement while exhibiting positive affect. He participated in continued ADL retraining as further noted below emphasizing fall prevention, and use of compensatory strategies and assistive devices providing extra time. Pt then participated in therapeutic activity addressing fine motor coordination, pinch /  strength, and functional use of (L) hand challenging him to pinch / grasp various items including coins, buttons, pegs, and foam pieces with and without use of resistive medium including resistive clothespins and calibrated hand gripper utilizing tip-to-tip, tripod, lateral prehension, or power grasp to retrieve, stabilize, lift, manipulate, and place items at specified locations requiring min verbal cueing to facilitate proper pinch / grasp and more coordinated movements. Lastly, he participated in therapeutic exercise performing 3x15 (L) UE strengthening exercises utilizing extra heavy resistance theraband with scapular retraction, shoulder extension, shoulder  adduction, horizontal abduction, shoulder flexion in plane of scaption, internal rotation, external rotation, elbow flexion, supination / pronation, wrist flexion, and wrist extension requiring min verbal and tactile cueing for technique emphasizing quality of movement.     Functional Mobility:  Bed Mobility:   Supine to sit: Independent   Sit to supine: Independent   Rolling: Independent   Scooting: Independent    Transfer Training:   Sit to stand:Modified Independent with Rolling Walker .  Bed <> Chair:  Step Transfer with Modified Independent with Rolling Walker .  Toilet Transfer:  Pt Step Transfer with Modified Independent with Grab bars .    Grooming:  Patient peformed hand washing with Modified Independent at sitting at sink.  Patient performed face washing with Modified Independent at sitting at sink.  Patient performed oral hygeine with Modified Independent at sitting at sink.  Patient performe hair grooming with Modified Independent at sitting at sink.    UE Dressing:  Patient performed UE Dressing with Modified Independent with extra time at Wheelchair.    LE Dressing:  Patient don/doffed socks with Modified Independent, Patient don/doffed shoes with Setup Assistance, Patient performed don/doffed adult brief with Modified Independent, and Patient performed don/doffed pants with Modified Independent    Toilet Training:  Pt performed toileting with Modified Independent with Grab  bar at [employer].    Balance:   Static Sit: GOOD: Takes MODERATE challenges from all directions  Dynamic Sit:  GOOD: Maintains balance through MODERATE excursions of active trunk movement  Static Stand: GOOD: Takes MODERATE challenges from all directions  Dynamic stand: GOOD: Needs SUPERVISION only during gait and able to self right with moderate LOB      Patient left sitting edge of bed with call button in reach and nurse notified    ASSESSMENT:  Pt now mostly modified independent with ADL's including functional mobility with  exception to donning shoes requiring setup assistance of (R) LE CAM Boot and setup assistance with bathing. However, patient now safer to return home with spouse reducing fall risk while minimizing burden of care.    Rehab potential is good    Activity tolerance: Good    Discharge recommendations: other (see comments) (To be further determined based on progress prior to discharge.)     Equipment recommendations: bedside commode, other (see comments) (To be further determined based on progress prior to discharge.)     GOALS:   Short Term Goals to be met by: 11/07/22      Patient will increase functional independence with ADLs by performing:     UE Dressing with Supervision. MET  LE Dressing with Minimal Assistance. MET  Toileting from toilet with Minimal Assistance for hygiene and clothing management. MET  Bathing from  shower chair/bench with Minimal Assistance. MET  Step transfer with Stand-by Assistance. MET  Toilet transfer to toilet with Supervision. MET  Increased functional strength to 4/5 for (L) UE. MET  Increased fine motor coordination as measured by decrease in 9-Hole Peg Test to less than 80 sec. MET     Long Term Goals to be met by: 11/14/22      Patient will increase functional independence with ADLs by performing:     Feeding with [place]. MET  UE Dressing with Modified [place]. MET  LE Dressing with Modified [place]. NOT MET - Setup Assist of CAM Boot to (L) LE  Grooming while seated at sink with Modified [place]. MET  Toileting from toilet with Modified [place] for hygiene and clothing management. MET  Bathing from  shower chair/bench with Modified [place]. NOT MET - Setup Assist  Step transfer with Modified [place]. MET  Toilet transfer to toilet with Modified [place]. MET  Increased functional strength to 4+/5 for (L) UE. MET  Increased fine motor coordination as measured by decrease in 9-Hole Peg Test to less than 65 sec. MET      Plan:  Patient to be seen  5 x/week (for 90 min per day, for 14 days) to address the above listed problems via self-care/home management, community/work re-entry, therapeutic activities, therapeutic exercises, neuromuscular re-education, wheelchair management/training  Plan of Care expires: 11/14/22  Plan of Care reviewed with: patient         11/08/2022

## 2022-11-08 NOTE — PROGRESS NOTES
[facility])  Podiatry  Progress Note    Patient Name: Andrew Del Cid Jr.  MRN: [id number]  Admission Date: 10/31/2022  Hospital Length of Stay: 8 days  Attending Physician: Cl Mittal III, MD  Primary Care Provider: Olena Thornton DO     Subjective:     History of Present Illness:  Patient is a 57-year-old male well known to me from previous inpatient admissions, as well as outpatient clinical visits, with multiple morbidities including morbid obesity, hypertension, hyperlipidemia, uncontrolled type 2 diabetes with Charcot foot deformity right lower extremity as well as history of previous digital amputations and chronic wound to this right foot, with MSSA bacteremia now admitted to inpatient rehab after sustaining stroke.  Patient seen at bedside this morning, resting comfortably, able to participate in all modalities of therapy yesterday, tolerating cam boot to right lower extremity well.  He is without pain at this site due to significant neuropathy.  He is complaining of progressive rash, no itching, this was noted on admission, concern for drug eruption rash.    Interval History: Patient seen in gym today, tolerating therapy well, making gains.  Nursing notified me of new skin irritation to lateral aspect of midfoot, no drainage from site.  Patient with completion of IV abx, PICC line removed.  He denies any fever, chills or other systemic signs of infection.  He feels he is getting stronger, progressing toward discharge.        Scheduled Meds:   apixaban  5 mg Oral BID    aspirin  81 mg Oral Daily    atorvastatin  40 mg Oral Daily    cholecalciferol (vitamin D3)  5,000 Units Oral Daily    dulaglutide  4.5 mg Subcutaneous Q7 Days    DULoxetine  30 mg Oral Daily    ferrous sulfate  1 tablet Oral BID    insulin detemir U-100  35 Units Subcutaneous BID    lactobacillus acidophilus & bulgar  1 packet Oral BID    losartan  50 mg Oral Daily    magnesium oxide  400 mg Oral Daily    meropenem   1 g Intravenous Q48H    metFORMIN  1,000 mg Oral BID    metoclopramide HCl  10 mg Oral TID AC    metoprolol succinate  50 mg Oral Daily    miconazole NITRATE 2 %   Topical (Top) BID    mupirocin   Topical (Top) BID    psyllium husk (with sugar)  1 packet Oral TID     Continuous Infusions:  PRN Meds:acetaminophen, glucose, insulin aspart U-100, melatonin, ondansetron, prochlorperazine, sodium chloride 0.9%, traMADoL    Review of Systems  Constitutional:  Positive for fatigue. Negative for chills and fever.   HENT:  Positive for dental problem (poor dentitian).    Eyes:  Negative for visual disturbance.   Respiratory:  Negative for cough.    Cardiovascular:  Negative for chest pain.   Gastrointestinal:  Negative for nausea and vomiting.   Musculoskeletal:  Positive for arthralgias and back pain.        Charcot neuroarthropathy, right foot   Skin:  Positive for rash (resolving) and wound (right foot, chronic).   Neurological:  Positive for weakness.   Psychiatric/Behavioral:  Negative for confusion.      Objective:     Vital Signs (Most Recent):  Temp: 97.2 °F (36.2 °C) (11/08/22 0501)  Pulse: 75 (11/08/22 0850)  Resp: 20 (11/08/22 0850)  BP: 138/62 (11/08/22 0838)  SpO2: (!) 93 % (11/08/22 0850)   Vital Signs (24h Range):  Temp:  [97.2 °F (36.2 °C)-98 °F (36.7 °C)] 97.2 °F (36.2 °C)  Pulse:  [] 75  Resp:  [18-22] 20  SpO2:  [93 %-96 %] 93 %  BP: (116-143)/(62-80) 138/62     Weight: (!) 166.3 kg (366 lb 10 oz)  Body mass index is 48.37 kg/m².    Foot Exam    General  General Appearance: (morbidly obese male)  Orientation: alert and oriented to person, place, and time   Affect: appropriate     CAM boot to right LE      Right Foot/Ankle      Inspection and Palpation  Ecchymosis: none  Tenderness: none   Swelling: (Chronic to right foot from Charcot neuroarthropathy)  Hammertoes: second toe, third toe, fourth toe and fifth toe  Skin Exam: ulcer (1.4x1.3 cm fibrogranular wound at plantar aspect of foot, site of bony  prominence from Charcot neuroarthropathy, no undermining, minimal surrounding hyperkeratotic skin, scant serous drainage noted, no purulence, no malodor, no probing to bone); pressure erythema/violaceous color to lateral midfoot, site of CAM boot irritation, no drainage, no SOI ; skin abrasion to dorsal aspect 2nd toe, superficial, no SOI     Neurovascular  Dorsalis pedis: 2+  Posterior tibial: 2+  Saphenous nerve sensation: absent  Tibial nerve sensation: absent  Superficial peroneal nerve sensation: absent  Deep peroneal nerve sensation: absent  Sural nerve sensation: absent        Left Foot/Ankle       Inspection and Palpation  Ecchymosis: none  Tenderness: none   Hammertoes: third toe, fourth toe and fifth toe  Skin Exam: skin intact; no erythema      Neurovascular  Dorsalis pedis: 2+  Posterior tibial: 2+  Saphenous nerve sensation: absent  Tibial nerve sensation: absent  Superficial peroneal nerve sensation: absent  Deep peroneal nerve sensation: absent  Sural nerve sensation: absent    Laboratory:  Blood Cultures: No results for input(s): LABBLOO in the last 48 hours.  BMP:   Recent Labs   Lab 11/08/22  0548   GLU 86      K 4.5      CO2 34*   BUN 16   CREATININE 0.7   CALCIUM 9.0   MG 1.8     CBC:   Recent Labs   Lab 11/08/22  0549   WBC 6.92   RBC 4.70   HGB 13.0*   HCT 41.1      MCV 87   MCH 27.7   MCHC 31.6*     CMP:   Recent Labs   Lab 11/08/22  0548   GLU 86   CALCIUM 9.0   ALBUMIN 2.8*   PROT 7.5      K 4.5   CO2 34*      BUN 16   CREATININE 0.7   ALKPHOS 93   ALT 18   AST 14   BILITOT 1.2*       Diagnostic Results:  None      Assessment/Plan:     Active Diagnoses:    Diagnosis Date Noted POA    PRINCIPAL PROBLEM:  CVA (cerebral vascular accident) [I63.9] 11/01/2022 Yes    Rash [R21] 11/01/2022 Yes    Cerebellar dysmetria [R27.8] 10/31/2022 Yes    A-fib [I48.91] 10/26/2022 Yes    Bacteremia due to methicillin susceptible Staphylococcus aureus (MSSA) [R78.81, B95.61]  10/23/2022 Yes    Diabetic foot ulcer [E11.621, L97.509] 10/23/2022 Yes    Type 2 diabetes mellitus with diabetic polyneuropathy, with long-term current use of insulin [E11.42, Z79.4]  Not Applicable    Hyperlipidemia [E78.5] 12/25/2018 Yes     Chronic    Essential hypertension [I10] 06/30/2017 Yes     Chronic    Morbid obesity with BMI of 50.0-59.9, adult [E66.01, Z68.43] 06/04/2015 Not Applicable     Chronic      Problems Resolved During this Admission:       Diabetic foot ulcer  Patient with chronic wound from bony prominence due to Charcot neuroarthropathy.  Wound with slight decrease in size, will continue with topical meropenem to wound every other day, patient's wife provides wound care at home.  He is to continue with Cam boot to right lower extremity to assist in  offloading during weightbearing - additional padding added to pressure point laterally.  Sx shoe ordered for patient to use at home if CAM boot continues to rub/irritate foot.  He will need Charcot boot/shoe after wound heals.      Bacteremia  Patient with MSSA bacteremia, completed course of IV ancef, PICC removed without incident.     Stroke  Old vs subactue, progressing well with rehab, will continue outpatient efforts as well.     A-fib  Anticoagulation with Eliquis, cardiology following     HTN, Hyperlipidemia, Morbid obesity  Stable, management per primary service    Will call with outpt follow up apt.      Baldemar Esquivel, MARIA GUADALUPE  Podiatry  [place] [facility] ([facility])

## 2022-11-09 VITALS
BODY MASS INDEX: 41.75 KG/M2 | SYSTOLIC BLOOD PRESSURE: 158 MMHG | HEART RATE: 74 BPM | RESPIRATION RATE: 20 BRPM | WEIGHT: 315 LBS | OXYGEN SATURATION: 97 % | HEIGHT: 73 IN | DIASTOLIC BLOOD PRESSURE: 82 MMHG | TEMPERATURE: 98 F

## 2022-11-09 DIAGNOSIS — I63.9 CVA (CEREBRAL VASCULAR ACCIDENT): Primary | ICD-10-CM

## 2022-11-09 LAB
POCT GLUCOSE: 130 MG/DL (ref 70–110)
POCT GLUCOSE: 140 MG/DL (ref 70–110)
POCT GLUCOSE: 146 MG/DL (ref 70–110)
POCT GLUCOSE: 213 MG/DL (ref 70–110)
POCT GLUCOSE: 338 MG/DL (ref 70–110)

## 2022-11-09 PROCEDURE — 25000003 PHARM REV CODE 250: Performed by: INTERNAL MEDICINE

## 2022-11-09 PROCEDURE — 99900035 HC TECH TIME PER 15 MIN (STAT)

## 2022-11-09 PROCEDURE — 25000003 PHARM REV CODE 250: Performed by: STUDENT IN AN ORGANIZED HEALTH CARE EDUCATION/TRAINING PROGRAM

## 2022-11-09 PROCEDURE — 99900031 HC PATIENT EDUCATION (STAT)

## 2022-11-09 PROCEDURE — 94761 N-INVAS EAR/PLS OXIMETRY MLT: CPT

## 2022-11-09 RX ORDER — METOCLOPRAMIDE 10 MG/1
10 TABLET ORAL
Qty: 21 TABLET | Refills: 0
Start: 2022-11-09 | End: 2022-11-16

## 2022-11-09 RX ORDER — FERROUS SULFATE 325(65) MG
325 TABLET, DELAYED RELEASE (ENTERIC COATED) ORAL DAILY
Qty: 30 TABLET | Refills: 1 | Status: SHIPPED | OUTPATIENT
Start: 2022-11-09 | End: 2022-12-09

## 2022-11-09 RX ORDER — METOPROLOL SUCCINATE 50 MG/1
50 TABLET, EXTENDED RELEASE ORAL DAILY
Qty: 30 TABLET | Refills: 1 | Status: SHIPPED | OUTPATIENT
Start: 2022-11-10 | End: 2022-11-15

## 2022-11-09 RX ORDER — LOSARTAN POTASSIUM 25 MG/1
50 TABLET ORAL DAILY
Qty: 360 TABLET | Refills: 1
Start: 2022-11-09 | End: 2022-11-15

## 2022-11-09 RX ADMIN — ATORVASTATIN CALCIUM 40 MG: 40 TABLET, FILM COATED ORAL at 08:11

## 2022-11-09 RX ADMIN — MICONAZOLE NITRATE 2 % TOPICAL POWDER: at 08:11

## 2022-11-09 RX ADMIN — FERROUS SULFATE TAB 325 MG (65 MG ELEMENTAL FE) 1 EACH: 325 (65 FE) TAB at 08:11

## 2022-11-09 RX ADMIN — METOPROLOL SUCCINATE 50 MG: 50 TABLET, EXTENDED RELEASE ORAL at 08:11

## 2022-11-09 RX ADMIN — ASPIRIN 81 MG: 81 TABLET, COATED ORAL at 08:11

## 2022-11-09 RX ADMIN — APIXABAN 5 MG: 2.5 TABLET, FILM COATED ORAL at 08:11

## 2022-11-09 RX ADMIN — Medication 400 MG: at 08:11

## 2022-11-09 RX ADMIN — CHOLECALCIFEROL TAB 125 MCG (5000 UNIT) 5000 UNITS: 125 TAB at 08:11

## 2022-11-09 RX ADMIN — DULOXETINE 30 MG: 30 CAPSULE, DELAYED RELEASE ORAL at 08:11

## 2022-11-09 RX ADMIN — INSULIN DETEMIR 35 UNITS: 100 INJECTION, SOLUTION SUBCUTANEOUS at 08:11

## 2022-11-09 RX ADMIN — LOSARTAN POTASSIUM 50 MG: 50 TABLET, FILM COATED ORAL at 08:11

## 2022-11-09 RX ADMIN — METFORMIN HYDROCHLORIDE 1000 MG: 1000 TABLET, FILM COATED ORAL at 08:11

## 2022-11-09 RX ADMIN — Medication 1 PACKET: at 08:11

## 2022-11-09 RX ADMIN — METOCLOPRAMIDE 10 MG: 10 TABLET ORAL at 06:11

## 2022-11-09 RX ADMIN — LACTOBACILLUS ACIDOPHILUS / LACTOBACILLUS BULGARICUS 1 EACH: 100 MILLION CFU STRENGTH GRANULES at 08:11

## 2022-11-09 NOTE — DISCHARGE INSTRUCTIONS
-Full code, fall precautions and activity as tolerated.  -Outpatient therapy at [facility] with physical therapy and occupational therapy.  -Take all medication as instructed by your doctor.  -If symptoms return or worsen call your doctor or go to nearest ER.  -Follow all doctor orders and go to scheduled appointments.  Dr. Thornton 12/6/22 @ 2:00 pm  Dr. Miles 12/1/22 @ 1:45 pm  Dr. Granado (ID) 12/15/22 @ 10:20 am  Dr. Cruz: office will call you with an appointment  -Thanks for choosing [facility] with your care!!!

## 2022-11-09 NOTE — NURSING
Patient dc home today with wife. I went over DC paperwork with patient and wife with both voicing understanding. All patient's belongings and medications were packed and sent home with patient. Patient was brought downstairs to spouses car via  by hospital staff.

## 2022-11-09 NOTE — PLAN OF CARE
[place] - Rehab (Hospital)  Discharge Final Note    Primary Care Provider: Olena Thornton DO    Expected Discharge Date:     Final Discharge Note (most recent)       Final Note - 11/09/22 0759          Final Note    Assessment Type Final Discharge Note     Anticipated Discharge Disposition Home or Self Care   Order sent to [facility] for PT & OT. Office will contact patient with appointment.    Hospital Resources/Appts/Education Provided Appointments scheduled and added to AVS        Post-Acute Status    Discharge Delays None known at this time                     Important Message from Medicare             Contact Info       Olena Thornton DO   Specialty: Family Medicine   Relationship: PCP - General    [address]  [address]  [place] [address]   Phone: [phone]       Next Steps: Follow up on 12/6/2022    Instructions: Follow up with Dr. Thornton on 12/06/2022 at 2:00    Jeison Granado MD, [facility]   Specialty: Infectious Diseases, Hospitalist    [id number] [place] [id number]   Phone: [phone]       Next Steps: Follow up on 12/15/2022    Instructions: Follow up with Dr. Granado on 12/15/2022 at 10:20    Baldemar Esquivel DPM   Specialty: Podiatry    [facility]  [address]  [address]  [place] [address]   Phone: [phone]       Next Steps: Follow up    Instructions: Dr. Esquivel office will contact you with a follow up appointment.

## 2022-11-09 NOTE — PT/OT/SLP DISCHARGE
Occupational Therapy Discharge Summary    Andrew Del Cid Jr.  MRN: [id number]   Principal Problem: CVA (cerebral vascular accident)      Patient Discharged from inpatient Occupational Therapy on 11/09/22.  Please refer to prior OT note dated 11/07/22 and 11/08/22 for functional status.    Assessment:     Pt was cooperative and motivated with minimal verbal encouragement while exhibiting positive affect during skilled OT treatment sessions including ADL retraining, functional transfer retraining, assistive device training, neuromuscular reeducation, therapeutic activity, therapeutic exercise, and patient education / instruction including discharge planning and home exercise program allowing him to progress with functional goals in which he achieved 8/8 STG's and 7/10 LTG's. Pt now setup assistance to modified independent with ADL's including functional mobility with extra time, and use of compensatory strategies and assistive devices. He demonstrated increased fine motor coordination of affected (L) UE as noted by a decrease of 38 seconds from 99 to 61 regarding 9-Hole Peg Test allowing patient to button shirts, and fasten clothing and shoes requiring less time. Also, he demonstrated increased (L) UE strength to 4+ / 5 manual muscle grades allowing for improved functional performance with ADL's and meaningful activities. Pt now safer to return home with spouse reducing fall risk while minimizing burden of care.    Objective:     GOALS:   Short Term Goals to be met by: 11/07/22      Patient will increase functional independence with ADLs by performing:     UE Dressing with Supervision. MET  LE Dressing with Minimal Assistance. MET  Toileting from toilet with Minimal Assistance for hygiene and clothing management. MET  Bathing from  shower chair/bench with Minimal Assistance. MET  Step transfer with Stand-by Assistance. MET  Toilet transfer to toilet with Supervision. MET  Increased functional strength to 4/5 for (L)  UE. MET  Increased fine motor coordination as measured by decrease in 9-Hole Peg Test to less than 80 sec. MET     Long Term Goals to be met by: 11/14/22      Patient will increase functional independence with ADLs by performing:     Feeding with [place]. MET  UE Dressing with Modified [place]. MET  LE Dressing with Modified [place]. NOT MET - Setup Assist of CAM Boot to (L) LE  Grooming while seated at sink with Modified [place]. MET  Toileting from toilet with Modified [place] for hygiene and clothing management. MET  Bathing from  shower chair/bench with Modified [place]. NOT MET - Setup Assist  Step transfer with Modified [place]. MET  Toilet transfer to toilet with Modified [place]. MET  Increased functional strength to 4+/5 for (L) UE. MET  Increased fine motor coordination as measured by decrease in 9-Hole Peg Test to less than 65 sec. MET      Reasons for Discontinuation of Therapy Services  Satisfactory goal achievement.      Plan:     Patient Discharged to: Outpatient Therapy Services    11/9/2022

## 2022-11-09 NOTE — PLAN OF CARE
2:00 PM     Director reached out to the patient's wife's phone after going to the rehab unit and being notified that patient had discharged earlier today.     Spoke to the patient's wife Aleena. She reports that they are without an income at the current time. Patient does receive food stamps - they applied for [employer] last week - still pending a decision.     Encouraged her to contact [place] [phone] to inquire about additional resources in the area around food, shelter, utilities, etc. Also encouraged her to contact food stamps to ensure that the family is receiving the maximum benefit without income (reports she believes they are as most are during COVID times).    Case management will remain available for any additional needs.

## 2022-11-10 LAB — POCT GLUCOSE: 105 MG/DL (ref 70–110)

## 2022-11-14 ENCOUNTER — TELEPHONE (OUTPATIENT)
Dept: PRIMARY CARE CLINIC | Facility: CLINIC | Age: 57
End: 2022-11-14
Payer: COMMERCIAL

## 2022-11-14 NOTE — TELEPHONE ENCOUNTER
Andrew's wife called and would like to get his medications in order.  She states that you changed him from 25mg to 50mg on the Losartan---but they do not have a script for the 50mg.    Also the Insulin---they do not have a script for Novalog.  She states he was getting 30 units at every meal. Novalog is not on his list of meds.     And she wants to know if he is to continue with the Trulicity as well because it's still listed in his medication list.     She would like a call from you to go over his medications.

## 2022-11-15 ENCOUNTER — OFFICE VISIT (OUTPATIENT)
Dept: PRIMARY CARE CLINIC | Facility: CLINIC | Age: 57
End: 2022-11-15
Payer: MEDICAID

## 2022-11-15 VITALS
HEART RATE: 83 BPM | TEMPERATURE: 98 F | BODY MASS INDEX: 41.75 KG/M2 | HEIGHT: 73 IN | WEIGHT: 315 LBS | SYSTOLIC BLOOD PRESSURE: 131 MMHG | OXYGEN SATURATION: 96 % | DIASTOLIC BLOOD PRESSURE: 67 MMHG | RESPIRATION RATE: 14 BRPM

## 2022-11-15 DIAGNOSIS — Z79.4 TYPE 2 DIABETES MELLITUS WITH DIABETIC POLYNEUROPATHY, WITH LONG-TERM CURRENT USE OF INSULIN: ICD-10-CM

## 2022-11-15 DIAGNOSIS — L08.9 DIABETIC INFECTION OF RIGHT FOOT: Chronic | ICD-10-CM

## 2022-11-15 DIAGNOSIS — E11.42 TYPE 2 DIABETES MELLITUS WITH DIABETIC POLYNEUROPATHY, WITH LONG-TERM CURRENT USE OF INSULIN: ICD-10-CM

## 2022-11-15 DIAGNOSIS — R27.8 CEREBELLAR DYSMETRIA: ICD-10-CM

## 2022-11-15 DIAGNOSIS — Z79.4 TYPE 2 DIABETES MELLITUS WITHOUT COMPLICATION, WITH LONG-TERM CURRENT USE OF INSULIN: ICD-10-CM

## 2022-11-15 DIAGNOSIS — E78.5 HYPERLIPIDEMIA, UNSPECIFIED HYPERLIPIDEMIA TYPE: Chronic | ICD-10-CM

## 2022-11-15 DIAGNOSIS — I63.9 CEREBROVASCULAR ACCIDENT (CVA), UNSPECIFIED MECHANISM: ICD-10-CM

## 2022-11-15 DIAGNOSIS — I10 PRIMARY HYPERTENSION: Primary | ICD-10-CM

## 2022-11-15 DIAGNOSIS — D50.8 IRON DEFICIENCY ANEMIA SECONDARY TO INADEQUATE DIETARY IRON INTAKE: ICD-10-CM

## 2022-11-15 DIAGNOSIS — E11.628 DIABETIC INFECTION OF RIGHT FOOT: Chronic | ICD-10-CM

## 2022-11-15 DIAGNOSIS — I10 ESSENTIAL HYPERTENSION: Chronic | ICD-10-CM

## 2022-11-15 DIAGNOSIS — E11.9 TYPE 2 DIABETES MELLITUS WITHOUT COMPLICATION, WITH LONG-TERM CURRENT USE OF INSULIN: ICD-10-CM

## 2022-11-15 DIAGNOSIS — E11.42 TYPE 2 DIABETES MELLITUS WITH DIABETIC POLYNEUROPATHY, WITHOUT LONG-TERM CURRENT USE OF INSULIN: ICD-10-CM

## 2022-11-15 DIAGNOSIS — I48.0 PAROXYSMAL ATRIAL FIBRILLATION: ICD-10-CM

## 2022-11-15 PROCEDURE — 3075F SYST BP GE 130 - 139MM HG: CPT | Mod: CPTII,,, | Performed by: STUDENT IN AN ORGANIZED HEALTH CARE EDUCATION/TRAINING PROGRAM

## 2022-11-15 PROCEDURE — 1159F MED LIST DOCD IN RCRD: CPT | Mod: CPTII,,, | Performed by: STUDENT IN AN ORGANIZED HEALTH CARE EDUCATION/TRAINING PROGRAM

## 2022-11-15 PROCEDURE — 4010F PR ACE/ARB THEARPY RXD/TAKEN: ICD-10-PCS | Mod: CPTII,,, | Performed by: STUDENT IN AN ORGANIZED HEALTH CARE EDUCATION/TRAINING PROGRAM

## 2022-11-15 PROCEDURE — 3078F PR MOST RECENT DIASTOLIC BLOOD PRESSURE < 80 MM HG: ICD-10-PCS | Mod: CPTII,,, | Performed by: STUDENT IN AN ORGANIZED HEALTH CARE EDUCATION/TRAINING PROGRAM

## 2022-11-15 PROCEDURE — 3075F PR MOST RECENT SYSTOLIC BLOOD PRESS GE 130-139MM HG: ICD-10-PCS | Mod: CPTII,,, | Performed by: STUDENT IN AN ORGANIZED HEALTH CARE EDUCATION/TRAINING PROGRAM

## 2022-11-15 PROCEDURE — 3008F PR BODY MASS INDEX (BMI) DOCUMENTED: ICD-10-PCS | Mod: CPTII,,, | Performed by: STUDENT IN AN ORGANIZED HEALTH CARE EDUCATION/TRAINING PROGRAM

## 2022-11-15 PROCEDURE — 3008F BODY MASS INDEX DOCD: CPT | Mod: CPTII,,, | Performed by: STUDENT IN AN ORGANIZED HEALTH CARE EDUCATION/TRAINING PROGRAM

## 2022-11-15 PROCEDURE — 1111F PR DISCHARGE MEDS RECONCILED W/ CURRENT OUTPATIENT MED LIST: ICD-10-PCS | Mod: CPTII,,, | Performed by: STUDENT IN AN ORGANIZED HEALTH CARE EDUCATION/TRAINING PROGRAM

## 2022-11-15 PROCEDURE — 99999 PR PBB SHADOW E&M-EST. PATIENT-LVL V: ICD-10-PCS | Mod: PBBFAC,,, | Performed by: STUDENT IN AN ORGANIZED HEALTH CARE EDUCATION/TRAINING PROGRAM

## 2022-11-15 PROCEDURE — 3078F DIAST BP <80 MM HG: CPT | Mod: CPTII,,, | Performed by: STUDENT IN AN ORGANIZED HEALTH CARE EDUCATION/TRAINING PROGRAM

## 2022-11-15 PROCEDURE — 3046F HEMOGLOBIN A1C LEVEL >9.0%: CPT | Mod: CPTII,,, | Performed by: STUDENT IN AN ORGANIZED HEALTH CARE EDUCATION/TRAINING PROGRAM

## 2022-11-15 PROCEDURE — 1159F PR MEDICATION LIST DOCUMENTED IN MEDICAL RECORD: ICD-10-PCS | Mod: CPTII,,, | Performed by: STUDENT IN AN ORGANIZED HEALTH CARE EDUCATION/TRAINING PROGRAM

## 2022-11-15 PROCEDURE — 99214 PR OFFICE/OUTPT VISIT, EST, LEVL IV, 30-39 MIN: ICD-10-PCS | Mod: S$PBB,,, | Performed by: STUDENT IN AN ORGANIZED HEALTH CARE EDUCATION/TRAINING PROGRAM

## 2022-11-15 PROCEDURE — 99214 OFFICE O/P EST MOD 30 MIN: CPT | Mod: S$PBB,,, | Performed by: STUDENT IN AN ORGANIZED HEALTH CARE EDUCATION/TRAINING PROGRAM

## 2022-11-15 PROCEDURE — 3046F PR MOST RECENT HEMOGLOBIN A1C LEVEL > 9.0%: ICD-10-PCS | Mod: CPTII,,, | Performed by: STUDENT IN AN ORGANIZED HEALTH CARE EDUCATION/TRAINING PROGRAM

## 2022-11-15 PROCEDURE — 4010F ACE/ARB THERAPY RXD/TAKEN: CPT | Mod: CPTII,,, | Performed by: STUDENT IN AN ORGANIZED HEALTH CARE EDUCATION/TRAINING PROGRAM

## 2022-11-15 PROCEDURE — 99215 OFFICE O/P EST HI 40 MIN: CPT | Mod: PBBFAC,PN | Performed by: STUDENT IN AN ORGANIZED HEALTH CARE EDUCATION/TRAINING PROGRAM

## 2022-11-15 PROCEDURE — 99999 PR PBB SHADOW E&M-EST. PATIENT-LVL V: CPT | Mod: PBBFAC,,, | Performed by: STUDENT IN AN ORGANIZED HEALTH CARE EDUCATION/TRAINING PROGRAM

## 2022-11-15 PROCEDURE — 1111F DSCHRG MED/CURRENT MED MERGE: CPT | Mod: CPTII,,, | Performed by: STUDENT IN AN ORGANIZED HEALTH CARE EDUCATION/TRAINING PROGRAM

## 2022-11-15 RX ORDER — DULAGLUTIDE 4.5 MG/.5ML
4.5 INJECTION, SOLUTION SUBCUTANEOUS
Qty: 4 PEN | Refills: 5 | Status: SHIPPED | OUTPATIENT
Start: 2022-11-15 | End: 2023-05-14

## 2022-11-15 RX ORDER — METOPROLOL SUCCINATE 50 MG/1
50 TABLET, EXTENDED RELEASE ORAL DAILY
Qty: 30 TABLET | Refills: 5 | Status: SHIPPED | OUTPATIENT
Start: 2022-11-15 | End: 2023-06-23

## 2022-11-15 RX ORDER — INSULIN GLARGINE 100 [IU]/ML
40 INJECTION, SOLUTION SUBCUTANEOUS NIGHTLY
Qty: 12 ML | Refills: 2 | Status: SHIPPED | OUTPATIENT
Start: 2022-11-15 | End: 2024-01-18 | Stop reason: SDUPTHER

## 2022-11-15 RX ORDER — LOSARTAN POTASSIUM 50 MG/1
50 TABLET ORAL DAILY
Qty: 30 TABLET | Refills: 5 | Status: SHIPPED | OUTPATIENT
Start: 2022-11-15 | End: 2023-05-27

## 2022-11-15 RX ORDER — METFORMIN HYDROCHLORIDE 500 MG/1
1000 TABLET, EXTENDED RELEASE ORAL 2 TIMES DAILY WITH MEALS
Qty: 120 TABLET | Refills: 5 | Status: SHIPPED | OUTPATIENT
Start: 2022-11-15 | End: 2024-01-18 | Stop reason: SDUPTHER

## 2022-11-15 RX ORDER — AMLODIPINE BESYLATE 5 MG/1
5 TABLET ORAL DAILY
Qty: 30 TABLET | Refills: 5 | Status: SHIPPED | OUTPATIENT
Start: 2022-11-15 | End: 2023-05-27

## 2022-11-15 NOTE — ASSESSMENT & PLAN NOTE
Continue outpatient PT and OT efforts and current medications for risk reduction.  - eliquis BID, aspirin, statin daily

## 2022-11-15 NOTE — ASSESSMENT & PLAN NOTE
Newly diagnosed with recent hospitalization for bacteremia, acute/subacute stroke affecting left side function.   - continue with Eliquis 5 mg BID  - f/u EP and cardiology in [place] [facility]

## 2022-11-15 NOTE — ASSESSMENT & PLAN NOTE
Resume antihyperglycemic medicines.   - jardiance 25 mg daily  - trulicity 4.5 mg Q7 days  - metformin 1000 mg BID  - lantus 40U QHS  - f/u referral to diabetic education  - f/u referral endocrinology [facility]  - f/u 2 months with HgA1C check

## 2022-11-15 NOTE — ASSESSMENT & PLAN NOTE
Wt Readings from Last 3 Encounters:   11/15/22 1536 (!) 166.9 kg (368 lb)   11/08/22 1326 (!) 168.6 kg (371 lb 12.8 oz)   11/01/22 1056 (!) 166.3 kg (366 lb 10 oz)   11/01/22 0818 (!) 166.3 kg (366 lb 9.6 oz)   10/31/22 1135 (!) 166.2 kg (366 lb 6.4 oz)   10/26/22 1050 (!) 171.5 kg (378 lb 1.6 oz)   BMI today is 49. Patient will not eat vegetables. His only intake for veggies include, cucumber, tomato, lettuce, peas and corn. Wife at [facility] states patient has cut back on fatty and fried food items.   Recommendations:   Stay physically active. As tolerated alternate resistance training with stretching and cardio. Goal of 150 minutes per week of moderate intensity activity or 7,500 - 10,000 steps per day. Follow the Mediterranean Diet. Include whole fresh fruits, vegetables, olive oil, seeds, nuts, whole grains, cold water fish, salmon, mackerel and lean cuts of meat.  Do not drink sugary/diet carbonated beverages. Decrease portion sizes slightly which will result in an approximately 500-calorie deficit. Avoid fast or fried and processed food, especially canned foods. Avoid refined carbohydrates, white starchy foods, flour, white potato, bread, muffins, and cakes. Consider substituting one meal a day with a meal replacement such as Slim fast, lean cuisine, or weight watcher's. Follow a healthy diet that includes enough calcium, vitamin D and proteins for bone health.

## 2022-11-15 NOTE — ASSESSMENT & PLAN NOTE
Completed IP PT and OT efforts. Has been out of IP services for a week and will not transition to outpatient PT and OT efforts. Still having coordination issues with ambulation. Currently in right air boot with walker.   - f/u PT and OT

## 2022-11-15 NOTE — ASSESSMENT & PLAN NOTE
Normotensive. Continue with current regimen post hospitalization.   - amlodipine 5 mg daily  - losartan 50 mg daily  - metoprolol succinate 50 mg daily  - continue to follow low sodium diet <2g or 2 teaspoons  - DASH diet  - f/u 2 months

## 2022-11-15 NOTE — PROGRESS NOTES
[facility] Note    Chief Complaint      Chief Complaint   Patient presents with    Follow-up     Hospital follow up---medication mgt     History of Present Illness      Andrew Del Cid Jr. is a 57 y.o. male who presents today for Follow-up (Hospital follow up---medication mgt)    Past Medical History:  Past Medical History:   Diagnosis Date    Acute renal failure with tubular necrosis 10/22/2022    Diabetes mellitus, type 2     Fever 10/22/2022    Hyperlipidemia     Hypertension     Hyponatremia 10/22/2022    Obese     Peripheral neuropathy      Past Surgical History:   has a past surgical history that includes Hernia repair; Appendectomy; Toe amputation; Incision and drainage foot (Bilateral, 12/26/2018); and Toe amputation (Left, 12/26/2018).    Family History:  family history is not on file.     Social History:  Social History     Tobacco Use    Smoking status: Never    Smokeless tobacco: Never   Substance Use Topics    Alcohol use: No    Drug use: No     I personally reviewed all past medical, surgical, social and family history.    Review of Systems   Constitutional:  Negative for chills, fever, malaise/fatigue and weight loss.   HENT:  Negative for congestion, ear discharge, ear pain, sinus pain and sore throat.    Eyes:  Negative for blurred vision, pain, discharge and redness.   Respiratory:  Negative for cough, hemoptysis, sputum production, shortness of breath, wheezing and stridor.    Cardiovascular:  Negative for chest pain, palpitations and leg swelling.   Gastrointestinal:  Negative for abdominal pain, blood in stool, constipation, diarrhea, nausea and vomiting.   Genitourinary:  Negative for dysuria, frequency and hematuria.   Musculoskeletal:  Negative for back pain, falls, myalgias and neck pain.   Skin: Negative.  Negative for rash.   Neurological:  Negative for dizziness, tingling, focal weakness, seizures, weakness and headaches.   Endo/Heme/Allergies:  Negative for  environmental allergies and polydipsia. Does not bruise/bleed easily.   Psychiatric/Behavioral:  Negative for depression and suicidal ideas. The patient is not nervous/anxious.       Medications:  Outpatient Encounter Medications as of 11/15/2022   Medication Sig Dispense Refill    apixaban (ELIQUIS) 5 mg Tab Take 1 tablet (5 mg total) by mouth 2 (two) times daily. 60 tablet 1    aspirin (ECOTRIN) 81 MG EC tablet Take 81 mg by mouth once daily.      atorvastatin (LIPITOR) 40 MG tablet Take 40 mg by mouth.      cholecalciferol, vitamin D3, (VITAMIN D3) 25 mcg (1,000 unit) capsule 1 capsule      DULoxetine (CYMBALTA) 30 MG capsule Take 30 mg by mouth once daily.      ferrous sulfate 325 (65 FE) MG EC tablet Take 1 tablet (325 mg total) by mouth once daily. 30 tablet 1    JARDIANCE 25 mg tablet Take 25 mg by mouth once daily.      metFORMIN (GLUCOPHAGE) 1000 MG tablet Take 1,000 mg by mouth 2 (two) times daily.      mupirocin (BACTROBAN) 2 % ointment Apply topically 2 (two) times daily. 30 g 2    [DISCONTINUED] LANTUS SOLOSTAR U-100 INSULIN glargine 100 units/mL SubQ pen SMARTSI Unit(s) SUB-Q Twice Daily      [DISCONTINUED] losartan (COZAAR) 25 MG tablet Take 2 tablets (50 mg total) by mouth once daily. 360 tablet 1    [DISCONTINUED] metoprolol succinate (TOPROL-XL) 50 MG 24 hr tablet Take 1 tablet (50 mg total) by mouth once daily. 30 tablet 1    amLODIPine (NORVASC) 5 MG tablet Take 1 tablet (5 mg total) by mouth once daily. 30 tablet 5    empagliflozin (JARDIANCE) 25 mg tablet Take 1 tablet (25 mg total) by mouth once daily. 30 tablet 5    lactobacillus acidophilus & bulgar (LACTINEX) 100 million cell packet Take 1 packet (1 each total) by mouth 2 (two) times daily. (Patient not taking: Reported on 11/15/2022) 60 packet 2    LANTUS SOLOSTAR U-100 INSULIN glargine 100 units/mL SubQ pen Inject 40 Units into the skin every evening. 12 mL 2    losartan (COZAAR) 50 MG tablet Take 1 tablet (50 mg total) by mouth once  "daily. 30 tablet 5    metFORMIN (GLUCOPHAGE-XR) 500 MG ER 24hr tablet Take 2 tablets (1,000 mg total) by mouth 2 (two) times daily with meals. Generic 120 tablet 5    metoclopramide HCl (REGLAN) 10 MG tablet Take 1 tablet (10 mg total) by mouth 3 (three) times daily before meals. for 7 days (Patient not taking: Reported on 11/15/2022) 21 tablet 0    metoprolol succinate (TOPROL-XL) 50 MG 24 hr tablet Take 1 tablet (50 mg total) by mouth once daily. 30 tablet 5    psyllium husk, with sugar, 3.4 gram packet Take 1 packet by mouth 3 (three) times daily. (Patient not taking: Reported on 11/15/2022) 90 packet 2    TRULICITY 4.5 mg/0.5 mL pen injector Inject 4.5 mg into the skin every 7 days. 4 pen 5    [DISCONTINUED] TRULICITY 4.5 mg/0.5 mL pen injector Inject into the skin.       No facility-administered encounter medications on file as of 11/15/2022.     Allergies:  Review of patient's allergies indicates:  No Known Allergies    Health Maintenance:  Immunization History   Administered Date(s) Administered    COVID-19 MRNA, LN-S PF (MODERNA HALF 0.25 ML DOSE) 03/12/2021, 03/14/2022    COVID-19, MRNA, LN-S, PF (MODERNA FULL 0.5 ML DOSE) 03/12/2021, 04/09/2021    Influenza 11/15/2018    Influenza - Quadrivalent - PF *Preferred* (6 months and older) 09/12/2019, 10/19/2021, 10/04/2022    Influenza - Trivalent - PF (ADULT) 10/19/2021    Pneumococcal Conjugate - 13 Valent 10/19/2021    Tdap 04/27/2013, 04/27/2013      Health Maintenance   Topic Date Due    Hepatitis C Screening  Never done    Eye Exam  05/27/2016    Hemoglobin A1c  01/21/2023    TETANUS VACCINE  04/27/2023    Lipid Panel  10/27/2023    Foot Exam  11/01/2023    High Dose Statin  11/15/2023        Physical Exam      Vital Signs  Temp: 98.1 °F (36.7 °C)  Temp src: Oral  Pulse: 83  Resp: 14  SpO2: 96 %  BP: 131/67  BP Location: Right arm  Patient Position: Sitting  Pain Score:   4  Pain Loc: Back  Height and Weight  Height: 6' 1" (185.4 cm)  Weight: (!) 166.9 kg " (368 lb)  BSA (Calculated - sq m): 2.93 sq meters  BMI (Calculated): 48.6  Weight in (lb) to have BMI = 25: 189.1]    Physical Exam  Vitals and nursing note reviewed.   Constitutional:       General: He is not in acute distress.     Appearance: Normal appearance. He is normal weight. He is not ill-appearing or toxic-appearing.   HENT:      Head: Normocephalic and atraumatic.      Right Ear: External ear normal.      Left Ear: External ear normal.      Nose: Nose normal.      Mouth/Throat:      Mouth: Mucous membranes are moist.      Pharynx: Oropharynx is clear.   Eyes:      Extraocular Movements: Extraocular movements intact.      Conjunctiva/sclera: Conjunctivae normal.   Cardiovascular:      Rate and Rhythm: Normal rate and regular rhythm.      Pulses: Normal pulses.      Heart sounds: Normal heart sounds. No murmur heard.  Pulmonary:      Effort: Pulmonary effort is normal. No respiratory distress.      Breath sounds: Normal breath sounds. No wheezing or rales.   Abdominal:      Palpations: Abdomen is soft. There is no mass.      Tenderness: There is no abdominal tenderness. There is no right CVA tenderness, left CVA tenderness or guarding.   Musculoskeletal:         General: Normal range of motion.      Cervical back: Normal range of motion and neck supple. No rigidity.      Right lower leg: No edema.      Left lower leg: No edema.      Comments: Right food wound wrapped and in air boot   Skin:     General: Skin is warm and dry.      Capillary Refill: Capillary refill takes less than 2 seconds.   Neurological:      General: No focal deficit present.      Mental Status: He is alert and oriented to person, place, and time. Mental status is at baseline.      Motor: No weakness.      Gait: Gait normal.   Psychiatric:         Mood and Affect: Mood normal.         Behavior: Behavior normal.         Thought Content: Thought content normal.         Judgment: Judgment normal.      Laboratory:  CBC:  Recent Labs   Lab  11/02/22  0529 11/05/22  0505 11/08/22  0549   WBC 7.29 6.14 6.92   RBC 4.31 L 4.51 L 4.70   Hemoglobin 11.9 L 12.4 L 13.0 L   Hematocrit 37.9 L 38.9 L 41.1   Platelets 454 H 369 297   MCV 88 86 87   MCH 27.6 27.5 27.7   MCHC 31.4 L 31.9 L 31.6 L     CMP:  Recent Labs   Lab 11/02/22  0529 11/05/22  0505 11/08/22  0548   Glucose 149 H 74 86   Calcium 8.6 L 8.7 9.0   Albumin 2.4 L 2.6 L 2.8 L   Total Protein 6.6 6.9 7.5   Sodium 139 139 138   Potassium 4.2 4.0 4.5   CO2 30 H 31 H 34 H   Chloride 105 105 102   BUN 18 14 16   Alkaline Phosphatase 100 87 93   ALT 19 16 18   AST 12 14 14   Total Bilirubin 0.6 1.0 1.2 H     URINALYSIS:  Recent Labs   Lab 10/21/22  1200 10/22/22  0026 10/26/22  1158   Color, UA Yellow Yellow Yellow   Specific Gravity, UA >=1.030 A 1.025 1.025   pH, UA 6.0 5.0 6.0   Protein, UA 1+ A 2+ A Negative   Bacteria Negative Negative Negative   Nitrite, UA Negative Negative Negative   Leukocytes, UA Negative Negative Negative   Urobilinogen, UA Negative 1.0 Negative   Hyaline Casts, UA 1.2 A 9.0 A 0.4 A      LIPIDS:  Recent Labs   Lab 10/27/22  0520 10/29/22  0515   TSH  --  2.370   HDL 22 L  --    Cholesterol 116 L  --    Triglycerides 141  --    LDL Cholesterol 65.8  --    HDL/Cholesterol Ratio 19.0 L  --    Non-HDL Cholesterol 94  --    Total Cholesterol/HDL Ratio 5.3 H  --      TSH:  Recent Labs   Lab 10/29/22  0515   TSH 2.370     A1C:  Recent Labs   Lab 11/13/20  1129 10/21/22  0213   Hemoglobin A1C  --  9.2 H   Hemoglobin A1c 13.9 H  --      Assessment/Plan     Andrew Del Cid Jr. is a 57 y.o.male with:    Problem List Items Addressed This Visit          Neuro    Cerebellar dysmetria    Current Assessment & Plan     Completed IP PT and OT efforts. Has been out of IP services for a week and will not transition to outpatient PT and OT efforts. Still having coordination issues with ambulation. Currently in right air boot with walker.   - f/u PT and OT         CVA (cerebral vascular accident)     Current Assessment & Plan     Continue outpatient PT and OT efforts and current medications for risk reduction.  - eliquis BID, aspirin, statin daily            Cardiac/Vascular    Essential hypertension (Chronic)    Current Assessment & Plan     Normotensive. Continue with current regimen post hospitalization.   - amlodipine 5 mg daily  - losartan 50 mg daily  - metoprolol succinate 50 mg daily  - continue to follow low sodium diet <2g or 2 teaspoons  - DASH diet  - f/u 2 months         Hyperlipidemia (Chronic)    Current Assessment & Plan     Review external lab 8/28. Continue daily atorvastatin 40 mg.          A-fib    Current Assessment & Plan     Newly diagnosed with recent hospitalization for bacteremia, acute/subacute stroke affecting left side function.   - continue with Eliquis 5 mg BID  - f/u EP and cardiology in [facility]            Endocrine    Diabetic infection of right foot (Chronic)    Relevant Medications    metFORMIN (GLUCOPHAGE-XR) 500 MG ER 24hr tablet    TRULICITY 4.5 mg/0.5 mL pen injector    LANTUS SOLOSTAR U-100 INSULIN glargine 100 units/mL SubQ pen    Other Relevant Orders    Ambulatory referral/consult to Endocrinology    Type 2 diabetes mellitus with diabetic polyneuropathy, without long-term current use of insulin    Current Assessment & Plan     Resume antihyperglycemic medicines.   - jardiance 25 mg daily  - trulicity 4.5 mg Q7 days  - metformin 1000 mg BID  - lantus 40U QHS  - f/u referral to diabetic education  - f/u referral endocrinology [facility]  - f/u 2 months with HgA1C check         Relevant Medications    metFORMIN (GLUCOPHAGE-XR) 500 MG ER 24hr tablet    empagliflozin (JARDIANCE) 25 mg tablet    TRULICITY 4.5 mg/0.5 mL pen injector    LANTUS SOLOSTAR U-100 INSULIN glargine 100 units/mL SubQ pen    Type 2 diabetes mellitus without complication, with long-term current use of insulin    Relevant Medications    metFORMIN (GLUCOPHAGE-XR) 500 MG ER 24hr tablet    TRULICITY 4.5  mg/0.5 mL pen injector    LANTUS SOLOSTAR U-100 INSULIN glargine 100 units/mL SubQ pen    Other Relevant Orders    Ambulatory referral/consult to Endocrinology    BMI 45.0-49.9, adult    Current Assessment & Plan     Wt Readings from Last 3 Encounters:   11/15/22 1536 (!) 166.9 kg (368 lb)   11/08/22 1326 (!) 168.6 kg (371 lb 12.8 oz)   11/01/22 1056 (!) 166.3 kg (366 lb 10 oz)   11/01/22 0818 (!) 166.3 kg (366 lb 9.6 oz)   10/31/22 1135 (!) 166.2 kg (366 lb 6.4 oz)   10/26/22 1050 (!) 171.5 kg (378 lb 1.6 oz)   BMI today is 49. Patient will not eat vegetables. His only intake for veggies include, cucumber, tomato, lettuce, peas and corn. Wife at side states patient has cut back on fatty and fried food items.   Recommendations:   Stay physically active. As tolerated alternate resistance training with stretching and cardio. Goal of 150 minutes per week of moderate intensity activity or 7,500 - 10,000 steps per day. Follow the Mediterranean Diet. Include whole fresh fruits, vegetables, olive oil, seeds, nuts, whole grains, cold water fish, salmon, mackerel and lean cuts of meat.  Do not drink sugary/diet carbonated beverages. Decrease portion sizes slightly which will result in an approximately 500-calorie deficit. Avoid fast or fried and processed food, especially canned foods. Avoid refined carbohydrates, white starchy foods, flour, white potato, bread, muffins, and cakes. Consider substituting one meal a day with a meal replacement such as Slim fast, lean cuisine, or weight watcher's. Follow a healthy diet that includes enough calcium, vitamin D and proteins for bone health.            Other Visit Diagnoses       Primary hypertension    -  Primary    Relevant Medications    amLODIPine (NORVASC) 5 MG tablet    metoprolol succinate (TOPROL-XL) 50 MG 24 hr tablet    losartan (COZAAR) 50 MG tablet    Iron deficiency anemia secondary to inadequate dietary iron intake        Relevant Orders    Ferritin    Iron and TIBC     Vitamin B12            Other than changes above, cont current medications and maintain follow up with specialists.  No follow-ups on file.    Future Appointments   Date Time Provider Department Center   11/16/2022  9:00 AM Lester Guevara OT [facility]   11/16/2022 10:00 AM Beth Heaton PT [facility]   12/6/2022  2:00 PM Olena Thornton DO [facility]   1/17/2023  2:00 PM Olena Thornton DO [facility]       [facility]

## 2022-11-28 ENCOUNTER — CLINICAL SUPPORT (OUTPATIENT)
Dept: REHABILITATION | Facility: HOSPITAL | Age: 57
End: 2022-11-28
Attending: INTERNAL MEDICINE
Payer: MEDICAID

## 2022-11-28 ENCOUNTER — CLINICAL SUPPORT (OUTPATIENT)
Dept: REHABILITATION | Facility: HOSPITAL | Age: 57
End: 2022-11-28
Payer: MEDICAID

## 2022-11-28 DIAGNOSIS — I63.9 CEREBROVASCULAR ACCIDENT (CVA), UNSPECIFIED MECHANISM: Primary | ICD-10-CM

## 2022-11-28 DIAGNOSIS — I63.9 CVA (CEREBRAL VASCULAR ACCIDENT): ICD-10-CM

## 2022-11-28 PROCEDURE — 97161 PT EVAL LOW COMPLEX 20 MIN: CPT

## 2022-11-28 PROCEDURE — 97166 OT EVAL MOD COMPLEX 45 MIN: CPT

## 2022-11-28 NOTE — PLAN OF CARE
Physical Therapy Initial Evaluation     Name: Andrew Del Cid Jr.  Clinic Number: [id number]    Diagnosis:   Encounter Diagnosis   Name Primary?    CVA (cerebral vascular accident)      Physician: Cl Mittal III, MD  Treatment Orders: PT Eval and Treat  Past Medical History:   Diagnosis Date    Acute renal failure with tubular necrosis 10/22/2022    Diabetes mellitus, type 2     Fever 10/22/2022    Hyperlipidemia     Hypertension     Hyponatremia 10/22/2022    Obese     Peripheral neuropathy      Current Outpatient Medications   Medication Sig    amLODIPine (NORVASC) 5 MG tablet Take 1 tablet (5 mg total) by mouth once daily.    apixaban (ELIQUIS) 5 mg Tab Take 1 tablet (5 mg total) by mouth 2 (two) times daily.    aspirin (ECOTRIN) 81 MG EC tablet Take 81 mg by mouth once daily.    atorvastatin (LIPITOR) 40 MG tablet Take 40 mg by mouth.    cholecalciferol, vitamin D3, (VITAMIN D3) 25 mcg (1,000 unit) capsule 1 capsule    DULoxetine (CYMBALTA) 30 MG capsule Take 30 mg by mouth once daily.    empagliflozin (JARDIANCE) 25 mg tablet Take 1 tablet (25 mg total) by mouth once daily.    ferrous sulfate 325 (65 FE) MG EC tablet Take 1 tablet (325 mg total) by mouth once daily.    JARDIANCE 25 mg tablet Take 25 mg by mouth once daily.    lactobacillus acidophilus & bulgar (LACTINEX) 100 million cell packet Take 1 packet (1 each total) by mouth 2 (two) times daily. (Patient not taking: Reported on 11/15/2022)    LANTUS SOLOSTAR U-100 INSULIN glargine 100 units/mL SubQ pen Inject 40 Units into the skin every evening.    losartan (COZAAR) 50 MG tablet Take 1 tablet (50 mg total) by mouth once daily.    metFORMIN (GLUCOPHAGE) 1000 MG tablet Take 1,000 mg by mouth 2 (two) times daily.    metFORMIN (GLUCOPHAGE-XR) 500 MG ER 24hr tablet Take 2 tablets (1,000 mg total) by mouth 2 (two) times daily with meals. Generic    metoprolol succinate (TOPROL-XL) 50 MG 24 hr tablet Take  1 tablet (50 mg total) by mouth once daily.    mupirocin (BACTROBAN) 2 % ointment Apply topically 2 (two) times daily.    psyllium husk, with sugar, 3.4 gram packet Take 1 packet by mouth 3 (three) times daily. (Patient not taking: Reported on 11/15/2022)    TRULICITY 4.5 mg/0.5 mL pen injector Inject 4.5 mg into the skin every 7 days.     No current facility-administered medications for this visit.     Review of patient's allergies indicates:  No Known Allergies    SUBJECTIVE     Patient states:  Patient with recent CVA and attended IPR. Patient reports (L) knee buckling but not as frequently as before. Patient reports he is unable to walk safely without use of RW. He reports attempting short distances but reaching for walls and furniture and decided he would use RW. Patient ambulates throughout home and community with RW. Patient does have 5 steps to enter home but has an elevator. Patient lives with his wife and small children. Wife is able to help patient with bathing and daily activities as needed. Patient has (R) walking boot that he alternates wearing with (R) surgical shoe due to wound plantar surface of (R) foot. Patient reports he more frequently wears surgical shoe due to walking boot being very bulky and heavy. Patient reports chief complaint is fatigue during ambulation and activities. Patient was a  prior to CVA and subsequent hospitalization. Standing tolerance 10 minutes.   Pts goals:  to improve functional mobility and return to PLOF.   Pain Scale: Andrew rates pain on a scale of 0-10 to be 2/10 (L) knee with weightbearing.     OBJECTIVE     Mental status: Alert and oriented x4  Posture Alignment: increased weight shift to (R) in standing. Forward trunk lean on walker in standing.   Sensation: Light Touch: Impaired: patient with diagnosis of neuropathy            ROM:  UPPER EXTREMITY--AROM/PROM  (R) UE: see OT eval  (L) UE: see OT eval           LOWER EXTREMITY --  AROM/PROM  (R) LE: WFLs  (L) LE: WFLs    Upper Extremity Strength:see OT evaluation for details    Lower Extremity Strength  Right LE  Left LE    Hip Flexion: 4-/5 Hip Flexion: 4-/5   Hip Abduction: 5/5 Hip Abduction: 4-/5   Hip IR: 5/5 Hip IR: 4-/5   Hip ER: 5/5 Hip ER: 4-/5   Hip Extension: 5/5 Hip Extension: 4-/5   Knee extension: 5/5 Knee extension: 4-/5   Knee flexion: 5/5 Knee flexion: 4-/5   Ankle dorsiflexion:   5/5 Ankle dorsiflexion:   4-/5   Ankle plantarflexion: 5/5 Ankle plantarflexion: 4-/5     GAIT: Andrew ambulates community distances feet with RW with independently.     GAIT DEVIATIONS: Andrew displays decreased foot clearance (B), decreased SLS (L) LE, decreased heel strike (B), forward lean on walker.      Pt/family was provided educational information, including: role of PT, goals for PT, scheduling - pt verbalized understanding. Discussed insurance limitations with pt.   Pt has no cultural, educational or language barriers to learning provided.    TREATMENT     Time In: 1004  Time Out: 1034    PT Evaluation Completed? Yes  Discussed Plan of Care with patient: Yes  Written Home Exercises Provided: will be provided next appt session    ASSESSMENT   Pt prognosis is Good.  Pt will benefit from skilled outpatient physical therapy to address the above stated deficits, provide pt/family education and to maximize pt's level of independence.     Medical necessity is demonstrated by the following IMPAIRMENTS/PROBLEMS:  1. Decreased strength  2. Decreased balance  3. Decreased tolerance of functional activities  4. Decreased LE coordinaion  5. Impaired gait    Pt's spiritual, cultural and educational needs considered and pt agreeable to plan of care and goals as stated below:     Anticipated Barriers for physical therapy:     Short Term GOALS: 3 weeks. Pt agrees with goals set.  Patient demonstrates independence with HEP.  Patient reports no falls with functional mobility.  Patient demonstrates  postural awareness with all activities.  Long Term GOALS: 6  weeks. Pt agrees with goals set.  Patient demonstrates improvement in LEFS score to 50 or greater.  Patient ambulates with minimal to no gait deviations with least restrictive AD.   Patient demonstrates TUG score 12s or less.   Patient demonstrates 5x sit to stand 20s or less.   Patient demonstrates (B) LE strength 4+/5.   Patient demonstrates standing tolerance 30 minutes or greater.       LOWER EXTREMITY FUNCTIONAL SCALE  18/80         1. Any of your usual work, housework or school activities     2. Your usual hobbies, sporting     0  3. Getting in and out of tub        4. Walking between rooms        5. Putting on shoes or socks        6. Squatting        0  7. Lifting an object from the ground        8. Performing light activities around the home     9. Performing heavy activities around the home     10. Getting in and out of car        11. Walking 2 blocks         12.Walking a mile         13. Getting up and down 1 flight of stairs      14. Standing for 1 hour        15. Sitting for an hour         16. Running on even ground        17. Running on uneven ground       18. Making sharp turns when running fast      19. Hopping          20. Rolling over in bed           TUs  5x sit to stand: 51.38      PLAN     Outpatient physical therapy 2 times weekly to include: pt ed, hep, therapeutic exercises, neuromuscular re-education/ balance exercises, manual therapy and modalities prn including MHP, CP, electrical stimulation, ultrasound. Cont PT for  6 weeks. Pt may be seen by PTA as part of the rehabilitation team.   Certification dates: 2022-2023    Therapist: Katie Guevara, PT      I certify the need for these services furnished under this plan of treatment and while under my care.  ____________________________________ Physician/Referring Practitioner   Date of Signature

## 2022-11-28 NOTE — PLAN OF CARE
Patient: Andrew Del Cid Jr.   Clinic #: [id number]  Date of evaluation: 11/28/2022     Referring Physician: Cl Mittal III, MD  Diagnosis:   Encounter Diagnosis   Name Primary?    Cerebrovascular accident (CVA), unspecified mechanism Yes     Referral Orders: Eval and Treat  Age: 57 y.o.  Sex: male          Hand dominance: Right    Time In: 9:00  Time Out: 9:45    Occupation: [employer]  Working presently: No    Date of onset: 10/26/2022  Involved area: left  Mechanism of Injury: CVA    Past Medical History:   Diagnosis Date    Acute renal failure with tubular necrosis 10/22/2022    Diabetes mellitus, type 2     Fever 10/22/2022    Hyperlipidemia     Hypertension     Hyponatremia 10/22/2022    Obese     Peripheral neuropathy      Precautions:   Current Outpatient Medications   Medication Sig    amLODIPine (NORVASC) 5 MG tablet Take 1 tablet (5 mg total) by mouth once daily.    apixaban (ELIQUIS) 5 mg Tab Take 1 tablet (5 mg total) by mouth 2 (two) times daily.    aspirin (ECOTRIN) 81 MG EC tablet Take 81 mg by mouth once daily.    atorvastatin (LIPITOR) 40 MG tablet Take 40 mg by mouth.    cholecalciferol, vitamin D3, (VITAMIN D3) 25 mcg (1,000 unit) capsule 1 capsule    DULoxetine (CYMBALTA) 30 MG capsule Take 30 mg by mouth once daily.    empagliflozin (JARDIANCE) 25 mg tablet Take 1 tablet (25 mg total) by mouth once daily.    ferrous sulfate 325 (65 FE) MG EC tablet Take 1 tablet (325 mg total) by mouth once daily.    JARDIANCE 25 mg tablet Take 25 mg by mouth once daily.    lactobacillus acidophilus & bulgar (LACTINEX) 100 million cell packet Take 1 packet (1 each total) by mouth 2 (two) times daily. (Patient not taking: Reported on 11/15/2022)    LANTUS SOLOSTAR U-100 INSULIN glargine 100 units/mL SubQ pen Inject 40 Units into the skin every evening.    losartan (COZAAR) 50 MG tablet Take 1 tablet (50 mg total) by mouth once daily.    metFORMIN (GLUCOPHAGE) 1000 MG tablet Take 1,000 mg by mouth 2 (two)  times daily.    metFORMIN (GLUCOPHAGE-XR) 500 MG ER 24hr tablet Take 2 tablets (1,000 mg total) by mouth 2 (two) times daily with meals. Generic    metoprolol succinate (TOPROL-XL) 50 MG 24 hr tablet Take 1 tablet (50 mg total) by mouth once daily.    mupirocin (BACTROBAN) 2 % ointment Apply topically 2 (two) times daily.    psyllium husk, with sugar, 3.4 gram packet Take 1 packet by mouth 3 (three) times daily. (Patient not taking: Reported on 11/15/2022)    TRULICITY 4.5 mg/0.5 mL pen injector Inject 4.5 mg into the skin every 7 days.     No current facility-administered medications for this visit.     Review of patient's allergies indicates:  No Known Allergies    Subjective:    Pain:  During no work: n/a/10  While working: n/a/10  Sleeping: n/a/10  Location of pain:    Andrew's goals for therapy are: Increase Functional Hand strength, Shoulder strength, and FMC      Objective:  Sensation Test: experiences tingling experiences numbness on the ring and pinky fingers on the LUE    Observation/Inspection   Left Right   Anatomic Symmetry normal normal   Bony normal normal   Suparspinatus normal normal   Infraspinatus normal normal   Rhomboid normal normal     Observation/Inspection:  normal muscle tone for age      Range of Motion:   Right: WNL  Left: WFL    Painful Arc:   Patient demonstrates no painful arc in shoulder flexion or abduction      Strength  Shoulder Flexion RUE: 5/5   Shoulder Extension RUE: 5/5   Shoulder Abduction RUE:  5/5   Shoulder Adduction RUE:  5/5   Internal Rotation RUE: 5/5   External Rotation RUE: 5/5   Horizontal Adduction RUE: 5/5   Shoulder Flexion LUE: 4/5   Shoulder Extension LUE: 4/5   Shoulder Abduction LUE: 4/5   Shoulder Abbduction LUE: 4/5   Internal Rotation LUE:  4/5   External Rotation LUE:  4/5   Horizontal Adduction LUE:  4/5      Strength:  Laly:  R:74.9 lbs  L: 68.4 lbs    9 Hole Peg Test:  R: 20 sec  L: 37 sec    Quick Dash: 56.8%    Limitations of Functional Status:    Self Care: requires increase time to don clothes  Work: fine motor coordination, Gripping, Lifting    Treatment included: OT evaluation    Assessment  This 57 y.o. male referred to Outpatient Occupational Therapy with diagnosis of   Encounter Diagnosis   Name Primary?    Cerebrovascular accident (CVA), unspecified mechanism Yes    presents with limitations as described in problem list. Patient can benefit from Occupational Therapy services for Ultrasound, moist heat, AROM, Theraputic exercises, home exercise program provied with written instructions, ice, strengthening, Theraband Ex, UBE, and pulley ex in order to maximize painfree functional use of  left UE. . The following goals were discussed with the patient and he is in agreement with them as to be addressed in the treatment plan.     Problem List:   Decreased function of Left UE, Decreased strength, Inability to perform work/tasks, Inability to perform leisure activiites, and Inability to perform self care tasks    Goals to be met in 8 weeks:  1) Pt will increase ability to move/handle objects AEB decreased score on QuickDASH <=50 % (currently 56.8 %)   2) Increase  strength 2-3 lbs. to improve functional grasp for ADLs/work/leisure activities.  3) Pt will complete the 9 hole peg test in LUE with a decrease of 2-3 sec to increase independence in ADLs.  4) Increase LUE strength to 4+/5 to improve functional use for ADLs/work/leisure activities.    Plan  Andrew to be treated by Occupational Therapy 2 times per week for 6 weeks to achieve the established goals.   Treatment to include Ultrasoud @ 3mHz, Ice, Moist heat, Strengthening Theraband Ex, UBE , and E- stim as well as any other treatments deemed necessary based on the patient's needs or progress.     Certification Dates: 11/29/2022 - 1/6/2023    I certify the need for these services furnished under this plan of treatment and while under my  care    ____________________________________  Physician/Referring Practitioner    _______________  Date of Signature

## 2022-12-01 ENCOUNTER — CLINICAL SUPPORT (OUTPATIENT)
Dept: REHABILITATION | Facility: HOSPITAL | Age: 57
End: 2022-12-01
Payer: MEDICAID

## 2022-12-01 DIAGNOSIS — I63.9 CEREBROVASCULAR ACCIDENT (CVA), UNSPECIFIED MECHANISM: Primary | ICD-10-CM

## 2022-12-01 PROCEDURE — 97110 THERAPEUTIC EXERCISES: CPT

## 2022-12-01 PROCEDURE — 97530 THERAPEUTIC ACTIVITIES: CPT

## 2022-12-01 NOTE — PROGRESS NOTES
Physical Therapy Daily Note     Name: Andrew Del Cid Jr.  Clinic Number: [id number]  Diagnosis:   Encounter Diagnosis   Name Primary?    Cerebrovascular accident (CVA), unspecified mechanism Yes     Physician: Cl Mittal III, MD  Precautions: fall  Visit #: 1 of 12  PTA Visit #: 0  Time In: 1000  Time Out: 1054    Subjective     Pt reports: No new complaints today. Patient ambulated into clinic using RW.  Pain Scale: Andrew rates pain on a scale of 0-10 to be n/a currently.    Objective     Andrew received individual therapeutic exercises to develop strength, endurance, ROM, flexibility, posture, and core stabilization for 39 minutes including:  LE omnicycle x15 minutes 99% activity (L) 60 (R) 40.   Standing at edge of TM 2x10 toe taps to edge of TM, hip abduction, hip extension,   FMAC to (L) quad x15 minutes total with strong quad contraction with 10 seconds on and 10 seconds off with patient performing quad set during on time.     Written Home Exercises Provided: will be provided in the future    Education provided re: exercises, modalities  Andrew verbalized good understanding of education provided.   No spiritual or educational barriers to learning provided    Assessment     Patient tolerated treatment well. Rest breaks needed during standing activities.   This is a 57 y.o. male referred to outpatient physical therapy and presents with a medical diagnosis of CVA and demonstrates limitations as described in the problem list. Pt prognosis is Good. Pt will continue to benefit from skilled outpatient physical therapy to address the deficits listed in the problem list, provide pt/family education and to maximize pt's level of independence in the home and community environment.     Goals as follows:  Short Term GOALS: 3 weeks. Pt agrees with goals set.  Patient demonstrates independence with HEP.  Patient reports no falls with functional  mobility.  Patient demonstrates postural awareness with all activities.  Long Term GOALS: 6  weeks. Pt agrees with goals set.  Patient demonstrates improvement in LEFS score to 50 or greater.  Patient ambulates with minimal to no gait deviations with least restrictive AD.   Patient demonstrates TUG score 12s or less.   Patient demonstrates 5x sit to stand 20s or less.   Patient demonstrates (B) LE strength 4+/5.   Patient demonstrates standing tolerance 30 minutes or greater.         Plan     Continue with established Plan of Care towards PT goals.    Therapist: Katie Guevara, PT  12/1/2022

## 2022-12-01 NOTE — PROGRESS NOTES
"Patient:  Andrew Del Cid Jr.  Clinic #:  [id number]   Date of Note: 12/01/2022   Referring Physician:  Cl Mittal III, MD  Diagnosis:    Encounter Diagnosis   Name Primary?    Cerebrovascular accident (CVA), unspecified mechanism Yes        Start Time: 11:00 am  End Time: 12:00 pm  Total Time: 60 min      Subjective:   "No pain today"  Pain: n/a out of 10     Objective:   Patient seen by OT this session. Treatment  consist of the following:  Therapeutic activities    Treatment: Therapeutic activities x 60 min     Omnicycle 9 res x 7.5 forward and 7.5 backwards for increased strength  [place] tower for increased FMC  Hand gripper 35# 3x10  Blue therabar 3 way 2x10  CP on dowel for increased hand strength    Assessment:  Pt tolerated therapy well this day with no complaints of pain or discomfort. Pt is progressing in all goals this day. Pt was able to tolorate increased resistance without increased pain. Continue OT POC at next visit as tolerated by pt.  Pt will continue to benefit from skilled OT intervention.    Patient continues to demonstrate limitation  with  Decreased fine motor coordination, Decreased functional use, Decreased strength, and Continued pain    Problem List:   Decreased function of Left UE, Decreased strength, Inability to perform work/tasks, Inability to perform leisure activiites, and Inability to perform self care tasks     Goals to be met in 8 weeks:  1) Pt will increase ability to move/handle objects AEB decreased score on QuickDASH <=50 % (currently 56.8 %)   2) Increase  strength 2-3 lbs. to improve functional grasp for ADLs/work/leisure activities.  3) Pt will complete the 9 hole peg test in LUE with a decrease of 2-3 sec to increase independence in ADLs.  4) Increase LUE strength to 4+/5 to improve functional use for ADLs/work/leisure activities.     Plan  Andrew to be treated by Occupational Therapy 2 times per week for 6 weeks to achieve the established goals.   Treatment to " include Ultrasoud @ 3mHz, Ice, Moist heat, Strengthening Theraband Ex, UBE , and E- stim as well as any other treatments deemed necessary based on the patient's needs or progress.      Certification Dates: 11/29/2022 - 1/6/2023

## 2022-12-05 ENCOUNTER — CLINICAL SUPPORT (OUTPATIENT)
Dept: REHABILITATION | Facility: HOSPITAL | Age: 57
End: 2022-12-05
Payer: MEDICAID

## 2022-12-05 DIAGNOSIS — I63.9 CEREBROVASCULAR ACCIDENT (CVA), UNSPECIFIED MECHANISM: Primary | ICD-10-CM

## 2022-12-05 PROCEDURE — 97110 THERAPEUTIC EXERCISES: CPT

## 2022-12-05 PROCEDURE — 97530 THERAPEUTIC ACTIVITIES: CPT

## 2022-12-05 NOTE — PROGRESS NOTES
"Patient:  Andrew Del Cid Jr.  Clinic #:  [id number]   Date of Note: 12/05/2022   Referring Physician:  Cl iMttal III, MD  Diagnosis:    Encounter Diagnosis   Name Primary?    Cerebrovascular accident (CVA), unspecified mechanism Yes          Start Time: 9:40 am  End Time: 10:35 pm  Total Time: 45 min      Subjective:   "No pain today"  Pain: n/a out of 10     Objective:   Patient seen by OT this session. Treatment  consist of the following:  Therapeutic activities    Treatment: Therapeutic activities x 45 min     Omnicycle 9 res x 7.5 forward and 7.5 backwards for increased strength  [place] tower for increased FMC  Blue therabar 3 way 2x10  CP on dowel for increased hand strength    Assessment:  Pt tolerated therapy well this day with no complaints of pain or discomfort. Pt is progressing in all goals this day. Pt was able to tolerate all resistance without increased pain. Pt requested to end treatment this day early due to personal reasons. Continue OT POC at next visit as tolerated by pt.  Pt will continue to benefit from skilled OT intervention.    Patient continues to demonstrate limitation  with  Decreased fine motor coordination, Decreased functional use, Decreased strength, and Continued pain    Problem List:   Decreased function of Left UE, Decreased strength, Inability to perform work/tasks, Inability to perform leisure activiites, and Inability to perform self care tasks     Goals to be met in 8 weeks:  1) Pt will increase ability to move/handle objects AEB decreased score on QuickDASH <=50 % (currently 56.8 %)   2) Increase  strength 2-3 lbs. to improve functional grasp for ADLs/work/leisure activities.  3) Pt will complete the 9 hole peg test in LUE with a decrease of 2-3 sec to increase independence in ADLs.  4) Increase LUE strength to 4+/5 to improve functional use for ADLs/work/leisure activities.     Plan  Andrew to be treated by Occupational Therapy 2 times per week for 6 weeks to " achieve the established goals.   Treatment to include Ultrasoud @ 3mHz, Ice, Moist heat, Strengthening Theraband Ex, UBE , and E- stim as well as any other treatments deemed necessary based on the patient's needs or progress.      Certification Dates: 11/29/2022 - 1/6/2023

## 2022-12-05 NOTE — PROGRESS NOTES
Physical Therapy Daily Note     Name: Andrew Del Cid Jr.  Clinic Number: [id number]  Diagnosis:   Encounter Diagnosis   Name Primary?    Cerebrovascular accident (CVA), unspecified mechanism Yes     Physician: Cl Mittal III, MD  Precautions: fall  Visit #: 2 of 12  PTA Visit #: 0  Time In: 0900  Time Out: 0945    Subjective     Pt reports: (L) knee discomfort today which patient attributes to increased use.   Pain Scale: Andrew unable to rate pain.   Objective     Andrew received individual therapeutic exercises to develop strength, endurance, ROM, flexibility, posture, and core stabilization for 30 minutes including:  LE omnicycle x15 minutes neuro setting 4W 98% activity (L) 64 (R) 36.   Standing at edge of TM 2x10 toe taps to edge of TM, hip abduction, hip extension,   FMAC to (L) quad x15 minutes total with strong quad contraction with 10 seconds on and 10 seconds off with patient performing quad set during on time.     Written Home Exercises Provided: will be provided in the future    Education provided re: exercises, modalities  Andrew verbalized good understanding of education provided.   No spiritual or educational barriers to learning provided    Assessment     Patient tolerated treatment well. Rest breaks needed during standing activities. Patient session abbreviated due to patient needs to leave early. PT will continue per POC and progress as appropriate.   This is a 57 y.o. male referred to outpatient physical therapy and presents with a medical diagnosis of CVA and demonstrates limitations as described in the problem list. Pt prognosis is Good. Pt will continue to benefit from skilled outpatient physical therapy to address the deficits listed in the problem list, provide pt/family education and to maximize pt's level of independence in the home and community environment.     Goals as follows:  Short Term GOALS: 3 weeks. Pt agrees with  goals set.  Patient demonstrates independence with HEP.  Patient reports no falls with functional mobility.  Patient demonstrates postural awareness with all activities.  Long Term GOALS: 6  weeks. Pt agrees with goals set.  Patient demonstrates improvement in LEFS score to 50 or greater.  Patient ambulates with minimal to no gait deviations with least restrictive AD.   Patient demonstrates TUG score 12s or less.   Patient demonstrates 5x sit to stand 20s or less.   Patient demonstrates (B) LE strength 4+/5.   Patient demonstrates standing tolerance 30 minutes or greater.         Plan     Continue with established Plan of Care towards PT goals.    Therapist: Katie Guevara, PT  12/5/2022

## 2022-12-05 NOTE — ASSESSMENT & PLAN NOTE
Body mass index is 49.05 kg/m². Morbid obesity complicates all aspects of disease management from diagnostic modalities to treatment. Weight loss encouraged and health benefits explained to patient.

## 2022-12-05 NOTE — ASSESSMENT & PLAN NOTE
Patient's FSGs are controlled on current medication regimen.  Last A1c reviewed-   Lab Results   Component Value Date    HGBA1C 9.2 (H) 10/21/2022     Most recent fingerstick glucose reviewed-   No results for input(s): POCTGLUCOSE in the last 24 hours.  Current correctional scale  High  Maintain anti-hyperglycemic dose as follows-   Antihyperglycemics (From admission, onward)    None        Hold Oral hypoglycemics while patient is in the hospital.

## 2022-12-05 NOTE — ASSESSMENT & PLAN NOTE
Continue current antibiotics but I am concerned about his drug eruption and this may need to be changed.  Rash seems to be improving with no abx change.  Suspect rash was releated to amiodarone.

## 2022-12-05 NOTE — DISCHARGE SUMMARY
[facility] Medicine  Discharge Summary      Patient Name: Andrew Del iCd Jr.  MRN: [id number]  [facility]: [id number]  Patient Class: IP- Rehab  Admission Date: 10/31/2022  Hospital Length of Stay: 9 days  Discharge Date and Time: 11/9/2022 11:36 AM  Attending Physician: Mariana att. providers found   Discharging Provider: Cl Mittal III, MD  Primary Care Provider: Olena Thornton DO    Primary Care Team: Networked reference to record PCT     HPI:   Patient is a 57-year-old male with a past medical history of type 2 diabetes hypertension morbid obesity neuropathy and a right lower extremity foot ulcer who presented to our acute care facility with sepsis.  He was found to have MSSA bacteremia and started on IV Ancef.  He was discharged home on IV antibiotics and then return back to the ED with an acute neurological change.  Patient had left-sided hemiparesis had falls and on evaluation and CT scan was found to have an acute right-sided CV A.  Patient was admitted underwent diagnostic testing had an MRI of the brain was also found to have newly diagnosed atrial fibrillation.  The patient was seen by cardiology eventually started on amiodarone and a CTA of the head neck and brain were ordered because he was unable to complete his MRI because of his size and weight.  The patient was seen by Podiatry a Cam boot was ordered to assist with offloading weight-bearing and ultimately the patient was stabilized and we were asked to assist with rehabilitative care.  The patient was transferred on IV Ancef wound care orders and has had a rapid recovery in his left-sided symptoms.  On evaluation this morning the patient has a new issue which is a diffuse bilateral symmetric starting centrally extending peripherally rash.  He has multiple areas of urticarial type lesions therapeutic he has dermatographia some where he has had the telemetry stickers placed as well as other tape that was placed for various  parts of his care.  The patient is only complaining of some itchiness from the rash.  The patient's therapist to bathe him yesterday states the rash was not present a call and spoke to his primary care physician and reviewed reviewed all new medications that was started which include amiodarone.        * No surgery found *      Hospital Course:   11/2/22 FM:  Patient's rash seems to be less intense and overall improved.  The patient is tolerating therapy states he feels he is getting stronger and overall improving.  Podiatry notes reviewed we are continuing antibiotics.    11/03/22 DL:  Patient reports difficulty participating in therapy due to increased generalized.  Orders placed for PRN tramadol.  Despite pain, patient has been the patient has been progressing well per the therapist.  Patient's rash has not continued to spread.    11/04/22 FM:  Patient is doing well, tolerating therapy, making gains, tolerating abx, rash seems unchanged.  Continue current efforts.    11/6/22 FM:  Patient rash has resolved, mood geat, doing well with treatment plan.  Encouraged patient to keep it up, home next week.    11/7/22 FM:  Patient mood is good, abx completed, will remove PIV, patient rash has resolved.  No furthur Afib.  Patient doing well with therapy and making good function gains.  Patient DC in am.    Discharge Note:  Patient had a good functional recovery.  Patient participated with therapy met his time requirements and overall made good functional gains.  Patient's safety strength endurance gait all improved.  Patient completed antibiotics during this admission for several problems including a positive blood culture and a foot infection started by Podiatry.  Patient will be discharged home with follow-up with multiple specialists and his primary care doctor we have encouraged him to keep working on therapy and to follow instructed wound care orders.  Patient's mood was good blood sugars and blood pressures were all  stable.       Goals of Care Treatment Preferences:  Code Status: Full Code      Consults:   Consults (From admission, onward)        Status Ordering Provider     Inpatient consult to Podiatry  Once        Provider:  Baldemar Esquivel DPM    Completed YVONNE RIOS III     Inpatient consult to Registered Dietitian/Nutritionist  Once        Provider:  (Not yet assigned)    Completed YVONNE RIOS III          * CVA (cerebral vascular accident)  Continue rehab efforts and current medications for risk reduction.      Rash  Strong suspicion for drug eruption.  I have reviewed all new medications with his primary.  Stopping amiodarone today.  Rash has resolved.      Cerebellar dysmetria  Continue rehab efforts.      A-fib  Stopping amiodarone today because of rash and possible drug eruption patient is in a normal sinus rhythm with controlled rate.  Rate still controlled.    Diabetic foot ulcer  Patient's FSGs are controlled on current medication regimen.  Last A1c reviewed-   Lab Results   Component Value Date    HGBA1C 9.2 (H) 10/21/2022     Most recent fingerstick glucose reviewed-   No results for input(s): POCTGLUCOSE in the last 24 hours.  Current correctional scale  High  Maintain anti-hyperglycemic dose as follows-   Antihyperglycemics (From admission, onward)    None        Hold Oral hypoglycemics while patient is in the hospital.    Bacteremia due to methicillin susceptible Staphylococcus aureus (MSSA)  Continue current antibiotics but I am concerned about his drug eruption and this may need to be changed.  Rash seems to be improving with no abx change.  Suspect rash was releated to amiodarone.    Type 2 diabetes mellitus with diabetic polyneuropathy, without long-term current use of insulin  Patient's FSGs are controlled on current medication regimen.  Last A1c reviewed-   Lab Results   Component Value Date    HGBA1C 9.2 (H) 10/21/2022     Most recent fingerstick glucose reviewed-   No results for  input(s): POCTGLUCOSE in the last 24 hours.  Current correctional scale  High  Maintain anti-hyperglycemic dose as follows-   Antihyperglycemics (From admission, onward)    None        Hold Oral hypoglycemics while patient is in the hospital.    Hyperlipidemia  Continue home medications.      Essential hypertension  BP stable, will adjust PRN.    Morbid obesity with BMI of 50.0-59.9, adult  Body mass index is 49.05 kg/m². Morbid obesity complicates all aspects of disease management from diagnostic modalities to treatment. Weight loss encouraged and health benefits explained to patient.           Final Active Diagnoses:    Diagnosis Date Noted POA    PRINCIPAL PROBLEM:  CVA (cerebral vascular accident) [I63.9] 11/01/2022 Yes    Rash [R21] 11/01/2022 Yes    Cerebellar dysmetria [R27.8] 10/31/2022 Yes    A-fib [I48.91] 10/26/2022 Yes    Bacteremia due to methicillin susceptible Staphylococcus aureus (MSSA) [R78.81, B95.61] 10/23/2022 Yes    Diabetic foot ulcer [E11.621, L97.509] 10/23/2022 Yes    Type 2 diabetes mellitus with diabetic polyneuropathy, without long-term current use of insulin [E11.42]  Not Applicable    Hyperlipidemia [E78.5] 12/25/2018 Yes     Chronic    Essential hypertension [I10] 06/30/2017 Yes     Chronic    Morbid obesity with BMI of 50.0-59.9, adult [E66.01, Z68.43] 06/04/2015 Not Applicable     Chronic      Problems Resolved During this Admission:       Discharged Condition: fair    Disposition: Home or Self Care    Follow Up:   Follow-up Information     Olena Thornton,  Follow up on 12/6/2022.    Specialty: Family Medicine  Why: Follow up with Dr. Thornton on 12/06/2022 at 2:00  Contact information:  [facility] [place]   [address] [address]  [phone]             Jeison Granado MD, FIDSA Follow up on 12/15/2022.    Specialties: Infectious Diseases, Hospitalist  Why: Follow up with Dr. Granado on 12/15/2022 at 10:20  Contact information:  [id number] [place] [address]  [facility] [address]  [phone]             Baldemar Esquivel DPM Follow up.    Specialty: Podiatry  Why: Dr. Esquivel office will contact you with a follow up appointment.  Contact information:  [address] [facility]  [address]  [place] [id number] [phone]             Marcelino Miles MD Follow up on 12/1/2022.    Specialty: Cardiology  Why: Follow up with Dr. Miles on 12/01/2022 at 1:45  Contact information:  [address] [facility]  [address]  [facility] [address]  [phone]                       Patient Instructions:      Diet Cardiac     Diet diabetic     Activity as tolerated       Significant Diagnostic Studies: Noted.    Pending Diagnostic Studies:     None         Medications:  Reconciled Home Medications:      Medication List      START taking these medications    apixaban 5 mg Tab  Commonly known as: ELIQUIS  Take 1 tablet (5 mg total) by mouth 2 (two) times daily.     ferrous sulfate 325 (65 FE) MG EC tablet  Take 1 tablet (325 mg total) by mouth once daily.        CONTINUE taking these medications    aspirin 81 MG EC tablet  Commonly known as: ECOTRIN  Take 81 mg by mouth once daily.     atorvastatin 40 MG tablet  Commonly known as: LIPITOR  Take 40 mg by mouth.     cholecalciferol (vitamin D3) 25 mcg (1,000 unit) capsule  Commonly known as: VITAMIN D3  1 capsule     DULoxetine 30 MG capsule  Commonly known as: CYMBALTA  Take 30 mg by mouth once daily.     JARDIANCE 25 mg tablet  Generic drug: empagliflozin  Take 25 mg by mouth once daily.     lactobacillus acidophilus & bulgar 100 million cell packet  Commonly known as: LACTINEX  Take 1 packet (1 each total) by mouth 2 (two) times daily.     metFORMIN 1000 MG tablet  Commonly known as: GLUCOPHAGE  Take 1,000 mg by mouth 2 (two) times daily.     mupirocin 2 % ointment  Commonly known as: BACTROBAN  Apply topically 2 (two) times daily.     psyllium husk (with sugar) 3.4 gram packet  Take 1 packet by mouth 3  (three) times daily.        STOP taking these medications    amLODIPine 5 MG tablet  Commonly known as: NORVASC     ceFAZolin 2 g/50mL Dextrose IVPB 2 gram/50 mL Pgbk  Commonly known as: ANCEF     losartan 25 MG tablet  Commonly known as: COZAAR     metoclopramide HCl 10 MG tablet  Commonly known as: REGLAN        ASK your doctor about these medications    metoclopramide HCl 10 MG tablet  Commonly known as: REGLAN  Take 1 tablet (10 mg total) by mouth 3 (three) times daily before meals. for 7 days  Ask about: Should I take this medication?            Indwelling Lines/Drains at time of discharge:   Lines/Drains/Airways     None                 Time spent on the discharge of patient: 45 minutes         Cl Mittal III, MD  Department of Hospital Medicine  [facility] ([facility])

## 2022-12-07 ENCOUNTER — DOCUMENTATION ONLY (OUTPATIENT)
Dept: REHABILITATION | Facility: HOSPITAL | Age: 57
End: 2022-12-07
Payer: MEDICAID

## 2022-12-07 DIAGNOSIS — R94.31 NONSPECIFIC ABNORMAL ELECTROCARDIOGRAM (ECG) (EKG): Primary | ICD-10-CM

## 2022-12-07 NOTE — PROGRESS NOTES
Pt did not call to cancel or show up to appointment 12/7/22. Cont with POC at next scheduled visit.     ISABELLE Madison/L

## 2022-12-09 ENCOUNTER — OFFICE VISIT (OUTPATIENT)
Dept: PRIMARY CARE CLINIC | Facility: CLINIC | Age: 57
End: 2022-12-09
Payer: MEDICAID

## 2022-12-09 VITALS
TEMPERATURE: 98 F | OXYGEN SATURATION: 95 % | HEIGHT: 73 IN | WEIGHT: 315 LBS | HEART RATE: 81 BPM | RESPIRATION RATE: 16 BRPM | BODY MASS INDEX: 41.75 KG/M2 | DIASTOLIC BLOOD PRESSURE: 64 MMHG | SYSTOLIC BLOOD PRESSURE: 124 MMHG

## 2022-12-09 DIAGNOSIS — I63.9 CEREBROVASCULAR ACCIDENT (CVA), UNSPECIFIED MECHANISM: Primary | ICD-10-CM

## 2022-12-09 DIAGNOSIS — I10 ESSENTIAL HYPERTENSION: Chronic | ICD-10-CM

## 2022-12-09 DIAGNOSIS — E78.5 HYPERLIPIDEMIA, UNSPECIFIED HYPERLIPIDEMIA TYPE: Chronic | ICD-10-CM

## 2022-12-09 DIAGNOSIS — E11.42 TYPE 2 DIABETES MELLITUS WITH DIABETIC POLYNEUROPATHY, WITHOUT LONG-TERM CURRENT USE OF INSULIN: ICD-10-CM

## 2022-12-09 LAB
ESTIMATED AVG GLUCOSE: 174 MG/DL (ref 68–131)
FERRITIN SERPL-MCNC: 41 NG/ML (ref 20–300)
HBA1C MFR BLD: 7.7 % (ref 4–5.6)
IRON SATN MFR SERPL: 19 % (ref 20–50)
IRON SERPL-MCNC: 54 UG/DL (ref 45–160)
TOTAL IRON BINDING CAPACITY: 281 UG/DL (ref 250–450)
VIT B12 SERPL-MCNC: 497 PG/ML (ref 210–950)

## 2022-12-09 PROCEDURE — 1159F MED LIST DOCD IN RCRD: CPT | Mod: CPTII,,, | Performed by: STUDENT IN AN ORGANIZED HEALTH CARE EDUCATION/TRAINING PROGRAM

## 2022-12-09 PROCEDURE — 3008F PR BODY MASS INDEX (BMI) DOCUMENTED: ICD-10-PCS | Mod: CPTII,,, | Performed by: STUDENT IN AN ORGANIZED HEALTH CARE EDUCATION/TRAINING PROGRAM

## 2022-12-09 PROCEDURE — 3051F PR MOST RECENT HEMOGLOBIN A1C LEVEL 7.0 - < 8.0%: ICD-10-PCS | Mod: CPTII,,, | Performed by: STUDENT IN AN ORGANIZED HEALTH CARE EDUCATION/TRAINING PROGRAM

## 2022-12-09 PROCEDURE — 99214 OFFICE O/P EST MOD 30 MIN: CPT | Mod: PBBFAC,PN | Performed by: STUDENT IN AN ORGANIZED HEALTH CARE EDUCATION/TRAINING PROGRAM

## 2022-12-09 PROCEDURE — 99999 PR PBB SHADOW E&M-EST. PATIENT-LVL IV: CPT | Mod: PBBFAC,,, | Performed by: STUDENT IN AN ORGANIZED HEALTH CARE EDUCATION/TRAINING PROGRAM

## 2022-12-09 PROCEDURE — 4010F PR ACE/ARB THEARPY RXD/TAKEN: ICD-10-PCS | Mod: CPTII,,, | Performed by: STUDENT IN AN ORGANIZED HEALTH CARE EDUCATION/TRAINING PROGRAM

## 2022-12-09 PROCEDURE — 3078F PR MOST RECENT DIASTOLIC BLOOD PRESSURE < 80 MM HG: ICD-10-PCS | Mod: CPTII,,, | Performed by: STUDENT IN AN ORGANIZED HEALTH CARE EDUCATION/TRAINING PROGRAM

## 2022-12-09 PROCEDURE — 3008F BODY MASS INDEX DOCD: CPT | Mod: CPTII,,, | Performed by: STUDENT IN AN ORGANIZED HEALTH CARE EDUCATION/TRAINING PROGRAM

## 2022-12-09 PROCEDURE — 83036 HEMOGLOBIN GLYCOSYLATED A1C: CPT | Performed by: STUDENT IN AN ORGANIZED HEALTH CARE EDUCATION/TRAINING PROGRAM

## 2022-12-09 PROCEDURE — 1159F PR MEDICATION LIST DOCUMENTED IN MEDICAL RECORD: ICD-10-PCS | Mod: CPTII,,, | Performed by: STUDENT IN AN ORGANIZED HEALTH CARE EDUCATION/TRAINING PROGRAM

## 2022-12-09 PROCEDURE — 99999 PR PBB SHADOW E&M-EST. PATIENT-LVL IV: ICD-10-PCS | Mod: PBBFAC,,, | Performed by: STUDENT IN AN ORGANIZED HEALTH CARE EDUCATION/TRAINING PROGRAM

## 2022-12-09 PROCEDURE — 82728 ASSAY OF FERRITIN: CPT | Performed by: STUDENT IN AN ORGANIZED HEALTH CARE EDUCATION/TRAINING PROGRAM

## 2022-12-09 PROCEDURE — 3078F DIAST BP <80 MM HG: CPT | Mod: CPTII,,, | Performed by: STUDENT IN AN ORGANIZED HEALTH CARE EDUCATION/TRAINING PROGRAM

## 2022-12-09 PROCEDURE — 4010F ACE/ARB THERAPY RXD/TAKEN: CPT | Mod: CPTII,,, | Performed by: STUDENT IN AN ORGANIZED HEALTH CARE EDUCATION/TRAINING PROGRAM

## 2022-12-09 PROCEDURE — 99213 PR OFFICE/OUTPT VISIT, EST, LEVL III, 20-29 MIN: ICD-10-PCS | Mod: S$PBB,,, | Performed by: STUDENT IN AN ORGANIZED HEALTH CARE EDUCATION/TRAINING PROGRAM

## 2022-12-09 PROCEDURE — 1160F RVW MEDS BY RX/DR IN RCRD: CPT | Mod: CPTII,,, | Performed by: STUDENT IN AN ORGANIZED HEALTH CARE EDUCATION/TRAINING PROGRAM

## 2022-12-09 PROCEDURE — 3051F HG A1C>EQUAL 7.0%<8.0%: CPT | Mod: CPTII,,, | Performed by: STUDENT IN AN ORGANIZED HEALTH CARE EDUCATION/TRAINING PROGRAM

## 2022-12-09 PROCEDURE — 99213 OFFICE O/P EST LOW 20 MIN: CPT | Mod: S$PBB,,, | Performed by: STUDENT IN AN ORGANIZED HEALTH CARE EDUCATION/TRAINING PROGRAM

## 2022-12-09 PROCEDURE — 3074F PR MOST RECENT SYSTOLIC BLOOD PRESSURE < 130 MM HG: ICD-10-PCS | Mod: CPTII,,, | Performed by: STUDENT IN AN ORGANIZED HEALTH CARE EDUCATION/TRAINING PROGRAM

## 2022-12-09 PROCEDURE — 83540 ASSAY OF IRON: CPT | Performed by: STUDENT IN AN ORGANIZED HEALTH CARE EDUCATION/TRAINING PROGRAM

## 2022-12-09 PROCEDURE — 3074F SYST BP LT 130 MM HG: CPT | Mod: CPTII,,, | Performed by: STUDENT IN AN ORGANIZED HEALTH CARE EDUCATION/TRAINING PROGRAM

## 2022-12-09 PROCEDURE — 82607 VITAMIN B-12: CPT | Performed by: STUDENT IN AN ORGANIZED HEALTH CARE EDUCATION/TRAINING PROGRAM

## 2022-12-09 PROCEDURE — 1111F PR DISCHARGE MEDS RECONCILED W/ CURRENT OUTPATIENT MED LIST: ICD-10-PCS | Mod: CPTII,,, | Performed by: STUDENT IN AN ORGANIZED HEALTH CARE EDUCATION/TRAINING PROGRAM

## 2022-12-09 PROCEDURE — 1160F PR REVIEW ALL MEDS BY PRESCRIBER/CLIN PHARMACIST DOCUMENTED: ICD-10-PCS | Mod: CPTII,,, | Performed by: STUDENT IN AN ORGANIZED HEALTH CARE EDUCATION/TRAINING PROGRAM

## 2022-12-09 PROCEDURE — 1111F DSCHRG MED/CURRENT MED MERGE: CPT | Mod: CPTII,,, | Performed by: STUDENT IN AN ORGANIZED HEALTH CARE EDUCATION/TRAINING PROGRAM

## 2022-12-12 ENCOUNTER — CLINICAL SUPPORT (OUTPATIENT)
Dept: REHABILITATION | Facility: HOSPITAL | Age: 57
End: 2022-12-12
Payer: MEDICAID

## 2022-12-12 DIAGNOSIS — I63.9 CEREBROVASCULAR ACCIDENT (CVA), UNSPECIFIED MECHANISM: Primary | ICD-10-CM

## 2022-12-12 PROCEDURE — 97530 THERAPEUTIC ACTIVITIES: CPT | Mod: CO

## 2022-12-12 PROCEDURE — 97110 THERAPEUTIC EXERCISES: CPT

## 2022-12-12 NOTE — PROGRESS NOTES
Physical Therapy Daily Note     Name: Andrew Del Cid Jr.  Clinic Number: [id number]  Diagnosis:   No diagnosis found.    Physician: Cl Mittal III, MD  Precautions: fall  Visit #: 3 of 12  PTA Visit #: 0  Time In: 0905  Time Out: 1000    Subjective     Pt reports: no complaints today prior to treatment. Patient reports he had a good workout last session.   Pain Scale: Andrew reports no pain symptoms.   Objective     Andrew received individual therapeutic exercises to develop strength, endurance, ROM, flexibility, posture, and core stabilization for 55 minutes including:  LE omnicycle x15 minutes neuro setting 4W 99% activity (L) 48 (R) 52.   Standing at edge of TM   3x10 toe taps to edge of TM,   3x10 hip abduction,   3x10 hip extension,   1x10 mini-squats  FMAC to (L) quad x15 minutes total with strong quad contraction with 10 seconds on and 10 seconds off with patient performing quad set during on time.     Written Home Exercises Provided: see  for details    Education provided re: exercises, modalities  Andrew verbalized good understanding of education provided.   No spiritual or educational barriers to learning provided    Assessment     Patient tolerated treatment well. Rest breaks needed during standing activities. (L) knee pain during squats and patient instructed to not lower as much. Patient ambulated 6 feet in gym without AD with slightly unsteady gait pattern. PT will attempt step ups on 4 inch step next session as able. PT will also increase patient resistance on omnicycle next treatment session as able.   This is a 57 y.o. male referred to outpatient physical therapy and presents with a medical diagnosis of CVA and demonstrates limitations as described in the problem list. Pt prognosis is Good. Pt will continue to benefit from skilled outpatient physical therapy to address the deficits listed in the problem list, provide  pt/family education and to maximize pt's level of independence in the home and community environment.     Goals as follows:  Short Term GOALS: 3 weeks. Pt agrees with goals set.  Patient demonstrates independence with HEP.  Patient reports no falls with functional mobility.  Patient demonstrates postural awareness with all activities.  Long Term GOALS: 6  weeks. Pt agrees with goals set.  Patient demonstrates improvement in LEFS score to 50 or greater.  Patient ambulates with minimal to no gait deviations with least restrictive AD.   Patient demonstrates TUG score 12s or less.   Patient demonstrates 5x sit to stand 20s or less.   Patient demonstrates (B) LE strength 4+/5.   Patient demonstrates standing tolerance 30 minutes or greater.         Plan     Continue with established Plan of Care towards PT goals.    Therapist: Katie Guevara, PT  12/12/2022

## 2022-12-12 NOTE — ASSESSMENT & PLAN NOTE
Over 15 pounds of weight loss in the past 4 weeks. Continue with weight loss efforts with diet modifications and incorporating daily physical exercise.

## 2022-12-12 NOTE — PROGRESS NOTES
Patient:  Andrew Del Cid Jr.  Clinic #:  [id number]   Date of Note: 12/12/2022   Referring Physician:  Cl Mittal III, MD  Diagnosis:    Encounter Diagnosis   Name Primary?    Cerebrovascular accident (CVA), unspecified mechanism Yes          Start Time: 10:00 am  End Time: 10:55 pm  Total Time: 55 min    Subjective:   Pt reporting no pain or problems this day.     Pain: n/a out of 10     Objective:   Patient seen by WALTON this session. Treatment  consist of the following:  Therapeutic activities    Treatment: Therapeutic activities x 55 min     Omnicycle 10 res x 7.5 forward and 7.5 backwards, for increased endurance and bilateral coordination  Biceps curls 6# 2 sets x 15 reps, increase strength  Hand gripper 50# x 25 ea, increase  strength   Blue therabar 3 way 2 x 10, increase  strength  Dowel ex with 5# weight x 15 (flex, press, hor abd/add), increase strength  Julio Cesar pegboard, increase fine motor coordination    Assessment:  Pt reporting no problems but reporting fatigue during tasks that subsided with rest breaks. During dowel ex pt reporting pain in R sh with ex that subsided with rest. Pt requiring min v/c for proper form and positioning but tolerated increased resistance and new tasks well. Pt educated to incorporate L hand into functional tasks to increase coordination as tolerated. Pt reporting only fatigue upon exit of session this day. Continue OT POC at next visit as tolerated by pt    Pt will continue to benefit from skilled OT intervention.    Patient continues to demonstrate limitation  with  Decreased fine motor coordination, Decreased functional use, Decreased strength, and Continued pain    Problem List:   Decreased function of Left UE, Decreased strength, Inability to perform work/tasks, Inability to perform leisure activiites, and Inability to perform self care tasks     Goals to be met in 8 weeks:  1) Pt will increase ability to move/handle objects AEB decreased score on QuickDASH  <=50 % (currently 56.8 %)   2) Increase  strength 2-3 lbs. to improve functional grasp for ADLs/work/leisure activities.  3) Pt will complete the 9 hole peg test in LUE with a decrease of 2-3 sec to increase independence in ADLs.  4) Increase LUE strength to 4+/5 to improve functional use for ADLs/work/leisure activities.     Plan  Andrew to be treated by Occupational Therapy 2 times per week for 6 weeks to achieve the established goals.   Treatment to include Ultrasoud @ 3mHz, Ice, Moist heat, Strengthening Theraband Ex, UBE , and E- stim as well as any other treatments deemed necessary based on the patient's needs or progress.      Certification Dates: 11/29/2022 - 1/6/2023

## 2022-12-12 NOTE — PROGRESS NOTES
[facility] Note    HPI:  Andrew Del Cid Jr. is a 57 y.o. male who presents today for Follow-up (Lab follow up )  Interim Izaiah  Has had follow up with cardiologist, completing PT and OT outpatient, lab follow up, but patient did not get labs drawn beforehand. Will collect specimen and follow up in 3 days.   No new complaints.     ROS   A review of systems was performed and was negative except as noted above.    I personally reviewed allergies, past medical, surgical, social and family history and updated as appropriate.    Medications:    Current Outpatient Medications:     amLODIPine (NORVASC) 5 MG tablet, Take 1 tablet (5 mg total) by mouth once daily., Disp: 30 tablet, Rfl: 5    apixaban (ELIQUIS) 5 mg Tab, Take 1 tablet (5 mg total) by mouth 2 (two) times daily., Disp: 60 tablet, Rfl: 1    aspirin (ECOTRIN) 81 MG EC tablet, Take 81 mg by mouth once daily., Disp: , Rfl:     atorvastatin (LIPITOR) 40 MG tablet, Take 40 mg by mouth., Disp: , Rfl:     cholecalciferol, vitamin D3, (VITAMIN D3) 25 mcg (1,000 unit) capsule, 1 capsule, Disp: , Rfl:     DULoxetine (CYMBALTA) 30 MG capsule, Take 30 mg by mouth once daily., Disp: , Rfl:     empagliflozin (JARDIANCE) 25 mg tablet, Take 1 tablet (25 mg total) by mouth once daily., Disp: 30 tablet, Rfl: 5    JARDIANCE 25 mg tablet, Take 25 mg by mouth once daily., Disp: , Rfl:     LANTUS SOLOSTAR U-100 INSULIN glargine 100 units/mL SubQ pen, Inject 40 Units into the skin every evening., Disp: 12 mL, Rfl: 2    losartan (COZAAR) 50 MG tablet, Take 1 tablet (50 mg total) by mouth once daily., Disp: 30 tablet, Rfl: 5    metFORMIN (GLUCOPHAGE) 1000 MG tablet, Take 1,000 mg by mouth 2 (two) times daily., Disp: , Rfl:     metFORMIN (GLUCOPHAGE-XR) 500 MG ER 24hr tablet, Take 2 tablets (1,000 mg total) by mouth 2 (two) times daily with meals. Generic, Disp: 120 tablet, Rfl: 5    metoprolol succinate (TOPROL-XL) 50 MG 24 hr tablet, Take 1 tablet (50 mg total) by  mouth once daily., Disp: 30 tablet, Rfl: 5    mupirocin (BACTROBAN) 2 % ointment, Apply topically 2 (two) times daily., Disp: 30 g, Rfl: 2    TRULICITY 4.5 mg/0.5 mL pen injector, Inject 4.5 mg into the skin every 7 days., Disp: 4 pen, Rfl: 5    lactobacillus acidophilus & bulgar (LACTINEX) 100 million cell packet, Take 1 packet (1 each total) by mouth 2 (two) times daily. (Patient not taking: Reported on 11/15/2022), Disp: 60 packet, Rfl: 2    psyllium husk, with sugar, 3.4 gram packet, Take 1 packet by mouth 3 (three) times daily. (Patient not taking: Reported on 11/15/2022), Disp: 90 packet, Rfl: 2     Health Maintenance:  Immunization History   Administered Date(s) Administered    COVID-19 MRNA, LN-S PF (MODERNA HALF 0.25 ML DOSE) 03/12/2021, 03/14/2022    COVID-19, MRNA, LN-S, PF (MODERNA FULL 0.5 ML DOSE) 03/12/2021, 04/09/2021    Influenza 11/15/2018    Influenza - Quadrivalent - PF *Preferred* (6 months and older) 09/12/2019, 10/19/2021, 10/04/2022    Influenza - Trivalent - PF (ADULT) 10/19/2021    Pneumococcal Conjugate - 13 Valent 10/19/2021    Tdap 04/27/2013, 04/27/2013      Health Maintenance   Topic Date Due    Hepatitis C Screening  Never done    Eye Exam  05/27/2016    TETANUS VACCINE  04/27/2023    Hemoglobin A1c  06/09/2023    Lipid Panel  10/27/2023    Foot Exam  11/01/2023    High Dose Statin  12/09/2023     Health Maintenance Topics with due status: Not Due       Topic Last Completion Date    TETANUS VACCINE 04/27/2013    Lipid Panel 10/27/2022    Foot Exam 11/01/2022    High Dose Statin 12/09/2022    Hemoglobin A1c 12/09/2022     Health Maintenance Due   Topic Date Due    Hepatitis C Screening  Never done    HIV Screening  Never done    Colorectal Cancer Screening  Never done    Diabetes Urine Screening  05/11/2016    Eye Exam  05/27/2016    Shingles Vaccine (1 of 2) Never done    COVID-19 Vaccine (2 - Moderna series) 04/11/2022    Pneumococcal Vaccines (Age 0-64) (2 - PPSV23 if available,  "else PCV20) 10/19/2022       PHYSICAL EXAM:  Vitals:    12/09/22 1404   BP: 124/64   BP Location: Right arm   Patient Position: Sitting   BP Method: Large (Automatic)   Pulse: 81   Resp: 16   Temp: 98 °F (36.7 °C)   TempSrc: Oral   SpO2: 95%   Weight: (!) 161 kg (355 lb)   Height: 6' 1" (1.854 m)     Body mass index is 46.84 kg/m².  Physical Exam  Vitals reviewed.   Constitutional:       General: He is not in acute distress.     Appearance: Normal appearance. He is not ill-appearing or toxic-appearing.   Cardiovascular:      Rate and Rhythm: Normal rate.   Pulmonary:      Effort: Pulmonary effort is normal.   Skin:     General: Skin is warm and dry.   Neurological:      Mental Status: He is alert.   Psychiatric:         Mood and Affect: Mood normal.         Behavior: Behavior normal.        ASSESSMENT/PLAN:  Collect specimen, patient to return to review of results. NO medications adjustments at this time.   1. Cerebrovascular accident (CVA), unspecified mechanism  -     Hypertension Digital Medicine (HDMP) Enrollment Order  -     Hypertension Digital Medicine (HDMP): Assign Onboarding Questionnaires    2. Hyperlipidemia, unspecified hyperlipidemia type  -     Lipids Digital Medicine (LDMP) Enrollment Order  -     Lipids Digital Medicine (LDMP): Assign Onboarding Questionnaires    3. Type 2 diabetes mellitus with diabetic polyneuropathy, without long-term current use of insulin  -     Diabetes Digital Medicine (DDMP) Enrollment Order  -     Diabetes Digital Medicine (DDMP): Assign Onboarding Questionnaires    4. Essential hypertension  -     Hypertension Digital Medicine (HDMP) Enrollment Order  -     Hypertension Digital Medicine (HDMP): Assign Onboarding Questionnaires        Other than changes above, continue current medications and maintain follow up with specialists.      No follow-ups on file.   Recent Results (from the past 2016 hour(s))   Hemoglobin A1c if not done in past 3 months    Collection Time: " 10/21/22  2:13 AM   Result Value Ref Range    Hemoglobin A1C 9.2 (H) 4.0 - 5.6 %    Estimated Avg Glucose 217 (H) 68 - 131 mg/dL   POCT glucose    Collection Time: 10/21/22 10:59 AM   Result Value Ref Range    POCT Glucose 237 (H) 70 - 110 mg/dL   CBC auto differential    Collection Time: 10/21/22 11:30 AM   Result Value Ref Range    WBC 7.39 3.90 - 12.70 K/uL    RBC 4.76 4.60 - 6.20 M/uL    Hemoglobin 13.4 (L) 14.0 - 18.0 g/dL    Hematocrit 40.0 40.0 - 54.0 %    MCV 84 82 - 98 fL    MCH 28.2 27.0 - 31.0 pg    MCHC 33.5 32.0 - 36.0 g/dL    RDW 12.4 11.5 - 14.5 %    Platelets 228 150 - 450 K/uL    MPV 9.4 9.2 - 12.9 fL    Immature Granulocytes 0.8 (H) 0.0 - 0.5 %    Gran # (ANC) 6.1 1.8 - 7.7 K/uL    Immature Grans (Abs) 0.06 (H) 0.00 - 0.04 K/uL    Lymph # 0.8 (L) 1.0 - 4.8 K/uL    Mono # 0.4 0.3 - 1.0 K/uL    Eos # 0.0 0.0 - 0.5 K/uL    Baso # 0.02 0.00 - 0.20 K/uL    nRBC 0 0 /100 WBC    Gran % 82.1 (H) 38.0 - 73.0 %    Lymph % 11.4 (L) 18.0 - 48.0 %    Mono % 5.4 4.0 - 15.0 %    Eosinophil % 0.0 0.0 - 8.0 %    Basophil % 0.3 0.0 - 1.9 %    Differential Method Automated    Comprehensive metabolic panel    Collection Time: 10/21/22 11:30 AM   Result Value Ref Range    Sodium 128 (L) 136 - 145 mmol/L    Potassium 4.2 3.5 - 5.1 mmol/L    Chloride 94 (L) 95 - 110 mmol/L    CO2 26 23 - 29 mmol/L    Glucose 278 (H) 70 - 110 mg/dL    BUN 28 (H) 6 - 20 mg/dL    Creatinine 1.2 0.5 - 1.4 mg/dL    Calcium 8.4 (L) 8.7 - 10.5 mg/dL    Total Protein 7.2 6.0 - 8.4 g/dL    Albumin 2.6 (L) 3.5 - 5.2 g/dL    Total Bilirubin 1.9 (H) 0.1 - 1.0 mg/dL    Alkaline Phosphatase 108 55 - 135 U/L    AST 58 (H) 10 - 40 U/L    ALT 34 10 - 44 U/L    Anion Gap 8 8 - 16 mmol/L    eGFR >60.0 >60 mL/min/1.73 m^2   Urinalysis, Reflex to Urine Culture Urine, Clean Catch    Collection Time: 10/21/22 12:00 PM    Specimen: Urine, Clean Catch   Result Value Ref Range    Specimen UA Urine, Clean Catch     Color, UA Yellow Yellow, Straw, Velvet     Appearance, UA Clear Clear    pH, UA 6.0 5.0 - 8.0    Specific Gravity, UA >=1.030 (A) 1.005 - 1.030    Protein, UA 1+ (A) Negative    Glucose, UA 4+ (A) Negative    Ketones, UA 1+ (A) Negative    Bilirubin (UA) Negative Negative    Occult Blood UA Trace (A) Negative    Nitrite, UA Negative Negative    Urobilinogen, UA Negative <2.0 EU/dL    Leukocytes, UA Negative Negative   Urinalysis Microscopic    Collection Time: 10/21/22 12:00 PM   Result Value Ref Range    RBC, UA 6 (H) 0 - 4 /hpf    WBC, UA 1 0 - 5 /hpf    Bacteria Negative None-Occ /hpf    Yeast, UA None None    Squam Epithel, UA 0 /hpf    Hyaline Casts, UA 1.2 (A) 0-1/lpf /lpf    Microscopic Comment SEE COMMENT    POCT glucose    Collection Time: 10/21/22 11:00 PM   Result Value Ref Range    POCT Glucose 349 (H) 70 - 110 mg/dL   POCT COVID-19 Rapid Screening    Collection Time: 10/21/22 11:18 PM   Result Value Ref Range    POC Rapid COVID Negative Negative     Acceptable Yes    POCT Influenza A/B Molecular    Collection Time: 10/21/22 11:18 PM   Result Value Ref Range    POC Molecular Influenza A Ag Negative Negative, Not Reported    POC Molecular Influenza B Ag Negative Negative, Not Reported     Acceptable Yes    Blood culture #1 **CANNOT BE ORDERED STAT**    Collection Time: 10/21/22 11:31 PM    Specimen: Blood   Result Value Ref Range    Blood Culture, Routine       Gram stain aer bottle: Gram positive cocci in clusters resembling Staph    Blood Culture, Routine       Gram stain lizzy bottle: Gram positive cocci in clusters resembling Staph    Blood Culture, Routine       Results called to and read back by: Tate Sanders RN 10/22/2022  15:16    Blood Culture, Routine (A)      STAPHYLOCOCCUS AUREUS  ID consult required at [facility]ie and Ravinder nur.         Susceptibility    Staphylococcus aureus - CULTURE, BLOOD     Clindamycin <=0.5 Resistant mcg/mL     Erythromycin >4 Resistant mcg/mL     Oxacillin <=0.25  Sensitive mcg/mL     Penicillin 2 Resistant mcg/mL     Trimeth/Sulfa <=0.5/9.5 Sensitive mcg/mL     Tetracycline <=4 Sensitive mcg/mL   POCT ARTERIAL BLOOD GAS    Collection Time: 10/21/22 11:42 PM   Result Value Ref Range    POC PH 7.400 7.345 - 7.450    POC PCO2 39.9 35.0 - 45.0 mmHg    POC PO2 53.3 (LL) 80.0 - 100 mmHg    POC SATURATED O2 89.5 %    Correct Temperature (PH) 7.400     Corrected Temperature (pCO2) 39.9 mmHg    Corrected Temperature (pO2) 53.3 mmHg    POC HCO3 24.1 mmol/l    Base Deficit -0.1 mmol/l    POC TCO2 50.0 ml/dl    POC Temp 37.0 C    Specimen source Arterial     Performed By: Fred     MODIFIED SLICK'S TEST POS     FiO2 21.0 %    O2DEVICE Room Air     Provider Notified: DR. BUFFY CALL     Notified Time 10/21/2022 23:43     Notified By Fred    Blood culture #2 **CANNOT BE ORDERED STAT**    Collection Time: 10/21/22 11:48 PM    Specimen: Blood   Result Value Ref Range    Blood Culture, Routine       Gram stain lizzy bottle: Gram positive cocci in clusters resembling Staph    Blood Culture, Routine       Results called to and read back by: Ttae Sanders RN 10/22/2022  15:16    Blood Culture, Routine       Gram stain aer bottle: Gram positive cocci in clusters resembling Staph    Blood Culture, Routine       Positive results previously called 10/22/2022  16:34    Blood Culture, Routine (A)      STAPHYLOCOCCUS AUREUS  ID consult required at [facility] [place] [facility].  For susceptibility see order # [id number]     CBC auto differential    Collection Time: 10/21/22 11:52 PM   Result Value Ref Range    WBC 5.75 3.90 - 12.70 K/uL    RBC 4.70 4.60 - 6.20 M/uL    Hemoglobin 13.3 (L) 14.0 - 18.0 g/dL    Hematocrit 39.1 (L) 40.0 - 54.0 %    MCV 83 82 - 98 fL    MCH 28.3 27.0 - 31.0 pg    MCHC 34.0 32.0 - 36.0 g/dL    RDW 12.2 11.5 - 14.5 %    Platelets 184 150 - 450 K/uL    MPV 10.1 9.2 - 12.9 fL    Immature Granulocytes 1.0 (H) 0.0 - 0.5 %    Gran # (ANC) 4.7 1.8 - 7.7 K/uL     Immature Grans (Abs) 0.06 (H) 0.00 - 0.04 K/uL    Lymph # 0.7 (L) 1.0 - 4.8 K/uL    Mono # 0.3 0.3 - 1.0 K/uL    Eos # 0.0 0.0 - 0.5 K/uL    Baso # 0.02 0.00 - 0.20 K/uL    nRBC 0 0 /100 WBC    Gran % 82.5 (H) 38.0 - 73.0 %    Lymph % 11.7 (L) 18.0 - 48.0 %    Mono % 4.5 4.0 - 15.0 %    Eosinophil % 0.0 0.0 - 8.0 %    Basophil % 0.3 0.0 - 1.9 %    Differential Method Automated    NT-Pro Natriuretic Peptide    Collection Time: 10/21/22 11:52 PM   Result Value Ref Range    NT-proBNP 381 5 - 900 pg/mL   Comprehensive metabolic panel    Collection Time: 10/21/22 11:52 PM   Result Value Ref Range    Sodium 129 (L) 136 - 145 mmol/L    Potassium 4.4 3.5 - 5.1 mmol/L    Chloride 97 95 - 110 mmol/L    CO2 24 23 - 29 mmol/L    Glucose 243 (H) 70 - 110 mg/dL    BUN 32 (H) 6 - 20 mg/dL    Creatinine 1.5 (H) 0.5 - 1.4 mg/dL    Calcium 8.2 (L) 8.7 - 10.5 mg/dL    Total Protein 7.5 6.0 - 8.4 g/dL    Albumin 2.6 (L) 3.5 - 5.2 g/dL    Total Bilirubin 1.4 (H) 0.1 - 1.0 mg/dL    Alkaline Phosphatase 102 55 - 135 U/L    AST 60 (H) 10 - 40 U/L    ALT 36 10 - 44 U/L    Anion Gap 8 8 - 16 mmol/L    eGFR 54.6 (A) >60 mL/min/1.73 m^2   Lactic acid, plasma    Collection Time: 10/21/22 11:52 PM   Result Value Ref Range    Lactate (Lactic Acid) 1.6 0.5 - 2.2 mmol/L   Magnesium    Collection Time: 10/21/22 11:52 PM   Result Value Ref Range    Magnesium 2.0 1.6 - 2.6 mg/dL   TROPONIN I HIGH SENSITIVITY    Collection Time: 10/21/22 11:52 PM   Result Value Ref Range    Troponin I High Sensitivity 45.7 0.0 - 60.0 pg/mL   Acetone    Collection Time: 10/21/22 11:52 PM   Result Value Ref Range    Acetone, Bld Negative Negative   Urinalysis, Reflex to Urine Culture Urine, Clean Catch    Collection Time: 10/22/22 12:26 AM    Specimen: Urine, Catheterized   Result Value Ref Range    Specimen UA Urine, Clean Catch     Color, UA Yellow Yellow, Straw, Velvet    Appearance, UA Cloudy (A) Clear    pH, UA 5.0 5.0 - 8.0    Specific Gravity, UA 1.025 1.005 -  1.030    Protein, UA 2+ (A) Negative    Glucose, UA 4+ (A) Negative    Ketones, UA Negative Negative    Bilirubin (UA) Negative Negative    Occult Blood UA 2+ (A) Negative    Nitrite, UA Negative Negative    Urobilinogen, UA 1.0 <2.0 EU/dL    Leukocytes, UA Negative Negative   Urinalysis Microscopic    Collection Time: 10/22/22 12:26 AM   Result Value Ref Range    RBC, UA 1 0 - 4 /hpf    WBC, UA 5 0 - 5 /hpf    Bacteria Negative None-Occ /hpf    Yeast, UA None None    Squam Epithel, UA 2 /hpf    Hyaline Casts, UA 9.0 (A) 0-1/lpf /lpf    Microscopic Comment SEE COMMENT    Respiratory Viral Panel by PCR (Sources other than NP Swab)    Collection Time: 10/22/22  1:54 AM   Result Value Ref Range    Respiratory Virus Panel, source NP     RVP - Adenovirus Not Detected Not Detected    Enterovirus/Rhinovirus Not Detected Not Detected    Human Bocavirus Not Detected Not Detected    Human Coronavirus, Common Cold Virus Not Detected Not Detected    RVP - Human Metapneumovirus (hMPV) Not Detected Not Detected    RVP - Influenza A Not Detected Not Detected    Influenza A - L5X5-44 Not Detected Not Detected    RVP - Influenza B Not Detected Not Detected    Parainfluenza Not Detected Not Detected    Respiratory Syncytial VirusVirus (RSV) A Not Detected Not Detected   POCT glucose    Collection Time: 10/22/22  2:29 AM   Result Value Ref Range    POCT Glucose 210 (H) 70 - 110 mg/dL   Lactic Acid, Plasma    Collection Time: 10/22/22  4:00 AM   Result Value Ref Range    Lactate (Lactic Acid) 1.2 0.5 - 2.2 mmol/L   POCT glucose    Collection Time: 10/22/22 11:09 AM   Result Value Ref Range    POCT Glucose 299 (H) 70 - 110 mg/dL   Vancomycin, Random    Collection Time: 10/22/22  4:17 PM   Result Value Ref Range    Vancomycin, Random 8.6 Not established ug/mL   MRSA/SA Rapid ID by PCR from Blood culture    Collection Time: 10/22/22  4:34 PM   Result Value Ref Range    Staph aureus ID by PCR Positive (A) Negative    MRSA ID by PCR  Negative Negative   POCT glucose    Collection Time: 10/22/22  7:25 PM   Result Value Ref Range    POCT Glucose 357 (H) 70 - 110 mg/dL   CBC Auto Differential    Collection Time: 10/23/22  5:30 AM   Result Value Ref Range    WBC 3.73 (L) 3.90 - 12.70 K/uL    RBC 4.39 (L) 4.60 - 6.20 M/uL    Hemoglobin 12.3 (L) 14.0 - 18.0 g/dL    Hematocrit 36.6 (L) 40.0 - 54.0 %    MCV 83 82 - 98 fL    MCH 28.0 27.0 - 31.0 pg    MCHC 33.6 32.0 - 36.0 g/dL    RDW 12.6 11.5 - 14.5 %    Platelets 168 150 - 450 K/uL    MPV 10.3 9.2 - 12.9 fL    Immature Granulocytes 0.3 0.0 - 0.5 %    Gran # (ANC) 2.5 1.8 - 7.7 K/uL    Immature Grans (Abs) 0.01 0.00 - 0.04 K/uL    Lymph # 0.9 (L) 1.0 - 4.8 K/uL    Mono # 0.3 0.3 - 1.0 K/uL    Eos # 0.0 0.0 - 0.5 K/uL    Baso # 0.01 0.00 - 0.20 K/uL    nRBC 0 0 /100 WBC    Gran % 67.2 38.0 - 73.0 %    Lymph % 24.1 18.0 - 48.0 %    Mono % 7.8 4.0 - 15.0 %    Eosinophil % 0.3 0.0 - 8.0 %    Basophil % 0.3 0.0 - 1.9 %    Differential Method Automated    Comprehensive Metabolic Panel    Collection Time: 10/23/22  5:30 AM   Result Value Ref Range    Sodium 133 (L) 136 - 145 mmol/L    Potassium 3.5 3.5 - 5.1 mmol/L    Chloride 102 95 - 110 mmol/L    CO2 22 (L) 23 - 29 mmol/L    Glucose 225 (H) 70 - 110 mg/dL    BUN 19 6 - 20 mg/dL    Creatinine 1.0 0.5 - 1.4 mg/dL    Calcium 7.8 (L) 8.7 - 10.5 mg/dL    Total Protein 6.4 6.0 - 8.4 g/dL    Albumin 2.2 (L) 3.5 - 5.2 g/dL    Total Bilirubin 0.9 0.1 - 1.0 mg/dL    Alkaline Phosphatase 80 55 - 135 U/L    AST 48 (H) 10 - 40 U/L    ALT 35 10 - 44 U/L    Anion Gap 9 8 - 16 mmol/L    eGFR >60.0 >60 mL/min/1.73 m^2   Magnesium    Collection Time: 10/23/22  5:30 AM   Result Value Ref Range    Magnesium 2.1 1.6 - 2.6 mg/dL   POCT glucose    Collection Time: 10/23/22  6:15 AM   Result Value Ref Range    POCT Glucose 253 (H) 70 - 110 mg/dL   POCT glucose    Collection Time: 10/23/22 12:42 PM   Result Value Ref Range    POCT Glucose 338 (H) 70 - 110 mg/dL   POCT glucose     Collection Time: 10/23/22  4:32 PM   Result Value Ref Range    POCT Glucose 266 (H) 70 - 110 mg/dL   POCT glucose    Collection Time: 10/23/22  8:50 PM   Result Value Ref Range    POCT Glucose 335 (H) 70 - 110 mg/dL   Blood culture    Collection Time: 10/24/22  5:47 AM    Specimen: Blood   Result Value Ref Range    Blood Culture, Routine No growth after 5 days.    CBC Auto Differential    Collection Time: 10/24/22  5:50 AM   Result Value Ref Range    WBC 4.05 3.90 - 12.70 K/uL    RBC 4.17 (L) 4.60 - 6.20 M/uL    Hemoglobin 11.6 (L) 14.0 - 18.0 g/dL    Hematocrit 34.8 (L) 40.0 - 54.0 %    MCV 84 82 - 98 fL    MCH 27.8 27.0 - 31.0 pg    MCHC 33.3 32.0 - 36.0 g/dL    RDW 12.8 11.5 - 14.5 %    Platelets 170 150 - 450 K/uL    MPV 10.6 9.2 - 12.9 fL    Immature Granulocytes 0.5 0.0 - 0.5 %    Gran # (ANC) 2.6 1.8 - 7.7 K/uL    Immature Grans (Abs) 0.02 0.00 - 0.04 K/uL    Lymph # 1.0 1.0 - 4.8 K/uL    Mono # 0.4 0.3 - 1.0 K/uL    Eos # 0.0 0.0 - 0.5 K/uL    Baso # 0.01 0.00 - 0.20 K/uL    nRBC 0 0 /100 WBC    Gran % 65.0 38.0 - 73.0 %    Lymph % 24.2 18.0 - 48.0 %    Mono % 9.6 4.0 - 15.0 %    Eosinophil % 0.5 0.0 - 8.0 %    Basophil % 0.2 0.0 - 1.9 %    Differential Method Automated    Comprehensive Metabolic Panel    Collection Time: 10/24/22  5:50 AM   Result Value Ref Range    Sodium 133 (L) 136 - 145 mmol/L    Potassium 3.8 3.5 - 5.1 mmol/L    Chloride 100 95 - 110 mmol/L    CO2 24 23 - 29 mmol/L    Glucose 270 (H) 70 - 110 mg/dL    BUN 15 6 - 20 mg/dL    Creatinine 1.1 0.5 - 1.4 mg/dL    Calcium 8.1 (L) 8.7 - 10.5 mg/dL    Total Protein 6.6 6.0 - 8.4 g/dL    Albumin 2.2 (L) 3.5 - 5.2 g/dL    Total Bilirubin 0.8 0.1 - 1.0 mg/dL    Alkaline Phosphatase 85 55 - 135 U/L    AST 40 10 - 40 U/L    ALT 34 10 - 44 U/L    Anion Gap 9 8 - 16 mmol/L    eGFR >60.0 >60 mL/min/1.73 m^2   Magnesium    Collection Time: 10/24/22  5:50 AM   Result Value Ref Range    Magnesium 2.1 1.6 - 2.6 mg/dL   VANCOMYCIN, TROUGH    Collection  Time: 10/24/22  5:50 AM   Result Value Ref Range    Vancomycin-Trough 12.1 10.0 - 22.0 ug/mL   Sedimentation rate    Collection Time: 10/24/22  5:50 AM   Result Value Ref Range    Sed Rate 54 (H) 0 - 10 mm/Hr   POCT glucose    Collection Time: 10/24/22  5:54 AM   Result Value Ref Range    POCT Glucose 249 (H) 70 - 110 mg/dL   Blood culture    Collection Time: 10/24/22  6:00 AM    Specimen: Blood   Result Value Ref Range    Blood Culture, Routine No growth after 5 days.    POCT glucose    Collection Time: 10/24/22 11:56 AM   Result Value Ref Range    POCT Glucose >500 (H) 70 - 110 mg/dL   Glucose, Random    Collection Time: 10/24/22 12:18 PM   Result Value Ref Range    Glucose 342 (H) 70 - 110 mg/dL   Echo    Collection Time: 10/24/22 12:59 PM   Result Value Ref Range    BSA 2.95 m2    TDI SEPTAL 0.19 m/s    LV LATERAL E/E' RATIO 8.13 m/s    LV SEPTAL E/E' RATIO 6.84 m/s    LA WIDTH 4.00 cm    Right Atrial Pressure (from IVC) 3 mmHg    EF 55 %    AORTIC VALVE CUSP SEPERATION 1.94 cm    TDI LATERAL 0.16 m/s    PV PEAK VELOCITY 0.92 cm/s    LVIDd 5.02 3.5 - 6.0 cm    IVS 1.28 (A) 0.6 - 1.1 cm    Posterior Wall 1.40 (A) 0.6 - 1.1 cm    Ao root annulus 3.21 cm    LVIDs 3.57 2.1 - 4.0 cm    FS 29 28 - 44 %    LV mass 275.18 g    LA size 4.86 cm    RVDD 3.40 cm    TAPSE 2.34 cm    Left Ventricle Relative Wall Thickness 0.56 cm    AV mean gradient 5 mmHg    AV valve area 2.59 cm2    AV Velocity Ratio 0.71     AV index (prosthetic) 0.77     MV valve area p 1/2 method 2.96 cm2    Mean e' 0.18 m/s    IVRT 92.27 msec    LVOT diameter 2.07 cm    LVOT area 3.4 cm2    LVOT peak shreyas 1.07 m/s    LVOT peak VTI 16.37 cm    Ao peak shreyas 1.50 m/s    Ao VTI 21.30 cm    LVOT stroke volume 55.06 cm3    AV peak gradient 9 mmHg    TV rest pulmonary artery pressure 26 mmHg    E/E' ratio 7.43 m/s    MV Peak E Shreyas 1.30 m/s    TR Max Shreyas 2.39 m/s    MV stenosis pressure 1/2 time 74.36 ms    LV Systolic Volume 53.27 mL    LV Systolic Volume Index  19.0 mL/m2    LV Diastolic Volume 119.27 mL    LV Diastolic Volume Index 42.60 mL/m2    LV Mass Index 98 g/m2    RA Major Axis 5.79 cm    Left Atrium Major Axis 6.89 cm    Triscuspid Valve Regurgitation Peak Gradient 23 mmHg    RA Width 3.50 cm   POCT glucose    Collection Time: 10/24/22  4:34 PM   Result Value Ref Range    POCT Glucose 352 (H) 70 - 110 mg/dL   POCT glucose    Collection Time: 10/24/22  8:18 PM   Result Value Ref Range    POCT Glucose 388 (H) 70 - 110 mg/dL   CBC Auto Differential    Collection Time: 10/25/22  3:48 AM   Result Value Ref Range    WBC 4.71 3.90 - 12.70 K/uL    RBC 4.34 (L) 4.60 - 6.20 M/uL    Hemoglobin 12.1 (L) 14.0 - 18.0 g/dL    Hematocrit 36.0 (L) 40.0 - 54.0 %    MCV 83 82 - 98 fL    MCH 27.9 27.0 - 31.0 pg    MCHC 33.6 32.0 - 36.0 g/dL    RDW 12.7 11.5 - 14.5 %    Platelets 224 150 - 450 K/uL    MPV 10.5 9.2 - 12.9 fL    Immature Granulocytes 0.6 (H) 0.0 - 0.5 %    Gran # (ANC) 3.2 1.8 - 7.7 K/uL    Immature Grans (Abs) 0.03 0.00 - 0.04 K/uL    Lymph # 1.0 1.0 - 4.8 K/uL    Mono # 0.4 0.3 - 1.0 K/uL    Eos # 0.1 0.0 - 0.5 K/uL    Baso # 0.02 0.00 - 0.20 K/uL    nRBC 0 0 /100 WBC    Gran % 68.6 38.0 - 73.0 %    Lymph % 21.4 18.0 - 48.0 %    Mono % 7.9 4.0 - 15.0 %    Eosinophil % 1.1 0.0 - 8.0 %    Basophil % 0.4 0.0 - 1.9 %    Differential Method Automated    Comprehensive Metabolic Panel    Collection Time: 10/25/22  3:48 AM   Result Value Ref Range    Sodium 135 (L) 136 - 145 mmol/L    Potassium 3.6 3.5 - 5.1 mmol/L    Chloride 102 95 - 110 mmol/L    CO2 27 23 - 29 mmol/L    Glucose 250 (H) 70 - 110 mg/dL    BUN 15 6 - 20 mg/dL    Creatinine 0.8 0.5 - 1.4 mg/dL    Calcium 8.1 (L) 8.7 - 10.5 mg/dL    Total Protein 6.5 6.0 - 8.4 g/dL    Albumin 2.2 (L) 3.5 - 5.2 g/dL    Total Bilirubin 0.6 0.1 - 1.0 mg/dL    Alkaline Phosphatase 88 55 - 135 U/L    AST 33 10 - 40 U/L    ALT 29 10 - 44 U/L    Anion Gap 6 (L) 8 - 16 mmol/L    eGFR >60.0 >60 mL/min/1.73 m^2   Magnesium    Collection  Time: 10/25/22  3:48 AM   Result Value Ref Range    Magnesium 1.9 1.6 - 2.6 mg/dL   Sedimentation rate    Collection Time: 10/25/22  3:48 AM   Result Value Ref Range    Sed Rate 40 (H) 0 - 10 mm/Hr   POCT glucose    Collection Time: 10/25/22 11:09 AM   Result Value Ref Range    POCT Glucose 284 (H) 70 - 110 mg/dL   CBC W/ AUTO DIFFERENTIAL    Collection Time: 10/26/22 11:43 AM   Result Value Ref Range    WBC 6.35 3.90 - 12.70 K/uL    RBC 4.17 (L) 4.60 - 6.20 M/uL    Hemoglobin 11.8 (L) 14.0 - 18.0 g/dL    Hematocrit 34.6 (L) 40.0 - 54.0 %    MCV 83 82 - 98 fL    MCH 28.3 27.0 - 31.0 pg    MCHC 34.1 32.0 - 36.0 g/dL    RDW 12.6 11.5 - 14.5 %    Platelets 313 150 - 450 K/uL    MPV 9.6 9.2 - 12.9 fL    Immature Granulocytes 1.1 (H) 0.0 - 0.5 %    Gran # (ANC) 4.3 1.8 - 7.7 K/uL    Immature Grans (Abs) 0.07 (H) 0.00 - 0.04 K/uL    Lymph # 1.4 1.0 - 4.8 K/uL    Mono # 0.4 0.3 - 1.0 K/uL    Eos # 0.1 0.0 - 0.5 K/uL    Baso # 0.02 0.00 - 0.20 K/uL    nRBC 0 0 /100 WBC    Gran % 68.3 38.0 - 73.0 %    Lymph % 22.4 18.0 - 48.0 %    Mono % 6.5 4.0 - 15.0 %    Eosinophil % 1.4 0.0 - 8.0 %    Basophil % 0.3 0.0 - 1.9 %    Differential Method Automated    Comprehensive metabolic panel    Collection Time: 10/26/22 11:43 AM   Result Value Ref Range    Sodium 138 136 - 145 mmol/L    Potassium 3.8 3.5 - 5.1 mmol/L    Chloride 104 95 - 110 mmol/L    CO2 29 23 - 29 mmol/L    Glucose 145 (H) 70 - 110 mg/dL    BUN 11 6 - 20 mg/dL    Creatinine 0.7 0.5 - 1.4 mg/dL    Calcium 8.5 (L) 8.7 - 10.5 mg/dL    Total Protein 6.6 6.0 - 8.4 g/dL    Albumin 2.2 (L) 3.5 - 5.2 g/dL    Total Bilirubin 0.8 0.1 - 1.0 mg/dL    Alkaline Phosphatase 84 55 - 135 U/L    AST 32 10 - 40 U/L    ALT 24 10 - 44 U/L    Anion Gap 5 (L) 8 - 16 mmol/L    eGFR >60.0 >60 mL/min/1.73 m^2   Protime-INR    Collection Time: 10/26/22 11:43 AM   Result Value Ref Range    Prothrombin Time 11.6 9.0 - 12.5 sec    INR 1.1 0.8 - 1.2   Magnesium    Collection Time: 10/26/22 11:43 AM    Result Value Ref Range    Magnesium 1.7 1.6 - 2.6 mg/dL   Urinalysis, Reflex to Urine Culture Urine, Clean Catch    Collection Time: 10/26/22 11:58 AM    Specimen: Urine   Result Value Ref Range    Specimen UA Urine, Unspecified     Color, UA Yellow Yellow, Straw, Velvet    Appearance, UA Clear Clear    pH, UA 6.0 5.0 - 8.0    Specific Gravity, UA 1.025 1.005 - 1.030    Protein, UA Negative Negative    Glucose, UA 4+ (A) Negative    Ketones, UA 1+ (A) Negative    Bilirubin (UA) Negative Negative    Occult Blood UA Negative Negative    Nitrite, UA Negative Negative    Urobilinogen, UA Negative <2.0 EU/dL    Leukocytes, UA Negative Negative   Urinalysis Microscopic    Collection Time: 10/26/22 11:58 AM   Result Value Ref Range    RBC, UA 2 0 - 4 /hpf    WBC, UA 1 0 - 5 /hpf    Bacteria Negative None-Occ /hpf    Yeast, UA None None    Squam Epithel, UA 0 /hpf    Hyaline Casts, UA 0.4 (A) 0-1/lpf /lpf    Microscopic Comment SEE COMMENT    POCT glucose    Collection Time: 10/26/22  7:29 PM   Result Value Ref Range    POCT Glucose 188 (H) 70 - 110 mg/dL   POCT glucose    Collection Time: 10/27/22  5:17 AM   Result Value Ref Range    POCT Glucose 212 (H) 70 - 110 mg/dL   Magnesium    Collection Time: 10/27/22  5:20 AM   Result Value Ref Range    Magnesium 1.7 1.6 - 2.6 mg/dL   Phosphorus    Collection Time: 10/27/22  5:20 AM   Result Value Ref Range    Phosphorus 3.7 2.7 - 4.5 mg/dL   APTT    Collection Time: 10/27/22  5:20 AM   Result Value Ref Range    aPTT 25.6 21.0 - 32.0 sec   Protime-INR    Collection Time: 10/27/22  5:20 AM   Result Value Ref Range    Prothrombin Time 11.2 9.0 - 12.5 sec    INR 1.0 0.8 - 1.2   Lipid panel    Collection Time: 10/27/22  5:20 AM   Result Value Ref Range    Cholesterol 116 (L) 120 - 199 mg/dL    Triglycerides 141 30 - 150 mg/dL    HDL 22 (L) 40 - 75 mg/dL    LDL Cholesterol 65.8 63.0 - 159.0 mg/dL    HDL/Cholesterol Ratio 19.0 (L) 20.0 - 50.0 %    Total Cholesterol/HDL Ratio 5.3 (H)  2.0 - 5.0    Non-HDL Cholesterol 94 mg/dL   CBC Auto Differential    Collection Time: 10/27/22  5:20 AM   Result Value Ref Range    WBC 6.11 3.90 - 12.70 K/uL    RBC 4.27 (L) 4.60 - 6.20 M/uL    Hemoglobin 11.8 (L) 14.0 - 18.0 g/dL    Hematocrit 36.3 (L) 40.0 - 54.0 %    MCV 85 82 - 98 fL    MCH 27.6 27.0 - 31.0 pg    MCHC 32.5 32.0 - 36.0 g/dL    RDW 12.8 11.5 - 14.5 %    Platelets 374 150 - 450 K/uL    MPV 9.5 9.2 - 12.9 fL    Immature Granulocytes 1.3 (H) 0.0 - 0.5 %    Gran # (ANC) 4.6 1.8 - 7.7 K/uL    Immature Grans (Abs) 0.08 (H) 0.00 - 0.04 K/uL    Lymph # 0.9 (L) 1.0 - 4.8 K/uL    Mono # 0.3 0.3 - 1.0 K/uL    Eos # 0.1 0.0 - 0.5 K/uL    Baso # 0.03 0.00 - 0.20 K/uL    nRBC 0 0 /100 WBC    Gran % 75.7 (H) 38.0 - 73.0 %    Lymph % 15.1 (L) 18.0 - 48.0 %    Mono % 5.4 4.0 - 15.0 %    Eosinophil % 2.0 0.0 - 8.0 %    Basophil % 0.5 0.0 - 1.9 %    Differential Method Automated    POCT glucose    Collection Time: 10/27/22 11:18 AM   Result Value Ref Range    POCT Glucose 184 (H) 70 - 110 mg/dL   POCT glucose    Collection Time: 10/27/22  4:34 PM   Result Value Ref Range    POCT Glucose 226 (H) 70 - 110 mg/dL   POCT glucose    Collection Time: 10/27/22  8:48 PM   Result Value Ref Range    POCT Glucose 319 (H) 70 - 110 mg/dL   CBC Auto Differential    Collection Time: 10/28/22  5:41 AM   Result Value Ref Range    WBC 6.49 3.90 - 12.70 K/uL    RBC 4.29 (L) 4.60 - 6.20 M/uL    Hemoglobin 11.8 (L) 14.0 - 18.0 g/dL    Hematocrit 37.1 (L) 40.0 - 54.0 %    MCV 87 82 - 98 fL    MCH 27.5 27.0 - 31.0 pg    MCHC 31.8 (L) 32.0 - 36.0 g/dL    RDW 13.0 11.5 - 14.5 %    Platelets 454 (H) 150 - 450 K/uL    MPV 9.2 9.2 - 12.9 fL    Immature Granulocytes 2.6 (H) 0.0 - 0.5 %    Gran # (ANC) 3.9 1.8 - 7.7 K/uL    Immature Grans (Abs) 0.17 (H) 0.00 - 0.04 K/uL    Lymph # 1.8 1.0 - 4.8 K/uL    Mono # 0.4 0.3 - 1.0 K/uL    Eos # 0.2 0.0 - 0.5 K/uL    Baso # 0.04 0.00 - 0.20 K/uL    nRBC 0 0 /100 WBC    Gran % 59.8 38.0 - 73.0 %    Lymph  % 27.0 18.0 - 48.0 %    Mono % 6.8 4.0 - 15.0 %    Eosinophil % 3.2 0.0 - 8.0 %    Basophil % 0.6 0.0 - 1.9 %    Differential Method Automated    Comprehensive Metabolic Panel    Collection Time: 10/28/22  5:41 AM   Result Value Ref Range    Sodium 140 136 - 145 mmol/L    Potassium 3.6 3.5 - 5.1 mmol/L    Chloride 105 95 - 110 mmol/L    CO2 32 (H) 23 - 29 mmol/L    Glucose 154 (H) 70 - 110 mg/dL    BUN 9 6 - 20 mg/dL    Creatinine 0.7 0.5 - 1.4 mg/dL    Calcium 8.3 (L) 8.7 - 10.5 mg/dL    Total Protein 6.6 6.0 - 8.4 g/dL    Albumin 2.2 (L) 3.5 - 5.2 g/dL    Total Bilirubin 0.6 0.1 - 1.0 mg/dL    Alkaline Phosphatase 92 55 - 135 U/L    AST 15 10 - 40 U/L    ALT 20 10 - 44 U/L    Anion Gap 3 (L) 8 - 16 mmol/L    eGFR >60.0 >60 mL/min/1.73 m^2   POCT glucose    Collection Time: 10/28/22 12:01 PM   Result Value Ref Range    POCT Glucose 204 (H) 70 - 110 mg/dL   POCT glucose    Collection Time: 10/28/22  4:45 PM   Result Value Ref Range    POCT Glucose 280 (H) 70 - 110 mg/dL   POCT glucose    Collection Time: 10/28/22  7:25 PM   Result Value Ref Range    POCT Glucose 349 (H) 70 - 110 mg/dL   CBC Auto Differential    Collection Time: 10/29/22  5:13 AM   Result Value Ref Range    WBC 6.86 3.90 - 12.70 K/uL    RBC 4.36 (L) 4.60 - 6.20 M/uL    Hemoglobin 12.1 (L) 14.0 - 18.0 g/dL    Hematocrit 38.1 (L) 40.0 - 54.0 %    MCV 87 82 - 98 fL    MCH 27.8 27.0 - 31.0 pg    MCHC 31.8 (L) 32.0 - 36.0 g/dL    RDW 13.1 11.5 - 14.5 %    Platelets 492 (H) 150 - 450 K/uL    MPV 9.0 (L) 9.2 - 12.9 fL    Immature Granulocytes 2.2 (H) 0.0 - 0.5 %    Gran # (ANC) 4.0 1.8 - 7.7 K/uL    Immature Grans (Abs) 0.15 (H) 0.00 - 0.04 K/uL    Lymph # 2.0 1.0 - 4.8 K/uL    Mono # 0.5 0.3 - 1.0 K/uL    Eos # 0.2 0.0 - 0.5 K/uL    Baso # 0.04 0.00 - 0.20 K/uL    nRBC 0 0 /100 WBC    Gran % 58.3 38.0 - 73.0 %    Lymph % 28.7 18.0 - 48.0 %    Mono % 7.1 4.0 - 15.0 %    Eosinophil % 3.1 0.0 - 8.0 %    Basophil % 0.6 0.0 - 1.9 %    Differential Method  Automated    Basic Metabolic Panel    Collection Time: 10/29/22  5:13 AM   Result Value Ref Range    Sodium 143 136 - 145 mmol/L    Potassium 4.7 3.5 - 5.1 mmol/L    Chloride 107 95 - 110 mmol/L    CO2 31 (H) 23 - 29 mmol/L    Glucose 114 (H) 70 - 110 mg/dL    BUN 10 6 - 20 mg/dL    Creatinine 0.7 0.5 - 1.4 mg/dL    Calcium 8.9 8.7 - 10.5 mg/dL    Anion Gap 5 (L) 8 - 16 mmol/L    eGFR >60.0 >60 mL/min/1.73 m^2   CBC Auto Differential    Collection Time: 10/29/22  5:13 AM   Result Value Ref Range    WBC 6.86 3.90 - 12.70 K/uL    RBC 4.36 (L) 4.60 - 6.20 M/uL    Hemoglobin 12.1 (L) 14.0 - 18.0 g/dL    Hematocrit 38.1 (L) 40.0 - 54.0 %    MCV 87 82 - 98 fL    MCH 27.8 27.0 - 31.0 pg    MCHC 31.8 (L) 32.0 - 36.0 g/dL    RDW 13.1 11.5 - 14.5 %    Platelets 492 (H) 150 - 450 K/uL    MPV 9.0 (L) 9.2 - 12.9 fL    Immature Granulocytes 2.2 (H) 0.0 - 0.5 %    Gran # (ANC) 4.0 1.8 - 7.7 K/uL    Immature Grans (Abs) 0.15 (H) 0.00 - 0.04 K/uL    Lymph # 2.0 1.0 - 4.8 K/uL    Mono # 0.5 0.3 - 1.0 K/uL    Eos # 0.2 0.0 - 0.5 K/uL    Baso # 0.04 0.00 - 0.20 K/uL    nRBC 0 0 /100 WBC    Gran % 58.3 38.0 - 73.0 %    Lymph % 28.7 18.0 - 48.0 %    Mono % 7.1 4.0 - 15.0 %    Eosinophil % 3.1 0.0 - 8.0 %    Basophil % 0.6 0.0 - 1.9 %    Differential Method Automated    TSH    Collection Time: 10/29/22  5:15 AM   Result Value Ref Range    TSH 2.370 0.400 - 4.000 uIU/mL   T4, Free    Collection Time: 10/29/22  5:15 AM   Result Value Ref Range    Free T4 1.41 0.71 - 1.51 ng/dL   Iron and TIBC    Collection Time: 10/29/22  5:15 AM   Result Value Ref Range    Iron 34 (L) 45 - 160 ug/dL    TIBC 234 (L) 250 - 450 ug/dL    Iron Saturation 15 (L) 20 - 50 %   Ferritin    Collection Time: 10/29/22  5:15 AM   Result Value Ref Range    Ferritin 135 20.0 - 300.0 ng/mL   POCT glucose    Collection Time: 10/29/22  5:35 AM   Result Value Ref Range    POCT Glucose 113 (H) 70 - 110 mg/dL   POCT glucose    Collection Time: 10/29/22 12:11 PM   Result Value  Ref Range    POCT Glucose 285 (H) 70 - 110 mg/dL   POCT glucose    Collection Time: 10/29/22  5:16 PM   Result Value Ref Range    POCT Glucose 317 (H) 70 - 110 mg/dL   POCT glucose    Collection Time: 10/29/22  9:06 PM   Result Value Ref Range    POCT Glucose 313 (H) 70 - 110 mg/dL   Basic Metabolic Panel    Collection Time: 10/30/22  5:49 AM   Result Value Ref Range    Sodium 141 136 - 145 mmol/L    Potassium 4.4 3.5 - 5.1 mmol/L    Chloride 105 95 - 110 mmol/L    CO2 32 (H) 23 - 29 mmol/L    Glucose 192 (H) 70 - 110 mg/dL    BUN 10 6 - 20 mg/dL    Creatinine 0.7 0.5 - 1.4 mg/dL    Calcium 9.0 8.7 - 10.5 mg/dL    Anion Gap 4 (L) 8 - 16 mmol/L    eGFR >60.0 >60 mL/min/1.73 m^2   CBC Auto Differential    Collection Time: 10/30/22  5:50 AM   Result Value Ref Range    WBC 7.20 3.90 - 12.70 K/uL    RBC 4.37 (L) 4.60 - 6.20 M/uL    Hemoglobin 11.9 (L) 14.0 - 18.0 g/dL    Hematocrit 38.2 (L) 40.0 - 54.0 %    MCV 87 82 - 98 fL    MCH 27.2 27.0 - 31.0 pg    MCHC 31.2 (L) 32.0 - 36.0 g/dL    RDW 13.0 11.5 - 14.5 %    Platelets 513 (H) 150 - 450 K/uL    MPV 8.8 (L) 9.2 - 12.9 fL    Immature Granulocytes 2.4 (H) 0.0 - 0.5 %    Gran # (ANC) 4.0 1.8 - 7.7 K/uL    Immature Grans (Abs) 0.17 (H) 0.00 - 0.04 K/uL    Lymph # 2.2 1.0 - 4.8 K/uL    Mono # 0.6 0.3 - 1.0 K/uL    Eos # 0.3 0.0 - 0.5 K/uL    Baso # 0.05 0.00 - 0.20 K/uL    nRBC 0 0 /100 WBC    Gran % 54.7 38.0 - 73.0 %    Lymph % 29.9 18.0 - 48.0 %    Mono % 8.8 4.0 - 15.0 %    Eosinophil % 3.5 0.0 - 8.0 %    Basophil % 0.7 0.0 - 1.9 %    Differential Method Automated    CBC Auto Differential    Collection Time: 10/30/22  5:50 AM   Result Value Ref Range    WBC 7.20 3.90 - 12.70 K/uL    RBC 4.37 (L) 4.60 - 6.20 M/uL    Hemoglobin 11.9 (L) 14.0 - 18.0 g/dL    Hematocrit 38.2 (L) 40.0 - 54.0 %    MCV 87 82 - 98 fL    MCH 27.2 27.0 - 31.0 pg    MCHC 31.2 (L) 32.0 - 36.0 g/dL    RDW 13.0 11.5 - 14.5 %    Platelets 513 (H) 150 - 450 K/uL    MPV 8.8 (L) 9.2 - 12.9 fL    Immature  Granulocytes 2.4 (H) 0.0 - 0.5 %    Gran # (ANC) 4.0 1.8 - 7.7 K/uL    Immature Grans (Abs) 0.17 (H) 0.00 - 0.04 K/uL    Lymph # 2.2 1.0 - 4.8 K/uL    Mono # 0.6 0.3 - 1.0 K/uL    Eos # 0.3 0.0 - 0.5 K/uL    Baso # 0.05 0.00 - 0.20 K/uL    nRBC 0 0 /100 WBC    Gran % 54.7 38.0 - 73.0 %    Lymph % 29.9 18.0 - 48.0 %    Mono % 8.8 4.0 - 15.0 %    Eosinophil % 3.5 0.0 - 8.0 %    Basophil % 0.7 0.0 - 1.9 %    Differential Method Automated    C-Peptide    Collection Time: 10/30/22  9:36 AM   Result Value Ref Range    C-Peptide 2.18 0.78 - 5.19 ng/mL   POCT glucose    Collection Time: 10/30/22  1:11 PM   Result Value Ref Range    POCT Glucose 279 (H) 70 - 110 mg/dL   POCT glucose    Collection Time: 10/30/22  4:39 PM   Result Value Ref Range    POCT Glucose 263 (H) 70 - 110 mg/dL   POCT glucose    Collection Time: 10/30/22  7:33 PM   Result Value Ref Range    POCT Glucose 335 (H) 70 - 110 mg/dL   Basic Metabolic Panel    Collection Time: 10/31/22  5:23 AM   Result Value Ref Range    Sodium 141 136 - 145 mmol/L    Potassium 4.0 3.5 - 5.1 mmol/L    Chloride 104 95 - 110 mmol/L    CO2 34 (H) 23 - 29 mmol/L    Glucose 94 70 - 110 mg/dL    BUN 8 6 - 20 mg/dL    Creatinine 0.7 0.5 - 1.4 mg/dL    Calcium 9.0 8.7 - 10.5 mg/dL    Anion Gap 3 (L) 8 - 16 mmol/L    eGFR >60.0 >60 mL/min/1.73 m^2   Magnesium    Collection Time: 10/31/22  5:23 AM   Result Value Ref Range    Magnesium 1.7 1.6 - 2.6 mg/dL   POCT glucose    Collection Time: 10/31/22 10:43 AM   Result Value Ref Range    POCT Glucose 274 (H) 70 - 110 mg/dL   POCT glucose    Collection Time: 10/31/22  3:25 PM   Result Value Ref Range    POCT Glucose 247 (H) 70 - 110 mg/dL   POCT glucose    Collection Time: 10/31/22  7:07 PM   Result Value Ref Range    POCT Glucose 305 (H) 70 - 110 mg/dL   POCT glucose    Collection Time: 11/01/22  7:49 AM   Result Value Ref Range    POCT Glucose 198 (H) 70 - 110 mg/dL   POCT glucose    Collection Time: 11/01/22 11:25 AM   Result Value Ref  Range    POCT Glucose 160 (H) 70 - 110 mg/dL   POCT glucose    Collection Time: 11/01/22  3:00 PM   Result Value Ref Range    POCT Glucose 275 (H) 70 - 110 mg/dL   POCT glucose    Collection Time: 11/01/22  7:32 PM   Result Value Ref Range    POCT Glucose 253 (H) 70 - 110 mg/dL   CBC Auto Differential    Collection Time: 11/02/22  5:29 AM   Result Value Ref Range    WBC 7.29 3.90 - 12.70 K/uL    RBC 4.31 (L) 4.60 - 6.20 M/uL    Hemoglobin 11.9 (L) 14.0 - 18.0 g/dL    Hematocrit 37.9 (L) 40.0 - 54.0 %    MCV 88 82 - 98 fL    MCH 27.6 27.0 - 31.0 pg    MCHC 31.4 (L) 32.0 - 36.0 g/dL    RDW 12.8 11.5 - 14.5 %    Platelets 454 (H) 150 - 450 K/uL    MPV 8.7 (L) 9.2 - 12.9 fL    Immature Granulocytes 0.7 (H) 0.0 - 0.5 %    Gran # (ANC) 4.6 1.8 - 7.7 K/uL    Immature Grans (Abs) 0.05 (H) 0.00 - 0.04 K/uL    Lymph # 2.0 1.0 - 4.8 K/uL    Mono # 0.5 0.3 - 1.0 K/uL    Eos # 0.1 0.0 - 0.5 K/uL    Baso # 0.04 0.00 - 0.20 K/uL    nRBC 0 0 /100 WBC    Gran % 62.8 38.0 - 73.0 %    Lymph % 27.7 18.0 - 48.0 %    Mono % 6.7 4.0 - 15.0 %    Eosinophil % 1.6 0.0 - 8.0 %    Basophil % 0.5 0.0 - 1.9 %    Differential Method Automated    Comprehensive Metabolic Panel    Collection Time: 11/02/22  5:29 AM   Result Value Ref Range    Sodium 139 136 - 145 mmol/L    Potassium 4.2 3.5 - 5.1 mmol/L    Chloride 105 95 - 110 mmol/L    CO2 30 (H) 23 - 29 mmol/L    Glucose 149 (H) 70 - 110 mg/dL    BUN 18 6 - 20 mg/dL    Creatinine 0.8 0.5 - 1.4 mg/dL    Calcium 8.6 (L) 8.7 - 10.5 mg/dL    Total Protein 6.6 6.0 - 8.4 g/dL    Albumin 2.4 (L) 3.5 - 5.2 g/dL    Total Bilirubin 0.6 0.1 - 1.0 mg/dL    Alkaline Phosphatase 100 55 - 135 U/L    AST 12 10 - 40 U/L    ALT 19 10 - 44 U/L    Anion Gap 4 (L) 8 - 16 mmol/L    eGFR >60.0 >60 mL/min/1.73 m^2   Magnesium    Collection Time: 11/02/22  5:29 AM   Result Value Ref Range    Magnesium 1.8 1.6 - 2.6 mg/dL   POCT glucose    Collection Time: 11/02/22 11:51 AM   Result Value Ref Range    POCT Glucose 140 (H)  70 - 110 mg/dL   POCT glucose    Collection Time: 11/02/22  4:32 PM   Result Value Ref Range    POCT Glucose 155 (H) 70 - 110 mg/dL   POCT glucose    Collection Time: 11/02/22  7:49 PM   Result Value Ref Range    POCT Glucose 182 (H) 70 - 110 mg/dL   POCT glucose    Collection Time: 11/03/22  7:21 AM   Result Value Ref Range    POCT Glucose 81 70 - 110 mg/dL   POCT glucose    Collection Time: 11/03/22 11:35 AM   Result Value Ref Range    POCT Glucose 162 (H) 70 - 110 mg/dL   POCT glucose    Collection Time: 11/03/22  4:05 PM   Result Value Ref Range    POCT Glucose 210 (H) 70 - 110 mg/dL   POCT glucose    Collection Time: 11/03/22  7:29 PM   Result Value Ref Range    POCT Glucose 191 (H) 70 - 110 mg/dL   POCT glucose    Collection Time: 11/04/22  7:38 AM   Result Value Ref Range    POCT Glucose 76 70 - 110 mg/dL   POCT glucose    Collection Time: 11/04/22 11:12 AM   Result Value Ref Range    POCT Glucose 137 (H) 70 - 110 mg/dL   POCT glucose    Collection Time: 11/04/22  4:18 PM   Result Value Ref Range    POCT Glucose 179 (H) 70 - 110 mg/dL   POCT glucose    Collection Time: 11/04/22  7:31 PM   Result Value Ref Range    POCT Glucose 154 (H) 70 - 110 mg/dL   CBC Auto Differential    Collection Time: 11/05/22  5:05 AM   Result Value Ref Range    WBC 6.14 3.90 - 12.70 K/uL    RBC 4.51 (L) 4.60 - 6.20 M/uL    Hemoglobin 12.4 (L) 14.0 - 18.0 g/dL    Hematocrit 38.9 (L) 40.0 - 54.0 %    MCV 86 82 - 98 fL    MCH 27.5 27.0 - 31.0 pg    MCHC 31.9 (L) 32.0 - 36.0 g/dL    RDW 12.8 11.5 - 14.5 %    Platelets 369 150 - 450 K/uL    MPV 8.8 (L) 9.2 - 12.9 fL    Immature Granulocytes 0.3 0.0 - 0.5 %    Gran # (ANC) 3.4 1.8 - 7.7 K/uL    Immature Grans (Abs) 0.02 0.00 - 0.04 K/uL    Lymph # 2.1 1.0 - 4.8 K/uL    Mono # 0.5 0.3 - 1.0 K/uL    Eos # 0.2 0.0 - 0.5 K/uL    Baso # 0.05 0.00 - 0.20 K/uL    nRBC 0 0 /100 WBC    Gran % 54.6 38.0 - 73.0 %    Lymph % 33.7 18.0 - 48.0 %    Mono % 7.3 4.0 - 15.0 %    Eosinophil % 3.3 0.0 - 8.0  %    Basophil % 0.8 0.0 - 1.9 %    Differential Method Automated    Comprehensive Metabolic Panel    Collection Time: 11/05/22  5:05 AM   Result Value Ref Range    Sodium 139 136 - 145 mmol/L    Potassium 4.0 3.5 - 5.1 mmol/L    Chloride 105 95 - 110 mmol/L    CO2 31 (H) 23 - 29 mmol/L    Glucose 74 70 - 110 mg/dL    BUN 14 6 - 20 mg/dL    Creatinine 0.7 0.5 - 1.4 mg/dL    Calcium 8.7 8.7 - 10.5 mg/dL    Total Protein 6.9 6.0 - 8.4 g/dL    Albumin 2.6 (L) 3.5 - 5.2 g/dL    Total Bilirubin 1.0 0.1 - 1.0 mg/dL    Alkaline Phosphatase 87 55 - 135 U/L    AST 14 10 - 40 U/L    ALT 16 10 - 44 U/L    Anion Gap 3 (L) 8 - 16 mmol/L    eGFR >60.0 >60 mL/min/1.73 m^2   Magnesium    Collection Time: 11/05/22  5:05 AM   Result Value Ref Range    Magnesium 1.6 1.6 - 2.6 mg/dL   POCT glucose    Collection Time: 11/05/22  6:55 AM   Result Value Ref Range    POCT Glucose 71 70 - 110 mg/dL   POCT glucose    Collection Time: 11/05/22  7:40 AM   Result Value Ref Range    POCT Glucose 103 70 - 110 mg/dL   POCT glucose    Collection Time: 11/05/22  4:21 PM   Result Value Ref Range    POCT Glucose 238 (H) 70 - 110 mg/dL   POCT glucose    Collection Time: 11/05/22  7:44 PM   Result Value Ref Range    POCT Glucose 205 (H) 70 - 110 mg/dL   POCT glucose    Collection Time: 11/06/22  6:39 AM   Result Value Ref Range    POCT Glucose 87 70 - 110 mg/dL   POCT glucose    Collection Time: 11/06/22 11:02 AM   Result Value Ref Range    POCT Glucose 195 (H) 70 - 110 mg/dL   POCT glucose    Collection Time: 11/06/22  4:25 PM   Result Value Ref Range    POCT Glucose 249 (H) 70 - 110 mg/dL   POCT glucose    Collection Time: 11/06/22  7:22 PM   Result Value Ref Range    POCT Glucose 235 (H) 70 - 110 mg/dL   POCT glucose    Collection Time: 11/07/22  7:11 AM   Result Value Ref Range    POCT Glucose 85 70 - 110 mg/dL   POCT glucose    Collection Time: 11/07/22 11:07 AM   Result Value Ref Range    POCT Glucose 124 (H) 70 - 110 mg/dL   POCT glucose     Collection Time: 11/07/22  3:41 PM   Result Value Ref Range    POCT Glucose 140 (H) 70 - 110 mg/dL   POCT glucose    Collection Time: 11/07/22  7:32 PM   Result Value Ref Range    POCT Glucose 213 (H) 70 - 110 mg/dL   Comprehensive Metabolic Panel    Collection Time: 11/08/22  5:48 AM   Result Value Ref Range    Sodium 138 136 - 145 mmol/L    Potassium 4.5 3.5 - 5.1 mmol/L    Chloride 102 95 - 110 mmol/L    CO2 34 (H) 23 - 29 mmol/L    Glucose 86 70 - 110 mg/dL    BUN 16 6 - 20 mg/dL    Creatinine 0.7 0.5 - 1.4 mg/dL    Calcium 9.0 8.7 - 10.5 mg/dL    Total Protein 7.5 6.0 - 8.4 g/dL    Albumin 2.8 (L) 3.5 - 5.2 g/dL    Total Bilirubin 1.2 (H) 0.1 - 1.0 mg/dL    Alkaline Phosphatase 93 55 - 135 U/L    AST 14 10 - 40 U/L    ALT 18 10 - 44 U/L    Anion Gap 2 (L) 8 - 16 mmol/L    eGFR >60.0 >60 mL/min/1.73 m^2   Magnesium    Collection Time: 11/08/22  5:48 AM   Result Value Ref Range    Magnesium 1.8 1.6 - 2.6 mg/dL   CBC Auto Differential    Collection Time: 11/08/22  5:49 AM   Result Value Ref Range    WBC 6.92 3.90 - 12.70 K/uL    RBC 4.70 4.60 - 6.20 M/uL    Hemoglobin 13.0 (L) 14.0 - 18.0 g/dL    Hematocrit 41.1 40.0 - 54.0 %    MCV 87 82 - 98 fL    MCH 27.7 27.0 - 31.0 pg    MCHC 31.6 (L) 32.0 - 36.0 g/dL    RDW 12.8 11.5 - 14.5 %    Platelets 297 150 - 450 K/uL    MPV 9.3 9.2 - 12.9 fL    Immature Granulocytes 0.3 0.0 - 0.5 %    Gran # (ANC) 3.5 1.8 - 7.7 K/uL    Immature Grans (Abs) 0.02 0.00 - 0.04 K/uL    Lymph # 2.7 1.0 - 4.8 K/uL    Mono # 0.5 0.3 - 1.0 K/uL    Eos # 0.1 0.0 - 0.5 K/uL    Baso # 0.08 0.00 - 0.20 K/uL    nRBC 0 0 /100 WBC    Gran % 51.0 38.0 - 73.0 %    Lymph % 39.3 18.0 - 48.0 %    Mono % 6.5 4.0 - 15.0 %    Eosinophil % 1.7 0.0 - 8.0 %    Basophil % 1.2 0.0 - 1.9 %    Differential Method Automated    POCT glucose    Collection Time: 11/08/22 11:10 AM   Result Value Ref Range    POCT Glucose 130 (H) 70 - 110 mg/dL   POCT glucose    Collection Time: 11/08/22  4:19 PM   Result Value Ref Range     POCT Glucose 146 (H) 70 - 110 mg/dL   POCT glucose    Collection Time: 11/08/22  7:13 PM   Result Value Ref Range    POCT Glucose 338 (H) 70 - 110 mg/dL   POCT glucose    Collection Time: 11/09/22  6:53 AM   Result Value Ref Range    POCT Glucose 105 70 - 110 mg/dL   Ferritin    Collection Time: 12/09/22  2:23 PM   Result Value Ref Range    Ferritin 41 20.0 - 300.0 ng/mL   Iron and TIBC    Collection Time: 12/09/22  2:23 PM   Result Value Ref Range    Iron 54 45 - 160 ug/dL    TIBC 281 250 - 450 ug/dL    Iron Saturation 19 (L) 20 - 50 %   Vitamin B12    Collection Time: 12/09/22  2:23 PM   Result Value Ref Range    Vitamin B-12 497 210 - 950 pg/mL   Hemoglobin A1C    Collection Time: 12/09/22  2:23 PM   Result Value Ref Range    Hemoglobin A1C 7.7 (H) 4.0 - 5.6 %    Estimated Avg Glucose 174 (H) 68 - 131 mg/dL     DO Penelope Dextersjeremiah [facility]

## 2022-12-14 ENCOUNTER — CLINICAL SUPPORT (OUTPATIENT)
Dept: CARDIOLOGY | Facility: HOSPITAL | Age: 57
End: 2022-12-14
Attending: INTERNAL MEDICINE
Payer: MEDICAID

## 2022-12-14 ENCOUNTER — HOSPITAL ENCOUNTER (OUTPATIENT)
Dept: RADIOLOGY | Facility: HOSPITAL | Age: 57
Discharge: HOME | End: 2022-12-14
Attending: INTERNAL MEDICINE
Payer: MEDICAID

## 2022-12-14 DIAGNOSIS — R94.31 NONSPECIFIC ABNORMAL ELECTROCARDIOGRAM (ECG) (EKG): ICD-10-CM

## 2022-12-14 PROCEDURE — 93017 CV STRESS TEST TRACING ONLY: CPT

## 2022-12-14 PROCEDURE — A9500 TC99M SESTAMIBI: HCPCS

## 2022-12-15 ENCOUNTER — CLINICAL SUPPORT (OUTPATIENT)
Dept: REHABILITATION | Facility: HOSPITAL | Age: 57
End: 2022-12-15
Payer: MEDICAID

## 2022-12-15 ENCOUNTER — CLINICAL SUPPORT (OUTPATIENT)
Dept: REHABILITATION | Facility: HOSPITAL | Age: 57
End: 2022-12-15
Payer: COMMERCIAL

## 2022-12-15 DIAGNOSIS — I63.9 CEREBROVASCULAR ACCIDENT (CVA), UNSPECIFIED MECHANISM: Primary | ICD-10-CM

## 2022-12-15 PROCEDURE — 97530 THERAPEUTIC ACTIVITIES: CPT | Mod: CO

## 2022-12-15 PROCEDURE — 97110 THERAPEUTIC EXERCISES: CPT

## 2022-12-15 NOTE — PROGRESS NOTES
Patient:  Andrew Del Cid Jr.  Clinic #:  [id number]   Date of Note: 12/15/2022   Referring Physician:  Cl Mittal III, MD  Diagnosis:    Encounter Diagnosis   Name Primary?    Cerebrovascular accident (CVA), unspecified mechanism Yes          Start Time: 9:00 am  End Time: 10:00 am  Total Time: 60 min    Subjective:   Pt reporting no pain or problems this day.     Pain: n/a out of 10     Objective:   Patient seen by WALTON this session. Treatment  consist of the following:  Therapeutic activities    Treatment: Therapeutic activities x 60 min     Omnicycle 10 res x 7.5 forward and 7.5 backwards, for increased endurance and bilateral coordination  Biceps curls 6# (L) 7# (R) 2 sets x 15 reps, increase strength  Cp with foam, increase FMC and strength in hand  Julio Cesar pegboard, increase fine motor coordination    Assessment:  Pt reporting no problems  or pain this day. Pt requiring < 25% v/c for proper form and positioning but tolerated new tasks well. Pt educated on importance of coordination in both hands and to attempt to increase efficiency with bilateral coordination to increase maximum potential for return to prior level of function. Pt reporting only fatigue upon exit of session this day. Continue OT POC at next visit as tolerated by pt    Pt will continue to benefit from skilled OT intervention.    Patient continues to demonstrate limitation  with  Decreased fine motor coordination, Decreased functional use, Decreased strength, and Continued pain    Problem List:   Decreased function of Left UE, Decreased strength, Inability to perform work/tasks, Inability to perform leisure activiites, and Inability to perform self care tasks     Goals to be met in 8 weeks:  1) Pt will increase ability to move/handle objects AEB decreased score on QuickDASH <=50 % (currently 56.8 %)   2) Increase  strength 2-3 lbs. to improve functional grasp for ADLs/work/leisure activities.  3) Pt will complete the 9 hole peg test in  LUE with a decrease of 2-3 sec to increase independence in ADLs.  4) Increase LUE strength to 4+/5 to improve functional use for ADLs/work/leisure activities.     Plan  Andrew to be treated by Occupational Therapy 2 times per week for 6 weeks to achieve the established goals.   Treatment to include Ultrasoud @ 3mHz, Ice, Moist heat, Strengthening Theraband Ex, UBE , and E- stim as well as any other treatments deemed necessary based on the patient's needs or progress.      Certification Dates: 11/29/2022 - 1/6/2023

## 2022-12-15 NOTE — PROGRESS NOTES
Physical Therapy Daily Note     Name: Andrew Del Cid Jr.  Clinic Number: [id number]  Diagnosis:   Encounter Diagnosis   Name Primary?    Cerebrovascular accident (CVA), unspecified mechanism Yes       Physician: Cl Mittal III, MD  Precautions: fall  Visit #: 4 of 12  PTA Visit #: 0  Time In: 1002  Time Out: 1055    Subjective     Pt reports: no complaints today prior to treatment. Patient reports he had a good workout last session.   Pain Scale: Andrew reports no pain symptoms.   Objective     Andrew received individual therapeutic exercises to develop strength, endurance, ROM, flexibility, posture, and core stabilization for 55 minutes including:     all exercises in BOLD performed today.     LE omnicycle x15 minutes neuro setting 6W 99% activity (L) 46 (R) 54.   Standing at edge of TM   3x10 toe taps to edge of TM,   3x10 hip abduction,   +step ups/down on 4 inch step   3x10 hip extension  1x10 mini-squats  FMAC to (L) quad x15 minutes total with strong quad contraction with 10 seconds on and 10 seconds off with patient performing quad set during on time.     Written Home Exercises Provided: see  for details    Education provided re: exercises, modalities  Andrew verbalized good understanding of education provided.   No spiritual or educational barriers to learning provided    Assessment     Patient tolerated treatment well. Rest breaks needed during standing activities. Patient tolerated increased resistance on omnicycle. Will continue to progress per treatment as appropriate.   This is a 57 y.o. male referred to outpatient physical therapy and presents with a medical diagnosis of CVA and demonstrates limitations as described in the problem list. Pt prognosis is Good. Pt will continue to benefit from skilled outpatient physical therapy to address the deficits listed in the problem list, provide pt/family education and to maximize pt's  level of independence in the home and community environment.     Goals as follows:  Short Term GOALS: 3 weeks. Pt agrees with goals set.  Patient demonstrates independence with HEP.  Patient reports no falls with functional mobility.  Patient demonstrates postural awareness with all activities.  Long Term GOALS: 6  weeks. Pt agrees with goals set.  Patient demonstrates improvement in LEFS score to 50 or greater.  Patient ambulates with minimal to no gait deviations with least restrictive AD.   Patient demonstrates TUG score 12s or less.   Patient demonstrates 5x sit to stand 20s or less.   Patient demonstrates (B) LE strength 4+/5.   Patient demonstrates standing tolerance 30 minutes or greater.         Plan     Continue with established Plan of Care towards PT goals.    Therapist: Katie Guevara, PT  12/15/2022

## 2022-12-16 ENCOUNTER — OFFICE VISIT (OUTPATIENT)
Dept: PRIMARY CARE CLINIC | Facility: CLINIC | Age: 57
End: 2022-12-16
Payer: MEDICAID

## 2022-12-16 VITALS
DIASTOLIC BLOOD PRESSURE: 79 MMHG | RESPIRATION RATE: 16 BRPM | HEART RATE: 87 BPM | WEIGHT: 315 LBS | OXYGEN SATURATION: 95 % | TEMPERATURE: 99 F | HEIGHT: 73 IN | BODY MASS INDEX: 41.75 KG/M2 | SYSTOLIC BLOOD PRESSURE: 132 MMHG

## 2022-12-16 DIAGNOSIS — I10 ESSENTIAL HYPERTENSION: Chronic | ICD-10-CM

## 2022-12-16 DIAGNOSIS — E11.621 DIABETIC ULCER OF RIGHT FOOT ASSOCIATED WITH TYPE 2 DIABETES MELLITUS, WITH OTHER ULCER SEVERITY, UNSPECIFIED PART OF FOOT: Primary | ICD-10-CM

## 2022-12-16 DIAGNOSIS — L97.518 DIABETIC ULCER OF RIGHT FOOT ASSOCIATED WITH TYPE 2 DIABETES MELLITUS, WITH OTHER ULCER SEVERITY, UNSPECIFIED PART OF FOOT: Primary | ICD-10-CM

## 2022-12-16 DIAGNOSIS — E11.42 TYPE 2 DIABETES MELLITUS WITH DIABETIC POLYNEUROPATHY, WITHOUT LONG-TERM CURRENT USE OF INSULIN: ICD-10-CM

## 2022-12-16 DIAGNOSIS — I48.0 PAROXYSMAL ATRIAL FIBRILLATION: ICD-10-CM

## 2022-12-16 DIAGNOSIS — E13.40 NEUROPATHY DUE TO SECONDARY DIABETES: ICD-10-CM

## 2022-12-16 DIAGNOSIS — I63.9 CEREBROVASCULAR ACCIDENT (CVA), UNSPECIFIED MECHANISM: ICD-10-CM

## 2022-12-16 DIAGNOSIS — E78.2 MIXED HYPERLIPIDEMIA: Chronic | ICD-10-CM

## 2022-12-16 PROCEDURE — 99999 PR PBB SHADOW E&M-EST. PATIENT-LVL III: CPT | Mod: PBBFAC,,, | Performed by: STUDENT IN AN ORGANIZED HEALTH CARE EDUCATION/TRAINING PROGRAM

## 2022-12-16 PROCEDURE — 3066F NEPHROPATHY DOC TX: CPT | Mod: CPTII,,, | Performed by: STUDENT IN AN ORGANIZED HEALTH CARE EDUCATION/TRAINING PROGRAM

## 2022-12-16 PROCEDURE — 3078F PR MOST RECENT DIASTOLIC BLOOD PRESSURE < 80 MM HG: ICD-10-PCS | Mod: CPTII,,, | Performed by: STUDENT IN AN ORGANIZED HEALTH CARE EDUCATION/TRAINING PROGRAM

## 2022-12-16 PROCEDURE — 3061F NEG MICROALBUMINURIA REV: CPT | Mod: CPTII,,, | Performed by: STUDENT IN AN ORGANIZED HEALTH CARE EDUCATION/TRAINING PROGRAM

## 2022-12-16 PROCEDURE — 3061F PR NEG MICROALBUMINURIA RESULT DOCUMENTED/REVIEW: ICD-10-PCS | Mod: CPTII,,, | Performed by: STUDENT IN AN ORGANIZED HEALTH CARE EDUCATION/TRAINING PROGRAM

## 2022-12-16 PROCEDURE — 3044F PR MOST RECENT HEMOGLOBIN A1C LEVEL <7.0%: ICD-10-PCS | Mod: CPTII,,, | Performed by: STUDENT IN AN ORGANIZED HEALTH CARE EDUCATION/TRAINING PROGRAM

## 2022-12-16 PROCEDURE — 99999 PR PBB SHADOW E&M-EST. PATIENT-LVL III: ICD-10-PCS | Mod: PBBFAC,,, | Performed by: STUDENT IN AN ORGANIZED HEALTH CARE EDUCATION/TRAINING PROGRAM

## 2022-12-16 PROCEDURE — 99213 OFFICE O/P EST LOW 20 MIN: CPT | Mod: PBBFAC,PN | Performed by: STUDENT IN AN ORGANIZED HEALTH CARE EDUCATION/TRAINING PROGRAM

## 2022-12-16 PROCEDURE — 3075F SYST BP GE 130 - 139MM HG: CPT | Mod: CPTII,,, | Performed by: STUDENT IN AN ORGANIZED HEALTH CARE EDUCATION/TRAINING PROGRAM

## 2022-12-16 PROCEDURE — 3008F BODY MASS INDEX DOCD: CPT | Mod: CPTII,,, | Performed by: STUDENT IN AN ORGANIZED HEALTH CARE EDUCATION/TRAINING PROGRAM

## 2022-12-16 PROCEDURE — 3078F DIAST BP <80 MM HG: CPT | Mod: CPTII,,, | Performed by: STUDENT IN AN ORGANIZED HEALTH CARE EDUCATION/TRAINING PROGRAM

## 2022-12-16 PROCEDURE — 99214 OFFICE O/P EST MOD 30 MIN: CPT | Mod: S$PBB,,, | Performed by: STUDENT IN AN ORGANIZED HEALTH CARE EDUCATION/TRAINING PROGRAM

## 2022-12-16 PROCEDURE — 99214 PR OFFICE/OUTPT VISIT, EST, LEVL IV, 30-39 MIN: ICD-10-PCS | Mod: S$PBB,,, | Performed by: STUDENT IN AN ORGANIZED HEALTH CARE EDUCATION/TRAINING PROGRAM

## 2022-12-16 PROCEDURE — 3008F PR BODY MASS INDEX (BMI) DOCUMENTED: ICD-10-PCS | Mod: CPTII,,, | Performed by: STUDENT IN AN ORGANIZED HEALTH CARE EDUCATION/TRAINING PROGRAM

## 2022-12-16 PROCEDURE — 3044F HG A1C LEVEL LT 7.0%: CPT | Mod: CPTII,,, | Performed by: STUDENT IN AN ORGANIZED HEALTH CARE EDUCATION/TRAINING PROGRAM

## 2022-12-16 PROCEDURE — 3075F PR MOST RECENT SYSTOLIC BLOOD PRESS GE 130-139MM HG: ICD-10-PCS | Mod: CPTII,,, | Performed by: STUDENT IN AN ORGANIZED HEALTH CARE EDUCATION/TRAINING PROGRAM

## 2022-12-16 PROCEDURE — 3066F PR DOCUMENTATION OF TREATMENT FOR NEPHROPATHY: ICD-10-PCS | Mod: CPTII,,, | Performed by: STUDENT IN AN ORGANIZED HEALTH CARE EDUCATION/TRAINING PROGRAM

## 2022-12-16 NOTE — PROGRESS NOTES
[facility] Note    HPI:  Andrew Del Cid Jr. is a 57 y.o. male who presents today for Follow-up (Follow up after lab work)  Overall doing well. Closely watching sugary food intake and regaining strength and balance.     ROS   A review of systems was performed and was negative except as noted above.    I personally reviewed allergies, past medical, surgical, social and family history and updated as appropriate.    Medications:    Current Outpatient Medications:     amLODIPine (NORVASC) 5 MG tablet, Take 1 tablet (5 mg total) by mouth once daily., Disp: 30 tablet, Rfl: 5    apixaban (ELIQUIS) 5 mg Tab, Take 1 tablet (5 mg total) by mouth 2 (two) times daily., Disp: 60 tablet, Rfl: 1    aspirin (ECOTRIN) 81 MG EC tablet, Take 81 mg by mouth once daily., Disp: , Rfl:     cholecalciferol, vitamin D3, (VITAMIN D3) 25 mcg (1,000 unit) capsule, 1 capsule, Disp: , Rfl:     empagliflozin (JARDIANCE) 25 mg tablet, Take 1 tablet (25 mg total) by mouth once daily., Disp: 30 tablet, Rfl: 5    LANTUS SOLOSTAR U-100 INSULIN glargine 100 units/mL SubQ pen, Inject 40 Units into the skin every evening., Disp: 12 mL, Rfl: 2    losartan (COZAAR) 50 MG tablet, Take 1 tablet (50 mg total) by mouth once daily., Disp: 30 tablet, Rfl: 5    metFORMIN (GLUCOPHAGE-XR) 500 MG ER 24hr tablet, Take 2 tablets (1,000 mg total) by mouth 2 (two) times daily with meals. Generic, Disp: 120 tablet, Rfl: 5    metoprolol succinate (TOPROL-XL) 50 MG 24 hr tablet, Take 1 tablet (50 mg total) by mouth once daily., Disp: 30 tablet, Rfl: 5    TRULICITY 4.5 mg/0.5 mL pen injector, Inject 4.5 mg into the skin every 7 days., Disp: 4 pen, Rfl: 5    atorvastatin (LIPITOR) 40 MG tablet, Take 1 tablet (40 mg total) by mouth once daily., Disp: 90 tablet, Rfl: 1    blood sugar diagnostic Strp, 1 strip by Misc.(Non-Drug; Combo Route) route 3 (three) times daily. One Touch Verio strips, Disp: 100 strip, Rfl: 5    blood-glucose meter kit, Use as  "instructed, Disp: 1 each, Rfl: 0    DULoxetine (CYMBALTA) 30 MG capsule, Take 1 capsule (30 mg total) by mouth once daily., Disp: 30 capsule, Rfl: 5    lancets (ONETOUCH DELICA LANCETS) 33 gauge Misc, 1 lancet by Misc.(Non-Drug; Combo Route) route 3 (three) times daily., Disp: 100 each, Rfl: 5     Health Maintenance:  Immunization History   Administered Date(s) Administered    COVID-19 MRNA, LN-S PF (MODERNA HALF 0.25 ML DOSE) 03/12/2021, 03/14/2022    COVID-19, MRNA, LN-S, PF (MODERNA FULL 0.5 ML DOSE) 03/12/2021, 04/09/2021    Influenza 11/15/2018    Influenza - Quadrivalent - PF *Preferred* (6 months and older) 09/12/2019, 10/19/2021, 10/04/2022    Influenza - Trivalent - PF (ADULT) 10/19/2021    Pneumococcal Conjugate - 13 Valent 10/19/2021    Tdap 04/27/2013, 04/27/2013      Health Maintenance   Topic Date Due    Hepatitis C Screening  Never done    Eye Exam  03/15/2023    TETANUS VACCINE  04/27/2023    Hemoglobin A1c  07/13/2023    Lipid Panel  10/27/2023    Foot Exam  11/01/2023    High Dose Statin  01/19/2024     Health Maintenance Topics with due status: Not Due       Topic Last Completion Date    TETANUS VACCINE 04/27/2013    Eye Exam 03/15/2022    Lipid Panel 10/27/2022    Foot Exam 11/01/2022    Diabetes Urine Screening 01/13/2023    Hemoglobin A1c 01/13/2023    High Dose Statin 01/19/2023     Health Maintenance Due   Topic Date Due    Hepatitis C Screening  Never done    HIV Screening  Never done    Colorectal Cancer Screening  Never done    Shingles Vaccine (1 of 2) Never done    COVID-19 Vaccine (4 - Booster for Moderna series) 05/09/2022    Pneumococcal Vaccines (Age 0-64) (2 - PPSV23 if available, else PCV20) 10/19/2022       PHYSICAL EXAM:  Vitals:    12/16/22 1555   BP: 132/79   BP Location: Left arm   Patient Position: Sitting   BP Method: X-Large (Automatic)   Pulse: 87   Resp: 16   Temp: 98.9 °F (37.2 °C)   TempSrc: Oral   SpO2: 95%   Weight: (!) 161.9 kg (357 lb)   Height: 6' 1" (1.854 m) "     Body mass index is 47.1 kg/m².  Physical Exam  Vitals reviewed.   Constitutional:       General: He is not in acute distress.     Appearance: Normal appearance. He is not ill-appearing or toxic-appearing.   HENT:      Head: Normocephalic.      Mouth/Throat:      Mouth: Mucous membranes are moist.      Pharynx: Oropharynx is clear. No posterior oropharyngeal erythema.   Eyes:      Extraocular Movements: Extraocular movements intact.      Conjunctiva/sclera: Conjunctivae normal.   Cardiovascular:      Rate and Rhythm: Normal rate.   Pulmonary:      Effort: Pulmonary effort is normal.   Abdominal:      General: There is no distension.      Palpations: Abdomen is soft.      Tenderness: There is no abdominal tenderness. There is no guarding.      Comments: Limited due to body habitus, protuberant   Musculoskeletal:      Right lower leg: No edema.      Left lower leg: No edema.   Skin:     General: Skin is warm and dry.      Capillary Refill: Capillary refill takes less than 2 seconds.   Neurological:      Mental Status: He is alert and oriented to person, place, and time. Mental status is at baseline.   Psychiatric:         Mood and Affect: Mood normal.         Behavior: Behavior normal.        ASSESSMENT/PLAN:  1. Diabetic ulcer of right foot associated with type 2 diabetes mellitus, with other ulcer severity, unspecified part of foot  -     DULoxetine (CYMBALTA) 30 MG capsule; Take 1 capsule (30 mg total) by mouth once daily.  Dispense: 30 capsule; Refill: 5    2. Type 2 diabetes mellitus with diabetic polyneuropathy, without long-term current use of insulin  Assessment & Plan:  Refill duloxetine.       3. Neuropathy due to secondary diabetes  -     DULoxetine (CYMBALTA) 30 MG capsule; Take 1 capsule (30 mg total) by mouth once daily.  Dispense: 30 capsule; Refill: 5    4. Essential hypertension  Assessment & Plan:  Normotensive.   - amlodipine 5 mg daily  - losartan 50 mg daily  - metoprolol succinate 50 mg  daily  - continue to follow low sodium diet <2g or 2 teaspoons  - DASH diet  - f/u 2 months      5. Paroxysmal atrial fibrillation  Assessment & Plan:  Eliquis 5 mg BID      6. Mixed hyperlipidemia  Assessment & Plan:  daily atorvastatin 40 mg      7. Cerebrovascular accident (CVA), unspecified mechanism  Assessment & Plan:  - risk reduction, glucose control, BP control, weight loss and maintenance   - eliquis BID, aspirin, statin daily      8. BMI 45.0-49.9, adult  Overview:  Recommendations:   Stay physically active. As tolerated alternate resistance training with stretching and cardio. Goal of 150 minutes per week of moderate intensity activity or 7,500 - 10,000 steps per day. Follow the Mediterranean Diet. Include whole fresh fruits, vegetables, olive oil, seeds, nuts, whole grains, cold water fish, salmon, mackerel and lean cuts of meat.  Do not drink sugary/diet carbonated beverages. Decrease portion sizes slightly which will result in an approximately 500-calorie deficit. Avoid fast or fried and processed food, especially canned foods. Avoid refined carbohydrates, white starchy foods, flour, white potato, bread, muffins, and cakes. Consider substituting one meal a day with a meal replacement such as Slim fast, lean cuisine, or weight watcher's. Follow a healthy diet that includes enough calcium, vitamin D and proteins for bone health.  Wt Readings from Last 8 Encounters:   12/16/22 (!) 161.9 kg (357 lb)   12/09/22 (!) 161 kg (355 lb)   11/15/22 (!) 166.9 kg (368 lb)   11/08/22 (!) 168.6 kg (371 lb 12.8 oz)   10/26/22 (!) 171.5 kg (378 lb 1.6 oz)   10/22/22 (!) 168.7 kg (372 lb)   10/24/22 (!) 168.7 kg (372 lb)   10/04/22 (!) 168.7 kg (372 lb)             Other than changes above, continue current medications and maintain follow up with specialists.      No follow-ups on file.   Recent Results (from the past 2016 hour(s))   Ferritin    Collection Time: 12/09/22  2:23 PM   Result Value Ref Range    Ferritin 41  20.0 - 300.0 ng/mL   Iron and TIBC    Collection Time: 12/09/22  2:23 PM   Result Value Ref Range    Iron 54 45 - 160 ug/dL    TIBC 281 250 - 450 ug/dL    Iron Saturation 19 (L) 20 - 50 %   Vitamin B12    Collection Time: 12/09/22  2:23 PM   Result Value Ref Range    Vitamin B-12 497 210 - 950 pg/mL   Hemoglobin A1C    Collection Time: 12/09/22  2:23 PM   Result Value Ref Range    Hemoglobin A1C 7.7 (H) 4.0 - 5.6 %    Estimated Avg Glucose 174 (H) 68 - 131 mg/dL   Nuclear Stress - Cardiology Interpreted    Collection Time: 12/15/22 12:11 PM   Result Value Ref Range    85% Max Predicted      Max Predicted      [facility] CV CPX PATIENT IS MALE 1.0     [facility] CV CPX PATIENT IS FEMALE 0.0     Peak  bpm    Peak Diatolic BP 74 mmHg    Peak Systolic  mmHg    Peak RPP 11,817     RPP 10,413     % Max HR Achieved 61     HR at rest 89 bpm    Diastolic blood pressure 74 mmHg    Systolic blood pressure 117 mmHg    Exercise duration (min) 2 minutes    Exercise duration (sec) 0 seconds    Estimated METs 2     Nuc Rest EF 52    Hemoglobin A1C    Collection Time: 01/13/23  9:29 AM   Result Value Ref Range    Hemoglobin A1C 6.5 (H) 4.0 - 5.6 %    Estimated Avg Glucose 140 (H) 68 - 131 mg/dL   Comprehensive Metabolic Panel    Collection Time: 01/13/23  9:29 AM   Result Value Ref Range    Sodium 138 136 - 145 mmol/L    Potassium 4.2 3.5 - 5.1 mmol/L    Chloride 102 95 - 110 mmol/L    CO2 30 (H) 23 - 29 mmol/L    Glucose 139 (H) 70 - 110 mg/dL    BUN 16 6 - 20 mg/dL    Creatinine 0.9 0.5 - 1.4 mg/dL    Calcium 9.0 8.7 - 10.5 mg/dL    Total Protein 7.9 6.0 - 8.4 g/dL    Albumin 3.4 (L) 3.5 - 5.2 g/dL    Total Bilirubin 1.1 (H) 0.1 - 1.0 mg/dL    Alkaline Phosphatase 93 55 - 135 U/L    AST 14 10 - 40 U/L    ALT 27 10 - 44 U/L    Anion Gap 6 (L) 8 - 16 mmol/L    eGFR >60.0 >60 mL/min/1.73 m^2   Microalbumin/Creatinine Ratio, Urine    Collection Time: 01/13/23  9:29 AM   Result Value Ref Range    Microalbumin, Urine 34.5 mg/L     Creatinine, Urine 210.0 23.0 - 375.0 mg/dL    Microalb/Creat Ratio 16.4 0.0 - 30.0 mg/mg         Olena Thornton DO  [facility]

## 2022-12-17 RX ORDER — DULOXETIN HYDROCHLORIDE 30 MG/1
30 CAPSULE, DELAYED RELEASE ORAL DAILY
Qty: 30 CAPSULE | Refills: 5 | Status: SHIPPED | OUTPATIENT
Start: 2022-12-17 | End: 2023-06-23

## 2022-12-19 ENCOUNTER — HOSPITAL ENCOUNTER (OUTPATIENT)
Dept: SLEEP MEDICINE | Facility: HOSPITAL | Age: 57
Discharge: HOME | End: 2022-12-19
Attending: INTERNAL MEDICINE
Payer: MEDICAID

## 2022-12-19 DIAGNOSIS — G47.30 SLEEP APNEA: Primary | ICD-10-CM

## 2022-12-19 DIAGNOSIS — G47.30 SLEEP APNEA: ICD-10-CM

## 2022-12-19 PROCEDURE — 95810 POLYSOM 6/> YRS 4/> PARAM: CPT

## 2022-12-21 ENCOUNTER — CLINICAL SUPPORT (OUTPATIENT)
Dept: REHABILITATION | Facility: HOSPITAL | Age: 57
End: 2022-12-21
Payer: MEDICAID

## 2022-12-21 DIAGNOSIS — I63.9 CEREBROVASCULAR ACCIDENT (CVA), UNSPECIFIED MECHANISM: Primary | ICD-10-CM

## 2022-12-21 PROCEDURE — 97530 THERAPEUTIC ACTIVITIES: CPT | Mod: CO

## 2022-12-21 PROCEDURE — 97110 THERAPEUTIC EXERCISES: CPT

## 2022-12-21 NOTE — PROGRESS NOTES
Patient:  Andrew Del Cid Jr.  Clinic #:  [id number]   Date of Note: 12/21/2022   Referring Physician:  Cl Mittal III, MD  Diagnosis:    Encounter Diagnosis   Name Primary?    Cerebrovascular accident (CVA), unspecified mechanism Yes     Start Time: 10:55 am  End Time: 11:55 am  Total Time: 60 min    Subjective:   Pt reporting no pain or problems this day.     Pain: n/a out of 10     Objective:   Patient seen by WALTON this session. Treatment  consist of the following:  Therapeutic activities    Treatment: Therapeutic activities x 60 min     Omnicycle 10 res x 7.5 forward and 7.5 backwards, for increased endurance and bilateral coordination  Dowel exercises with 2# wt 2 sets x 15 reps, increase strength  Handgripper with foam, increase FMC and strength in hand  Purdue pegboard, increase fine motor coordination    Assessment:  Pt reported no problems this day. Pt reporting intermittent pain in R sh during dowel ex tasks this day that subsided with rest breaks. Pt requiring education for continuing to implement both upper extremities into functional tasks to increase bilateral coordination. Pt reporting only fatigue upon exit of session this day. Continue OT POC at next visit as tolerated by pt    Pt will continue to benefit from skilled OT intervention.    Patient continues to demonstrate limitation  with  Decreased fine motor coordination, Decreased functional use, Decreased strength, and Continued pain    Problem List:   Decreased function of Left UE, Decreased strength, Inability to perform work/tasks, Inability to perform leisure activiites, and Inability to perform self care tasks     Goals to be met in 8 weeks:  1) Pt will increase ability to move/handle objects AEB decreased score on QuickDASH <=50 % (currently 56.8 %)   2) Increase  strength 2-3 lbs. to improve functional grasp for ADLs/work/leisure activities.  3) Pt will complete the 9 hole peg test in E with a decrease of 2-3 sec to increase  independence in ADLs.  4) Increase LUE strength to 4+/5 to improve functional use for ADLs/work/leisure activities.     Plan  Andrew to be treated by Occupational Therapy 2 times per week for 6 weeks to achieve the established goals.   Treatment to include Ultrasoud @ 3mHz, Ice, Moist heat, Strengthening Theraband Ex, UBE , and E- stim as well as any other treatments deemed necessary based on the patient's needs or progress.      Certification Dates: 11/29/2022 - 1/6/2023

## 2022-12-21 NOTE — PROGRESS NOTES
Physical Therapy Daily Note     Name: Andrew Del Cid Jr.  Clinic Number: [id number]  Diagnosis:   No diagnosis found.      Physician: Cl Mittal III, MD  Precautions: fall  Visit #: 5 of 12  PTA Visit #: 0  Time In: 1000  Time Out: 1055    Subjective     Pt reports: no complaints today prior to treatment.   Pain Scale: Andrew reports no pain symptoms.   Objective     Andrew received individual therapeutic exercises to develop strength, endurance, ROM, flexibility, posture, and core stabilization for 40 minutes including:     all exercises in BOLD performed today.     LE omnicycle x15 minutes neuro setting 6W 99% activity (L) 56 (R) 44.   Standing at edge of TM   3x10 toe taps to edge of TM,   3x10 hip abduction,   step ups/down on 4 inch step   3x10 hip extension  1x10 mini-squats  FMAC to (L) quad x15 minutes total with strong quad contraction with 10 seconds on and 10 seconds off with patient performing quad set during on time.     Written Home Exercises Provided: see  for details    Education provided re: exercises, modalities  Andrew verbalized good understanding of education provided.   No spiritual or educational barriers to learning provided    Assessment     Patient tolerated treatment well. Decreased Rest breaks needed during standing activities. Will continue to progress per POC as appropriate. Re-assessment next visit.   This is a 57 y.o. male referred to outpatient physical therapy and presents with a medical diagnosis of CVA and demonstrates limitations as described in the problem list. Pt prognosis is Good. Pt will continue to benefit from skilled outpatient physical therapy to address the deficits listed in the problem list, provide pt/family education and to maximize pt's level of independence in the home and community environment.     Goals as follows:  Short Term GOALS: 3 weeks. Pt agrees with goals set.  Patient  demonstrates independence with HEP.  Patient reports no falls with functional mobility.  Patient demonstrates postural awareness with all activities.  Long Term GOALS: 6  weeks. Pt agrees with goals set.  Patient demonstrates improvement in LEFS score to 50 or greater.  Patient ambulates with minimal to no gait deviations with least restrictive AD.   Patient demonstrates TUG score 12s or less.   Patient demonstrates 5x sit to stand 20s or less.   Patient demonstrates (B) LE strength 4+/5.   Patient demonstrates standing tolerance 30 minutes or greater.         Plan     Continue with established Plan of Care towards PT goals. Re-assessment next visit    Therapist: Katie Guevara, PT  12/21/2022

## 2022-12-22 LAB
CV STRESS BASE HR: 89 BPM
DIASTOLIC BLOOD PRESSURE: 74 MMHG
NUC REST EJECTION FRACTION: 52
OHS CV CPX 85 PERCENT MAX PREDICTED HEART RATE MALE: 140
OHS CV CPX ESTIMATED METS: 2
OHS CV CPX MAX PREDICTED HEART RATE: 165
OHS CV CPX PATIENT IS FEMALE: 0
OHS CV CPX PATIENT IS MALE: 1
OHS CV CPX PEAK DIASTOLIC BLOOD PRESSURE: 74 MMHG
OHS CV CPX PEAK HEAR RATE: 101 BPM
OHS CV CPX PEAK RATE PRESSURE PRODUCT: NORMAL
OHS CV CPX PEAK SYSTOLIC BLOOD PRESSURE: 117 MMHG
OHS CV CPX PERCENT MAX PREDICTED HEART RATE ACHIEVED: 61
OHS CV CPX RATE PRESSURE PRODUCT PRESENTING: NORMAL
STRESS ECHO POST EXERCISE DUR MIN: 2 MINUTES
STRESS ECHO POST EXERCISE DUR SEC: 0 SECONDS
SYSTOLIC BLOOD PRESSURE: 117 MMHG

## 2022-12-28 ENCOUNTER — CLINICAL SUPPORT (OUTPATIENT)
Dept: REHABILITATION | Facility: HOSPITAL | Age: 57
End: 2022-12-28
Payer: MEDICAID

## 2022-12-28 DIAGNOSIS — I63.9 CEREBROVASCULAR ACCIDENT (CVA), UNSPECIFIED MECHANISM: Primary | ICD-10-CM

## 2022-12-28 PROCEDURE — 97110 THERAPEUTIC EXERCISES: CPT

## 2022-12-28 PROCEDURE — 97530 THERAPEUTIC ACTIVITIES: CPT

## 2022-12-28 NOTE — PROGRESS NOTES
Patient:  Andrew Del Cid Jr.  Clinic #:  [id number]   Date of Note: 12/28/2022   Referring Physician:  Cl Mittal III, MD  Diagnosis:    Encounter Diagnosis   Name Primary?    Cerebrovascular accident (CVA), unspecified mechanism Yes     Progress/Discharge Note    Start Time: 10:53 am  End Time: 11:46 am  Total Time: 53 min    Subjective:   Pt reporting no pain or problems this day.     Pain: n/a out of 10     Objective:   Patient seen by OT this session. Treatment  consist of the following:  Therapeutic activities    Treatment: Therapeutic activities x 53 min     Omnicycle 10 res x 7.5 forward and 7.5 backwards, for increased endurance and bilateral coordination  Screw board for increased FMC  grooved pegboard, increase fine motor coordination  Re-assessment    Strength  Shoulder Flexion RUE: 5/5   Shoulder Extension RUE: 5/5   Shoulder Abduction RUE:  5/5   Shoulder Adduction RUE:  5/5   Internal Rotation RUE: 5/5   External Rotation RUE: 5/5   Horizontal Adduction RUE: 5/5   Shoulder Flexion LUE: 5/5   Shoulder Extension LUE: 5/5   Shoulder Abduction LUE: 5/5   Shoulder Abbduction LUE: 5/5   Internal Rotation LUE:  5/5   External Rotation LUE:  5/5   Horizontal Adduction LUE:  5/5       Strength:  Laly:  R:74.9 lbs  79.4 lbs  L: 68.4 lbs  72.6 lbs     9 Hole Peg Test:  R: 20 sec  20 sec  L: 37 sec  31 sec     Quick Dash: 56.8% 20.5%    Assessment:  Pt reported no problems this day. Pt reporting that shoulder feels better andf would like to be discharged from OT. Pt has progressed to WFL in all objective measurements and has progressed in all goals as stated below. Pt will be discharged from skilled OT.     Goals to be met in 8 weeks:  1) Pt will increase ability to move/handle objects AEB decreased score on QuickDASH <=50 % (currently 20.5%)  MET   2) Increase  strength 2-3 lbs. to improve functional grasp for ADLs/work/leisure activities. MET   3) Pt will complete the 9 hole peg test in LUE with a  decrease of 2-3 sec to increase independence in ADLs. MET   4) Increase LUE strength to 4+/5 to improve functional use for ADLs/work/leisure activities. MET      Plan  Andrew to be discharged from Occupational Therapy as he has achieved all of the established goals.

## 2022-12-28 NOTE — PROGRESS NOTES
Physical Therapy Daily Note/Discharge Summary     Name: Andrew Del Cid Jr.  Clinic Number: [id number]  Diagnosis:   Encounter Diagnosis   Name Primary?    Cerebrovascular accident (CVA), unspecified mechanism Yes         Physician: Cl Mittal III, MD  Precautions: fall  Visit #: 6 of 12  PTA Visit #: 0  Time In: 0900  Time Out: 0953    Subjective     Pt reports: Patient reports no new complaints. Patient has been ambulating without RW. Patient feels like he is making steady improvements.   Pain Scale: Andrew reports (L) knee pain 4/10 and (R) knee pain 3/10.   Objective     Andrew received individual therapeutic exercises to develop strength, endurance, ROM, flexibility, posture, and core stabilization for 38 minutes including:     all exercises in BOLD performed today.     LE omnicycle x15 minutes neuro setting 8W 99% activity (L) 53 (R) 47.     Standing at edge of TM   3x10 toe taps to edge of TM,   3x10 hip abduction,   step ups/down on 4 inch step   3x10 hip extension  1x10 mini-squats  FMAC to (L) quad x15 minutes total with strong quad contraction with 10 seconds on and 10 seconds off with patient performing quad set during on time.     Written Home Exercises Provided: see  for details    Education provided re: exercises, modalities  Andrew verbalized good understanding of education provided.   No spiritual or educational barriers to learning provided    LOWER EXTREMITY FUNCTIONAL SCALE  52/80         1. Any of your usual work, housework or school activities   3/4  2. Your usual hobbies, sporting     3/4  3. Getting in and out of tub      3/4  4. Walking between rooms      3/4  5. Putting on shoes or socks      4/4  6. Squatting        3/4  7. Lifting an object from the ground      3/4  8. Performing light activities around the home   4/4  9. Performing heavy activities around the home   3/4  10. Getting in and out of  car        11. Walking 2 blocks       3/4  12.Walking a mile         13. Getting up and down 1 flight of stairs    3/4  14. Standing for 1 hour      3/4  15. Sitting for an hour         16. Running on even ground      0  17. Running on uneven ground     0  18. Making sharp turns when running fast      19. Hopping          20. Rolling over in bed           TU.52  5x sit to stand: 22.51s  (L) LE strength grossly     At initial evalution:   TUs  5x sit to stand: 51.38  LEFS:   Assessment     Patient has made significant improvements in functional mobility since beginning therapy intervention. Patient ambulating majority of time without AD. Patient reports he still feels (L) LE weakness but much improved knee stability. Improvement in TUG, LEFS, and 5x sit to stand scores. Patient agrees with discharge from PT at this time.     This is a 57 y.o. male referred to outpatient physical therapy and presents with a medical diagnosis of CVA and demonstrates limitations as described in the problem list. Pt prognosis is Good. Pt will continue to benefit from skilled outpatient physical therapy to address the deficits listed in the problem list, provide pt/family education and to maximize pt's level of independence in the home and community environment.     Goals as follows:  Short Term GOALS: 3 weeks. Pt agrees with goals set.  Patient demonstrates independence with HEP. Met CB 2022  Patient reports no falls with functional mobility.Met CB 2022  Patient demonstrates postural awareness with all activities.Met CB 2022  Long Term GOALS: 6  weeks. Pt agrees with goals set.  Patient demonstrates improvement in LEFS score to 50 or greater. Progress CB 2022  Patient ambulates with minimal to no gait deviations with least restrictive AD. Progress CB 2022  Patient demonstrates TUG score 12s or less. Progress CB 2022  Patient demonstrates 5x sit to stand 20s or  less. Progress CB 12/28/2022  Patient demonstrates (B) LE strength 4+/5. Progress CB 12/28/2022  Patient demonstrates standing tolerance 30 minutes or greater. Progress CB 12/28/2022        Plan     Patient discharge from PT at this time. Patient to follow up with physician as appropriate.     Therapist: Katie Guevara, PT  12/28/2022

## 2023-01-04 ENCOUNTER — HOSPITAL ENCOUNTER (OUTPATIENT)
Dept: SLEEP MEDICINE | Facility: HOSPITAL | Age: 58
Discharge: HOME | End: 2023-01-04
Attending: INTERNAL MEDICINE
Payer: MEDICAID

## 2023-01-04 DIAGNOSIS — G47.30 SLEEP APNEA: Primary | ICD-10-CM

## 2023-01-04 DIAGNOSIS — G47.30 SLEEP APNEA: ICD-10-CM

## 2023-01-04 PROCEDURE — 95811 POLYSOM 6/>YRS CPAP 4/> PARM: CPT

## 2023-01-09 RX ORDER — APIXABAN 5 MG/1
TABLET, FILM COATED ORAL
Qty: 60 TABLET | Refills: 0 | OUTPATIENT
Start: 2023-01-09

## 2023-01-09 RX ORDER — FERROUS SULFATE 325(65) MG
TABLET, DELAYED RELEASE (ENTERIC COATED) ORAL
Qty: 30 TABLET | Refills: 0 | OUTPATIENT
Start: 2023-01-09

## 2023-01-13 ENCOUNTER — LAB VISIT (OUTPATIENT)
Dept: LAB | Facility: HOSPITAL | Age: 58
End: 2023-01-13
Attending: NURSE PRACTITIONER
Payer: MEDICAID

## 2023-01-13 DIAGNOSIS — E11.42 TYPE 2 DIABETES MELLITUS WITH DIABETIC POLYNEUROPATHY, WITH LONG-TERM CURRENT USE OF INSULIN: ICD-10-CM

## 2023-01-13 DIAGNOSIS — E11.9 TYPE 2 DIABETES MELLITUS WITHOUT COMPLICATION, WITH LONG-TERM CURRENT USE OF INSULIN: ICD-10-CM

## 2023-01-13 DIAGNOSIS — E11.628 DIABETIC INFECTION OF RIGHT FOOT: ICD-10-CM

## 2023-01-13 DIAGNOSIS — Z79.4 TYPE 2 DIABETES MELLITUS WITHOUT COMPLICATION, WITH LONG-TERM CURRENT USE OF INSULIN: ICD-10-CM

## 2023-01-13 DIAGNOSIS — Z79.4 TYPE 2 DIABETES MELLITUS WITH DIABETIC POLYNEUROPATHY, WITH LONG-TERM CURRENT USE OF INSULIN: ICD-10-CM

## 2023-01-13 DIAGNOSIS — L08.9 DIABETIC INFECTION OF RIGHT FOOT: ICD-10-CM

## 2023-01-13 LAB
ALBUMIN SERPL BCP-MCNC: 3.4 G/DL (ref 3.5–5.2)
ALBUMIN/CREAT UR: 16.4 MG/MG (ref 0–30)
ALP SERPL-CCNC: 93 U/L (ref 55–135)
ALT SERPL W/O P-5'-P-CCNC: 27 U/L (ref 10–44)
ANION GAP SERPL CALC-SCNC: 6 MMOL/L (ref 8–16)
AST SERPL-CCNC: 14 U/L (ref 10–40)
BILIRUB SERPL-MCNC: 1.1 MG/DL (ref 0.1–1)
BUN SERPL-MCNC: 16 MG/DL (ref 6–20)
CALCIUM SERPL-MCNC: 9 MG/DL (ref 8.7–10.5)
CHLORIDE SERPL-SCNC: 102 MMOL/L (ref 95–110)
CO2 SERPL-SCNC: 30 MMOL/L (ref 23–29)
CREAT SERPL-MCNC: 0.9 MG/DL (ref 0.5–1.4)
CREAT UR-MCNC: 210 MG/DL (ref 23–375)
EST. GFR  (NO RACE VARIABLE): >60 ML/MIN/1.73 M^2
ESTIMATED AVG GLUCOSE: 140 MG/DL (ref 68–131)
GLUCOSE SERPL-MCNC: 139 MG/DL (ref 70–110)
HBA1C MFR BLD: 6.5 % (ref 4–5.6)
MICROALBUMIN UR DL<=1MG/L-MCNC: 34.5 MG/L
POTASSIUM SERPL-SCNC: 4.2 MMOL/L (ref 3.5–5.1)
PROT SERPL-MCNC: 7.9 G/DL (ref 6–8.4)
SODIUM SERPL-SCNC: 138 MMOL/L (ref 136–145)

## 2023-01-13 PROCEDURE — 80053 COMPREHEN METABOLIC PANEL: CPT | Performed by: NURSE PRACTITIONER

## 2023-01-13 PROCEDURE — 36415 COLL VENOUS BLD VENIPUNCTURE: CPT | Performed by: NURSE PRACTITIONER

## 2023-01-13 PROCEDURE — 82570 ASSAY OF URINE CREATININE: CPT | Performed by: NURSE PRACTITIONER

## 2023-01-13 PROCEDURE — 83036 HEMOGLOBIN GLYCOSYLATED A1C: CPT | Performed by: NURSE PRACTITIONER

## 2023-01-17 ENCOUNTER — PATIENT OUTREACH (OUTPATIENT)
Dept: ADMINISTRATIVE | Facility: HOSPITAL | Age: 58
End: 2023-01-17
Payer: MEDICAID

## 2023-01-17 LAB
LEFT EYE DM RETINOPATHY: NEGATIVE
RIGHT EYE DM RETINOPATHY: NEGATIVE

## 2023-01-18 RX ORDER — ATORVASTATIN CALCIUM 40 MG/1
40 TABLET, FILM COATED ORAL DAILY
Qty: 90 TABLET | Refills: 1 | Status: SHIPPED | OUTPATIENT
Start: 2023-01-18 | End: 2023-08-16

## 2023-01-30 PROBLEM — G45.9 TIA (TRANSIENT ISCHEMIC ATTACK): Status: RESOLVED | Noted: 2022-10-26 | Resolved: 2023-01-30

## 2023-02-05 PROBLEM — E13.40 NEUROPATHY DUE TO SECONDARY DIABETES: Status: ACTIVE | Noted: 2022-10-04

## 2023-02-05 NOTE — ASSESSMENT & PLAN NOTE
- risk reduction, glucose control, BP control, weight loss and maintenance   - eliquis BID, aspirin, statin daily

## 2023-02-05 NOTE — ASSESSMENT & PLAN NOTE
Normotensive.   - amlodipine 5 mg daily  - losartan 50 mg daily  - metoprolol succinate 50 mg daily  - continue to follow low sodium diet <2g or 2 teaspoons  - DASH diet  - f/u 2 months

## 2023-02-06 PROBLEM — I63.9 CVA (CEREBRAL VASCULAR ACCIDENT): Status: RESOLVED | Noted: 2022-11-01 | Resolved: 2023-02-06

## 2023-04-03 ENCOUNTER — PATIENT MESSAGE (OUTPATIENT)
Dept: ADMINISTRATIVE | Facility: HOSPITAL | Age: 58
End: 2023-04-03
Payer: MEDICAID

## 2023-05-10 NOTE — ASSESSMENT & PLAN NOTE
BP stable, will adjust PRN.   Impression: Examination revealed dry eye syndrome secondary to tear deficiencies. BLL very small punctum. Unable to place RLL extended plug due to small punctum. s/p Extended duration BUL, LLL (2/21/2023) with good improvement. Plan: Continue ATs TID-QID OU.

## 2023-05-25 DIAGNOSIS — I10 PRIMARY HYPERTENSION: ICD-10-CM

## 2023-05-27 RX ORDER — LOSARTAN POTASSIUM 50 MG/1
TABLET ORAL
Qty: 30 TABLET | Refills: 0 | Status: SHIPPED | OUTPATIENT
Start: 2023-05-27 | End: 2023-06-23

## 2023-05-27 RX ORDER — AMLODIPINE BESYLATE 5 MG/1
TABLET ORAL
Qty: 30 TABLET | Refills: 0 | Status: SHIPPED | OUTPATIENT
Start: 2023-05-27 | End: 2023-06-23

## 2023-06-23 DIAGNOSIS — L97.518 DIABETIC ULCER OF RIGHT FOOT ASSOCIATED WITH TYPE 2 DIABETES MELLITUS, WITH OTHER ULCER SEVERITY, UNSPECIFIED PART OF FOOT: ICD-10-CM

## 2023-06-23 DIAGNOSIS — I10 PRIMARY HYPERTENSION: ICD-10-CM

## 2023-06-23 DIAGNOSIS — Z79.4 TYPE 2 DIABETES MELLITUS WITH DIABETIC POLYNEUROPATHY, WITH LONG-TERM CURRENT USE OF INSULIN: ICD-10-CM

## 2023-06-23 DIAGNOSIS — E13.40 NEUROPATHY DUE TO SECONDARY DIABETES: ICD-10-CM

## 2023-06-23 DIAGNOSIS — E11.621 DIABETIC ULCER OF RIGHT FOOT ASSOCIATED WITH TYPE 2 DIABETES MELLITUS, WITH OTHER ULCER SEVERITY, UNSPECIFIED PART OF FOOT: ICD-10-CM

## 2023-06-23 DIAGNOSIS — E11.42 TYPE 2 DIABETES MELLITUS WITH DIABETIC POLYNEUROPATHY, WITH LONG-TERM CURRENT USE OF INSULIN: ICD-10-CM

## 2023-06-23 RX ORDER — AMLODIPINE BESYLATE 5 MG/1
TABLET ORAL
Qty: 30 TABLET | Refills: 0 | Status: SHIPPED | OUTPATIENT
Start: 2023-06-23 | End: 2023-09-26

## 2023-06-23 RX ORDER — LOSARTAN POTASSIUM 50 MG/1
TABLET ORAL
Qty: 30 TABLET | Refills: 0 | Status: SHIPPED | OUTPATIENT
Start: 2023-06-23 | End: 2023-09-06

## 2023-06-23 RX ORDER — DULOXETIN HYDROCHLORIDE 30 MG/1
CAPSULE, DELAYED RELEASE ORAL
Qty: 30 CAPSULE | Refills: 0 | Status: SHIPPED | OUTPATIENT
Start: 2023-06-23 | End: 2023-08-16

## 2023-06-23 RX ORDER — EMPAGLIFLOZIN 25 MG/1
TABLET, FILM COATED ORAL
Qty: 30 TABLET | Refills: 0 | Status: SHIPPED | OUTPATIENT
Start: 2023-06-23 | End: 2023-09-06 | Stop reason: SDUPTHER

## 2023-06-23 RX ORDER — METOPROLOL SUCCINATE 50 MG/1
TABLET, EXTENDED RELEASE ORAL
Qty: 30 TABLET | Refills: 0 | Status: SHIPPED | OUTPATIENT
Start: 2023-06-23 | End: 2023-07-17

## 2023-06-27 NOTE — PLAN OF CARE
SW has completed referral to [facility] for h/h. H/H orders along w/supporting documents have been faxed to the intake dept (ph#[phone]/F#[phone]). The ID reference# is [id number].    Yamile Mcfarlane LMSW  [id number]   MALATHI Arteaga (stroke ACP) made aware./No skilled OT needs

## 2023-07-10 ENCOUNTER — PATIENT MESSAGE (OUTPATIENT)
Dept: ADMINISTRATIVE | Facility: HOSPITAL | Age: 58
End: 2023-07-10
Payer: MEDICAID

## 2023-07-15 DIAGNOSIS — I10 PRIMARY HYPERTENSION: ICD-10-CM

## 2023-07-17 RX ORDER — METOPROLOL SUCCINATE 50 MG/1
TABLET, EXTENDED RELEASE ORAL
Qty: 30 TABLET | Refills: 0 | Status: SHIPPED | OUTPATIENT
Start: 2023-07-17 | End: 2024-02-22

## 2023-07-30 RX ORDER — DULAGLUTIDE 4.5 MG/.5ML
4.5 INJECTION, SOLUTION SUBCUTANEOUS
Qty: 12 PEN | Refills: 1 | Status: SHIPPED | OUTPATIENT
Start: 2023-07-30 | End: 2024-01-18 | Stop reason: SDUPTHER

## 2023-08-13 DIAGNOSIS — E11.621 DIABETIC ULCER OF RIGHT FOOT ASSOCIATED WITH TYPE 2 DIABETES MELLITUS, WITH OTHER ULCER SEVERITY, UNSPECIFIED PART OF FOOT: ICD-10-CM

## 2023-08-13 DIAGNOSIS — E78.2 MIXED HYPERLIPIDEMIA: Primary | Chronic | ICD-10-CM

## 2023-08-13 DIAGNOSIS — L97.518 DIABETIC ULCER OF RIGHT FOOT ASSOCIATED WITH TYPE 2 DIABETES MELLITUS, WITH OTHER ULCER SEVERITY, UNSPECIFIED PART OF FOOT: ICD-10-CM

## 2023-08-13 DIAGNOSIS — E13.40 NEUROPATHY DUE TO SECONDARY DIABETES: ICD-10-CM

## 2023-08-16 RX ORDER — ATORVASTATIN CALCIUM 40 MG/1
40 TABLET, FILM COATED ORAL DAILY
Qty: 90 TABLET | Refills: 3 | Status: SHIPPED | OUTPATIENT
Start: 2023-08-16

## 2023-08-16 RX ORDER — DULOXETIN HYDROCHLORIDE 30 MG/1
CAPSULE, DELAYED RELEASE ORAL
Qty: 30 CAPSULE | Refills: 11 | Status: SHIPPED | OUTPATIENT
Start: 2023-08-16

## 2023-09-06 DIAGNOSIS — Z79.4 TYPE 2 DIABETES MELLITUS WITH DIABETIC POLYNEUROPATHY, WITH LONG-TERM CURRENT USE OF INSULIN: Primary | ICD-10-CM

## 2023-09-06 DIAGNOSIS — E11.42 TYPE 2 DIABETES MELLITUS WITH DIABETIC POLYNEUROPATHY, WITH LONG-TERM CURRENT USE OF INSULIN: Primary | ICD-10-CM

## 2023-09-25 DIAGNOSIS — I10 PRIMARY HYPERTENSION: Primary | ICD-10-CM

## 2023-09-26 RX ORDER — AMLODIPINE BESYLATE 5 MG/1
5 TABLET ORAL DAILY
Qty: 90 TABLET | Refills: 0 | Status: ON HOLD | OUTPATIENT
Start: 2023-09-26 | End: 2023-12-15 | Stop reason: CLARIF

## 2023-09-30 DIAGNOSIS — I10 PRIMARY HYPERTENSION: Primary | ICD-10-CM

## 2023-10-03 RX ORDER — LOSARTAN POTASSIUM 50 MG/1
50 TABLET ORAL DAILY
Qty: 90 TABLET | Refills: 3 | Status: SHIPPED | OUTPATIENT
Start: 2023-10-03 | End: 2024-02-22

## 2023-10-11 ENCOUNTER — PATIENT MESSAGE (OUTPATIENT)
Dept: ADMINISTRATIVE | Facility: HOSPITAL | Age: 58
End: 2023-10-11

## 2023-11-02 DIAGNOSIS — E11.9 TYPE 2 DIABETES MELLITUS WITHOUT COMPLICATION: ICD-10-CM

## 2023-11-17 ENCOUNTER — HOSPITAL ENCOUNTER (OUTPATIENT)
Dept: RADIOLOGY | Facility: HOSPITAL | Age: 58
Discharge: HOME | End: 2023-11-17
Attending: STUDENT IN AN ORGANIZED HEALTH CARE EDUCATION/TRAINING PROGRAM
Payer: MEDICAID

## 2023-11-17 ENCOUNTER — HOSPITAL ENCOUNTER (OUTPATIENT)
Dept: PULMONOLOGY | Facility: HOSPITAL | Age: 58
Discharge: HOME | End: 2023-11-17
Attending: STUDENT IN AN ORGANIZED HEALTH CARE EDUCATION/TRAINING PROGRAM
Payer: MEDICAID

## 2023-11-17 ENCOUNTER — OFFICE VISIT (OUTPATIENT)
Dept: PRIMARY CARE CLINIC | Facility: CLINIC | Age: 58
End: 2023-11-17
Payer: MEDICAID

## 2023-11-17 VITALS
BODY MASS INDEX: 41.75 KG/M2 | TEMPERATURE: 98 F | DIASTOLIC BLOOD PRESSURE: 67 MMHG | HEART RATE: 86 BPM | WEIGHT: 315 LBS | SYSTOLIC BLOOD PRESSURE: 105 MMHG | RESPIRATION RATE: 16 BRPM | OXYGEN SATURATION: 98 % | HEIGHT: 73 IN

## 2023-11-17 DIAGNOSIS — D50.9 IRON DEFICIENCY ANEMIA, UNSPECIFIED IRON DEFICIENCY ANEMIA TYPE: ICD-10-CM

## 2023-11-17 DIAGNOSIS — E66.01 CLASS 3 SEVERE OBESITY WITH SERIOUS COMORBIDITY AND BODY MASS INDEX (BMI) OF 40.0 TO 44.9 IN ADULT, UNSPECIFIED OBESITY TYPE: ICD-10-CM

## 2023-11-17 DIAGNOSIS — Z23 INFLUENZA VACCINE ADMINISTERED: ICD-10-CM

## 2023-11-17 DIAGNOSIS — E11.9 TYPE 2 DIABETES MELLITUS WITHOUT COMPLICATION, WITH LONG-TERM CURRENT USE OF INSULIN: ICD-10-CM

## 2023-11-17 DIAGNOSIS — I10 ESSENTIAL HYPERTENSION: Chronic | ICD-10-CM

## 2023-11-17 DIAGNOSIS — Z01.818 PRE-OP EXAMINATION: ICD-10-CM

## 2023-11-17 DIAGNOSIS — Z79.4 TYPE 2 DIABETES MELLITUS WITHOUT COMPLICATION, WITH LONG-TERM CURRENT USE OF INSULIN: ICD-10-CM

## 2023-11-17 DIAGNOSIS — I48.0 PAROXYSMAL ATRIAL FIBRILLATION: ICD-10-CM

## 2023-11-17 DIAGNOSIS — I44.0 FIRST DEGREE HEART BLOCK BY ELECTROCARDIOGRAM: ICD-10-CM

## 2023-11-17 DIAGNOSIS — G47.61 PERIODIC LIMB MOVEMENT SLEEP DISORDER: ICD-10-CM

## 2023-11-17 DIAGNOSIS — Z01.818 PRE-OP EVALUATION: ICD-10-CM

## 2023-11-17 DIAGNOSIS — E11.42 TYPE 2 DIABETES MELLITUS WITH DIABETIC POLYNEUROPATHY, WITHOUT LONG-TERM CURRENT USE OF INSULIN: ICD-10-CM

## 2023-11-17 DIAGNOSIS — Z01.818 PRE-OP EVALUATION: Primary | ICD-10-CM

## 2023-11-17 DIAGNOSIS — E78.2 MIXED HYPERLIPIDEMIA: Chronic | ICD-10-CM

## 2023-11-17 DIAGNOSIS — T14.8XXA CHRONIC WOUND: ICD-10-CM

## 2023-11-17 DIAGNOSIS — K59.00 CONSTIPATION, UNSPECIFIED CONSTIPATION TYPE: ICD-10-CM

## 2023-11-17 DIAGNOSIS — E13.40 NEUROPATHY DUE TO SECONDARY DIABETES: ICD-10-CM

## 2023-11-17 PROCEDURE — 3066F NEPHROPATHY DOC TX: CPT | Mod: CPTII,,, | Performed by: STUDENT IN AN ORGANIZED HEALTH CARE EDUCATION/TRAINING PROGRAM

## 2023-11-17 PROCEDURE — 99999 PR PBB SHADOW E&M-EST. PATIENT-LVL IV: CPT | Mod: PBBFAC,,, | Performed by: STUDENT IN AN ORGANIZED HEALTH CARE EDUCATION/TRAINING PROGRAM

## 2023-11-17 PROCEDURE — 3008F BODY MASS INDEX DOCD: CPT | Mod: CPTII,,, | Performed by: STUDENT IN AN ORGANIZED HEALTH CARE EDUCATION/TRAINING PROGRAM

## 2023-11-17 PROCEDURE — 1159F PR MEDICATION LIST DOCUMENTED IN MEDICAL RECORD: ICD-10-PCS | Mod: CPTII,,, | Performed by: STUDENT IN AN ORGANIZED HEALTH CARE EDUCATION/TRAINING PROGRAM

## 2023-11-17 PROCEDURE — 3074F PR MOST RECENT SYSTOLIC BLOOD PRESSURE < 130 MM HG: ICD-10-PCS | Mod: CPTII,,, | Performed by: STUDENT IN AN ORGANIZED HEALTH CARE EDUCATION/TRAINING PROGRAM

## 2023-11-17 PROCEDURE — 3044F HG A1C LEVEL LT 7.0%: CPT | Mod: CPTII,,, | Performed by: STUDENT IN AN ORGANIZED HEALTH CARE EDUCATION/TRAINING PROGRAM

## 2023-11-17 PROCEDURE — 99214 PR OFFICE/OUTPT VISIT, EST, LEVL IV, 30-39 MIN: ICD-10-PCS | Mod: S$PBB,,, | Performed by: STUDENT IN AN ORGANIZED HEALTH CARE EDUCATION/TRAINING PROGRAM

## 2023-11-17 PROCEDURE — 3061F PR NEG MICROALBUMINURIA RESULT DOCUMENTED/REVIEW: ICD-10-PCS | Mod: CPTII,,, | Performed by: STUDENT IN AN ORGANIZED HEALTH CARE EDUCATION/TRAINING PROGRAM

## 2023-11-17 PROCEDURE — 3078F DIAST BP <80 MM HG: CPT | Mod: CPTII,,, | Performed by: STUDENT IN AN ORGANIZED HEALTH CARE EDUCATION/TRAINING PROGRAM

## 2023-11-17 PROCEDURE — 3061F NEG MICROALBUMINURIA REV: CPT | Mod: CPTII,,, | Performed by: STUDENT IN AN ORGANIZED HEALTH CARE EDUCATION/TRAINING PROGRAM

## 2023-11-17 PROCEDURE — 1159F MED LIST DOCD IN RCRD: CPT | Mod: CPTII,,, | Performed by: STUDENT IN AN ORGANIZED HEALTH CARE EDUCATION/TRAINING PROGRAM

## 2023-11-17 PROCEDURE — 93005 ELECTROCARDIOGRAM TRACING: CPT

## 2023-11-17 PROCEDURE — 4010F ACE/ARB THERAPY RXD/TAKEN: CPT | Mod: CPTII,,, | Performed by: STUDENT IN AN ORGANIZED HEALTH CARE EDUCATION/TRAINING PROGRAM

## 2023-11-17 PROCEDURE — 93010 ELECTROCARDIOGRAM REPORT: CPT | Mod: ,,, | Performed by: INTERNAL MEDICINE

## 2023-11-17 PROCEDURE — 99214 OFFICE O/P EST MOD 30 MIN: CPT | Mod: S$PBB,,, | Performed by: STUDENT IN AN ORGANIZED HEALTH CARE EDUCATION/TRAINING PROGRAM

## 2023-11-17 PROCEDURE — 3066F PR DOCUMENTATION OF TREATMENT FOR NEPHROPATHY: ICD-10-PCS | Mod: CPTII,,, | Performed by: STUDENT IN AN ORGANIZED HEALTH CARE EDUCATION/TRAINING PROGRAM

## 2023-11-17 PROCEDURE — 99999PBSHW FLU VACCINE (QUAD) GREATER THAN OR EQUAL TO 3YO PRESERVATIVE FREE IM: Mod: PBBFAC,,,

## 2023-11-17 PROCEDURE — 3008F PR BODY MASS INDEX (BMI) DOCUMENTED: ICD-10-PCS | Mod: CPTII,,, | Performed by: STUDENT IN AN ORGANIZED HEALTH CARE EDUCATION/TRAINING PROGRAM

## 2023-11-17 PROCEDURE — 99999PBSHW FLU VACCINE (QUAD) GREATER THAN OR EQUAL TO 3YO PRESERVATIVE FREE IM: ICD-10-PCS | Mod: PBBFAC,,,

## 2023-11-17 PROCEDURE — 99999 PR PBB SHADOW E&M-EST. PATIENT-LVL IV: ICD-10-PCS | Mod: PBBFAC,,, | Performed by: STUDENT IN AN ORGANIZED HEALTH CARE EDUCATION/TRAINING PROGRAM

## 2023-11-17 PROCEDURE — 4010F PR ACE/ARB THEARPY RXD/TAKEN: ICD-10-PCS | Mod: CPTII,,, | Performed by: STUDENT IN AN ORGANIZED HEALTH CARE EDUCATION/TRAINING PROGRAM

## 2023-11-17 PROCEDURE — 99214 OFFICE O/P EST MOD 30 MIN: CPT | Mod: PBBFAC,25,PO | Performed by: STUDENT IN AN ORGANIZED HEALTH CARE EDUCATION/TRAINING PROGRAM

## 2023-11-17 PROCEDURE — 3074F SYST BP LT 130 MM HG: CPT | Mod: CPTII,,, | Performed by: STUDENT IN AN ORGANIZED HEALTH CARE EDUCATION/TRAINING PROGRAM

## 2023-11-17 PROCEDURE — 90471 IMMUNIZATION ADMIN: CPT | Mod: PBBFAC,PO

## 2023-11-17 PROCEDURE — 93010 EKG 12-LEAD: ICD-10-PCS | Mod: ,,, | Performed by: INTERNAL MEDICINE

## 2023-11-17 PROCEDURE — 71046 X-RAY EXAM CHEST 2 VIEWS: CPT | Mod: TC

## 2023-11-17 PROCEDURE — 3078F PR MOST RECENT DIASTOLIC BLOOD PRESSURE < 80 MM HG: ICD-10-PCS | Mod: CPTII,,, | Performed by: STUDENT IN AN ORGANIZED HEALTH CARE EDUCATION/TRAINING PROGRAM

## 2023-11-17 PROCEDURE — 3044F PR MOST RECENT HEMOGLOBIN A1C LEVEL <7.0%: ICD-10-PCS | Mod: CPTII,,, | Performed by: STUDENT IN AN ORGANIZED HEALTH CARE EDUCATION/TRAINING PROGRAM

## 2023-11-17 RX ORDER — AMOXICILLIN 250 MG
1 CAPSULE ORAL 2 TIMES DAILY PRN
Qty: 60 TABLET | Refills: 2 | Status: SHIPPED | OUTPATIENT
Start: 2023-11-17 | End: 2024-02-15

## 2023-11-17 RX ORDER — CETIRIZINE HYDROCHLORIDE 10 MG/1
10 TABLET ORAL NIGHTLY
COMMUNITY
Start: 2023-11-09 | End: 2024-02-22

## 2023-11-17 NOTE — ASSESSMENT & PLAN NOTE
Chronic charcot's foot wound with history of past amputations of toes. Patient's FSGs are controlled on current medication regimen. Fall precautions discussed.   - continue with CVD risk reduction measures for hypertension, hyperlipidemia  - continue with daily wound care  - continue daily off loading of right foot  - continue PT/OT  Last A1c reviewed-   Lab Results   Component Value Date    HGBA1C 6.5 (H) 01/13/2023

## 2023-11-17 NOTE — ASSESSMENT & PLAN NOTE
Body mass index is 42.22 kg/m². Morbid obesity complicates all aspects of disease management from diagnostic modalities to treatment. Weight loss encouraged and health benefits explained to patient.   Wt Readings from Last 8 Encounters:   11/17/23 (!) 145.2 kg (320 lb)   01/19/23 (!) 155.9 kg (343 lb 9.6 oz)   12/16/22 (!) 161.9 kg (357 lb)   12/09/22 (!) 161 kg (355 lb)   11/15/22 (!) 166.9 kg (368 lb)   11/08/22 (!) 168.6 kg (371 lb 12.8 oz)   10/26/22 (!) 171.5 kg (378 lb 1.6 oz)   10/22/22 (!) 168.7 kg (372 lb)     Over the past 12 months, patient has lost over 50 pounds in weight.  Recommendations:   Stay physically active. As tolerated alternate resistance training with stretching and cardio. Goal of 150 minutes per week of moderate intensity activity or 7,500 - 10,000 steps per day. Follow the Mediterranean Diet. Include whole fresh fruits, vegetables, olive oil, seeds, nuts, whole grains, cold water fish, salmon, mackerel and lean cuts of meat.  Do not drink sugary/diet carbonated beverages. Decrease portion sizes slightly which will result in an approximately 500-calorie deficit. Avoid fast or fried and processed food, especially canned foods. Avoid refined carbohydrates, white starchy foods, flour, white potato, bread, muffins, and cakes. Consider substituting one meal a day with a meal replacement such as Slim fast, lean cuisine, or weight watcher's. Follow a healthy diet that includes enough calcium, vitamin D and proteins for bone health.

## 2023-11-17 NOTE — PROGRESS NOTES
[facility] Note    HPI:  Andrew Del Cid Jr. is a 58 y.o. male who presents today for surgery clearance (Patient needing surgery clearance for podiatrist. Surgery of right foot scheduled on Dec 1, 2023. Patient denies complications with anesthesia. /)    58 year old with hypertension, hyperlipidemia, polyneuropathy secondary to diabetes mellitus type 2, chronic diabetic right foot, s/p osteomyelitis, wound care with podiatry, history of CVA with residual left sided weakness, obstructive sleep apnea on CPAP.  Per patient surgery for right foot scar tissue  removal to help with wound closure.   He endorses feeling well overall.   Denies fever, chills, excessive headaches, vision changes, chest pain, palpitations, shortness of breath, nausea, vomiting, diarrhea.   Endorses constipation, stooling once 1-2 days. He attributes daily iron supplementation to be cause of constipation.   Denies dysuria, blood in urine, blood in stool.     Care Coordination:  Cardiology, Dr. Miles, new onset atrial fibrillation with RVR (IP 10/26/22-) now resolved, ischemic work up  Endocrinology, Floridalma Tierney, ADITYA, diabetes mellitus type 2  Podiatry, Dr. Esquivel, right diabetic foot wound    ROS   A review of systems was performed and was negative except as noted above.    I personally reviewed allergies, past medical, surgical, social and family history and updated as appropriate.    Medications:    Current Outpatient Medications:     apixaban (ELIQUIS) 5 mg Tab, Take 1 tablet (5 mg total) by mouth 2 (two) times daily., Disp: 60 tablet, Rfl: 1    aspirin (ECOTRIN) 81 MG EC tablet, Take 81 mg by mouth once daily., Disp: , Rfl:     atorvastatin (LIPITOR) 40 MG tablet, Take 1 tablet (40 mg total) by mouth once daily., Disp: 90 tablet, Rfl: 3    blood-glucose meter kit, Use as instructed, Disp: 1 each, Rfl: 0    cetirizine (ZYRTEC) 10 MG tablet, Take 10 mg by mouth every evening., Disp: , Rfl:     dulaglutide (TRULICITY)  4.5 mg/0.5 mL pen injector, Inject 4.5 mg into the skin every 7 days., Disp: 12 pen , Rfl: 1    DULoxetine (CYMBALTA) 30 MG capsule, Take 1 capsule by mouth once daily, Disp: 30 capsule, Rfl: 11    empagliflozin (JARDIANCE) 25 mg tablet, Take 1 tablet (25 mg total) by mouth once daily., Disp: 30 tablet, Rfl: 11    losartan (COZAAR) 50 MG tablet, Take 1 tablet (50 mg total) by mouth once daily., Disp: 90 tablet, Rfl: 3    metoprolol succinate (TOPROL-XL) 50 MG 24 hr tablet, Take 1 tablet by mouth once daily, Disp: 30 tablet, Rfl: 0    amLODIPine (NORVASC) 5 MG tablet, Take 1 tablet (5 mg total) by mouth once daily. (Patient not taking: Reported on 11/17/2023), Disp: 90 tablet, Rfl: 0    cholecalciferol, vitamin D3, (VITAMIN D3) 25 mcg (1,000 unit) capsule, 1 capsule, Disp: , Rfl:     lancets (ONETOUCH DELICA LANCETS) 33 gauge Misc, 1 lancet by Misc.(Non-Drug; Combo Route) route 3 (three) times daily., Disp: 100 each, Rfl: 5    LANTUS SOLOSTAR U-100 INSULIN glargine 100 units/mL SubQ pen, Inject 40 Units into the skin every evening., Disp: 12 mL, Rfl: 2    metFORMIN (GLUCOPHAGE-XR) 500 MG ER 24hr tablet, Take 2 tablets (1,000 mg total) by mouth 2 (two) times daily with meals. Generic, Disp: 120 tablet, Rfl: 5    senna-docusate 8.6-50 mg (SENNA WITH DOCUSATE SODIUM) 8.6-50 mg per tablet, Take 1 tablet by mouth 2 (two) times daily as needed for Constipation., Disp: 60 tablet, Rfl: 2     Health Maintenance:  Immunization History   Administered Date(s) Administered    COVID-19 MRNA, LN-S PF (MODERNA HALF 0.25 ML DOSE) 03/12/2021, 03/14/2022    COVID-19 Vaccine 03/12/2021, 04/09/2021, 03/14/2022    COVID-19, MRNA, LN-S, PF (MODERNA FULL 0.5 ML DOSE) 03/12/2021, 04/09/2021    Influenza 11/15/2018    Influenza - Quadrivalent - PF *Preferred* (6 months and older) 09/12/2019, 10/19/2021, 10/04/2022, 11/17/2023    Influenza - Trivalent - PF (ADULT) 10/19/2021    Pneumococcal Conjugate - 13 Valent 10/19/2021    Tdap 04/27/2013,  "04/27/2013      Health Maintenance   Topic Date Due    Hepatitis C Screening  Never done    Shingles Vaccine (1 of 2) Never done    Eye Exam  03/15/2023    TETANUS VACCINE  04/27/2023    Hemoglobin A1c  07/13/2023    Foot Exam  11/01/2023    Colorectal Cancer Screening  11/05/2024    Lipid Panel  11/17/2024     Health Maintenance Topics with due status: Not Due       Topic Last Completion Date    Colorectal Cancer Screening 11/05/2021    Diabetes Urine Screening 01/13/2023    Lipid Panel 11/17/2023     Health Maintenance Due   Topic Date Due    Hepatitis C Screening  Never done    HIV Screening  Never done    Shingles Vaccine (1 of 2) Never done    Pneumococcal Vaccines (Age 0-64) (2 - PPSV23 or PCV20) 12/14/2021    Eye Exam  03/15/2023    TETANUS VACCINE  04/27/2023    Hemoglobin A1c  07/13/2023    COVID-19 Vaccine (8 - 2023-24 season) 09/01/2023    Foot Exam  11/01/2023     PHYSICAL EXAM:  Vitals:    11/17/23 0859   BP: 105/67   BP Location: Right arm   Patient Position: Sitting   BP Method: Large (Automatic)   Pulse: 86   Resp: 16   Temp: 98 °F (36.7 °C)   SpO2: 98%   Weight: (!) 145.2 kg (320 lb)   Height: 6' 1" (1.854 m)     Body mass index is 42.22 kg/m².  Physical Exam  Vitals and nursing note reviewed.   Constitutional:       General: He is not in acute distress.     Appearance: Normal appearance. He is not ill-appearing or toxic-appearing.   HENT:      Head: Normocephalic.      Mouth/Throat:      Mouth: Mucous membranes are moist.      Pharynx: Oropharynx is clear. No posterior oropharyngeal erythema.   Eyes:      Extraocular Movements: Extraocular movements intact.      Conjunctiva/sclera: Conjunctivae normal.   Cardiovascular:      Rate and Rhythm: Normal rate.   Pulmonary:      Effort: Pulmonary effort is normal.   Abdominal:      General: There is no distension.      Palpations: Abdomen is soft.      Tenderness: There is no abdominal tenderness. There is no guarding.      Comments: Limited due to body " habitus, protuberant   Musculoskeletal:      Right lower leg: No edema.      Left lower leg: No edema.   Feet:      Comments: Right leg in cam boot  Skin:     General: Skin is warm and dry.      Capillary Refill: Capillary refill takes less than 2 seconds.   Neurological:      Mental Status: He is alert and oriented to person, place, and time. Mental status is at baseline.   Psychiatric:         Mood and Affect: Mood normal.         Behavior: Behavior normal.        ASSESSMENT/PLAN:  1. Pre-op evaluation  -     Protime-INR; Future; Expected date: 11/17/2023  -     CBC Auto Differential; Future; Expected date: 11/17/2023  -     Basic Metabolic Panel; Future; Expected date: 11/17/2023  -     Uric Acid; Future; Expected date: 11/17/2023  -     X-Ray Chest PA And Lateral; Future  -     EKG 12-lead; Future    2. Pre-op examination  Assessment & Plan:  Planned surgery per podiatry on right chronic foot wound.   Known risk factors for perioperative complications: Anemia  Diabetes mellitus  History of malignant cardiac dysrhythmia, paroxysmal atrial fibrillation  Difficulty with intubation is not anticipated.    NSQIP Risk Estimation: Patient has above average risk, currently medically optimized.     Risk factors including, age, male, diabetes, hypertension and BMI >42  Current medications which may produce withdrawal symptoms if withheld perioperatively: duloxetine     Plan:   1. Preoperative workup as follows chest x-ray, ECG, hemoglobin, hematocrit, electrolytes, creatinine, glucose, liver function studies, coagulation studies.  2. Change in medication regimen before surgery: Hold Eliquis at least 48 hours to surgery otherwise follow instructions per primary surgeon and cardiology.  3. Prophylaxis for cardiac events with perioperative beta-blockers: should be considered, specific regimen per anesthesia.  4. Deep vein thrombosis prophylaxis postoperatively:regimen to be chosen by surgical team.          3. Chronic  wound  -     Hemoglobin A1C; Future; Expected date: 11/18/2023  -     Vitamin D; Future; Expected date: 11/18/2023    4. Neuropathy due to secondary diabetes  Assessment & Plan:  Chronic charcot's foot wound with history of past amputations of toes. Patient's FSGs are controlled on current medication regimen. Fall precautions discussed.   - continue with CVD risk reduction measures for hypertension, hyperlipidemia  - continue with daily wound care  - continue daily off loading of right foot  - continue PT/OT  Last A1c reviewed-   Lab Results   Component Value Date    HGBA1C 6.5 (H) 01/13/2023             Orders:  -     Cancel: Iron and TIBC; Future; Expected date: 11/17/2023  -     Cancel: Ferritin; Future; Expected date: 11/17/2023  -     Cancel: Vitamin B12; Future; Expected date: 11/17/2023  -     Ferritin; Future; Expected date: 11/17/2023  -     Iron and TIBC; Future; Expected date: 11/17/2023  -     Vitamin B12; Future; Expected date: 11/17/2023  -     Hemoglobin A1C; Future; Expected date: 11/18/2023    5. Iron deficiency anemia, unspecified iron deficiency anemia type  Assessment & Plan:  Lab Results   Component Value Date    IRON 41 (L) 11/17/2023    TIBC 271 11/17/2023    LABIRON 15 (L) 11/17/2023   Per patient, taking daily iron supplementation. Endorses side effect with daily PO intake of iron causing constipation. Patient with chronic wound healing and anticipating surgical intervention. Will arrange for intravenous iron infusion to promote post operative healing.  - continue PO ferrous sulfate daily, take with vitamin C  - f/u outpatient infusion  - venofer       Orders:  -     senna-docusate 8.6-50 mg (SENNA WITH DOCUSATE SODIUM) 8.6-50 mg per tablet; Take 1 tablet by mouth 2 (two) times daily as needed for Constipation.  Dispense: 60 tablet; Refill: 2  -     Cancel: Iron and TIBC; Future; Expected date: 11/17/2023  -     Cancel: Ferritin; Future; Expected date: 11/17/2023  -     Cancel: Vitamin B12;  Future; Expected date: 11/17/2023  -     Ferritin; Future; Expected date: 11/17/2023  -     Iron and TIBC; Future; Expected date: 11/17/2023  -     Vitamin B12; Future; Expected date: 11/17/2023    6. Constipation, unspecified constipation type  -     senna-docusate 8.6-50 mg (SENNA WITH DOCUSATE SODIUM) 8.6-50 mg per tablet; Take 1 tablet by mouth 2 (two) times daily as needed for Constipation.  Dispense: 60 tablet; Refill: 2    7. Periodic limb movement sleep disorder  Assessment & Plan:  Multifactorial, severe obstructive apnea, iron deficiency anemia.   - CPAP QHS   - consider gabapentin in the future      8. Essential hypertension  Assessment & Plan:  Normotensive.   BP Readings from Last 3 Encounters:   11/17/23 105/67   01/19/23 137/85   12/16/22 132/79      - amlodipine 5 mg daily  - losartan 50 mg daily  - metoprolol succinate 50 mg daily  - continue to follow low sodium diet <2g or 2 teaspoons  - DASH diet  - f/u 6 months      9. Mixed hyperlipidemia  Assessment & Plan:  Lab Results   Component Value Date    CHOL 116 (L) 10/27/2022    CHOL 185 08/16/2017    CHOL 132 07/01/2017     Lab Results   Component Value Date    HDL 22 (L) 10/27/2022    HDL 43 08/16/2017    HDL 29 (L) 07/01/2017     Lab Results   Component Value Date    LDLCALC 65.8 10/27/2022    LDLCALC 105.2 08/16/2017    LDLCALC 62.4 (L) 07/01/2017     Lab Results   Component Value Date    TRIG 141 10/27/2022    TRIG 184 (H) 08/16/2017    TRIG 203 (H) 07/01/2017       Lab Results   Component Value Date    CHOLHDL 19.0 (L) 10/27/2022    CHOLHDL 23.2 08/16/2017    CHOLHDL 22.0 07/01/2017      Continue with daily atorvastatin 40 mg    Orders:  -     Cancel: Lipid Panel; Future; Expected date: 11/18/2023    10. Paroxysmal atrial fibrillation  Assessment & Plan:  Resolved. Last EKG tracings of sinus rhythm, first degree block.   Following cardiology services.   - continue daily aspirin  - eliquis 5 mg BID        11. First degree heart block by  electrocardiogram  Assessment & Plan:  FL interval >212 ms on EKG 11/17/23. Sinus rhythm, no atrial fibrillation. Patient asymptomatic. Monitor with EKG.   - f/u cardiology notes       12. Type 2 diabetes mellitus with diabetic polyneuropathy, without long-term current use of insulin  Assessment & Plan:  Denies hypoglycemic events. Improved glycemic control overall.   Lab Results   Component Value Date    HGBA1C 6.5 (H) 01/13/2023     Current antihyperglycemic regimen includes,   - Jardiance 25 mg daily  - Lantus 30U QHS  - Metformin  2 tabs BID  - Trulicity 4.5mg Qweekly  - f/u endocrinology notes    Orders:  -     Hemoglobin A1C; Future; Expected date: 11/18/2023    13. Type 2 diabetes mellitus without complication, with long-term current use of insulin  Assessment & Plan:  Continue with basal insulin as instructed per endocrinology.     Orders:  -     Hemoglobin A1C; Future; Expected date: 11/18/2023    14. Class 3 severe obesity with serious comorbidity and body mass index (BMI) of 40.0 to 44.9 in adult, unspecified obesity type  Assessment & Plan:  Body mass index is 42.22 kg/m². Morbid obesity complicates all aspects of disease management from diagnostic modalities to treatment. Weight loss encouraged and health benefits explained to patient.   Wt Readings from Last 8 Encounters:   11/17/23 (!) 145.2 kg (320 lb)   01/19/23 (!) 155.9 kg (343 lb 9.6 oz)   12/16/22 (!) 161.9 kg (357 lb)   12/09/22 (!) 161 kg (355 lb)   11/15/22 (!) 166.9 kg (368 lb)   11/08/22 (!) 168.6 kg (371 lb 12.8 oz)   10/26/22 (!) 171.5 kg (378 lb 1.6 oz)   10/22/22 (!) 168.7 kg (372 lb)     Over the past 12 months, patient has lost over 50 pounds in weight.  Recommendations:   Stay physically active. As tolerated alternate resistance training with stretching and cardio. Goal of 150 minutes per week of moderate intensity activity or 7,500 - 10,000 steps per day. Follow the Mediterranean Diet. Include whole fresh fruits, vegetables,  olive oil, seeds, nuts, whole grains, cold water fish, salmon, mackerel and lean cuts of meat.  Do not drink sugary/diet carbonated beverages. Decrease portion sizes slightly which will result in an approximately 500-calorie deficit. Avoid fast or fried and processed food, especially canned foods. Avoid refined carbohydrates, white starchy foods, flour, white potato, bread, muffins, and cakes. Consider substituting one meal a day with a meal replacement such as Slim fast, lean cuisine, or weight watcher's. Follow a healthy diet that includes enough calcium, vitamin D and proteins for bone health.              15. Influenza vaccine administered  Assessment & Plan:  Tolerated well.     Orders:  -     Influenza - Quadrivalent *Preferred* (6 months+) (PF)        Other than changes above, continue current medications and maintain follow up with specialists.      No follow-ups on file.   Recent Results (from the past 2016 hour(s))   Protime-INR    Collection Time: 11/17/23 10:20 AM   Result Value Ref Range    Prothrombin Time 11.4 9.0 - 12.5 sec    INR 1.1 0.8 - 1.2   CBC Auto Differential    Collection Time: 11/17/23 10:20 AM   Result Value Ref Range    WBC 7.49 3.90 - 12.70 K/uL    RBC 5.07 4.60 - 6.20 M/uL    Hemoglobin 14.7 14.0 - 18.0 g/dL    Hematocrit 46.4 40.0 - 54.0 %    MCV 92 82 - 98 fL    MCH 29.0 27.0 - 31.0 pg    MCHC 31.7 (L) 32.0 - 36.0 g/dL    RDW 12.7 11.5 - 14.5 %    Platelets 313 150 - 450 K/uL    MPV 9.6 9.2 - 12.9 fL    Immature Granulocytes 0.1 0.0 - 0.5 %    Gran # (ANC) 4.2 1.8 - 7.7 K/uL    Immature Grans (Abs) 0.01 0.00 - 0.04 K/uL    Lymph # 2.7 1.0 - 4.8 K/uL    Mono # 0.4 0.3 - 1.0 K/uL    Eos # 0.2 0.0 - 0.5 K/uL    Baso # 0.04 0.00 - 0.20 K/uL    nRBC 0 0 /100 WBC    Gran % 55.8 38.0 - 73.0 %    Lymph % 35.6 18.0 - 48.0 %    Mono % 5.9 4.0 - 15.0 %    Eosinophil % 2.1 0.0 - 8.0 %    Basophil % 0.5 0.0 - 1.9 %    Differential Method Automated    Basic Metabolic Panel    Collection Time:  11/17/23 10:20 AM   Result Value Ref Range    Sodium 142 136 - 145 mmol/L    Potassium 4.3 3.5 - 5.1 mmol/L    Chloride 110 95 - 110 mmol/L    CO2 30 (H) 23 - 29 mmol/L    Glucose 104 70 - 110 mg/dL    BUN 18 6 - 20 mg/dL    Creatinine 1.0 0.5 - 1.4 mg/dL    Calcium 8.5 (L) 8.7 - 10.5 mg/dL    Anion Gap 2 (L) 3 - 11 mmol/L    eGFR >60.0 >60 mL/min/1.73 m^2   Uric Acid    Collection Time: 11/17/23 10:20 AM   Result Value Ref Range    Uric Acid 4.9 3.4 - 7.0 mg/dL   Ferritin    Collection Time: 11/17/23 10:20 AM   Result Value Ref Range    Ferritin 30 20.0 - 300.0 ng/mL   Iron and TIBC    Collection Time: 11/17/23 10:20 AM   Result Value Ref Range    Iron 41 (L) 45 - 160 ug/dL    TIBC 271 250 - 450 ug/dL    Iron Saturation 15 (L) 20 - 50 %   Lipid Panel    Collection Time: 11/17/23 10:20 AM   Result Value Ref Range    Cholesterol 97 (L) 120 - 199 mg/dL    Triglycerides 104 30 - 150 mg/dL    HDL 32 (L) 40 - 75 mg/dL    LDL Cholesterol 44.2 (L) 63.0 - 159.0 mg/dL    HDL/Cholesterol Ratio 33.0 20.0 - 50.0 %    Total Cholesterol/HDL Ratio 3.0 2.0 - 5.0    Non-HDL Cholesterol 65 mg/dL     Olena Thornton DO  [facility]

## 2023-11-18 DIAGNOSIS — Z79.4 TYPE 2 DIABETES MELLITUS WITHOUT COMPLICATION, WITH LONG-TERM CURRENT USE OF INSULIN: ICD-10-CM

## 2023-11-18 DIAGNOSIS — E11.42 TYPE 2 DIABETES MELLITUS WITH DIABETIC POLYNEUROPATHY, WITHOUT LONG-TERM CURRENT USE OF INSULIN: ICD-10-CM

## 2023-11-18 DIAGNOSIS — L97.418 DIABETIC ULCER OF RIGHT MIDFOOT ASSOCIATED WITH TYPE 2 DIABETES MELLITUS, WITH OTHER ULCER SEVERITY: ICD-10-CM

## 2023-11-18 DIAGNOSIS — E11.621 DIABETIC ULCER OF RIGHT MIDFOOT ASSOCIATED WITH TYPE 2 DIABETES MELLITUS, WITH OTHER ULCER SEVERITY: ICD-10-CM

## 2023-11-18 DIAGNOSIS — E78.2 MIXED HYPERLIPIDEMIA: Primary | Chronic | ICD-10-CM

## 2023-11-18 DIAGNOSIS — E11.9 TYPE 2 DIABETES MELLITUS WITHOUT COMPLICATION, WITH LONG-TERM CURRENT USE OF INSULIN: ICD-10-CM

## 2023-11-18 PROBLEM — G47.61 PERIODIC LIMB MOVEMENT SLEEP DISORDER: Status: ACTIVE | Noted: 2022-12-27

## 2023-11-18 PROBLEM — R21 RASH: Status: RESOLVED | Noted: 2022-11-01 | Resolved: 2023-11-18

## 2023-11-18 PROBLEM — I44.0 FIRST DEGREE HEART BLOCK BY ELECTROCARDIOGRAM: Status: ACTIVE | Noted: 2023-11-17

## 2023-11-18 PROBLEM — Z01.818 PRE-OP EVALUATION: Status: ACTIVE | Noted: 2023-11-18

## 2023-11-18 PROBLEM — T14.8XXA CHRONIC WOUND: Status: ACTIVE | Noted: 2023-11-18

## 2023-11-18 PROBLEM — K59.00 CONSTIPATION: Status: ACTIVE | Noted: 2023-11-18

## 2023-11-18 PROBLEM — D50.9 IRON DEFICIENCY ANEMIA: Status: ACTIVE | Noted: 2023-11-18

## 2023-11-18 PROBLEM — G47.61 PERIODIC LIMB MOVEMENT SLEEP DISORDER: Status: ACTIVE | Noted: 2023-11-18

## 2023-11-18 NOTE — ASSESSMENT & PLAN NOTE
Lab Results   Component Value Date    CHOL 116 (L) 10/27/2022    CHOL 185 08/16/2017    CHOL 132 07/01/2017     Lab Results   Component Value Date    HDL 22 (L) 10/27/2022    HDL 43 08/16/2017    HDL 29 (L) 07/01/2017     Lab Results   Component Value Date    LDLCALC 65.8 10/27/2022    LDLCALC 105.2 08/16/2017    LDLCALC 62.4 (L) 07/01/2017     Lab Results   Component Value Date    TRIG 141 10/27/2022    TRIG 184 (H) 08/16/2017    TRIG 203 (H) 07/01/2017       Lab Results   Component Value Date    CHOLHDL 19.0 (L) 10/27/2022    CHOLHDL 23.2 08/16/2017    CHOLHDL 22.0 07/01/2017      Continue with daily atorvastatin 40 mg   Rifampin Counseling: I discussed with the patient the risks of rifampin including but not limited to liver damage, kidney damage, red-orange body fluids, nausea/vomiting and severe allergy.

## 2023-11-18 NOTE — ASSESSMENT & PLAN NOTE
Multifactorial, severe obstructive apnea, iron deficiency anemia.   - CPAP QHS   - consider gabapentin in the future

## 2023-11-18 NOTE — ASSESSMENT & PLAN NOTE
Lab Results   Component Value Date    IRON 41 (L) 11/17/2023    TIBC 271 11/17/2023    LABIRON 15 (L) 11/17/2023   Per patient, taking daily iron supplementation. Endorses side effect with daily PO intake of iron causing constipation. Patient with chronic wound healing and anticipating surgical intervention. Will arrange for intravenous iron infusion to promote post operative healing.  - continue PO ferrous sulfate daily, take with vitamin C  - f/u outpatient infusion  - venofer

## 2023-11-18 NOTE — ASSESSMENT & PLAN NOTE
Denies hypoglycemic events. Improved glycemic control overall.   Lab Results   Component Value Date    HGBA1C 6.5 (H) 01/13/2023     Current antihyperglycemic regimen includes,   - Jardiance 25 mg daily  - Lantus 30U QHS  - Metformin  2 tabs BID  - Trulicity 4.5mg Qweekly  - f/u endocrinology notes

## 2023-11-18 NOTE — ASSESSMENT & PLAN NOTE
RI interval >212 ms on EKG 11/17/23. Sinus rhythm, no atrial fibrillation. Patient asymptomatic. Monitor with EKG.   - f/u cardiology notes

## 2023-11-18 NOTE — ASSESSMENT & PLAN NOTE
Resolved. Last EKG tracings of sinus rhythm, first degree block.   Following cardiology services.   - continue daily aspirin  - eliquis 5 mg BID

## 2023-11-18 NOTE — ASSESSMENT & PLAN NOTE
Normotensive.   BP Readings from Last 3 Encounters:   11/17/23 105/67   01/19/23 137/85   12/16/22 132/79      - amlodipine 5 mg daily  - losartan 50 mg daily  - metoprolol succinate 50 mg daily  - continue to follow low sodium diet <2g or 2 teaspoons  - DASH diet  - f/u 6 months

## 2023-11-18 NOTE — ASSESSMENT & PLAN NOTE
Planned surgery per podiatry on right chronic foot wound.   Known risk factors for perioperative complications: Anemia  Diabetes mellitus  History of malignant cardiac dysrhythmia, paroxysmal atrial fibrillation  Difficulty with intubation is not anticipated.    NSQIP Risk Estimation: Patient has above average risk, currently medically optimized.     Risk factors including, age, male, diabetes, hypertension and BMI >42  Current medications which may produce withdrawal symptoms if withheld perioperatively: duloxetine     Plan:   1. Preoperative workup as follows chest x-ray, ECG, hemoglobin, hematocrit, electrolytes, creatinine, glucose, liver function studies, coagulation studies.  2. Change in medication regimen before surgery: Hold Eliquis at least 48 hours to surgery otherwise follow instructions per primary surgeon and cardiology.  3. Prophylaxis for cardiac events with perioperative beta-blockers: should be considered, specific regimen per anesthesia.  4. Deep vein thrombosis prophylaxis postoperatively:regimen to be chosen by surgical team.

## 2023-12-11 DIAGNOSIS — M24.575 CONTRACTURE, FOOT, LEFT: ICD-10-CM

## 2023-12-11 DIAGNOSIS — L97.521 ULCER OF TOE OF LEFT FOOT, LIMITED TO BREAKDOWN OF SKIN: ICD-10-CM

## 2023-12-11 DIAGNOSIS — M14.671 CHARCOT'S JOINT OF RIGHT FOOT: Primary | ICD-10-CM

## 2023-12-11 DIAGNOSIS — L97.512 RIGHT FOOT ULCER, WITH FAT LAYER EXPOSED: ICD-10-CM

## 2023-12-13 ENCOUNTER — ANESTHESIA EVENT (OUTPATIENT)
Dept: SURGERY | Facility: HOSPITAL | Age: 58
End: 2023-12-13
Payer: MEDICAID

## 2023-12-13 ENCOUNTER — HOSPITAL ENCOUNTER (OUTPATIENT)
Dept: PREADMISSION TESTING | Facility: HOSPITAL | Age: 58
Discharge: HOME | End: 2023-12-13
Attending: PODIATRIST
Payer: MEDICAID

## 2023-12-13 VITALS — HEIGHT: 73 IN | BODY MASS INDEX: 41.75 KG/M2 | WEIGHT: 315 LBS

## 2023-12-13 NOTE — DISCHARGE INSTRUCTIONS
BEFORE THE PROCEDURE:    REPORT ANY CHANGE IN YOUR PHYSICAL CONDITION TO YOUR DOCTOR IMMEDIATELY.  SELF ISOLATE AND CHECK TEMPERATURE DAILY, IF TEMP OVER 100, CALL PHYSICIAN IMMEDIATELY.  TRY TO REFRAIN FROM SMOKING AND ALCOHOL 72 HOURS BEFORE YOUR PROCEDURE.   DO NOT EAT OR DRINK ANYTHING AFTER MIDNIGHT THE NIGHT BEFORE YOUR PROCEDURE.  NO MAKE UP, NAIL POLISH OR JEWELRY.      SURGERY PREP INSTRUCTIONS    ARRIVE TO FIRST FLOOR REGISTRATION DESK AT 6AM.    DAY OF YOUR PROCEDURE:    TAKE BLOOD PRESSURE MEDICATIONS THE MORNING OF YOUR PROCEDURE, WITH SMALL SIPS WATER, AS DIRECTED BY YOUR PHYSICIAN.   DO NOT TAKE ANY DIABETIC MEDICATIONS UNLESS DIRECTED TO DO SO BY YOUR PHYSICIAN.   CONTACT LENSES AND DENTURES MUST BE REMOVED.  A RESPONSIBLE ADULT MUST ACCOMPANY YOU HOME UPON DISCHARGE.   ONLY 1 VISITOR ALLOWED PER ROOM.     YOUR THOUGHTS AND OPINIONS HELP US TO BETTER SERVE YOU.     PLEASE PARTICIPATE IN SURVEYS ABOUT YOUR CARE.    THANK YOU FOR CHOOSING [facility].

## 2023-12-13 NOTE — ANESTHESIA PREPROCEDURE EVALUATION
12/13/2023  Andrew Del Cid Jr. is a 58 y.o., male.      Pre-op Assessment    I have reviewed the Patient Summary Reports.    I have reviewed the NPO Status.   I have reviewed the Medications.     Review of Systems  Anesthesia Hx:  No problems with previous Anesthesia             Denies Family Hx of Anesthesia complications.    Denies Personal Hx of Anesthesia complications.                    Social:  Non-Smoker       Cardiovascular:     Hypertension, well controlled    Dysrhythmias atrial fibrillation     PVD     LOPEZ CLEARED                         Pulmonary:        Sleep Apnea           Education provided regarding risk of obstructive sleep apnea            Renal/:  Renal/ Normal                 Hepatic/GI:  Hepatic/GI Normal                 Neurological:   CVA, residual symptoms Neuromuscular Disease,             Peripheral Neuropathy                          Endocrine:  Diabetes, well controlled, type 2               Physical Exam  General: Well nourished    Airway:  Mallampati: III / III  Mouth Opening: Normal  TM Distance: Normal  Tongue: Normal  Neck ROM: Normal ROM    Dental:  Periodontal disease  WARNED ABOUT POSSIBLE DAMAGE  Chest/Lungs:  Clear to auscultation    Heart:  Rate: Normal  Rhythm: Regular Rhythm  Sounds: Normal    Hematocrit 46 platelets 313 sodium 142 potassium 4.3 creatinine 1.0 BUN 18    Anesthesia Plan  Type of Anesthesia, risks & benefits discussed:    Anesthesia Type: Gen ETT, MAC  Intra-op Monitoring Plan: Standard ASA Monitors  Post Op Pain Control Plan: multimodal analgesia  Induction:  IV  Airway Plan: Direct  Informed Consent: Informed consent signed with the Patient and all parties understand the risks and agree with anesthesia plan.  All questions answered.   ASA Score: 4  Day of Surgery Review of History & Physical: I have interviewed and examined the patient. I  have reviewed the patient's H&P dated: There are no significant changes.     Ready For Surgery From Anesthesia Perspective.     .

## 2023-12-15 ENCOUNTER — HOSPITAL ENCOUNTER (OUTPATIENT)
Facility: HOSPITAL | Age: 58
Discharge: HOME | End: 2023-12-15
Attending: PODIATRIST | Admitting: PODIATRIST
Payer: MEDICAID

## 2023-12-15 ENCOUNTER — ANESTHESIA (OUTPATIENT)
Dept: SURGERY | Facility: HOSPITAL | Age: 58
End: 2023-12-15
Payer: MEDICAID

## 2023-12-15 VITALS
SYSTOLIC BLOOD PRESSURE: 108 MMHG | TEMPERATURE: 98 F | HEART RATE: 60 BPM | DIASTOLIC BLOOD PRESSURE: 66 MMHG | RESPIRATION RATE: 18 BRPM | OXYGEN SATURATION: 98 %

## 2023-12-15 DIAGNOSIS — T14.8XXA CHRONIC WOUND: ICD-10-CM

## 2023-12-15 DIAGNOSIS — M14.671 CHARCOT'S JOINT OF RIGHT FOOT: Primary | ICD-10-CM

## 2023-12-15 LAB — POCT GLUCOSE: 113 MG/DL (ref 70–110)

## 2023-12-15 PROCEDURE — 63600175 PHARM REV CODE 636 W HCPCS: Performed by: NURSE ANESTHETIST, CERTIFIED REGISTERED

## 2023-12-15 PROCEDURE — C1889 IMPLANT/INSERT DEVICE, NOC: HCPCS | Performed by: PODIATRIST

## 2023-12-15 PROCEDURE — 36000706: Performed by: PODIATRIST

## 2023-12-15 PROCEDURE — 27201423 OPTIME MED/SURG SUP & DEVICES STERILE SUPPLY: Performed by: PODIATRIST

## 2023-12-15 PROCEDURE — 71000016 HC POSTOP RECOV ADDL HR: Performed by: PODIATRIST

## 2023-12-15 PROCEDURE — 25000003 PHARM REV CODE 250: Performed by: PODIATRIST

## 2023-12-15 PROCEDURE — 37000009 HC ANESTHESIA EA ADD 15 MINS: Performed by: PODIATRIST

## 2023-12-15 PROCEDURE — 63600175 PHARM REV CODE 636 W HCPCS: Performed by: PODIATRIST

## 2023-12-15 PROCEDURE — V2790 AMNIOTIC MEMBRANE: HCPCS | Performed by: PODIATRIST

## 2023-12-15 PROCEDURE — 37000008 HC ANESTHESIA 1ST 15 MINUTES: Performed by: PODIATRIST

## 2023-12-15 PROCEDURE — 36000707: Performed by: PODIATRIST

## 2023-12-15 PROCEDURE — 71000015 HC POSTOP RECOV 1ST HR: Performed by: PODIATRIST

## 2023-12-15 RX ORDER — MIDAZOLAM HYDROCHLORIDE 1 MG/ML
INJECTION INTRAMUSCULAR; INTRAVENOUS
Status: DISCONTINUED | OUTPATIENT
Start: 2023-12-15 | End: 2023-12-15

## 2023-12-15 RX ORDER — PROPOFOL 10 MG/ML
INJECTION, EMULSION INTRAVENOUS
Status: DISCONTINUED | OUTPATIENT
Start: 2023-12-15 | End: 2023-12-15

## 2023-12-15 RX ORDER — SODIUM CHLORIDE 9 MG/ML
INJECTION, SOLUTION INTRAVENOUS CONTINUOUS
Status: DISCONTINUED | OUTPATIENT
Start: 2023-12-15 | End: 2023-12-15 | Stop reason: HOSPADM

## 2023-12-15 RX ORDER — ACETAMINOPHEN 325 MG/1
650 TABLET ORAL EVERY 4 HOURS PRN
Status: DISCONTINUED | OUTPATIENT
Start: 2023-12-15 | End: 2023-12-15 | Stop reason: HOSPADM

## 2023-12-15 RX ORDER — SODIUM CHLORIDE 0.9 % (FLUSH) 0.9 %
10 SYRINGE (ML) INJECTION
Status: DISCONTINUED | OUTPATIENT
Start: 2023-12-15 | End: 2023-12-15 | Stop reason: HOSPADM

## 2023-12-15 RX ORDER — ONDANSETRON 4 MG/1
8 TABLET, ORALLY DISINTEGRATING ORAL EVERY 8 HOURS PRN
Status: DISCONTINUED | OUTPATIENT
Start: 2023-12-15 | End: 2023-12-15 | Stop reason: HOSPADM

## 2023-12-15 RX ORDER — LIDOCAINE HYDROCHLORIDE 10 MG/ML
INJECTION, SOLUTION INTRAVENOUS
Status: DISCONTINUED | OUTPATIENT
Start: 2023-12-15 | End: 2023-12-15

## 2023-12-15 RX ORDER — HYDROCODONE BITARTRATE AND ACETAMINOPHEN 5; 325 MG/1; MG/1
1 TABLET ORAL EVERY 4 HOURS PRN
Status: DISCONTINUED | OUTPATIENT
Start: 2023-12-15 | End: 2023-12-15 | Stop reason: HOSPADM

## 2023-12-15 RX ORDER — HYDROCODONE BITARTRATE AND ACETAMINOPHEN 7.5; 325 MG/1; MG/1
1 TABLET ORAL EVERY 4 HOURS PRN
Status: DISCONTINUED | OUTPATIENT
Start: 2023-12-15 | End: 2023-12-15 | Stop reason: HOSPADM

## 2023-12-15 RX ORDER — LIDOCAINE HYDROCHLORIDE 10 MG/ML
INJECTION, SOLUTION EPIDURAL; INFILTRATION; INTRACAUDAL; PERINEURAL
Status: DISCONTINUED | OUTPATIENT
Start: 2023-12-15 | End: 2023-12-15 | Stop reason: HOSPADM

## 2023-12-15 RX ORDER — BUPIVACAINE HYDROCHLORIDE 2.5 MG/ML
INJECTION, SOLUTION EPIDURAL; INFILTRATION; INTRACAUDAL
Status: DISCONTINUED | OUTPATIENT
Start: 2023-12-15 | End: 2023-12-15 | Stop reason: HOSPADM

## 2023-12-15 RX ADMIN — PROPOFOL 25 MG: 10 INJECTION, EMULSION INTRAVENOUS at 08:12

## 2023-12-15 RX ADMIN — MIDAZOLAM 2 MG: 1 INJECTION INTRAMUSCULAR; INTRAVENOUS at 07:12

## 2023-12-15 RX ADMIN — PROPOFOL 50 MG: 10 INJECTION, EMULSION INTRAVENOUS at 08:12

## 2023-12-15 RX ADMIN — LIDOCAINE HYDROCHLORIDE 50 MG: 10 INJECTION, SOLUTION INTRAVENOUS at 08:12

## 2023-12-15 RX ADMIN — SODIUM CHLORIDE: 9 INJECTION, SOLUTION INTRAVENOUS at 06:12

## 2023-12-15 NOTE — PLAN OF CARE
Ancef 3gm not available from pharmacy at this time, pt brought to holding per Summer,RN, pharmacy will bring ancef to holding area.

## 2023-12-15 NOTE — OP NOTE
Surgeon:  CHIDI Esquivel DPM      Preoperative diagnosis: 1. Chronic neuropathic ulcer, right foot  2. Charcot neuroarthopathy, right foot  3. Chronic neuropathic ulcer, left hallux.  4. Contracture, left foot     Postoperative diagnosis:  1. Chronic neuropathic ulcer, right foot  2. Charcot neuroarthopathy, right foot  3. Chronic neuropathic ulcer, left hallux.  4. Contracture, left foot     Procedures performed:  1. Excision of chronic neuropathic ulcer and associated soft tissue mass, right foot with partial primary closure   2. Percutaneous plantar fascietomy, left foot     Anesthesia:  Mac with local, 20 mL 1:1 mix of 1% lidocaine plain and 0.5% Marcaine plain     Materials: 3.0 vicryl and 2.0 nylon; Salera Mini Membrane 160mg     Hemostasis:  Well-padded pneumatic ankle tourniquet, set at 250 mmHg for 11 minutes on right LE only     Findings:  Large fibrotic soft tissue mass at site of right foot chronic wound, no exposed bone nor bony prominence present, no soniya purulence     Estimated blood loss:  15 mL     Complications:  None     Indications for procedure:  The patient is a 58-year-old diabetic male with multiple pedal complications, well known to me from outpatient setting for Charcot neuroarthropathy with chronic wound formation at plantar aspect of right midfoot and left hallux wound.  He has failed local wound care, including nonweightbearing, skin substitute placement.  Surgical treatment, including right midfoot wound excision and possible bone biopsy have been discussed with patient in the clinical setting as means to achieve wound closure more quickly.  Percutaneous plantar fasciectomy also planned to reduce left hallux contracture to facilitate healing of this chronic wound.  Patient is agreeable to procedures.  Possible risk and complications have been discussed in great detail, including infection, need for long-term antibiotic therapy, wound dehiscence and/or nonhealing, need for additional  surgical procedures.  All questions were answered.  Informed consent signed and placed in patient's chart.  Preoperative H&P obtained from patient's primary care provider, along with cardiac risk stratification.      Procedure in detail:  On 12/15/2023, the patient was brought into the operating room via cart and placed on the operating table in a supine position.  After the administration of mac anesthesia, a well-padded pneumatic tourniquet was placed on the right ankle.  Time-out was then performed by the OR staff to ensure correct patient, procedure, limb designations.  Both the right and left feet were prepped and draped in the usual aseptic technique.  The left foot plantar fascial site was then anesthetized using 4 mL of 1:1 mix of 1% lidocaine plain and 0.5% Marcaine plain.  At this time, anesthesia was checked and deemed adequate.  An 18 gauge needle was then inserted 2cm proximal to sesamoid apparatus and central fibers of the plantar fascia were sharply transected.  A palpable defect was felt in the central band, which reduced the contracture.  An island dressing was then applied to the percutaneous needle stab site.    Now, the right foot was anesthetized using 16ml of aforementioned local anesthetic in block fashion.  It was then exsanguinated and tourniquet inflated to 250 mmHg.  Attention was now directed to the plantar aspect of the right foot, where 3 cm granular wound was present.  Two converging semielliptical incisions were made encompassing the wound, 9 cm total, to allow for wound closure.  A large, dense fibrous soft tissue was noted at the site of the wound, extending laterally.  This was excised in total.  No visible bony was present, therefore no bony bx was warranted.  The tourniquet was deflated after 11 minutes due to patient discomfort.  The surgical site was then copiously irrigated using normal saline.  Deep tissues were reapproximated using 3-0 Vicryl.  Skin was then mobilized.  160  mg of Salera Mini Membrane was then placed in wound bed to optimize healing.  Skin edges were reapproximated using 3-0 nylon in both horizontal and simple suture techniques with assistance of suture bradford.  A small gap remained in central aspect of incision site and surgicell was placed to help with hemostasis.  An additional 10 mL of 0.5% Marcaine plain was infiltrated about the surgical site to assist in postoperative pain control.  Incision dressed with 4 x 4 gauze, Celena and kerlix.  A CAM boot was then placed on the right lower extremity.  The patient tolerated both anesthesia and procedure well.  He was transported back to same-day surgery with vital signs stable and neurovascular status intact to both feet.   Patient has been instructed in strict nonweightbearing on this right lower extremity.  He has knee scooter at home, as well as walker.  Patient has follow-up appointment with me next week.  Prescription for Norco 5/325 written for postoperative pain control.  Should any issues arise, he is to contact clinic immediately or seek care at nearest emergency department.

## 2023-12-15 NOTE — TRANSFER OF CARE
Anesthesia Transfer of Care Note    Patient: Andrew Del Cid     Procedure(s) Performed: Procedure(s) (LRB):  EXCISION,TARSAL OR METATARSAL BONE,EXCLUDING TALUS OR CALCANEUS,PARTIAL (Right)  FASCIECTOMY (Left)    Patient location: Other: OR    Anesthesia Type: MAC    Transport from OR: Transported from OR on room air with adequate spontaneous ventilation    Post pain: adequate analgesia    Post assessment: no apparent anesthetic complications    Post vital signs: stable    Level of consciousness: awake    Nausea/Vomiting: no nausea/vomiting    Complications: none    Transfer of care protocol was followed      Last vitals:   132/73  16 RR  59 HR  98% O2 SAT  37 C TEMP

## 2023-12-15 NOTE — PLAN OF CARE
RECEIVED PT TO ROOM 513 ACCOMPANIED BY GALENRN, PT AA&OX3, NO C/O, FEET ELEVATED WITH PILLOW AND FOB, ICE UNDER RT KNEE, CAM BOOT ON RLE, ORTHO SHOE ON LT FOOT. SNACK AVAILABLE. WIFE AT BEDSIDE. CALL WITH NEEDS.

## 2023-12-15 NOTE — DISCHARGE INSTRUCTIONS
FOLLOW UP WITH DR ALCARAZ' AS SCHEDULED.    CALL DR ALCARAZ' OFFICE FOR ANY QUESTIONS OR CONCERNS.  REPORT TO THE ER IF URGENT.    RESUME HOME MEDS AS SCHEDULED.  TAKE PAIN MEDS AS PRESCRIBED.    THANK YOU FOR CHOOSING [facility]!

## 2023-12-15 NOTE — PLAN OF CARE
Pt aa&ox3, no c/o. Bandages rt and lt foot d&I, ice behind knees, urinal to pt, voided 200ml yellow urine, tolerated well. Pt dressing with wife at bedside.

## 2023-12-15 NOTE — BRIEF OP NOTE
[place] - Surgery  Brief Operative Note    Surgery Date: 12/15/2023     Surgeon(s) and Role:     * Baldemar Alcaraz, DPM - Primary    Assisting Surgeon: None    Pre-op Diagnosis:  Charcot's joint of right foot [M14.671]  Right foot ulcer, with fat layer exposed [L97.512]  Ulcer of toe of left foot, limited to breakdown of skin [L97.521]  Contracture, foot, left [M24.575]    Post-op Diagnosis:  Post-Op Diagnosis Codes:     * Charcot's joint of right foot [M14.671]     * Right foot ulcer, with fat layer exposed [L97.512]     * Ulcer of toe of left foot, limited to breakdown of skin [L97.521]     * Contracture, foot, left [M24.575]    Procedure(s) (LRB):  FASCIECTOMY (Left)  DEBRIDEMENT, FOOT (Right)  EXCISION, MASS, FOOT (Right)    Anesthesia: Local MAC    Operative Findings: Large, firm soft tissue mass to plantar foot, site of chronic ucleration; no exposed bone, no bone excision warranted    Estimated Blood Loss: * No values recorded between 12/15/2023  8:04 AM and 12/15/2023  9:13 AM *         Specimens:   Specimen (24h ago, onward)       Start     Ordered    12/15/23 0856  Specimen to Pathology, Surgery Other  Once        Comments: Pre-op Diagnosis: Charcot's joint of right foot [M14.671]Right foot ulcer, with fat layer exposed [L97.512]Ulcer of toe of left foot, limited to breakdown of skin [L97.521]Contracture, foot, left [M24.575]Procedure(s):EXCISION,TARSAL OR METATARSAL BONE,EXCLUDING TALUS OR CALCANEUS,PARTIALFASCIECTOMY Number of specimens: 1Name of specimens: 1)  SOFT TISSUE , CHRONIC WOUND RIGHT FOOT @ 0841     References:    Click here for ordering Quick Tip   Question Answer Comment   Procedure Type: Other    Which provider would you like to cc? BALDEMAR ALCARAZ    Release to patient Immediate        12/15/23 0856                      Discharge Note    OUTCOME: Patient tolerated treatment/procedure well without complication and is now ready for discharge.    DISPOSITION: Home or Self  Care    FINAL DIAGNOSIS:  <principal problem not specified>    FOLLOWUP: In clinic    DISCHARGE INSTRUCTIONS:  No discharge procedures on file.

## 2023-12-18 LAB — SPECIMEN TO PATHOLOGY - SURGICAL: NORMAL

## 2023-12-19 NOTE — ANESTHESIA POSTPROCEDURE EVALUATION
Anesthesia Post Evaluation    Patient: Andrew Del Cid     Procedure(s) Performed: Procedure(s) (LRB):  FASCIECTOMY (Left)  DEBRIDEMENT, FOOT (Right)  EXCISION, MASS, FOOT (Right)    Final Anesthesia Type: general      Patient location during evaluation: OPS  Patient participation: Yes- Able to Participate  Level of consciousness: awake  Post-procedure vital signs: reviewed and stable  Pain management: adequate  Airway patency: patent    PONV status at discharge: No PONV  Anesthetic complications: no      Cardiovascular status: blood pressure returned to baseline  Respiratory status: spontaneous ventilation  Hydration status: euvolemic  Follow-up not needed.              Vitals Value Taken Time   /66 12/15/23 1018   Temp 36.5 °C (97.7 °F) 12/15/23 1018   Pulse 60 12/15/23 1018   Resp 18 12/15/23 1018   SpO2 98 % 12/15/23 1018         No case tracking events are documented in the log.      Pain/Judy Score: No data recorded

## 2024-01-08 LAB
LEFT EYE DM RETINOPATHY: NEGATIVE
RIGHT EYE DM RETINOPATHY: NEGATIVE

## 2024-01-16 ENCOUNTER — LAB VISIT (OUTPATIENT)
Dept: LAB | Facility: HOSPITAL | Age: 59
End: 2024-01-16
Attending: NURSE PRACTITIONER
Payer: MEDICAID

## 2024-01-16 DIAGNOSIS — E11.42 TYPE 2 DIABETES MELLITUS WITH DIABETIC POLYNEUROPATHY, WITH LONG-TERM CURRENT USE OF INSULIN: ICD-10-CM

## 2024-01-16 DIAGNOSIS — I10 ESSENTIAL HYPERTENSION: Chronic | ICD-10-CM

## 2024-01-16 DIAGNOSIS — Z79.4 TYPE 2 DIABETES MELLITUS WITH DIABETIC POLYNEUROPATHY, WITH LONG-TERM CURRENT USE OF INSULIN: ICD-10-CM

## 2024-01-16 LAB
25(OH)D3+25(OH)D2 SERPL-MCNC: 35 NG/ML (ref 30–96)
ALBUMIN SERPL BCP-MCNC: 3.4 G/DL (ref 3.5–5.2)
ALP SERPL-CCNC: 104 U/L (ref 55–135)
ALT SERPL W/O P-5'-P-CCNC: 43 U/L (ref 10–44)
ANION GAP SERPL CALC-SCNC: -1 MMOL/L (ref 3–11)
AST SERPL-CCNC: 26 U/L (ref 10–40)
BILIRUB SERPL-MCNC: 1.1 MG/DL (ref 0.1–1)
BUN SERPL-MCNC: 13 MG/DL (ref 6–20)
CALCIUM SERPL-MCNC: 9.2 MG/DL (ref 8.7–10.5)
CHLORIDE SERPL-SCNC: 106 MMOL/L (ref 95–110)
CO2 SERPL-SCNC: 32 MMOL/L (ref 23–29)
CREAT SERPL-MCNC: 0.9 MG/DL (ref 0.5–1.4)
EST. GFR  (NO RACE VARIABLE): >60 ML/MIN/1.73 M^2
ESTIMATED AVG GLUCOSE: 108 MG/DL (ref 68–131)
GLUCOSE SERPL-MCNC: 101 MG/DL (ref 70–110)
HBA1C MFR BLD: 5.4 % (ref 4–5.6)
POTASSIUM SERPL-SCNC: 4.8 MMOL/L (ref 3.5–5.1)
PROT SERPL-MCNC: 7.9 G/DL (ref 6–8.4)
SODIUM SERPL-SCNC: 137 MMOL/L (ref 136–145)

## 2024-01-16 PROCEDURE — 83036 HEMOGLOBIN GLYCOSYLATED A1C: CPT | Performed by: NURSE PRACTITIONER

## 2024-01-16 PROCEDURE — 36415 COLL VENOUS BLD VENIPUNCTURE: CPT | Performed by: NURSE PRACTITIONER

## 2024-01-16 PROCEDURE — 80053 COMPREHEN METABOLIC PANEL: CPT | Performed by: NURSE PRACTITIONER

## 2024-01-16 PROCEDURE — 82306 VITAMIN D 25 HYDROXY: CPT | Performed by: NURSE PRACTITIONER

## 2024-02-01 PROBLEM — L97.512 NON-PRESSURE CHRONIC ULCER OF OTHER PART OF RIGHT FOOT WITH FAT LAYER EXPOSED: Status: ACTIVE | Noted: 2024-02-01

## 2024-02-01 PROBLEM — L97.522 NON-PRESSURE CHRONIC ULCER OF OTHER PART OF LEFT FOOT WITH FAT LAYER EXPOSED: Status: ACTIVE | Noted: 2024-02-01

## 2024-02-01 PROBLEM — L97.412 DIABETIC ULCER OF RIGHT MIDFOOT ASSOCIATED WITH TYPE 2 DIABETES MELLITUS, WITH FAT LAYER EXPOSED: Status: ACTIVE | Noted: 2024-02-01

## 2024-02-01 PROBLEM — E11.621 DIABETIC ULCER OF RIGHT MIDFOOT ASSOCIATED WITH TYPE 2 DIABETES MELLITUS, WITH FAT LAYER EXPOSED: Status: ACTIVE | Noted: 2024-02-01

## 2024-03-04 PROBLEM — I73.9 PAD (PERIPHERAL ARTERY DISEASE): Status: ACTIVE | Noted: 2024-03-04

## 2024-04-07 DIAGNOSIS — I10 PRIMARY HYPERTENSION: ICD-10-CM

## 2024-04-09 RX ORDER — LOSARTAN POTASSIUM 50 MG/1
50 TABLET ORAL DAILY
Qty: 30 TABLET | Refills: 2 | Status: SHIPPED | OUTPATIENT
Start: 2024-04-09 | End: 2024-06-17

## 2024-05-06 NOTE — ASSESSMENT & PLAN NOTE
Follow wound care team. Last dressing change by podiatry on 10/25.  Patient's FSGs are uncontrolled due to hyperglycemia on current medication regimen.  Last A1c reviewed-   Lab Results   Component Value Date    HGBA1C 9.2 (H) 10/21/2022     Most recent fingerstick glucose reviewed-   Recent Labs   Lab 10/26/22  1929 10/27/22  0517 10/27/22  1118   POCTGLUCOSE 188* 212* 184*     Current correctional scale  Low  Maintain anti-hyperglycemic dose as follows-   Antihyperglycemics (From admission, onward)    Start     Stop Route Frequency Ordered    10/26/22 2115  insulin detemir U-100 pen 30 Units         -- SubQ 2 times daily 10/26/22 2100    10/26/22 2054  insulin aspart U-100 pen 0-5 Units         -- SubQ Before meals & nightly PRN 10/26/22 2100        Hold Oral hypoglycemics while patient is in the hospital.   An extensive goals of care conversation was held including options, risks and benefits of many medical treatments including CPR, intubation, and tube feeding. Pt decided to remain FULL CODE. Pt would like to have all measures taken to help her live however she does not want to be on life support for extended period of time.

## 2024-05-23 ENCOUNTER — OFFICE VISIT (OUTPATIENT)
Dept: PRIMARY CARE CLINIC | Facility: CLINIC | Age: 59
End: 2024-05-23
Payer: MEDICAID

## 2024-05-23 VITALS
WEIGHT: 311 LBS | OXYGEN SATURATION: 97 % | BODY MASS INDEX: 41.22 KG/M2 | HEIGHT: 73 IN | DIASTOLIC BLOOD PRESSURE: 81 MMHG | RESPIRATION RATE: 18 BRPM | SYSTOLIC BLOOD PRESSURE: 132 MMHG | HEART RATE: 86 BPM

## 2024-05-23 DIAGNOSIS — E66.01 CLASS 3 SEVERE OBESITY WITH SERIOUS COMORBIDITY AND BODY MASS INDEX (BMI) OF 40.0 TO 44.9 IN ADULT, UNSPECIFIED OBESITY TYPE: ICD-10-CM

## 2024-05-23 DIAGNOSIS — L97.412 DIABETIC ULCER OF RIGHT MIDFOOT ASSOCIATED WITH TYPE 2 DIABETES MELLITUS, WITH FAT LAYER EXPOSED: ICD-10-CM

## 2024-05-23 DIAGNOSIS — T14.8XXA CHRONIC WOUND: ICD-10-CM

## 2024-05-23 DIAGNOSIS — G47.33 OBSTRUCTIVE SLEEP APNEA: ICD-10-CM

## 2024-05-23 DIAGNOSIS — Z79.4 TYPE 2 DIABETES MELLITUS WITH HYPERGLYCEMIA, WITH LONG-TERM CURRENT USE OF INSULIN: Primary | ICD-10-CM

## 2024-05-23 DIAGNOSIS — E78.2 MIXED HYPERLIPIDEMIA: Chronic | ICD-10-CM

## 2024-05-23 DIAGNOSIS — E11.65 TYPE 2 DIABETES MELLITUS WITH HYPERGLYCEMIA, WITH LONG-TERM CURRENT USE OF INSULIN: Primary | ICD-10-CM

## 2024-05-23 DIAGNOSIS — E13.40 NEUROPATHY DUE TO SECONDARY DIABETES: ICD-10-CM

## 2024-05-23 DIAGNOSIS — E11.621 DIABETIC ULCER OF RIGHT MIDFOOT ASSOCIATED WITH TYPE 2 DIABETES MELLITUS, WITH FAT LAYER EXPOSED: ICD-10-CM

## 2024-05-23 DIAGNOSIS — I10 ESSENTIAL HYPERTENSION: Chronic | ICD-10-CM

## 2024-05-23 DIAGNOSIS — D50.8 IRON DEFICIENCY ANEMIA SECONDARY TO INADEQUATE DIETARY IRON INTAKE: ICD-10-CM

## 2024-05-23 LAB
ALBUMIN SERPL BCP-MCNC: 3.5 G/DL (ref 3.5–5.2)
ALP SERPL-CCNC: 87 U/L (ref 55–135)
ALT SERPL W/O P-5'-P-CCNC: 26 U/L (ref 10–44)
ANION GAP SERPL CALC-SCNC: 4 MMOL/L (ref 3–11)
AST SERPL-CCNC: 14 U/L (ref 10–40)
BILIRUB SERPL-MCNC: 1.2 MG/DL (ref 0.1–1)
BUN SERPL-MCNC: 20 MG/DL (ref 6–20)
CALCIUM SERPL-MCNC: 9.3 MG/DL (ref 8.7–10.5)
CHLORIDE SERPL-SCNC: 106 MMOL/L (ref 95–110)
CO2 SERPL-SCNC: 30 MMOL/L (ref 23–29)
CREAT SERPL-MCNC: 0.9 MG/DL (ref 0.5–1.4)
CREAT UR-MCNC: 359 MG/DL (ref 23–375)
EST. GFR  (NO RACE VARIABLE): >60 ML/MIN/1.73 M^2
ESTIMATED AVG GLUCOSE: 111 MG/DL (ref 68–131)
GLUCOSE SERPL-MCNC: 100 MG/DL (ref 70–110)
HBA1C MFR BLD: 5.5 % (ref 4–5.6)
POTASSIUM SERPL-SCNC: 5.1 MMOL/L (ref 3.5–5.1)
PROT SERPL-MCNC: 7.6 G/DL (ref 6–8.4)
PROT UR-MCNC: 20.7 MG/DL (ref 0–15)
PROT/CREAT UR: 0.06 MG/G{CREAT} (ref 0–0.2)
SODIUM SERPL-SCNC: 140 MMOL/L (ref 136–145)

## 2024-05-23 PROCEDURE — 4010F ACE/ARB THERAPY RXD/TAKEN: CPT | Mod: CPTII,,, | Performed by: STUDENT IN AN ORGANIZED HEALTH CARE EDUCATION/TRAINING PROGRAM

## 2024-05-23 PROCEDURE — 3044F HG A1C LEVEL LT 7.0%: CPT | Mod: CPTII,,, | Performed by: STUDENT IN AN ORGANIZED HEALTH CARE EDUCATION/TRAINING PROGRAM

## 2024-05-23 PROCEDURE — 99214 OFFICE O/P EST MOD 30 MIN: CPT | Mod: S$PBB,,, | Performed by: STUDENT IN AN ORGANIZED HEALTH CARE EDUCATION/TRAINING PROGRAM

## 2024-05-23 PROCEDURE — 1160F RVW MEDS BY RX/DR IN RCRD: CPT | Mod: CPTII,,, | Performed by: STUDENT IN AN ORGANIZED HEALTH CARE EDUCATION/TRAINING PROGRAM

## 2024-05-23 PROCEDURE — 99999 PR PBB SHADOW E&M-EST. PATIENT-LVL IV: CPT | Mod: PBBFAC,,, | Performed by: STUDENT IN AN ORGANIZED HEALTH CARE EDUCATION/TRAINING PROGRAM

## 2024-05-23 PROCEDURE — 82570 ASSAY OF URINE CREATININE: CPT | Performed by: STUDENT IN AN ORGANIZED HEALTH CARE EDUCATION/TRAINING PROGRAM

## 2024-05-23 PROCEDURE — 3008F BODY MASS INDEX DOCD: CPT | Mod: CPTII,,, | Performed by: STUDENT IN AN ORGANIZED HEALTH CARE EDUCATION/TRAINING PROGRAM

## 2024-05-23 PROCEDURE — 2023F DILAT RTA XM W/O RTNOPTHY: CPT | Mod: CPTII,,, | Performed by: STUDENT IN AN ORGANIZED HEALTH CARE EDUCATION/TRAINING PROGRAM

## 2024-05-23 PROCEDURE — 3079F DIAST BP 80-89 MM HG: CPT | Mod: CPTII,,, | Performed by: STUDENT IN AN ORGANIZED HEALTH CARE EDUCATION/TRAINING PROGRAM

## 2024-05-23 PROCEDURE — 80053 COMPREHEN METABOLIC PANEL: CPT | Performed by: STUDENT IN AN ORGANIZED HEALTH CARE EDUCATION/TRAINING PROGRAM

## 2024-05-23 PROCEDURE — 3075F SYST BP GE 130 - 139MM HG: CPT | Mod: CPTII,,, | Performed by: STUDENT IN AN ORGANIZED HEALTH CARE EDUCATION/TRAINING PROGRAM

## 2024-05-23 PROCEDURE — 1159F MED LIST DOCD IN RCRD: CPT | Mod: CPTII,,, | Performed by: STUDENT IN AN ORGANIZED HEALTH CARE EDUCATION/TRAINING PROGRAM

## 2024-05-23 PROCEDURE — 99214 OFFICE O/P EST MOD 30 MIN: CPT | Mod: PBBFAC | Performed by: STUDENT IN AN ORGANIZED HEALTH CARE EDUCATION/TRAINING PROGRAM

## 2024-05-23 PROCEDURE — 83036 HEMOGLOBIN GLYCOSYLATED A1C: CPT | Performed by: STUDENT IN AN ORGANIZED HEALTH CARE EDUCATION/TRAINING PROGRAM

## 2024-05-23 RX ORDER — MULTIVITAMIN
TABLET ORAL
COMMUNITY
Start: 2024-02-22

## 2024-05-23 RX ORDER — METOPROLOL SUCCINATE 50 MG/1
50 TABLET, EXTENDED RELEASE ORAL
COMMUNITY
Start: 2024-05-13

## 2024-05-23 RX ORDER — DULAGLUTIDE 4.5 MG/.5ML
INJECTION, SOLUTION SUBCUTANEOUS
COMMUNITY
Start: 2024-05-11

## 2024-05-23 NOTE — ASSESSMENT & PLAN NOTE
BP Readings from Last 3 Encounters:   05/23/24 132/81   05/15/24 (!) 167/83   04/24/24 129/75     - amlodipine 5 mg daily  - losartan 50 mg daily  - metoprolol succinate 50 mg daily  - continue to follow low sodium diet <2g or 2 teaspoons  - DASH diet  - f/u 6 months

## 2024-05-31 NOTE — ASSESSMENT & PLAN NOTE
Body mass index is 41.03 kg/m². Morbid obesity complicates all aspects of disease management from diagnostic modalities to treatment. Weight loss encouraged and health benefits explained to patient.   Wt Readings from Last 8 Encounters:   05/23/24 (!) 141.1 kg (311 lb)   03/04/24 (!) 136.5 kg (300 lb 14.9 oz)   01/18/24 (!) 138.2 kg (304 lb 9.6 oz)   12/13/23 (!) 146.5 kg (323 lb)   11/17/23 (!) 145.2 kg (320 lb)   01/19/23 (!) 155.9 kg (343 lb 9.6 oz)   12/16/22 (!) 161.9 kg (357 lb)   12/09/22 (!) 161 kg (355 lb)   Over the past 12 months, patient has lost over 50 pounds in weight.  Recommendations:   Stay physically active. As tolerated alternate resistance training with stretching and cardio. Goal of 150 minutes per week of moderate intensity activity or 7,500 - 10,000 steps per day. Follow the Mediterranean Diet. Include whole fresh fruits, vegetables, olive oil, seeds, nuts, whole grains, cold water fish, salmon, mackerel and lean cuts of meat.  Do not drink sugary/diet carbonated beverages. Decrease portion sizes slightly which will result in an approximately 500-calorie deficit. Avoid fast or fried and processed food, especially canned foods. Avoid refined carbohydrates, white starchy foods, flour, white potato, bread, muffins, and cakes. Consider substituting one meal a day with a meal replacement such as Slim fast, lean cuisine, or weight watcher's. Follow a healthy diet that includes enough calcium, vitamin D and proteins for bone health.

## 2024-05-31 NOTE — PROGRESS NOTES
[facility] Note    HPI:  Andrew Del Cid Jr. is a 59 y.o. male who presents today for Health Maintenance (6 month health maintenance)  59 year old with hypertension, hyperlipidemia, polyneuropathy secondary to diabetes mellitus type 2, chronic diabetic foot wound s/p osteomyelitis, wound care with podiatry on going, history of CVA with residual left sided weakness, obstructive sleep apnea on CPAP.     ROS   A review of systems was performed and was negative except as noted above.    I personally reviewed allergies, past medical, surgical, social and family history and updated as appropriate.    Medications:    Current Outpatient Medications:     apixaban (ELIQUIS) 5 mg Tab, Take 1 tablet (5 mg total) by mouth 2 (two) times daily., Disp: 60 tablet, Rfl: 1    aspirin (ECOTRIN) 81 MG EC tablet, Take 81 mg by mouth once daily. OK TO CONTINUE PER DR. ALCARAZ, Disp: , Rfl:     atorvastatin (LIPITOR) 40 MG tablet, Take 1 tablet (40 mg total) by mouth once daily., Disp: 90 tablet, Rfl: 3    DULoxetine (CYMBALTA) 30 MG capsule, Take 1 capsule by mouth once daily, Disp: 30 capsule, Rfl: 11    empagliflozin (JARDIANCE) 25 mg tablet, Take 1 tablet (25 mg total) by mouth once daily., Disp: 30 tablet, Rfl: 11    losartan (COZAAR) 50 MG tablet, Take 1 tablet (50 mg total) by mouth once daily., Disp: 30 tablet, Rfl: 2    metFORMIN (GLUCOPHAGE-XR) 500 MG ER 24hr tablet, Take 2 tablets (1,000 mg total) by mouth 2 (two) times daily with meals. Generic, Disp: 120 tablet, Rfl: 5    metoprolol succinate (TOPROL-XL) 50 MG 24 hr tablet, Take 50 mg by mouth., Disp: , Rfl:     multivitamin (THERAGRAN) per tablet, multivitamin tablet, [RxNorm: 0], Disp: , Rfl:     TRULICITY 4.5 mg/0.5 mL pen injector, inject 4.5 mg under the skin once a week (every 7 days) THANK YOU, Disp: , Rfl:     blood-glucose meter kit, Use as instructed, Disp: 1 each, Rfl: 0    lancets (ONETOUCH DELICA LANCETS) 33 gauge Misc, 1 lancet by  Misc.(Non-Drug; Combo Route) route 3 (three) times daily., Disp: 100 each, Rfl: 5    LANTUS SOLOSTAR U-100 INSULIN glargine 100 units/mL SubQ pen, Inject 40 Units into the skin every evening. (Patient taking differently: Inject 20 Units into the skin every evening.), Disp: 12 mL, Rfl: 2     Health Maintenance:  Immunization History   Administered Date(s) Administered    COVID-19 MRNA, LN-S PF (MODERNA HALF 0.25 ML DOSE) 03/14/2022    COVID-19 Vaccine 03/12/2021, 04/09/2021, 03/14/2022    COVID-19, MRNA, LN-S, PF (MODERNA FULL 0.5 ML DOSE) 03/12/2021, 03/12/2021, 04/09/2021    Influenza 11/15/2018    Influenza - Quadrivalent - PF *Preferred* (6 months and older) 09/12/2019, 10/19/2021, 10/04/2022, 11/17/2023    Influenza - Trivalent - PF (ADULT) 10/19/2021    Pneumococcal Conjugate - 13 Valent 10/19/2021    Tdap 04/27/2013, 04/27/2013      Health Maintenance   Topic Date Due    Hepatitis C Screening  Never done    Shingles Vaccine (1 of 2) Never done    TETANUS VACCINE  04/27/2023    Foot Exam  11/01/2023    Colorectal Cancer Screening  11/05/2024    Lipid Panel  11/17/2024    Hemoglobin A1c  11/23/2024    Eye Exam  01/08/2025    Low Dose Statin  05/23/2025     Health Maintenance Topics with due status: Not Due       Topic Last Completion Date    Colorectal Cancer Screening 11/05/2021    Lipid Panel 11/17/2023    Eye Exam 01/08/2024    Low Dose Statin 05/23/2024    Hemoglobin A1c 05/23/2024     Health Maintenance Due   Topic Date Due    Hepatitis C Screening  Never done    HIV Screening  Never done    Shingles Vaccine (1 of 2) Never done    Pneumococcal Vaccines (Age 0-64) (2 of 2 - PPSV23 or PCV20) 12/14/2021    TETANUS VACCINE  04/27/2023    COVID-19 Vaccine (8 - 2023-24 season) 09/01/2023    Foot Exam  11/01/2023    Diabetes Urine Screening  01/13/2024       PHYSICAL EXAM:  Vitals:    05/23/24 1412   BP: 132/81   BP Location: Right arm   Patient Position: Sitting   BP Method: Large (Automatic)   Pulse: 86   Resp:  Price (Use Numbers Only, No Special Characters Or $): 125 "18   SpO2: 97%   Weight: (!) 141.1 kg (311 lb)   Height: 6' 1" (1.854 m)     Body mass index is 41.03 kg/m².  Physical Exam  Vitals and nursing note reviewed.   Constitutional:       General: He is not in acute distress.     Appearance: Normal appearance. He is not ill-appearing, toxic-appearing or diaphoretic.   HENT:      Head: Normocephalic.      Mouth/Throat:      Mouth: Mucous membranes are moist.      Pharynx: Oropharynx is clear. No posterior oropharyngeal erythema.   Eyes:      Extraocular Movements: Extraocular movements intact.      Conjunctiva/sclera: Conjunctivae normal.   Neck:      Vascular: No carotid bruit.   Cardiovascular:      Rate and Rhythm: Normal rate.      Pulses: Normal pulses.      Heart sounds: Normal heart sounds.   Pulmonary:      Effort: Pulmonary effort is normal.   Abdominal:      General: There is no distension.      Palpations: Abdomen is soft.      Tenderness: There is no abdominal tenderness. There is no guarding.      Comments: Limited due to body habitus, protuberant   Musculoskeletal:      Cervical back: No rigidity or tenderness.      Right lower leg: No edema.      Left lower leg: No edema.   Feet:      Comments: Right leg in cam boot  Lymphadenopathy:      Cervical: No cervical adenopathy.   Skin:     General: Skin is warm and dry.      Capillary Refill: Capillary refill takes less than 2 seconds.   Neurological:      Mental Status: He is alert and oriented to person, place, and time. Mental status is at baseline.   Psychiatric:         Mood and Affect: Mood normal.         Behavior: Behavior normal.          ASSESSMENT/PLAN:  1. Type 2 diabetes mellitus with hyperglycemia, with long-term current use of insulin  Assessment & Plan:  Continue with basal insulin as instructed per endocrinology.   Lab Results   Component Value Date    HGBA1C 5.5 05/23/2024         Orders:  -     Hemoglobin A1C; Future; Expected date: 05/23/2024  -     Comprehensive Metabolic Panel; Future; " Expected date: 05/23/2024  -     Protein/Creatinine Ratio, Urine    2. Essential hypertension  Assessment & Plan:    BP Readings from Last 3 Encounters:   05/23/24 132/81   05/15/24 (!) 167/83   04/24/24 129/75     - amlodipine 5 mg daily  - losartan 50 mg daily  - metoprolol succinate 50 mg daily  - continue to follow low sodium diet <2g or 2 teaspoons  - DASH diet  - f/u 6 months      3. Chronic wound  Assessment & Plan:  Recently completed round of PO antibiotics 4/2024. See above management and podiatry notes.       4. Obstructive sleep apnea  -     Ambulatory referral/consult to ENT; Future; Expected date: 05/30/2024    5. Neuropathy due to secondary diabetes  Overview:   Charcot's       6. Mixed hyperlipidemia    7. Class 3 severe obesity with serious comorbidity and body mass index (BMI) of 40.0 to 44.9 in adult, unspecified obesity type  Assessment & Plan:  Body mass index is 41.03 kg/m². Morbid obesity complicates all aspects of disease management from diagnostic modalities to treatment. Weight loss encouraged and health benefits explained to patient.   Wt Readings from Last 8 Encounters:   05/23/24 (!) 141.1 kg (311 lb)   03/04/24 (!) 136.5 kg (300 lb 14.9 oz)   01/18/24 (!) 138.2 kg (304 lb 9.6 oz)   12/13/23 (!) 146.5 kg (323 lb)   11/17/23 (!) 145.2 kg (320 lb)   01/19/23 (!) 155.9 kg (343 lb 9.6 oz)   12/16/22 (!) 161.9 kg (357 lb)   12/09/22 (!) 161 kg (355 lb)   Over the past 12 months, patient has lost over 50 pounds in weight.  Recommendations:   Stay physically active. As tolerated alternate resistance training with stretching and cardio. Goal of 150 minutes per week of moderate intensity activity or 7,500 - 10,000 steps per day. Follow the Mediterranean Diet. Include whole fresh fruits, vegetables, olive oil, seeds, nuts, whole grains, cold water fish, salmon, mackerel and lean cuts of meat.  Do not drink sugary/diet carbonated beverages. Decrease portion sizes slightly which will result in an  Total Number Of Lesions Treated: 1 Detail Level: Zone approximately 500-calorie deficit. Avoid fast or fried and processed food, especially canned foods. Avoid refined carbohydrates, white starchy foods, flour, white potato, bread, muffins, and cakes. Consider substituting one meal a day with a meal replacement such as Slim fast, lean cuisine, or weight watcher's. Follow a healthy diet that includes enough calcium, vitamin D and proteins for bone health.              8. Diabetic ulcer of right midfoot associated with type 2 diabetes mellitus, with fat layer exposed  Overview:  HPI: 59 yr old male with history of DM-2, was referred to wound clinic for possible HBO, records from podiatry do not indicate the presence of osteomyelitis at this time.     2-7-24: here for wound care for multiple DFU's  2-14-24: here for wound care for multiple DFU's to his feet  2-21-24: here for wound care for multiple DFU's to both feet  2-28-24: here for wound care for multiple DFU's to both feet  3-27-24: here for wound care for multiple DFU's to both feet.  4-10-24: here for wound care for DFU's to right plantar foot and left medial great toe.  4-24-24: here for wound care for DFU's to right plantar mid foot and left medial great toe  5-14-24: here for wound care for DFU's to right plantar foot and left medial great toe  5-29-24: here for wound care for DFU's to right plantar mid foot and left medial great toe.    Assessment & Plan:  Following outpatient wound care with podiatry. . Patient endorses short interruption to healing, but back on daily wound care.       9. Iron deficiency anemia secondary to inadequate dietary iron intake  Assessment & Plan:  Lab Results   Component Value Date    IRON 41 (L) 11/17/2023    TIBC 271 11/17/2023    LABIRON 15 (L) 11/17/2023   Per patient, taking daily iron supplementation. Endorses side effect with daily PO intake of iron causing constipation. Patient with chronic wound healing and anticipating surgical intervention. Will arrange for intravenous  Post-Care Instructions: I reviewed with the patient in detail post-care instructions. Patient is to wear sunprotection, and avoid picking at any of the treated lesions. Pt may apply Vaseline to crusted or scabbing areas. iron infusion to promote post operative healing.  - continue PO ferrous sulfate daily, take with vitamin C  - f/u outpatient infusion with venofer          10. BMI 45.0-49.9, adult  Overview:    Wt Readings from Last 8 Encounters:   05/23/24 (!) 141.1 kg (311 lb)   03/04/24 (!) 136.5 kg (300 lb 14.9 oz)   01/18/24 (!) 138.2 kg (304 lb 9.6 oz)   12/13/23 (!) 146.5 kg (323 lb)   11/17/23 (!) 145.2 kg (320 lb)   01/19/23 (!) 155.9 kg (343 lb 9.6 oz)   12/16/22 (!) 161.9 kg (357 lb)   12/09/22 (!) 161 kg (355 lb)       Assessment & Plan:  Recommendations:   Stay physically active. As tolerated alternate resistance training with stretching and cardio. Goal of 150 minutes per week of moderate intensity activity or 7,500 - 10,000 steps per day. Follow the Mediterranean Diet. Include whole fresh fruits, vegetables, olive oil, seeds, nuts, whole grains, cold water fish, salmon, mackerel and lean cuts of meat.  Do not drink sugary/diet carbonated beverages. Decrease portion sizes slightly which will result in an approximately 500-calorie deficit. Avoid fast or fried and processed food, especially canned foods. Avoid refined carbohydrates, white starchy foods, flour, white potato, bread, muffins, and cakes. Consider substituting one meal a day with a meal replacement such as Slim fast, lean cuisine, or weight watcher's. Follow a healthy diet that includes enough calcium, vitamin D and proteins for bone health.          Other than changes above, continue current medications and maintain follow up with specialists.      No follow-ups on file.   Recent Results (from the past 2016 hour(s))   Aerobic culture    Collection Time: 04/24/24 10:45 AM    Specimen: Foot, Right; Wound   Result Value Ref Range    Aerobic Bacterial Culture ESCHERICHIA COLI  Few   (A)        Susceptibility    Escherichia coli - CULTURE, AEROBIC  (SPECIFY SOURCE)     Ampicillin 8 Resistant mcg/mL     Amp/Sulbactam <=2 Sensitive mcg/mL     Aztreonam <=1  Anesthesia Volume In Cc: 0.5 Sensitive mcg/mL     Cefazolin >=64 Resistant mcg/mL     Cefepime <=1 Sensitive mcg/mL     Ceftriaxone <=1 Sensitive mcg/mL     Ciprofloxacin 1 Sensitive mcg/mL     Ertapenem <=0.5 Sensitive mcg/mL     Gentamicin <=1 Sensitive mcg/mL     Meropenem <0.25 Sensitive mcg/mL     Piperacillin/Tazo <=4 Sensitive mcg/mL     Tobramycin <=1 Sensitive mcg/mL     Trimeth/Sulfa <=20 Sensitive mcg/mL   Anaerobic culture    Collection Time: 04/24/24 10:45 AM    Specimen: Foot, Right; Wound   Result Value Ref Range    Anaerobic Culture No anaerobes isolated    Protein/Creatinine Ratio, Urine    Collection Time: 05/23/24  3:04 PM   Result Value Ref Range    Protein, Urine Random 20.7 (H) 0.0 - 15.0 mg/dL    Creatinine, Urine 359.0 23.0 - 375.0 mg/dL    Prot/Creat Ratio, Urine 0.06 0.00 - 0.20   Comprehensive Metabolic Panel    Collection Time: 05/23/24  3:05 PM   Result Value Ref Range    Sodium 140 136 - 145 mmol/L    Potassium 5.1 3.5 - 5.1 mmol/L    Chloride 106 95 - 110 mmol/L    CO2 30 (H) 23 - 29 mmol/L    Glucose 100 70 - 110 mg/dL    BUN 20 6 - 20 mg/dL    Creatinine 0.9 0.5 - 1.4 mg/dL    Calcium 9.3 8.7 - 10.5 mg/dL    Total Protein 7.6 6.0 - 8.4 g/dL    Albumin 3.5 3.5 - 5.2 g/dL    Total Bilirubin 1.2 (H) 0.1 - 1.0 mg/dL    Alkaline Phosphatase 87 55 - 135 U/L    AST 14 10 - 40 U/L    ALT 26 10 - 44 U/L    eGFR >60.0 >60 mL/min/1.73 m^2    Anion Gap 4 3 - 11 mmol/L   Hemoglobin A1C    Collection Time: 05/23/24  3:05 PM   Result Value Ref Range    Hemoglobin A1C 5.5 4.0 - 5.6 %    Estimated Avg Glucose 111 68 - 131 mg/dL         Kazumi G Yoshinaga, DO [facility]                   Consent: The patient's consent was obtained including but not limited to risks of crusting, scabbing, blistering, scarring, darker or lighter pigmentary change, recurrence, incomplete removal and infection.

## 2024-05-31 NOTE — ASSESSMENT & PLAN NOTE
Lab Results   Component Value Date    IRON 41 (L) 11/17/2023    TIBC 271 11/17/2023    LABIRON 15 (L) 11/17/2023   Per patient, taking daily iron supplementation. Endorses side effect with daily PO intake of iron causing constipation. Patient with chronic wound healing and anticipating surgical intervention. Will arrange for intravenous iron infusion to promote post operative healing.  - continue PO ferrous sulfate daily, take with vitamin C  - f/u outpatient infusion with venofer

## 2024-05-31 NOTE — ASSESSMENT & PLAN NOTE
Following outpatient wound care with podiatry. . Patient endorses short interruption to healing, but back on daily wound care.

## 2024-05-31 NOTE — ASSESSMENT & PLAN NOTE
Continue with basal insulin as instructed per endocrinology.   Lab Results   Component Value Date    HGBA1C 5.5 05/23/2024

## 2024-06-14 DIAGNOSIS — I10 PRIMARY HYPERTENSION: ICD-10-CM

## 2024-06-17 RX ORDER — LOSARTAN POTASSIUM 50 MG/1
50 TABLET ORAL DAILY
Qty: 90 TABLET | Refills: 1 | Status: SHIPPED | OUTPATIENT
Start: 2024-06-17

## 2024-08-07 PROBLEM — L97.522 ULCER OF TOE OF LEFT FOOT, WITH FAT LAYER EXPOSED: Status: ACTIVE | Noted: 2024-08-07

## 2024-09-06 DIAGNOSIS — E78.2 MIXED HYPERLIPIDEMIA: Chronic | ICD-10-CM

## 2024-09-06 DIAGNOSIS — L97.518 DIABETIC ULCER OF RIGHT FOOT ASSOCIATED WITH TYPE 2 DIABETES MELLITUS, WITH OTHER ULCER SEVERITY, UNSPECIFIED PART OF FOOT: ICD-10-CM

## 2024-09-06 DIAGNOSIS — E13.40 NEUROPATHY DUE TO SECONDARY DIABETES: ICD-10-CM

## 2024-09-06 DIAGNOSIS — E11.621 DIABETIC ULCER OF RIGHT FOOT ASSOCIATED WITH TYPE 2 DIABETES MELLITUS, WITH OTHER ULCER SEVERITY, UNSPECIFIED PART OF FOOT: ICD-10-CM

## 2024-09-06 RX ORDER — ATORVASTATIN CALCIUM 40 MG/1
40 TABLET, FILM COATED ORAL
Qty: 30 TABLET | Refills: 0 | Status: SHIPPED | OUTPATIENT
Start: 2024-09-06

## 2024-09-06 RX ORDER — DULOXETIN HYDROCHLORIDE 30 MG/1
CAPSULE, DELAYED RELEASE ORAL
Qty: 30 CAPSULE | Refills: 0 | Status: SHIPPED | OUTPATIENT
Start: 2024-09-06

## 2024-10-06 ENCOUNTER — HOSPITAL ENCOUNTER (INPATIENT)
Facility: HOSPITAL | Age: 59
LOS: 4 days | Discharge: HOME | DRG: 871 | End: 2024-10-10
Attending: EMERGENCY MEDICINE | Admitting: EMERGENCY MEDICINE
Payer: MEDICAID

## 2024-10-06 DIAGNOSIS — L97.412 DIABETIC ULCER OF RIGHT MIDFOOT ASSOCIATED WITH TYPE 2 DIABETES MELLITUS, WITH FAT LAYER EXPOSED: ICD-10-CM

## 2024-10-06 DIAGNOSIS — D72.829 LEUKOCYTOSIS, UNSPECIFIED TYPE: ICD-10-CM

## 2024-10-06 DIAGNOSIS — R79.89 ELEVATED LACTIC ACID LEVEL: ICD-10-CM

## 2024-10-06 DIAGNOSIS — T14.8XXA CHRONIC WOUND: ICD-10-CM

## 2024-10-06 DIAGNOSIS — R78.81 BACTEREMIA DUE TO METHICILLIN SUSCEPTIBLE STAPHYLOCOCCUS AUREUS (MSSA): Primary | ICD-10-CM

## 2024-10-06 DIAGNOSIS — R53.1 WEAKNESS: ICD-10-CM

## 2024-10-06 DIAGNOSIS — I48.91 ATRIAL FIBRILLATION WITH RAPID VENTRICULAR RESPONSE: ICD-10-CM

## 2024-10-06 DIAGNOSIS — A41.9 SEVERE SEPSIS: ICD-10-CM

## 2024-10-06 DIAGNOSIS — D50.8 IRON DEFICIENCY ANEMIA SECONDARY TO INADEQUATE DIETARY IRON INTAKE: ICD-10-CM

## 2024-10-06 DIAGNOSIS — R65.20 SEVERE SEPSIS: ICD-10-CM

## 2024-10-06 DIAGNOSIS — E11.621 DIABETIC ULCER OF RIGHT MIDFOOT ASSOCIATED WITH TYPE 2 DIABETES MELLITUS, WITH FAT LAYER EXPOSED: ICD-10-CM

## 2024-10-06 DIAGNOSIS — I48.91 ATRIAL FIBRILLATION: ICD-10-CM

## 2024-10-06 DIAGNOSIS — E11.628 DIABETIC FOOT INFECTION: ICD-10-CM

## 2024-10-06 DIAGNOSIS — B95.61 BACTEREMIA DUE TO METHICILLIN SUSCEPTIBLE STAPHYLOCOCCUS AUREUS (MSSA): Primary | ICD-10-CM

## 2024-10-06 DIAGNOSIS — L08.9 DIABETIC FOOT INFECTION: ICD-10-CM

## 2024-10-06 LAB
ALBUMIN SERPL BCP-MCNC: 1.9 G/DL (ref 3.5–5.2)
ALP SERPL-CCNC: 109 U/L (ref 55–135)
ALT SERPL W/O P-5'-P-CCNC: 17 U/L (ref 10–44)
ANION GAP SERPL CALC-SCNC: 12 MMOL/L (ref 3–11)
AST SERPL-CCNC: 18 U/L (ref 10–40)
BACTERIA #/AREA URNS HPF: NEGATIVE /HPF
BASOPHILS # BLD AUTO: 0.05 K/UL (ref 0–0.2)
BASOPHILS NFR BLD: 0.3 % (ref 0–1.9)
BILIRUB SERPL-MCNC: 1 MG/DL (ref 0.1–1)
BILIRUB UR QL STRIP: NEGATIVE
BUN SERPL-MCNC: 27 MG/DL (ref 6–20)
CALCIUM SERPL-MCNC: 9.1 MG/DL (ref 8.7–10.5)
CHLORIDE SERPL-SCNC: 93 MMOL/L (ref 95–110)
CLARITY UR: ABNORMAL
CO2 SERPL-SCNC: 24 MMOL/L (ref 23–29)
COLOR UR: YELLOW
CREAT SERPL-MCNC: 1.4 MG/DL (ref 0.5–1.4)
CTP QC/QA: YES
DIFFERENTIAL METHOD BLD: ABNORMAL
EOSINOPHIL # BLD AUTO: 0 K/UL (ref 0–0.5)
EOSINOPHIL NFR BLD: 0.2 % (ref 0–8)
ERYTHROCYTE [DISTWIDTH] IN BLOOD BY AUTOMATED COUNT: 12.6 % (ref 11.5–14.5)
EST. GFR  (NO RACE VARIABLE): 58.6 ML/MIN/1.73 M^2
ESTIMATED AVG GLUCOSE: 177 MG/DL (ref 68–131)
GLUCOSE SERPL-MCNC: 192 MG/DL (ref 70–110)
GLUCOSE UR QL STRIP: ABNORMAL
GRAN CASTS #/AREA URNS LPF: 5 /LPF
HBA1C MFR BLD: 7.8 % (ref 4–5.6)
HCT VFR BLD AUTO: 35.1 % (ref 40–54)
HGB BLD-MCNC: 11.3 G/DL (ref 14–18)
HGB UR QL STRIP: NEGATIVE
HYALINE CASTS #/AREA URNS LPF: 16 /LPF
IMM GRANULOCYTES # BLD AUTO: 0.16 K/UL (ref 0–0.04)
IMM GRANULOCYTES NFR BLD AUTO: 1 % (ref 0–0.5)
KETONES UR QL STRIP: ABNORMAL
LACTATE SERPL-SCNC: 1.1 MMOL/L (ref 0.5–2.2)
LACTATE SERPL-SCNC: 2.3 MMOL/L (ref 0.5–2.2)
LEUKOCYTE ESTERASE UR QL STRIP: NEGATIVE
LYMPHOCYTES # BLD AUTO: 0.8 K/UL (ref 1–4.8)
LYMPHOCYTES NFR BLD: 5 % (ref 18–48)
MAGNESIUM SERPL-MCNC: 1.5 MG/DL (ref 1.6–2.6)
MCH RBC QN AUTO: 27.2 PG (ref 27–31)
MCHC RBC AUTO-ENTMCNC: 32.2 G/DL (ref 32–36)
MCV RBC AUTO: 84 FL (ref 82–98)
MICROSCOPIC COMMENT: ABNORMAL
MONOCYTES # BLD AUTO: 0.9 K/UL (ref 0.3–1)
MONOCYTES NFR BLD: 5.6 % (ref 4–15)
NEUTROPHILS # BLD AUTO: 14.5 K/UL (ref 1.8–7.7)
NEUTROPHILS NFR BLD: 87.9 % (ref 38–73)
NITRITE UR QL STRIP: NEGATIVE
NRBC BLD-RTO: 0 /100 WBC
NT-PROBNP SERPL-MCNC: 2184 PG/ML (ref 5–900)
OHS QRS DURATION: 86 MS
OHS QTC CALCULATION: 414 MS
PH UR STRIP: 5 [PH] (ref 5–8)
PLATELET # BLD AUTO: 536 K/UL (ref 150–450)
PMV BLD AUTO: 8.9 FL (ref 9.2–12.9)
POCT GLUCOSE: 178 MG/DL (ref 70–110)
POCT GLUCOSE: 223 MG/DL (ref 70–110)
POTASSIUM SERPL-SCNC: 4.6 MMOL/L (ref 3.5–5.1)
PROCALCITONIN SERPL IA-MCNC: 0.36 NG/ML
PROT SERPL-MCNC: 7.5 G/DL (ref 6–8.4)
PROT UR QL STRIP: NEGATIVE
RBC # BLD AUTO: 4.16 M/UL (ref 4.6–6.2)
RBC #/AREA URNS HPF: 3 /HPF (ref 0–4)
SARS-COV-2 RDRP RESP QL NAA+PROBE: NEGATIVE
SODIUM SERPL-SCNC: 129 MMOL/L (ref 136–145)
SP GR UR STRIP: >=1.03 (ref 1–1.03)
SQUAMOUS #/AREA URNS HPF: 2 /HPF
TROPONIN I SERPL DL<=0.01 NG/ML-MCNC: 4.7 PG/ML (ref 0–60)
URN SPEC COLLECT METH UR: ABNORMAL
UROBILINOGEN UR STRIP-ACNC: 1 EU/DL
WBC # BLD AUTO: 16.47 K/UL (ref 3.9–12.7)
WBC #/AREA URNS HPF: 2 /HPF (ref 0–5)
YEAST URNS QL MICRO: ABNORMAL

## 2024-10-06 PROCEDURE — 87635 SARS-COV-2 COVID-19 AMP PRB: CPT | Performed by: NURSE PRACTITIONER

## 2024-10-06 PROCEDURE — 80053 COMPREHEN METABOLIC PANEL: CPT | Performed by: NURSE PRACTITIONER

## 2024-10-06 PROCEDURE — 96375 TX/PRO/DX INJ NEW DRUG ADDON: CPT

## 2024-10-06 PROCEDURE — 87077 CULTURE AEROBIC IDENTIFY: CPT | Performed by: NURSE PRACTITIONER

## 2024-10-06 PROCEDURE — 93005 ELECTROCARDIOGRAM TRACING: CPT

## 2024-10-06 PROCEDURE — 20000000 HC ICU ROOM

## 2024-10-06 PROCEDURE — 99900031 HC PATIENT EDUCATION (STAT)

## 2024-10-06 PROCEDURE — 83605 ASSAY OF LACTIC ACID: CPT | Performed by: NURSE PRACTITIONER

## 2024-10-06 PROCEDURE — 99291 CRITICAL CARE FIRST HOUR: CPT

## 2024-10-06 PROCEDURE — 84484 ASSAY OF TROPONIN QUANT: CPT | Performed by: EMERGENCY MEDICINE

## 2024-10-06 PROCEDURE — 93010 ELECTROCARDIOGRAM REPORT: CPT | Mod: ,,, | Performed by: INTERNAL MEDICINE

## 2024-10-06 PROCEDURE — 83880 ASSAY OF NATRIURETIC PEPTIDE: CPT | Performed by: EMERGENCY MEDICINE

## 2024-10-06 PROCEDURE — 27000221 HC OXYGEN, UP TO 24 HOURS

## 2024-10-06 PROCEDURE — 63600175 PHARM REV CODE 636 W HCPCS: Performed by: EMERGENCY MEDICINE

## 2024-10-06 PROCEDURE — 83036 HEMOGLOBIN GLYCOSYLATED A1C: CPT | Performed by: EMERGENCY MEDICINE

## 2024-10-06 PROCEDURE — 87154 CUL TYP ID BLD PTHGN 6+ TRGT: CPT | Performed by: NURSE PRACTITIONER

## 2024-10-06 PROCEDURE — 87186 SC STD MICRODIL/AGAR DIL: CPT | Mod: 59 | Performed by: NURSE PRACTITIONER

## 2024-10-06 PROCEDURE — 63600175 PHARM REV CODE 636 W HCPCS: Performed by: STUDENT IN AN ORGANIZED HEALTH CARE EDUCATION/TRAINING PROGRAM

## 2024-10-06 PROCEDURE — 99900035 HC TECH TIME PER 15 MIN (STAT)

## 2024-10-06 PROCEDURE — 85025 COMPLETE CBC W/AUTO DIFF WBC: CPT | Performed by: NURSE PRACTITIONER

## 2024-10-06 PROCEDURE — 94761 N-INVAS EAR/PLS OXIMETRY MLT: CPT

## 2024-10-06 PROCEDURE — 25000003 PHARM REV CODE 250: Performed by: EMERGENCY MEDICINE

## 2024-10-06 PROCEDURE — 25000003 PHARM REV CODE 250: Performed by: STUDENT IN AN ORGANIZED HEALTH CARE EDUCATION/TRAINING PROGRAM

## 2024-10-06 PROCEDURE — 83605 ASSAY OF LACTIC ACID: CPT | Mod: 91 | Performed by: NURSE PRACTITIONER

## 2024-10-06 PROCEDURE — 84145 PROCALCITONIN (PCT): CPT | Performed by: EMERGENCY MEDICINE

## 2024-10-06 PROCEDURE — 36415 COLL VENOUS BLD VENIPUNCTURE: CPT | Performed by: EMERGENCY MEDICINE

## 2024-10-06 PROCEDURE — 81000 URINALYSIS NONAUTO W/SCOPE: CPT | Performed by: NURSE PRACTITIONER

## 2024-10-06 PROCEDURE — 94799 UNLISTED PULMONARY SVC/PX: CPT

## 2024-10-06 PROCEDURE — 83735 ASSAY OF MAGNESIUM: CPT | Performed by: EMERGENCY MEDICINE

## 2024-10-06 PROCEDURE — 87040 BLOOD CULTURE FOR BACTERIA: CPT | Performed by: NURSE PRACTITIONER

## 2024-10-06 PROCEDURE — 96365 THER/PROPH/DIAG IV INF INIT: CPT

## 2024-10-06 PROCEDURE — 36415 COLL VENOUS BLD VENIPUNCTURE: CPT | Performed by: NURSE PRACTITIONER

## 2024-10-06 RX ORDER — TALC
6 POWDER (GRAM) TOPICAL NIGHTLY PRN
Status: DISCONTINUED | OUTPATIENT
Start: 2024-10-06 | End: 2024-10-10 | Stop reason: HOSPADM

## 2024-10-06 RX ORDER — GLUCAGON 1 MG
1 KIT INJECTION
Status: DISCONTINUED | OUTPATIENT
Start: 2024-10-06 | End: 2024-10-10 | Stop reason: HOSPADM

## 2024-10-06 RX ORDER — SODIUM CHLORIDE 0.9 % (FLUSH) 0.9 %
10 SYRINGE (ML) INJECTION
Status: DISCONTINUED | OUTPATIENT
Start: 2024-10-06 | End: 2024-10-10 | Stop reason: HOSPADM

## 2024-10-06 RX ORDER — ATORVASTATIN CALCIUM 40 MG/1
40 TABLET, FILM COATED ORAL DAILY
Status: DISCONTINUED | OUTPATIENT
Start: 2024-10-07 | End: 2024-10-10 | Stop reason: HOSPADM

## 2024-10-06 RX ORDER — ONDANSETRON HYDROCHLORIDE 2 MG/ML
4 INJECTION, SOLUTION INTRAVENOUS EVERY 8 HOURS PRN
Status: DISCONTINUED | OUTPATIENT
Start: 2024-10-06 | End: 2024-10-10 | Stop reason: HOSPADM

## 2024-10-06 RX ORDER — IBUPROFEN 200 MG
16 TABLET ORAL
Status: DISCONTINUED | OUTPATIENT
Start: 2024-10-06 | End: 2024-10-10 | Stop reason: HOSPADM

## 2024-10-06 RX ORDER — ENOXAPARIN SODIUM 150 MG/ML
1 INJECTION SUBCUTANEOUS EVERY 12 HOURS
Status: DISCONTINUED | OUTPATIENT
Start: 2024-10-06 | End: 2024-10-07

## 2024-10-06 RX ORDER — HYDROCODONE BITARTRATE AND ACETAMINOPHEN 10; 325 MG/1; MG/1
1 TABLET ORAL EVERY 4 HOURS PRN
Status: DISCONTINUED | OUTPATIENT
Start: 2024-10-06 | End: 2024-10-09

## 2024-10-06 RX ORDER — DILTIAZEM HYDROCHLORIDE 5 MG/ML
10 INJECTION INTRAVENOUS
Status: COMPLETED | OUTPATIENT
Start: 2024-10-06 | End: 2024-10-06

## 2024-10-06 RX ORDER — IBUPROFEN 200 MG
24 TABLET ORAL
Status: DISCONTINUED | OUTPATIENT
Start: 2024-10-06 | End: 2024-10-10 | Stop reason: HOSPADM

## 2024-10-06 RX ORDER — DILTIAZEM HYDROCHLORIDE 5 MG/ML
20 INJECTION INTRAVENOUS
Status: COMPLETED | OUTPATIENT
Start: 2024-10-06 | End: 2024-10-06

## 2024-10-06 RX ORDER — HYDROCODONE BITARTRATE AND ACETAMINOPHEN 5; 325 MG/1; MG/1
1 TABLET ORAL EVERY 4 HOURS PRN
Status: DISCONTINUED | OUTPATIENT
Start: 2024-10-06 | End: 2024-10-10 | Stop reason: HOSPADM

## 2024-10-06 RX ORDER — ACETAMINOPHEN 325 MG/1
650 TABLET ORAL EVERY 8 HOURS PRN
Status: DISCONTINUED | OUTPATIENT
Start: 2024-10-06 | End: 2024-10-10 | Stop reason: HOSPADM

## 2024-10-06 RX ORDER — DULOXETIN HYDROCHLORIDE 30 MG/1
30 CAPSULE, DELAYED RELEASE ORAL DAILY
Status: DISCONTINUED | OUTPATIENT
Start: 2024-10-07 | End: 2024-10-10 | Stop reason: HOSPADM

## 2024-10-06 RX ORDER — INSULIN ASPART 100 [IU]/ML
0-5 INJECTION, SOLUTION INTRAVENOUS; SUBCUTANEOUS
Status: DISCONTINUED | OUTPATIENT
Start: 2024-10-06 | End: 2024-10-10 | Stop reason: HOSPADM

## 2024-10-06 RX ORDER — FAMOTIDINE 10 MG/ML
20 INJECTION INTRAVENOUS EVERY 12 HOURS
Status: DISCONTINUED | OUTPATIENT
Start: 2024-10-06 | End: 2024-10-10 | Stop reason: HOSPADM

## 2024-10-06 RX ORDER — ASPIRIN 81 MG/1
81 TABLET ORAL DAILY
Status: DISCONTINUED | OUTPATIENT
Start: 2024-10-07 | End: 2024-10-10 | Stop reason: HOSPADM

## 2024-10-06 RX ORDER — METOPROLOL SUCCINATE 100 MG/1
100 TABLET, EXTENDED RELEASE ORAL DAILY
Status: DISCONTINUED | OUTPATIENT
Start: 2024-10-07 | End: 2024-10-10 | Stop reason: HOSPADM

## 2024-10-06 RX ADMIN — FAMOTIDINE 20 MG: 10 INJECTION, SOLUTION INTRAVENOUS at 07:10

## 2024-10-06 RX ADMIN — DILTIAZEM HYDROCHLORIDE 10 MG: 5 INJECTION INTRAVENOUS at 02:10

## 2024-10-06 RX ADMIN — PIPERACILLIN SODIUM AND TAZOBACTAM SODIUM 4.5 G: 4; .5 INJECTION, POWDER, FOR SOLUTION INTRAVENOUS at 10:10

## 2024-10-06 RX ADMIN — PIPERACILLIN SODIUM AND TAZOBACTAM SODIUM 4.5 G: 4; .5 INJECTION, POWDER, FOR SOLUTION INTRAVENOUS at 02:10

## 2024-10-06 RX ADMIN — INSULIN ASPART 1 UNITS: 100 INJECTION, SOLUTION INTRAVENOUS; SUBCUTANEOUS at 09:10

## 2024-10-06 RX ADMIN — ENOXAPARIN SODIUM 135 MG: 150 INJECTION SUBCUTANEOUS at 07:10

## 2024-10-06 RX ADMIN — VANCOMYCIN HYDROCHLORIDE 1500 MG: 1.5 INJECTION, POWDER, LYOPHILIZED, FOR SOLUTION INTRAVENOUS at 06:10

## 2024-10-06 RX ADMIN — DILTIAZEM HYDROCHLORIDE 20 MG: 5 INJECTION INTRAVENOUS at 03:10

## 2024-10-06 RX ADMIN — VANCOMYCIN HYDROCHLORIDE 1000 MG: 1 INJECTION, POWDER, LYOPHILIZED, FOR SOLUTION INTRAVENOUS at 06:10

## 2024-10-06 NOTE — ED PROVIDER NOTES
"Encounter Date: 10/6/2024       History     Chief Complaint   Patient presents with    Weakness     Pt stated that for the past couple weeks he has been experiencing fatigue / decreased appetite / cough / subjective fever / "cold sweats".       59-year-old male with a history of acute renal failure, atrial fibrillation in the past, off of his blood thinners, diabetes mellitus, hyperlipidemia, hypertension, peripheral neuropathy presents the emergency department stating that for the past couple of weeks he has been experiencing fatigue, subjective fevers, cold sweats, cough, not eating or drinking well.  He has been seeing  as well as  for his wound care to bilateral feet.  Here in the emergency department he is alert and oriented x4, GCS is 15      Review of patient's allergies indicates:  No Known Allergies  Past Medical History:   Diagnosis Date    Acute renal failure with tubular necrosis 10/22/2022    Atrial fibrillation     BMI 45.0-49.9, adult 10/05/2022    Recommendations:   Stay physically active. As tolerated alternate resistance training with stretching and cardio. Goal of 150 minutes per week of moderate intensity activity or 7,500 - 10,000 steps per day. Follow the Mediterranean Diet. Include whole fresh fruits, vegetables, olive oil, seeds, nuts, whole grains, cold water fish, salmon, mackerel and lean cuts of meat.  Do not drink sugary/diet c    Charcot's joint 12/2023    right    Diabetes mellitus, type 2     Does mobilize using cane     Fever 10/22/2022    Hyperlipidemia     Hypertension     Hyponatremia 10/22/2022    Left-sided weakness     Neuropathy     bilateral lower ext    Obese     PAD (peripheral artery disease)     Peripheral neuropathy     Rash 11/01/2022    Rhinophyma     BILATERAL NOSE    Severe sleep apnea     UNABLE TO TOLERATE CPAP    Stroke 10/2022    Wound of right foot 12/2023     Past Surgical History:   Procedure Laterality Date    ANGIOGRAM, AORTIC ARCH, " PERIPHERAL Bilateral 3/4/2024    Procedure: Angiogram, Aortic Arch, Peripheral;  Surgeon: Isai Hernandez MD;  Location: [facility] CATH;  Service: Cardiology;  Laterality: Bilateral;    APPENDECTOMY      pt denies    DEBRIDEMENT OF FOOT Right 12/15/2023    Procedure: DEBRIDEMENT, FOOT;  Surgeon: Baldemar Esquivel DPM;  Location: [facility] OR;  Service: Podiatry;  Laterality: Right;    FOOT MASS EXCISION Right 12/15/2023    Procedure: EXCISION, MASS, FOOT;  Surgeon: Baldemar Esquivel DPM;  Location: [facility] OR;  Service: Podiatry;  Laterality: Right;    HERNIA REPAIR      INCISION AND DRAINAGE FOOT Bilateral 12/26/2018    Procedure: INCISION AND DRAINAGE, FOOT;  Surgeon: Rob Alas DPM;  Location: [facility] OR;  Service: Podiatry;  Laterality: Bilateral;    SURGICAL REMOVAL OF FASCIA Left 12/15/2023    Procedure: FASCIECTOMY;  Surgeon: Baldemar Esquivel DPM;  Location: [facility] OR;  Service: Podiatry;  Laterality: Left;  1st 0600    TOE AMPUTATION      right great toe    TOE AMPUTATION Left 12/26/2018    Procedure: AMPUTATION, TOE; left 3rd toe;  Surgeon: Rob Alas DPM;  Location: [facility] OR;  Service: Podiatry;  Laterality: Left;    TONSILLECTOMY       Family History   Problem Relation Name Age of Onset    No Known Problems Mother      Cancer Father      No Known Problems Brother       Social History     Tobacco Use    Smoking status: Never     Passive exposure: Never    Smokeless tobacco: Never   Substance Use Topics    Alcohol use: No    Drug use: No     Review of Systems   Constitutional:  Negative for fever.   HENT:  Negative for sore throat.    Respiratory:  Positive for cough. Negative for shortness of breath.    Cardiovascular:  Negative for chest pain.   Gastrointestinal:  Negative for nausea.   Genitourinary:  Negative for dysuria.   Musculoskeletal:  Negative for back pain.   Skin:  Positive for wound. Negative for rash.   Neurological:  Negative for weakness.   Hematological:  Does not bruise/bleed  easily.   All other systems reviewed and are negative.      Physical Exam     Initial Vitals [10/06/24 1325]   BP Pulse Resp Temp SpO2   (!) 88/68 (!) 140 20 98.9 °F (37.2 °C) (!) 94 %      MAP       --         Physical Exam    Nursing note and vitals reviewed.  Constitutional: He appears well-developed and well-nourished. He is not diaphoretic. No distress.   Obese male   HENT:   Head: Normocephalic and atraumatic.   Eyes: Conjunctivae and EOM are normal. Pupils are equal, round, and reactive to light. Right eye exhibits no discharge. Left eye exhibits no discharge. No scleral icterus.   Neck: Neck supple. No JVD present.   Normal range of motion.  Cardiovascular:  Normal heart sounds and intact distal pulses.           No murmur heard.  Irregular rhythm, rate of 128 beats per minute   Pulmonary/Chest: Breath sounds normal. No stridor. No respiratory distress. He has no wheezes. He has no rhonchi. He has no rales. He exhibits no tenderness.   Abdominal: Abdomen is soft. Bowel sounds are normal. He exhibits no distension and no mass. There is no abdominal tenderness. There is no rebound and no guarding.   Musculoskeletal:         General: No tenderness or edema. Normal range of motion.      Cervical back: Normal range of motion and neck supple.      Comments: Wound appears clean to plantar surface of right foot.  There is a foul odor     Neurological: He is alert and oriented to person, place, and time. He has normal strength. GCS score is 15. GCS eye subscore is 4. GCS verbal subscore is 5. GCS motor subscore is 6.   Skin: Skin is warm and dry. Capillary refill takes less than 2 seconds.         ED Course   Critical Care    Date/Time: 10/6/2024 2:53 PM    Performed by: Ismael Amaro MD  Authorized by: Ismael Amaro MD  Direct patient critical care time: 10 minutes  Additional history critical care time: 10 minutes  Ordering / reviewing critical care time: 10 minutes  Documentation critical care time: 10  minutes  Consulting other physicians critical care time: 10 minutes  Consult with family critical care time: 10 minutes  Total critical care time (exclusive of procedural time) : 60 minutes  Critical care was necessary to treat or prevent imminent or life-threatening deterioration of the following conditions: sepsis.  Critical care was time spent personally by me on the following activities: review of old charts, re-evaluation of patient's condition, pulse oximetry, ordering and review of radiographic studies, ordering and review of laboratory studies, ordering and performing treatments and interventions, obtaining history from patient or surrogate, examination of patient, evaluation of patient's response to treatment, discussions with primary provider and development of treatment plan with patient or surrogate.        Labs Reviewed   CBC W/ AUTO DIFFERENTIAL - Abnormal       Result Value    WBC 16.47 (*)     RBC 4.16 (*)     Hemoglobin 11.3 (*)     Hematocrit 35.1 (*)     MCV 84      MCH 27.2      MCHC 32.2      RDW 12.6      Platelets 536 (*)     MPV 8.9 (*)     Immature Granulocytes 1.0 (*)     Gran # (ANC) 14.5 (*)     Immature Grans (Abs) 0.16 (*)     Lymph # 0.8 (*)     Mono # 0.9      Eos # 0.0      Baso # 0.05      nRBC 0      Gran % 87.9 (*)     Lymph % 5.0 (*)     Mono % 5.6      Eosinophil % 0.2      Basophil % 0.3      Differential Method Automated     COMPREHENSIVE METABOLIC PANEL - Abnormal    Sodium 129 (*)     Potassium 4.6      Chloride 93 (*)     CO2 24      Glucose 192 (*)     BUN 27 (*)     Creatinine 1.4      Calcium 9.1      Total Protein 7.5      Albumin 1.9 (*)     Total Bilirubin 1.0      Alkaline Phosphatase 109      AST 18      ALT 17      eGFR 58.6 (*)     Anion Gap 12 (*)    LACTIC ACID, PLASMA - Abnormal    Lactate (Lactic Acid) 2.3 (*)    URINALYSIS, REFLEX TO URINE CULTURE - Abnormal    Specimen UA Urine, Clean Catch      Color, UA Yellow      Appearance, UA Cloudy (*)     pH, UA 5.0       Specific Gravity, UA >=1.030 (*)     Protein, UA Negative      Glucose, UA 4+ (*)     Ketones, UA 1+ (*)     Bilirubin (UA) Negative      Occult Blood UA Negative      Nitrite, UA Negative      Urobilinogen, UA 1.0      Leukocytes, UA Negative      Narrative:     Preferred Collection Type->Urine, Clean Catch  Specimen Source->Urine   MAGNESIUM - Abnormal    Magnesium 1.5 (*)    NT-PRO NATRIURETIC PEPTIDE - Abnormal    NT-proBNP 2184 (*)    URINALYSIS MICROSCOPIC - Abnormal    RBC, UA 3      WBC, UA 2      Bacteria Negative      Yeast, UA None      Squam Epithel, UA 2      Hyaline Casts, UA 16.0 (*)     Granular Casts, UA 5 (*)     Microscopic Comment SEE COMMENT      Narrative:     Preferred Collection Type->Urine, Clean Catch  Specimen Source->Urine   CULTURE, BLOOD   CULTURE, BLOOD   TROPONIN I HIGH SENSITIVITY    Troponin I High Sensitivity 4.7     PROCALCITONIN   SARS-COV-2 RDRP GENE    POC Rapid COVID Negative       Acceptable Yes       EKG Readings: (Independently Interpreted)   Initial Reading: No STEMI. Rhythm: Atrial Fibrillation. Heart Rate: 128. Ectopy: No Ectopy. Conduction: Normal. ST Segments: Normal ST Segments. T Waves: Normal. Axis: Left Axis Deviation. Clinical Impression: Atrial Fibrillation with RVR       Imaging Results              X-Ray Chest AP Portable (Final result)  Result time 10/06/24 14:24:51      Final result by Beth Zuleta MD (10/06/24 14:24:51)                   Impression:      No acute findings      Electronically signed by: Beth Zuleta MD  Date:    10/06/2024  Time:    14:24               Narrative:    EXAMINATION:  XR CHEST AP PORTABLE    CLINICAL HISTORY:  Sepsis;    COMPARISON:  11/17/2023    FINDINGS:  Clear lungs.  Unremarkable cardiomediastinal silhouette.  No pleural fluid or vascular congestion detected                                       Medications   sodium chloride 0.9% bolus 2,397 mL 2,397 mL (2,397 mLs Intravenous Bolus from Bag  10/6/24 1420)   piperacillin-tazobactam (ZOSYN) 4.5 g in D5W 100 mL IVPB (MB+) (4.5 g Intravenous New Bag 10/6/24 1434)   diltiaZEM injection 10 mg (10 mg Intravenous Given 10/6/24 1403)     Medical Decision Making             ED Course as of 10/06/24 1502   Sun Oct 06, 2024   1416 Chest x-ray is negative for acute changes [SD]      ED Course User Index  [SD] Ismael Amaro MD               Medical Decision Making:   Differential Diagnosis:   Viral syndrome, sepsis, atrial fibrillation with rapid ventricular response  Clinical Tests:   Sepsis Perfusion Assessment: "I attest a sepsis perfusion exam was performed within 6 hours of sepsis, severe sepsis, or septic shock presentation, following fluid resuscitation."  ED Management:  This patient does have evidence of infective focus  My overall impression is sepsis.  Source: Skin and Soft Tissue (location feet)  Antibiotics given- Antibiotics (72h ago, onward)    Start     Stop Route Frequency Ordered    10/06/24 1415  piperacillin-tazobactam (ZOSYN) 4.5 g in D5W 100 mL IVPB   (MB+)         10/07/24 0214 IV ED 1 Time 10/06/24 1410      Latest lactate reviewed-  2.3  Organ dysfunction indicated by Acute heart failure    Fluid challenge Ideal Body Weight- The patient's ideal body weight will be used to calculate fluid bolus of 30 ml/kg for treatment of septic shock.      Post- resuscitation assessment Yes Perfusion exam was performed within 6 hours of septic shock presentation after bolus shows Adequate tissue perfusion assessed by non-invasive monitoring       Will Not start Pressors-               Clinical Impression:  Final diagnoses:  [R53.1] Weakness  [A41.9, R65.20] Severe sepsis (Primary)  [E11.628, L08.9] Diabetic foot infection  [I48.91] Atrial fibrillation with rapid ventricular response  [D72.829] Leukocytosis, unspecified type  [R79.89] Elevated lactic acid level          ED Disposition Condition    Admit Stable                Ismael Amaro,  MD  10/06/24 1503

## 2024-10-06 NOTE — ED NOTES
"Tony RN HS notified of ICU Admit, reports: "waiting on a transfer to leave ICU and then we can get pt up"  "

## 2024-10-06 NOTE — PROGRESS NOTES
"Pharmacokinetic Initial Assessment: IV Vancomycin    Assessment/Plan:    Initiate intravenous vancomycin with loading dose of 2500 mg once with subsequent doses when random concentrations are less than 20 mcg/mL  Desired empiric serum trough concentration is 15 to 20 mcg/mL  Draw vancomycin random level on 10/7 at 2100.  Pharmacy will continue to follow and monitor vancomycin.      Thank you for allowing pharmacy participate in this patient's care.     Elke Teran, PharmD  Clinical Pharmacist, [facility]  [email or web address]  office [phone]     Patient brief summary:  Andrew Del Cid Jr. is a 59 y.o. male initiated on antimicrobial therapy with IV Vancomycin for treatment of suspected skin & soft tissue infection    Drug Allergies:   Review of patient's allergies indicates:  No Known Allergies    Actual Body Weight:   140.6kg    Renal Function:   Estimated Creatinine Clearance: 85.8 mL/min (based on SCr of 1.4 mg/dL).,     Dialysis Method (if applicable):  N/A    CBC (last 72 hours):  Recent Labs   Lab Result Units 10/06/24  1346   WBC K/uL 16.47*   Hemoglobin g/dL 11.3*   Hematocrit % 35.1*   Platelets K/uL 536*   Gran % % 87.9*   Lymph % % 5.0*   Mono % % 5.6   Eosinophil % % 0.2   Basophil % % 0.3   Differential Method  Automated       Metabolic Panel (last 72 hours):  Recent Labs   Lab Result Units 10/06/24  1346 10/06/24  1410   Sodium mmol/L 129*  --    Potassium mmol/L 4.6  --    Chloride mmol/L 93*  --    CO2 mmol/L 24  --    Glucose mg/dL 192*  --    Glucose, UA   --  4+*   BUN mg/dL 27*  --    Creatinine mg/dL 1.4  --    Albumin g/dL 1.9*  --    Total Bilirubin mg/dL 1.0  --    Alkaline Phosphatase U/L 109  --    AST U/L 18  --    ALT U/L 17  --    Magnesium mg/dL 1.5*  --        Drug levels (last 3 results):  No results for input(s): "VANCOMYCINRA", "VANCORANDOM", "VANCOMYCINPE", "VANCOPEAK", "VANCOMYCINTR", "VANCOTROUGH" in the last 72 hours.    Microbiologic " Results:  Microbiology Results (last 7 days)       Procedure Component Value Units Date/Time    Blood culture x two cultures. Draw prior to antibiotics. [[id number]] Collected: 10/06/24 1346    Order Status: Sent Specimen: Blood     Blood culture x two cultures. Draw prior to antibiotics. [[id number]] Collected: 10/06/24 1346    Order Status: Sent Specimen: Blood

## 2024-10-07 PROBLEM — R79.89 ELEVATED LACTIC ACID LEVEL: Status: ACTIVE | Noted: 2024-10-07

## 2024-10-07 PROBLEM — L08.9 DIABETIC FOOT INFECTION: Status: ACTIVE | Noted: 2024-10-07

## 2024-10-07 PROBLEM — R53.1 WEAKNESS: Status: ACTIVE | Noted: 2024-10-07

## 2024-10-07 PROBLEM — A41.9 SEVERE SEPSIS: Status: ACTIVE | Noted: 2024-10-07

## 2024-10-07 PROBLEM — R65.20 SEVERE SEPSIS: Status: ACTIVE | Noted: 2024-10-07

## 2024-10-07 PROBLEM — E11.628 DIABETIC FOOT INFECTION: Status: ACTIVE | Noted: 2024-10-07

## 2024-10-07 PROBLEM — D72.829 LEUKOCYTOSIS: Status: ACTIVE | Noted: 2024-10-07

## 2024-10-07 LAB
ACINETOBACTER CALCOACETICUS/BAUMANNII COMPLEX: NOT DETECTED
ALBUMIN SERPL BCP-MCNC: 1.6 G/DL (ref 3.5–5.2)
ALP SERPL-CCNC: 92 U/L (ref 55–135)
ALT SERPL W/O P-5'-P-CCNC: 16 U/L (ref 10–44)
ANION GAP SERPL CALC-SCNC: 7 MMOL/L (ref 3–11)
AST SERPL-CCNC: 14 U/L (ref 10–40)
BACTEROIDES FRAGILIS: NOT DETECTED
BASOPHILS # BLD AUTO: 0.05 K/UL (ref 0–0.2)
BASOPHILS NFR BLD: 0.4 % (ref 0–1.9)
BILIRUB SERPL-MCNC: 0.7 MG/DL (ref 0.1–1)
BUN SERPL-MCNC: 28 MG/DL (ref 6–20)
CALCIUM SERPL-MCNC: 8.9 MG/DL (ref 8.7–10.5)
CANDIDA ALBICANS: NOT DETECTED
CANDIDA AURIS: NOT DETECTED
CANDIDA GLABRATA: NOT DETECTED
CANDIDA KRUSEI: NOT DETECTED
CANDIDA PARAPSILOSIS: NOT DETECTED
CANDIDA TROPICALIS: NOT DETECTED
CHLORIDE SERPL-SCNC: 101 MMOL/L (ref 95–110)
CO2 SERPL-SCNC: 26 MMOL/L (ref 23–29)
CREAT SERPL-MCNC: 1 MG/DL (ref 0.5–1.4)
CRYPTOCOCCUS NEOFORMANS/GATTII: NOT DETECTED
CTX-M GENE (ESBL PRODUCER): ABNORMAL
DIFFERENTIAL METHOD BLD: ABNORMAL
ENTEROBACTER CLOACAE COMPLEX: NOT DETECTED
ENTEROBACTERALES: NOT DETECTED
ENTEROCOCCUS FAECALIS: NOT DETECTED
ENTEROCOCCUS FAECIUM: NOT DETECTED
EOSINOPHIL # BLD AUTO: 0.3 K/UL (ref 0–0.5)
EOSINOPHIL NFR BLD: 2.2 % (ref 0–8)
ERYTHROCYTE [DISTWIDTH] IN BLOOD BY AUTOMATED COUNT: 12.8 % (ref 11.5–14.5)
ESCHERICHIA COLI: NOT DETECTED
EST. GFR  (NO RACE VARIABLE): >60 ML/MIN/1.73 M^2
GLUCOSE SERPL-MCNC: 172 MG/DL (ref 70–110)
HAEMOPHILUS INFLUENZAE: NOT DETECTED
HCT VFR BLD AUTO: 32 % (ref 40–54)
HGB BLD-MCNC: 10.1 G/DL (ref 14–18)
IMM GRANULOCYTES # BLD AUTO: 0.15 K/UL (ref 0–0.04)
IMM GRANULOCYTES NFR BLD AUTO: 1.3 % (ref 0–0.5)
IMP GENE (CARBAPENEM RESISTANT): ABNORMAL
KLEBSIELLA AEROGENES: NOT DETECTED
KLEBSIELLA OXYTOCA: NOT DETECTED
KLEBSIELLA PNEUMONIAE GROUP: NOT DETECTED
KPC RESISTANCE GENE (CARBAPENEM): ABNORMAL
LISTERIA MONOCYTOGENES: NOT DETECTED
LYMPHOCYTES # BLD AUTO: 1.1 K/UL (ref 1–4.8)
LYMPHOCYTES NFR BLD: 9.3 % (ref 18–48)
MCH RBC QN AUTO: 27.1 PG (ref 27–31)
MCHC RBC AUTO-ENTMCNC: 31.6 G/DL (ref 32–36)
MCR-1: ABNORMAL
MCV RBC AUTO: 86 FL (ref 82–98)
MEC A/C AND MREJ (MRSA): NOT DETECTED
MEC A/C: ABNORMAL
MONOCYTES # BLD AUTO: 1 K/UL (ref 0.3–1)
MONOCYTES NFR BLD: 8.8 % (ref 4–15)
NDM GENE (CARBAPENEM RESISTANT): ABNORMAL
NEISSERIA MENINGITIDIS: NOT DETECTED
NEUTROPHILS # BLD AUTO: 9.2 K/UL (ref 1.8–7.7)
NEUTROPHILS NFR BLD: 78 % (ref 38–73)
NRBC BLD-RTO: 0 /100 WBC
OXA-48-LIKE (CARBAPENEM RESISTANT): ABNORMAL
PLATELET # BLD AUTO: 396 K/UL (ref 150–450)
PMV BLD AUTO: 9.1 FL (ref 9.2–12.9)
POCT GLUCOSE: 224 MG/DL (ref 70–110)
POCT GLUCOSE: 250 MG/DL (ref 70–110)
POCT GLUCOSE: 256 MG/DL (ref 70–110)
POTASSIUM SERPL-SCNC: 4.5 MMOL/L (ref 3.5–5.1)
PROT SERPL-MCNC: 6.6 G/DL (ref 6–8.4)
PROTEUS SPECIES: NOT DETECTED
PSEUDOMONAS AERUGINOSA: NOT DETECTED
RBC # BLD AUTO: 3.73 M/UL (ref 4.6–6.2)
SALMONELLA SP: NOT DETECTED
SERRATIA MARCESCENS: NOT DETECTED
SODIUM SERPL-SCNC: 134 MMOL/L (ref 136–145)
STAPHYLOCOCCUS AUREUS: DETECTED
STAPHYLOCOCCUS EPIDERMIDIS: NOT DETECTED
STAPHYLOCOCCUS LUGDUNESIS: NOT DETECTED
STAPHYLOCOCCUS SPECIES: ABNORMAL
STENOTROPHOMONAS MALTOPHILIA: NOT DETECTED
STREPTOCOCCUS AGALACTIAE: NOT DETECTED
STREPTOCOCCUS PNEUMONIAE: NOT DETECTED
STREPTOCOCCUS PYOGENES: NOT DETECTED
STREPTOCOCCUS SPECIES: NOT DETECTED
VAN A/B (VRE GENE): ABNORMAL
VANCOMYCIN SERPL-MCNC: 7.4 UG/ML
VIM GENE (CARBAPENEM RESISTANT): ABNORMAL
WBC # BLD AUTO: 11.74 K/UL (ref 3.9–12.7)

## 2024-10-07 PROCEDURE — 63600175 PHARM REV CODE 636 W HCPCS: Performed by: EMERGENCY MEDICINE

## 2024-10-07 PROCEDURE — 25000003 PHARM REV CODE 250: Performed by: STUDENT IN AN ORGANIZED HEALTH CARE EDUCATION/TRAINING PROGRAM

## 2024-10-07 PROCEDURE — 80053 COMPREHEN METABOLIC PANEL: CPT | Performed by: EMERGENCY MEDICINE

## 2024-10-07 PROCEDURE — 99900035 HC TECH TIME PER 15 MIN (STAT)

## 2024-10-07 PROCEDURE — 85025 COMPLETE CBC W/AUTO DIFF WBC: CPT | Performed by: EMERGENCY MEDICINE

## 2024-10-07 PROCEDURE — 36415 COLL VENOUS BLD VENIPUNCTURE: CPT | Performed by: EMERGENCY MEDICINE

## 2024-10-07 PROCEDURE — 36415 COLL VENOUS BLD VENIPUNCTURE: CPT | Performed by: STUDENT IN AN ORGANIZED HEALTH CARE EDUCATION/TRAINING PROGRAM

## 2024-10-07 PROCEDURE — 94761 N-INVAS EAR/PLS OXIMETRY MLT: CPT

## 2024-10-07 PROCEDURE — 63600175 PHARM REV CODE 636 W HCPCS: Performed by: INTERNAL MEDICINE

## 2024-10-07 PROCEDURE — 25000003 PHARM REV CODE 250: Performed by: EMERGENCY MEDICINE

## 2024-10-07 PROCEDURE — 80202 ASSAY OF VANCOMYCIN: CPT | Performed by: STUDENT IN AN ORGANIZED HEALTH CARE EDUCATION/TRAINING PROGRAM

## 2024-10-07 PROCEDURE — 63600175 PHARM REV CODE 636 W HCPCS: Performed by: STUDENT IN AN ORGANIZED HEALTH CARE EDUCATION/TRAINING PROGRAM

## 2024-10-07 PROCEDURE — 27000207 HC ISOLATION

## 2024-10-07 PROCEDURE — 99900031 HC PATIENT EDUCATION (STAT)

## 2024-10-07 PROCEDURE — 99223 1ST HOSP IP/OBS HIGH 75: CPT | Mod: ,,, | Performed by: STUDENT IN AN ORGANIZED HEALTH CARE EDUCATION/TRAINING PROGRAM

## 2024-10-07 PROCEDURE — 25000003 PHARM REV CODE 250: Performed by: INTERNAL MEDICINE

## 2024-10-07 PROCEDURE — 20000000 HC ICU ROOM

## 2024-10-07 RX ORDER — LOSARTAN POTASSIUM 50 MG/1
50 TABLET ORAL DAILY
Status: DISCONTINUED | OUTPATIENT
Start: 2024-10-07 | End: 2024-10-09

## 2024-10-07 RX ORDER — MUPIROCIN 20 MG/G
OINTMENT TOPICAL 2 TIMES DAILY
Status: DISCONTINUED | OUTPATIENT
Start: 2024-10-07 | End: 2024-10-10 | Stop reason: HOSPADM

## 2024-10-07 RX ORDER — VANCOMYCIN 2 GRAM/500 ML IN 0.9 % SODIUM CHLORIDE INTRAVENOUS
2000
Status: DISCONTINUED | OUTPATIENT
Start: 2024-10-08 | End: 2024-10-09

## 2024-10-07 RX ADMIN — AMIODARONE HYDROCHLORIDE 0.5 MG/MIN: 1.8 INJECTION, SOLUTION INTRAVENOUS at 05:10

## 2024-10-07 RX ADMIN — INSULIN ASPART 2 UNITS: 100 INJECTION, SOLUTION INTRAVENOUS; SUBCUTANEOUS at 04:10

## 2024-10-07 RX ADMIN — ASPIRIN 81 MG: 81 TABLET, COATED ORAL at 08:10

## 2024-10-07 RX ADMIN — AMIODARONE HYDROCHLORIDE 1 MG/MIN: 1.8 INJECTION, SOLUTION INTRAVENOUS at 12:10

## 2024-10-07 RX ADMIN — METOPROLOL SUCCINATE 100 MG: 100 TABLET, EXTENDED RELEASE ORAL at 08:10

## 2024-10-07 RX ADMIN — ENOXAPARIN SODIUM 135 MG: 150 INJECTION SUBCUTANEOUS at 08:10

## 2024-10-07 RX ADMIN — MUPIROCIN: 20 OINTMENT TOPICAL at 11:10

## 2024-10-07 RX ADMIN — ATORVASTATIN CALCIUM 40 MG: 40 TABLET, FILM COATED ORAL at 08:10

## 2024-10-07 RX ADMIN — FAMOTIDINE 20 MG: 10 INJECTION, SOLUTION INTRAVENOUS at 08:10

## 2024-10-07 RX ADMIN — PIPERACILLIN SODIUM AND TAZOBACTAM SODIUM 4.5 G: 4; .5 INJECTION, POWDER, FOR SOLUTION INTRAVENOUS at 06:10

## 2024-10-07 RX ADMIN — INSULIN ASPART 2 UNITS: 100 INJECTION, SOLUTION INTRAVENOUS; SUBCUTANEOUS at 11:10

## 2024-10-07 RX ADMIN — PIPERACILLIN SODIUM AND TAZOBACTAM SODIUM 4.5 G: 4; .5 INJECTION, POWDER, FOR SOLUTION INTRAVENOUS at 10:10

## 2024-10-07 RX ADMIN — PIPERACILLIN SODIUM AND TAZOBACTAM SODIUM 4.5 G: 4; .5 INJECTION, POWDER, FOR SOLUTION INTRAVENOUS at 02:10

## 2024-10-07 RX ADMIN — MUPIROCIN: 20 OINTMENT TOPICAL at 08:10

## 2024-10-07 RX ADMIN — INSULIN ASPART 1 UNITS: 100 INJECTION, SOLUTION INTRAVENOUS; SUBCUTANEOUS at 08:10

## 2024-10-07 RX ADMIN — APIXABAN 5 MG: 5 TABLET, FILM COATED ORAL at 04:10

## 2024-10-07 RX ADMIN — DULOXETINE HYDROCHLORIDE 30 MG: 30 CAPSULE, DELAYED RELEASE ORAL at 08:10

## 2024-10-07 RX ADMIN — LOSARTAN POTASSIUM 50 MG: 50 TABLET, FILM COATED ORAL at 11:10

## 2024-10-07 NOTE — PLAN OF CARE
[facility] - Intensive Care  Initial Discharge Assessment       Primary Care Provider: Olena Thornton DO    Admission Diagnosis: Weakness [R53.1]  Atrial fibrillation with rapid ventricular response [I48.91]  Diabetic foot infection [E11.628, L08.9]  Elevated lactic acid level [R79.89]  Severe sepsis [A41.9, R65.20]  Leukocytosis, unspecified type [D72.829]    Admission Date: 10/6/2024  Expected Discharge Date:     Transition of Care Barriers: None    Payor: MEDICAID / Plan: [facility] / Product Type: Managed Medicaid /     Extended Emergency Contact Information  Primary Emergency Contact: Aleena Garcia  Mobile Phone: [phone]  Relation: Spouse  Preferred language: English   needed? No    Discharge Plan A: Home with family, Home Health  Discharge Plan B: Home with family      [address]  [place] [address]  Phone: [phone] Fax: [phone]    [facility] [place] [id number] [place] [address]  Phone: [phone] Fax: [phone]      Initial Assessment (most recent)       Adult Discharge Assessment - 10/07/24 1150          Discharge Assessment    Assessment Type Discharge Planning Assessment     Confirmed/corrected address, phone number and insurance Yes     Confirmed Demographics Correct on Facesheet     Source of Information patient;family   Pt's spouse at bedside during assessment interview.    When was your last doctors appointment? 10/02/24   wound care visit    Communicated MICHAEL with patient/caregiver Date not available/Unable to determine     Reason For Admission Severe sepsis     People in Home child(milana), dependent;spouse   Pt reported he lives at home with spouse and 3 children.    Do you expect to return to your current living situation? Yes     Do you have help at home or someone to help you manage your care at home? Yes     Who are  "your caregiver(s) and their phone number(s)? Aleena Garcia (Spouse)  [phone]     Prior to hospitilization cognitive status: Alert/Oriented     Current cognitive status: Alert/Oriented     Walking or Climbing Stairs Difficulty yes     Walking or Climbing Stairs ambulation difficulty, requires equipment     Mobility Management cane, "knee walker"     Dressing/Bathing Difficulty no     Do you have any problems with: Errands/Grocery     Home Accessibility not wheelchair accessible;stairs to enter home     Number of Stairs, Main Entrance six     Stair Railings, Main Entrance railings on both sides of stairs     Home Layout Bathroom on 2nd floor;Bedroom on 2nd floor   Pt reported the home has an elevator to bedroom on 2nd floor.    Equipment Currently Used at Home cane, straight;other (see comments)   "knee walker"    Readmission within 30 days? No     Patient currently being followed by outpatient case management? No     Do you currently have service(s) that help you manage your care at home? No     Do you take prescription medications? Yes     Do you have prescription coverage? Yes     Coverage [facility]     Do you have any problems affording any of your prescribed medications? No     Is the patient taking medications as prescribed? yes     Who is going to help you get home at discharge? Spouse     How do you get to doctors appointments? family or friend will provide     Are you on dialysis? No     Do you take coumadin? No     Discharge Plan A Home with family;Home Health     Discharge Plan B Home with family     DME Needed Upon Discharge  shower chair     Discharge Plan discussed with: Spouse/sig other;Patient     Name(s) and Number(s) Aleena Garcia (Spouse)  [phone]     Transition of Care Barriers None        Physical Activity    On average, how many days per week do you engage in moderate to strenuous exercise (like a brisk walk)? 0 days     On average, how many minutes do you engage in " exercise at this level? 0 min        Financial Resource Strain    How hard is it for you to pay for the very basics like food, housing, medical care, and heating? Somewhat hard   Family is interested in food resources.       Housing Stability    In the last 12 months, was there a time when you were not able to pay the mortgage or rent on time? No     At any time in the past 12 months, were you homeless or living in a shelter (including now)? No        Transportation Needs    Has the lack of transportation kept you from medical appointments, meetings, work or from getting things needed for daily living? No        Food Insecurity    Within the past 12 months, you worried that your food would run out before you got the money to buy more. Sometimes true     Within the past 12 months, the food you bought just didn't last and you didn't have money to get more. Sometimes true        Stress    Do you feel stress - tense, restless, nervous, or anxious, or unable to sleep at night because your mind is troubled all the time - these days? Not at all        Social Isolation    How often do you feel lonely or isolated from those around you?  Never        Analyze Re    In the past 12 months has the electric, gas, oil, or water company threatened to shut off services in your home? No        Health Literacy    How often do you need to have someone help you when you read instructions, pamphlets, or other written material from your doctor or pharmacy? Never                   Discharge assessment completed with patient.  Patient screened positive for food resources. Family is open to receiving resources for local food pantrys. Discharge planning checklist given to patient/family with instructions to contact  for any needs.  SW will follow as needed.

## 2024-10-07 NOTE — NURSING
Notified Dr Miles that the Amiodarone drip is ordered continuous @ 1mg/min. He ordered to decreased to 0.5mg/min after 6 hours per protocol.

## 2024-10-07 NOTE — EICU
"eICU Note : New Admit :notified by the [facility]:    Brief HPI: Sepsis , weakness   Pt stated that for the past couple weeks he has been experiencing fatigue / decreased appetite / cough / subjective fever / "cold sweats".        59-year-old male with a history of acute renal failure, chronic atrial fibrillation in the past, off blood thinners, diabetes mellitus, hyperlipidemia, hypertension, peripheral neuropathy presented to the emergency department with symptoms of fatigue, subjective fevers, cold sweats.  He has been seeing Dr. allison  for his wound care for bilateral feet.  In the ED he is alert and oriented X4, GCS is 15.  Patient admitted to ICU for continued management of sepsis with diabetic foot infection bilaterally, atrial fibrillation with RVR, CHF exacerbation.  Follow up consult with primary cardiology and primary podiatry.    PMHx :  MAGDI, chronic renal failure, Charcot joint, diabetes mellitus, fever, hyperlipidemia, hypertension, hyponatremia, left-sided weakness, peripheral neuropathy, rhinophyma, obstructive sleep apnea, CVA    Vital Signs :  Blood pressure 137/66, pulse 94, temperature 97.4 °F (36.3 °C), resp. rate (!) 21, height 6' 1" (1.854 m), weight (!) 140.6 kg (310 lb), SpO2 97%.       Camera Assessment : Pt lying in bed in no apparent distress      Data:    WBC 16.47, hemoglobin 11.3, hematocrit 35.1, platelets 536  Sodium 129, potassium 4.6, chloride 93, CO2 24, anion gap 12, BUN 27, creatinine 1.4, EGFR 58.6, glucose 192, magnesium 1.5, albumin 1.9, CRP 24.2, cholesterol 97, HDL 32, proBNP 2184  Hemoglobin A1c 7.8, estimated average glucose 177  ProBNP 2184  Urinalysis :Staph aureum Positive by PCR   Impression and recommendations:,   Sepsis  with MAGDI : Generalized weakness with Leukocytosis : ON Zosyn Acute Renal Failure with tubular Necrosis . BUN /Creat 28/1.0   Diabetes Mellitus 2: Insulin Sliding scale    Hypertension  : Currently Normotensive , On Toprol  mg daily   4.    " Hyperlipidemia On Atorvastatin   5.   Painful peripheral Neuropathy , ulcer of the left foot : ON Duloxetine , Zosyn and Vancomycin  6.    PUD, DVT prophylaxis : SCD and PPI      Rosalinda BellamyU Physician

## 2024-10-07 NOTE — H&P
"  [facility] Medicine  History & Physical    Patient Name: Andrew Del Cid Jr.  MRN: [id number]  Patient Class: IP- Inpatient  Admission Date: 10/6/2024  Attending Physician: Olena Thornton DO   Primary Care Provider: Olena Thornton DO    Patient information was obtained from patient and ER records.     Subjective:     Principal Problem:Severe sepsis    Chief Complaint:   Chief Complaint   Patient presents with    Weakness     Pt stated that for the past couple weeks he has been experiencing fatigue / decreased appetite / cough / subjective fever / "cold sweats".          HPI:   Chief Complaint   Patient presents with    Weakness       Pt stated that for the past couple weeks he has been experiencing fatigue / decreased appetite / cough / subjective fever / "cold sweats".        59-year-old male with a history of acute renal failure, atrial fibrillation in the past, off of his blood thinners, diabetes mellitus, hyperlipidemia, hypertension, peripheral neuropathy presents the emergency department stating that for the past couple of weeks he has been experiencing fatigue, subjective fevers, cold sweats, cough, not eating or drinking well.  He has been seeing  as well as  for his wound care to bilateral feet.  Here in the emergency department he is alert and oriented x4, GCS is 15.    ED course: vitals signs BP 88/68 mmHg, , RR 29, SpO2 945 on room air, temp 98.9F  Chemistry labs Na 129, K4.6, Cl 93, CO2 24, Glu 192, Alb 1.9, BUN 27, Cr 1.4  WBC 16.47, Hg 11.3, HCT 35, , Mag 1.5, troponin I HS 4.7, lactate 2.3  NTproBNP 2184  EKG tracings, atrial fibrillation, rate 128 bpm, no ST-T wave elevation  Patient received sepsis bolus IVF 30/kg and started on vancomycin and zosyn.     Patient admitted to ICU for continued management of sepsis with diabetic foot infection bilaterally, atrial fibrillation with RVR, CHF exacerbation.  Follow up consult " with primary cardiology and primary podiatry.     Telemetry overnight tracings of atrial fibrillation rate >100s bpm. Repeat lactate within normal limits with fluid resuscitation. Patient awake and alert, endorses improved tolerance to PO intake of fluids and solids. Continue with broad coverage antibiotics, follow up blood cx x2. Further recommendations appreciated from cardiology and podiatry.        Past Medical History:   Diagnosis Date    Acute renal failure with tubular necrosis 10/22/2022    Atrial fibrillation     BMI 45.0-49.9, adult 10/05/2022    Recommendations:   Stay physically active. As tolerated alternate resistance training with stretching and cardio. Goal of 150 minutes per week of moderate intensity activity or 7,500 - 10,000 steps per day. Follow the Mediterranean Diet. Include whole fresh fruits, vegetables, olive oil, seeds, nuts, whole grains, cold water fish, salmon, mackerel and lean cuts of meat.  Do not drink sugary/diet c    Charcot's joint 12/2023    right    Diabetes mellitus, type 2     Does mobilize using cane     Fever 10/22/2022    Hyperlipidemia     Hypertension     Hyponatremia 10/22/2022    Left-sided weakness     Neuropathy     bilateral lower ext    Obese     PAD (peripheral artery disease)     Peripheral neuropathy     Rash 11/01/2022    Rhinophyma     BILATERAL NOSE    Severe sleep apnea     UNABLE TO TOLERATE CPAP    Stroke 10/2022    Wound of right foot 12/2023       Past Surgical History:   Procedure Laterality Date    ANGIOGRAM, AORTIC ARCH, PERIPHERAL Bilateral 3/4/2024    Procedure: Angiogram, Aortic Arch, Peripheral;  Surgeon: Isai Hernandez MD;  Location: [facility] CATH;  Service: Cardiology;  Laterality: Bilateral;    APPENDECTOMY      pt denies    DEBRIDEMENT OF FOOT Right 12/15/2023    Procedure: DEBRIDEMENT, FOOT;  Surgeon: Baldemar Esquivel DPM;  Location: [facility] OR;  Service: Podiatry;  Laterality: Right;    FOOT MASS EXCISION Right 12/15/2023    Procedure:  EXCISION, MASS, FOOT;  Surgeon: Baldemar Alcaraz DPM;  Location: [facility] OR;  Service: Podiatry;  Laterality: Right;    HERNIA REPAIR      INCISION AND DRAINAGE FOOT Bilateral 12/26/2018    Procedure: INCISION AND DRAINAGE, FOOT;  Surgeon: Rob Alas DPM;  Location: [facility] OR;  Service: Podiatry;  Laterality: Bilateral;    SURGICAL REMOVAL OF FASCIA Left 12/15/2023    Procedure: FASCIECTOMY;  Surgeon: Baldemar Alcaraz DPM;  Location: [facility] OR;  Service: Podiatry;  Laterality: Left;  1st 0600    TOE AMPUTATION      right great toe    TOE AMPUTATION Left 12/26/2018    Procedure: AMPUTATION, TOE; left 3rd toe;  Surgeon: Rob Alas DPM;  Location: [facility] OR;  Service: Podiatry;  Laterality: Left;    TONSILLECTOMY         Review of patient's allergies indicates:  No Known Allergies    No current facility-administered medications on file prior to encounter.     Current Outpatient Medications on File Prior to Encounter   Medication Sig    apixaban (ELIQUIS) 5 mg Tab Take 1 tablet (5 mg total) by mouth 2 (two) times daily.    aspirin (ECOTRIN) 81 MG EC tablet Take 81 mg by mouth once daily. OK TO CONTINUE PER DR. ALCARAZ    atorvastatin (LIPITOR) 40 MG tablet Take 1 tablet by mouth once daily    blood-glucose meter kit Use as instructed    dulaglutide (TRULICITY) 4.5 mg/0.5 mL pen injector Inject 4.5 mg into the skin every 7 days.    DULoxetine (CYMBALTA) 30 MG capsule Take 1 capsule by mouth once daily    empagliflozin (JARDIANCE) 25 mg tablet Take 1 tablet (25 mg total) by mouth once daily.    lancets (ONETOUCH DELICA LANCETS) 33 gauge Misc 1 lancet by Misc.(Non-Drug; Combo Route) route 3 (three) times daily.    LANTUS SOLOSTAR U-100 INSULIN glargine 100 units/mL SubQ pen Inject 40 Units into the skin every evening. (Patient taking differently: Inject 20 Units into the skin every evening.)    losartan (COZAAR) 50 MG tablet Take 1 tablet (50 mg total) by mouth once daily.    metFORMIN (GLUCOPHAGE-XR) 500  MG ER 24hr tablet TAKE 2 TABLETS BY MOUTH TWICE DAILY WITH MEALS    metoprolol succinate (TOPROL-XL) 50 MG 24 hr tablet Take 50 mg by mouth.    multivitamin (THERAGRAN) per tablet multivitamin tablet, [RxNorm: 0]     Family History       Problem Relation (Age of Onset)    Cancer Father    No Known Problems Mother, Brother          Tobacco Use    Smoking status: Never     Passive exposure: Never    Smokeless tobacco: Never   Substance and Sexual Activity    Alcohol use: No    Drug use: No    Sexual activity: Yes     Partners: Female     Comment:  wife gilbert     Review of Systems   Constitutional:  Positive for activity change, appetite change, chills and fatigue. Negative for fever.   Eyes:  Negative for visual disturbance.   Respiratory:  Negative for cough, chest tightness, shortness of breath and wheezing.    Cardiovascular:  Negative for chest pain, palpitations and leg swelling.   Gastrointestinal:  Negative for abdominal pain, constipation, diarrhea, nausea and vomiting.   Musculoskeletal:  Positive for arthralgias, gait problem and myalgias.   Skin:  Positive for wound.   Neurological:  Positive for weakness. Negative for dizziness, syncope, speech difficulty, light-headedness and headaches.   Psychiatric/Behavioral:  Negative for agitation, behavioral problems, confusion and decreased concentration.      Objective:     Vital Signs (Most Recent):  Temp: 97.6 °F (36.4 °C) (10/07/24 1101)  Pulse: 105 (10/07/24 1215)  Resp: (!) 24 (10/07/24 1215)  BP: 121/63 (10/07/24 1215)  SpO2: 95 % (10/07/24 1215) Vital Signs (24h Range):  Temp:  [97.2 °F (36.2 °C)-98.9 °F (37.2 °C)] 97.6 °F (36.4 °C)  Pulse:  [] 105  Resp:  [14-26] 24  SpO2:  [91 %-100 %] 95 %  BP: ()/() 121/63     Weight: (!) 140.6 kg (310 lb)  Body mass index is 40.9 kg/m².     Physical Exam  Vitals and nursing note reviewed.   Constitutional:       General: He is not in acute distress.     Appearance: He is obese. He is  ill-appearing. He is not toxic-appearing.   HENT:      Head: Normocephalic and atraumatic.      Right Ear: External ear normal.      Left Ear: External ear normal.      Nose: Nose normal. No congestion or rhinorrhea.      Mouth/Throat:      Mouth: Mucous membranes are dry.      Pharynx: Oropharynx is clear.   Eyes:      Extraocular Movements: Extraocular movements intact.      Conjunctiva/sclera: Conjunctivae normal.   Cardiovascular:      Rate and Rhythm: Normal rate and regular rhythm.      Pulses: Normal pulses.      Heart sounds: Normal heart sounds. No murmur heard.  Pulmonary:      Effort: Pulmonary effort is normal. No respiratory distress.      Breath sounds: Normal breath sounds. No wheezing or rales.   Abdominal:      General: Abdomen is flat. There is no distension.      Palpations: Abdomen is soft. There is no mass.      Tenderness: There is no abdominal tenderness. There is no right CVA tenderness, left CVA tenderness or guarding.   Musculoskeletal:         General: Normal range of motion.      Cervical back: Normal range of motion and neck supple. No rigidity.      Comments: Both feet wrapped in ace bandage, dressing clean and dry   Skin:     General: Skin is warm and dry.      Capillary Refill: Capillary refill takes less than 2 seconds.   Neurological:      General: No focal deficit present.      Mental Status: He is alert and oriented to person, place, and time.      Motor: No weakness.      Gait: Gait normal.   Psychiatric:         Mood and Affect: Mood normal.         Behavior: Behavior normal.                Significant Labs: All pertinent labs within the past 24 hours have been reviewed.  A1C:   Recent Labs   Lab 05/23/24  1505 10/06/24  1355   HGBA1C 5.5 7.8*     Recent Labs   Lab 10/06/24  1346 10/07/24  0354   BILITOT 1.0 0.7     Blood Culture:   Recent Labs   Lab 10/06/24  1346   LABBLOO Gram stain lizzy bottle: Gram positive cocci in clusters resembling Staph  Results called to and read back  by: Chelita Fleming RN 10/07/2024 09:38  Gram stain aer bottle: Gram positive cocci in clusters resembling Staph  10/07/2024  12:42  Gram stain lizzy bottle: Gram positive cocci in clusters resembling Staph  Results called to and read back by: Chelita Fleming RN 10/07/2024 09:38     BMP:   Recent Labs   Lab 10/06/24  1346 10/07/24  0354   * 172*   * 134*   K 4.6 4.5   CL 93* 101   CO2 24 26   BUN 27* 28*   CREATININE 1.4 1.0   CALCIUM 9.1 8.9   MG 1.5*  --      CBC:   Recent Labs   Lab 10/06/24  1346 10/07/24  0354   WBC 16.47* 11.74   HGB 11.3* 10.1*   HCT 35.1* 32.0*   * 396     CMP:   Recent Labs   Lab 10/06/24  1346 10/07/24  0354   * 134*   K 4.6 4.5   CL 93* 101   CO2 24 26   * 172*   BUN 27* 28*   CREATININE 1.4 1.0   CALCIUM 9.1 8.9   PROT 7.5 6.6   ALBUMIN 1.9* 1.6*   BILITOT 1.0 0.7   ALKPHOS 109 92   AST 18 14   ALT 17 16   ANIONGAP 12* 7     Recent Labs   Lab 10/06/24  1346 10/06/24  1721   LACTATE 2.3* 1.1     Recent Labs   Lab 10/06/24  1346   MG 1.5*     POCT Glucose:   Recent Labs   Lab 10/06/24  1644 10/06/24  2107 10/07/24  1141   POCTGLUCOSE 178* 223* 224*     Recent Labs   Lab 10/06/24  1346   TROPONINIHS 4.7     Recent Labs   Lab 10/06/24  1410   COLORU Yellow   APPEARANCEUA Cloudy*   PHUR 5.0   SPECGRAV >=1.030*   PROTEINUA Negative   GLUCUA 4+*   KETONESU 1+*   BILIRUBINUA Negative   OCCULTUA Negative   NITRITE Negative   UROBILINOGEN 1.0   LEUKOCYTESUR Negative   RBCUA 3   WBCUA 2   BACTERIA Negative   SQUAMEPITHEL 2   HYALINECASTS 16.0*     X-Ray Chest AP Portable  Narrative: EXAMINATION:  XR CHEST AP PORTABLE    CLINICAL HISTORY:  Sepsis;    COMPARISON:  11/17/2023    FINDINGS:  Clear lungs.  Unremarkable cardiomediastinal silhouette.  No pleural fluid or vascular congestion detected  Impression: No acute findings    Electronically signed by: Beth Zuleta MD  Date:    10/06/2024  Time:    14:24     Significant Imaging: I have reviewed all pertinent  "imaging results/findings within the past 24 hours.  Assessment/Plan:     * Severe sepsis  This patient does have evidence of infective focus  My overall impression is sepsis.  Source: Skin and Soft Tissue (location bilateral diabetic foot wound)  Antibiotics given-   Antibiotics (72h ago, onward)      Start     Stop Route Frequency Ordered    10/07/24 1245  mupirocin 2 % ointment         10/12/24 0859 Nasl 2 times daily 10/07/24 1138    10/06/24 2230  piperacillin-tazobactam (ZOSYN) 4.5 g in D5W 100 mL IVPB (MB+)         -- IV Every 8 hours (non-standard times) 10/06/24 1640    10/06/24 1640  vancomycin - pharmacy to dose  (vancomycin IVPB (PEDS and ADULTS))        Placed in "And" Linked Group    -- IV pharmacy to manage frequency 10/06/24 1640          Latest lactate reviewed-  Recent Labs   Lab 10/06/24  1721   LACTATE 1.1     Organ dysfunction indicated by Acute heart failure    Fluid challenge Ideal Body Weight- The patient's ideal body weight is Ideal body weight: 79.9 kg (176 lb 2.4 oz) which will be used to calculate fluid bolus of 30 ml/kg for treatment of septic shock.      Post- resuscitation assessment Yes Perfusion exam was performed within 6 hours of septic shock presentation after bolus shows Adequate tissue perfusion assessed by non-invasive monitoring       Will Not start Pressors- Levophed for MAP of 65  Source control achieved by: IVF resuscitation, IV antibiotics    Leukocytosis        Elevated lactic acid level  With mild elevated procalcitonin. Resolved after IVF hydration.   - f/u blood cx x2  - trend WBC      Weakness  Secondary to infectious process, poor control of diabetes, poor nutritional status and chronic anemia.   Hg/HCT 10.1/32.   Continue with IV antibiotics, encourage PO hydration.   Continue management of chronic conditions.        Ulcer of toe of left foot, with fat layer exposed  Contributing source of infection. Blood cx x2 positive.   Continue with IV antibiotics, zosyn and " vancomycin  - f/u podiatry consult      Atrial fibrillation with rapid ventricular response  Patient has paroxysmal (<7 days) atrial fibrillation. Patient is currently in atrial fibrillation. [id number] Score: 2. The patients heart rate in the last 24 hours is as follows:  Pulse  Min: 90  Max: 140     Antiarrhythmics  metoprolol succinate (TOPROL-XL) 24 hr tablet 100 mg, Daily, Oral  amiodarone 360 mg/200 mL (1.8 mg/mL) infusion, Continuous, Intravenous    Anticoagulants  apixaban tablet 5 mg, 2 times daily, Oral    Plan  - Replete lytes with a goal of K>4, Mg >2  - Patient's afib is currently uncontrolled. Will continue current treatment  Follow up consult cardiology  - eliquis BID    Bacteremia due to methicillin susceptible Staphylococcus aureus (MSSA)  Lab Results   Component Value Date    LABBLOO  10/06/2024     Gram stain lizzy bottle: Gram positive cocci in clusters resembling Staph    LABBLOO  10/06/2024     Results called to and read back by: Chelita Fleming RN 10/07/2024 09:38    LABBLOO  10/06/2024     Gram stain lizzy bottle: Gram positive cocci in clusters resembling Staph    LABBLOO  10/06/2024     Results called to and read back by: Chelita Fleming RN 10/07/2024 09:38    LABBLOO  10/06/2024     Gram stain aer bottle: Gram positive cocci in clusters resembling Staph    LABBLOO 10/07/2024  12:42 10/06/2024      Past history of MSSA bacteremia.   Chronic diabetic foot wound bilaterally.   Continue with broad spectrum antibiotics, zosyn and vancomycin  Trend WBC  Follow up recommendations from podiatry with further infection control        Type 2 diabetes mellitus, with long-term current use of insulin  Hold oral antihyperglycemics.   Lab Results   Component Value Date    HGBA1C 7.8 (H) 10/06/2024    HGBA1C 5.5 05/23/2024    HGBA1C 5.4 01/16/2024   Poorly controlled compared to 6 months ago.   - SSI QACHS  - consult nutritionist          Hyperlipidemia  Continue with home statin.       Essential  hypertension  Patients blood pressure range in the last 24 hours was: BP  Min: 88/68  Max: 171/71.The patient's inpatient anti-hypertensive regimen is listed below:    Current Antihypertensives  metoprolol succinate (TOPROL-XL) 24 hr tablet 100 mg, Daily, Oral  losartan tablet 50 mg, Daily, Oral  Denies symptoms.  Plan  - BP is controlled, no changes needed to their regimen  - continue with current antihypertensive regimen  - follow up consult cardiology    Class 3 severe obesity with serious comorbidity and body mass index (BMI) of 40.0 to 44.9 in adult  Body mass index is 40.9 kg/m². Morbid obesity complicates all aspects of disease management from diagnostic modalities to treatment. Weight loss encouraged and health benefits explained to patient.     Wt Readings from Last 3 Encounters:   10/06/24 (!) 140.6 kg (310 lb)   05/23/24 (!) 141.1 kg (311 lb)   03/04/24 (!) 136.5 kg (300 lb 14.9 oz)          VTE Risk Mitigation (From admission, onward)           Ordered     apixaban tablet 5 mg  2 times daily         10/07/24 1134     IP VTE LOW RISK PATIENT  Once         10/06/24 1640                               Pharmacokinetic Initial Assessment: IV Vancomycin    Assessment/Plan:    Initiate intravenous vancomycin with loading dose of 2500 mg once with subsequent doses when random concentrations are less than 20 mcg/mL  Desired empiric serum trough concentration is 15 to 20 mcg/mL  Draw vancomycin random level on 10/7 at 2100.  Pharmacy will continue to follow and monitor vancomycin.      Thank you for allowing pharmacy participate in this patient's care.     Elke Teran, PharmD  Clinical Pharmacist, [facility] Team  [email or web address]  office [phone]     Patient brief summary:  Andrew Del Cid Jr. is a 59 y.o. male initiated on antimicrobial therapy with IV Vancomycin for treatment of suspected skin & soft tissue infection    Drug Allergies:   Review of patient's allergies indicates:  No Known  "Allergies    Actual Body Weight:   140.6kg    Renal Function:   Estimated Creatinine Clearance: 85.8 mL/min (based on SCr of 1.4 mg/dL).,     Dialysis Method (if applicable):  N/A    CBC (last 72 hours):  Recent Labs   Lab Result Units 10/06/24  1346   WBC K/uL 16.47*   Hemoglobin g/dL 11.3*   Hematocrit % 35.1*   Platelets K/uL 536*   Gran % % 87.9*   Lymph % % 5.0*   Mono % % 5.6   Eosinophil % % 0.2   Basophil % % 0.3   Differential Method  Automated       Metabolic Panel (last 72 hours):  Recent Labs   Lab Result Units 10/06/24  1346 10/06/24  1410   Sodium mmol/L 129*  --    Potassium mmol/L 4.6  --    Chloride mmol/L 93*  --    CO2 mmol/L 24  --    Glucose mg/dL 192*  --    Glucose, UA   --  4+*   BUN mg/dL 27*  --    Creatinine mg/dL 1.4  --    Albumin g/dL 1.9*  --    Total Bilirubin mg/dL 1.0  --    Alkaline Phosphatase U/L 109  --    AST U/L 18  --    ALT U/L 17  --    Magnesium mg/dL 1.5*  --        Drug levels (last 3 results):  No results for input(s): "VANCOMYCINRA", "VANCORANDOM", "VANCOMYCINPE", "VANCOPEAK", "VANCOMYCINTR", "VANCOTROUGH" in the last 72 hours.    Microbiologic Results:  Microbiology Results (last 7 days)       Procedure Component Value Units Date/Time    Blood culture x two cultures. Draw prior to antibiotics. [[id number]] Collected: 10/06/24 1346    Order Status: Sent Specimen: Blood     Blood culture x two cultures. Draw prior to antibiotics. [[id number]] Collected: 10/06/24 1346    Order Status: Sent Specimen: Blood               Olena Thornton DO  Department of [facility] Medicine  [place] - Intensive Care          "

## 2024-10-07 NOTE — PLAN OF CARE
No acute events during shift. Amiodarone drip started today per Dr Miles's orders. Vitals stable. Appetite good. Urine output adequate. No BM noted. Dr Esquivel performed dressing changes to bilat feet.

## 2024-10-07 NOTE — SUBJECTIVE & OBJECTIVE
Past Medical History:   Diagnosis Date    Acute renal failure with tubular necrosis 10/22/2022    Atrial fibrillation     BMI 45.0-49.9, adult 10/05/2022    Recommendations:   Stay physically active. As tolerated alternate resistance training with stretching and cardio. Goal of 150 minutes per week of moderate intensity activity or 7,500 - 10,000 steps per day. Follow the Mediterranean Diet. Include whole fresh fruits, vegetables, olive oil, seeds, nuts, whole grains, cold water fish, salmon, mackerel and lean cuts of meat.  Do not drink sugary/diet c    Charcot's joint 12/2023    right    Diabetes mellitus, type 2     Does mobilize using cane     Fever 10/22/2022    Hyperlipidemia     Hypertension     Hyponatremia 10/22/2022    Left-sided weakness     Neuropathy     bilateral lower ext    Obese     PAD (peripheral artery disease)     Peripheral neuropathy     Rash 11/01/2022    Rhinophyma     BILATERAL NOSE    Severe sleep apnea     UNABLE TO TOLERATE CPAP    Stroke 10/2022    Wound of right foot 12/2023       Past Surgical History:   Procedure Laterality Date    ANGIOGRAM, AORTIC ARCH, PERIPHERAL Bilateral 3/4/2024    Procedure: Angiogram, Aortic Arch, Peripheral;  Surgeon: Isai Hernandez MD;  Location: [facility] CATH;  Service: Cardiology;  Laterality: Bilateral;    APPENDECTOMY      pt denies    DEBRIDEMENT OF FOOT Right 12/15/2023    Procedure: DEBRIDEMENT, FOOT;  Surgeon: Baldemar Esquivel DPM;  Location: [facility] OR;  Service: Podiatry;  Laterality: Right;    FOOT MASS EXCISION Right 12/15/2023    Procedure: EXCISION, MASS, FOOT;  Surgeon: Baldemar Esquivel DPM;  Location: [facility] OR;  Service: Podiatry;  Laterality: Right;    HERNIA REPAIR      INCISION AND DRAINAGE FOOT Bilateral 12/26/2018    Procedure: INCISION AND DRAINAGE, FOOT;  Surgeon: Rob Alas DPM;  Location: [facility] OR;  Service: Podiatry;  Laterality: Bilateral;    SURGICAL REMOVAL OF FASCIA Left 12/15/2023    Procedure: FASCIECTOMY;  Surgeon:  Baldemar Alcaraz DPM;  Location: [facility] OR;  Service: Podiatry;  Laterality: Left;  1st 0600    TOE AMPUTATION      right great toe    TOE AMPUTATION Left 12/26/2018    Procedure: AMPUTATION, TOE; left 3rd toe;  Surgeon: Rob Alas DPM;  Location: [facility] OR;  Service: Podiatry;  Laterality: Left;    TONSILLECTOMY         Review of patient's allergies indicates:  No Known Allergies    No current facility-administered medications on file prior to encounter.     Current Outpatient Medications on File Prior to Encounter   Medication Sig    apixaban (ELIQUIS) 5 mg Tab Take 1 tablet (5 mg total) by mouth 2 (two) times daily.    aspirin (ECOTRIN) 81 MG EC tablet Take 81 mg by mouth once daily. OK TO CONTINUE PER DR. ALCARAZ    atorvastatin (LIPITOR) 40 MG tablet Take 1 tablet by mouth once daily    blood-glucose meter kit Use as instructed    dulaglutide (TRULICITY) 4.5 mg/0.5 mL pen injector Inject 4.5 mg into the skin every 7 days.    DULoxetine (CYMBALTA) 30 MG capsule Take 1 capsule by mouth once daily    empagliflozin (JARDIANCE) 25 mg tablet Take 1 tablet (25 mg total) by mouth once daily.    lancets (ONETOUCH DELICA LANCETS) 33 gauge Misc 1 lancet by Misc.(Non-Drug; Combo Route) route 3 (three) times daily.    LANTUS SOLOSTAR U-100 INSULIN glargine 100 units/mL SubQ pen Inject 40 Units into the skin every evening. (Patient taking differently: Inject 20 Units into the skin every evening.)    losartan (COZAAR) 50 MG tablet Take 1 tablet (50 mg total) by mouth once daily.    metFORMIN (GLUCOPHAGE-XR) 500 MG ER 24hr tablet TAKE 2 TABLETS BY MOUTH TWICE DAILY WITH MEALS    metoprolol succinate (TOPROL-XL) 50 MG 24 hr tablet Take 50 mg by mouth.    multivitamin (THERAGRAN) per tablet multivitamin tablet, [RxNorm: 0]     Family History       Problem Relation (Age of Onset)    Cancer Father    No Known Problems Mother, Brother          Tobacco Use    Smoking status: Never     Passive exposure: Never    Smokeless  tobacco: Never   Substance and Sexual Activity    Alcohol use: No    Drug use: No    Sexual activity: Yes     Partners: Female     Comment:  wife gilbert     Review of Systems   Constitutional:  Positive for activity change, appetite change, chills and fatigue. Negative for fever.   Eyes:  Negative for visual disturbance.   Respiratory:  Negative for cough, chest tightness, shortness of breath and wheezing.    Cardiovascular:  Negative for chest pain, palpitations and leg swelling.   Gastrointestinal:  Negative for abdominal pain, constipation, diarrhea, nausea and vomiting.   Musculoskeletal:  Positive for arthralgias, gait problem and myalgias.   Skin:  Positive for wound.   Neurological:  Positive for weakness. Negative for dizziness, syncope, speech difficulty, light-headedness and headaches.   Psychiatric/Behavioral:  Negative for agitation, behavioral problems, confusion and decreased concentration.      Objective:     Vital Signs (Most Recent):  Temp: 97.6 °F (36.4 °C) (10/07/24 1101)  Pulse: 105 (10/07/24 1215)  Resp: (!) 24 (10/07/24 1215)  BP: 121/63 (10/07/24 1215)  SpO2: 95 % (10/07/24 1215) Vital Signs (24h Range):  Temp:  [97.2 °F (36.2 °C)-98.9 °F (37.2 °C)] 97.6 °F (36.4 °C)  Pulse:  [] 105  Resp:  [14-26] 24  SpO2:  [91 %-100 %] 95 %  BP: ()/() 121/63     Weight: (!) 140.6 kg (310 lb)  Body mass index is 40.9 kg/m².     Physical Exam  Vitals and nursing note reviewed.   Constitutional:       General: He is not in acute distress.     Appearance: He is obese. He is ill-appearing. He is not toxic-appearing.   HENT:      Head: Normocephalic and atraumatic.      Right Ear: External ear normal.      Left Ear: External ear normal.      Nose: Nose normal. No congestion or rhinorrhea.      Mouth/Throat:      Mouth: Mucous membranes are dry.      Pharynx: Oropharynx is clear.   Eyes:      Extraocular Movements: Extraocular movements intact.      Conjunctiva/sclera: Conjunctivae  normal.   Cardiovascular:      Rate and Rhythm: Normal rate and regular rhythm.      Pulses: Normal pulses.      Heart sounds: Normal heart sounds. No murmur heard.  Pulmonary:      Effort: Pulmonary effort is normal. No respiratory distress.      Breath sounds: Normal breath sounds. No wheezing or rales.   Abdominal:      General: Abdomen is flat. There is no distension.      Palpations: Abdomen is soft. There is no mass.      Tenderness: There is no abdominal tenderness. There is no right CVA tenderness, left CVA tenderness or guarding.   Musculoskeletal:         General: Normal range of motion.      Cervical back: Normal range of motion and neck supple. No rigidity.      Comments: Both feet wrapped in ace bandage, dressing clean and dry   Skin:     General: Skin is warm and dry.      Capillary Refill: Capillary refill takes less than 2 seconds.   Neurological:      General: No focal deficit present.      Mental Status: He is alert and oriented to person, place, and time.      Motor: No weakness.      Gait: Gait normal.   Psychiatric:         Mood and Affect: Mood normal.         Behavior: Behavior normal.                Significant Labs: All pertinent labs within the past 24 hours have been reviewed.  A1C:   Recent Labs   Lab 05/23/24  1505 10/06/24  1355   HGBA1C 5.5 7.8*     Recent Labs   Lab 10/06/24  1346 10/07/24  0354   BILITOT 1.0 0.7     Blood Culture:   Recent Labs   Lab 10/06/24  1346   LABBLOO Gram stain lizzy bottle: Gram positive cocci in clusters resembling Staph  Results called to and read back by: Chelita Fleming RN 10/07/2024 09:38  Gram stain aer bottle: Gram positive cocci in clusters resembling Staph  10/07/2024  12:42  Gram stain lizzy bottle: Gram positive cocci in clusters resembling Staph  Results called to and read back by: Chelita Fleming RN 10/07/2024 09:38     BMP:   Recent Labs   Lab 10/06/24  1346 10/07/24  0354   * 172*   * 134*   K 4.6 4.5   CL 93* 101   CO2 24 26    BUN 27* 28*   CREATININE 1.4 1.0   CALCIUM 9.1 8.9   MG 1.5*  --      CBC:   Recent Labs   Lab 10/06/24  1346 10/07/24  0354   WBC 16.47* 11.74   HGB 11.3* 10.1*   HCT 35.1* 32.0*   * 396     CMP:   Recent Labs   Lab 10/06/24  1346 10/07/24  0354   * 134*   K 4.6 4.5   CL 93* 101   CO2 24 26   * 172*   BUN 27* 28*   CREATININE 1.4 1.0   CALCIUM 9.1 8.9   PROT 7.5 6.6   ALBUMIN 1.9* 1.6*   BILITOT 1.0 0.7   ALKPHOS 109 92   AST 18 14   ALT 17 16   ANIONGAP 12* 7     Recent Labs   Lab 10/06/24  1346 10/06/24  1721   LACTATE 2.3* 1.1     Recent Labs   Lab 10/06/24  1346   MG 1.5*     POCT Glucose:   Recent Labs   Lab 10/06/24  1644 10/06/24  2107 10/07/24  1141   POCTGLUCOSE 178* 223* 224*     Recent Labs   Lab 10/06/24  1346   TROPONINIHS 4.7     Recent Labs   Lab 10/06/24  1410   COLORU Yellow   APPEARANCEUA Cloudy*   PHUR 5.0   SPECGRAV >=1.030*   PROTEINUA Negative   GLUCUA 4+*   KETONESU 1+*   BILIRUBINUA Negative   OCCULTUA Negative   NITRITE Negative   UROBILINOGEN 1.0   LEUKOCYTESUR Negative   RBCUA 3   WBCUA 2   BACTERIA Negative   SQUAMEPITHEL 2   HYALINECASTS 16.0*     X-Ray Chest AP Portable  Narrative: EXAMINATION:  XR CHEST AP PORTABLE    CLINICAL HISTORY:  Sepsis;    COMPARISON:  11/17/2023    FINDINGS:  Clear lungs.  Unremarkable cardiomediastinal silhouette.  No pleural fluid or vascular congestion detected  Impression: No acute findings    Electronically signed by: Beth Zuleta MD  Date:    10/06/2024  Time:    14:24     Significant Imaging: I have reviewed all pertinent imaging results/findings within the past 24 hours.

## 2024-10-07 NOTE — ASSESSMENT & PLAN NOTE
Contributing source of infection. Blood cx x2 positive.   Continue with IV antibiotics, zosyn and vancomycin  - f/u podiatry consult

## 2024-10-07 NOTE — ASSESSMENT & PLAN NOTE
Patients blood pressure range in the last 24 hours was: BP  Min: 88/68  Max: 171/71.The patient's inpatient anti-hypertensive regimen is listed below:    Current Antihypertensives  metoprolol succinate (TOPROL-XL) 24 hr tablet 100 mg, Daily, Oral  losartan tablet 50 mg, Daily, Oral  Denies symptoms.  Plan  - BP is controlled, no changes needed to their regimen  - continue with current antihypertensive regimen  - follow up consult cardiology

## 2024-10-07 NOTE — ASSESSMENT & PLAN NOTE
Secondary to infectious process, poor control of diabetes, poor nutritional status and chronic anemia.   Hg/HCT 10.1/32.   Continue with IV antibiotics, encourage PO hydration.   Continue management of chronic conditions.

## 2024-10-07 NOTE — EICU
Intervention Initiated From:  [facility] / EICU    Doctor Notified:  Yes  Comments: Dr. Rosalinda Clayton notified of pt's admission to unit.

## 2024-10-07 NOTE — ASSESSMENT & PLAN NOTE
Hold oral antihyperglycemics.   Lab Results   Component Value Date    HGBA1C 7.8 (H) 10/06/2024    HGBA1C 5.5 05/23/2024    HGBA1C 5.4 01/16/2024   Poorly controlled compared to 6 months ago.   - [facility] QAS  - consult nutritionist

## 2024-10-07 NOTE — CONSULTS
LUZ MARIA Miles MD  [address] [place] [address],  [address] [place] [address]  Phone:  [phone]  Fax:  [phone]  Email: [email or web address]  _______________________________________________________________  Cardiac consultation:    Today's Date:  10/7/2024  Patient Name: Andrew Del Cid Jr.    MRN: [id number]    Code Status: Full Code     Attending Physician: Olena Thornton DO   Consulting Physician: LUZ MARIA Miles MD  ______________________________________________________________________    Reason for Consult:   Atrial fibrillation with a rapid ventricular rate    Temp: 97.6 °F (36.4 °C) (10/07/24 0701)  Pulse: (!) 113 (10/07/24 1000)  Resp: (!) 23 (10/07/24 1000)  BP: 131/60 (10/07/24 1000)  SpO2: (!) 92 % (10/07/24 1000)  Subjective:   The patient is well-known to me.  He is a 59-year-old gentleman who has been followed in the cardiology clinic as an outpatient.  He was last seen in the clinic in May 2024, at which time he was maintaining normal sinus rhythm.    He was admitted to the hospital after presenting with a several week history of decreased appetite, fever, and cold sweats.  In the ED, he was noted to be in atrial fibrillation with a rapid ventricular rate, and hypotensive with a blood pressure of 88/68 mmHg's.    Laboratory studies:  CBC: WBC 16.4 hemoglobin 11.3 platelets 536  Chemistries: Sodium 129 potassium 4.6 glucose 192 BUN 27 creatinine 1.4 liver function studies: Normal.  BNP: 2184  EKG: Atrial fibrillation with a ventricular rate of 128 bpm.  Chest x-ray: Lungs are were clear; unremarkable cardiomediastinal silhouette.    The patient was given IV fluids; started on antibiotic therapy (with concern for sepsis).  The patient was admitted to the ICU.    Since his admission, his blood pressure has normalized with his most recent BP: 131/60mmHg's, and a heart rate of 113 bpm.  The patient is on aspirin, Lovenox, and metoprolol  for stroke reduction and heart  rate control.    Home meds included the following: Eliquis and metoprolol for his underlying atrial fibrillation.      Prior evaluation and outpatient workup:    The patient is a 57-year-old gentleman with multiple chronic comorbidities: hypertension, hyperlipidemia, peripheral arterial disease, history of acute renal failure, and status post-CVA (Cerebrovascular Accident).    He was previously admitted to the hospital on 10/26/2022 for sepsis.  During his presentation, he was noted to be in atrial fibrillation with a rapid ventricular rate.    EKG (12/6/2022): atrial fibrillation with an RVR of 127 bpm; left anterior fascicular block; poor R wave progression.     Echocardiogram (10/24/2022): EF 55%;  mild LVH; moderate RV enlargement; mild tricuspid regurgitation     Chest x-ray (10/22/2022): no acute findings.    Carotid ultrasound (10/26/2022):  mild scattered plaquing; no evidence of hemodynamic significant disease.    Chest CTA (noncoronary;  10/22/2022): no evidence of pulmonary emboli.    Neck CTA (10/27/2022): mild mixed plaquing bilaterally of no hemodynamic Significance.    Head CTA (10/27/2022): no evidence of intracranial large vessel stenosis or aneurysms    Abdominal/pelvis CT without contrast  (10/22/2022):   mild perinephric fat stranding.  No hydronephrosis; unremarkable liver, adrenals, spleen and pancreas.  No suspicious masses or lymphadenopathy.    Exercise nuclear stress test (12/14/2022):  EF calculated at 52%.  Normal wall motion.  Normal myocardial perfusion scan  EKG negative for ischemia  No arrhythmias noted  Reduced functional capacity exercising for only 2 minutes corresponding to a functional capacity of 2 Daxa.    Sleep study (12/19/22):  Severe sleep apnea  Severe oxygen desaturation with a coco of 32% during sleep.  Mean oxygen saturation however was 91%.  Periodic limb movement during sleep.    10/4/2023:    The patient has a history of obstructive sleep apnea but has been  intolerant to CPAP therapy.    Medications:  Metoprolol Succinate (film Coated) 50 MG,  Start: 05/13/24, Tablet, Extended Release, 1 PO daily, ORAL, Qty. 30, No Eliquis 5 MG Oral Tablet 5 MG,  Start: 12/28/23, tablet, Take 1 tablet by mouth twice daily, , Qty. 60, No Substitutions Allowed, Atorvastatin Calcium (10x10,Film-Coated) 40 MG,  Start: 12/01/22, Tablet, 1 po daily, ORAL, Qty. 30, Substitutions Allowed, Amlodipine Besylate 5 MG,  Start: 12/01/22, Tablet, 1  po daily, ORAL, Qty. 500, Substitutions Allowed, Refills: 0  Losartan Potassium (usp,Film-Coated) 50 MG,  Start: 12/01/22, Tablet, 1 po daily, ORAL, Qty. 30, Substitutions Allowed, Refills: Metformin Hcl 500 MG,  Start: 12/01/22, Tablet, 1  po BID, ORAL, Qty. 100, Substitutions Allowed, Refills: 0  Jardiance 25 MG,  Start: 12/01/22, Tablet, 1  po daily, ORAL, Qty. 90, Substitutions Allowed, Refills: 0  Trulicity (inner Pack,Pf) 4.5 MG/0.5 ML,  Start: 12/01/22, Solution, inject once q week, , Qty. 1, Substitutions Allowed, Refills: 0  Lantus 100 UNITS/1 ML,  Start: 12/01/22, Solution, inject 40ui daily, , Qty. 1, Substitutions Allowed, Refills: 0  Ferrous Fumarate 325 MG,  Start: 12/01/22, Tablet, 1  po daily, ORAL, Qty. 60, Substitutions Allowed, Refills: 0  Duloxetine Hcl 30 MG,  Start: 12/01/22, Capsule, Delayed Release, 1  po daily, ORAL, Qty. 30, Substitutions Allowed, Refills: 0  Multivitamin (gluten-Free,Sodium-Free) NA,  Start: 12/01/22, Tablet, 1  po daily, ORAL, Qty. 1000, Substitutions Allowed, Aspirin 81 MG,  Start: 12/01/22, Tablet, Enteric Coated, 1 po daily, ORAL, Qty. 30, Substitutions Allowed, Refills: 0     *All medications reviewed with the patient.     Allergies:  NKDA      Testing/Procedures:    Abnormal EKG (10/4/2023): Normal sinus rhythm; intraventricular conduction delay; PVC (Premature Ventricular Contraction). Will continue to monitor.     EKG (5/30/2024): Sinus rhythm with a ventricular rate of 78 bpm with a first-degree AV  block with a LA interval of 202 ms.  Left anterior fascicular block.  We will continue to monitor.    Echocardiogram (5/30/2024):   EF 60% without regional wall motion abnormalities.  Dilatation of the left atrium measuring 4.3 cm as well as the right ventricle measuring 4.1 cm.  There is mild concentric LVH with an IVS measuring 1.2 cm and a posterior wall of 1.1.  The ascending aorta is mildly calcified.  By color Doppler, there is mild tricuspid regurgitation with normal pulmonary pressures.  I will continue surveillance.    Carotid Duplex Scan (12/6/2022 ([facility]):  Normal velocities bilaterally.  Mild scattered plaque of no hemodynamic significance    Carotid Duplex Scan (5/29/2024):   Normal velocities bilaterally.  By 2D, there is mild bilateral plaquing of no hemodynamic significance.  Vertebral flow bilaterally is antegrade.  Continue with antiplatelet and statin therapy and repeat the study in one year.    Lipid panel on 10/27/2022: total cholesterol-116, HDL-22, LDL-65, triglycerides-141      Clinical Impression/Plan of Care:    1.  Atrial Fibrillation: The patient presented with new-onset atrial fibrillation on 12/6/2022.  He converted to normal sinus rhythm.  He was started on Eliquis for stroke reduction.  He has been maintaining normal sinus rhythm since.  Echocardiography reveals left ventricular hypertrophy with mild right-sided dilatation.    2.  Hypertension: Blood pressure is adequately controlled.  He is on Metoprolol, Amlodipine, and Losartan therapy. Continue with current therapy.    3.  Valvular Disease: There is mild tricuspid regurgitation with normal pulmonary pressures.  I will continue to monitor.    4.  Hyperlipidemia: The lipids are very well controlled. Continue with Atorvastatin 40 mg/day.        5.  Peripheral Arterial Disease: The patient is status post bilateral toe amputations.  - Continue with Aspirin and Atorvastatin therapy.    6.  Sleep Apnea: His sleep study  "showed severe obstructive sleep apnea with desaturation.  The patient, however, states that he cannot adjust to a CPAP machine.  He is therefore, refusing it.  He and his wife asked if there is an alternative.  - Refer the patient to the ENT service.     7. Status-Post CVA(Cerebrovascular Accident): Without recurrence.  - Continue with Eliquis for stroke reduction.     8.  Diabetes Mellitus: Will defer to the medicine service.    9. Obesity Counseling BMI 39.44: The patient has been counseled on the importance of weight loss to reduce chronic morbidity conditions which may lead to hypertension, diabetes, and cardiopulmonary disease(s).  I have spent 15-20 minutes discussing "metabolic dysregulation", and various dietary and eating strategies for safe weight loss.        Past Medical History:  Past Medical History:   Diagnosis Date    Acute renal failure with tubular necrosis 10/22/2022    Atrial fibrillation     BMI 45.0-49.9, adult 10/05/2022    Recommendations:   Stay physically active. As tolerated alternate resistance training with stretching and cardio. Goal of 150 minutes per week of moderate intensity activity or 7,500 - 10,000 steps per day. Follow the Mediterranean Diet. Include whole fresh fruits, vegetables, olive oil, seeds, nuts, whole grains, cold water fish, salmon, mackerel and lean cuts of meat.  Do not drink sugary/diet c    Charcot's joint 12/2023    right    Diabetes mellitus, type 2     Does mobilize using cane     Fever 10/22/2022    Hyperlipidemia     Hypertension     Hyponatremia 10/22/2022    Left-sided weakness     Neuropathy     bilateral lower ext    Obese     PAD (peripheral artery disease)     Peripheral neuropathy     Rash 11/01/2022    Rhinophyma     BILATERAL NOSE    Severe sleep apnea     UNABLE TO TOLERATE CPAP    Stroke 10/2022    Wound of right foot 12/2023       Past Surgical History:   Procedure Laterality Date    ANGIOGRAM, AORTIC ARCH, PERIPHERAL Bilateral 3/4/2024    " Procedure: Angiogram, Aortic Arch, Peripheral;  Surgeon: Isai Hernandez MD;  Location: [facility] CATH;  Service: Cardiology;  Laterality: Bilateral;    APPENDECTOMY      pt denies    DEBRIDEMENT OF FOOT Right 12/15/2023    Procedure: DEBRIDEMENT, FOOT;  Surgeon: Baldemar Esquivel DPM;  Location: [facility] OR;  Service: Podiatry;  Laterality: Right;    FOOT MASS EXCISION Right 12/15/2023    Procedure: EXCISION, MASS, FOOT;  Surgeon: Baldemar Esquivel DPM;  Location: [facility] OR;  Service: Podiatry;  Laterality: Right;    HERNIA REPAIR      INCISION AND DRAINAGE FOOT Bilateral 12/26/2018    Procedure: INCISION AND DRAINAGE, FOOT;  Surgeon: Rob Alas DPM;  Location: [facility] OR;  Service: Podiatry;  Laterality: Bilateral;    SURGICAL REMOVAL OF FASCIA Left 12/15/2023    Procedure: FASCIECTOMY;  Surgeon: Baldemar Esquivel DPM;  Location: [facility] OR;  Service: Podiatry;  Laterality: Left;  1st 0600    TOE AMPUTATION      right great toe    TOE AMPUTATION Left 12/26/2018    Procedure: AMPUTATION, TOE; left 3rd toe;  Surgeon: Rob Alas DPM;  Location: [facility] OR;  Service: Podiatry;  Laterality: Left;    TONSILLECTOMY         Family History   Problem Relation Name Age of Onset    No Known Problems Mother      Cancer Father      No Known Problems Brother         Social History     Socioeconomic History    Marital status:    Tobacco Use    Smoking status: Never     Passive exposure: Never    Smokeless tobacco: Never   Substance and Sexual Activity    Alcohol use: No    Drug use: No    Sexual activity: Yes     Partners: Female     Comment:  wife gilbert   Social History Narrative    Wife takes care of his medications 10/4/22 (gilbert dahl)     Social Drivers of Health     Financial Resource Strain: Medium Risk (10/7/2024)    Overall Financial Resource Strain (CARDIA)     Difficulty of Paying Living Expenses: Somewhat hard   Food Insecurity: Food Insecurity Present (10/7/2024)    Hunger Vital Sign      Worried About Running Out of Food in the Last Year: Sometimes true     Ran Out of Food in the Last Year: Sometimes true   Transportation Needs: No Transportation Needs (10/7/2024)    TRANSPORTATION NEEDS     Transportation : No   Physical Activity: Inactive (10/31/2022)    Exercise Vital Sign     Days of Exercise per Week: 0 days     Minutes of Exercise per Session: 0 min   Stress: Stress Concern Present (10/7/2024)    [employer] [place] [employer] - Occupational Stress Questionnaire     Feeling of Stress : To some extent   Housing Stability: Low Risk  (10/7/2024)    Housing Stability Vital Sign     Unable to Pay for Housing in the Last Year: No     Homeless in the Last Year: No       Medications:  Current Facility-Administered Medications   Medication Dose Route Frequency Provider Last Rate Last Admin    acetaminophen tablet 650 mg  650 mg Oral Q8H PRN Olena Thornton, DO        aspirin EC tablet 81 mg  81 mg Oral Daily Olena Thornton, DO   81 mg at 10/07/24 0846    atorvastatin tablet 40 mg  40 mg Oral Daily Olena Thornton, DO   40 mg at 10/07/24 0846    dextrose 10% bolus 125 mL 125 mL  12.5 g Intravenous PRN Olena Thornton,         dextrose 10% bolus 250 mL 250 mL  25 g Intravenous PRN Olena Thornton,         DULoxetine DR capsule 30 mg  30 mg Oral Daily Olena Thornton, DO   30 mg at 10/07/24 0846    enoxaparin injection 135 mg  1 mg/kg Subcutaneous Q12H (prophylaxis, 0900/2100) Olena Thornton, DO   135 mg at 10/07/24 0845    famotidine (PF) injection 20 mg  20 mg Intravenous Q12H Olena Thornton, DO   20 mg at 10/07/24 0845    glucagon (human recombinant) injection 1 mg  1 mg Intramuscular PRN Olena Thornton, DO        glucose chewable tablet 16 g  16 g Oral PRN Olena Thornton, DO        glucose chewable tablet 24 g  24 g Oral PRN Olena Thornton, DO        HYDROcodone-acetaminophen  mg per tablet 1 tablet  1 tablet Oral Q4H PRN  Olena Thornton DO        HYDROcodone-acetaminophen 5-325 mg per tablet 1 tablet  1 tablet Oral Q4H PRN Olena Thornton DO        insulin aspart U-100 pen 0-5 Units  0-5 Units Subcutaneous QID (AC + HS) PRN Olena Thornton DO   1 Units at 10/06/24 2135    melatonin tablet 6 mg  6 mg Oral Nightly PRN Olena Thornton DO        metoprolol succinate (TOPROL-XL) 24 hr tablet 100 mg  100 mg Oral Daily Olena Thornton DO   100 mg at 10/07/24 0846    ondansetron injection 4 mg  4 mg Intravenous Q8H PRN Olena Thornton DO        piperacillin-tazobactam (ZOSYN) 4.5 g in D5W 100 mL IVPB (MB+)  4.5 g Intravenous Q8H Olena Thornton DO 25 mL/hr at 10/07/24 0634 4.5 g at 10/07/24 0634    sodium chloride 0.9% flush 10 mL  10 mL Intravenous PRN Olena Thornton DO        vancomycin - pharmacy to dose   Intravenous pharmacy to manage frequency Olena Thornton DO           Allergies:  Review of patient's allergies indicates:  No Known Allergies     Labs: CMP   Recent Labs   Lab 10/06/24  1346 10/07/24  0354   * 134*   K 4.6 4.5   CL 93* 101   CO2 24 26   * 172*   BUN 27* 28*   CREATININE 1.4 1.0   CALCIUM 9.1 8.9   PROT 7.5 6.6   ALBUMIN 1.9* 1.6*   BILITOT 1.0 0.7   ALKPHOS 109 92   AST 18 14   ALT 17 16   ANIONGAP 12* 7    and CBC   Recent Labs   Lab 10/06/24  1346 10/07/24  0354   WBC 16.47* 11.74   HGB 11.3* 10.1*   HCT 35.1* 32.0*   * 396         Vital Signs (Most Recent):  Temp: 97.6 °F (36.4 °C) (10/07/24 0701)  Pulse: (!) 113 (10/07/24 1000)  Resp: (!) 23 (10/07/24 1000)  BP: 131/60 (10/07/24 1000)  SpO2: (!) 92 % (10/07/24 1000) Vital Signs (24h Range):  Temp:  [97.2 °F (36.2 °C)-98.9 °F (37.2 °C)] 97.6 °F (36.4 °C)  Pulse:  [] 113  Resp:  [14-26] 23  SpO2:  [91 %-100 %] 92 %  BP: ()/() 131/60     Weight: (!) 140.6 kg (310 lb)  Body mass index is 40.9 kg/m².    SpO2: (!) 92 %         Intake/Output Summary (Last 24 hours) at 10/7/2024  "1025  Last data filed at 10/7/2024 1021  Gross per 24 hour   Intake 4492.63 ml   Output 1125 ml   Net 3367.63 ml       Assessment and Plan:     Clinical Impression/Plan of Care:    1.  Atrial Fibrillation: The patient with  recurrent atrial fibrillation. Echocardiography reveals left ventricular hypertrophy with mild right-sided dilatation.   Rate control: Agree with increasing metoprolol to 100 mg daily.   Discontinue Lovenox   Initiate Eliquis 5 mg 1 tablet twice daily for stroke reduction.   Initiate amiodarone infusion    2.  Hypertension: Blood pressure is  has normalized.    Restart losartan at 50 mg daily.    3.  Valvular Disease: There is mild tricuspid regurgitation with normal pulmonary pressures.  I will continue to monitor.    4.  Hyperlipidemia: The lipids are very well controlled. Continue with Atorvastatin 40 mg/day.        5.  Peripheral Arterial Disease: The patient is status post bilateral toe amputations.  - Continue with Aspirin and Atorvastatin therapy.    6.  Sleep Apnea: His sleep study showed severe obstructive sleep apnea with desaturation.  The patient, however, states that he cannot adjust to a CPAP machine.  He is therefore, refusing it.  He and his wife asked if there is an alternative.  Referred to ENT service as outpatient.    7. Status-Post CVA(Cerebrovascular Accident): Without recurrence.  Continue with Eliquis for stroke reduction.   Continue with aspirin therapy    8.  Diabetes Mellitus: Will defer to the medicine service.    9. Obesity Counseling BMI 39.44: The patient has been counseled on the importance of weight loss to reduce chronic morbidity conditions which may lead to hypertension, diabetes, and cardiopulmonary disease(s).  I have spent 15-20 minutes discussing "metabolic dysregulation", and various dietary and eating strategies for safe weight loss.      "

## 2024-10-07 NOTE — HPI
"Chief Complaint   Patient presents with    Weakness       Pt stated that for the past couple weeks he has been experiencing fatigue / decreased appetite / cough / subjective fever / "cold sweats".        59-year-old male with a history of acute renal failure, atrial fibrillation in the past, off of his blood thinners, diabetes mellitus, hyperlipidemia, hypertension, peripheral neuropathy presents the emergency department stating that for the past couple of weeks he has been experiencing fatigue, subjective fevers, cold sweats, cough, not eating or drinking well.  He has been seeing  as well as  for his wound care to bilateral feet.  Here in the emergency department he is alert and oriented x4, GCS is 15.    ED course: vitals signs BP 88/68 mmHg, , RR 29, SpO2 945 on room air, temp 98.9F  Chemistry labs Na 129, K4.6, Cl 93, CO2 24, Glu 192, Alb 1.9, BUN 27, Cr 1.4  WBC 16.47, Hg 11.3, HCT 35, , Mag 1.5, troponin I HS 4.7, lactate 2.3  NTproBNP 2184  EKG tracings, atrial fibrillation, rate 128 bpm, no ST-T wave elevation  Patient received sepsis bolus IVF 30/kg and started on vancomycin and zosyn.     Patient admitted to ICU for continued management of sepsis with diabetic foot infection bilaterally, atrial fibrillation with RVR, CHF exacerbation.  Follow up consult with primary cardiology and primary podiatry.     Telemetry overnight tracings of atrial fibrillation rate >100s bpm. Repeat lactate within normal limits with fluid resuscitation. Patient awake and alert, endorses improved tolerance to PO intake of fluids and solids. Continue with broad coverage antibiotics, follow up blood cx x2. Further recommendations appreciated from cardiology and podiatry.      "

## 2024-10-07 NOTE — ASSESSMENT & PLAN NOTE
Body mass index is 40.9 kg/m². Morbid obesity complicates all aspects of disease management from diagnostic modalities to treatment. Weight loss encouraged and health benefits explained to patient.     Wt Readings from Last 3 Encounters:   10/06/24 (!) 140.6 kg (310 lb)   05/23/24 (!) 141.1 kg (311 lb)   03/04/24 (!) 136.5 kg (300 lb 14.9 oz)

## 2024-10-07 NOTE — ASSESSMENT & PLAN NOTE
"This patient does have evidence of infective focus  My overall impression is sepsis.  Source: Skin and Soft Tissue (location bilateral diabetic foot wound)  Antibiotics given-   Antibiotics (72h ago, onward)      Start     Stop Route Frequency Ordered    10/07/24 1245  mupirocin 2 % ointment         10/12/24 0859 Nasl 2 times daily 10/07/24 1138    10/06/24 2230  piperacillin-tazobactam (ZOSYN) 4.5 g in D5W 100 mL IVPB (MB+)         -- IV Every 8 hours (non-standard times) 10/06/24 1640    10/06/24 1640  vancomycin - pharmacy to dose  (vancomycin IVPB (PEDS and ADULTS))        Placed in "And" Linked Group    -- IV pharmacy to manage frequency 10/06/24 1640          Latest lactate reviewed-  Recent Labs   Lab 10/06/24  1721   LACTATE 1.1     Organ dysfunction indicated by Acute heart failure    Fluid challenge Ideal Body Weight- The patient's ideal body weight is Ideal body weight: 79.9 kg (176 lb 2.4 oz) which will be used to calculate fluid bolus of 30 ml/kg for treatment of septic shock.      Post- resuscitation assessment Yes Perfusion exam was performed within 6 hours of septic shock presentation after bolus shows Adequate tissue perfusion assessed by non-invasive monitoring       Will Not start Pressors- Levophed for MAP of 65  Source control achieved by: IVF resuscitation, IV antibiotics  "

## 2024-10-07 NOTE — ASSESSMENT & PLAN NOTE
With mild elevated procalcitonin. Resolved after IVF hydration.   - f/u blood cx x2  - trend WBC

## 2024-10-07 NOTE — ASSESSMENT & PLAN NOTE
Lab Results   Component Value Date    LABBLOO  10/06/2024     Gram stain lizzy bottle: Gram positive cocci in clusters resembling Staph    LABBLOO  10/06/2024     Results called to and read back by: Chelita Fleming RN 10/07/2024 09:38    LABBLOO  10/06/2024     Gram stain lizzy bottle: Gram positive cocci in clusters resembling Staph    LABBLOO  10/06/2024     Results called to and read back by: Chelita Fleming RN 10/07/2024 09:38    LABBLOO  10/06/2024     Gram stain aer bottle: Gram positive cocci in clusters resembling Staph    LABBLOO 10/07/2024  12:42 10/06/2024      Past history of MSSA bacteremia.   Chronic diabetic foot wound bilaterally.   Continue with broad spectrum antibiotics, zosyn and vancomycin  Trend WBC  Follow up recommendations from podiatry with further infection control

## 2024-10-07 NOTE — ASSESSMENT & PLAN NOTE
Patient has paroxysmal (<7 days) atrial fibrillation. Patient is currently in atrial fibrillation. [id number] Score: 2. The patients heart rate in the last 24 hours is as follows:  Pulse  Min: 90  Max: 140     Antiarrhythmics  metoprolol succinate (TOPROL-XL) 24 hr tablet 100 mg, Daily, Oral  amiodarone 360 mg/200 mL (1.8 mg/mL) infusion, Continuous, Intravenous    Anticoagulants  apixaban tablet 5 mg, 2 times daily, Oral    Plan  - Replete lytes with a goal of K>4, Mg >2  - Patient's afib is currently uncontrolled. Will continue current treatment  Follow up consult cardiology  - eliquis BID

## 2024-10-07 NOTE — PLAN OF CARE
Received patient to room 209 from ER via stretcher in stable condition. AAOX4, pleasant and cooperative. Oriented to unit and staff, no c/o voiced. VSS, afebrile. On room air, no distress noted. Afib on monitor, reports history of afib after CVA approx 2 yrs ago and no problems since. PIV X2 s/l'd. Bilateral feet dressings dry and intact, pictures taken and scanned to [facility] per ER RN. Denies pain. Voids spontaneously to urinal. Bed low and locked, side rails up X2, call bell in reach, instructed to call with any needs.

## 2024-10-08 ENCOUNTER — CLINICAL SUPPORT (OUTPATIENT)
Dept: CARDIOLOGY | Facility: HOSPITAL | Age: 59
End: 2024-10-08
Attending: INTERNAL MEDICINE
Payer: MEDICAID

## 2024-10-08 PROBLEM — R78.81 MRSA BACTEREMIA: Status: ACTIVE | Noted: 2024-10-08

## 2024-10-08 PROBLEM — D72.829 LEUKOCYTOSIS: Status: RESOLVED | Noted: 2024-10-07 | Resolved: 2024-10-08

## 2024-10-08 PROBLEM — B95.62 MRSA BACTEREMIA: Status: ACTIVE | Noted: 2024-10-08

## 2024-10-08 PROBLEM — L97.509 DIABETIC FOOT ULCER: Status: RESOLVED | Noted: 2022-10-23 | Resolved: 2024-10-08

## 2024-10-08 PROBLEM — E11.621 DIABETIC FOOT ULCER: Status: RESOLVED | Noted: 2022-10-23 | Resolved: 2024-10-08

## 2024-10-08 PROBLEM — B95.62 BACTEREMIA DUE TO METHICILLIN RESISTANT STAPHYLOCOCCUS AUREUS: Status: ACTIVE | Noted: 2022-10-23

## 2024-10-08 LAB
ANION GAP SERPL CALC-SCNC: 6 MMOL/L (ref 3–11)
BUN SERPL-MCNC: 29 MG/DL (ref 6–20)
CALCIUM SERPL-MCNC: 8.4 MG/DL (ref 8.7–10.5)
CHLORIDE SERPL-SCNC: 102 MMOL/L (ref 95–110)
CO2 SERPL-SCNC: 26 MMOL/L (ref 23–29)
CREAT SERPL-MCNC: 1 MG/DL (ref 0.5–1.4)
ERYTHROCYTE [DISTWIDTH] IN BLOOD BY AUTOMATED COUNT: 12.7 % (ref 11.5–14.5)
EST. GFR  (NO RACE VARIABLE): >60 ML/MIN/1.73 M^2
GLUCOSE SERPL-MCNC: 248 MG/DL (ref 70–110)
HCT VFR BLD AUTO: 31.4 % (ref 40–54)
HGB BLD-MCNC: 10.2 G/DL (ref 14–18)
MAGNESIUM SERPL-MCNC: 2 MG/DL (ref 1.6–2.6)
MCH RBC QN AUTO: 27.3 PG (ref 27–31)
MCHC RBC AUTO-ENTMCNC: 32.5 G/DL (ref 32–36)
MCV RBC AUTO: 84 FL (ref 82–98)
PLATELET # BLD AUTO: 377 K/UL (ref 150–450)
PMV BLD AUTO: 9.1 FL (ref 9.2–12.9)
POCT GLUCOSE: 212 MG/DL (ref 70–110)
POCT GLUCOSE: 266 MG/DL (ref 70–110)
POCT GLUCOSE: 278 MG/DL (ref 70–110)
POCT GLUCOSE: 361 MG/DL (ref 70–110)
POTASSIUM SERPL-SCNC: 4.3 MMOL/L (ref 3.5–5.1)
RBC # BLD AUTO: 3.74 M/UL (ref 4.6–6.2)
SODIUM SERPL-SCNC: 134 MMOL/L (ref 136–145)
WBC # BLD AUTO: 6.62 K/UL (ref 3.9–12.7)

## 2024-10-08 PROCEDURE — 25000003 PHARM REV CODE 250: Performed by: STUDENT IN AN ORGANIZED HEALTH CARE EDUCATION/TRAINING PROGRAM

## 2024-10-08 PROCEDURE — 36415 COLL VENOUS BLD VENIPUNCTURE: CPT | Performed by: STUDENT IN AN ORGANIZED HEALTH CARE EDUCATION/TRAINING PROGRAM

## 2024-10-08 PROCEDURE — 93306 TTE W/DOPPLER COMPLETE: CPT

## 2024-10-08 PROCEDURE — 63600175 PHARM REV CODE 636 W HCPCS: Performed by: STUDENT IN AN ORGANIZED HEALTH CARE EDUCATION/TRAINING PROGRAM

## 2024-10-08 PROCEDURE — 99233 SBSQ HOSP IP/OBS HIGH 50: CPT | Mod: ,,, | Performed by: STUDENT IN AN ORGANIZED HEALTH CARE EDUCATION/TRAINING PROGRAM

## 2024-10-08 PROCEDURE — 25000003 PHARM REV CODE 250: Performed by: INTERNAL MEDICINE

## 2024-10-08 PROCEDURE — 99900035 HC TECH TIME PER 15 MIN (STAT)

## 2024-10-08 PROCEDURE — 20000000 HC ICU ROOM

## 2024-10-08 PROCEDURE — 80048 BASIC METABOLIC PNL TOTAL CA: CPT | Performed by: STUDENT IN AN ORGANIZED HEALTH CARE EDUCATION/TRAINING PROGRAM

## 2024-10-08 PROCEDURE — 94761 N-INVAS EAR/PLS OXIMETRY MLT: CPT

## 2024-10-08 PROCEDURE — 27000207 HC ISOLATION

## 2024-10-08 PROCEDURE — 99900031 HC PATIENT EDUCATION (STAT)

## 2024-10-08 PROCEDURE — 83735 ASSAY OF MAGNESIUM: CPT | Performed by: STUDENT IN AN ORGANIZED HEALTH CARE EDUCATION/TRAINING PROGRAM

## 2024-10-08 PROCEDURE — 85027 COMPLETE CBC AUTOMATED: CPT | Performed by: STUDENT IN AN ORGANIZED HEALTH CARE EDUCATION/TRAINING PROGRAM

## 2024-10-08 PROCEDURE — 63600175 PHARM REV CODE 636 W HCPCS: Performed by: INTERNAL MEDICINE

## 2024-10-08 RX ORDER — INSULIN GLARGINE 100 [IU]/ML
20 INJECTION, SOLUTION SUBCUTANEOUS DAILY
Status: DISCONTINUED | OUTPATIENT
Start: 2024-10-08 | End: 2024-10-09

## 2024-10-08 RX ORDER — ASCORBIC ACID 500 MG
500 TABLET ORAL 2 TIMES DAILY
Status: DISCONTINUED | OUTPATIENT
Start: 2024-10-08 | End: 2024-10-10 | Stop reason: HOSPADM

## 2024-10-08 RX ORDER — FERROUS SULFATE, DRIED 160(50) MG
2 TABLET, EXTENDED RELEASE ORAL 2 TIMES DAILY
Status: DISCONTINUED | OUTPATIENT
Start: 2024-10-08 | End: 2024-10-10 | Stop reason: HOSPADM

## 2024-10-08 RX ORDER — INSULIN GLARGINE 100 [IU]/ML
20 INJECTION, SOLUTION SUBCUTANEOUS DAILY
Status: DISCONTINUED | OUTPATIENT
Start: 2024-10-08 | End: 2024-10-08

## 2024-10-08 RX ORDER — AMIODARONE HYDROCHLORIDE 100 MG/1
200 TABLET ORAL DAILY
Status: DISCONTINUED | OUTPATIENT
Start: 2024-10-08 | End: 2024-10-10 | Stop reason: HOSPADM

## 2024-10-08 RX ADMIN — MUPIROCIN: 20 OINTMENT TOPICAL at 08:10

## 2024-10-08 RX ADMIN — APIXABAN 5 MG: 5 TABLET, FILM COATED ORAL at 10:10

## 2024-10-08 RX ADMIN — DULOXETINE HYDROCHLORIDE 30 MG: 30 CAPSULE, DELAYED RELEASE ORAL at 08:10

## 2024-10-08 RX ADMIN — INSULIN GLARGINE 20 UNITS: 100 INJECTION, SOLUTION SUBCUTANEOUS at 08:10

## 2024-10-08 RX ADMIN — OXYCODONE HYDROCHLORIDE AND ACETAMINOPHEN 500 MG: 500 TABLET ORAL at 10:10

## 2024-10-08 RX ADMIN — ATORVASTATIN CALCIUM 40 MG: 40 TABLET, FILM COATED ORAL at 08:10

## 2024-10-08 RX ADMIN — PIPERACILLIN SODIUM AND TAZOBACTAM SODIUM 4.5 G: 4; .5 INJECTION, POWDER, FOR SOLUTION INTRAVENOUS at 10:10

## 2024-10-08 RX ADMIN — INSULIN ASPART 3 UNITS: 100 INJECTION, SOLUTION INTRAVENOUS; SUBCUTANEOUS at 10:10

## 2024-10-08 RX ADMIN — Medication 2000 MG: at 01:10

## 2024-10-08 RX ADMIN — LOSARTAN POTASSIUM 50 MG: 50 TABLET, FILM COATED ORAL at 08:10

## 2024-10-08 RX ADMIN — ASPIRIN 81 MG: 81 TABLET, COATED ORAL at 08:10

## 2024-10-08 RX ADMIN — METOPROLOL SUCCINATE 100 MG: 100 TABLET, EXTENDED RELEASE ORAL at 08:10

## 2024-10-08 RX ADMIN — INSULIN ASPART 2 UNITS: 100 INJECTION, SOLUTION INTRAVENOUS; SUBCUTANEOUS at 06:10

## 2024-10-08 RX ADMIN — FAMOTIDINE 20 MG: 10 INJECTION, SOLUTION INTRAVENOUS at 10:10

## 2024-10-08 RX ADMIN — PIPERACILLIN SODIUM AND TAZOBACTAM SODIUM 4.5 G: 4; .5 INJECTION, POWDER, FOR SOLUTION INTRAVENOUS at 06:10

## 2024-10-08 RX ADMIN — Medication 2000 MG: at 12:10

## 2024-10-08 RX ADMIN — MUPIROCIN: 20 OINTMENT TOPICAL at 10:10

## 2024-10-08 RX ADMIN — AMIODARONE HYDROCHLORIDE 0.5 MG/MIN: 1.8 INJECTION, SOLUTION INTRAVENOUS at 06:10

## 2024-10-08 RX ADMIN — Medication 2 TABLET: at 10:10

## 2024-10-08 RX ADMIN — APIXABAN 5 MG: 5 TABLET, FILM COATED ORAL at 08:10

## 2024-10-08 RX ADMIN — INSULIN ASPART 5 UNITS: 100 INJECTION, SOLUTION INTRAVENOUS; SUBCUTANEOUS at 04:10

## 2024-10-08 RX ADMIN — PIPERACILLIN SODIUM AND TAZOBACTAM SODIUM 4.5 G: 4; .5 INJECTION, POWDER, FOR SOLUTION INTRAVENOUS at 02:10

## 2024-10-08 RX ADMIN — AMIODARONE HYDROCHLORIDE 200 MG: 100 TABLET ORAL at 12:10

## 2024-10-08 RX ADMIN — FAMOTIDINE 20 MG: 10 INJECTION, SOLUTION INTRAVENOUS at 08:10

## 2024-10-08 RX ADMIN — Medication 6 MG: at 10:10

## 2024-10-08 NOTE — PLAN OF CARE
No acute events noted during shift. Vitals stable. Amiodarone converted to PO, tolerating. Appetite good. Urine output adequate. PT/OT services attempting to coordinate boot for pt's Left foot in order for him to be able to work with therapy services. Echo performed today.

## 2024-10-08 NOTE — PLAN OF CARE
SW met with pt at bedside and provide community resources for [facility]. Pt was thankful. SW will continue to follow for dc needs.

## 2024-10-08 NOTE — CONSULTS
[facility] - Intensive Care  Podiatry  Consult Note    Patient Name: Andrew Del Cid Jr.  MRN: [id number]  Admission Date: 10/6/2024  Hospital Length of Stay: 1 days  Attending Physician: Olena Thornton DO  Primary Care Provider: Olena Thornton DO     Inpatient consult to Podiatry  Consult performed by: Baldemar Esquivel DPM  Consult ordered by: Olena Thornton DO  Reason for consult: Chronic foot wounds, bilateral        Subjective:     History of Present Illness: Patient is a 59-year-old male with quite extensive medical history, type 2 diabetes, peripheral vascular disease, hypertension, history of stroke, A-fib, sleep apnea, Charcot neuroarthropathy well-known to me from both outpatient and inpatient setting.  Patient admitted to [facility] after weakness, fever, chills, found to be in A-fib, hypotensive.  Podiatry consulted due to chronic wound formation to both right and left feet, right plantar midfoot wound present for at least 1-2 years duration.  Patient has been receiving wound care at [facility], not a candidate for hyperbaric therapy due to lack of osteomyelitis.  He had recently completed course of oral antibiotics, was pending possible outpatient foot surgery and attempt to heal wounds.  Patient seen at bedside today, resting comfortably.  He denies any injury to the feet.  He does use offloading shoe or scooter at times, does weight-bear as tolerated, has found it very difficult to remain nonweightbearing as he has been instructed.    Scheduled Meds:   apixaban  5 mg Oral BID    aspirin  81 mg Oral Daily    atorvastatin  40 mg Oral Daily    DULoxetine  30 mg Oral Daily    famotidine (PF)  20 mg Intravenous Q12H    losartan  50 mg Oral Daily    metoprolol succinate  100 mg Oral Daily    mupirocin   Nasal BID    piperacillin-tazobactam (Zosyn) IV (PEDS and ADULTS) (extended infusion is not appropriate)  4.5 g Intravenous Q8H     Continuous Infusions:   amiodarone in dextrose 5%   0.5 mg/min Intravenous Continuous 16.7 mL/hr at 10/07/24 1820 0.5 mg/min at 10/07/24 1820     PRN Meds:  Current Facility-Administered Medications:     acetaminophen, 650 mg, Oral, Q8H PRN    dextrose 10%, 12.5 g, Intravenous, PRN    dextrose 10%, 25 g, Intravenous, PRN    glucagon (human recombinant), 1 mg, Intramuscular, PRN    glucose, 16 g, Oral, PRN    glucose, 24 g, Oral, PRN    HYDROcodone-acetaminophen, 1 tablet, Oral, Q4H PRN    HYDROcodone-acetaminophen, 1 tablet, Oral, Q4H PRN    insulin aspart U-100, 0-5 Units, Subcutaneous, QID (AC + HS) PRN    melatonin, 6 mg, Oral, Nightly PRN    ondansetron, 4 mg, Intravenous, Q8H PRN    sodium chloride 0.9%, 10 mL, Intravenous, PRN    Pharmacy to dose Vancomycin consult, , , Once **AND** vancomycin - pharmacy to dose, , Intravenous, pharmacy to manage frequency    Review of patient's allergies indicates:  No Known Allergies     Past Medical History:   Diagnosis Date    Acute renal failure with tubular necrosis 10/22/2022    Atrial fibrillation     BMI 45.0-49.9, adult 10/05/2022    Recommendations:   Stay physically active. As tolerated alternate resistance training with stretching and cardio. Goal of 150 minutes per week of moderate intensity activity or 7,500 - 10,000 steps per day. Follow the Mediterranean Diet. Include whole fresh fruits, vegetables, olive oil, seeds, nuts, whole grains, cold water fish, salmon, mackerel and lean cuts of meat.  Do not drink sugary/diet c    Charcot's joint 12/2023    right    Diabetes mellitus, type 2     Does mobilize using cane     Fever 10/22/2022    Hyperlipidemia     Hypertension     Hyponatremia 10/22/2022    Left-sided weakness     Neuropathy     bilateral lower ext    Obese     PAD (peripheral artery disease)     Peripheral neuropathy     Rash 11/01/2022    Rhinophyma     BILATERAL NOSE    Severe sleep apnea     UNABLE TO TOLERATE CPAP    Stroke 10/2022    Wound of right foot 12/2023     Past Surgical History:    Procedure Laterality Date    ANGIOGRAM, AORTIC ARCH, PERIPHERAL Bilateral 3/4/2024    Procedure: Angiogram, Aortic Arch, Peripheral;  Surgeon: Isai Hernandez MD;  Location: [facility] CATH;  Service: Cardiology;  Laterality: Bilateral;    APPENDECTOMY      pt denies    DEBRIDEMENT OF FOOT Right 12/15/2023    Procedure: DEBRIDEMENT, FOOT;  Surgeon: Baldemar Esquivel DPM;  Location: [facility] OR;  Service: Podiatry;  Laterality: Right;    FOOT MASS EXCISION Right 12/15/2023    Procedure: EXCISION, MASS, FOOT;  Surgeon: Baldemar Esquivel DPM;  Location: [facility] OR;  Service: Podiatry;  Laterality: Right;    HERNIA REPAIR      INCISION AND DRAINAGE FOOT Bilateral 12/26/2018    Procedure: INCISION AND DRAINAGE, FOOT;  Surgeon: Rob Alas DPM;  Location: [facility] OR;  Service: Podiatry;  Laterality: Bilateral;    SURGICAL REMOVAL OF FASCIA Left 12/15/2023    Procedure: FASCIECTOMY;  Surgeon: Baldemar Esquivel DPM;  Location: [facility] OR;  Service: Podiatry;  Laterality: Left;  1st 0600    TOE AMPUTATION      right great toe    TOE AMPUTATION Left 12/26/2018    Procedure: AMPUTATION, TOE; left 3rd toe;  Surgeon: Rob Alas DPM;  Location: [facility] OR;  Service: Podiatry;  Laterality: Left;    TONSILLECTOMY         Family History       Problem Relation (Age of Onset)    Cancer Father    No Known Problems Mother, Brother          Tobacco Use    Smoking status: Never     Passive exposure: Never    Smokeless tobacco: Never   Substance and Sexual Activity    Alcohol use: No    Drug use: No    Sexual activity: Yes     Partners: Female     Comment:  wife gilbert     Review of Systems   Constitutional:  Positive for fatigue.   HENT:  Negative for hearing loss.    Eyes:  Negative for visual disturbance.   Respiratory:  Negative for cough.    Cardiovascular:  Positive for palpitations.   Gastrointestinal:  Negative for nausea and vomiting.   Skin:  Positive for wound.   Hematological:  Bruises/bleeds easily.    Psychiatric/Behavioral:  Negative for agitation and confusion.      Objective:     Vital Signs (Most Recent):  Temp: 97.3 °F (36.3 °C) (10/07/24 2000)  Pulse: 92 (10/07/24 2000)  Resp: (!) 24 (10/07/24 2000)  BP: (!) 124/58 (10/07/24 2000)  SpO2: 97 % (10/07/24 2000) Vital Signs (24h Range):  Temp:  [97.1 °F (36.2 °C)-97.6 °F (36.4 °C)] 97.3 °F (36.3 °C)  Pulse:  [] 92  Resp:  [16-26] 24  SpO2:  [91 %-98 %] 97 %  BP: (104-171)/() 124/58     Weight: (!) 140.6 kg (310 lb)  Body mass index is 40.9 kg/m².    Foot Exam    General  General Appearance: (Obese male, poor hygiene)  Orientation: alert and oriented to person, place, and time   Affect: appropriate       Right Foot/Ankle     Inspection and Palpation  Ecchymosis: none  Tenderness: (No foot pain secondary to neuropathy)  Swelling: (chronic to foot and ankle)  Hammertoes: second toe, third toe, fourth toe and fifth toe  Skin Exam: ulcer ([id number] hypergranular wound to plantar midfoot, site of rockerbottom deformity from Charcot; wound probing deep, serous drainage, genetian violet surrounding wound, no malodor, no surrounding erythema, no crepitus in soft tissues);     Neurovascular  Dorsalis pedis: 2+  Posterior tibial: 2+  Saphenous nerve sensation: absent  Tibial nerve sensation: absent  Superficial peroneal nerve sensation: absent  Deep peroneal nerve sensation: absent  Sural nerve sensation: absent    Muscle Strength  Ankle dorsiflexion: 5  Ankle plantar flexion: 5  Ankle inversion: 5  Ankle eversion: 5  Great toe extension: 5  Great toe flexion: 5    Range of Motion    Passive  Ankle dorsiflexion: 20      Comments  Charcot neuroarthropathy to midfoot - chronic, no active bone breakdown; rockerbottom deformity of foot; ankle joint dorsiflexion decreased    Left Foot/Ankle      Inspection and Palpation  Ecchymosis: none  Tenderness: (No foot pain secondary to neuropathy)  Swelling: dorsum   Hammertoes: first toe, second toe, fourth toe and fifth  toe  Skin Exam: ulcer (0.6x0.3cm fibrogranular wound to plantar medial aspect hallux, site of flexion deformity; genetian violet surrouding wound, no drainage, no malodor);     Neurovascular  Dorsalis pedis: 2+  Posterior tibial: 2+  Saphenous nerve sensation: absent  Tibial nerve sensation: absent  Superficial peroneal nerve sensation: absent  Deep peroneal nerve sensation: absent  Sural nerve sensation: absent    Muscle Strength  Ankle dorsiflexion: 5  Ankle plantar flexion: 5  Ankle inversion: 5  Ankle eversion: 5  Great toe extension: 5  Great toe flexion: 5    Range of Motion    Passive  Ankle dorsiflexion: 20            Laboratory:  A1C:   Recent Labs   Lab 05/23/24  1505 10/06/24  1355   HGBA1C 5.5 7.8*     Blood Cultures:   Recent Labs   Lab 10/06/24  1346   LABBLOO Gram stain lizzy bottle: Gram positive cocci in clusters resembling Staph  Results called to and read back by: Chelita Fleming RN 10/07/2024 09:38  Gram stain aer bottle: Gram positive cocci in clusters resembling Staph  Gram stain aer bottle: Gram positive cocci in chains resembling Strep  Results called to and read back by:Chelita Fleming RN 10/07/2024  18:08  Gram stain lizzy bottle: Gram positive cocci in clusters resembling Staph  Results called to and read back by: Chelita Fleming RN 10/07/2024 09:38  Gram stain aer bottle: Gram positive cocci in clusters resembling Staph  10/07/2024  12:42     CBC:   Recent Labs   Lab 10/07/24  0354   WBC 11.74   RBC 3.73*   HGB 10.1*   HCT 32.0*      MCV 86   MCH 27.1   MCHC 31.6*     CMP:   Recent Labs   Lab 10/07/24  0354   *   CALCIUM 8.9   ALBUMIN 1.6*   PROT 6.6   *   K 4.5   CO2 26      BUN 28*   CREATININE 1.0   ALKPHOS 92   ALT 16   AST 14   BILITOT 0.7       Wound Cultures:   Recent Labs   Lab 04/24/24  1045 06/26/24  1100 09/04/24  1030   LABAERO ESCHERICHIA COLI  Few  * STAPHYLOCOCCUS AUREUS  Moderate  *  ENTEROBACTER CLOACAE COMPLEX  Few  * ACINETOBACTER  BAUMANNII COMPLEX  Many  *  STAPHYLOCOCCUS AUREUS  Many  *       Diagnostic Results:  X-Ray: I have reviewed all pertinent results/findings within the past 24 hours.  Right foot 3 views - plantar wound visible, no soft tissue emphysema, midfoot neuroarthopathy; prior partial amputation of 1st ray.  Left foot 3 views - prior amputation 3rd toe, fusion hallux IPJ, no soft tissue emphysema, significant arthritic changes      Assessment/Plan:     Active Diagnoses:    Diagnosis Date Noted POA    PRINCIPAL PROBLEM:  Severe sepsis [A41.9, R65.20] 10/07/2024 Yes    Weakness [R53.1] 10/07/2024 Yes    Elevated lactic acid level [R79.89] 10/07/2024 Yes    Leukocytosis [D72.829] 10/07/2024 Yes    Diabetic foot infection [E11.628, L08.9] 10/07/2024 Yes    Ulcer of toe of left foot, with fat layer exposed [L97.522] 08/07/2024 Yes    PAD (peripheral artery disease) [I73.9] 03/04/2024 Yes    Diabetic ulcer of right midfoot associated with type 2 diabetes mellitus, with fat layer exposed [E11.621, L97.412] 02/01/2024 Yes    Atrial fibrillation with rapid ventricular response [I48.91] 10/26/2022 Yes    Bacteremia due to methicillin susceptible Staphylococcus aureus (MSSA) [R78.81, B95.61] 10/23/2022 Yes    Diabetic foot ulcer [E11.621, L97.509] 10/23/2022 Yes    Type 2 diabetes mellitus, with long-term current use of insulin [E11.9, Z79.4] 10/05/2022 Not Applicable    Hyperlipidemia [E78.5] 12/25/2018 Yes     Chronic    Essential hypertension [I10] 06/30/2017 Yes     Chronic    Class 3 severe obesity with serious comorbidity and body mass index (BMI) of 40.0 to 44.9 in adult [E66.813, Z68.41, E66.01] 06/04/2015 Not Applicable      Problems Resolved During this Admission:       Diabetic neuropathic ulcer, right midfoot  Patient with chronic wound to right midfoot, greater than 1 year at this point.  Foot x-rays ordered earlier today, reviewed, no soft tissue emphysema, no signs of bone infection.  [place] [facility] [place], MRIs taken March 2024 as well as August 2024 negative for osteomyelitis.  Patient had wound culture taken September 4, 2024, growing Acinetobacter and Staph aureus, both were susceptible to Bactrim.  He completed course of oral Bactrim.  Due to excessive granular tissue, undermining, discussion for wound and soft tissue debulking with attempted closure and percutaneous Achilles tendon lengthening was discussed, and actually being planned for next month as outpatient.  Due to new cardiac events, recurrence of A-fib, any elective foot surgery on hold.  Will continue with antibiotics, local wound care, silver cell packing, Betadine or gentian violet to surrounding periwound tissues, offloading with walking boot or surgical shoe and assistance of knee scooter.  Discussion of possible future amputation had with patient today, especially if this is source of bacteremia and initiating A-fib.  Patient refuses any discussion of below-knee amputation.    Diabetic ulceration, left great toe  Patient with chronic wound to plantar medial aspect of great toe, likely due to fusion of hallux IPJ.  Xrays reviewed, no soft tissue emphysema, no osteomyelitis.  Site dry today.  Will continue with antibiotics, local wound care and offloading.    Thank you for your consult. I will follow-up with patient. Please contact us if you have any additional questions.    Baldemar Esquivel, MARIA GUADALUPE  Podiatry  [facility] - Intensive Care

## 2024-10-08 NOTE — ASSESSMENT & PLAN NOTE
Hold oral antihyperglycemics.   Lab Results   Component Value Date    HGBA1C 7.8 (H) 10/06/2024    HGBA1C 5.5 05/23/2024    HGBA1C 5.4 01/16/2024   Poorly controlled compared to 6 months ago.   - lantus 20 U daily  - [facility] QACHS  - consult nutritionist

## 2024-10-08 NOTE — SUBJECTIVE & OBJECTIVE
Interval History: Patient seen and examined.     Review of Systems   Constitutional:  Positive for activity change and fatigue. Negative for appetite change, chills and fever.   Eyes:  Negative for visual disturbance.   Respiratory:  Negative for cough, chest tightness, shortness of breath and wheezing.    Cardiovascular:  Negative for chest pain, palpitations and leg swelling.   Gastrointestinal:  Negative for abdominal pain, constipation, diarrhea, nausea and vomiting.   Musculoskeletal:  Positive for arthralgias, gait problem and myalgias.   Skin:  Positive for wound.   Neurological:  Positive for weakness. Negative for dizziness, syncope, speech difficulty, light-headedness and headaches.   Psychiatric/Behavioral:  Negative for agitation, behavioral problems, confusion and decreased concentration.      Objective:     Vital Signs (Most Recent):  Temp: 97 °F (36.1 °C) (10/08/24 1145)  Pulse: 93 (10/08/24 1205)  Resp: (!) 23 (10/08/24 1205)  BP: 110/72 (10/08/24 1205)  SpO2: (!) 94 % (10/08/24 1205) Vital Signs (24h Range):  Temp:  [97 °F (36.1 °C)-97.8 °F (36.6 °C)] 97 °F (36.1 °C)  Pulse:  [] 93  Resp:  [18-26] 23  SpO2:  [77 %-98 %] 94 %  BP: (102-140)/(54-75) 110/72     Weight: (!) 140.6 kg (309 lb 15.5 oz)  Body mass index is 40.9 kg/m².    Intake/Output Summary (Last 24 hours) at 10/8/2024 1225  Last data filed at 10/8/2024 1211  Gross per 24 hour   Intake 3431 ml   Output 2725 ml   Net 706 ml         Physical Exam  Vitals and nursing note reviewed.   Constitutional:       General: He is not in acute distress.     Appearance: He is obese. He is ill-appearing. He is not toxic-appearing.   HENT:      Head: Normocephalic and atraumatic.      Right Ear: External ear normal.      Left Ear: External ear normal.      Nose: Nose normal. No congestion or rhinorrhea.      Mouth/Throat:      Mouth: Mucous membranes are dry.      Pharynx: Oropharynx is clear.   Eyes:      Extraocular Movements: Extraocular  "movements intact.      Conjunctiva/sclera: Conjunctivae normal.   Cardiovascular:      Rate and Rhythm: Normal rate and regular rhythm.      Pulses: Normal pulses.      Heart sounds: Normal heart sounds. No murmur heard.  Pulmonary:      Effort: Pulmonary effort is normal. No respiratory distress.      Breath sounds: Normal breath sounds. No wheezing or rales.   Abdominal:      General: Abdomen is flat. There is no distension.      Palpations: Abdomen is soft. There is no mass.      Tenderness: There is no abdominal tenderness. There is no right CVA tenderness, left CVA tenderness or guarding.   Musculoskeletal:         General: Normal range of motion.      Cervical back: Normal range of motion and neck supple. No rigidity.      Comments: Both feet wrapped in ace bandage, dressing clean and dry   Skin:     General: Skin is warm and dry.      Capillary Refill: Capillary refill takes less than 2 seconds.   Neurological:      General: No focal deficit present.      Mental Status: He is alert and oriented to person, place, and time.      Motor: No weakness.      Gait: Gait normal.   Psychiatric:         Mood and Affect: Mood normal.         Behavior: Behavior normal.             Significant Labs: All pertinent labs within the past 24 hours have been reviewed.  A1C:   Recent Labs   Lab 05/23/24  1505 10/06/24  1355   HGBA1C 5.5 7.8*     ABGs: No results for input(s): "PH", "PCO2", "HCO3", "POCSATURATED", "BE", "TOTALHB", "COHB", "METHB", "O2HB", "POCFIO2", "PO2" in the last 48 hours.  Bilirubin:   Recent Labs   Lab 10/06/24  1346 10/07/24  0354   BILITOT 1.0 0.7     Blood Culture:   Recent Labs   Lab 10/06/24  1346   LABBLOO Gram stain lizzy bottle: Gram positive cocci in clusters resembling Staph  Results called to and read back by: Chelita Fleming RN 10/07/2024 09:38  Gram stain aer bottle: Gram positive cocci in clusters resembling Staph  10/07/2024  12:42  STAPHYLOCOCCUS AUREUS  Susceptibility pending  ID consult " required [facility].  *  Gram stain lizzy bottle: Gram positive cocci in clusters resembling Staph  Results called to and read back by: Chelita Fleming RN 10/07/2024 09:38  Gram stain aer bottle: Gram positive cocci in clusters resembling Staph  Gram stain aer bottle: Gram positive cocci in chains resembling Strep  Results called to and read back by:Chelita Fleming RN 10/07/2024  18:08  STAPHYLOCOCCUS AUREUS  ID consult required at [facility].  For susceptibility see order #[id number]  *     BMP:   Recent Labs   Lab 10/06/24  1346 10/07/24  0354 10/08/24  1030   *   < > 248*   *   < > 134*   K 4.6   < > 4.3   CL 93*   < > 102   CO2 24   < > 26   BUN 27*   < > 29*   CREATININE 1.4   < > 1.0   CALCIUM 9.1   < > 8.4*   MG 1.5*  --   --     < > = values in this interval not displayed.     CBC:   Recent Labs   Lab 10/06/24  1346 10/07/24  0354 10/08/24  1030   WBC 16.47* 11.74 6.62   HGB 11.3* 10.1* 10.2*   HCT 35.1* 32.0* 31.4*   * 396 377     CMP:   Recent Labs   Lab 10/06/24  1346 10/07/24  0354 10/08/24  1030   * 134* 134*   K 4.6 4.5 4.3   CL 93* 101 102   CO2 24 26 26   * 172* 248*   BUN 27* 28* 29*   CREATININE 1.4 1.0 1.0   CALCIUM 9.1 8.9 8.4*   PROT 7.5 6.6  --    ALBUMIN 1.9* 1.6*  --    BILITOT 1.0 0.7  --    ALKPHOS 109 92  --    AST 18 14  --    ALT 17 16  --    ANIONGAP 12* 7 6      Recent Labs   Lab 10/06/24  1346   MG 1.5*     POCT Glucose:   Recent Labs   Lab 10/07/24  2039 10/08/24  0527 10/08/24  1050   POCTGLUCOSE 256* 212* 266*     X-Ray Foot Complete Left  Narrative: EXAMINATION:  XR FOOT COMPLETE 3 VIEW LEFT    CLINICAL HISTORY:  Chronic wound hallux;    FINDINGS:  Previous resection 3rd phalanx.  Fusion interphalangeal joint 1st digit.  Severe osteoarthritis 1st MTP joint.  Joint space narrowing and osteophytes within the tarsal bones consistent with Charcot joint/degenerative change.  Moderate  vascular calcifications.  Impression: Chronic changes as above.  No acute findings.    Electronically signed by: Jeison Mckinney MD  Date:    10/07/2024  Time:    15:52    X-Ray Foot Complete Right  Narrative: EXAMINATION:  XR FOOT COMPLETE 3 VIEW RIGHT    CLINICAL HISTORY:  Chronic wound, plantar midfoot; hx of Charcot;    FINDINGS:  Previous resection distal 1st metatarsal.  Tarsal of bony destruction consistent with a Charcot joint.  No acute fracture seen.  Mid plantar soft tissue wound and diffuse soft tissue swelling.  Scattered vascular calcifications.  Impression: 1. Plantar soft tissue wound and diffuse soft tissue swelling.  2. Charcot joint and resection distal 1st metatarsal.    Electronically signed by: Jeison Mckinney MD  Date:    10/07/2024  Time:    15:51     Significant Imaging: I have reviewed all pertinent imaging results/findings within the past 24 hours.

## 2024-10-08 NOTE — EICU
Intervention Initiated From:  COR / BRIANU    Zully intervened regarding:  Rounding (Video assessment)    Nurse Notified:  No    Doctor Notified:  No    Comments: Video rounding complete. Pt awake. VSS.

## 2024-10-08 NOTE — PLAN OF CARE
No issues overnight. Rested quietly with no c/o voiced. Remains on amiodarone gtt at 0.5mg/min and remains in Afib with CVR. VSS, afebrile. Voids to urinal without difficulty, UOP adequate. No BM noted. POC reviewed with patient, verbalized understanding.

## 2024-10-08 NOTE — ASSESSMENT & PLAN NOTE
Contributing source of infection. Blood cx x2 positive.   Continue with IV antibiotics, zosyn and vancomycin  - f/u podiatry consult  - continue wound care per podiatry

## 2024-10-08 NOTE — HOSPITAL COURSE
10/8/24 No new complaints. Patient endorses appetite slowly improving. Per cardiology, patient placed on amiodarone gtt overnight to transition to PO. Telemetry tracing, patient remains in atrial fibrillation, intermittent PVCs rate 80-90 bpm. Blood cx x2 positive for MRSA. Estimated Creatinine Clearance: 120.1 mL/min (based on SCr of 1 mg/dL). ID pharm to be consulted. Leukocytosis resolved and stable since IVF and 24 hours of IV abx. Resume glucose control with basal insulin and SSI. Follow up echocardiogram and cardiology recommendations. Back on AC with Eliquis BID. Podiatry plans to post-pone foot surgery until bacteremia clears. Continue with IV antibiotics D3 vancomycin and zosyn. Follow up PT and OT eval and therapy.   10/9/24 No elevated temperature since time of admission. Telemetry tracings remain in atrial fibrillation with intermittent PVCs rate controlled with PO amiodarone per cardiology. Echocardiogram with no evidence to support endocarditis.  Blood culture results with sensitivity shows penicillin resistant MSSA. Will place PICC line, obtain surveillance blood cultures. Discontinue vancomycin and zosyn and start Cefazolin Q8 for 28 days. CAM boot and instruction on off loading instructions provided by PT and OT. Patient has wound care established with [facility]. Home IV infusion to be set up. Patient medically stable for discharge with continued wound care, IV antibiotics, ID follow up and management of all other chronic conditions.   10/10/24 Vital signs normotensive, HR 70-80s. PICC line in place, IV antibiotics outpatient infusion set up. Patient has been instructed on importance of medication compliance, keeping non-weight bearing status to allow proper healing of chronic wounds, close follow up with his specialists outpatient. Appointments arranged.

## 2024-10-08 NOTE — ASSESSMENT & PLAN NOTE
With mild elevated procalcitonin.   Resolved after IVF hydration.   Continue with above management with source control; positive MRSA bacteremia

## 2024-10-08 NOTE — CONSULTS
[facility] - Intensive Care  Adult Nutrition  Consult Note    SUMMARY     Recommendations  1. Rec'd Cardiac Consistent CHO diet.   2. Rec'd Sidney BID to promote wound healing and to provide additional nutrition. 3. Rec'd ONS: Glucerna TID to provide 660kcal and 30g of protein.   4. RD provide Diabetes diet education to pt. 5. RD to follow andm make rec's accordingly.  Goals:   1. Pt will continue to consume 100% of meals via PO intake by next RD follow up. 2. Pt will consume 100% of all ONS by next RD follow up.  Nutrition Goal Status: new  Communication of RD Recs: reviewed with RN    Assessment and Plan    Nutrition Problem  Inadequate protein energy intake    Related to (etiology):   Increased nutrient needs    Signs and Symptoms (as evidenced by):   Severe Sepsis, Wounds     Interventions/Recommendations (treatment strategy):  1. Rec'd Cardiac Consistent CHO diet.   2. Rec'd Sidney BID to promote wound healing and to provide additional nutrition. 3. Rec'd ONS: Glucerna TID to provide 660kcal and 30g of protein.   4. RD provide Diabetes diet education to pt. 5. RD to follow andm make rec's accordingly.    Nutrition Diagnosis Status:   New    Malnutrition Assessment  NFPE not warranted at this time. RD to continue to monitor for signs and symptoms of malnutrition.    Nutrition Related Social Determinants of Health: None identified at this time.    Reason for Assessment    Reason For Assessment: consult (education on daily required nutrition, diabetes, hearrt healthy diet)  Diagnosis: infection/sepsis (Severe Sepsis)  General Information Comments: RD consulted for nutrition education. Pt reports appetite is good and that he eats 100% of meals. Pt has a diabetic foot ulcer as per RN. Pt states that his wife manages his diabetes care and meals at home and reports that he sometimes drinks ONS: Chocolate Glucerna. Rec'd ONS: Sidney and Glucerna to promote wound healing and provide additional nutrition. Brought pt  "diabetes nutrition education handout and attempted to go over packet with pt but pt refused stating that his wife would go over it with him. RD to follow and make rec's accordingly.  Nutrition Discharge Planning: TBD as care progresses    Nutrition Risk Screen    Nutrition Risk Screen: large or nonhealing wound, burn or pressure injury    Nutrition/Diet History    Patient Reported Diet/Restrictions/Preferences: diabetic diet, general  Spiritual, Cultural Beliefs, Scientology Practices, Values that Affect Care: no  Supplemental Drinks or Food Habits: Glucerna  Food Allergies: NKFA  Factors Affecting Nutritional Intake: None identified at this time    Anthropometrics    Temp: 97.8 °F (36.6 °C)  Height: 6' 1" (185.4 cm)  Height (inches): 73 in  Weight Method: Bed Scale  Weight: (!) 140.6 kg (309 lb 15.5 oz)  Weight (lb): (!) 309.97 lb  Ideal Body Weight (IBW), Male: 184 lb  % Ideal Body Weight, Male (lb): 168.46 %  BMI (Calculated): 40.9  BMI Grade: greater than 40 - morbid obesity    Lab/Procedures/Meds    Pertinent Labs Reviewed: reviewed  Pertinent Medications Reviewed: reviewed    Estimated/Assessed Needs    Weight Used For Calorie Calculations: 97.6 kg (215 lb 4 oz) (AdjBW)  Energy Calorie Requirements (kcal): 0091-6965 (20-25kcal/kg AdjBW)  Energy Need Method: Kcal/kg  Protein Requirements: 100-125 (1.2-1.5g/kg IBW)  Weight Used For Protein Calculations: 83.5 kg (184 lb) (IBW)  Fluid Requirements (mL): 8426-5745 (1mL/kcal)  Estimated Fluid Requirement Method: RDA Method  RDA Method (mL): 1952  CHO Requirement: 75g per meal (As per MD)    Nutrition Prescription Ordered    Current Diet Order: Diabetic 2000 calorie  Oral Nutrition Supplement: None    Evaluation of Received Nutrient/Fluid Intake    % Kcal Needs: 50-75%  % Protein Needs: 50-75%  I/O: +55  Energy Calories Required: not meeting needs  Protein Required: not meeting needs  Tolerance: tolerating  % Intake of Estimated Energy Needs: 50 - 75 %  % Meal Intake: " 75 - 100 %    Nutrition Risk    Level of Risk/Frequency of Follow-up: moderate     Monitor and Evaluation    Food and Nutrient Intake: energy intake, food and beverage intake  Food and Nutrient Adminstration: diet order  Knowledge/Beliefs/Attitudes: food and nutrition knowledge/skill, beliefs and attitudes  Physical Activity and Function: nutrition-related ADLs and IADLs  Anthropometric Measurements: height/length, weight, weight change, body mass index  Biochemical Data, Medical Tests and Procedures: electrolyte and renal panel, gastrointestinal profile, glucose/endocrine profile, inflammatory profile, lipid profile  Nutrition-Focused Physical Findings: overall appearance     Nutrition Follow-Up    RD Follow-up?: Yes

## 2024-10-08 NOTE — EICU
Intervention Initiated From:  [facility] / EICU    Zully intervened regarding:  Rounding (Video assessment)    Nurse Notified:  No    Doctor Notified:  No    Comments: Video rounds complete. Patient sitting up in bed with no alarms, or distress noted. Denies needs at this time. Vitals appear WDL per ECG bedside monitor.

## 2024-10-08 NOTE — ASSESSMENT & PLAN NOTE
Lab Results   Component Value Date    LABBLOO  10/06/2024     Gram stain lizzy bottle: Gram positive cocci in clusters resembling Staph    LABBLOO  10/06/2024     Results called to and read back by: Chelita Fleming RN 10/07/2024 09:38    LABBLOO  10/06/2024     Gram stain aer bottle: Gram positive cocci in clusters resembling Staph    LABBLOO  10/06/2024     Gram stain aer bottle: Gram positive cocci in chains resembling Strep    LABBLOO  10/06/2024     Results called to and read back by:Chelita Fleming RN 10/07/2024  18:08    LABBLOO (A) 10/06/2024     STAPHYLOCOCCUS AUREUS  ID consult required at [facility].  For susceptibility see order #[id number]      LABBLOO  10/06/2024     Gram stain lizzy bottle: Gram positive cocci in clusters resembling Staph    LABBLOO  10/06/2024     Results called to and read back by: Chelita Fleming RN 10/07/2024 09:38    LABBLOO  10/06/2024     Gram stain aer bottle: Gram positive cocci in clusters resembling Staph    LABBLOO 10/07/2024  12:42 10/06/2024    LABBLOO (A) 10/06/2024     STAPHYLOCOCCUS AUREUS  Susceptibility pending  ID consult required at [facility].        Past history of MSSA bacteremia.   Chronic diabetic foot wound bilaterally.   MDRO acinetobacter positive (9/4/24) via wound culture per podiatry outpatient.   Leukocytosis resolved. Afebrile overnight.  10/8/24 Positive blood cx x2 MRSA    - continue  zosyn and vancomycin  - follow up echocardiogram  - follow up ID pharm

## 2024-10-08 NOTE — PT/OT/SLP PROGRESS
Physical Therapy      Patient Name:  Andrew Del Cid    MRN:  [id number]    Patient not seen today secondary to MD hold (Comment). Patient on hold secondary to podiatry consult for CAM boot. Will follow-up 10/9/24.

## 2024-10-08 NOTE — PLAN OF CARE
Recommendations  1. Rec'd Cardiac Consistent CHO diet.   2. Rec'd Sidney BID to promote wound healing and to provide additional nutrition. 3. Rec'd ONS: Glucerna TID to provide 660kcal and 30g of protein.   4. RD provide Diabetes diet education to pt. 5. RD to follow andm make rec's accordingly.  Goals:   1. Pt will continue to consume 100% of meals via PO intake by next RD follow up. 2. Pt will consume 100% of all ONS by next RD follow up.  Nutrition Goal Status: new

## 2024-10-08 NOTE — ASSESSMENT & PLAN NOTE
Blood cx x2 positive PCR for MRSA. D3 vancomycin, zosyn. Follow up final culture results. Follow up echocardiogram.

## 2024-10-08 NOTE — PROGRESS NOTES
Pharmacokinetic Assessment Follow Up: IV Vancomycin    Vancomycin serum concentration assessment(s):    The random level was drawn correctly and can be used to guide therapy at this time. The measurement is below the desired definitive target range of 15 to 20 mcg/mL.    Vancomycin Regimen Plan:    Change regimen to Vancomycin 2000 mg IV every 12 hours with next serum trough concentration measured at 1100 prior to fourth dose on 10/9    Drug levels (last 3 results):  Recent Labs   Lab Result Units 10/07/24  2123   Vancomycin, Random ug/mL 7.4       Pharmacy will continue to follow and monitor vancomycin.    Please contact pharmacy for questions regarding this assessment.    Thank you for the consult,   Ramona Darling       Patient brief summary:  Andrew Del Cid Jr. is a 59 y.o. male initiated on antimicrobial therapy with IV Vancomycin for treatment of sepsis    The patient's current regimen is 2g q12h    Drug Allergies:   Review of patient's allergies indicates:  No Known Allergies    Actual Body Weight:   140.6kg    Renal Function:   Estimated Creatinine Clearance: 120.1 mL/min (based on SCr of 1 mg/dL).,     Dialysis Method (if applicable):  N/A    CBC (last 72 hours):  Recent Labs   Lab Result Units 10/06/24  1346 10/06/24  1355 10/07/24  0354   WBC K/uL 16.47*  --  11.74   Hemoglobin g/dL 11.3*  --  10.1*   Hemoglobin A1C %  --  7.8*  --    Hematocrit % 35.1*  --  32.0*   Platelets K/uL 536*  --  396   Gran % % 87.9*  --  78.0*   Lymph % % 5.0*  --  9.3*   Mono % % 5.6  --  8.8   Eosinophil % % 0.2  --  2.2   Basophil % % 0.3  --  0.4   Differential Method  Automated  --  Automated       Metabolic Panel (last 72 hours):  Recent Labs   Lab Result Units 10/06/24  1346 10/06/24  1410 10/07/24  0354   Sodium mmol/L 129*  --  134*   Potassium mmol/L 4.6  --  4.5   Chloride mmol/L 93*  --  101   CO2 mmol/L 24  --  26   Glucose mg/dL 192*  --  172*   Glucose, UA   --  4+*  --    BUN mg/dL 27*  --  28*   Creatinine  mg/dL 1.4  --  1.0   Albumin g/dL 1.9*  --  1.6*   Total Bilirubin mg/dL 1.0  --  0.7   Alkaline Phosphatase U/L 109  --  92   AST U/L 18  --  14   ALT U/L 17  --  16   Magnesium mg/dL 1.5*  --   --        Vancomycin Administrations:  vancomycin given in the last 96 hours                     vancomycin (VANCOCIN) 1,000 mg in D5W 250 mL IVPB (admixture device) (mg) 1,000 mg New Bag 10/06/24 1806    vancomycin 1,500 mg in D5W 250 mL IVPB (admixture device) (mg) 1,500 mg New Bag 10/06/24 1801                    Microbiologic Results:  Microbiology Results (last 7 days)       Procedure Component Value Units Date/Time    Blood culture x two cultures. Draw prior to antibiotics. [[id number]] Collected: 10/06/24 1346    Order Status: Completed Specimen: Blood from Peripheral, Hand, Right Updated: 10/07/24 1809     Blood Culture, Routine Gram stain lizzy bottle: Gram positive cocci in clusters resembling Staph      Results called to and read back by: Chelita Fleming RN 10/07/2024 09:38      Gram stain aer bottle: Gram positive cocci in clusters resembling Staph      Gram stain aer bottle: Gram positive cocci in chains resembling Strep      Results called to and read back by:Chelita Fleming RN 10/07/2024  18:08    Narrative:      Aerobic and anaerobic    Blood culture x two cultures. Draw prior to antibiotics. [[id number]] Collected: 10/06/24 1346    Order Status: Completed Specimen: Blood from Peripheral, Forearm, Left Updated: 10/07/24 1242     Blood Culture, Routine Gram stain lizzy bottle: Gram positive cocci in clusters resembling Staph      Results called to and read back by: Chelita Fleming RN 10/07/2024 09:38      Gram stain aer bottle: Gram positive cocci in clusters resembling Staph      10/07/2024  12:42    Narrative:      Aerobic and anaerobic    Rapid Organism ID by PCR (from Blood culture) [[id number]]  (Abnormal) Collected: 10/06/24 1346    Order Status: Completed Updated: 10/07/24 1124     Enterococcus faecalis  Not Detected     Enterococcus faecium Not Detected     Listeria monocytogenes Not Detected     Staphylococcus spp. See species for ID     Staphylococcus aureus Detected     Staphylococcus epidermidis Not Detected     Staphylococcus lugdunensis Not Detected     Streptococcus species Not Detected     Streptococcus agalactiae Not Detected     Streptococcus pneumoniae Not Detected     Streptococcus pyogenes Not Detected     Acinetobacter calcoaceticus/baumannii complex Not Detected     Bacteroides fragilis Not Detected     Enterobacterales Not Detected     Enterobacter cloacae complex Not Detected     Escherichia coli Not Detected     Klebsiella aerogenes Not Detected     Klebsiella oxytoca Not Detected     Klebsiella pneumoniae group Not Detected     Proteus Not Detected     Salmonella sp Not Detected     Serratia marcescens Not Detected     Haemophilus influenzae Not Detected     Neisseria meningtidis Not Detected     Pseudomonas aeruginosa Not Detected     Stenotrophomonas maltophilia Not Detected     Candida albicans Not Detected     Candida auris Not Detected     Candida glabrata Not Detected     Candida krusei Not Detected     Candida parapsilosis Not Detected     Candida tropicalis Not Detected     Cryptococcus neoformans/gattii Not Detected     CTX-M (ESBL ) Test Not Applicable     IMP (Carbapenem resistant) Test Not Applicable     KPC resistance gene (Carbapenem resistant) Test Not Applicable     mcr-1  Test Not Applicable     mec A/C  Test Not Applicable     mec A/C and MREJ (MRSA) gene Not Detected     NDM (Carbapenem resistant) Test Not Applicable     OXA-48-like (Carbapenem resistant) Test Not Applicable     van A/B (VRE gene) Test Not Applicable     VIM (Carbapenem resistant) Test Not Applicable    Narrative:      Aerobic and anaerobic

## 2024-10-08 NOTE — ASSESSMENT & PLAN NOTE
Body mass index is 40.9 kg/m². Morbid obesity complicates all aspects of disease management from diagnostic modalities to treatment. Weight loss encouraged and health benefits explained to patient.     Wt Readings from Last 3 Encounters:   10/08/24 (!) 140.6 kg (309 lb 15.5 oz)   05/23/24 (!) 141.1 kg (311 lb)   03/04/24 (!) 136.5 kg (300 lb 14.9 oz)

## 2024-10-08 NOTE — ASSESSMENT & PLAN NOTE
"This patient does have evidence of infective focus  My overall impression is sepsis.  Source: Skin and Soft Tissue (location bilateral diabetic foot wound)  Antibiotics given-   Antibiotics (72h ago, onward)    Start     Stop Route Frequency Ordered    10/08/24 0015  vancomycin 2 g in 0.9% sodium chloride 500 mL IVPB         -- IV Every 12 hours (non-standard times) 10/07/24 2301    10/07/24 1245  mupirocin 2 % ointment         10/12/24 0859 Nasl 2 times daily 10/07/24 1138    10/06/24 2230  piperacillin-tazobactam (ZOSYN) 4.5 g in D5W 100 mL IVPB (MB+)         -- IV Every 8 hours (non-standard times) 10/06/24 1640    10/06/24 1640  vancomycin - pharmacy to dose  (vancomycin IVPB (PEDS and ADULTS))        Placed in "And" Linked Group    -- IV pharmacy to manage frequency 10/06/24 1640        Latest lactate reviewed-  Recent Labs   Lab 10/06/24  1721   LACTATE 1.1       Organ dysfunction indicated by Acute heart failure    Fluid challenge Ideal Body Weight- The patient's ideal body weight is Ideal body weight: 79.9 kg (176 lb 2.4 oz) which will be used to calculate fluid bolus of 30 ml/kg for treatment of septic shock.      Post- resuscitation assessment Yes Perfusion exam was performed within 6 hours of septic shock presentation after bolus shows Adequate tissue perfusion assessed by non-invasive monitoring       Will Not start Pressors- Levophed for MAP of 65  Source control achieved by: IVF resuscitation, IV antibiotics  "

## 2024-10-08 NOTE — PROGRESS NOTES
Occupational Therapy  Evaluation Attempt    Patient Name:  Andrew Del Cid Jr.   MRN:  [id number]    Patient not seen today secondary to (P) Nurse/ PREET hold. Will follow-up when appropriate.    10/8/2024

## 2024-10-08 NOTE — ASSESSMENT & PLAN NOTE
Patient's FSGs are uncontrolled due to hyperglycemia on current medication regimen.  Last A1c reviewed-   Lab Results   Component Value Date    HGBA1C 7.8 (H) 10/06/2024     Most recent fingerstick glucose reviewed-   Recent Labs   Lab 10/07/24  1647 10/07/24  2039 10/08/24  0527 10/08/24  1050   POCTGLUCOSE 250* 256* 212* 266*     Current correctional scale  Low  Maintain anti-hyperglycemic dose as follows-   Antihyperglycemics (From admission, onward)      Start     Stop Route Frequency Ordered    10/08/24 0830  insulin glargine U-100 (Lantus) pen 20 Units         -- SubQ Daily 10/08/24 0820    10/06/24 1640  insulin aspart U-100 pen 0-5 Units         -- SubQ Before meals & nightly PRN 10/06/24 1640          Hold Oral hypoglycemics while patient is in the hospital.

## 2024-10-08 NOTE — ASSESSMENT & PLAN NOTE
Patient has paroxysmal (<7 days) atrial fibrillation. Patient is currently in atrial fibrillation. [id number] Score: 2. The patients heart rate in the last 24 hours is as follows:  Pulse  Min: 82  Max: 102     Antiarrhythmics  metoprolol succinate (TOPROL-XL) 24 hr tablet 100 mg, Daily, Oral  amiodarone 360 mg/200 mL (1.8 mg/mL) infusion, Continuous, Intravenous  amiodarone tablet 200 mg, Daily, Oral  Anticoagulants  apixaban tablet 5 mg, 2 times daily, Oral    Per cardiology, trasition from amiodarone gtt to PO amiodarone.   Plan  - Replete lytes with a goal of K>4, Mg >2  - Patient's afib is currently controlled  - eliquis BID  - f/u cardiology recommendations

## 2024-10-09 PROBLEM — I51.3 LEFT ATRIAL THROMBUS: Status: ACTIVE | Noted: 2024-10-09

## 2024-10-09 LAB
ALBUMIN SERPL BCP-MCNC: 1.5 G/DL (ref 3.5–5.2)
ALP SERPL-CCNC: 94 U/L (ref 55–135)
ALT SERPL W/O P-5'-P-CCNC: 25 U/L (ref 10–44)
ANION GAP SERPL CALC-SCNC: 3 MMOL/L (ref 3–11)
AORTIC ROOT ANNULUS: 3.49 CM
AORTIC VALVE CUSP SEPERATION: 1.64 CM
AST SERPL-CCNC: 21 U/L (ref 10–40)
AV INDEX (PROSTH): 0.85
AV MEAN GRADIENT: 3.1 MMHG
AV PEAK GRADIENT: 5.8 MMHG
AV VALVE AREA BY VELOCITY RATIO: 2.4 CM²
AV VALVE AREA: 2.7 CM²
AV VELOCITY RATIO: 0.75
BACTERIA BLD CULT: ABNORMAL
BASOPHILS # BLD AUTO: 0.03 K/UL (ref 0–0.2)
BASOPHILS NFR BLD: 0.4 % (ref 0–1.9)
BILIRUB SERPL-MCNC: 0.4 MG/DL (ref 0.1–1)
BSA FOR ECHO PROCEDURE: 2.69 M2
BUN SERPL-MCNC: 21 MG/DL (ref 6–20)
CALCIUM SERPL-MCNC: 8.6 MG/DL (ref 8.7–10.5)
CHLORIDE SERPL-SCNC: 102 MMOL/L (ref 95–110)
CO2 SERPL-SCNC: 31 MMOL/L (ref 23–29)
CREAT SERPL-MCNC: 1.1 MG/DL (ref 0.5–1.4)
CV ECHO LV RWT: 0.48 CM
DIFFERENTIAL METHOD BLD: ABNORMAL
DOP CALC AO PEAK VEL: 1.2 M/S
DOP CALC AO VTI: 19.6 CM
DOP CALC LVOT AREA: 3.1 CM2
DOP CALC LVOT DIAMETER: 2 CM
DOP CALC LVOT PEAK VEL: 0.9 M/S
DOP CALC LVOT STROKE VOLUME: 52.1 CM3
DOP CALC RVOT PEAK VEL: 0.91 M/S
DOP CALC RVOT VTI: 19.5 CM
DOP CALCLVOT PEAK VEL VTI: 16.6 CM
E WAVE DECELERATION TIME: 239.47 MSEC
E/E' RATIO: 10.9 M/S
ECHO LV POSTERIOR WALL: 1.3 CM (ref 0.6–1.1)
EOSINOPHIL # BLD AUTO: 0.3 K/UL (ref 0–0.5)
EOSINOPHIL NFR BLD: 3.8 % (ref 0–8)
ERYTHROCYTE [DISTWIDTH] IN BLOOD BY AUTOMATED COUNT: 12.5 % (ref 11.5–14.5)
EST. GFR  (NO RACE VARIABLE): >60 ML/MIN/1.73 M^2
FRACTIONAL SHORTENING: 29.6 % (ref 28–44)
GLUCOSE SERPL-MCNC: 152 MG/DL (ref 70–110)
HCT VFR BLD AUTO: 33.2 % (ref 40–54)
HGB BLD-MCNC: 10.4 G/DL (ref 14–18)
IMM GRANULOCYTES # BLD AUTO: 0.23 K/UL (ref 0–0.04)
IMM GRANULOCYTES NFR BLD AUTO: 3.1 % (ref 0–0.5)
INTERVENTRICULAR SEPTUM: 1.1 CM (ref 0.6–1.1)
IVC DIAMETER: 2.43 CM
LA MAJOR: 6.16 CM
LEFT INTERNAL DIMENSION IN SYSTOLE: 3.8 CM (ref 2.1–4)
LEFT VENTRICLE DIASTOLIC VOLUME INDEX: 53.44 ML/M2
LEFT VENTRICLE DIASTOLIC VOLUME: 138.4 ML
LEFT VENTRICLE MASS INDEX: 102.1 G/M2
LEFT VENTRICLE SYSTOLIC VOLUME INDEX: 23.9 ML/M2
LEFT VENTRICLE SYSTOLIC VOLUME: 61.89 ML
LEFT VENTRICULAR INTERNAL DIMENSION IN DIASTOLE: 5.4 CM (ref 3.5–6)
LEFT VENTRICULAR MASS: 264.4 G
LV LATERAL E/E' RATIO: 9.91 M/S
LV SEPTAL E/E' RATIO: 12.11 M/S
LVED V (TEICH): 138.4 ML
LVES V (TEICH): 61.89 ML
LVOT MG: 1.83 MMHG
LVOT MV: 0.65 CM/S
LYMPHOCYTES # BLD AUTO: 1.6 K/UL (ref 1–4.8)
LYMPHOCYTES NFR BLD: 21.4 % (ref 18–48)
MAGNESIUM SERPL-MCNC: 1.6 MG/DL (ref 1.6–2.6)
MCH RBC QN AUTO: 26.9 PG (ref 27–31)
MCHC RBC AUTO-ENTMCNC: 31.3 G/DL (ref 32–36)
MCV RBC AUTO: 86 FL (ref 82–98)
MONOCYTES # BLD AUTO: 0.7 K/UL (ref 0.3–1)
MONOCYTES NFR BLD: 10 % (ref 4–15)
MV PEAK E VEL: 1.09 M/S
MV STENOSIS PRESSURE HALF TIME: 69.45 MS
MV VALVE AREA P 1/2 METHOD: 3.17 CM2
NEUTROPHILS # BLD AUTO: 4.5 K/UL (ref 1.8–7.7)
NEUTROPHILS NFR BLD: 61.3 % (ref 38–73)
NRBC BLD-RTO: 0 /100 WBC
PISA TR MAX VEL: 2.62 M/S
PLATELET # BLD AUTO: 400 K/UL (ref 150–450)
PMV BLD AUTO: 8.9 FL (ref 9.2–12.9)
POCT GLUCOSE: 232 MG/DL (ref 70–110)
POCT GLUCOSE: 256 MG/DL (ref 70–110)
POCT GLUCOSE: >500 MG/DL (ref 70–110)
POTASSIUM SERPL-SCNC: 4.5 MMOL/L (ref 3.5–5.1)
PROT SERPL-MCNC: 6.5 G/DL (ref 6–8.4)
PV MEAN GRADIENT: 2 MMHG
RA MAJOR: 5.69 CM
RBC # BLD AUTO: 3.87 M/UL (ref 4.6–6.2)
RIGHT VENTRICULAR END-DIASTOLIC DIMENSION: 3.19 CM
SODIUM SERPL-SCNC: 136 MMOL/L (ref 136–145)
TDI LATERAL: 0.11 M/S
TDI SEPTAL: 0.09 M/S
TDI: 0.1 M/S
TR MAX PG: 27 MMHG
VANCOMYCIN TROUGH SERPL-MCNC: 18.7 UG/ML (ref 10–22)
WBC # BLD AUTO: 7.33 K/UL (ref 3.9–12.7)
Z-SCORE OF LEFT VENTRICULAR DIMENSION IN END DIASTOLE: -11.99
Z-SCORE OF LEFT VENTRICULAR DIMENSION IN END SYSTOLE: -8.06

## 2024-10-09 PROCEDURE — 63600175 PHARM REV CODE 636 W HCPCS: Performed by: STUDENT IN AN ORGANIZED HEALTH CARE EDUCATION/TRAINING PROGRAM

## 2024-10-09 PROCEDURE — 99233 SBSQ HOSP IP/OBS HIGH 50: CPT | Mod: ,,, | Performed by: STUDENT IN AN ORGANIZED HEALTH CARE EDUCATION/TRAINING PROGRAM

## 2024-10-09 PROCEDURE — 85025 COMPLETE CBC W/AUTO DIFF WBC: CPT | Performed by: STUDENT IN AN ORGANIZED HEALTH CARE EDUCATION/TRAINING PROGRAM

## 2024-10-09 PROCEDURE — 99900035 HC TECH TIME PER 15 MIN (STAT)

## 2024-10-09 PROCEDURE — 97161 PT EVAL LOW COMPLEX 20 MIN: CPT

## 2024-10-09 PROCEDURE — 83735 ASSAY OF MAGNESIUM: CPT | Performed by: STUDENT IN AN ORGANIZED HEALTH CARE EDUCATION/TRAINING PROGRAM

## 2024-10-09 PROCEDURE — C1751 CATH, INF, PER/CENT/MIDLINE: HCPCS

## 2024-10-09 PROCEDURE — 80202 ASSAY OF VANCOMYCIN: CPT | Performed by: STUDENT IN AN ORGANIZED HEALTH CARE EDUCATION/TRAINING PROGRAM

## 2024-10-09 PROCEDURE — 36569 INSJ PICC 5 YR+ W/O IMAGING: CPT

## 2024-10-09 PROCEDURE — 36415 COLL VENOUS BLD VENIPUNCTURE: CPT | Performed by: STUDENT IN AN ORGANIZED HEALTH CARE EDUCATION/TRAINING PROGRAM

## 2024-10-09 PROCEDURE — 20000000 HC ICU ROOM

## 2024-10-09 PROCEDURE — A4216 STERILE WATER/SALINE, 10 ML: HCPCS | Performed by: STUDENT IN AN ORGANIZED HEALTH CARE EDUCATION/TRAINING PROGRAM

## 2024-10-09 PROCEDURE — 87040 BLOOD CULTURE FOR BACTERIA: CPT | Performed by: STUDENT IN AN ORGANIZED HEALTH CARE EDUCATION/TRAINING PROGRAM

## 2024-10-09 PROCEDURE — 97116 GAIT TRAINING THERAPY: CPT

## 2024-10-09 PROCEDURE — 94761 N-INVAS EAR/PLS OXIMETRY MLT: CPT

## 2024-10-09 PROCEDURE — 25000003 PHARM REV CODE 250: Performed by: INTERNAL MEDICINE

## 2024-10-09 PROCEDURE — 80053 COMPREHEN METABOLIC PANEL: CPT | Performed by: STUDENT IN AN ORGANIZED HEALTH CARE EDUCATION/TRAINING PROGRAM

## 2024-10-09 PROCEDURE — 25000003 PHARM REV CODE 250: Performed by: STUDENT IN AN ORGANIZED HEALTH CARE EDUCATION/TRAINING PROGRAM

## 2024-10-09 PROCEDURE — 02HV33Z INSERTION OF INFUSION DEVICE INTO SUPERIOR VENA CAVA, PERCUTANEOUS APPROACH: ICD-10-PCS | Performed by: STUDENT IN AN ORGANIZED HEALTH CARE EDUCATION/TRAINING PROGRAM

## 2024-10-09 PROCEDURE — 99900031 HC PATIENT EDUCATION (STAT)

## 2024-10-09 PROCEDURE — 27000207 HC ISOLATION

## 2024-10-09 RX ORDER — SODIUM CHLORIDE 0.9 % (FLUSH) 0.9 %
10 SYRINGE (ML) INJECTION EVERY 6 HOURS
Status: DISCONTINUED | OUTPATIENT
Start: 2024-10-09 | End: 2024-10-10 | Stop reason: HOSPADM

## 2024-10-09 RX ORDER — INSULIN GLARGINE 100 [IU]/ML
22 INJECTION, SOLUTION SUBCUTANEOUS DAILY
Status: DISCONTINUED | OUTPATIENT
Start: 2024-10-09 | End: 2024-10-10 | Stop reason: HOSPADM

## 2024-10-09 RX ORDER — SODIUM CHLORIDE 0.9 % (FLUSH) 0.9 %
10 SYRINGE (ML) INJECTION
Status: DISCONTINUED | OUTPATIENT
Start: 2024-10-09 | End: 2024-10-10 | Stop reason: HOSPADM

## 2024-10-09 RX ORDER — LOSARTAN POTASSIUM 25 MG/1
25 TABLET ORAL DAILY
Status: DISCONTINUED | OUTPATIENT
Start: 2024-10-10 | End: 2024-10-10 | Stop reason: HOSPADM

## 2024-10-09 RX ORDER — MAGNESIUM SULFATE HEPTAHYDRATE 40 MG/ML
2 INJECTION, SOLUTION INTRAVENOUS ONCE
Status: COMPLETED | OUTPATIENT
Start: 2024-10-09 | End: 2024-10-09

## 2024-10-09 RX ORDER — CEFAZOLIN SODIUM 2 G/50ML
2 SOLUTION INTRAVENOUS
Status: DISCONTINUED | OUTPATIENT
Start: 2024-10-09 | End: 2024-10-09

## 2024-10-09 RX ADMIN — AMIODARONE HYDROCHLORIDE 200 MG: 100 TABLET ORAL at 08:10

## 2024-10-09 RX ADMIN — OXYCODONE HYDROCHLORIDE AND ACETAMINOPHEN 500 MG: 500 TABLET ORAL at 08:10

## 2024-10-09 RX ADMIN — METOPROLOL SUCCINATE 100 MG: 100 TABLET, EXTENDED RELEASE ORAL at 08:10

## 2024-10-09 RX ADMIN — MUPIROCIN: 20 OINTMENT TOPICAL at 08:10

## 2024-10-09 RX ADMIN — CEFAZOLIN 2 G: 2 INJECTION, POWDER, FOR SOLUTION INTRAMUSCULAR; INTRAVENOUS at 02:10

## 2024-10-09 RX ADMIN — Medication 2 TABLET: at 08:10

## 2024-10-09 RX ADMIN — ATORVASTATIN CALCIUM 40 MG: 40 TABLET, FILM COATED ORAL at 08:10

## 2024-10-09 RX ADMIN — Medication 2000 MG: at 12:10

## 2024-10-09 RX ADMIN — DULOXETINE HYDROCHLORIDE 30 MG: 30 CAPSULE, DELAYED RELEASE ORAL at 08:10

## 2024-10-09 RX ADMIN — FAMOTIDINE 20 MG: 10 INJECTION, SOLUTION INTRAVENOUS at 08:10

## 2024-10-09 RX ADMIN — MAGNESIUM SULFATE HEPTAHYDRATE 2 G: 40 INJECTION, SOLUTION INTRAVENOUS at 08:10

## 2024-10-09 RX ADMIN — INSULIN ASPART 2 UNITS: 100 INJECTION, SOLUTION INTRAVENOUS; SUBCUTANEOUS at 04:10

## 2024-10-09 RX ADMIN — INSULIN GLARGINE 22 UNITS: 100 INJECTION, SOLUTION SUBCUTANEOUS at 08:10

## 2024-10-09 RX ADMIN — APIXABAN 5 MG: 5 TABLET, FILM COATED ORAL at 08:10

## 2024-10-09 RX ADMIN — INSULIN ASPART 3 UNITS: 100 INJECTION, SOLUTION INTRAVENOUS; SUBCUTANEOUS at 11:10

## 2024-10-09 RX ADMIN — Medication 10 ML: at 11:10

## 2024-10-09 RX ADMIN — PIPERACILLIN SODIUM AND TAZOBACTAM SODIUM 4.5 G: 4; .5 INJECTION, POWDER, FOR SOLUTION INTRAVENOUS at 06:10

## 2024-10-09 RX ADMIN — INSULIN ASPART 1 UNITS: 100 INJECTION, SOLUTION INTRAVENOUS; SUBCUTANEOUS at 08:10

## 2024-10-09 RX ADMIN — Medication 6 MG: at 08:10

## 2024-10-09 RX ADMIN — Medication 10 ML: at 05:10

## 2024-10-09 RX ADMIN — LOSARTAN POTASSIUM 50 MG: 50 TABLET, FILM COATED ORAL at 08:10

## 2024-10-09 RX ADMIN — CEFAZOLIN 2 G: 2 INJECTION, POWDER, FOR SOLUTION INTRAMUSCULAR; INTRAVENOUS at 11:10

## 2024-10-09 RX ADMIN — ASPIRIN 81 MG: 81 TABLET, COATED ORAL at 08:10

## 2024-10-09 NOTE — ASSESSMENT & PLAN NOTE
Body mass index is 40.9 kg/m². Morbid obesity complicates all aspects of disease management from diagnostic modalities to treatment. Weight loss encouraged and health benefits explained to patient.   Lengthy discussion on importance of modifying current diet to help support wound healing and cardiovascular health.     Wt Readings from Last 3 Encounters:   10/08/24 (!) 140.6 kg (309 lb 15.5 oz)   05/23/24 (!) 141.1 kg (311 lb)   03/04/24 (!) 136.5 kg (300 lb 14.9 oz)

## 2024-10-09 NOTE — PLAN OF CARE
[facility] - Intensive Care  Discharge Reassessment    Primary Care Provider: Olena Thornton,     Expected Discharge Date:     Reassessment (most recent)       Discharge Reassessment - 10/09/24 1412          Discharge Reassessment    Assessment Type Discharge Planning Reassessment     Did the patient's condition or plan change since previous assessment? Yes     Discharge Plan A Home;Home with family     Discharge Plan B Home;Home with family     DME Needed Upon Discharge  other (see comments)   IV medication    Transition of Care Barriers None     Why the patient remains in the hospital Requires continued medical care        Post-Acute Status    Post-Acute Authorization IV Infusion     IV Infusion Status Referral(s) sent     Discharge Delays None known at this time                 Anticipated discharge is tomorrow. The patient will return home with his family. He is requiring home antibiotics post discharge.  informed me that the patient also plans to follow up with Dr. Esquivel for outpatient wound care.

## 2024-10-09 NOTE — EICU
Intervention Initiated From:  [facility] / BRIANU    Zully intervened regarding:  Rounding (Video assessment)    Nurse Notified:  No    Doctor Notified:  No    Comments: Video rounding complete. Pt resting in bed. VSS.

## 2024-10-09 NOTE — PROGRESS NOTES
VANCOMYCIN DOSING BY PHARMACY DISCONTINUATION NOTE    Andrew Del Cid Jr. is a 59 y.o. male who had been consulted for vancomycin dosing.    The pharmacy consult for vancomycin dosing has been discontinued.     Vancomycin Dosing by Pharmacy Consult will sign-off. Please reconsult if necessary. Thank you for allowing us to participate in this patient's care.     Thank you for the consult,   Jahaira Gillespie, Pharm.D.  Inpatient Pharmacist  [phone]

## 2024-10-09 NOTE — ASSESSMENT & PLAN NOTE
Hold oral antihyperglycemics.   Lab Results   Component Value Date    HGBA1C 7.8 (H) 10/06/2024    HGBA1C 5.5 05/23/2024    HGBA1C 5.4 01/16/2024   Poorly controlled compared to 6 months ago.   - lantus 20 U daily  - [facility]AC[facility]  - consult nutritionist

## 2024-10-09 NOTE — PLAN OF CARE
Spoke to Dr. Thornton about outpatient IV antibiotics. Dr. Thornton is awaiting final echo results prior to ordering outpatient IV antibiotics. Will arrange once ordered. Awaiting results.

## 2024-10-09 NOTE — PT/OT/SLP PROGRESS
Occupational Therapy      Patient Name:  Andrew Del Cid Jr.   MRN:  [id number]    Patient not seen today after two attempts this date. On first attempt, patient awaiting cam boot for mobility. On second attempt, during afternoon, patient meeting with MD and PICC nurse to follow immediately after. Will follow-up at later time/date as appropriate.    Ilana Mcbride, OT      10/9/2024

## 2024-10-09 NOTE — ASSESSMENT & PLAN NOTE
Patients blood pressure range in the last 24 hours was: BP  Min: 88/68  Max: 171/71.The patient's inpatient anti-hypertensive regimen is listed below:    Current Antihypertensives  metoprolol succinate (TOPROL-XL) 24 hr tablet 100 mg, Daily, Oral  losartan tablet 25 mg, Daily, Oral  Denies symptoms.  Plan  - BP is controlled, on discharge losartan dose reduce to 25 mg from 50 mg daily  - continue with current antihypertensive regimen  - follow up primary cardiology outpatient

## 2024-10-09 NOTE — ASSESSMENT & PLAN NOTE
Per cardiology repeat echocardiogram in 3 weeks as an outpatient to ensure resolution of what appears to be left atrial thrombus.   - continue eliquis BID and remain on anticoagulation therapy

## 2024-10-09 NOTE — PROGRESS NOTES
LUZ MARIA Miles MD  [address] [place] [address],  [address]  [place] [address]  Phone:  [phone]  Fax:  [phone]  Email: [email or web address]  _______________________________________________________________      Today's Date:  10/9/2024  Patient Name: Andrew Del Cid Jr.    MRN: [id number]    Code Status: Full Code     Attending Physician: Olena Thornton DO   Consulting Physician: LUZ MARIA Miles MD    Hospital Course:    10/9/2024:  The patient is feeling well this morning  stating that he feels so much better.  He denies symptoms of shortness of breath, chest pain, dizziness, but he has mild symptoms of heart palpitations.    He held it so denies fever, chills or rigors.     Clinical:    Patient remains in atrial fibrillation, but with controlled ventricular rate.  He tolerated amiodarone infusion well, and is now on amiodarone 200 mg p.o. daily.    Blood cultures were positive for MRSA.  The patient has been on antibiotic therapy with the normalization of his white count.    An echocardiogram was performed revealing an EF of 60% without regional wall motion abnormalities.  He has biatrial dilatation.  Also mild concentric LVH.  The echocardiogram also showed increased echogenicity within the left atrium measuring 1.3 x 2.2 cm consistent with thrombus.  Both the mitral valve and aortic valve are sclerotic with mild calcification -- ruling out endocarditis is therefore difficult.  However, when comparing his echocardiogram to his previous echocardiogram from May 2024, there is no significant interval change within the mitral valve leaflets or the aortic valve cusps.  Based on this comparison of these studies, endocarditis is not supported.    Hemodynamic:    Blood pressure has been well-controlled over the last 24 hours.  It is now trending lower.    Telemetry:    Persistent atrial fibrillation in the 80 bpm range.  There have been no blocks or pauses.    ROS  General:   The patient is  feeling better.  He has no complaints at this time.  Neurologic:   No strokelike symptoms.  He is alert and oriented and appears to be at baseline.  Lungs:   Denies chest pain, shortness of breath.  Heart: Mild palpitations.  No dizziness, or chest pain.  Extremities:   No lower extremity pain at this time.    Vital Signs (Most Recent):  Temp: 97.8 °F (36.6 °C) (10/09/24 0730)  Pulse: 83 (10/09/24 1000)  Resp: 12 (10/09/24 1000)  BP: 102/63 (10/09/24 1000)  SpO2: (!) 93 % (10/09/24 1000) Vital Signs (24h Range):  Temp:  [96.9 °F (36.1 °C)-97.8 °F (36.6 °C)] 97.8 °F (36.6 °C)  Pulse:  [76-96] 83  Resp:  [10-26] 12  SpO2:  [92 %-98 %] 93 %  BP: ()/(54-74) 102/63     Weight: (!) 140.6 kg (309 lb 15.5 oz)  Body mass index is 40.9 kg/m².    SpO2: (!) 93 %         Intake/Output Summary (Last 24 hours) at 10/9/2024 1049  Last data filed at 10/9/2024 1040  Gross per 24 hour   Intake 4092.3 ml   Output 3500 ml   Net 592.3 ml     Labs:  CMP   Recent Labs   Lab 10/08/24  1030 10/09/24  0355   * 136   K 4.3 4.5    102   CO2 26 31*   * 152*   BUN 29* 21*   CREATININE 1.0 1.1   CALCIUM 8.4* 8.6*   PROT  --  6.5   ALBUMIN  --  1.5*   BILITOT  --  0.4   ALKPHOS  --  94   AST  --  21   ALT  --  25   ANIONGAP 6 3    and CBC   Recent Labs   Lab 10/08/24  1030 10/09/24  0355   WBC 6.62 7.33   HGB 10.2* 10.4*   HCT 31.4* 33.2*    400       Physical Examination:  General:   Alert and oriented x 3; he is in no distress.  He is communicating well and states that he feels good.  Heent:   No JVD.  Lungs:   Diminished breath sounds but without wheezing.  Heart:   Irregular regular rhythm; rate within normal limits; grade 1 holosystolic murmur  Extremities:  warm; mild bilateral edema; both feet are wrapped.    Assessment and Plan:   1. Atrial fibrillation:   Patient with a history of atrial fibrillation.   His last episode occurred while he was septic.  Again, he is bacteremic.  Echocardiography reveals  biatrial dilatation.  Echocardiography also shows evidence to support left atrial thrombus for which he should remain on Eliquis therapy.  Repeat echocardiogram in 3 weeks as an outpatient  to ensure resolution of what appears to be left atrial thrombus.  Continue Eliquis therapy-likely long-term as this is his second episode of atrial fibrillation in the context of morbid obesity,  MARVEL, and biatrial dilatation.  Rate controlled with metoprolol at its current dose.    2.  Sepsis:   Echocardiogram does not support endocarditis.  However if his clinical status should change, then we will need to entertain this.  I will not recommend transesophageal echocardiography at this time.    Treat bacteremia as per guidelines.    If the patient should become bacteremic after antibiotic therapy, then will need to reassess for endocarditis.    3.  Hypertension:   Blood pressure is in the low 100 range.   Continue metoprolol  mg daily   Reduce losartan from 2050 to 25 mg daily     4.  Diabetes mellitus: As per PCP     5.  Status post CVA:   Continue long-term Eliquis therapy.    6.  Hyperlipidemia: Continue with statin therapy.

## 2024-10-09 NOTE — PROCEDURES
"Andrew Del Cid Jr. is a 59 y.o. male patient.    Temp: 97.9 °F (36.6 °C) (10/09/24 1130)  Pulse: 79 (10/09/24 1230)  Resp: (!) 21 (10/09/24 1230)  BP: (!) 142/59 (10/09/24 1230)  SpO2: 100 % (10/09/24 1230)  Weight: (!) 140.6 kg (309 lb 15.5 oz) (10/08/24 0856)  Height: 6' 1" (185.4 cm) (10/08/24 0856)    PICC  Date/Time: 10/9/2024 1:05 PM  Performed by: Ishaan Waters RN  Consent Done: Yes  Time out: Immediately prior to procedure a time out was called to verify the correct patient, procedure, equipment, support staff and site/side marked as required  Indications: med administration  Anesthesia: local infiltration  Local anesthetic: lidocaine 1% without epinephrine  Anesthetic Total (mL): 1  Preparation: skin prepped with ChloraPrep  Skin prep agent dried: skin prep agent completely dried prior to procedure  Sterile barriers: all five maximum sterile barriers used - cap, mask, sterile gown, sterile gloves, and large sterile sheet  Hand hygiene: hand hygiene performed prior to central venous catheter insertion  Location details: right basilic  Catheter type: double lumen  Catheter size: 5 Fr  Catheter Length: 45cm    Ultrasound guidance: yes  Vessel Caliber: large and patent, compressibility normal  Needle advanced into vessel with real time Ultrasound guidance.  Guidewire confirmed in vessel.  Sterile sheath used.  Number of attempts: 1  Post-procedure: blood return through all ports, chlorhexidine patch and sterile dressing applied  Estimated blood loss (mL): 0            Name Ishaan Waters   10/9/2024    "

## 2024-10-09 NOTE — PROGRESS NOTES
"[facility] Medicine  Progress Note    Patient Name: Andrew Del Cid Jr.  MRN: [id number]  Patient Class: IP- Inpatient   Admission Date: 10/6/2024  Length of Stay: 3 days  Attending Physician: Olena Thornton DO  Primary Care Provider: Olena Thornton DO        Subjective:     Principal Problem:Bacteremia due to methicillin susceptible Staphylococcus aureus (MSSA)        HPI:  Chief Complaint   Patient presents with    Weakness       Pt stated that for the past couple weeks he has been experiencing fatigue / decreased appetite / cough / subjective fever / "cold sweats".        59-year-old male with a history of acute renal failure, atrial fibrillation in the past, off of his blood thinners, diabetes mellitus, hyperlipidemia, hypertension, peripheral neuropathy presents the emergency department stating that for the past couple of weeks he has been experiencing fatigue, subjective fevers, cold sweats, cough, not eating or drinking well.  He has been seeing  as well as  for his wound care to bilateral feet.  Here in the emergency department he is alert and oriented x4, GCS is 15.    ED course: vitals signs BP 88/68 mmHg, , RR 29, SpO2 945 on room air, temp 98.9F  Chemistry labs Na 129, K4.6, Cl 93, CO2 24, Glu 192, Alb 1.9, BUN 27, Cr 1.4  WBC 16.47, Hg 11.3, HCT 35, , Mag 1.5, troponin I HS 4.7, lactate 2.3  NTproBNP 2184  EKG tracings, atrial fibrillation, rate 128 bpm, no ST-T wave elevation  Patient received sepsis bolus IVF 30/kg and started on vancomycin and zosyn.     Patient admitted to ICU for continued management of sepsis with diabetic foot infection bilaterally, atrial fibrillation with RVR, CHF exacerbation.  Follow up consult with primary cardiology and primary podiatry.     Telemetry overnight tracings of atrial fibrillation rate >100s bpm. Repeat lactate within normal limits with fluid resuscitation. Patient awake and alert, " endorses improved tolerance to PO intake of fluids and solids. Continue with broad coverage antibiotics, follow up blood cx x2. Further recommendations appreciated from cardiology and podiatry.        Overview/Hospital Course:  10/8/24 No new complaints. Patient endorses appetite slowly improving. Per cardiology, patient placed on amiodarone gtt overnight to transition to PO. Telemetry tracing, patient remains in atrial fibrillation, intermittent PVCs rate 80-90 bpm. Blood cx x2 positive for MRSA. Estimated Creatinine Clearance: 120.1 mL/min (based on SCr of 1 mg/dL). ID pharm to be consulted. Leukocytosis resolved and stable since IVF and 24 hours of IV abx. Resume glucose control with basal insulin and SSI. Follow up echocardiogram and cardiology recommendations. Back on AC with Eliquis BID. Podiatry plans to post-pone foot surgery until bacteremia clears. Continue with IV antibiotics D3 vancomycin and zosyn. Follow up PT and OT eval and therapy.   10/9/24 No elevated temperature since time of admission. Telemetry tracings remain in atrial fibrillation with intermittent PVCs rate controlled with PO amiodarone per cardiology. Echocardiogram with no evidence to support endocarditis.  Blood culture results with sensitivity shows penicillin resistant MSSA. Will place PICC line, obtain surveillance blood cultures. Discontinue vancomycin and zosyn and start Cefazolin Q8 for 28 days. CAM boot and instruction on off loading instructions provided by PT and OT. Patient has wound care established with [place] wound clinic. Home IV infusion to be set up. Patient medically stable for discharge with continued wound care, IV antibiotics, ID follow up and management of all other chronic conditions.       Interval History:Patient seen and examined.     Review of Systems   Constitutional:  Negative for activity change, appetite change, chills, fatigue and fever.   Eyes:  Negative for visual disturbance.   Respiratory:  Negative  for cough, chest tightness, shortness of breath and wheezing.    Cardiovascular:  Negative for chest pain, palpitations and leg swelling.   Gastrointestinal:  Negative for abdominal pain, constipation, diarrhea, nausea and vomiting.   Musculoskeletal:  Positive for arthralgias and gait problem. Negative for myalgias.   Skin:  Positive for wound.   Neurological:  Negative for dizziness, syncope, speech difficulty, weakness, light-headedness and headaches.   Psychiatric/Behavioral:  Negative for agitation, behavioral problems, confusion and decreased concentration.      Objective:     Vital Signs (Most Recent):  Temp: 97.9 °F (36.6 °C) (10/09/24 1130)  Pulse: 79 (10/09/24 1100)  Resp: 20 (10/09/24 1100)  BP: 105/68 (10/09/24 1100)  SpO2: 95 % (10/09/24 1100) Vital Signs (24h Range):  Temp:  [96.9 °F (36.1 °C)-97.9 °F (36.6 °C)] 97.9 °F (36.6 °C)  Pulse:  [76-96] 79  Resp:  [10-26] 20  SpO2:  [92 %-98 %] 95 %  BP: ()/(54-71) 105/68     Weight: (!) 140.6 kg (309 lb 15.5 oz)  Body mass index is 40.9 kg/m².    Intake/Output Summary (Last 24 hours) at 10/9/2024 1242  Last data filed at 10/9/2024 1234  Gross per 24 hour   Intake 4211.09 ml   Output 3300 ml   Net 911.09 ml         Physical Exam  Vitals and nursing note reviewed.   Constitutional:       General: He is not in acute distress.     Appearance: He is obese. He is not ill-appearing, toxic-appearing or diaphoretic.   HENT:      Head: Normocephalic and atraumatic.      Right Ear: External ear normal.      Left Ear: External ear normal.      Nose: Nose normal. No congestion or rhinorrhea.      Mouth/Throat:      Pharynx: Oropharynx is clear.   Eyes:      Extraocular Movements: Extraocular movements intact.      Conjunctiva/sclera: Conjunctivae normal.   Cardiovascular:      Rate and Rhythm: Normal rate and regular rhythm.      Pulses: Normal pulses.      Heart sounds: Normal heart sounds. No murmur heard.  Pulmonary:      Effort: Pulmonary effort is normal. No  respiratory distress.      Breath sounds: Normal breath sounds. No wheezing or rales.   Abdominal:      General: Abdomen is flat. There is no distension.      Palpations: Abdomen is soft. There is no mass.      Tenderness: There is no abdominal tenderness. There is no right CVA tenderness, left CVA tenderness or guarding.   Musculoskeletal:         General: Normal range of motion.      Cervical back: Normal range of motion and neck supple. No rigidity.      Comments: Both feet wrapped in ace bandage, dressing clean and dry   Skin:     General: Skin is warm and dry.      Capillary Refill: Capillary refill takes less than 2 seconds.   Neurological:      General: No focal deficit present.      Mental Status: He is alert and oriented to person, place, and time. Mental status is at baseline.      Motor: No weakness.      Gait: Gait normal.   Psychiatric:         Mood and Affect: Mood normal.         Behavior: Behavior normal.             Significant Labs: All pertinent labs within the past 24 hours have been reviewed.  A1C:   Recent Labs   Lab 05/23/24  1505 10/06/24  1355   HGBA1C 5.5 7.8*     Recent Labs   Lab 10/06/24  1346 10/07/24  0354 10/09/24  0355   BILITOT 1.0 0.7 0.4     Recent Labs   Lab 10/09/24  0355   *      K 4.5      CO2 31*   BUN 21*   CREATININE 1.1   CALCIUM 8.6*   MG 1.6     CBC:   Recent Labs   Lab 10/08/24  1030 10/09/24  0355   WBC 6.62 7.33   HGB 10.2* 10.4*   HCT 31.4* 33.2*    400     CMP:   Recent Labs   Lab 10/08/24  1030 10/09/24  0355   * 136   K 4.3 4.5    102   CO2 26 31*   * 152*   BUN 29* 21*   CREATININE 1.0 1.1   CALCIUM 8.4* 8.6*   PROT  --  6.5   ALBUMIN  --  1.5*   BILITOT  --  0.4   ALKPHOS  --  94   AST  --  21   ALT  --  25   ANIONGAP 6 3     Recent Labs   Lab 10/08/24  1030 10/09/24  0355   MG 2.0 1.6     POCT Glucose:   Recent Labs   Lab 10/08/24  1626 10/08/24  2217 10/09/24  1118   POCTGLUCOSE 361* 278* 256*       Significant  Imaging: I have reviewed all pertinent imaging results/findings within the past 24 hours.    Assessment/Plan:      * Bacteremia due to methicillin susceptible Staphylococcus aureus (MSSA)  Lab Results   Component Value Date    LABBLOO  10/06/2024     Gram stain lizzy bottle: Gram positive cocci in clusters resembling Staph    LABBLOO  10/06/2024     Results called to and read back by: Chelita Fleming RN 10/07/2024 09:38    LABBLOO  10/06/2024     Gram stain aer bottle: Gram positive cocci in clusters resembling Staph    LABBLOO  10/06/2024     Gram stain aer bottle: Gram positive cocci in chains resembling Strep    LABBLOO  10/06/2024     Results called to and read back by:Chelita Fleming RN 10/07/2024  18:08    LABBLOO (A) 10/06/2024     STAPHYLOCOCCUS AUREUS  ID consult required at [facility] and [facility].  For susceptibility see order #[id number]      LABBLOO  10/06/2024     Gram stain lizzy bottle: Gram positive cocci in clusters resembling Staph    LABBLOO  10/06/2024     Results called to and read back by: Chelita Fleming RN 10/07/2024 09:38    LABBLOO  10/06/2024     Gram stain aer bottle: Gram positive cocci in clusters resembling Staph    LABBLOO 10/07/2024  12:42 10/06/2024    LABBLOO (A) 10/06/2024     STAPHYLOCOCCUS AUREUS  ID consult required at [facility] and [facility].        Past history of MSSA bacteremia.   Chronic diabetic foot wound bilaterally.   MDRO acinetobacter positive (9/4/24) via wound culture per podiatry outpatient.   Leukocytosis resolved. Afebrile overnight.  10/9/24 Positive blood cx x2 (10/6/24) MSSA    Echocardiogram with no reported evidence suggestive of vegetations or endocarditis  - completed zosyn and vancomycin (10/6-10/9/24)  - start cefazolin 2 g Q8H complete 4 weeks per ID pharm recommendations  - obtain surveillance culture (10/9/24)  - PICC line placement ordered  - arrange for home IV abx infusion  - on discharge follow up ID outpatient  clinic        Left atrial thrombus  Per cardiology repeat echocardiogram in 3 weeks as an outpatient to ensure resolution of what appears to be left atrial thrombus.   - continue eliquis BID and remain on anticoagulation therapy        MRSA bacteremia  Blood cx x2 positive PCR for MRSA. D3 vancomycin, zosyn. Follow up final culture results. Follow up echocardiogram.      Diabetic foot infection  Patient's FSGs are uncontrolled due to hyperglycemia on current medication regimen.  Last A1c reviewed-   Lab Results   Component Value Date    HGBA1C 7.8 (H) 10/06/2024     Most recent fingerstick glucose reviewed-   Recent Labs   Lab 10/07/24  1647 10/07/24  2039 10/08/24  0527 10/08/24  1050   POCTGLUCOSE 250* 256* 212* 266*     Current correctional scale  Low  Maintain anti-hyperglycemic dose as follows-   Antihyperglycemics (From admission, onward)      Start     Stop Route Frequency Ordered    10/08/24 0830  insulin glargine U-100 (Lantus) pen 20 Units         -- SubQ Daily 10/08/24 0820    10/06/24 1640  insulin aspart U-100 pen 0-5 Units         -- SubQ Before meals & nightly PRN 10/06/24 1640          Hold Oral hypoglycemics while patient is in the hospital.    Elevated lactic acid level  With mild elevated procalcitonin.   Resolved after IVF hydration.   Continue with above management with source control; positive MRSA bacteremia    Weakness  Secondary to infectious process, poor control of diabetes, poor nutritional status and chronic anemia.   Hg/HCT 10.1/32.   Continue with IV antibiotics, encourage PO hydration.   Continue management of chronic conditions.        Severe sepsis  This patient does have evidence of infective focus  My overall impression is sepsis.  Source: Skin and Soft Tissue (location bilateral diabetic foot wound)  Antibiotics given-   Antibiotics (72h ago, onward)      Start     Stop Route Frequency Ordered    10/07/24 1245  mupirocin 2 % ointment         10/12/24 0859 Nasl 2 times daily  10/07/24 1138          Latest lactate reviewed-  Recent Labs   Lab 10/06/24  1721   LACTATE 1.1       Organ dysfunction indicated by Acute heart failure    Fluid challenge Ideal Body Weight- The patient's ideal body weight is Ideal body weight: 79.9 kg (176 lb 2.4 oz) which will be used to calculate fluid bolus of 30 ml/kg for treatment of septic shock.      Post- resuscitation assessment Yes Perfusion exam was performed within 6 hours of septic shock presentation after bolus shows Adequate tissue perfusion assessed by non-invasive monitoring       Will Not start Pressors- Levophed for MAP of 65  Source control achieved by: IVF resuscitation, IV antibiotics    Ulcer of toe of left foot, with fat layer exposed  Contributing source of infection. Blood cx x2 positive with MSSA.   - continue source control and wound healing with outpatient wound care, primary podiatry follow up, IV antibiotics for bacteremia, ID outpatient follow up     PAD (peripheral artery disease)  Continue with statin and aspirin.       Diabetic ulcer of right midfoot associated with type 2 diabetes mellitus, with fat layer exposed  Patient's FSGs are uncontrolled due to hyperglycemia on current medication regimen.  Poor control likely contributed by chronic poor wound healing and bacteremia.   Last A1c reviewed-   Lab Results   Component Value Date    HGBA1C 7.8 (H) 10/06/2024     Most recent fingerstick glucose reviewed-   Recent Labs   Lab 10/08/24  1626 10/08/24  2217 10/09/24  1118   POCTGLUCOSE 361* 278* 256*     Current correctional scale  Low  Maintain anti-hyperglycemic dose as follows-   Antihyperglycemics (From admission, onward)      Start     Stop Route Frequency Ordered    10/09/24 0900  insulin glargine U-100 (Lantus) pen 22 Units         -- SubQ Daily 10/09/24 0728    10/06/24 1640  insulin aspart U-100 pen 0-5 Units         -- SubQ Before meals & nightly PRN 10/06/24 1640          Hold Oral hypoglycemics while patient is in the  hospital.    Atrial fibrillation with rapid ventricular response  Patient has paroxysmal (<7 days) atrial fibrillation. Patient is currently in atrial fibrillation. [id number] Score: 2. The patients heart rate in the last 24 hours is as follows:  Pulse  Min: 82  Max: 102     Antiarrhythmics  metoprolol succinate (TOPROL-XL) 24 hr tablet 100 mg, Daily, Oral  amiodarone 360 mg/200 mL (1.8 mg/mL) infusion, Continuous, Intravenous  amiodarone tablet 200 mg, Daily, Oral  Anticoagulants  apixaban tablet 5 mg, 2 times daily, Oral    Per cardiology, trasition from amiodarone gtt to PO amiodarone.   Plan  - Replete lytes with a goal of K>4, Mg >2  - Patient's afib is currently controlled  - eliquis BID  - f/u cardiology recommendations    Type 2 diabetes mellitus, with long-term current use of insulin  Hold oral antihyperglycemics.   Lab Results   Component Value Date    HGBA1C 7.8 (H) 10/06/2024    HGBA1C 5.5 05/23/2024    HGBA1C 5.4 01/16/2024   Poorly controlled compared to 6 months ago.   - lantus 20 U daily  - SSI QACHS  - consult nutritionist          Hyperlipidemia  Continue with home statin.       Essential hypertension  Patients blood pressure range in the last 24 hours was: BP  Min: 88/68  Max: 171/71.The patient's inpatient anti-hypertensive regimen is listed below:    Current Antihypertensives  metoprolol succinate (TOPROL-XL) 24 hr tablet 100 mg, Daily, Oral  losartan tablet 25 mg, Daily, Oral  Denies symptoms.  Plan  - BP is controlled, on discharge losartan dose reduce to 25 mg from 50 mg daily  - continue with current antihypertensive regimen  - follow up primary cardiology outpatient    Class 3 severe obesity with serious comorbidity and body mass index (BMI) of 40.0 to 44.9 in adult  Body mass index is 40.9 kg/m². Morbid obesity complicates all aspects of disease management from diagnostic modalities to treatment. Weight loss encouraged and health benefits explained to patient.   Lengthy discussion on  importance of modifying current diet to help support wound healing and cardiovascular health.     Wt Readings from Last 3 Encounters:   10/08/24 (!) 140.6 kg (309 lb 15.5 oz)   05/23/24 (!) 141.1 kg (311 lb)   03/04/24 (!) 136.5 kg (300 lb 14.9 oz)          VTE Risk Mitigation (From admission, onward)           Ordered     apixaban tablet 5 mg  2 times daily         10/07/24 1134     IP VTE LOW RISK PATIENT  Once         10/06/24 1640                    Discharge Planning   MICHAEL:      Code Status: Full Code   Is the patient medically ready for discharge?:     Reason for patient still in hospital (select all that apply): Treatment, Consult recommendations, PT / OT recommendations, and Pending disposition  Discharge Plan A: Home with family, Home Health                  Olena Thornton DO  Department of Hospital Medicine   [facility]

## 2024-10-09 NOTE — ASSESSMENT & PLAN NOTE
Contributing source of infection. Blood cx x2 positive with MSSA.   - continue source control and wound healing with outpatient wound care, primary podiatry follow up, IV antibiotics for bacteremia, ID outpatient follow up

## 2024-10-09 NOTE — ASSESSMENT & PLAN NOTE
Patient's FSGs are uncontrolled due to hyperglycemia on current medication regimen.  Poor control likely contributed by chronic poor wound healing and bacteremia.   Last A1c reviewed-   Lab Results   Component Value Date    HGBA1C 7.8 (H) 10/06/2024     Most recent fingerstick glucose reviewed-   Recent Labs   Lab 10/08/24  1626 10/08/24  2217 10/09/24  1118   POCTGLUCOSE 361* 278* 256*     Current correctional scale  Low  Maintain anti-hyperglycemic dose as follows-   Antihyperglycemics (From admission, onward)      Start     Stop Route Frequency Ordered    10/09/24 0900  insulin glargine U-100 (Lantus) pen 22 Units         -- SubQ Daily 10/09/24 0728    10/06/24 1640  insulin aspart U-100 pen 0-5 Units         -- SubQ Before meals & nightly PRN 10/06/24 1640          Hold Oral hypoglycemics while patient is in the hospital.

## 2024-10-09 NOTE — SUBJECTIVE & OBJECTIVE
Interval History:Patient seen and examined.     Review of Systems   Constitutional:  Negative for activity change, appetite change, chills, fatigue and fever.   Eyes:  Negative for visual disturbance.   Respiratory:  Negative for cough, chest tightness, shortness of breath and wheezing.    Cardiovascular:  Negative for chest pain, palpitations and leg swelling.   Gastrointestinal:  Negative for abdominal pain, constipation, diarrhea, nausea and vomiting.   Musculoskeletal:  Positive for arthralgias and gait problem. Negative for myalgias.   Skin:  Positive for wound.   Neurological:  Negative for dizziness, syncope, speech difficulty, weakness, light-headedness and headaches.   Psychiatric/Behavioral:  Negative for agitation, behavioral problems, confusion and decreased concentration.      Objective:     Vital Signs (Most Recent):  Temp: 97.9 °F (36.6 °C) (10/09/24 1130)  Pulse: 79 (10/09/24 1100)  Resp: 20 (10/09/24 1100)  BP: 105/68 (10/09/24 1100)  SpO2: 95 % (10/09/24 1100) Vital Signs (24h Range):  Temp:  [96.9 °F (36.1 °C)-97.9 °F (36.6 °C)] 97.9 °F (36.6 °C)  Pulse:  [76-96] 79  Resp:  [10-26] 20  SpO2:  [92 %-98 %] 95 %  BP: ()/(54-71) 105/68     Weight: (!) 140.6 kg (309 lb 15.5 oz)  Body mass index is 40.9 kg/m².    Intake/Output Summary (Last 24 hours) at 10/9/2024 1242  Last data filed at 10/9/2024 1234  Gross per 24 hour   Intake 4211.09 ml   Output 3300 ml   Net 911.09 ml         Physical Exam  Vitals and nursing note reviewed.   Constitutional:       General: He is not in acute distress.     Appearance: He is obese. He is not ill-appearing, toxic-appearing or diaphoretic.   HENT:      Head: Normocephalic and atraumatic.      Right Ear: External ear normal.      Left Ear: External ear normal.      Nose: Nose normal. No congestion or rhinorrhea.      Mouth/Throat:      Pharynx: Oropharynx is clear.   Eyes:      Extraocular Movements: Extraocular movements intact.      Conjunctiva/sclera:  Conjunctivae normal.   Cardiovascular:      Rate and Rhythm: Normal rate and regular rhythm.      Pulses: Normal pulses.      Heart sounds: Normal heart sounds. No murmur heard.  Pulmonary:      Effort: Pulmonary effort is normal. No respiratory distress.      Breath sounds: Normal breath sounds. No wheezing or rales.   Abdominal:      General: Abdomen is flat. There is no distension.      Palpations: Abdomen is soft. There is no mass.      Tenderness: There is no abdominal tenderness. There is no right CVA tenderness, left CVA tenderness or guarding.   Musculoskeletal:         General: Normal range of motion.      Cervical back: Normal range of motion and neck supple. No rigidity.      Comments: Both feet wrapped in ace bandage, dressing clean and dry   Skin:     General: Skin is warm and dry.      Capillary Refill: Capillary refill takes less than 2 seconds.   Neurological:      General: No focal deficit present.      Mental Status: He is alert and oriented to person, place, and time. Mental status is at baseline.      Motor: No weakness.      Gait: Gait normal.   Psychiatric:         Mood and Affect: Mood normal.         Behavior: Behavior normal.             Significant Labs: All pertinent labs within the past 24 hours have been reviewed.  A1C:   Recent Labs   Lab 05/23/24  1505 10/06/24  1355   HGBA1C 5.5 7.8*     Recent Labs   Lab 10/06/24  1346 10/07/24  0354 10/09/24  0355   BILITOT 1.0 0.7 0.4     Recent Labs   Lab 10/09/24  0355   *      K 4.5      CO2 31*   BUN 21*   CREATININE 1.1   CALCIUM 8.6*   MG 1.6     CBC:   Recent Labs   Lab 10/08/24  1030 10/09/24  0355   WBC 6.62 7.33   HGB 10.2* 10.4*   HCT 31.4* 33.2*    400     CMP:   Recent Labs   Lab 10/08/24  1030 10/09/24  0355   * 136   K 4.3 4.5    102   CO2 26 31*   * 152*   BUN 29* 21*   CREATININE 1.0 1.1   CALCIUM 8.4* 8.6*   PROT  --  6.5   ALBUMIN  --  1.5*   BILITOT  --  0.4   ALKPHOS  --  94   AST   --  21   ALT  --  25   ANIONGAP 6 3     Recent Labs   Lab 10/08/24  1030 10/09/24  0355   MG 2.0 1.6     POCT Glucose:   Recent Labs   Lab 10/08/24  1626 10/08/24  2217 10/09/24  1118   POCTGLUCOSE 361* 278* 256*       Significant Imaging: I have reviewed all pertinent imaging results/findings within the past 24 hours.

## 2024-10-09 NOTE — EICU
Intervention Initiated From:  [facility] / EICU    Zully intervened regarding:  Rounding (Video assessment)    Nurse Notified:  No    Doctor Notified:  No    Comments: Video rounds complete. Patient resting in bed with no alarms, or distress noted. Denies needs at this time, and vitals appear WNL per ECG bedside monitor.

## 2024-10-09 NOTE — PLAN OF CARE
Rested well throughout the shift. No c/o voiced. VSS, afebrile. Remains in Afib w/ CVR. UOP adequate, large BM X1, bed bath and linen change done.

## 2024-10-09 NOTE — PLAN OF CARE
10/09/24 1350   Post-Acute Status   Post-Acute Authorization IV Infusion   IV Infusion Status Referral(s) sent         Late entry: 1250  New referral for home IV antibiotics sent to [facility]. Spoke to Beth with [facility] about new referral. Insurance being ran.     Spoke to patient about driving to [employer]'s office in [place] once a week for PICC line dressing change and for lab work. Patient and wife agree to this. Wife will drive him and will help with IV antibiotics administration. The patient and his wife have done home IV antibiotics before so they are familiar with the process.     Spoke to Beth with [facility]. Still running insurance but she will be here within the hour for teaching. Patient and wife notified. Primary nurse aware.     Potential discharge date is tomorrow per Dr. Thornton. Patient made aware. [facility] aware.     Addendum: 1441:  Beth with [facility] completed home IV antibiotic teaching with the patient and his wife. She is still awaiting his benefits to come back with the copayment. She will call the patient and his wife with this information once she receives it. Beth says that antibiotics will be delivered to the home upon discharge. Will notify Beth when patient is discharged.

## 2024-10-09 NOTE — ASSESSMENT & PLAN NOTE
Lab Results   Component Value Date    LABBLOO  10/06/2024     Gram stain lizzy bottle: Gram positive cocci in clusters resembling Staph    LABBLOO  10/06/2024     Results called to and read back by: Chelita Fleming RN 10/07/2024 09:38    LABBLOO  10/06/2024     Gram stain aer bottle: Gram positive cocci in clusters resembling Staph    LABBLOO  10/06/2024     Gram stain aer bottle: Gram positive cocci in chains resembling Strep    LABBLOO  10/06/2024     Results called to and read back by:Chelita Fleming RN 10/07/2024  18:08    LABBLOO (A) 10/06/2024     STAPHYLOCOCCUS AUREUS  ID consult required at [facility] and [facility].  For susceptibility see order #[id number]      LABBLOO  10/06/2024     Gram stain lizzy bottle: Gram positive cocci in clusters resembling Staph    LABBLOO  10/06/2024     Results called to and read back by: Chelita Fleming RN 10/07/2024 09:38    LABBLOO  10/06/2024     Gram stain aer bottle: Gram positive cocci in clusters resembling Staph    LABBLOO 10/07/2024  12:42 10/06/2024    LABBLOO (A) 10/06/2024     STAPHYLOCOCCUS AUREUS  ID consult required at [facility] and [facility] locations.        Past history of MSSA bacteremia.   Chronic diabetic foot wound bilaterally.   MDRO acinetobacter positive (9/4/24) via wound culture per podiatry outpatient.   Leukocytosis resolved. Afebrile overnight.  10/9/24 Positive blood cx x2 (10/6/24) MSSA    Echocardiogram with no reported evidence suggestive of vegetations or endocarditis  - completed zosyn and vancomycin (10/6-10/9/24)  - start cefazolin 2 g Q8H complete 4 weeks per ID pharm recommendations  - obtain surveillance culture (10/9/24)  - PICC line placement ordered  - arrange for home IV abx infusion  - on discharge follow up ID outpatient clinic

## 2024-10-09 NOTE — PROGRESS NOTES
[facility] called for set up of PICC line.  Approved by Aleena Ambrose RN. Notified Jesse with [facility] and will have someone out as soon as possible.

## 2024-10-09 NOTE — ASSESSMENT & PLAN NOTE
This patient does have evidence of infective focus  My overall impression is sepsis.  Source: Skin and Soft Tissue (location bilateral diabetic foot wound)  Antibiotics given-   Antibiotics (72h ago, onward)    Start     Stop Route Frequency Ordered    10/07/24 1245  mupirocin 2 % ointment         10/12/24 0859 Nasl 2 times daily 10/07/24 1138        Latest lactate reviewed-  Recent Labs   Lab 10/06/24  1721   LACTATE 1.1       Organ dysfunction indicated by Acute heart failure    Fluid challenge Ideal Body Weight- The patient's ideal body weight is Ideal body weight: 79.9 kg (176 lb 2.4 oz) which will be used to calculate fluid bolus of 30 ml/kg for treatment of septic shock.      Post- resuscitation assessment Yes Perfusion exam was performed within 6 hours of septic shock presentation after bolus shows Adequate tissue perfusion assessed by non-invasive monitoring       Will Not start Pressors- Levophed for MAP of 65  Source control achieved by: IVF resuscitation, IV antibiotics

## 2024-10-09 NOTE — PLAN OF CARE
Vitals stable, no fever. Picc line placed today for long term antibiotics. Plan to d/c home tomorrow with continued antibiotic treatment. Education for home antibiotic use completed to pt and wife. PT worked with patient after CAM boot in place to L foot- pt does well with mobility. Pt has a rolling knee walker as well to remain non weight bearing to R foot. Remains in A Fib but rate is controlled. Repeat blood cultures obtained today. No concerns at this time.

## 2024-10-10 VITALS
OXYGEN SATURATION: 98 % | HEIGHT: 73 IN | WEIGHT: 309.94 LBS | SYSTOLIC BLOOD PRESSURE: 114 MMHG | TEMPERATURE: 98 F | HEART RATE: 77 BPM | BODY MASS INDEX: 41.08 KG/M2 | DIASTOLIC BLOOD PRESSURE: 63 MMHG | RESPIRATION RATE: 21 BRPM

## 2024-10-10 LAB
ALBUMIN SERPL BCP-MCNC: 1.4 G/DL (ref 3.5–5.2)
ALP SERPL-CCNC: 85 U/L (ref 55–135)
ALT SERPL W/O P-5'-P-CCNC: 27 U/L (ref 10–44)
ANION GAP SERPL CALC-SCNC: 2 MMOL/L (ref 3–11)
AST SERPL-CCNC: 23 U/L (ref 10–40)
BASOPHILS # BLD AUTO: 0.03 K/UL (ref 0–0.2)
BASOPHILS NFR BLD: 0.4 % (ref 0–1.9)
BILIRUB SERPL-MCNC: 0.3 MG/DL (ref 0.1–1)
BUN SERPL-MCNC: 18 MG/DL (ref 6–20)
CALCIUM SERPL-MCNC: 8.3 MG/DL (ref 8.7–10.5)
CHLORIDE SERPL-SCNC: 102 MMOL/L (ref 95–110)
CO2 SERPL-SCNC: 31 MMOL/L (ref 23–29)
CREAT SERPL-MCNC: 0.8 MG/DL (ref 0.5–1.4)
DIFFERENTIAL METHOD BLD: ABNORMAL
EOSINOPHIL # BLD AUTO: 0.3 K/UL (ref 0–0.5)
EOSINOPHIL NFR BLD: 3.6 % (ref 0–8)
ERYTHROCYTE [DISTWIDTH] IN BLOOD BY AUTOMATED COUNT: 12.4 % (ref 11.5–14.5)
EST. GFR  (NO RACE VARIABLE): >60 ML/MIN/1.73 M^2
GLUCOSE SERPL-MCNC: 138 MG/DL (ref 70–110)
HCT VFR BLD AUTO: 30.5 % (ref 40–54)
HGB BLD-MCNC: 9.7 G/DL (ref 14–18)
IMM GRANULOCYTES # BLD AUTO: 0.2 K/UL (ref 0–0.04)
IMM GRANULOCYTES NFR BLD AUTO: 2.4 % (ref 0–0.5)
LYMPHOCYTES # BLD AUTO: 2.1 K/UL (ref 1–4.8)
LYMPHOCYTES NFR BLD: 24.9 % (ref 18–48)
MAGNESIUM SERPL-MCNC: 1.4 MG/DL (ref 1.6–2.6)
MCH RBC QN AUTO: 27 PG (ref 27–31)
MCHC RBC AUTO-ENTMCNC: 31.8 G/DL (ref 32–36)
MCV RBC AUTO: 85 FL (ref 82–98)
MONOCYTES # BLD AUTO: 0.7 K/UL (ref 0.3–1)
MONOCYTES NFR BLD: 7.7 % (ref 4–15)
NEUTROPHILS # BLD AUTO: 5.1 K/UL (ref 1.8–7.7)
NEUTROPHILS NFR BLD: 61 % (ref 38–73)
NRBC BLD-RTO: 0 /100 WBC
PLATELET # BLD AUTO: 405 K/UL (ref 150–450)
PMV BLD AUTO: 8.7 FL (ref 9.2–12.9)
POCT GLUCOSE: 234 MG/DL (ref 70–110)
POTASSIUM SERPL-SCNC: 4.4 MMOL/L (ref 3.5–5.1)
PROT SERPL-MCNC: 6.2 G/DL (ref 6–8.4)
RBC # BLD AUTO: 3.59 M/UL (ref 4.6–6.2)
SODIUM SERPL-SCNC: 135 MMOL/L (ref 136–145)
WBC # BLD AUTO: 8.41 K/UL (ref 3.9–12.7)

## 2024-10-10 PROCEDURE — 83735 ASSAY OF MAGNESIUM: CPT | Performed by: STUDENT IN AN ORGANIZED HEALTH CARE EDUCATION/TRAINING PROGRAM

## 2024-10-10 PROCEDURE — 25000003 PHARM REV CODE 250: Performed by: STUDENT IN AN ORGANIZED HEALTH CARE EDUCATION/TRAINING PROGRAM

## 2024-10-10 PROCEDURE — 85025 COMPLETE CBC W/AUTO DIFF WBC: CPT | Performed by: STUDENT IN AN ORGANIZED HEALTH CARE EDUCATION/TRAINING PROGRAM

## 2024-10-10 PROCEDURE — 99900031 HC PATIENT EDUCATION (STAT)

## 2024-10-10 PROCEDURE — 99900035 HC TECH TIME PER 15 MIN (STAT)

## 2024-10-10 PROCEDURE — 25000003 PHARM REV CODE 250: Performed by: INTERNAL MEDICINE

## 2024-10-10 PROCEDURE — 99239 HOSP IP/OBS DSCHRG MGMT >30: CPT | Mod: ,,, | Performed by: STUDENT IN AN ORGANIZED HEALTH CARE EDUCATION/TRAINING PROGRAM

## 2024-10-10 PROCEDURE — 94761 N-INVAS EAR/PLS OXIMETRY MLT: CPT

## 2024-10-10 PROCEDURE — 80053 COMPREHEN METABOLIC PANEL: CPT | Performed by: STUDENT IN AN ORGANIZED HEALTH CARE EDUCATION/TRAINING PROGRAM

## 2024-10-10 PROCEDURE — 36415 COLL VENOUS BLD VENIPUNCTURE: CPT | Performed by: STUDENT IN AN ORGANIZED HEALTH CARE EDUCATION/TRAINING PROGRAM

## 2024-10-10 PROCEDURE — 63600175 PHARM REV CODE 636 W HCPCS: Performed by: STUDENT IN AN ORGANIZED HEALTH CARE EDUCATION/TRAINING PROGRAM

## 2024-10-10 PROCEDURE — A4216 STERILE WATER/SALINE, 10 ML: HCPCS | Performed by: STUDENT IN AN ORGANIZED HEALTH CARE EDUCATION/TRAINING PROGRAM

## 2024-10-10 RX ORDER — ACETAMINOPHEN 325 MG/1
650 TABLET ORAL EVERY 8 HOURS PRN
Qty: 42 TABLET | Refills: 0 | Status: SHIPPED | OUTPATIENT
Start: 2024-10-10 | End: 2024-10-11

## 2024-10-10 RX ORDER — LANOLIN ALCOHOL/MO/W.PET/CERES
400 CREAM (GRAM) TOPICAL 2 TIMES DAILY
Qty: 60 TABLET | Refills: 0 | Status: SHIPPED | OUTPATIENT
Start: 2024-10-10 | End: 2024-10-11

## 2024-10-10 RX ORDER — MAGNESIUM SULFATE HEPTAHYDRATE 40 MG/ML
2 INJECTION, SOLUTION INTRAVENOUS ONCE
Status: COMPLETED | OUTPATIENT
Start: 2024-10-10 | End: 2024-10-10

## 2024-10-10 RX ORDER — FERROUS SULFATE, DRIED 160(50) MG
2 TABLET, EXTENDED RELEASE ORAL 2 TIMES DAILY
Qty: 120 TABLET | Refills: 11 | Status: SHIPPED | OUTPATIENT
Start: 2024-10-10 | End: 2024-10-11

## 2024-10-10 RX ORDER — LANOLIN ALCOHOL/MO/W.PET/CERES
400 CREAM (GRAM) TOPICAL 2 TIMES DAILY
Status: DISCONTINUED | OUTPATIENT
Start: 2024-10-10 | End: 2024-10-10 | Stop reason: HOSPADM

## 2024-10-10 RX ORDER — LOSARTAN POTASSIUM 25 MG/1
25 TABLET ORAL DAILY
Qty: 90 TABLET | Refills: 3 | Status: SHIPPED | OUTPATIENT
Start: 2024-10-10 | End: 2025-10-10

## 2024-10-10 RX ORDER — AMIODARONE HYDROCHLORIDE 200 MG/1
200 TABLET ORAL DAILY
Qty: 30 TABLET | Refills: 11 | Status: SHIPPED | OUTPATIENT
Start: 2024-10-10 | End: 2024-10-11

## 2024-10-10 RX ORDER — ASCORBIC ACID 500 MG
500 TABLET ORAL 2 TIMES DAILY
Qty: 60 TABLET | Refills: 11 | Status: SHIPPED | OUTPATIENT
Start: 2024-10-10 | End: 2024-10-11

## 2024-10-10 RX ORDER — METOPROLOL SUCCINATE 100 MG/1
100 TABLET, EXTENDED RELEASE ORAL DAILY
Qty: 90 TABLET | Refills: 3 | Status: SHIPPED | OUTPATIENT
Start: 2024-10-10 | End: 2025-10-10

## 2024-10-10 RX ADMIN — MAGNESIUM SULFATE HEPTAHYDRATE 2 G: 40 INJECTION, SOLUTION INTRAVENOUS at 07:10

## 2024-10-10 RX ADMIN — DULOXETINE HYDROCHLORIDE 30 MG: 30 CAPSULE, DELAYED RELEASE ORAL at 08:10

## 2024-10-10 RX ADMIN — APIXABAN 5 MG: 5 TABLET, FILM COATED ORAL at 08:10

## 2024-10-10 RX ADMIN — METOPROLOL SUCCINATE 100 MG: 100 TABLET, EXTENDED RELEASE ORAL at 08:10

## 2024-10-10 RX ADMIN — OXYCODONE HYDROCHLORIDE AND ACETAMINOPHEN 500 MG: 500 TABLET ORAL at 08:10

## 2024-10-10 RX ADMIN — ATORVASTATIN CALCIUM 40 MG: 40 TABLET, FILM COATED ORAL at 08:10

## 2024-10-10 RX ADMIN — MUPIROCIN: 20 OINTMENT TOPICAL at 08:10

## 2024-10-10 RX ADMIN — AMIODARONE HYDROCHLORIDE 200 MG: 100 TABLET ORAL at 08:10

## 2024-10-10 RX ADMIN — LOSARTAN POTASSIUM 25 MG: 25 TABLET, FILM COATED ORAL at 08:10

## 2024-10-10 RX ADMIN — FAMOTIDINE 20 MG: 10 INJECTION, SOLUTION INTRAVENOUS at 08:10

## 2024-10-10 RX ADMIN — Medication 10 ML: at 06:10

## 2024-10-10 RX ADMIN — INSULIN GLARGINE 22 UNITS: 100 INJECTION, SOLUTION SUBCUTANEOUS at 08:10

## 2024-10-10 RX ADMIN — CEFAZOLIN 2 G: 2 INJECTION, POWDER, FOR SOLUTION INTRAMUSCULAR; INTRAVENOUS at 07:10

## 2024-10-10 RX ADMIN — Medication 400 MG: at 08:10

## 2024-10-10 RX ADMIN — ASPIRIN 81 MG: 81 TABLET, COATED ORAL at 08:10

## 2024-10-10 RX ADMIN — Medication 2 TABLET: at 08:10

## 2024-10-10 NOTE — ASSESSMENT & PLAN NOTE
Patients blood pressure range in the last 24 hours was: BP  Min: 88/68  Max: 171/71.The patient's inpatient anti-hypertensive regimen is listed below:    Current Antihypertensives  metoprolol succinate (TOPROL-XL) 24 hr tablet 100 mg, Daily, Oral  losartan tablet 25 mg, Daily, Oral  losartan (COZAAR) tablet, Daily, Oral  metoprolol succinate (TOPROL-XL) 24 hr tablet, Daily, Oral  Denies symptoms.  Plan  - BP is controlled  - continue with current antihypertensive regimen  - follow up primary cardiology outpatient

## 2024-10-10 NOTE — ASSESSMENT & PLAN NOTE
Patient's FSGs are uncontrolled due to hyperglycemia on current medication regimen.  Poor control likely contributed by chronic poor wound healing and bacteremia.   Last A1c reviewed-   Lab Results   Component Value Date    HGBA1C 7.8 (H) 10/06/2024   Current correctional scale  Low  Maintain anti-hyperglycemic dose as follows-   Antihyperglycemics (From admission, onward)      Start     Stop Route Frequency Ordered    10/09/24 0900  insulin glargine U-100 (Lantus) pen 22 Units         -- SubQ Daily 10/09/24 0728    10/06/24 1640  insulin aspart U-100 pen 0-5 Units         -- SubQ Before meals & nightly PRN 10/06/24 1640          Hold Oral hypoglycemics while patient is in the hospital.

## 2024-10-10 NOTE — PROGRESS NOTES
LUZ MARIA Miles MD  [address] [place] [address],  [address]  [place] [address]  Phone:  [phone]  Fax:  [phone]  Email: [email or web address]  _______________________________________________________________      Today's Date:  10/10/2024  Patient Name: Andrew Del Cid Jr.    MRN: [id number]    Code Status: Full Code     Attending Physician: Olena Thornton DO   Consulting Physician: LUZ MARIA Miles MD    Hospital Course:    10/9/2024:  The patient is feeling well this morning  stating that he feels so much better.  He denies symptoms of shortness of breath, chest pain, dizziness, but he has mild symptoms of heart palpitations.    He held it so denies fever, chills or rigors.     Clinical:    Patient remains in atrial fibrillation, but with controlled ventricular rate.  He tolerated amiodarone infusion well, and is now on amiodarone 200 mg p.o. daily.    Blood cultures were positive for MRSA.  The patient has been on antibiotic therapy with the normalization of his white count.    An echocardiogram was performed revealing an EF of 60% without regional wall motion abnormalities.  He has biatrial dilatation.  Also mild concentric LVH.  The echocardiogram also showed increased echogenicity within the left atrium measuring 1.3 x 2.2 cm consistent with thrombus.  Both the mitral valve and aortic valve are sclerotic with mild calcification -- ruling out endocarditis is therefore difficult.  However, when comparing his echocardiogram to his previous echocardiogram from May 2024, there is no significant interval change within the mitral valve leaflets or the aortic valve cusps.  Based on this comparison of these studies, endocarditis is not supported.    10/10/2024:  The patient this morning is doing well.  He has no specific complaints.  He states that he is breathing better.  He denies symptoms of chest pain, or shortness of breath.  He states that he has a mild nonproductive cough.  Energy  level has improved.    He denies bruising, bleeding, or strokelike symptoms.    Hemodynamic:    Blood pressure has been well-controlled over the last 24 hours.  It is now trending lower.    Losartan has been reduced from 50 to 25 mg daily.  He remains on metoprolol for blood pressure and rate control.    Telemetry:    Persistent atrial fibrillation in the 80 bpm range.  There have been no blocks or pauses.    ROS  General:   The patient is feeling better.  He has no complaints at this time.  Neurologic:   No strokelike symptoms.  He is alert and oriented and appears to be at baseline.  Lungs:   Denies chest pain, shortness of breath.  Heart: Mild palpitations.  No dizziness, or chest pain.  Extremities:   No lower extremity pain at this time.    Vital Signs (Most Recent):  Temp: 97.7 °F (36.5 °C) (10/10/24 0702)  Pulse: 77 (10/10/24 0741)  Resp: (!) 21 (10/10/24 0600)  BP: 114/63 (10/10/24 0600)  SpO2: 98 % (10/10/24 0741) Vital Signs (24h Range):  Temp:  [97.1 °F (36.2 °C)-97.9 °F (36.6 °C)] 97.7 °F (36.5 °C)  Pulse:  [70-85] 77  Resp:  [7-26] 21  SpO2:  [93 %-100 %] 98 %  BP: ()/(53-77) 114/63     Weight: (!) 140.6 kg (309 lb 15.5 oz)  Body mass index is 40.9 kg/m².    SpO2: 98 %         Intake/Output Summary (Last 24 hours) at 10/10/2024 1007  Last data filed at 10/10/2024 0600  Gross per 24 hour   Intake 1792.53 ml   Output 3550 ml   Net -1757.47 ml     Labs:  CMP   Recent Labs   Lab 10/08/24  1030 10/09/24  0355 10/10/24  0336   * 136 135*   K 4.3 4.5 4.4    102 102   CO2 26 31* 31*   * 152* 138*   BUN 29* 21* 18   CREATININE 1.0 1.1 0.8   CALCIUM 8.4* 8.6* 8.3*   PROT  --  6.5 6.2   ALBUMIN  --  1.5* 1.4*   BILITOT  --  0.4 0.3   ALKPHOS  --  94 85   AST  --  21 23   ALT  --  25 27   ANIONGAP 6 3 2*    and CBC   Recent Labs   Lab 10/08/24  1030 10/09/24  0355 10/10/24  0336   WBC 6.62 7.33 8.41   HGB 10.2* 10.4* 9.7*   HCT 31.4* 33.2* 30.5*    400 405       Physical  Examination:  General:   Alert and oriented x 3; he is in no distress.  He is communicating well and states that he feels good.  Heent:   No JVD.  Lungs:   Diminished breath sounds but without wheezing.  Heart:   Irregular regular rhythm; rate within normal limits; grade 1 holosystolic murmur  Extremities:  warm; mild bilateral edema; both feet are wrapped.    Assessment and Plan:   1. Atrial fibrillation:   Patient with a history of atrial fibrillation.   His last episode occurred while he was septic.  Again, he is bacteremic.  Echocardiography reveals biatrial dilatation.  Echocardiography also shows evidence to support left atrial thrombus for which he should remain on Eliquis therapy.  Repeat echocardiogram in 3 weeks as an outpatient  to ensure resolution of what appears to be left atrial thrombus.  Continue Eliquis therapy-likely long-term as this is his second episode of atrial fibrillation in the context of morbid obesity,  MARVEL, and biatrial dilatation.  Rate controlled with metoprolol at its current dose.    2.  Sepsis:   Echocardiogram does not support endocarditis.  However if his clinical status should change, then we will need to entertain this.  I will not recommend transesophageal echocardiography at this time.    Treat bacteremia as per guidelines.    If the patient should become bacteremic after antibiotic therapy, then will need to reassess for endocarditis.    3.  Hypertension:   Blood pressure is well-controlled on current therapy.   Continue metoprolol  mg daily   Continue  losartan 25 mg daily     4.  Diabetes mellitus: As per PCP     5.  Status post CVA:   Continue long-term Eliquis therapy.    6.  Hyperlipidemia: Continue with statin therapy.    According to the patient, his wife would like a second opinion surrounding management of atrial fibrillation.  I have provided the patient with several video clips of his echocardiogram showing my concern for thrombus formation.  I have  emphasized the importance of anticoagulation therapy and rate control.  He is hemodynamically stable.

## 2024-10-10 NOTE — ASSESSMENT & PLAN NOTE
Patient's FSGs are uncontrolled due to hyperglycemia on current medication regimen.  Last A1c reviewed-   Lab Results   Component Value Date    HGBA1C 7.8 (H) 10/06/2024     Most recent fingerstick glucose reviewed-   Recent Labs   Lab 10/09/24  1118 10/09/24  1636 10/09/24  1638 10/09/24  2045   POCTGLUCOSE 256* >500* 232* 234*     Current correctional scale  Low  Maintain anti-hyperglycemic dose as follows-   Antihyperglycemics (From admission, onward)      Start     Stop Route Frequency Ordered    10/09/24 0900  insulin glargine U-100 (Lantus) pen 22 Units         -- SubQ Daily 10/09/24 0728    10/06/24 1640  insulin aspart U-100 pen 0-5 Units         -- SubQ Before meals & nightly PRN 10/06/24 1640          Affecting both feet, right midfoot and left great toe  Bacteremia with MSSA, PICC line in place, patient will complete long term IV antibiotics, cefazolin 2g Q8H for total 28 days, continue wound care, requires off loading.   Fitted for CAM foot along with his home knee scooter to keep non weight bearing condition.   Follow up outpatient podiatry, endocrinology, infectious disease specialists.

## 2024-10-10 NOTE — PLAN OF CARE
10/10/24 0809   Post-Acute Status   Post-Acute Authorization IV Infusion   IV Infusion Status Set-up Complete/Auth obtained       Spoke to Beth with [facility]. Home IV antibiotics approved and covered 100%. Beth notified patient and spouse. Dr. Thornton aware that set up is complete. Antibiotics will be delivered to patient's home upon discharge.

## 2024-10-10 NOTE — PROGRESS NOTES
[facility] - Intensive Care  Podiatry  Progress Note    Patient Name: Andrew Del Cid Jr.  MRN: [id number]  Admission Date: 10/6/2024  Hospital Length of Stay: 4 days  Attending Physician: Olena Thornton DO  Primary Care Provider: Olena Thornton DO     Subjective:     History of Present Illness: Patient is a 59-year-old male with quite extensive medical history, type 2 diabetes, peripheral vascular disease, hypertension, history of stroke, A-fib, sleep apnea, Charcot neuroarthropathy well-known to me from both outpatient and inpatient setting.  Patient admitted to [facility] after weakness, fever, chills, found to be in A-fib, hypotensive.  Podiatry consulted due to chronic wound formation to both right and left feet, right plantar midfoot wound present for at least 1-2 years duration.  Patient has been receiving wound care at [facility], not a candidate for hyperbaric therapy due to lack of osteomyelitis.  He had recently completed course of oral antibiotics, was pending possible outpatient foot surgery and attempt to heal wounds.  Patient seen at bedside today, resting comfortably.  He denies any injury to the feet.  He does use offloading shoe or scooter at times, does weight-bear as tolerated, has found it very difficult to remain nonweightbearing as he has been instructed.      Interval History:  10/09/24 Patient seen at bedside this afternoon, resting comfortably.  Cam boot has been dispensed to left foot in attempt to offload great toe ulcer.  Scooter is at bedside as well, he is nonweightbearing on the right lower extremity.  Bandage are clean, dry, intact.  He participated in therapy.  PICC line has been ordered for long-term IV antibiotics for bacteremia.    10/10/24 Patient seen at bedside this morning, resting comfortably, pending discharge today, PICC line in place, tolerating IV antibiotics well.  Patient feeling well, denies any acute overnight events.  He is asking about follow up in  clinic.        Scheduled Meds:   amiodarone  200 mg Oral Daily    apixaban  5 mg Oral BID    ascorbic acid (vitamin C)  500 mg Oral BID    aspirin  81 mg Oral Daily    atorvastatin  40 mg Oral Daily    calcium-vitamin D3  2 tablet Oral BID    ceFAZolin (Ancef) IV (PEDS and ADULTS)  2 g Intravenous Q8H    DULoxetine  30 mg Oral Daily    famotidine (PF)  20 mg Intravenous Q12H    insulin glargine U-100  22 Units Subcutaneous Daily    losartan  25 mg Oral Daily    magnesium oxide  400 mg Oral BID    magnesium sulfate IVPB  2 g Intravenous Once    metoprolol succinate  100 mg Oral Daily    mupirocin   Nasal BID    sodium chloride 0.9%  10 mL Intravenous Q6H     Continuous Infusions:  PRN Meds:  Current Facility-Administered Medications:     acetaminophen, 650 mg, Oral, Q8H PRN    dextrose 10%, 12.5 g, Intravenous, PRN    dextrose 10%, 25 g, Intravenous, PRN    glucagon (human recombinant), 1 mg, Intramuscular, PRN    glucose, 16 g, Oral, PRN    glucose, 24 g, Oral, PRN    HYDROcodone-acetaminophen, 1 tablet, Oral, Q4H PRN    insulin aspart U-100, 0-5 Units, Subcutaneous, QID (AC + HS) PRN    melatonin, 6 mg, Oral, Nightly PRN    ondansetron, 4 mg, Intravenous, Q8H PRN    sodium chloride 0.9%, 10 mL, Intravenous, PRN    Flushing PICC/Midline Protocol, , , Until Discontinued **AND** sodium chloride 0.9%, 10 mL, Intravenous, Q6H **AND** sodium chloride 0.9%, 10 mL, Intravenous, PRN    Review of Systems  Constitutional:  Positive for fatigue.   HENT:  Negative for hearing loss.    Eyes:  Negative for visual disturbance.   Respiratory:  Negative for cough.    Cardiovascular:  Positive for palpitations.   Gastrointestinal:  Negative for nausea and vomiting.   Skin:  Positive for wound.   Hematological:  Bruises/bleeds easily.   Psychiatric/Behavioral:  Negative for agitation and confusion.      Objective:     Vital Signs (Most Recent):  Temp: 97.7 °F (36.5 °C) (10/10/24 0702)  Pulse: 77 (10/10/24 0741)  Resp: (!) 21 (10/10/24  0600)  BP: 114/63 (10/10/24 0600)  SpO2: 98 % (10/10/24 0741) Vital Signs (24h Range):  Temp:  [97.1 °F (36.2 °C)-97.9 °F (36.6 °C)] 97.7 °F (36.5 °C)  Pulse:  [70-85] 77  Resp:  [7-26] 21  SpO2:  [93 %-100 %] 98 %  BP: ()/(53-77) 114/63     Weight: (!) 140.6 kg (309 lb 15.5 oz)  Body mass index is 40.9 kg/m².    Foot Exam    General  General Appearance: (Obese male, poor hygiene)  Orientation: alert and oriented to person, place, and time   Affect: appropriate         Right Foot/Ankle      Inspection and Palpation  Ecchymosis: none  Tenderness: (No foot pain secondary to neuropathy)  Swelling: (chronic to foot and ankle)  Hammertoes: second toe, third toe, fourth toe and fifth toe  Skin Exam: ulcer ([id number] hypergranular wound to plantar midfoot, site of rockerbottom deformity from Charcot; wound probing deep, serous drainage, genetian violet surrounding wound, no malodor, no surrounding erythema, no crepitus in soft tissues);      Neurovascular  Dorsalis pedis: 2+  Posterior tibial: 2+  Saphenous nerve sensation: absent  Tibial nerve sensation: absent  Superficial peroneal nerve sensation: absent  Deep peroneal nerve sensation: absent  Sural nerve sensation: absent     Muscle Strength  Ankle dorsiflexion: 5  Ankle plantar flexion: 5  Ankle inversion: 5  Ankle eversion: 5  Great toe extension: 5  Great toe flexion: 5     Range of Motion     Passive  Ankle dorsiflexion: 20        Comments  Charcot neuroarthropathy to midfoot - chronic, no active bone breakdown; rockerbottom deformity of foot; ankle joint dorsiflexion decreased     Left Foot/Ankle       Inspection and Palpation  Ecchymosis: none  Tenderness: (No foot pain secondary to neuropathy)  Swelling: dorsum   Hammertoes: first toe, second toe, fourth toe and fifth toe  Skin Exam: ulcer (0.6x0.3cm fibrogranular wound to plantar medial aspect hallux, site of flexion deformity; genetian violet surrouding wound, no drainage, no malodor);       Neurovascular  Dorsalis pedis: 2+  Posterior tibial: 2+  Saphenous nerve sensation: absent  Tibial nerve sensation: absent  Superficial peroneal nerve sensation: absent  Deep peroneal nerve sensation: absent  Sural nerve sensation: absent     Muscle Strength  Ankle dorsiflexion: 5  Ankle plantar flexion: 5  Ankle inversion: 5  Ankle eversion: 5  Great toe extension: 5  Great toe flexion: 5     Range of Motion     Passive  Ankle dorsiflexion: 20    Laboratory:  Blood Cultures:   Recent Labs   Lab 10/09/24  1132 10/09/24  1147   LABBLOO No Growth to date No Growth to date     CBC:   Recent Labs   Lab 10/10/24  0336   WBC 8.41   RBC 3.59*   HGB 9.7*   HCT 30.5*      MCV 85   MCH 27.0   MCHC 31.8*     CMP:   Recent Labs   Lab 10/10/24  0336   *   CALCIUM 8.3*   ALBUMIN 1.4*   PROT 6.2   *   K 4.4   CO2 31*      BUN 18   CREATININE 0.8   ALKPHOS 85   ALT 27   AST 23   BILITOT 0.3       Diagnostic Results:  I have reviewed all pertinent imaging results/findings within the past 24 hours.      Assessment/Plan:     Active Diagnoses:    Diagnosis Date Noted POA    PRINCIPAL PROBLEM:  Bacteremia due to methicillin susceptible Staphylococcus aureus (MSSA) [R78.81, B95.61] 10/23/2022 Yes    Left atrial thrombus [I51.3] 10/09/2024 Yes    Severe sepsis [A41.9, R65.20] 10/07/2024 Yes    Weakness [R53.1] 10/07/2024 Yes    Elevated lactic acid level [R79.89] 10/07/2024 Yes    Diabetic foot infection [E11.628, L08.9] 10/07/2024 Yes    Ulcer of toe of left foot, with fat layer exposed [L97.522] 08/07/2024 Yes    PAD (peripheral artery disease) [I73.9] 03/04/2024 Yes    Diabetic ulcer of right midfoot associated with type 2 diabetes mellitus, with fat layer exposed [E11.621, L97.412] 02/01/2024 Yes    Atrial fibrillation with rapid ventricular response [I48.91] 10/26/2022 Yes    Type 2 diabetes mellitus, with long-term current use of insulin [E11.9, Z79.4] 10/05/2022 Not Applicable    Hyperlipidemia [E78.5]  12/25/2018 Yes     Chronic    Essential hypertension [I10] 06/30/2017 Yes     Chronic    Class 3 severe obesity with serious comorbidity and body mass index (BMI) of 40.0 to 44.9 in adult [E66.813, Z68.41, E66.01] 06/04/2015 Not Applicable      Problems Resolved During this Admission:    Diagnosis Date Noted Date Resolved POA    Leukocytosis [D72.829] 10/07/2024 10/08/2024 Yes    Diabetic foot ulcer [E11.621, L97.509] 10/23/2022 10/08/2024 Yes       Diabetic neuropathic ulcer, right midfoot  Patient with chronic wound to right midfoot, greater than 1 year at this point.  Foot x-rays without any signs of bone infection.  [place] [facility] [place] MRIs taken March 2024 as well as August 2024 negative for osteomyelitis.  Foot without clinical appearance of infection at this time.  Due to new cardiac events, recurrence of A-fib and bacteremia, any elective foot surgery on hold.  Will continue with antibiotics, local wound care, silver cell packing, Betadine or gentian violet to surrounding periwound tissues, offloading with walking boot or surgical shoe and assistance of knee scooter. - patient has supplies, wife to assist with wound care.  I will follow him up next week in clinic.  Discussion of possible future amputation had with patient today, especially if this is source of bacteremia and initiating A-fib.  Patient refuses any discussion of below-knee amputation.     Diabetic ulceration, left great toe  Patient with chronic wound to plantar medial aspect of great toe, likely due to fusion of hallux IPJ.  Will continue with antibiotics, local wound care and offloading with CAM boot.    Baldemar Esquivel, MARIA GUADALUPE  Podiatry  [facility] - Intensive Care

## 2024-10-10 NOTE — PLAN OF CARE
Patient rested well, no c/o voiced. Removed CAM boot for sleep. VSS, afebrile. ABX administered. UOP adequate, no BM this shift.

## 2024-10-10 NOTE — PLAN OF CARE
Follow up appointments made with Dr. Thornton and Dr. Esquivel. Called to make appointment with endocrinologist NP (Floridalma Tierney, ADITYA). Her office will call the patient back with date and time. Attempted to make appointment with Dr. Miles. Call went to office . Patient to make own appointment. Discharge orders faxed to [facility] and [facility] with [facility] aware of discharge. Antibiotics to be delivered to patient's home today. Primary nurse notified.

## 2024-10-10 NOTE — ASSESSMENT & PLAN NOTE
"This patient does have evidence of infective focus  My overall impression is sepsis.  Source: Skin and Soft Tissue (location bilateral diabetic foot wound)  Antibiotics given-   Antibiotics (72h ago, onward)    Start     Stop Route Frequency Ordered    10/09/24 1445  ceFAZolin 2 g in D5W 50 mL IVPB (MB+)         11/06/24 1444 IV Every 8 hours (non-standard times) 10/09/24 1340    10/07/24 1245  mupirocin 2 % ointment         10/12/24 0859 Nasl 2 times daily 10/07/24 1138        Latest lactate reviewed-  No results for input(s): "LACTATE", "POCLAC" in the last 72 hours.    Organ dysfunction indicated by Acute heart failure    Fluid challenge Ideal Body Weight- The patient's ideal body weight is Ideal body weight: 79.9 kg (176 lb 2.4 oz) which will be used to calculate fluid bolus of 30 ml/kg for treatment of septic shock.      Post- resuscitation assessment Yes Perfusion exam was performed within 6 hours of septic shock presentation after bolus shows Adequate tissue perfusion assessed by non-invasive monitoring       Will Not start Pressors- Levophed for MAP of 65  Source control achieved by: IVF resuscitation, IV antibiotics  "

## 2024-10-10 NOTE — DISCHARGE SUMMARY
"[facility] Medicine  Discharge Summary      Patient Name: Andrew Del Cid Jr.  MRN: [id number]  ANABELLA: [id number]  Patient Class: IP- Inpatient  Admission Date: 10/6/2024  Hospital Length of Stay: 4 days  Discharge Date and Time:  10/10/2024 8:46 AM  Attending Physician: Olena Thornton DO   Discharging Provider: Olena Thornton DO  Primary Care Provider: Olena Thornton DO    Primary Care Team: Networked reference to record PCT     HPI:   Chief Complaint   Patient presents with    Weakness       Pt stated that for the past couple weeks he has been experiencing fatigue / decreased appetite / cough / subjective fever / "cold sweats".        59-year-old male with a history of acute renal failure, atrial fibrillation in the past, off of his blood thinners, diabetes mellitus, hyperlipidemia, hypertension, peripheral neuropathy presents the emergency department stating that for the past couple of weeks he has been experiencing fatigue, subjective fevers, cold sweats, cough, not eating or drinking well.  He has been seeing  as well as  for his wound care to bilateral feet.  Here in the emergency department he is alert and oriented x4, GCS is 15.    ED course: vitals signs BP 88/68 mmHg, , RR 29, SpO2 945 on room air, temp 98.9F  Chemistry labs Na 129, K4.6, Cl 93, CO2 24, Glu 192, Alb 1.9, BUN 27, Cr 1.4  WBC 16.47, Hg 11.3, HCT 35, , Mag 1.5, troponin I HS 4.7, lactate 2.3  NTproBNP 2184  EKG tracings, atrial fibrillation, rate 128 bpm, no ST-T wave elevation  Patient received sepsis bolus IVF 30/kg and started on vancomycin and zosyn.     Patient admitted to ICU for continued management of sepsis with diabetic foot infection bilaterally, atrial fibrillation with RVR, CHF exacerbation.  Follow up consult with primary cardiology and primary podiatry.     Telemetry overnight tracings of atrial fibrillation rate >100s bpm. Repeat lactate within " normal limits with fluid resuscitation. Patient awake and alert, endorses improved tolerance to PO intake of fluids and solids. Continue with broad coverage antibiotics, follow up blood cx x2. Further recommendations appreciated from cardiology and podiatry.        * No surgery found *      Hospital Course:   10/8/24 No new complaints. Patient endorses appetite slowly improving. Per cardiology, patient placed on amiodarone gtt overnight to transition to PO. Telemetry tracing, patient remains in atrial fibrillation, intermittent PVCs rate 80-90 bpm. Blood cx x2 positive for MRSA. Estimated Creatinine Clearance: 120.1 mL/min (based on SCr of 1 mg/dL). ID pharm to be consulted. Leukocytosis resolved and stable since IVF and 24 hours of IV abx. Resume glucose control with basal insulin and SSI. Follow up echocardiogram and cardiology recommendations. Back on AC with Eliquis BID. Podiatry plans to post-pone foot surgery until bacteremia clears. Continue with IV antibiotics D3 vancomycin and zosyn. Follow up PT and OT eval and therapy.   10/9/24 No elevated temperature since time of admission. Telemetry tracings remain in atrial fibrillation with intermittent PVCs rate controlled with PO amiodarone per cardiology. Echocardiogram with no evidence to support endocarditis.  Blood culture results with sensitivity shows penicillin resistant MSSA. Will place PICC line, obtain surveillance blood cultures. Discontinue vancomycin and zosyn and start Cefazolin Q8 for 28 days. CAM boot and instruction on off loading instructions provided by PT and OT. Patient has wound care established with [place] [facility]. Home IV infusion to be set up. Patient medically stable for discharge with continued wound care, IV antibiotics, ID follow up and management of all other chronic conditions.   10/10/24 Vital signs normotensive, HR 70-80s. PICC line in place, IV antibiotics outpatient infusion set up. Patient has been instructed on  importance of medication compliance, keeping non-weight bearing status to allow proper healing of chronic wounds, close follow up with his specialists outpatient. Appointments arranged.      Goals of Care Treatment Preferences:  Code Status: Full Code    SDOH Screening:  The patient was screened for food insecurity, housing instability, transportation needs, utility difficulties, and interpersonal safety. The social determinant(s) of health identified as a concern this admission are:    Social Drivers of Health with Concerns     Food Insecurity: Food Insecurity Present (10/7/2024)     Consults:   Consults (From admission, onward)          Status Ordering Provider     Inpatient consult to Social Work/Case Management  Once        Provider:  (Not yet assigned)    Completed CYNTHIA REYES     [facility]  Once        Provider:  (Not yet assigned)    Acknowledged CYNTHIA REYES     Inpatient consult to Registered Dietitian/Nutritionist  Once        Provider:  (Not yet assigned)    Completed CYNTHIA REYES     Inpatient consult to Cardiology  Once        Provider:  Marcelino Miles MD    Completed CYNTHIA REYES     Inpatient consult to Podiatry  Once        Provider:  Baldemar Esquivel DPM    Completed CYNTHIA REYES          Cardiac/Vascular  Left atrial thrombus  Per cardiology repeat echocardiogram in 3 weeks as an outpatient to ensure resolution of what appears to be left atrial thrombus.   - continue eliquis BID and remain on anticoagulation therapy        PAD (peripheral artery disease)  Continue with statin and aspirin.       Atrial fibrillation with rapid ventricular response  Patient has paroxysmal (<7 days) atrial fibrillation. Patient is currently in atrial fibrillation. [id number] Score: 2. The patients heart rate in the last 24 hours is as follows:  Pulse  Min: 70  Max: 96     Antiarrhythmics  metoprolol succinate (TOPROL-XL) 24 hr tablet 100 mg,  Daily, Oral  amiodarone tablet 200 mg, Daily, Oral  metoprolol succinate (TOPROL-XL) 24 hr tablet, Daily, Oral  amiodarone (PACERONE) tablet, Daily, Oral  Anticoagulants  apixaban tablet 5 mg, 2 times daily, Oral  apixaban tablet, 2 times daily, Oral    Per cardiology, trasition from amiodarone gtt to PO amiodarone.   Plan  - Replete lytes with a goal of K>4, Mg >2  - Patient's afib is currently controlled  - eliquis BID  - f/u cardiology recommendations    Hyperlipidemia  Continue with home statin.       Essential hypertension  Patients blood pressure range in the last 24 hours was: BP  Min: 88/68  Max: 171/71.The patient's inpatient anti-hypertensive regimen is listed below:    Current Antihypertensives  metoprolol succinate (TOPROL-XL) 24 hr tablet 100 mg, Daily, Oral  losartan tablet 25 mg, Daily, Oral  losartan (COZAAR) tablet, Daily, Oral  metoprolol succinate (TOPROL-XL) 24 hr tablet, Daily, Oral  Denies symptoms.  Plan  - BP is controlled  - continue with current antihypertensive regimen  - follow up primary cardiology outpatient    ID  * Bacteremia due to methicillin susceptible Staphylococcus aureus (MSSA)  Lab Results   Component Value Date    LABBLOO No Growth to date 10/09/2024    LABBLOO No Growth to date 10/09/2024      Past history of MSSA bacteremia.   Chronic diabetic foot wound bilaterally.   MDRO acinetobacter positive (9/4/24) via wound culture per podiatry outpatient.   Leukocytosis resolved. Afebrile overnight.  10/10/24 Positive blood cx x2 (10/6/24) MSSA, PICC line in place, long term home IV antibiotic infusion arranged   Echocardiogram with no reported evidence suggestive of vegetations or endocarditis  - completed zosyn and vancomycin (10/6-10/9/24)  - continue cefazolin 2 g Q8H complete 4 weeks per ID pharm recommendations  - obtain surveillance culture (10/9/24); NGTD x1 days, follow up results  - follow up ID outpatient clinic        Severe sepsis  This patient does have evidence of  "infective focus  My overall impression is sepsis.  Source: Skin and Soft Tissue (location bilateral diabetic foot wound)  Antibiotics given-   Antibiotics (72h ago, onward)      Start     Stop Route Frequency Ordered    10/09/24 1445  ceFAZolin 2 g in D5W 50 mL IVPB (MB+)         11/06/24 1444 IV Every 8 hours (non-standard times) 10/09/24 1340    10/07/24 1245  mupirocin 2 % ointment         10/12/24 0859 Nasl 2 times daily 10/07/24 1138          Latest lactate reviewed-  No results for input(s): "LACTATE", "POCLAC" in the last 72 hours.    Organ dysfunction indicated by Acute heart failure    Fluid challenge Ideal Body Weight- The patient's ideal body weight is Ideal body weight: 79.9 kg (176 lb 2.4 oz) which will be used to calculate fluid bolus of 30 ml/kg for treatment of septic shock.      Post- resuscitation assessment Yes Perfusion exam was performed within 6 hours of septic shock presentation after bolus shows Adequate tissue perfusion assessed by non-invasive monitoring       Will Not start Pressors- Levophed for MAP of 65  Source control achieved by: IVF resuscitation, IV antibiotics    Endocrine  Diabetic foot infection  Patient's FSGs are uncontrolled due to hyperglycemia on current medication regimen.  Last A1c reviewed-   Lab Results   Component Value Date    HGBA1C 7.8 (H) 10/06/2024     Most recent fingerstick glucose reviewed-   Recent Labs   Lab 10/09/24  1118 10/09/24  1636 10/09/24  1638 10/09/24  2045   POCTGLUCOSE 256* >500* 232* 234*     Current correctional scale  Low  Maintain anti-hyperglycemic dose as follows-   Antihyperglycemics (From admission, onward)      Start     Stop Route Frequency Ordered    10/09/24 0900  insulin glargine U-100 (Lantus) pen 22 Units         -- SubQ Daily 10/09/24 0728    10/06/24 1640  insulin aspart U-100 pen 0-5 Units         -- SubQ Before meals & nightly PRN 10/06/24 1640          Affecting both feet, right midfoot and left great toe  Bacteremia with MSSA, " PICC line in place, patient will complete long term IV antibiotics, cefazolin 2g Q8H for total 28 days, continue wound care, requires off loading.   Fitted for CAM foot along with his home knee scooter to keep non weight bearing condition.   Follow up outpatient podiatry, endocrinology, infectious disease specialists.       Diabetic ulcer of right midfoot associated with type 2 diabetes mellitus, with fat layer exposed  Patient's FSGs are uncontrolled due to hyperglycemia on current medication regimen.  Poor control likely contributed by chronic poor wound healing and bacteremia.   Last A1c reviewed-   Lab Results   Component Value Date    HGBA1C 7.8 (H) 10/06/2024   Current correctional scale  Low  Maintain anti-hyperglycemic dose as follows-   Antihyperglycemics (From admission, onward)      Start     Stop Route Frequency Ordered    10/09/24 0900  insulin glargine U-100 (Lantus) pen 22 Units         -- SubQ Daily 10/09/24 0728    10/06/24 1640  insulin aspart U-100 pen 0-5 Units         -- SubQ Before meals & nightly PRN 10/06/24 1640          Hold Oral hypoglycemics while patient is in the hospital.    Class 3 severe obesity with serious comorbidity and body mass index (BMI) of 40.0 to 44.9 in adult  Body mass index is 40.9 kg/m². Morbid obesity complicates all aspects of disease management from diagnostic modalities to treatment. Weight loss encouraged and health benefits explained to patient.   Lengthy discussion on importance of modifying current diet to help support wound healing and cardiovascular health.     Wt Readings from Last 3 Encounters:   10/08/24 (!) 140.6 kg (309 lb 15.5 oz)   05/23/24 (!) 141.1 kg (311 lb)   03/04/24 (!) 136.5 kg (300 lb 14.9 oz)        Other  Weakness  Secondary to infectious process, poor control of diabetes, poor nutritional status and chronic anemia.   Hg/HCT 10.1/32.   Continue with IV antibiotics, encourage PO hydration.   Continue management of chronic conditions.           Final Active Diagnoses:    Diagnosis Date Noted POA    PRINCIPAL PROBLEM:  Bacteremia due to methicillin susceptible Staphylococcus aureus (MSSA) [R78.81, B95.61] 10/23/2022 Yes    Left atrial thrombus [I51.3] 10/09/2024 Yes    Severe sepsis [A41.9, R65.20] 10/07/2024 Yes    Weakness [R53.1] 10/07/2024 Yes    Elevated lactic acid level [R79.89] 10/07/2024 Yes    Diabetic foot infection [E11.628, L08.9] 10/07/2024 Yes    Ulcer of toe of left foot, with fat layer exposed [L97.522] 08/07/2024 Yes    PAD (peripheral artery disease) [I73.9] 03/04/2024 Yes    Diabetic ulcer of right midfoot associated with type 2 diabetes mellitus, with fat layer exposed [E11.621, L97.412] 02/01/2024 Yes    Atrial fibrillation with rapid ventricular response [I48.91] 10/26/2022 Yes    Type 2 diabetes mellitus, with long-term current use of insulin [E11.9, Z79.4] 10/05/2022 Not Applicable    Hyperlipidemia [E78.5] 12/25/2018 Yes     Chronic    Essential hypertension [I10] 06/30/2017 Yes     Chronic    Class 3 severe obesity with serious comorbidity and body mass index (BMI) of 40.0 to 44.9 in adult [E66.813, Z68.41, E66.01] 06/04/2015 Not Applicable      Problems Resolved During this Admission:    Diagnosis Date Noted Date Resolved POA    Leukocytosis [D72.829] 10/07/2024 10/08/2024 Yes    Diabetic foot ulcer [E11.621, L97.509] 10/23/2022 10/08/2024 Yes       Discharged Condition: stable    Disposition: Home or Self Care    Follow Up:   Contact information for follow-up providers       Olena Thornton DO Follow up on 10/14/2024.    Specialty: Family Medicine  Why: 2:00pm.  Contact information:  [address]  [facility] [address]  [phone]               Floridalma Tierney NP Follow up.    Specialty: Endocrinology  Contact information:  [address] [facility] [id number] [phone]               Baldemar Esquivel, MARIA GUADALUPE Follow up on 10/14/2024.    Specialty: Podiatry  Why: 10:30am.  Contact  information:  [address]  [facility] [address]  [phone]               Marcelino Miles MD Follow up in 3 week(s).    Specialty: Cardiology  Contact information:  [address]  [address]  [facility] [address]  [phone]                       Contact information for after-discharge care       Dialysis/Infusion       [facility] .    Service: Infusion and IV Therapy  Contact information:  [address]  [facility] [address]  [phone]                                 Patient Instructions:      Ambulatory referral/consult to Infectious Disease   Standing Status: Future   Referral Priority: Routine Referral Type: Consultation   Referral Reason: Specialty Services Required   Referred to Provider: CORNEL MONROY Requested Specialty: Infectious Diseases   Number of Visits Requested: 1     Ambulatory referral/consult to Hematology / Oncology   Standing Status: Future   Referral Priority: Routine Referral Type: Consultation   Referral Reason: Specialty Services Required   Requested Specialty: Hematology and Oncology   Number of Visits Requested: 1       Significant Diagnostic Studies: Labs: BMP:   Recent Labs   Lab 10/08/24  1030 10/09/24  0355 10/10/24  0336   * 152* 138*   * 136 135*   K 4.3 4.5 4.4    102 102   CO2 26 31* 31*   BUN 29* 21* 18   CREATININE 1.0 1.1 0.8   CALCIUM 8.4* 8.6* 8.3*   MG 2.0 1.6 1.4*   , CMP   Recent Labs   Lab 10/08/24  1030 10/09/24  0355 10/10/24  0336   * 136 135*   K 4.3 4.5 4.4    102 102   CO2 26 31* 31*   * 152* 138*   BUN 29* 21* 18   CREATININE 1.0 1.1 0.8   CALCIUM 8.4* 8.6* 8.3*   PROT  --  6.5 6.2   ALBUMIN  --  1.5* 1.4*   BILITOT  --  0.4 0.3   ALKPHOS  --  94 85   AST  --  21 23   ALT  --  25 27   ANIONGAP 6 3 2*   , CBC   Recent Labs   Lab 10/08/24  1030 10/09/24  0355 10/10/24  0336   WBC 6.62 7.33 8.41   HGB 10.2* 10.4* 9.7*   HCT 31.4* 33.2* 30.5*  "   400 405   , INR   Lab Results   Component Value Date    INR 1.1 11/17/2023    INR 1.0 10/27/2022    INR 1.1 10/26/2022   , Lipid Panel   Lab Results   Component Value Date    CHOL 97 (L) 11/17/2023    HDL 32 (L) 11/17/2023    LDLCALC 44.2 (L) 11/17/2023    TRIG 104 11/17/2023    CHOLHDL 33.0 11/17/2023   , Troponin No results for input(s): "TROPONINI" in the last 168 hours., A1C:   Recent Labs   Lab 05/23/24  1505 10/06/24  1355   HGBA1C 5.5 7.8*   , and All labs within the past 24 hours have been reviewed  Microbiology: Blood Culture   Lab Results   Component Value Date    LABBLOO No Growth to date 10/09/2024   Positive for MSSA (10/6/24)  Surveillance culture (10/9/24) NGTD x2, final report pending.     Pending Diagnostic Studies:       None           Medications:  Reconciled Home Medications:      Medication List        START taking these medications      acetaminophen 325 MG tablet  Commonly known as: TYLENOL  Take 2 tablets (650 mg total) by mouth every 8 (eight) hours as needed for Temperature greater than (or equal to 101 degree F).     amiodarone 200 MG Tab  Commonly known as: PACERONE  Take 1 tablet (200 mg total) by mouth once daily.     ascorbic acid (vitamin C) 500 MG tablet  Commonly known as: VITAMIN C  Take 1 tablet (500 mg total) by mouth 2 (two) times daily.     calcium-vitamin D3 500 mg-5 mcg (200 unit) per tablet  Commonly known as: OS-CASIMIRO 500 + D3  Take 2 tablets by mouth 2 (two) times daily.     D5W PgBk 50 mL with ceFAZolin 2 gram SolR 2 g  Inject 2 g into the vein every 8 (eight) hours.     magnesium oxide 400 mg (241.3 mg magnesium) tablet  Commonly known as: MAG-OX  Take 1 tablet (400 mg total) by mouth 2 (two) times daily.            CHANGE how you take these medications      * apixaban 5 mg Tab  Commonly known as: ELIQUIS  Take 1 tablet (5 mg total) by mouth 2 (two) times daily.  What changed: Another medication with the same name was added. Make sure you understand how and when " to take each.     * apixaban 5 mg Tab  Commonly known as: ELIQUIS  Take 1 tablet (5 mg total) by mouth 2 (two) times daily.  What changed: You were already taking a medication with the same name, and this prescription was added. Make sure you understand how and when to take each.     LANTUS SOLOSTAR U-100 INSULIN 100 unit/mL (3 mL) Inpn pen  Generic drug: insulin glargine U-100 (Lantus)  Inject 40 Units into the skin every evening.  What changed:   how much to take  when to take this     losartan 25 MG tablet  Commonly known as: COZAAR  Take 1 tablet (25 mg total) by mouth once daily.  What changed:   medication strength  how much to take     metoprolol succinate 100 MG 24 hr tablet  Commonly known as: TOPROL-XL  Take 1 tablet (100 mg total) by mouth once daily.  What changed:   medication strength  how much to take           * This list has 2 medication(s) that are the same as other medications prescribed for you. Read the directions carefully, and ask your doctor or other care provider to review them with you.                CONTINUE taking these medications      aspirin 81 MG EC tablet  Commonly known as: ECOTRIN  Take 81 mg by mouth once daily. OK TO CONTINUE PER DR. ALCARAZ     atorvastatin 40 MG tablet  Commonly known as: LIPITOR  Take 1 tablet by mouth once daily     blood-glucose meter kit  Use as instructed     DULoxetine 30 MG capsule  Commonly known as: CYMBALTA  Take 1 capsule by mouth once daily     empagliflozin 25 mg tablet  Commonly known as: JARDIANCE  Take 1 tablet (25 mg total) by mouth once daily.     lancets 33 gauge Misc  Commonly known as: ONETOUCH DELICA LANCETS  1 lancet by Misc.(Non-Drug; Combo Route) route 3 (three) times daily.     metFORMIN 500 MG ER 24hr tablet  Commonly known as: GLUCOPHAGE-XR  TAKE 2 TABLETS BY MOUTH TWICE DAILY WITH MEALS     multivitamin per tablet  Commonly known as: THERAGRAN  multivitamin tablet, [RxNorm: 0]     TRULICITY 4.5 mg/0.5 mL pen injector  Generic  drug: dulaglutide  Inject 4.5 mg into the skin every 7 days.              Indwelling Lines/Drains at time of discharge:   Lines/Drains/Airways       Peripherally Inserted Central Catheter Line  Duration             PICC Double Lumen 10/09/24 1305 right basilic <1 day                    Time spent on the discharge of patient: 35 minutes      Olena Thornton DO  Department of [facility] Medicine  [place] - Intensive Care

## 2024-10-10 NOTE — PROGRESS NOTES
Patient to be discharged home with PICC line for on going antibiotics at home. Spouse was trained on how to flush PICC lines and infusion company will change the dressings. Patient and Spouse verbalize understanding. Bilateral wound care was done by Dr Cruz.

## 2024-10-10 NOTE — SUBJECTIVE & OBJECTIVE
Interval History: Patient seen and examined.     Review of Systems   Constitutional:  Negative for activity change, appetite change, chills, fatigue and fever.   Eyes:  Negative for visual disturbance.   Respiratory:  Negative for cough, chest tightness, shortness of breath and wheezing.    Cardiovascular:  Negative for chest pain, palpitations and leg swelling.   Gastrointestinal:  Negative for abdominal pain, constipation, diarrhea, nausea and vomiting.   Musculoskeletal:  Positive for gait problem. Negative for arthralgias and myalgias.   Skin:  Positive for wound.   Neurological:  Negative for dizziness, syncope, speech difficulty, weakness, light-headedness and headaches.   Psychiatric/Behavioral:  Negative for agitation, behavioral problems, confusion and decreased concentration.      Objective:     Vital Signs (Most Recent):  Temp: 97.7 °F (36.5 °C) (10/10/24 0702)  Pulse: 77 (10/10/24 0741)  Resp: (!) 21 (10/10/24 0600)  BP: 114/63 (10/10/24 0600)  SpO2: 98 % (10/10/24 0741) Vital Signs (24h Range):  Temp:  [97.1 °F (36.2 °C)-97.9 °F (36.6 °C)] 97.7 °F (36.5 °C)  Pulse:  [70-87] 77  Resp:  [7-26] 21  SpO2:  [93 %-100 %] 98 %  BP: ()/(53-77) 114/63     Weight: (!) 140.6 kg (309 lb 15.5 oz)  Body mass index is 40.9 kg/m².    Intake/Output Summary (Last 24 hours) at 10/10/2024 0844  Last data filed at 10/10/2024 0600  Gross per 24 hour   Intake 1913.83 ml   Output 3550 ml   Net -1636.17 ml         Physical Exam  Vitals and nursing note reviewed.   Constitutional:       General: He is not in acute distress.     Appearance: He is obese. He is not ill-appearing, toxic-appearing or diaphoretic.   HENT:      Head: Normocephalic and atraumatic.      Right Ear: External ear normal.      Left Ear: External ear normal.      Nose: Nose normal. No congestion or rhinorrhea.      Mouth/Throat:      Pharynx: Oropharynx is clear.   Eyes:      Extraocular Movements: Extraocular movements intact.      Conjunctiva/sclera:  Conjunctivae normal.   Cardiovascular:      Rate and Rhythm: Normal rate and regular rhythm.      Pulses: Normal pulses.      Heart sounds: Normal heart sounds. No murmur heard.  Pulmonary:      Effort: Pulmonary effort is normal. No respiratory distress.      Breath sounds: Normal breath sounds. No wheezing or rales.   Abdominal:      General: Abdomen is flat. There is no distension.      Palpations: Abdomen is soft. There is no mass.      Tenderness: There is no abdominal tenderness. There is no right CVA tenderness, left CVA tenderness or guarding.   Musculoskeletal:         General: Normal range of motion.      Cervical back: Normal range of motion and neck supple. No rigidity.      Comments: Both feet wrapped in ace bandage, dressing clean and dry   Skin:     General: Skin is warm and dry.      Capillary Refill: Capillary refill takes less than 2 seconds.   Neurological:      General: No focal deficit present.      Mental Status: He is alert and oriented to person, place, and time. Mental status is at baseline.      Motor: No weakness.      Gait: Gait normal.   Psychiatric:         Mood and Affect: Mood normal.         Behavior: Behavior normal.             Significant Labs: All pertinent labs within the past 24 hours have been reviewed.  A1C:   Recent Labs   Lab 05/23/24  1505 10/06/24  1355   HGBA1C 5.5 7.8*     Recent Labs   Lab 10/06/24  1346 10/07/24  0354 10/09/24  0355 10/10/24  0336   BILITOT 1.0 0.7 0.4 0.3     Blood Culture:   Recent Labs   Lab 10/09/24  1132 10/09/24  1147   LABBLOO No Growth to date No Growth to date     BMP:   Recent Labs   Lab 10/10/24  0336   *   *   K 4.4      CO2 31*   BUN 18   CREATININE 0.8   CALCIUM 8.3*   MG 1.4*     CBC:   Recent Labs   Lab 10/08/24  1030 10/09/24  0355 10/10/24  0336   WBC 6.62 7.33 8.41   HGB 10.2* 10.4* 9.7*   HCT 31.4* 33.2* 30.5*    400 405     CMP:   Recent Labs   Lab 10/08/24  1030 10/09/24  0355 10/10/24  0336   *  136 135*   K 4.3 4.5 4.4    102 102   CO2 26 31* 31*   * 152* 138*   BUN 29* 21* 18   CREATININE 1.0 1.1 0.8   CALCIUM 8.4* 8.6* 8.3*   PROT  --  6.5 6.2   ALBUMIN  --  1.5* 1.4*   BILITOT  --  0.4 0.3   ALKPHOS  --  94 85   AST  --  21 23   ALT  --  25 27   ANIONGAP 6 3 2*     Recent Labs   Lab 10/08/24  1030 10/09/24  0355 10/10/24  0336   MG 2.0 1.6 1.4*     POCT Glucose:   Recent Labs   Lab 10/09/24  1636 10/09/24  1638 10/09/24  2045   POCTGLUCOSE >500* 232* 234*     Significant Imaging: I have reviewed all pertinent imaging results/findings within the past 24 hours.

## 2024-10-10 NOTE — ASSESSMENT & PLAN NOTE
Patient has paroxysmal (<7 days) atrial fibrillation. Patient is currently in atrial fibrillation. [id number] Score: 2. The patients heart rate in the last 24 hours is as follows:  Pulse  Min: 70  Max: 96     Antiarrhythmics  metoprolol succinate (TOPROL-XL) 24 hr tablet 100 mg, Daily, Oral  amiodarone tablet 200 mg, Daily, Oral  metoprolol succinate (TOPROL-XL) 24 hr tablet, Daily, Oral  amiodarone (PACERONE) tablet, Daily, Oral  Anticoagulants  apixaban tablet 5 mg, 2 times daily, Oral  apixaban tablet, 2 times daily, Oral    Per cardiology, trasition from amiodarone gtt to PO amiodarone.   Plan  - Replete lytes with a goal of K>4, Mg >2  - Patient's afib is currently controlled  - eliquis BID  - f/u cardiology recommendations

## 2024-10-12 ENCOUNTER — LAB VISIT (OUTPATIENT)
Dept: LAB | Facility: HOSPITAL | Age: 59
End: 2024-10-12
Attending: INTERNAL MEDICINE
Payer: MEDICAID

## 2024-10-12 DIAGNOSIS — I48.91 ATRIAL FIBRILLATION WITH RVR: ICD-10-CM

## 2024-10-12 LAB
ALBUMIN SERPL BCP-MCNC: 1.8 G/DL (ref 3.5–5.2)
ALP SERPL-CCNC: 98 U/L (ref 55–135)
ALT SERPL W/O P-5'-P-CCNC: 19 U/L (ref 10–44)
ANION GAP SERPL CALC-SCNC: 5 MMOL/L (ref 3–11)
APTT PPP: 36 SEC (ref 21–32)
AST SERPL-CCNC: 17 U/L (ref 10–40)
BILIRUB SERPL-MCNC: 0.8 MG/DL (ref 0.1–1)
BUN SERPL-MCNC: 18 MG/DL (ref 6–20)
CALCIUM SERPL-MCNC: 8.9 MG/DL (ref 8.7–10.5)
CHLORIDE SERPL-SCNC: 99 MMOL/L (ref 95–110)
CHOLEST SERPL-MCNC: 76 MG/DL (ref 120–199)
CHOLEST/HDLC SERPL: 4 {RATIO} (ref 2–5)
CO2 SERPL-SCNC: 30 MMOL/L (ref 23–29)
CREAT SERPL-MCNC: 1 MG/DL (ref 0.5–1.4)
ERYTHROCYTE [DISTWIDTH] IN BLOOD BY AUTOMATED COUNT: 13.2 % (ref 11.5–14.5)
ERYTHROCYTE [SEDIMENTATION RATE] IN BLOOD: 92 MM/HR (ref 0–10)
EST. GFR  (NO RACE VARIABLE): >60 ML/MIN/1.73 M^2
ESTIMATED AVG GLUCOSE: 183 MG/DL (ref 68–131)
GLUCOSE SERPL-MCNC: 154 MG/DL (ref 70–110)
HBA1C MFR BLD: 8 % (ref 4–5.6)
HCT VFR BLD AUTO: 32.1 % (ref 40–54)
HDLC SERPL-MCNC: 19 MG/DL (ref 40–75)
HDLC SERPL: 25 % (ref 20–50)
HGB BLD-MCNC: 10.2 G/DL (ref 14–18)
INR PPP: 1.1 (ref 0.8–1.2)
LDLC SERPL CALC-MCNC: 35.4 MG/DL (ref 63–159)
MCH RBC QN AUTO: 27.3 PG (ref 27–31)
MCHC RBC AUTO-ENTMCNC: 31.8 G/DL (ref 32–36)
MCV RBC AUTO: 86 FL (ref 82–98)
NONHDLC SERPL-MCNC: 57 MG/DL
PLATELET # BLD AUTO: 417 K/UL (ref 150–450)
PMV BLD AUTO: 8.8 FL (ref 9.2–12.9)
POTASSIUM SERPL-SCNC: 4.5 MMOL/L (ref 3.5–5.1)
PROT SERPL-MCNC: 7.1 G/DL (ref 6–8.4)
PROTHROMBIN TIME: 11.9 SEC (ref 9–12.5)
RBC # BLD AUTO: 3.74 M/UL (ref 4.6–6.2)
SODIUM SERPL-SCNC: 134 MMOL/L (ref 136–145)
TRIGL SERPL-MCNC: 108 MG/DL (ref 30–150)
WBC # BLD AUTO: 13.74 K/UL (ref 3.9–12.7)

## 2024-10-12 PROCEDURE — 36415 COLL VENOUS BLD VENIPUNCTURE: CPT | Performed by: INTERNAL MEDICINE

## 2024-10-12 PROCEDURE — 85027 COMPLETE CBC AUTOMATED: CPT | Performed by: INTERNAL MEDICINE

## 2024-10-12 PROCEDURE — 83036 HEMOGLOBIN GLYCOSYLATED A1C: CPT | Performed by: INTERNAL MEDICINE

## 2024-10-12 PROCEDURE — 85610 PROTHROMBIN TIME: CPT | Performed by: INTERNAL MEDICINE

## 2024-10-12 PROCEDURE — 85651 RBC SED RATE NONAUTOMATED: CPT | Performed by: INTERNAL MEDICINE

## 2024-10-12 PROCEDURE — 80061 LIPID PANEL: CPT | Performed by: INTERNAL MEDICINE

## 2024-10-12 PROCEDURE — 85730 THROMBOPLASTIN TIME PARTIAL: CPT | Performed by: INTERNAL MEDICINE

## 2024-10-12 PROCEDURE — 80053 COMPREHEN METABOLIC PANEL: CPT | Performed by: INTERNAL MEDICINE

## 2024-10-14 LAB
BACTERIA BLD CULT: NORMAL
BACTERIA BLD CULT: NORMAL

## 2024-10-26 DIAGNOSIS — L97.518 DIABETIC ULCER OF RIGHT FOOT ASSOCIATED WITH TYPE 2 DIABETES MELLITUS, WITH OTHER ULCER SEVERITY, UNSPECIFIED PART OF FOOT: ICD-10-CM

## 2024-10-26 DIAGNOSIS — E78.2 MIXED HYPERLIPIDEMIA: Chronic | ICD-10-CM

## 2024-10-26 DIAGNOSIS — E13.40 NEUROPATHY DUE TO SECONDARY DIABETES: ICD-10-CM

## 2024-10-26 DIAGNOSIS — E11.621 DIABETIC ULCER OF RIGHT FOOT ASSOCIATED WITH TYPE 2 DIABETES MELLITUS, WITH OTHER ULCER SEVERITY, UNSPECIFIED PART OF FOOT: ICD-10-CM

## 2024-10-30 RX ORDER — ATORVASTATIN CALCIUM 40 MG/1
40 TABLET, FILM COATED ORAL DAILY
Qty: 90 TABLET | Refills: 4 | Status: SHIPPED | OUTPATIENT
Start: 2024-10-30 | End: 2025-10-30

## 2024-10-30 RX ORDER — DULOXETIN HYDROCHLORIDE 30 MG/1
30 CAPSULE, DELAYED RELEASE ORAL DAILY
Qty: 30 CAPSULE | Refills: 11 | Status: SHIPPED | OUTPATIENT
Start: 2024-10-30

## 2024-11-18 ENCOUNTER — TELEPHONE (OUTPATIENT)
Dept: PRIMARY CARE CLINIC | Facility: CLINIC | Age: 59
End: 2024-11-18
Payer: MEDICAID

## 2024-11-18 NOTE — TELEPHONE ENCOUNTER
Provider spoke to patients wife on the telephone          ----- Message from Tigist sent at 11/13/2024  1:30 PM CST -----  Regarding: Needing an order  Has Pickline in and now course is  completed, needing to get  picline remove, needing orders removed.    [facility] infusion- [phone]

## 2025-04-14 PROBLEM — E55.9 VITAMIN D DEFICIENCY: Status: ACTIVE | Noted: 2025-04-14

## 2025-04-14 PROBLEM — E66.01 MORBID OBESITY WITH BMI OF 40.0-44.9, ADULT: Status: ACTIVE | Noted: 2022-10-05

## 2025-04-14 PROBLEM — Z86.73 HISTORY OF CVA (CEREBROVASCULAR ACCIDENT): Status: ACTIVE | Noted: 2025-04-14

## 2025-05-02 ENCOUNTER — HOSPITAL ENCOUNTER (EMERGENCY)
Facility: HOSPITAL | Age: 60
Discharge: HOME | End: 2025-05-02
Attending: EMERGENCY MEDICINE
Payer: MEDICARE

## 2025-05-02 VITALS
HEART RATE: 80 BPM | OXYGEN SATURATION: 97 % | WEIGHT: 315 LBS | BODY MASS INDEX: 41.75 KG/M2 | HEIGHT: 73 IN | RESPIRATION RATE: 20 BRPM | TEMPERATURE: 98 F | DIASTOLIC BLOOD PRESSURE: 104 MMHG | SYSTOLIC BLOOD PRESSURE: 197 MMHG

## 2025-05-02 DIAGNOSIS — T14.8XXA PUNCTURE WOUND: Primary | ICD-10-CM

## 2025-05-02 DIAGNOSIS — T14.8XXA CHRONIC WOUND: ICD-10-CM

## 2025-05-02 DIAGNOSIS — E11.42 DIABETIC POLYNEUROPATHY ASSOCIATED WITH TYPE 2 DIABETES MELLITUS: ICD-10-CM

## 2025-05-02 PROCEDURE — 25000003 PHARM REV CODE 250: Performed by: NURSE PRACTITIONER

## 2025-05-02 PROCEDURE — 99283 EMERGENCY DEPT VISIT LOW MDM: CPT

## 2025-05-02 RX ORDER — CEPHALEXIN 500 MG/1
500 CAPSULE ORAL 4 TIMES DAILY
Qty: 28 CAPSULE | Refills: 0 | Status: SHIPPED | OUTPATIENT
Start: 2025-05-02 | End: 2025-05-09

## 2025-05-02 RX ORDER — CEPHALEXIN 250 MG/1
500 CAPSULE ORAL
Status: COMPLETED | OUTPATIENT
Start: 2025-05-02 | End: 2025-05-02

## 2025-05-02 RX ADMIN — CEPHALEXIN 500 MG: 250 CAPSULE ORAL at 08:05

## 2025-05-03 NOTE — ED PROVIDER NOTES
Encounter Date: 5/2/2025       History     Chief Complaint   Patient presents with    Foot Injury     PT HERE VIA POV AFTER PULLING OUT A NAIL FROM HIS RIGHT FOOT, PT HAS A HISTORY OF DIABETIC NEUROPATHY AND DID NOT FEE ANYTHING. hE IS CONCERNED THAT ONE OF HIS TOES COULD BE INFECTED. NOT UP TO DATE WITH TETANUS      This is a 60-year-old white male with multiple medical problems including Charcot joint, diabetes mellitus type 2, hypertension, and peripheral neuropathy with chronic ulcerations who presents to the emergency department for treatment of puncture wound to left 5th toe that occurred earlier today.  Patient reports accidentally stepping on a nail and was able to remove it, tip intact.  Patient is concerned regarding length of most recent tetanus immunization and is unsure if the injury would require antibiotics given his conditions.  Patient is also requesting evaluation of the 2nd digit that does have a chronic ulceration on it but patient felt that it was a little bit redder than usual earlier today, reporting that it appears normal now.  He denies fever or vomiting.  He has upcoming appointments with podiatry and wound care.      Review of patient's allergies indicates:  No Known Allergies  Past Medical History:   Diagnosis Date    Acute renal failure with tubular necrosis 10/22/2022    Atrial fibrillation     BMI 45.0-49.9, adult 10/05/2022    Recommendations:   Stay physically active. As tolerated alternate resistance training with stretching and cardio. Goal of 150 minutes per week of moderate intensity activity or 7,500 - 10,000 steps per day. Follow the Mediterranean Diet. Include whole fresh fruits, vegetables, olive oil, seeds, nuts, whole grains, cold water fish, salmon, mackerel and lean cuts of meat.  Do not drink sugary/diet c    Charcot's joint 12/2023    right    Diabetes mellitus, type 2     Does mobilize using cane     Fever 10/22/2022    Hyperlipidemia     Hypertension     Hyponatremia  10/22/2022    Left-sided weakness     Neuropathy     bilateral lower ext    Obese     PAD (peripheral artery disease)     Peripheral neuropathy     Rash 11/01/2022    Rhinophyma     BILATERAL NOSE    Severe sleep apnea     UNABLE TO TOLERATE CPAP    Stroke 10/2022    Wound of right foot 12/2023     Past Surgical History:   Procedure Laterality Date    ANGIOGRAM, AORTIC ARCH, PERIPHERAL Bilateral 3/4/2024    Procedure: Angiogram, Aortic Arch, Peripheral;  Surgeon: Isai Hernandez MD;  Location: [facility] CATH;  Service: Cardiology;  Laterality: Bilateral;    APPENDECTOMY      pt denies    DEBRIDEMENT OF FOOT Right 12/15/2023    Procedure: DEBRIDEMENT, FOOT;  Surgeon: Baldemar Esquivel DPM;  Location: [facility] OR;  Service: Podiatry;  Laterality: Right;    ECHOCARDIOGRAM,TRANSESOPHAGEAL N/A 10/14/2024    Procedure: TRANSESOPHAGEAL ECHOCARDIOGRAM;  Surgeon: Isai Hernandez MD;  Location: [facility] ENDO;  Service: Cardiology;  Laterality: N/A;    FOOT MASS EXCISION Right 12/15/2023    Procedure: EXCISION, MASS, FOOT;  Surgeon: Baldemar Esquivel DPM;  Location: [facility] OR;  Service: Podiatry;  Laterality: Right;    HERNIA REPAIR      INCISION AND DRAINAGE FOOT Bilateral 12/26/2018    Procedure: INCISION AND DRAINAGE, FOOT;  Surgeon: Rob Alas DPM;  Location: [facility] OR;  Service: Podiatry;  Laterality: Bilateral;    SURGICAL REMOVAL OF FASCIA Left 12/15/2023    Procedure: FASCIECTOMY;  Surgeon: Baldemar Esquivel DPM;  Location: [facility] OR;  Service: Podiatry;  Laterality: Left;  1st 0600    TOE AMPUTATION      right great toe    TOE AMPUTATION Left 12/26/2018    Procedure: AMPUTATION, TOE; left 3rd toe;  Surgeon: Rob Alas DPM;  Location: [facility] OR;  Service: Podiatry;  Laterality: Left;    TONSILLECTOMY      TREATMENT OF CARDIAC ARRHYTHMIA N/A 10/14/2024    Procedure: CARDIOVERSION;  Surgeon: Isai Hernandez MD;  Location: [facility] ENDO;  Service: Cardiology;  Laterality: N/A;     Family History   Problem Relation  Name Age of Onset    No Known Problems Mother      Cancer Father      No Known Problems Brother       Social History[1]  Review of Systems   Constitutional:  Negative for fever.   Gastrointestinal:  Negative for vomiting.   Skin:  Positive for wound.       Physical Exam     Initial Vitals [05/02/25 2017]   BP Pulse Resp Temp SpO2   (!) 197/104 80 20 98.1 °F (36.7 °C) 97 %      MAP       --         Physical Exam    Nursing note and vitals reviewed.  Constitutional: He appears well-developed and well-nourished. He is active. No distress.   HENT:   Head: Normocephalic and atraumatic.   Eyes: EOM are normal. Pupils are equal, round, and reactive to light.   Neck: Neck supple.   Normal range of motion.  Cardiovascular:  Normal rate.           Pulmonary/Chest: No respiratory distress.   Musculoskeletal:      Cervical back: Normal range of motion and neck supple.        Feet:      Neurological: He is alert and oriented to person, place, and time. GCS score is 15. GCS eye subscore is 4. GCS verbal subscore is 5. GCS motor subscore is 6.   Skin: Skin is warm and dry. Capillary refill takes less than 2 seconds.   Psychiatric: He has a normal mood and affect. His behavior is normal. Thought content normal.         ED Course   Procedures  Labs Reviewed - No data to display       Imaging Results    None          Medications   cephALEXin capsule 500 mg (500 mg Oral Given 5/2/25 2034)     Medical Decision Making  Risk  Prescription drug management.               ED Course as of 05/02/25 2035   Fri May 02, 2025   2032 Review of EMR reveals most recent tetanus immunization was in April of 2023.  Will provide antibiotic prophylaxis for puncture wound given multiple comorbidities. [CB]      ED Course User Index  [CB] Cristina Huang NP                           Clinical Impression:  Final diagnoses:  [T14.8XXA] Puncture wound (Primary)  [E11.42] Diabetic polyneuropathy associated with type 2 diabetes mellitus  [T14.8XXA] Chronic  wound          ED Disposition Condition    Discharge Stable          ED Prescriptions       Medication Sig Dispense Start Date End Date Auth. Provider    cephALEXin (KEFLEX) 500 MG capsule Take 1 capsule (500 mg total) by mouth 4 (four) times daily. for 7 days 28 capsule 5/2/2025 5/9/2025 Cristina Huang NP          Follow-up Information       Follow up With Specialties Details Why Contact Info    PCP Follow UP  Schedule an appointment as soon as possible for a visit in 3 days for follow-up, for re-evaluation of today's complaint                [1]   Social History  Tobacco Use    Smoking status: Never     Passive exposure: Never    Smokeless tobacco: Never   Substance Use Topics    Alcohol use: No    Drug use: No        Cristina Huang NP  05/02/25 2035

## 2025-05-03 NOTE — ED NOTES
.AAOx4, skin W/D/P, cap refill<3 sec, MAEW, NAD, gait steady ,  no assistance needed upon discharge. Wheelchair and assistance offered, patient declined.

## 2025-05-03 NOTE — DISCHARGE INSTRUCTIONS
Wash affected area twice daily with antibacterial soap and water.  Keep wound covered.  Be sure to complete all antibiotics.  Follow-up with your primary doctor and podiatrist as scheduled.  Return to the emergency department for worsening condition.

## 2025-05-06 ENCOUNTER — CLINICAL SUPPORT (OUTPATIENT)
Facility: CLINIC | Age: 60
End: 2025-05-06
Payer: MEDICARE

## 2025-05-06 DIAGNOSIS — E11.42 TYPE 2 DIABETES MELLITUS WITH DIABETIC POLYNEUROPATHY, WITH LONG-TERM CURRENT USE OF INSULIN: ICD-10-CM

## 2025-05-06 DIAGNOSIS — Z79.4 TYPE 2 DIABETES MELLITUS WITH DIABETIC POLYNEUROPATHY, WITH LONG-TERM CURRENT USE OF INSULIN: ICD-10-CM

## 2025-05-06 PROCEDURE — G0108 DIAB MANAGE TRN  PER INDIV: HCPCS | Mod: S$GLB,,, | Performed by: STUDENT IN AN ORGANIZED HEALTH CARE EDUCATION/TRAINING PROGRAM

## 2025-05-06 NOTE — TELEPHONE ENCOUNTER
Patient came in office stating that patient is needing prior authorization for his Dexcom G7 sensor, seen by Yancy Diabetes educator today 05/06/25 at [facility].    Prior Authorization sent to payer.     *Patient not seen provider Dr. Justin, currently seeing Floridalma Tierney endocrinologist.

## 2025-05-06 NOTE — PROGRESS NOTES
Diabetes Care Specialist Progress Note  Author: Yancy Nergete RN  Date: 5/6/2025    Intake  Program Intake  Reason for Diabetes Program Visit:: Intervention  Type of Intervention:: Individual  Individual: Device Training  Device Training: Personal CGM  Current diabetes risk level:: moderate  In the last month, have you used the ER or been admitted to the hospital: Yes  Was the ER or hospital admission related to diabetes?: No  Permission to speak with others about care:: yes    Continuous Glucose Monitoring  Patient has CGM: Yes  Personal CGM type:: Dexcom G7    Lab Results   Component Value Date    HGBA1C 7.1 (H) 04/14/2025     Diabetes Self-Management Skills Assessment  Medication Skills Assessment  Medication Skills Assessment Completed:: No  Deffered due to:: Time  Area of need?: Deferred    Home Blood Glucose Monitoring  Personal CGM type:: Dexcom G7    Assessment Summary and Plan  Based on today's diabetes care assessment, the following areas of need were identified:      Identified Areas of Need      Medication/Current Diabetes Treatment: Deferred   Lifestyle Coping/Support:     Diabetes Disease Process/Treatment Options:     Nutrition/Healthy Eating:      Physical Activity/Exercise:      Home Blood Glucose Monitoring:      Acute Complications:      Chronic Complications:       Today's interventions were provided through individual discussion, instruction, and written materials were provided.      Patient verbalized understanding of instruction and written materials.  Pt was able to return back demonstration of instructions today. Patient understood key points, needs reinforcement and further instruction.     Diabetes Self-Management Care Plan:  Today's Diabetes Self-Management Care Plan was developed with Andrew's input. Andrew has agreed to work toward the following goal(s) to improve his/her overall diabetes control.      Care Plan: Diabetes Management   Updates made since 5/6/2024 12:00 AM         Problem: Blood Glucose Self-Monitoring         Goal: Patient agrees to check and record blood sugars uasing the Dexcom G7.    Start Date: 5/6/2025   Priority: High   Barriers: Knowledge deficit   Note:    DEXCOM G7 CGM TRAINING    Patient will use Dexcom G7  to receive continuous glucose data.        Overview:  5 minute glucose reading updates, trending arrows, BG graph screens, reports screen,  connectivity and functions.   Menus: Trend graph, start sensor, enter BG, events, alerts, settings, replace sensor, stop sensor, and shutdown  Dexcom G7 mobile silvina/ Settings:                          * Urgent Low: 55 mg/dL                          * Urgent Low Soon: On                          * Low Alert: 70 mg/dL                          * High Alert: 250 mg/dL     Reviewed additional setting options.  Patient paired sensor using 4-digit code listed on sensor cap..    Reviewed where to find sensor insertion time and expiration date.   Reviewed appropriate calibration techniques.  Reviewed sensor site selection. Patient selected and prepped site using aseptic technique, inserted sensor, applied overlay tape and started session.   Reviewed sensor removal from site. Discussed times to remove sensor per  guidelines include MRI or diatherapy.   Patient able to demonstrate without difficulty. Encouraged to review manual and/or training videos prior to starting another sensor.   Reviewed problem solving aspects of sensor transmission/variables that can disrupt RF transmission.  range 20 feet, but the first 3 hrs keep within 3 feet of transmitter.  Patient instructed on lag time of interstitial fluid from CBG and was advised to treat hypoglycemia and dose insulin based on SMBG values.  Dexcom technical support contact number given and examples of when to contact them discussed.       Andrew came into the clinic today to start his Dexcom G7. He explained that he was unable to pick  up his Dexcom due to his insurance card just coming in today. Pt was offered to reschedule his appointment, but instead asked to use a DEMO kit for training. Walked the patient through the steps to power on the , setup the , and start a sensor. Patient was able to complete a return demonstration using the DEMO kit. Discussed appropriate timing and frequency to SMBG, education on parameters on when to notify provider and advised patient to bring logs to all appts with PCP/Endocrinologist/Diabetes Care Specialist. Pt was receptive to education. Pt was provided with the Diabetes education clinic contact number and the Dexcom technical support number for any further questions or concerns. Pt is to follow up in 10 days for BG review and further education.          Follow Up Plan   No follow-ups on file.    Today's care plan and follow up schedule was discussed with patient.  Andrew verbalized understanding of the care plan, goals, and agrees to follow up plan.        The patient was encouraged to communicate with his/her health care provider/physician and care team regarding his/her condition(s) and treatment.  I provided the patient with my contact information today and encouraged to contact me via phone or [facility]'s Patient Portal as needed.     Length of Visit   Total Time: 30 Minutes

## 2025-06-10 ENCOUNTER — CLINICAL SUPPORT (OUTPATIENT)
Facility: CLINIC | Age: 60
End: 2025-06-10
Payer: MEDICARE

## 2025-06-10 DIAGNOSIS — Z79.4 TYPE 2 DIABETES MELLITUS WITH HYPERGLYCEMIA, WITH LONG-TERM CURRENT USE OF INSULIN: Primary | ICD-10-CM

## 2025-06-10 DIAGNOSIS — E11.65 TYPE 2 DIABETES MELLITUS WITH HYPERGLYCEMIA, WITH LONG-TERM CURRENT USE OF INSULIN: Primary | ICD-10-CM

## 2025-06-10 PROCEDURE — G0108 DIAB MANAGE TRN  PER INDIV: HCPCS | Mod: S$GLB,,, | Performed by: STUDENT IN AN ORGANIZED HEALTH CARE EDUCATION/TRAINING PROGRAM

## 2025-07-15 ENCOUNTER — CLINICAL SUPPORT (OUTPATIENT)
Facility: CLINIC | Age: 60
End: 2025-07-15
Payer: MEDICARE

## 2025-07-15 DIAGNOSIS — E11.9 TYPE 2 DIABETES MELLITUS WITHOUT COMPLICATION, WITH LONG-TERM CURRENT USE OF INSULIN: Primary | ICD-10-CM

## 2025-07-15 DIAGNOSIS — Z79.4 TYPE 2 DIABETES MELLITUS WITHOUT COMPLICATION, WITH LONG-TERM CURRENT USE OF INSULIN: Primary | ICD-10-CM

## 2025-07-15 PROCEDURE — G0108 DIAB MANAGE TRN  PER INDIV: HCPCS | Mod: S$GLB,,, | Performed by: STUDENT IN AN ORGANIZED HEALTH CARE EDUCATION/TRAINING PROGRAM

## 2025-07-15 NOTE — PROGRESS NOTES
Diabetes Care Specialist Progress Note  Author: Yancy Negrete RN  Date: 7/15/2025    Intake  Program Intake  Reason for Diabetes Program Visit:: Intervention  Type of Intervention:: Individual  Individual: Education  Education: Self-Management Skill Review  Current diabetes risk level:: moderate  In the last month, have you used the ER or been admitted to the hospital: No  Permission to speak with others about care:: yes    Current Diabetes Treatment: Oral Medications, DM Injectables, Insulin  Oral Medication Type/Dose: Metformin XR 1000mg bid, Jardiance 25mg  DM Injectables Type/Dose: Ozempic 0.25mg  Method of insulin delivery?: Injections  Injection Type: Pens  Pen Type/Dose: Lantus 25 units    Continuous Glucose Monitoring  Patient has CGM: Yes  Personal CGM type:: Dexcom G7  GMI Date: 07/15/25  GMI Value: 6.6 %    Lab Results   Component Value Date    HGBA1C 7.1 (H) 04/14/2025         Diabetes Self-Management Skills Assessment  Medication Skills Assessment  Patient is able to identify current diabetes medications, dosages, and appropriate timing of medications.: yes  Patient reports problems or concerns with current medication regimen.: no  Patient is  aware that some diabetes medications can cause low blood sugar?: Yes  Medication Skills Assessment Completed:: Yes  Assessment indicates:: Adequate understanding  Area of need?: No    Home Blood Glucose Monitoring  Personal CGM type:: Dexcom G7    Assessment Summary and Plan  Based on today's diabetes care assessment, the following areas of need were identified:      Identified Areas of Need      Medication/Current Diabetes Treatment: No   Lifestyle Coping/Support:     Diabetes Disease Process/Treatment Options:     Nutrition/Healthy Eating:      Physical Activity/Exercise:      Home Blood Glucose Monitoring:      Acute Complications:      Chronic Complications:     Today's interventions were provided through individual discussion, instruction, and written  materials were provided.      Patient verbalized understanding of instruction and written materials.  Pt was able to return back demonstration of instructions today. Patient understood key points, needs reinforcement and further instruction.     Diabetes Self-Management Care Plan:  Today's Diabetes Self-Management Care Plan was developed with Andrew's input. Andrew has agreed to work toward the following goal(s) to improve his/her overall diabetes control.      Care Plan: Diabetes Management   Updates made since 7/15/2024 12:00 AM        Problem: Blood Glucose Self-Monitoring         Goal: Patient agrees to check and record blood sugars uasing the Dexcom G7.    Start Date: 5/6/2025   This Visit's Progress: On track   Recent Progress: On track   Priority: High   Barriers: Knowledge deficit   Note:    DEXCOM G7 CGM TRAINING    Patient will use Dexcom G7  to receive continuous glucose data.        Overview:  5 minute glucose reading updates, trending arrows, BG graph screens, reports screen,  connectivity and functions.   Menus: Trend graph, start sensor, enter BG, events, alerts, settings, replace sensor, stop sensor, and shutdown  Dexcom G7 mobile silvina/ Settings:                          * Urgent Low: 55 mg/dL                          * Urgent Low Soon: On                          * Low Alert: 70 mg/dL                          * High Alert: 250 mg/dL     Reviewed additional setting options.  Patient paired sensor using 4-digit code listed on sensor cap..    Reviewed where to find sensor insertion time and expiration date.   Reviewed appropriate calibration techniques.  Reviewed sensor site selection. Patient selected and prepped site using aseptic technique, inserted sensor, applied overlay tape and started session.   Reviewed sensor removal from site. Discussed times to remove sensor per  guidelines include MRI or diatherapy.   Patient able to demonstrate without difficulty.  Encouraged to review manual and/or training videos prior to starting another sensor.   Reviewed problem solving aspects of sensor transmission/variables that can disrupt RF transmission.  range 20 feet, but the first 3 hrs keep within 3 feet of transmitter.  Patient instructed on lag time of interstitial fluid from CBG and was advised to treat hypoglycemia and dose insulin based on SMBG values.  Dexcom technical support contact number given and examples of when to contact them discussed.       Andrew came into the clinic today to start his Dexcom G7. He explained that he was unable to  his Dexcom due to his insurance card just coming in today. Pt was offered to reschedule his appointment, but instead asked to use a DEMO kit for training. Walked the patient through the steps to power on the , setup the , and start a sensor. Patient was able to complete a return demonstration using the DEMO kit. Discussed appropriate timing and frequency to SMBG, education on parameters on when to notify provider and advised patient to bring logs to all appts with PCP/Endocrinologist/Diabetes Care Specialist. Pt was receptive to education. Pt was provided with the Diabetes education clinic contact number and the Dexcom technical support number for any further questions or concerns. Pt is to follow up in 10 days for BG review and further education.     06/10/2025: Andrew came into the Diabetes Education clinic today to start his Dexcom G7. He explained he remembered how to insert the sensor but wanted to do it in the clinic for reassurance. Pt's family member was able to select and prep a proper site, insert the sensor, apply the overlay patch, and enter the 4 digit pairing code. The  then entered the 30 minute warm up period. Discussed appropriate timing and frequency to SMBG, education on parameters on when to notify provider and advised patient to bring logs to all appts with  PCP/Endocrinologist/Diabetes Care Specialist. Pt was receptive to education. Pt was provided with the Diabetes education clinic contact number and the Dexcom technical support number for any further questions or concerns. All other questions and concerns addressed. Pt is to f/u in 1 month for BG review and further education.     07/15/2025: Andrew came into the clinic today for Bg review. Upload of his Dexcom  showed an average BG of 138mg/dL with 88% in range, 11% high, and 1% very high. Pt explained that he has been taking 25mg of Jardiance, 1000mg of Metformin XR bid, 0.25mg of Ozempic weekly, and 25 units of Lantus daily. Overall pt is doing well. Pt c/o sensor falling off prior to the 10 day wear period. Discussed using skin tac or over patch to increase wear time. Pt was receptive to education. All other questions and concerns addressed.          Follow Up Plan   No follow-ups on file.    Today's care plan and follow up schedule was discussed with patient.  Andrew verbalized understanding of the care plan, goals, and agrees to follow up plan.        The patient was encouraged to communicate with his/her health care provider/physician and care team regarding his/her condition(s) and treatment.  I provided the patient with my contact information today and encouraged to contact me via phone or [facility] Patient Portal as needed.     Length of Visit   Total Time: 30 Minutes

## 2025-08-06 ENCOUNTER — LAB VISIT (OUTPATIENT)
Dept: LAB | Facility: HOSPITAL | Age: 60
End: 2025-08-06
Attending: NURSE PRACTITIONER
Payer: MEDICARE

## 2025-08-06 DIAGNOSIS — Z15.2 CLASS 3 OBESITY DUE TO DISRUPTION OF MC4R PATHWAY WITH SERIOUS COMORBIDITY AND BODY MASS INDEX (BMI) OF 40.0 TO 44.9 IN ADULT: ICD-10-CM

## 2025-08-06 DIAGNOSIS — Z79.4 TYPE 2 DIABETES MELLITUS WITH HYPOGLYCEMIA WITHOUT COMA, WITH LONG-TERM CURRENT USE OF INSULIN: ICD-10-CM

## 2025-08-06 DIAGNOSIS — Z86.73 HISTORY OF CVA (CEREBROVASCULAR ACCIDENT): ICD-10-CM

## 2025-08-06 DIAGNOSIS — E11.42 TYPE 2 DIABETES MELLITUS WITH DIABETIC POLYNEUROPATHY, WITH LONG-TERM CURRENT USE OF INSULIN: ICD-10-CM

## 2025-08-06 DIAGNOSIS — E78.2 MIXED HYPERLIPIDEMIA: Chronic | ICD-10-CM

## 2025-08-06 DIAGNOSIS — E88.82 CLASS 3 OBESITY DUE TO DISRUPTION OF MC4R PATHWAY WITH SERIOUS COMORBIDITY AND BODY MASS INDEX (BMI) OF 40.0 TO 44.9 IN ADULT: ICD-10-CM

## 2025-08-06 DIAGNOSIS — E11.649 TYPE 2 DIABETES MELLITUS WITH HYPOGLYCEMIA WITHOUT COMA, WITH LONG-TERM CURRENT USE OF INSULIN: ICD-10-CM

## 2025-08-06 DIAGNOSIS — I10 ESSENTIAL HYPERTENSION: ICD-10-CM

## 2025-08-06 DIAGNOSIS — Z79.4 TYPE 2 DIABETES MELLITUS WITH DIABETIC POLYNEUROPATHY, WITH LONG-TERM CURRENT USE OF INSULIN: ICD-10-CM

## 2025-08-06 DIAGNOSIS — Z79.899 LONG-TERM USE OF HIGH-RISK MEDICATION: ICD-10-CM

## 2025-08-06 DIAGNOSIS — E66.813 CLASS 3 OBESITY DUE TO DISRUPTION OF MC4R PATHWAY WITH SERIOUS COMORBIDITY AND BODY MASS INDEX (BMI) OF 40.0 TO 44.9 IN ADULT: ICD-10-CM

## 2025-08-06 DIAGNOSIS — E55.9 VITAMIN D DEFICIENCY: ICD-10-CM

## 2025-08-06 LAB
25(OH)D3+25(OH)D2 SERPL-MCNC: 27 NG/ML (ref 30–96)
ALBUMIN SERPL BCP-MCNC: 3.5 G/DL (ref 3.5–5.2)
ALBUMIN/CREAT UR: 10.3 UG/MG
ALP SERPL-CCNC: 100 UNIT/L (ref 40–150)
ALT SERPL W/O P-5'-P-CCNC: 11 UNIT/L (ref 10–44)
ANION GAP (OHS): 11 MMOL/L (ref 8–16)
AST SERPL-CCNC: 16 UNIT/L (ref 11–45)
BILIRUB SERPL-MCNC: 1.3 MG/DL (ref 0.1–1)
BUN SERPL-MCNC: 23 MG/DL (ref 6–20)
CALCIUM SERPL-MCNC: 8.8 MG/DL (ref 8.7–10.5)
CHLORIDE SERPL-SCNC: 107 MMOL/L (ref 95–110)
CHOLEST SERPL-MCNC: 112 MG/DL (ref 120–199)
CHOLEST/HDLC SERPL: 3.9 {RATIO} (ref 2–5)
CO2 SERPL-SCNC: 23 MMOL/L (ref 23–29)
CREAT SERPL-MCNC: 0.8 MG/DL (ref 0.5–1.4)
CREAT UR-MCNC: 116.5 MG/DL (ref 23–375)
EAG (OHS): 128 MG/DL (ref 68–131)
GFR SERPLBLD CREATININE-BSD FMLA CKD-EPI: >60 ML/MIN/1.73/M2
GLUCOSE SERPL-MCNC: 142 MG/DL (ref 70–110)
HBA1C MFR BLD: 6.1 % (ref 4–5.6)
HDLC SERPL-MCNC: 29 MG/DL (ref 40–75)
HDLC SERPL: 25.9 % (ref 20–50)
LDLC SERPL CALC-MCNC: 48.6 MG/DL (ref 63–159)
MICROALBUMIN UR-MCNC: 12 UG/ML (ref ?–5000)
NONHDLC SERPL-MCNC: 83 MG/DL
POTASSIUM SERPL-SCNC: 4.1 MMOL/L (ref 3.5–5.1)
PROT SERPL-MCNC: 7 GM/DL (ref 6–8.4)
SODIUM SERPL-SCNC: 141 MMOL/L (ref 136–145)
TRIGL SERPL-MCNC: 172 MG/DL (ref 30–150)
TSH SERPL-ACNC: 3.2 UIU/ML (ref 0.4–4)
VIT B12 SERPL-MCNC: 424 PG/ML (ref 210–950)

## 2025-08-06 PROCEDURE — 83036 HEMOGLOBIN GLYCOSYLATED A1C: CPT

## 2025-08-06 PROCEDURE — 80061 LIPID PANEL: CPT

## 2025-08-06 PROCEDURE — 82570 ASSAY OF URINE CREATININE: CPT

## 2025-08-06 PROCEDURE — 82306 VITAMIN D 25 HYDROXY: CPT

## 2025-08-06 PROCEDURE — 36415 COLL VENOUS BLD VENIPUNCTURE: CPT

## 2025-08-06 PROCEDURE — 84443 ASSAY THYROID STIM HORMONE: CPT

## 2025-08-06 PROCEDURE — 82040 ASSAY OF SERUM ALBUMIN: CPT

## 2025-08-06 PROCEDURE — 82607 VITAMIN B-12: CPT

## (undated) DEVICE — UNDERPAD PROTECT PLUS 17X24IN

## (undated) DEVICE — BANDAGE GAUZE COT STRL 4.5X4.1

## (undated) DEVICE — GLOVE BIOGEL SKINSENSE PI 7.0

## (undated) DEVICE — SEE MEDLINE ITEM 146270

## (undated) DEVICE — DRESSING SPONGE 16PLY 4X4 NS

## (undated) DEVICE — BANDAGE DERMACEA STRETCH 4X1IN

## (undated) DEVICE — BLADE SURG #15 CARBON STEEL

## (undated) DEVICE — GAUZE SPONGE BULKEE 6X6.75IN

## (undated) DEVICE — SPONGE LAP 18X18 PREWASHED

## (undated) DEVICE — PACKING STRIP IDOFORM 1X5YD

## (undated) DEVICE — NDL 18GA X1 1/2 REG BEVEL

## (undated) DEVICE — ADHESIVE MASTISOL VIAL 48/BX

## (undated) DEVICE — SOL NACL IRR 1000ML BTL

## (undated) DEVICE — GLOVE SURGICAL LATEX SZ 6.5

## (undated) DEVICE — GAUZE SPONGE 4X4 12PLY

## (undated) DEVICE — CLOSURE SKIN STERI STRIP 1/2X4

## (undated) DEVICE — PADDING CAST SPECIALIST 6X4YD

## (undated) DEVICE — SKIN MARKER STER DUAL TIP

## (undated) DEVICE — SEE MEDLINE ITEM 152622

## (undated) DEVICE — STRIP MEDI WND CLSR 1/2X4IN

## (undated) DEVICE — BLADE SAGITTA 5/BX

## (undated) DEVICE — GLOVE SENSICARE PI SURG 6

## (undated) DEVICE — SPONGE GAUZE 16PLY 4X4

## (undated) DEVICE — SUT ETHILON 4-0 PS2 18 BLK

## (undated) DEVICE — SUT 4.0 ETHILON

## (undated) DEVICE — TRAY MAJOR ORTHO SPILT SURG

## (undated) DEVICE — PAD ABD 8X10 STERILE

## (undated) DEVICE — PACK ELITE MINIVIEW DRAPE

## (undated) DEVICE — PADDING CAST 4IN SPECIALIST

## (undated) DEVICE — PACK ARTHROSCOPY W/ISO BAC

## (undated) DEVICE — HEMOSTAT SURGICEL 2X3IN

## (undated) DEVICE — CLEANER CAUT TIP STRL 2X2IN

## (undated) DEVICE — MARKER SKIN STND TIP BLUE BARR

## (undated) DEVICE — GOWN ISOLATION IMPERVIOUS

## (undated) DEVICE — POSITIONER IV ARMBOARD FOAM

## (undated) DEVICE — GAUZE PACKING STRIP PLN 1/4X5

## (undated) DEVICE — SEE MEDLINE ITEM 152529

## (undated) DEVICE — BLADES MINI BEAVER 62

## (undated) DEVICE — GAUZE CNFRM STRTCH STRL 4X75IN

## (undated) DEVICE — SUT 2-0 VICRYL / SH (J417)

## (undated) DEVICE — SEE MEDLINE ITEM 152530

## (undated) DEVICE — ELECTRODE REM PLYHSV RETURN 9

## (undated) DEVICE — TOURNIQUET SB QC DP 18X4IN

## (undated) DEVICE — APPLICATOR CHLORAPREP ORN 26ML

## (undated) DEVICE — LABEL FOR UTILITY MARKER

## (undated) DEVICE — BLANKET UPPER BODY 78.7X29.9IN

## (undated) DEVICE — NDL ECLIPSE SAFETY 18GX1-1/2IN

## (undated) DEVICE — CUP MEDICINE STERILE 2OZ

## (undated) DEVICE — NDL HYPO REG 25G X 1 1/2

## (undated) DEVICE — LINER GLOVE POWDERFREE SZ 6.5

## (undated) DEVICE — SEE MEDLINE ITEM 157172

## (undated) DEVICE — SYR 10CC LUER LOCK

## (undated) DEVICE — DRESSING XEROFORM 1X8IN

## (undated) DEVICE — TAPE SILK 3IN

## (undated) DEVICE — SEE MEDLINE ITEM 157128

## (undated) DEVICE — GAUZE BANDAGE CONFORM 3X75IN

## (undated) DEVICE — SPONGE DERMACEA GAUZE 4X4

## (undated) DEVICE — SEE MEDLINE ITEM 157131

## (undated) DEVICE — BANDAGE ESMARK LATEX FREE 4INX

## (undated) DEVICE — SWABSTICK PVP IODINE 3 S

## (undated) DEVICE — BANDAGE MATRIX HK LOOP 3IN 5YD

## (undated) DEVICE — DRESSING N ADH OIL EMUL 3X3

## (undated) DEVICE — STRAP KNEE & BODY DISP 4X34IN

## (undated) DEVICE — SYR ORAL ENTERAL PUR 12ML

## (undated) DEVICE — Device

## (undated) DEVICE — SEE MEDLINE ITEM 152523

## (undated) DEVICE — PAD ABDOMINAL 5X9 STERILE

## (undated) DEVICE — SEE MEDLINE ITEM 152487

## (undated) DEVICE — SUT 3-0 ETHILON 18 FS-1

## (undated) DEVICE — DRESSING PRIMAPORE IV 2X3IN

## (undated) DEVICE — CAUTERY PUSHBUTTON PENCIL

## (undated) DEVICE — GOWN SURGICAL XX LARGE X LONG

## (undated) DEVICE — COVER OVERHEAD SURG LT BLUE

## (undated) DEVICE — LINER SUCTION CANNISTER REGUGA

## (undated) DEVICE — SUT 3-0 VICRYL / SH (J416)

## (undated) DEVICE — DRAPE MINI C-ARM